# Patient Record
Sex: MALE | Race: WHITE | NOT HISPANIC OR LATINO | Employment: OTHER | URBAN - METROPOLITAN AREA
[De-identification: names, ages, dates, MRNs, and addresses within clinical notes are randomized per-mention and may not be internally consistent; named-entity substitution may affect disease eponyms.]

---

## 2017-01-13 PROBLEM — A04.71 RECURRENT CLOSTRIDIUM DIFFICILE DIARRHEA: Status: ACTIVE | Noted: 2017-01-13

## 2017-01-24 ENCOUNTER — PATIENT OUTREACH (OUTPATIENT)
Dept: HEALTH INFORMATION MANAGEMENT | Facility: OTHER | Age: 37
End: 2017-01-24

## 2017-01-24 NOTE — PROGRESS NOTES
· Chart reviewed.  Patient was discharged home from [facility] 1/23/17 with [facility] services.  Does not qualify for discharge outreach phone call.

## 2017-02-01 ENCOUNTER — TELEPHONE (OUTPATIENT)
Dept: NEUROLOGY | Facility: MEDICAL CENTER | Age: 37
End: 2017-02-01

## 2017-02-01 NOTE — TELEPHONE ENCOUNTER
Message: patient was admitted into the hospital 12/4/16. Patient was discharged 1/23/17. Patient's last tysabri infusion was 11/14/16. Whats the next step? Do you think he has the progessive MS and should he continue the tysabri?    Caller: angelika  Call Back #: [phone]  OK to leave detailed message: yes

## 2017-02-13 ENCOUNTER — OFFICE VISIT (OUTPATIENT)
Dept: PHYSICAL MEDICINE AND REHAB | Facility: MEDICAL CENTER | Age: 37
End: 2017-02-13
Payer: MEDICARE

## 2017-02-13 VITALS
HEIGHT: 71 IN | DIASTOLIC BLOOD PRESSURE: 74 MMHG | HEART RATE: 79 BPM | WEIGHT: 175 LBS | TEMPERATURE: 98.6 F | OXYGEN SATURATION: 96 % | BODY MASS INDEX: 24.5 KG/M2 | SYSTOLIC BLOOD PRESSURE: 124 MMHG

## 2017-02-13 DIAGNOSIS — G89.29 CHRONIC BILATERAL LOW BACK PAIN, WITH SCIATICA PRESENCE UNSPECIFIED: ICD-10-CM

## 2017-02-13 DIAGNOSIS — M54.5 CHRONIC BILATERAL LOW BACK PAIN, WITH SCIATICA PRESENCE UNSPECIFIED: ICD-10-CM

## 2017-02-13 DIAGNOSIS — M79.2 NERVE PAIN: ICD-10-CM

## 2017-02-13 DIAGNOSIS — G35 MULTIPLE SCLEROSIS (HCC): ICD-10-CM

## 2017-02-13 DIAGNOSIS — M62.838 MUSCLE SPASM: ICD-10-CM

## 2017-02-13 DIAGNOSIS — M79.18 MYOFASCIAL PAIN: ICD-10-CM

## 2017-02-13 DIAGNOSIS — M54.9 SPINAL PAIN: ICD-10-CM

## 2017-02-13 DIAGNOSIS — R25.2 SPASTICITY: ICD-10-CM

## 2017-02-13 PROCEDURE — 1111F DSCHRG MED/CURRENT MED MERGE: CPT | Performed by: PHYSICAL MEDICINE & REHABILITATION

## 2017-02-13 PROCEDURE — G8420 CALC BMI NORM PARAMETERS: HCPCS | Performed by: PHYSICAL MEDICINE & REHABILITATION

## 2017-02-13 PROCEDURE — G8484 FLU IMMUNIZE NO ADMIN: HCPCS | Performed by: PHYSICAL MEDICINE & REHABILITATION

## 2017-02-13 PROCEDURE — 1036F TOBACCO NON-USER: CPT | Performed by: PHYSICAL MEDICINE & REHABILITATION

## 2017-02-13 PROCEDURE — 99214 OFFICE O/P EST MOD 30 MIN: CPT | Performed by: PHYSICAL MEDICINE & REHABILITATION

## 2017-02-13 PROCEDURE — G8432 DEP SCR NOT DOC, RNG: HCPCS | Performed by: PHYSICAL MEDICINE & REHABILITATION

## 2017-02-13 RX ORDER — GABAPENTIN 100 MG/1
100 CAPSULE ORAL 3 TIMES DAILY
Qty: 45 CAP | Refills: 2 | Status: SHIPPED | OUTPATIENT
Start: 2017-02-13 | End: 2017-07-11

## 2017-02-13 RX ORDER — LIDOCAINE 50 MG/G
1 PATCH TOPICAL EVERY 24 HOURS
Qty: 30 PATCH | Refills: 2 | Status: SHIPPED | OUTPATIENT
Start: 2017-02-13 | End: 2017-04-11

## 2017-02-13 ASSESSMENT — ENCOUNTER SYMPTOMS
MYALGIAS: 1
BACK PAIN: 1
SPUTUM PRODUCTION: 0
NECK PAIN: 1
SENSORY CHANGE: 1
DIARRHEA: 0
ORTHOPNEA: 0
EYE DISCHARGE: 0
FEVER: 0
ABDOMINAL PAIN: 0
EYE REDNESS: 0
CLAUDICATION: 0
HEMOPTYSIS: 0
TINGLING: 1
CHILLS: 0

## 2017-02-13 NOTE — MR AVS SNAPSHOT
"        Jerzy Juan III   2017 1:20 PM   Office Visit   MRN: [id number]    Department:  Physiat[facility]   Dept Phone:  [phone]    Description:  Male : 1980   Provider:  John Peralta M.D.           Reason for Visit     New Patient           Allergies as of 2017     Allergen Noted Reactions    Other Drug 2016       CANNOT TAKE ANY STEROIDS WITH RECENT TYSABRI INFUSION    Asa [Aspirin] 2016   Unspecified    Bleeding in stomach as a baby      You were diagnosed with     Multiple sclerosis (CMS-HCC)   [340.ICD-9-CM]       Chronic bilateral low back pain, with sciatica presence unspecified   [6798735]       Nerve pain   [126304]       Spinal pain   [457293]         Vital Signs     Blood Pressure Pulse Temperature Height Weight Body Mass Index    124/74 mmHg 79 37 °C (98.6 °F) 1.803 m (5' 10.98\") 79.379 kg (175 lb) 24.42 kg/m2    Oxygen Saturation Smoking Status                96% Former Smoker          Basic Information     Date Of Birth Sex Race Ethnicity Preferred Language    1980 Male White Non- English      Your appointments     2017  3:00 PM   Follow Up Visit with Melissa P Bloch, M.D.   [facility] (--)    [address], [address]  [facility] [address] [phone]           You will be receiving a confirmation call a few days before your appointment from our automated call confirmation system.            Mar 22, 2017  1:30 PM   Follow Up Visit with John Peralta M.D.   [facility] (--)    [address]  [facility] [address] [phone]           You will be receiving a confirmation call a few days before your appointment from our automated call confirmation system.              Problem List              ICD-10-CM Priority Class Noted - Resolved    MS (multiple sclerosis) (HCC) G35 Low  2013 - Present    Depression F32.9 Low  2013 - Present    Multiple sclerosis exacerbation " (CMS-HCC) G35 High  4/7/2016 - Present    Urinary tract infection N39.0   10/7/2016 - Present    Hypokalemia E87.6   10/8/2016 - Present    Hyperglycemia R73.9   10/8/2016 - Present    C. 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      Below and/or attached are the medications your provider expects you to take. Review all of your home medications and newly ordered medications with your provider and/or pharmacist. Follow medication instructions as directed by your provider and/or pharmacist. Please keep your medication list with you and share with your provider. Update the information when medications are discontinued, doses are changed, or new medications (including over-the-counter products) are added; and carry medication information at all times in the event of emergency situations     Allergies:  OTHER DRUG - (reactions not documented)     ASA - Unspecified               Medications  Valid as of: February 13, 2017 -  2:24 PM    Generic Name Brand Name Tablet Size Instructions for use    BuPROPion HCl (TABLET SR 12 HR) WELLBUTRIN- MG Take 1 Tab by mouth 2 Times a Day.        Cholecalciferol   Take 1 Tab by mouth every day.        Escitalopram Oxalate (Tab)  LEXAPRO 20 MG Take 1 Tab by mouth every day.        Gabapentin (Cap) NEURONTIN 100 MG Take 1 Cap by mouth 3 times a day. as needed for nerve pain.        Lidocaine (Patch) LIDODERM 5 % Apply 1 Patch to skin as directed every 24 hours. as needed for nerve pain        Lidocaine HCl (Gel) XYLOCAINE 2 % Apply 1 Application to affected area(s) as needed (pain).        Magnesium Oxide (Cap) Magnesium Oxide 500 MG Take 2 Caps by mouth every day.        Natalizumab (Conc) TYSABRI 300 MG/15ML 300 mg by Intravenous route Q30 DAYS.        NON SPECIFIED   Take 1-2 Caps by mouth 2 Times a Day. Athletic Nutrients. OTC supplement/multivitamin   1 in am, 2 in pm        Ondansetron (TABLET DISPERSIBLE) ZOFRAN ODT 4 MG Take 1 Tab by mouth every 8 hours as needed for Nausea/Vomiting (give PO if no IV route available).        Oxybutynin Chloride (TABLET SR 24 HR) DITROPAN-XL 10 MG Take 1 Tab by mouth every evening.        Probiotic Product   Take 1 Cap by mouth every day.        TiZANidine HCl (Tab) ZANAFLEX 4 MG Take 1 Tab by mouth every 6 hours as needed. Indications: Muscle Spasticity        .                 Medicines prescribed today were sent to:     [facility] #[id number] - [place], [place] - [address] [address]    Phone: [phone] Fax: [phone]    Open 24 Hours?: No    [facility] - [place], [place] - [id number]    [id number] [facility] [address]    Phone: [phone] Fax: [phone]    Open 24 Hours?: No      Medication refill instructions:       If your prescription bottle indicates you have medication refills left, it is not necessary to call your provider’s office. Please contact your pharmacy and they will refill your medication.    If your prescription bottle indicates you do not have any refills left, you may request refills at any time through one of the following ways: The online [employer] CRM system (except Urgent Care), by calling your provider’s office, or by  asking your pharmacy to contact your provider’s office with a refill request. Medication refills are processed only during regular business hours and may not be available until the next business day. Your provider may request additional information or to have a follow-up visit with you prior to refilling your medication.   *Please Note: Medication refills are assigned a new Rx number when refilled electronically. Your pharmacy may indicate that no refills were authorized even though a new prescription for the same medication is available at the pharmacy. Please request the medicine by name with the pharmacy before contacting your provider for a refill.        Your To Do List     Future Labs/Procedures Complete By Expires    MR-LUMBAR SPINE-W/O  As directed 2/13/2018         [employer] Access Code: [email or web address]  Expires: 2/22/2017 12:05 PM    [employer]  A secure, online tool to manage your health information     [employer]’s [employer]® is a secure, online tool that connects you to your personalized health information from the privacy of your home -- day or night - making it very easy for you to manage your healthcare. Once the activation process is completed, you can even access your medical information using the [employer] jesus, which is available for free in the Apple Jesus store or Google Play store.     [employer] provides the following levels of access (as shown below):   My Chart Features   [facility] Doctor [facility]  Urgent  Care Non-[facility]  Primary Care  Doctor   Email your healthcare team securely and privately 24/7 X X X    Manage appointments: schedule your next appointment; view details of past/upcoming appointments X      Request prescription refills. X      View recent personal medical records, including lab and immunizations X X X X   View health record, including health history, allergies, medications X X X X   Read reports about your outpatient visits, procedures, consult and ER  notes X X X X   See your discharge summary, which is a recap of your hospital and/or ER visit that includes your diagnosis, lab results, and care plan. X X       How to register for [employer]:  1. Go to  [email or web address].  2. Click on the Sign Up Now box, which takes you to the New Member Sign Up page. You will need to provide the following information:  a. Enter your [employer] Access Code exactly as it appears at the top of this page. (You will not need to use this code after you’ve completed the sign-up process. If you do not sign up before the expiration date, you must request a new code.)   b. Enter your date of birth.   c. Enter your home email address.   d. Click Submit, and follow the next screen’s instructions.  3. Create a "[employer]t ID. This will be your "[employer]t login ID and cannot be changed, so think of one that is secure and easy to remember.  4. Create a Sionex password. You can change your password at any time.  5. Enter your Password Reset Question and Answer. This can be used at a later time if you forget your password.   6. Enter your e-mail address. This allows you to receive e-mail notifications when new information is available in Sionex.  7. Click Sign Up. You can now view your health information.    For assistance activating your [employer] account, call [phone]

## 2017-02-14 NOTE — PROGRESS NOTES
Subjective:      Jerzy Juan III is a 36 y.o. male who presents with New Patient      Chief complaint: MS pain        HPI   The patient's history is significant for multiple sclerosis, diagnosed in 2013, followed by neurology. The patient has had multiple hospital admissions as well as rehabilitation stay, some records available for review.    Regarding today's visit:    The patient notes ongoing pain in the neck as well as back, as well as extremities with neuropathic component.    The patient has low back pain, also lower limb nerve pain.    The patientis note spasticity lower limbs greater than upper limbs.    The patient has an electric wheelchair, has been nonambulatory for the last couple years. The patient has DME at home.    The patient has tried medications. He has been to physical therapy. He has tried massage therapy. He has tried modalities. The ongoing pain limits his ability to function. He is inquiring about additional treatment options.      MEDICAL RECORDS REVIEW/DATA REVIEW: Reviewed in epic.    Records Reviewed: Reviewed referring provider notes. Reviewed hospital admission records. Did not tolerate trial with Lyrica.    I reviewed medications. Reviewed medication list.    I reviewed  profile 2/13/2017.     I reviewed diagnostic studies:     I reviewed radiographs. Reviewed MRI brain, cervical spine, thoracic spine.    I reviewed lab studies.   Reviewed labs 1/2017, including CMP.    I reviewed medical issues. Followed by primary care provider, consults.    I reviewed family history: No neuromuscular disorders noted.    I reviewed social issues. On disability.      PAST MEDICAL HISTORY:   Past Medical History   Diagnosis Date   • Blood transfusion    • Headache(784.0)    • Heart murmur    • MS (multiple sclerosis) (CMS-HCC) DX 8/20/2013   • Back pain    • Fall      4/6/2016   • Urinary incontinence      condom catheter   • Depression      PTSD/clinical depression       PAST SURGICAL  HISTORY:    Past Surgical History   Procedure Laterality Date   • Open reduction     • Gastroscopy  1/14/2017     Procedure: GASTROSCOPY WITH FECAL MATTER TRANSPLANT;  Surgeon: Obinna Douglas M.D.;  Location: SURGERY [facility];  Service:        ALLERGIES:  Other drug and Asa    MEDICATIONS:    Outpatient Encounter Prescriptions as of 2/13/2017   Medication Sig Dispense Refill   • gabapentin (NEURONTIN) 100 MG Cap Take 1 Cap by mouth 3 times a day. as needed for nerve pain. 45 Cap 2   • lidocaine (LIDODERM) 5 % Patch Apply 1 Patch to skin as directed every 24 hours. as needed for nerve pain 30 Patch 2   • buPROPion SR (WELLBUTRIN-SR) 100 MG TABLET SR 12 HR Take 1 Tab by mouth 2 Times a Day. 60 Tab 0   • escitalopram (LEXAPRO) 20 MG tablet Take 1 Tab by mouth every day. 30 Tab 0   • lidocaine (XYLOCAINE) 2 % Gel Apply 1 Application to affected area(s) as needed (pain). 1 Bottle 0   • oxybutynin SR (DITROPAN-XL) 10 MG CR tablet Take 1 Tab by mouth every evening. 30 Tab 0   • tizanidine (ZANAFLEX) 4 MG Tab Take 1 Tab by mouth every 6 hours as needed. Indications: Muscle Spasticity 30 Tab 0   • Magnesium Oxide 500 MG Cap Take 2 Caps by mouth every day.     • NON SPECIFIED Take 1-2 Caps by mouth 2 Times a Day. Athletic Nutrients. OTC supplement/multivitamin   1 in am, 2 in pm     • Cholecalciferol (VITAMIN D PO) Take 1 Tab by mouth every day.     • ondansetron (ZOFRAN ODT) 4 MG TABLET DISPERSIBLE Take 1 Tab by mouth every 8 hours as needed for Nausea/Vomiting (give PO if no IV route available). 10 Tab 0   • natalizumab (TYSABRI) 300 MG/15ML Conc 300 mg by Intravenous route Q30 DAYS.     • Probiotic Product (PROBIOTIC DAILY PO) Take 1 Cap by mouth every day.       No facility-administered encounter medications on file as of 2/13/2017.       SOCIAL HISTORY:    Social History     Social History   • Marital Status:      Spouse Name: N/A   • Number of Children: N/A   • Years of Education: N/A     Social History  "Main Topics   • Smoking status: Former Smoker     Start date: 08/01/1997     Quit date: 08/01/2013   • Smokeless tobacco: Former User   • Alcohol Use: 0.0 oz/week     0 Standard drinks or equivalent per week      Comment: 1 beer/day   • Drug Use: Yes     Special: Inhaled      Comment: marijuana   • Sexual Activity: Not Asked      Comment: once daily for 23 yrs     Other Topics Concern   • None     Social History Narrative           Review of Systems   Constitutional: Negative for fever and chills.   HENT: Negative for ear discharge and ear pain.    Eyes: Negative for discharge and redness.   Respiratory: Negative for hemoptysis and sputum production.    Cardiovascular: Negative for orthopnea and claudication.   Gastrointestinal: Negative for abdominal pain and diarrhea.   Genitourinary: Negative for urgency.   Musculoskeletal: Positive for myalgias, back pain, joint pain and neck pain.   Skin: Negative.    Neurological: Positive for tingling and sensory change.   Endo/Heme/Allergies: Negative.    All other systems reviewed and are negative.        Objective:     /74 mmHg  Pulse 79  Temp(Src) 37 °C (98.6 °F)  Ht 1.803 m (5' 10.98\")  Wt 79.379 kg (175 lb)  BMI 24.42 kg/m2  SpO2 96%     Physical Exam  Constitutional: oriented to person, place, and time, appears well-developed and well-nourished.   HEENT: Normocephalic atraumatic, neck supple, no JVD noted, no masses noted, no meningeal signs noted  Lymphadenopathy: no cervical, supraclavicular, or inguinal lymphadenopathy noted  Cardiovascular: Intact distal pulses, including at wrists and ankles, no significant limb swelling noted  Pulmonary: No tachypnea noted, no accessory muscle use noted, no dyspnea noted  Abdominal: Soft, nontender, exhibits no distension, no peritoneal signs, no HSM  Musculoskeletal:   Right shoulder: exhibits mild tenderness. Mild pain with range of motion testing  Left shoulder: exhibits mild tenderness. Mild pain with range of " motion testing  Right hip: exhibits mild tenderness. Mild pain with range of motion testing  Left hip: exhibits mild tenderness. Mild pain with range of motion testing  Cervical back: exhibits mild decreased range of motion, mild tenderness and mild pain. Spurling's testing produces mild axial pain, trigger points noted  Lumbar back: exhibits decreased range of motion,  tenderness and  pain. negative straight leg testing, trigger points noted  Wrist/hand: mild pain with range of motion testing, negative tinel's at wrist, negative tinel's at elbows  Neurological: oriented to person, place, and time. Cranial nerves grossly intact, 4/5 strength throughout, nonfocal, Sensation intact distally. Reflexes 1-2+ in upper and lower limbs, spasticity noted in upper and lower limbs, stance/gait not tested   Skin: Skin is intact. no rashes or lesions noted  Psychiatric: normal mood and affect. behavior is normal. Judgment and thought content normal. Cognition and memory are normal.         Assessment/Plan:       ASSESSMENT:    1. Multiple sclerosis, spasticity, nerve pain, neurology consulted    - TENS UNIT    2. Low back pain, myofascial pain, lower limb nerve pain, consider radiculitis/stenosis    - MR-LUMBAR SPINE-W/O; Future      DISCUSSION/PLAN:    - I discussed management options. I reviewed symptomatic care    - I reviewed home exercise program, with medical precautions    - The patient can consider complementary trials with acupuncture and continue with massage therapy     - I reviewed medication monitoring.  I reviewed medication adjustments. I wrote prescription for:    - lidocaine (LIDODERM) 5 % Patch; Apply 1 Patch to skin as directed every 24 hours. as needed for nerve pain  Dispense: 30 Patch; Refill: 2  - gabapentin (NEURONTIN) 100 MG Cap; Take 1 Cap by mouth 3 times a day. as needed for nerve pain.  Dispense: 45 Cap; Refill: 2    - I reviewed risks, side effects, and interactions of medications, including  over-the-counter medications. I reviewed further symptomatic medications.    - I reviewed additional diagnostic options, including further sessions as imaging, electrodiagnostic testing, vascular studies, and further lab screen    - I reviewed additional therapeutic options, including injection/interventional therapy and additional consultative input    - I reviewed psychosocial interventions    - Followup with primary care provider and consultants regarding co-morbid medical isues, as well as for health maintenance/co-management    - Return after the above-noted MRI or an as-needed basis      Please note that this dictation was created using voice recognition software. I have made every reasonable attempt to correct obvious errors but there may be errors of grammar and content that I may have overlooked prior to finalization of this note.

## 2017-02-15 ENCOUNTER — HOSPITAL ENCOUNTER (OUTPATIENT)
Facility: MEDICAL CENTER | Age: 37
End: 2017-02-15
Attending: PHYSICIAN ASSISTANT
Payer: MEDICARE

## 2017-02-15 PROCEDURE — 87086 URINE CULTURE/COLONY COUNT: CPT

## 2017-02-15 PROCEDURE — 87186 SC STD MICRODIL/AGAR DIL: CPT

## 2017-02-15 PROCEDURE — 87077 CULTURE AEROBIC IDENTIFY: CPT

## 2017-02-16 NOTE — TELEPHONE ENCOUNTER
Called and spoke with pt. All information was given and pt and she verbally understood and will comply with all given. ARAM

## 2017-02-17 LAB
BACTERIA UR CULT: ABNORMAL
SIGNIFICANT IND 70042: ABNORMAL
SOURCE SOURCE: ABNORMAL

## 2017-02-21 ENCOUNTER — OFFICE VISIT (OUTPATIENT)
Dept: NEUROLOGY | Facility: MEDICAL CENTER | Age: 37
End: 2017-02-21
Payer: MEDICARE

## 2017-02-21 VITALS
BODY MASS INDEX: 25.05 KG/M2 | WEIGHT: 175 LBS | HEART RATE: 88 BPM | HEIGHT: 70 IN | TEMPERATURE: 99.2 F | SYSTOLIC BLOOD PRESSURE: 120 MMHG | OXYGEN SATURATION: 95 % | DIASTOLIC BLOOD PRESSURE: 82 MMHG

## 2017-02-21 DIAGNOSIS — G35 MS (MULTIPLE SCLEROSIS) (HCC): ICD-10-CM

## 2017-02-21 DIAGNOSIS — F32.9 REACTIVE DEPRESSION: ICD-10-CM

## 2017-02-21 PROCEDURE — G8419 CALC BMI OUT NRM PARAM NOF/U: HCPCS | Performed by: PSYCHIATRY & NEUROLOGY

## 2017-02-21 PROCEDURE — G8484 FLU IMMUNIZE NO ADMIN: HCPCS | Performed by: PSYCHIATRY & NEUROLOGY

## 2017-02-21 PROCEDURE — 99214 OFFICE O/P EST MOD 30 MIN: CPT | Performed by: PSYCHIATRY & NEUROLOGY

## 2017-02-21 PROCEDURE — 1111F DSCHRG MED/CURRENT MED MERGE: CPT | Performed by: PSYCHIATRY & NEUROLOGY

## 2017-02-21 PROCEDURE — G8432 DEP SCR NOT DOC, RNG: HCPCS | Performed by: PSYCHIATRY & NEUROLOGY

## 2017-02-21 PROCEDURE — 1036F TOBACCO NON-USER: CPT | Performed by: PSYCHIATRY & NEUROLOGY

## 2017-02-21 RX ORDER — TIZANIDINE 4 MG/1
4 TABLET ORAL EVERY 6 HOURS PRN
Qty: 60 TAB | Refills: 5 | Status: ON HOLD | OUTPATIENT
Start: 2017-02-21 | End: 2017-07-18

## 2017-02-21 NOTE — MR AVS SNAPSHOT
"        Jerzy Juan III   2017 3:00 PM   Office Visit   MRN: [id number]    Department:  [facility]   Dept Phone:  [phone]    Description:  Male : 1980   Provider:  Melissa P Bloch, M.D.           Reason for Visit     Multiple Sclerosis 4 month follow up      Allergies as of 2017     Allergen Noted Reactions    Other Drug 2016       CANNOT TAKE ANY STEROIDS WITH RECENT TYSABRI INFUSION    Asa [Aspirin] 2016   Unspecified    Bleeding in stomach as a baby      You were diagnosed with     MS (multiple sclerosis) (CMS-HCC)   [427771]         Vital Signs     Blood Pressure Pulse Temperature Height Weight Body Mass Index    120/82 mmHg 88 37.3 °C (99.2 °F) 1.778 m (5' 10\") 79.379 kg (175 lb) 25.11 kg/m2    Oxygen Saturation Smoking Status                95% Former Smoker          Basic Information     Date Of Birth Sex Race Ethnicity Preferred Language    1980 Male White Non- English      Your appointments     2017  4:30 PM   MR SP WO 30 with DOUBLE R MRI 1   IMAGING DOUBLE R. (Double R)    [address]  [facility] [address]   [phone]            Mar 22, 2017  1:30 PM   Follow Up Visit with John Peralta M.D.   [facility] (--)    [address]  [facility] [id number] [phone]           You will be receiving a confirmation call a few days before your appointment from our automated call confirmation system.            May 22, 2017 11:40 AM   Follow Up Visit with Melissa P Bloch, M.D.   [facility] (--)    [address], [address]  [facility] [address] [phone]           You will be receiving a confirmation call a few days before your appointment from our automated call confirmation system.              Problem List              ICD-10-CM Priority Class Noted - Resolved    MS (multiple sclerosis) ([facility]) G35 Low  2013 - Present    Depression F32.9 Low  2013 - Present  "    Multiple sclerosis exacerbation (CMS-HCC) G35 High  4/7/2016 - Present    Urinary tract infection N39.0   10/7/2016 - Present    Hypokalemia E87.6   10/8/2016 - Present    Hyperglycemia R73.9   10/8/2016 - Present    C. 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      Below and/or attached are the medications your provider expects you to take. Review all of your home medications and newly ordered medications with your provider and/or pharmacist. Follow medication instructions as directed by your provider and/or pharmacist. Please keep your medication list with you and share with your provider. Update the information when medications are discontinued, doses are changed, or new medications (including over-the-counter products) are added; and carry medication information at all times in the event of emergency situations     Allergies:  OTHER DRUG - (reactions not documented)     ASA - Unspecified               Medications  Valid as of: February 21, 2017 -  4:04 PM    Generic Name Brand Name Tablet Size Instructions for use    BuPROPion HCl (TABLET SR 12 HR) WELLBUTRIN- MG Take 1 Tab by mouth 2 Times a Day.        Cholecalciferol   Take 1 Tab by mouth every day.         Escitalopram Oxalate (Tab) LEXAPRO 20 MG Take 1 Tab by mouth every day.        Gabapentin (Cap) NEURONTIN 100 MG Take 1 Cap by mouth 3 times a day. as needed for nerve pain.        Lidocaine (Patch) LIDODERM 5 % Apply 1 Patch to skin as directed every 24 hours. as needed for nerve pain        Lidocaine HCl (Gel) XYLOCAINE 2 % Apply 1 Application to affected area(s) as needed (pain).        Magnesium Oxide (Cap) Magnesium Oxide 500 MG Take 2 Caps by mouth every day.        Natalizumab (Conc) TYSABRI 300 MG/15ML 300 mg by Intravenous route Q30 DAYS.        NON SPECIFIED   Take 1-2 Caps by mouth 2 Times a Day. Athletic Nutrients. OTC supplement/multivitamin   1 in am, 2 in pm        Ondansetron (TABLET DISPERSIBLE) ZOFRAN ODT 4 MG Take 1 Tab by mouth every 8 hours as needed for Nausea/Vomiting (give PO if no IV route available).        Oxybutynin Chloride (TABLET SR 24 HR) DITROPAN-XL 10 MG Take 1 Tab by mouth every evening.        Probiotic Product   Take 1 Cap by mouth every day.        TiZANidine HCl (Tab) ZANAFLEX 4 MG Take 1 Tab by mouth every 6 hours as needed. Indications: Muscle Spasticity        .                 Medicines prescribed today were sent to:     \[facility]\"" [facility] #[id number] - [place], [place] - [address]    [address] [address]    Phone: [phone] Fax: [phone]    Open 24 Hours?: No    [facility] - [place], [place] - [id number]    [id number] [facility] [address]    Phone: [phone] Fax: [phone]    Open 24 Hours?: No      Medication refill instructions:       If your prescription bottle indicates you have medication refills left, it is not necessary to call your provider’s office. Please contact your pharmacy and they will refill your medication.    If your prescription bottle indicates you do not have any refills left, you may request refills at any time through one of the following ways: The online [employer] system (except Urgent Care), by calling  your provider’s office, or by asking your pharmacy to contact your provider’s office with a refill request. Medication refills are processed only during regular business hours and may not be available until the next business day. Your provider may request additional information or to have a follow-up visit with you prior to refilling your medication.   *Please Note: Medication refills are assigned a new Rx number when refilled electronically. Your pharmacy may indicate that no refills were authorized even though a new prescription for the same medication is available at the pharmacy. Please request the medicine by name with the pharmacy before contacting your provider for a refill.           TiVo Access Code: [email or web address]  Expires: 2/22/2017 12:05 PM    TiVo  A secure, online tool to manage your health information     "[employer]’s TiVo® is a secure, online tool that connects you to your personalized health information from the privacy of your home -- day or night - making it very easy for you to manage your healthcare. Once the activation process is completed, you can even access your medical information using the TiVo jesus, which is available for free in the [employer] store or Google Play store.     TiVo provides the following levels of access (as shown below):   My Chart Features   [facility] Doctor [facility]  Specialists [facility]  Urgent  Care Non-[facility]  Primary Care  Doctor   Email your healthcare team securely and privately 24/7 X X X    Manage appointments: schedule your next appointment; view details of past/upcoming appointments X      Request prescription refills. X      View recent personal medical records, including lab and immunizations X X X X   View health record, including health history, allergies, medications X X X X   Read reports about your outpatient visits, procedures, consult and ER notes X X X X   See your discharge summary, which is a recap of your hospital and/or  ER visit that includes your diagnosis, lab results, and care plan. X X       How to register for DueDil:  1. Go to  [email or web address].  2. Click on the Sign Up Now box, which takes you to the New Member Sign Up page. You will need to provide the following information:  a. Enter your DueDil Access Code exactly as it appears at the top of this page. (You will not need to use this code after you’ve completed the sign-up process. If you do not sign up before the expiration date, you must request a new code.)   b. Enter your date of birth.   c. Enter your home email address.   d. Click Submit, and follow the next screen’s instructions.  3. Create a Autotethert ID. This will be your Autotethert login ID and cannot be changed, so think of one that is secure and easy to remember.  4. Create a Autotethert password. You can change your password at any time.  5. Enter your Password Reset Question and Answer. This can be used at a later time if you forget your password.   6. Enter your e-mail address. This allows you to receive e-mail notifications when new information is available in DueDil.  7. Click Sign Up. You can now view your health information.    For assistance activating your DueDil account, call [phone]

## 2017-02-24 ENCOUNTER — APPOINTMENT (OUTPATIENT)
Dept: RADIOLOGY | Facility: MEDICAL CENTER | Age: 37
End: 2017-02-24
Attending: PHYSICAL MEDICINE & REHABILITATION
Payer: MEDICARE

## 2017-02-24 DIAGNOSIS — M54.5 CHRONIC BILATERAL LOW BACK PAIN, WITH SCIATICA PRESENCE UNSPECIFIED: ICD-10-CM

## 2017-02-24 DIAGNOSIS — G89.29 CHRONIC BILATERAL LOW BACK PAIN, WITH SCIATICA PRESENCE UNSPECIFIED: ICD-10-CM

## 2017-02-24 DIAGNOSIS — M79.2 NERVE PAIN: ICD-10-CM

## 2017-02-24 PROCEDURE — 72148 MRI LUMBAR SPINE W/O DYE: CPT

## 2017-02-28 ENCOUNTER — OUTPATIENT INFUSION SERVICES (OUTPATIENT)
Dept: ONCOLOGY | Facility: MEDICAL CENTER | Age: 37
End: 2017-02-28
Attending: PSYCHIATRY & NEUROLOGY
Payer: MEDICARE

## 2017-02-28 NOTE — ASSESSMENT & PLAN NOTE
Patient states that he is come to terms with his illness during his hospitalization and feels slightly less depressed.

## 2017-02-28 NOTE — ASSESSMENT & PLAN NOTE
Patient was recently hospitalized with prolonged C. difficile infection after being treated for urosepsis. He is now back in a home facility. Patient states that he is concerned about frequent urinary tract infections and helping to take antibiotics and the antibiotics causing secondary infection. Patient states overall he feels somewhat better.

## 2017-02-28 NOTE — PROGRESS NOTES
CHIEF COMPLAINT  Chief Complaint   Patient presents with   • Multiple Sclerosis     4 month follow up       HPI  Jerzy Juan III is a 35 y.o. male who presents for treatment of his MS, patient is somewhat frustrated and fatigued about dealing with his disease but feels like he is in a better place than he has been in the past.  MS (multiple sclerosis) ([facility])  Patient was recently hospitalized with prolonged C. difficile infection after being treated for urosepsis. He is now back in a home facility. Patient states that he is concerned about frequent urinary tract infections and helping to take antibiotics and the antibiotics causing secondary infection. Patient states overall he feels somewhat better.    Depression  Patient states that he is come to terms with his illness during his hospitalization and feels slightly less depressed.      REVIEW OF SYSTEMS  Pertinent Positives:fatigue, anxiety, paraesthesias   All other systems are negative.     PAST MEDICAL HISTORY  Past Medical History   Diagnosis Date   • Blood transfusion    • Headache(784.0)    • Heart murmur    • MS (multiple sclerosis) (CMS-HCC) DX 8/20/2013   • Back pain    • Fall      4/6/2016   • Urinary incontinence      condom catheter   • Depression      PTSD/clinical depression       SOCIAL HISTORY  Social History     Social History   • Marital Status:      Spouse Name: N/A   • Number of Children: N/A   • Years of Education: N/A     Occupational History   • Not on file.     Social History Main Topics   • Smoking status: Former Smoker     Start date: 08/01/1997     Quit date: 08/01/2013   • Smokeless tobacco: Former User   • Alcohol Use: 0.0 oz/week     0 Standard drinks or equivalent per week      Comment: 1 beer/day   • Drug Use: Yes     Special: Inhaled      Comment: marijuana   • Sexual Activity: Not on file      Comment: once daily for 23 yrs     Other Topics Concern   • Not on file     Social History Narrative       SURGICAL  HISTORY  Past Surgical History   Procedure Laterality Date   • Open reduction     • Gastroscopy  1/14/2017     Procedure: GASTROSCOPY WITH FECAL MATTER TRANSPLANT;  Surgeon: Obinna Douglas M.D.;  Location: SURGERY [facility];  Service:        CURRENT MEDICATIONS  Current Outpatient Prescriptions   Medication Sig Dispense Refill   • tizanidine (ZANAFLEX) 4 MG Tab Take 1 Tab by mouth every 6 hours as needed. Indications: Muscle Spasticity 60 Tab 5   • gabapentin (NEURONTIN) 100 MG Cap Take 1 Cap by mouth 3 times a day. as needed for nerve pain. 45 Cap 2   • lidocaine (LIDODERM) 5 % Patch Apply 1 Patch to skin as directed every 24 hours. as needed for nerve pain 30 Patch 2   • buPROPion SR (WELLBUTRIN-SR) 100 MG TABLET SR 12 HR Take 1 Tab by mouth 2 Times a Day. 60 Tab 0   • escitalopram (LEXAPRO) 20 MG tablet Take 1 Tab by mouth every day. 30 Tab 0   • lidocaine (XYLOCAINE) 2 % Gel Apply 1 Application to affected area(s) as needed (pain). 1 Bottle 0   • oxybutynin SR (DITROPAN-XL) 10 MG CR tablet Take 1 Tab by mouth every evening. 30 Tab 0   • Magnesium Oxide 500 MG Cap Take 2 Caps by mouth every day.     • NON SPECIFIED Take 1-2 Caps by mouth 2 Times a Day. Athletic Nutrients. OTC supplement/multivitamin   1 in am, 2 in pm     • Cholecalciferol (VITAMIN D PO) Take 1 Tab by mouth every day.     • ondansetron (ZOFRAN ODT) 4 MG TABLET DISPERSIBLE Take 1 Tab by mouth every 8 hours as needed for Nausea/Vomiting (give PO if no IV route available). 10 Tab 0   • natalizumab (TYSABRI) 300 MG/15ML Conc 300 mg by Intravenous route Q30 DAYS.     • Probiotic Product (PROBIOTIC DAILY PO) Take 1 Cap by mouth every day.       No current facility-administered medications for this visit.       ALLERGIES  Allergies   Allergen Reactions   • Other Drug      CANNOT TAKE ANY STEROIDS WITH RECENT TYSABRI INFUSION   • Asa [Aspirin] Unspecified     Bleeding in stomach as a baby       PHYSICAL EXAM  VITAL SIGNS: /82 mmHg  Pulse 88  " Temp(Src) 37.3 °C (99.2 °F)  Ht 1.778 m (5' 10\")  Wt 79.379 kg (175 lb)  BMI 25.11 kg/m2  SpO2 95%  Constitutional: Well developed, Well nourished, No acute distress, Non-toxic appearance.   HENT: normocephalic, atraumatic   Eyes: fundi-disc sharp, perrl   Neck: Normal range of motion, No tenderness, Supple, No stridor.   Cardiovascular: Normal heart rate, Normal rhythm, No murmurs, No rubs, No gallops.   Thorax & Lungs: Normal breath sounds, No respiratory distress, No wheezing, No chest tenderness.   Abdomen: Bowel sounds normal, Soft, No tenderness, No masses, No pulsatile masses.   Skin: Warm, Dry, No erythema, No rash.   Back: No tenderness, No CVA tenderness.   Extremities: Intact distal pulses, No edema, No tenderness, No cyanosis, No clubbing.   Neurologic: A&Ox3, CN:2-12:nystagmus, motor: increased tone, sensory; decreased in his legs, wheelchair bound  Psychiatric: Affect normal, Judgment normal, Mood normal.   Lab: Reviewed with patient in detail and as noted in results.        RADIOLOGY/PROCEDURES  MRI of the brain reviewed in the PACS system with the patient.      FINDINGS:  There are multiple ovoid and ushaped areas of increased T2 signal intensity in the immediate periventricular and subcortical white matter. Several lesions are also noted in the zabrina at the level of the brachium pontis. The size and number of lesions has   not changed significantly since previous exam. There are no \"active\" enhancing lesions in the brain parenchyma.    The calvariae are unremarkable.  There are no extra-axial fluid collections.  The ventricular system and basal cisterns are within normal limits.  There are no mass effects or shift of midline structures.  There are no hemorrhagic lesions.  The diffusion   weighted axial images show no evidence of acute cerebral infarction.    The postcontrast images show no areas of abnormal parenchymal or meningeal enhancement.    The brainstem and posterior fossa structures " "are unremarkable.    Vascular flow voids in the vertebrobasilar and carotid arteries, [place], and dural venous sinuses are intact.    The paranasal sinuses and mastoids in the field of view are unremarkable.         Impression          1.  Numerous periventricular, subcortical, and pontine areas of demyelination unchanged from previous exam of 2/29/16 and consistent with the patient's known diagnosis of multiple sclerosis.    2.  No \"active\" enhancing lesions in the brain parenchyma.           ASSESSMENT AND PLAN  1. MS (multiple sclerosis)  Plan to restart Tysabri as patient is HILARIA virus negative re-and his C. difficile is clear. Patient given encouragement.   Patient is taking vitamin D and working on a healthy diet. He will follow up with urologist to monitor urinary tract infections.    2. Depression  Pt will consider therapy and support groups and significant encouragement for home therapy given to patient.     I spent 30 minutes with this patient and his caregiver, over fifty percent was spent counseling patient on their condition, best management practices, reviewing test results and risks and benefits of treatment.          "

## 2017-03-04 ENCOUNTER — OUTPATIENT INFUSION SERVICES (OUTPATIENT)
Dept: ONCOLOGY | Facility: MEDICAL CENTER | Age: 37
End: 2017-03-04
Attending: PSYCHIATRY & NEUROLOGY
Payer: MEDICARE

## 2017-03-04 VITALS
HEIGHT: 71 IN | TEMPERATURE: 98.7 F | DIASTOLIC BLOOD PRESSURE: 89 MMHG | WEIGHT: 177.91 LBS | BODY MASS INDEX: 24.91 KG/M2 | OXYGEN SATURATION: 96 % | RESPIRATION RATE: 18 BRPM | HEART RATE: 92 BPM | SYSTOLIC BLOOD PRESSURE: 120 MMHG

## 2017-03-04 PROCEDURE — 700105 HCHG RX REV CODE 258: Performed by: PSYCHIATRY & NEUROLOGY

## 2017-03-04 PROCEDURE — 700111 HCHG RX REV CODE 636 W/ 250 OVERRIDE (IP): Performed by: PSYCHIATRY & NEUROLOGY

## 2017-03-04 PROCEDURE — 306780 HCHG STAT FOR TRANSFUSION PER CASE

## 2017-03-04 PROCEDURE — 96413 CHEMO IV INFUSION 1 HR: CPT

## 2017-03-04 RX ADMIN — NATALIZUMAB 300 MG: 300 INJECTION INTRAVENOUS at 13:55

## 2017-03-04 ASSESSMENT — PAIN SCALES - GENERAL: PAINLEVEL: 4=SLIGHT-MODERATE PAIN

## 2017-03-05 NOTE — PROGRESS NOTES
Jerzy here for tysabri infusion. Jerzy reports no new or changed symptoms. Patient stated that UTI is resolved. TOUCH pre-infusion patient checklist completed online. Tysabri infused as ordered. Jerzy tolerated well. Jerzy then observed by RN for one hour after infusion finished. No signed or symptoms of adverse reaction observed or expressed. Jerzy DC'd home in good  condition. Jerzy returns monthly.

## 2017-03-14 ENCOUNTER — TELEPHONE (OUTPATIENT)
Dept: PHYSICAL MEDICINE AND REHAB | Facility: MEDICAL CENTER | Age: 37
End: 2017-03-14

## 2017-03-14 NOTE — TELEPHONE ENCOUNTER
Mother wanted to know if Dr. Peralta could help Jerzy with pain in leg. I let her know he could discuss at next office visit 3/22/17

## 2017-03-22 ENCOUNTER — TELEPHONE (OUTPATIENT)
Dept: PHYSICAL MEDICINE AND REHAB | Facility: MEDICAL CENTER | Age: 37
End: 2017-03-22

## 2017-03-22 ENCOUNTER — OFFICE VISIT (OUTPATIENT)
Dept: PHYSICAL MEDICINE AND REHAB | Facility: MEDICAL CENTER | Age: 37
End: 2017-03-22
Payer: MEDICARE

## 2017-03-22 VITALS
HEART RATE: 82 BPM | HEIGHT: 70 IN | BODY MASS INDEX: 25.34 KG/M2 | SYSTOLIC BLOOD PRESSURE: 128 MMHG | OXYGEN SATURATION: 96 % | TEMPERATURE: 98.2 F | WEIGHT: 177 LBS | DIASTOLIC BLOOD PRESSURE: 78 MMHG

## 2017-03-22 DIAGNOSIS — M79.18 MYOFASCIAL PAIN: ICD-10-CM

## 2017-03-22 DIAGNOSIS — M51.36 DDD (DEGENERATIVE DISC DISEASE), LUMBAR: ICD-10-CM

## 2017-03-22 DIAGNOSIS — M79.2 NERVE PAIN: ICD-10-CM

## 2017-03-22 DIAGNOSIS — M54.5 CHRONIC BILATERAL LOW BACK PAIN, WITH SCIATICA PRESENCE UNSPECIFIED: ICD-10-CM

## 2017-03-22 DIAGNOSIS — G35 MULTIPLE SCLEROSIS (HCC): ICD-10-CM

## 2017-03-22 DIAGNOSIS — G89.29 CHRONIC BILATERAL LOW BACK PAIN, WITH SCIATICA PRESENCE UNSPECIFIED: ICD-10-CM

## 2017-03-22 DIAGNOSIS — M54.16 LUMBAR RADICULITIS: ICD-10-CM

## 2017-03-22 DIAGNOSIS — M25.559 ARTHRALGIA OF HIP, UNSPECIFIED LATERALITY: ICD-10-CM

## 2017-03-22 PROCEDURE — G8419 CALC BMI OUT NRM PARAM NOF/U: HCPCS | Performed by: PHYSICAL MEDICINE & REHABILITATION

## 2017-03-22 PROCEDURE — 1036F TOBACCO NON-USER: CPT | Performed by: PHYSICAL MEDICINE & REHABILITATION

## 2017-03-22 PROCEDURE — G8432 DEP SCR NOT DOC, RNG: HCPCS | Performed by: PHYSICAL MEDICINE & REHABILITATION

## 2017-03-22 PROCEDURE — G8484 FLU IMMUNIZE NO ADMIN: HCPCS | Performed by: PHYSICAL MEDICINE & REHABILITATION

## 2017-03-22 PROCEDURE — 99214 OFFICE O/P EST MOD 30 MIN: CPT | Performed by: PHYSICAL MEDICINE & REHABILITATION

## 2017-03-22 NOTE — MR AVS SNAPSHOT
"        Jerzy Juan III   3/22/2017 1:30 PM   Office Visit   MRN: [id number]    Department:  [facility]   Dept Phone:  [phone]    Description:  Male : 1980   Provider:  John Peralta M.D.           Reason for Visit     Follow-Up           Allergies as of 3/22/2017     Allergen Noted Reactions    Other Drug 2016       CANNOT TAKE ANY STEROIDS WITH RECENT TYSABRI INFUSION    Asa [Aspirin] 2016   Unspecified    Bleeding in stomach as a baby      Vital Signs     Blood Pressure Pulse Temperature Height Weight Body Mass Index    128/78 mmHg 82 36.8 °C (98.2 °F) 1.778 m (5' 10\") 80.287 kg (177 lb) 25.40 kg/m2    Oxygen Saturation Smoking Status                96% Former Smoker          Basic Information     Date Of Birth Sex Race Ethnicity Preferred Language    1980 Male White Non- English      Your appointments     2017  1:30 PM   EST MISC Infusion 2 HR with RN 5   Infusion Services ([facility])    [address] [facility] [address]  [facility] [id number] [phone]            May 04, 2017  4:20 PM   Follow Up Visit with John Peralta M.D.   [facility] PHYSIATRY (--)    [address]  [facility] [address] [phone]           You will be receiving a confirmation call a few days before your appointment from our automated call confirmation system.            May 22, 2017 11:40 AM   Follow Up Visit with Melissa P Bloch, M.D.   [facility] Neurology (--)    [address], [address]  [facility] [address] [phone]           You will be receiving a confirmation call a few days before your appointment from our automated call confirmation system.              Problem List              ICD-10-CM Priority Class Noted - Resolved    MS (multiple sclerosis) (HCC) G35 Low  2013 - Present    Depression F32.9 Low  2013 - Present    Multiple sclerosis exacerbation (CMS-HCC) G35 High  2016 - Present    Urinary tract infection N39.0   " 10/7/2016 - Present    Hypokalemia E87.6   10/8/2016 - Present    Hyperglycemia R73.9   10/8/2016 - Present    C. 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      Below and/or attached are the medications your provider expects you to take. Review all of your home medications and newly ordered medications with your provider and/or pharmacist. Follow medication instructions as directed by your provider and/or pharmacist. Please keep your medication list with you and share with your provider. Update the information when medications are discontinued, doses are changed, or new medications (including over-the-counter products) are added; and carry medication information at all times in the event of emergency situations     Allergies:  OTHER DRUG - (reactions not documented)     ASA - Unspecified               Medications  Valid as of: March 22, 2017 -  2:41 PM    Generic Name Brand Name Tablet Size Instructions for use    BuPROPion HCl (TABLET SR 12 HR) WELLBUTRIN- MG Take 1 Tab by mouth 2 Times a Day.        Cholecalciferol   Take 1 Tab by mouth every day.        Cranberry (Tab) Cranberry 125 MG Take  by mouth.        Escitalopram Oxalate (Tab) LEXAPRO 20 MG Take 1 Tab by  mouth every day.        Gabapentin (Cap) NEURONTIN 100 MG Take 1 Cap by mouth 3 times a day. as needed for nerve pain.        Lidocaine (Patch) LIDODERM 5 % Apply 1 Patch to skin as directed every 24 hours. as needed for nerve pain        Lidocaine HCl (Gel) XYLOCAINE 2 % Apply 1 Application to affected area(s) as needed (pain).        Magnesium Oxide (Cap) Magnesium Oxide 500 MG Take 2 Caps by mouth every day.        Natalizumab (Conc) TYSABRI 300 MG/15ML 300 mg by Intravenous route Q30 DAYS.        NON SPECIFIED   Take 1-2 Caps by mouth 2 Times a Day. Athletic Nutrients. OTC supplement/multivitamin   1 in am, 2 in pm        Ondansetron (TABLET DISPERSIBLE) ZOFRAN ODT 4 MG Take 1 Tab by mouth every 8 hours as needed for Nausea/Vomiting (give PO if no IV route available).        Oxybutynin Chloride (TABLET SR 24 HR) DITROPAN-XL 10 MG Take 1 Tab by mouth every evening.        Probiotic Product   Take 1 Cap by mouth every day.        TiZANidine HCl (Tab) ZANAFLEX 4 MG Take 1 Tab by mouth every 6 hours as needed. Indications: Muscle Spasticity        .                 Medicines prescribed today were sent to:     [id number] - [place], [place] - [address] [address]    Phone: [phone] Fax: [phone]    Open 24 Hours?: No    [facility] - [place], [place] - [address]    [address] [facility] [address]    Phone: [phone] Fax: [phone]    Open 24 Hours?: No      Medication refill instructions:       If your prescription bottle indicates you have medication refills left, it is not necessary to call your provider’s office. Please contact your pharmacy and they will refill your medication.    If your prescription bottle indicates you do not have any refills left, you may request refills at any time through one of the following ways: The online m-spatial system (except Urgent Care), by calling your provider’s office, or by asking your pharmacy to  contact your provider’s office with a refill request. Medication refills are processed only during regular business hours and may not be available until the next business day. Your provider may request additional information or to have a follow-up visit with you prior to refilling your medication.   *Please Note: Medication refills are assigned a new Rx number when refilled electronically. Your pharmacy may indicate that no refills were authorized even though a new prescription for the same medication is available at the pharmacy. Please request the medicine by name with the pharmacy before contacting your provider for a refill.           demandmart Access Code: [email or web address]  Expires: 4/21/2017 12:52 PM    demandmart  A secure, online tool to manage your health information     [employer]’s demandmart® is a secure, online tool that connects you to your personalized health information from the privacy of your home -- day or night - making it very easy for you to manage your healthcare. Once the activation process is completed, you can even access your medical information using the demandmart jesus, which is available for free in the [employer] store or Google Play store.     demandmart provides the following levels of access (as shown below):   My Chart Features   [facility] Doctor [facility]  Specialists [facility]  Urgent  Care Non-[facility]  Primary Care  Doctor   Email your healthcare team securely and privately 24/7 X X X    Manage appointments: schedule your next appointment; view details of past/upcoming appointments X      Request prescription refills. X      View recent personal medical records, including lab and immunizations X X X X   View health record, including health history, allergies, medications X X X X   Read reports about your outpatient visits, procedures, consult and ER notes X X X X   See your discharge summary, which is a recap of your hospital and/or ER visit that includes your diagnosis, lab results, and  care plan. X X       How to register for [employer]:  1. Go to  [email or web address].  2. Click on the Sign Up Now box, which takes you to the New Member Sign Up page. You will need to provide the following information:  a. Enter your [employer] Access Code exactly as it appears at the top of this page. (You will not need to use this code after you’ve completed the sign-up process. If you do not sign up before the expiration date, you must request a new code.)   b. Enter your date of birth.   c. Enter your home email address.   d. Click Submit, and follow the next screen’s instructions.  3. Create a [employer] ID. This will be your [employer] login ID and cannot be changed, so think of one that is secure and easy to remember.  4. Create a [employer] password. You can change your password at any time.  5. Enter your Password Reset Question and Answer. This can be used at a later time if you forget your password.   6. Enter your e-mail address. This allows you to receive e-mail notifications when new information is available in [employer].  7. Click Sign Up. You can now view your health information.    For assistance activating your [employer] account, call [phone]

## 2017-03-22 NOTE — TELEPHONE ENCOUNTER
I left message that I checked with pharmacy and there was no auth needed for lidocaine patches, co-pay is about $120.00 and call to verify with pharmacy and to get filled if desired.

## 2017-03-23 NOTE — PROGRESS NOTES
Special Procedures H&P:    Subjective: Mr. Juan notes ongoing low back pain, also left lower limb radiating pain with neuropathic component. He has had prior conservative treatment trial.    The patient has multiple sclerosis, mobility impaired.    MEDICAL RECORDS REVIEW/DATA REVIEW: Reviewed in epic.    I reviewed medications.     I reviewed diagnostic studies:     I reviewed radiographs. Reviewed MRI lumbar spine 2/2017, see below. Reviewed MRI brain, cervical spine, thoracic spine.    I reviewed lab studies.   Reviewed labs 1/2017, including CMP.    I reviewed medical issues. Followed by primary care provider, consultants.    I reviewed family history: No neuromuscular disorders noted.    I reviewed social issues. On disability.        2/24/2017 4:50 PM    HISTORY/REASON FOR EXAM: Chronic LBP extending to bilateral legs. Pt has a hx of MS x 3yrs.    TECHNIQUE/EXAM DESCRIPTION:  MRI of the lumbar spine without contrast.    The study was performed on a [employer] Signa 1.5 Kayce MRI scanner.  T1 sagittal, T2 sagittal, and T2 axial images were obtained of the lumbar spine.    COMPARISON: None.    FINDINGS: Motion artifact degrades the examination and limits evaluation.      There is a chronic compression deformity of the L4 vertebral body with less than 25% loss of height anteriorly. There is no retropulsion. The lumbar vertebral bodies have a normal height and alignment. There is mild disc space height loss at L4-5 and   L5-S1 with associated degenerative disc desiccation. There is a small rounded T1 and T2 hyperintense structure in the right posterior aspect of the L3 vertebral body consistent with a benign spinal hemangioma The conus medullaris has a normal caliber,   course and signal intensity.    Level-specific findings as follows:    L5-S1: There is mild broad posterior disc bulge. There is mild bilateral neural foraminal stenosis.  L4-5: There is mild broad posterior disc bulge. There is mild facet arthropathy  and ligamentum flavum hypertrophy. There is mild bilateral neural foraminal stenosis.  L3-4: There is mild facet arthropathy. There is mild diffuse disc bulge. There is mild right greater than left neural foraminal stenosis.  L2-3: No significant central canal or neural foraminal stenosis.  L1-2: No significant central canal or neural foraminal stenosis.           Impression          1.  Chronic compression deformity of the L4 vertebral body with less than 25% loss of height anteriorly and no retropulsion.  2.  Multilevel degenerative changes of the lumbar spine as described above. Exam is limited secondary to extensive motion artifact.        PAST MEDICAL HISTORY:    Past Medical History     Past Medical History    Diagnosis  Date    •  Blood transfusion      •  Headache(784.0)      •  Heart murmur      •  MS (multiple sclerosis) (CMS-HCC)  DX 8/20/2013    •  Back pain      •  Fall          4/6/2016    •  Urinary incontinence          condom catheter    •  Depression          PTSD/clinical depression           PAST SURGICAL HISTORY:     Past Surgical History     Past Surgical History    Procedure  Laterality  Date    •  Open reduction        •  Gastroscopy    1/14/2017        Procedure: GASTROSCOPY WITH FECAL MATTER TRANSPLANT;  Surgeon: Obinna Douglas M.D.;  Location: SURGERY [facility];  Service:            ALLERGIES:  Other drug and Asa    MEDICATIONS:     Encounter Medications     Outpatient Encounter Prescriptions as of 3/22/2017    Medication  Sig  Dispense  Refill    •  Cranberry 125 MG Tab  Take  by mouth.        •  tizanidine (ZANAFLEX) 4 MG Tab  Take 1 Tab by mouth every 6 hours as needed. Indications: Muscle Spasticity  60 Tab  5    •  buPROPion SR (WELLBUTRIN-SR) 100 MG TABLET SR 12 HR  Take 1 Tab by mouth 2 Times a Day.  60 Tab  0    •  escitalopram (LEXAPRO) 20 MG tablet  Take 1 Tab by mouth every day.  30 Tab  0    •  oxybutynin SR (DITROPAN-XL) 10 MG CR tablet  Take 1 Tab by mouth every  evening.  30 Tab  0    •  Magnesium Oxide 500 MG Cap  Take 2 Caps by mouth every day.        •  NON SPECIFIED  Take 1-2 Caps by mouth 2 Times a Day. Athletic Nutrients. OTC supplement/multivitamin    1 in am, 2 in pm        •  Cholecalciferol (VITAMIN D PO)  Take 1 Tab by mouth every day.        •  natalizumab (TYSABRI) 300 MG/15ML Conc  300 mg by Intravenous route Q30 DAYS.        •  Probiotic Product (PROBIOTIC DAILY PO)  Take 1 Cap by mouth every day.        •  gabapentin (NEURONTIN) 100 MG Cap  Take 1 Cap by mouth 3 times a day. as needed for nerve pain.  45 Cap  2    •  lidocaine (LIDODERM) 5 % Patch  Apply 1 Patch to skin as directed every 24 hours. as needed for nerve pain  30 Patch  2    •  lidocaine (XYLOCAINE) 2 % Gel  Apply 1 Application to affected area(s) as needed (pain).  1 Bottle  0    •  ondansetron (ZOFRAN ODT) 4 MG TABLET DISPERSIBLE  Take 1 Tab by mouth every 8 hours as needed for Nausea/Vomiting (give PO if no IV route available).  10 Tab  0        No facility-administered encounter medications on file as of 3/22/2017.           SOCIAL HISTORY:     Social History     Social History        Social History    •  Marital Status:          Spouse Name:  N/A    •  Number of Children:  N/A    •  Years of Education:  N/A        Social History Main Topics    •  Smoking status:  Former Smoker        Start date:  08/01/1997        Quit date:  08/01/2013    •  Smokeless tobacco:  Former User    •  Alcohol Use:  0.0 oz/week        0 Standard drinks or equivalent per week          Comment: 1 beer/day    •  Drug Use:  Yes        Special:  Inhaled          Comment: marijuana    •  Sexual Activity:  Not Asked          Comment: once daily for 23 yrs        Other Topics  Concern    •  None        Social History Narrative           Review of Systems   Constitutional: Negative for fever and chills.   HENT: Negative for ear discharge and ear pain.    Eyes: Negative for discharge and redness.   Respiratory:  "Negative for hemoptysis and sputum production.    Cardiovascular: Negative for orthopnea and claudication.   Gastrointestinal: Negative for abdominal pain and diarrhea.   Genitourinary: Negative for urgency.   Musculoskeletal: Positive for myalgias, back pain, joint pain and neck pain.   Skin: Negative.    Neurological: Positive for tingling and sensory change.   Endo/Heme/Allergies: Negative.    All other systems reviewed and are negative.          Objective:      /78 mmHg  Pulse 82  Temp(Src) 36.8 °C (98.2 °F)  Ht 1.778 m (5' 10\")  Wt 80.287 kg (177 lb)  BMI 25.40 kg/m2  SpO2 96%     Physical Exam  Constitutional: oriented to person, place, and time, appears well-developed and well-nourished.    HEENT: Normocephalic atraumatic, neck supple, no JVD noted, no masses noted, no meningeal signs noted  Lymphadenopathy: no cervical, supraclavicular, or inguinal lymphadenopathy noted  Cardiovascular: Intact distal pulses, including at wrists and ankles, no significant limb swelling noted  Pulmonary: No tachypnea noted, no accessory muscle use noted, no dyspnea noted  Abdominal: Soft, nontender, exhibits no distension, no peritoneal signs, no HSM  Musculoskeletal:   Right shoulder: exhibits mild tenderness. Mild pain with range of motion testing  Left shoulder: exhibits mild tenderness. Mild pain with range of motion testing  Right hip: exhibits mild tenderness. Mild pain with range of motion testing  Left hip: exhibits mild tenderness. Mild pain with range of motion testing  Cervical back: exhibits mild decreased range of motion, mild tenderness and mild pain. Spurling's testing produces mild axial pain, trigger points noted  Lumbar back: exhibits decreased range of motion,  tenderness and  pain. negative straight leg testing, trigger points noted  Wrist/hand: mild pain with range of motion testing, negative tinel's at wrist, negative tinel's at elbows  Neurological: oriented to person, place, and time. " Cranial nerves grossly intact, 4/5 strength throughout, nonfocal, Sensation intact distally. Reflexes 1-2+ in upper and lower limbs, spasticity noted in upper and lower limbs, stance/gait not tested    Skin: Skin is intact. no rashes or lesions noted  Psychiatric: normal mood and affect. behavior is normal. Judgment and thought content normal. Cognition and memory are normal.           Assessment:    Lumbar radiculitis    Plan:    Left lumbar 4 and 5 transforaminal epidural injections without steroid

## 2017-03-23 NOTE — PROGRESS NOTES
Subjective:      Jerzy Juan III presents with Follow-Up            Subjective: Mr. Juan returns to the office today for follow-up evaluation of spinal/joints/musculoskeletal pain, also nerve pain, note history of multiple sclerosis, followed by neurology.     Regarding today's visit:    The patient notes ongoing low back pain, also left lower limb radiating pain with neuropathic component.    The patient notes ongoing pain in the neck without overt radicular component.    The patient notes nerve pain in extremities    The patientis note spasticity lower limbs greater than upper limbs.    The patient has an electric wheelchair, has been nonambulatory for the last couple years. The patient has DME at home.    The patient has tried medications. He has been to physical therapy, without benefit. He has tried massage therapy. He has tried modalities. The ongoing pain limits his ability to function.       MEDICAL RECORDS REVIEW/DATA REVIEW: Reviewed in epic.    I reviewed medications. Reviewed medication list. Did not fill Lidoderm patch due to cost, did not try gabapentin. Did not tolerate prior trial with Lyrica.    I reviewed  profile 3/23/2017.    I reviewed diagnostic studies:     I reviewed radiographs. Reviewed MRI lumbar spine 2/2017, images and report, see below. Reviewed MRI brain, cervical spine, thoracic spine.    I reviewed lab studies.   Reviewed labs 1/2017, including CMP.    I reviewed medical issues. Followed by primary care provider, consults.    I reviewed family history: No neuromuscular disorders noted.    I reviewed social issues. On disability.      2/24/2017 4:50 PM    HISTORY/REASON FOR EXAM: Chronic LBP extending to bilateral legs. Pt has a hx of MS x 3yrs.    TECHNIQUE/EXAM DESCRIPTION:  MRI of the lumbar spine without contrast.    The study was performed on a [employer] Signa 1.5 Kayce MRI scanner.  T1 sagittal, T2 sagittal, and T2 axial images were obtained of the lumbar  spine.    COMPARISON: None.    FINDINGS: Motion artifact degrades the examination and limits evaluation.      There is a chronic compression deformity of the L4 vertebral body with less than 25% loss of height anteriorly. There is no retropulsion. The lumbar vertebral bodies have a normal height and alignment. There is mild disc space height loss at L4-5 and   L5-S1 with associated degenerative disc desiccation. There is a small rounded T1 and T2 hyperintense structure in the right posterior aspect of the L3 vertebral body consistent with a benign spinal hemangioma The conus medullaris has a normal caliber,   course and signal intensity.    Level-specific findings as follows:    L5-S1: There is mild broad posterior disc bulge. There is mild bilateral neural foraminal stenosis.  L4-5: There is mild broad posterior disc bulge. There is mild facet arthropathy and ligamentum flavum hypertrophy. There is mild bilateral neural foraminal stenosis.  L3-4: There is mild facet arthropathy. There is mild diffuse disc bulge. There is mild right greater than left neural foraminal stenosis.  L2-3: No significant central canal or neural foraminal stenosis.  L1-2: No significant central canal or neural foraminal stenosis.         Impression        1.  Chronic compression deformity of the L4 vertebral body with less than 25% loss of height anteriorly and no retropulsion.  2.  Multilevel degenerative changes of the lumbar spine as described above. Exam is limited secondary to extensive motion artifact.         PAST MEDICAL HISTORY:   Past Medical History   Diagnosis Date   • Blood transfusion    • Headache(784.0)    • Heart murmur    • MS (multiple sclerosis) (CMS-HCC) DX 8/20/2013   • Back pain    • Fall      4/6/2016   • Urinary incontinence      condom catheter   • Depression      PTSD/clinical depression       PAST SURGICAL HISTORY:    Past Surgical History   Procedure Laterality Date   • Open reduction     • Gastroscopy   1/14/2017     Procedure: GASTROSCOPY WITH FECAL MATTER TRANSPLANT;  Surgeon: Obinna Douglas M.D.;  Location: SURGERY [facility];  Service:        ALLERGIES:  Other drug and Asa    MEDICATIONS:    Outpatient Encounter Prescriptions as of 3/22/2017   Medication Sig Dispense Refill   • Cranberry 125 MG Tab Take  by mouth.     • tizanidine (ZANAFLEX) 4 MG Tab Take 1 Tab by mouth every 6 hours as needed. Indications: Muscle Spasticity 60 Tab 5   • buPROPion SR (WELLBUTRIN-SR) 100 MG TABLET SR 12 HR Take 1 Tab by mouth 2 Times a Day. 60 Tab 0   • escitalopram (LEXAPRO) 20 MG tablet Take 1 Tab by mouth every day. 30 Tab 0   • oxybutynin SR (DITROPAN-XL) 10 MG CR tablet Take 1 Tab by mouth every evening. 30 Tab 0   • Magnesium Oxide 500 MG Cap Take 2 Caps by mouth every day.     • NON SPECIFIED Take 1-2 Caps by mouth 2 Times a Day. Athletic Nutrients. OTC supplement/multivitamin   1 in am, 2 in pm     • Cholecalciferol (VITAMIN D PO) Take 1 Tab by mouth every day.     • natalizumab (TYSABRI) 300 MG/15ML Conc 300 mg by Intravenous route Q30 DAYS.     • Probiotic Product (PROBIOTIC DAILY PO) Take 1 Cap by mouth every day.     • gabapentin (NEURONTIN) 100 MG Cap Take 1 Cap by mouth 3 times a day. as needed for nerve pain. 45 Cap 2   • lidocaine (LIDODERM) 5 % Patch Apply 1 Patch to skin as directed every 24 hours. as needed for nerve pain 30 Patch 2   • lidocaine (XYLOCAINE) 2 % Gel Apply 1 Application to affected area(s) as needed (pain). 1 Bottle 0   • ondansetron (ZOFRAN ODT) 4 MG TABLET DISPERSIBLE Take 1 Tab by mouth every 8 hours as needed for Nausea/Vomiting (give PO if no IV route available). 10 Tab 0     No facility-administered encounter medications on file as of 3/22/2017.       SOCIAL HISTORY:    Social History     Social History   • Marital Status:      Spouse Name: N/A   • Number of Children: N/A   • Years of Education: N/A     Social History Main Topics   • Smoking status: Former Smoker     Start  "date: 08/01/1997     Quit date: 08/01/2013   • Smokeless tobacco: Former User   • Alcohol Use: 0.0 oz/week     0 Standard drinks or equivalent per week      Comment: 1 beer/day   • Drug Use: Yes     Special: Inhaled      Comment: marijuana   • Sexual Activity: Not Asked      Comment: once daily for 23 yrs     Other Topics Concern   • None     Social History Narrative         Review of Systems   Constitutional: Negative for fever and chills.   HENT: Negative for ear discharge and ear pain.    Eyes: Negative for discharge and redness.   Respiratory: Negative for hemoptysis and sputum production.    Cardiovascular: Negative for orthopnea and claudication.   Gastrointestinal: Negative for abdominal pain and diarrhea.   Genitourinary: Negative for urgency.   Musculoskeletal: Positive for myalgias, back pain, joint pain and neck pain.   Skin: Negative.    Neurological: Positive for tingling and sensory change.   Endo/Heme/Allergies: Negative.    All other systems reviewed and are negative.        Objective:     /78 mmHg  Pulse 82  Temp(Src) 36.8 °C (98.2 °F)  Ht 1.778 m (5' 10\")  Wt 80.287 kg (177 lb)  BMI 25.40 kg/m2  SpO2 96%     Physical Exam  Constitutional: oriented to person, place, and time, appears well-developed and well-nourished.   HEENT: Normocephalic atraumatic, neck supple, no JVD noted, no masses noted, no meningeal signs noted  Lymphadenopathy: no cervical, supraclavicular, or inguinal lymphadenopathy noted  Cardiovascular: Intact distal pulses, including at wrists and ankles, no significant limb swelling noted  Pulmonary: No tachypnea noted, no accessory muscle use noted, no dyspnea noted  Abdominal: Soft, nontender, exhibits no distension, no peritoneal signs, no HSM  Musculoskeletal:   Right shoulder: exhibits mild tenderness. Mild pain with range of motion testing  Left shoulder: exhibits mild tenderness. Mild pain with range of motion testing  Right hip: exhibits mild tenderness. Mild pain " with range of motion testing  Left hip: exhibits mild tenderness. Mild pain with range of motion testing  Cervical back: exhibits mild decreased range of motion, mild tenderness and mild pain. Spurling's testing produces mild axial pain, trigger points noted  Lumbar back: exhibits decreased range of motion,  tenderness and  pain. negative straight leg testing, trigger points noted  Wrist/hand: mild pain with range of motion testing, negative tinel's at wrist, negative tinel's at elbows  Neurological: oriented to person, place, and time. Cranial nerves grossly intact, 4/5 strength throughout, nonfocal, Sensation intact distally. Reflexes 1-2+ in upper and lower limbs, spasticity noted in upper and lower limbs, stance/gait not tested   Skin: Skin is intact. no rashes or lesions noted  Psychiatric: normal mood and affect. behavior is normal. Judgment and thought content normal. Cognition and memory are normal.         Assessment/Plan:       ASSESSMENT:    1. Low back pain, myofascial pain, lumbar radiculitis, degenerative disc disease, foraminal stenosis, facet arthropathy, chronic L4 compression fracture    - Reviewed injection therapy with left lumbar 4 and 5 transforaminal epidural injections without steroid, as diagnostic/therapeutic intervention    2. Multiple sclerosis, spasticity, nerve pain, neurology consulted      DISCUSSION/PLAN:    - I discussed management options. I reviewed symptomatic care    - I reviewed home exercise program, with medical precautions    - The patient can consider complementary trials with acupuncture, massage therapy, or TENS unit, previously ordered    - I reviewed medication monitoring.  For now, continue current medications. I reviewed medication adjustments, the patient can initiate trials with gabapentin and Lidoderm patch, previously ordered.    - I reviewed risks, side effects, and interactions of medications, including over-the-counter medications. I reviewed further  symptomatic medications.    - I reviewed additional diagnostic options, including further/advanced imaging, electrodiagnostic testing, vascular studies, and further lab screen    - I reviewed additional therapeutic options, including further injection/interventional therapy and additional consultative input    - I reviewed psychosocial interventions    - Followup with primary care provider and consultants regarding co-morbid medical isues, as well as for health maintenance/co-management    - I will plan on seeing the patient back in the procedure center for the above noted intervention or in the office were can further review management options      Please note that this dictation was created using voice recognition software. I have made every reasonable attempt to correct obvious errors but there may be errors of grammar and content that I may have overlooked prior to finalization of this note.

## 2017-04-03 ENCOUNTER — OUTPATIENT INFUSION SERVICES (OUTPATIENT)
Dept: ONCOLOGY | Facility: MEDICAL CENTER | Age: 37
End: 2017-04-03
Attending: PSYCHIATRY & NEUROLOGY
Payer: MEDICARE

## 2017-04-03 VITALS
HEART RATE: 70 BPM | TEMPERATURE: 99.3 F | BODY MASS INDEX: 25 KG/M2 | DIASTOLIC BLOOD PRESSURE: 79 MMHG | RESPIRATION RATE: 18 BRPM | OXYGEN SATURATION: 99 % | SYSTOLIC BLOOD PRESSURE: 115 MMHG | WEIGHT: 178.57 LBS | HEIGHT: 71 IN

## 2017-04-03 PROCEDURE — 700111 HCHG RX REV CODE 636 W/ 250 OVERRIDE (IP): Performed by: PSYCHIATRY & NEUROLOGY

## 2017-04-03 PROCEDURE — 306780 HCHG STAT FOR TRANSFUSION PER CASE

## 2017-04-03 PROCEDURE — 700105 HCHG RX REV CODE 258: Performed by: PSYCHIATRY & NEUROLOGY

## 2017-04-03 PROCEDURE — 96413 CHEMO IV INFUSION 1 HR: CPT

## 2017-04-03 RX ADMIN — NATALIZUMAB 300 MG: 300 INJECTION INTRAVENOUS at 14:14

## 2017-04-03 ASSESSMENT — PAIN SCALES - GENERAL: PAINLEVEL: 2=MINIMAL-SLIGHT

## 2017-04-04 ENCOUNTER — TELEPHONE (OUTPATIENT)
Dept: NEUROLOGY | Facility: MEDICAL CENTER | Age: 37
End: 2017-04-04

## 2017-04-04 NOTE — TELEPHONE ENCOUNTER
Message: patient is seeing Dr Peralta and is having muscle spasms in his left leg. Dr Peralta recommends an epidural for this. Patient would like to know if Dr Bloch agrees.     Caller: angelika  Call Back #: [phone]  OK to leave detailed message: n

## 2017-04-04 NOTE — PROGRESS NOTES
Pt is here for his scheduled Tysabri. Progressive MS - unable to ambulate. Needs help with transfers. Mother here to help with care. He states he is interested in trying a new MS drug recently approved by FDA - he does have an appointment on 5/22 with MD Bloch to discuss his options. He is tearful while talking about his divorce and his financial losses after diagnosed with MS. Emotional support provided. Pt is staying at home with home care assistants. Tysabri online questionnaire completed. Infusion well tolerated followed by one hour observation time. Discharged home with his mother in a wheelchair - [employer] here to provide transportation.

## 2017-04-06 NOTE — TELEPHONE ENCOUNTER
Is he having much back pain because epidural help if the nerve root is irritated but his muscle spasms might be coming from his MS? MB?

## 2017-04-11 ENCOUNTER — HOSPITAL ENCOUNTER (OUTPATIENT)
Dept: RADIOLOGY | Facility: REHABILITATION | Age: 37
End: 2017-04-11
Attending: PHYSICAL MEDICINE & REHABILITATION
Payer: MEDICARE

## 2017-04-11 ENCOUNTER — HOSPITAL ENCOUNTER (OUTPATIENT)
Dept: PAIN MANAGEMENT | Facility: REHABILITATION | Age: 37
End: 2017-04-11
Attending: PHYSICAL MEDICINE & REHABILITATION
Payer: MEDICARE

## 2017-04-11 VITALS
TEMPERATURE: 98.3 F | DIASTOLIC BLOOD PRESSURE: 94 MMHG | RESPIRATION RATE: 18 BRPM | HEART RATE: 69 BPM | BODY MASS INDEX: 24.5 KG/M2 | HEIGHT: 71 IN | SYSTOLIC BLOOD PRESSURE: 128 MMHG | OXYGEN SATURATION: 95 % | WEIGHT: 175 LBS

## 2017-04-11 PROCEDURE — 700117 HCHG RX CONTRAST REV CODE 255

## 2017-04-11 PROCEDURE — 99152 MOD SED SAME PHYS/QHP 5/>YRS: CPT

## 2017-04-11 PROCEDURE — 64483 NJX AA&/STRD TFRM EPI L/S 1: CPT

## 2017-04-11 PROCEDURE — 64484 NJX AA&/STRD TFRM EPI L/S EA: CPT

## 2017-04-11 PROCEDURE — 99153 MOD SED SAME PHYS/QHP EA: CPT

## 2017-04-11 PROCEDURE — 700111 HCHG RX REV CODE 636 W/ 250 OVERRIDE (IP)

## 2017-04-11 RX ORDER — METHYLPREDNISOLONE ACETATE 40 MG/ML
INJECTION, SUSPENSION INTRA-ARTICULAR; INTRALESIONAL; INTRAMUSCULAR; SOFT TISSUE
Status: COMPLETED
Start: 2017-04-11 | End: 2017-04-11

## 2017-04-11 RX ORDER — LIDOCAINE HYDROCHLORIDE 10 MG/ML
INJECTION, SOLUTION EPIDURAL; INFILTRATION; INTRACAUDAL; PERINEURAL
Status: COMPLETED
Start: 2017-04-11 | End: 2017-04-11

## 2017-04-11 RX ORDER — MIDAZOLAM HYDROCHLORIDE 1 MG/ML
INJECTION INTRAMUSCULAR; INTRAVENOUS
Status: COMPLETED
Start: 2017-04-11 | End: 2017-04-11

## 2017-04-11 RX ORDER — METHYLPREDNISOLONE ACETATE 80 MG/ML
INJECTION, SUSPENSION INTRA-ARTICULAR; INTRALESIONAL; INTRAMUSCULAR; SOFT TISSUE
Status: COMPLETED
Start: 2017-04-11 | End: 2017-04-11

## 2017-04-11 RX ADMIN — FENTANYL CITRATE 25 MCG: 50 INJECTION, SOLUTION INTRAMUSCULAR; INTRAVENOUS at 13:46

## 2017-04-11 RX ADMIN — MIDAZOLAM HYDROCHLORIDE 1 MG: 1 INJECTION, SOLUTION INTRAMUSCULAR; INTRAVENOUS at 13:46

## 2017-04-11 RX ADMIN — LIDOCAINE HYDROCHLORIDE 5 ML: 10 INJECTION, SOLUTION EPIDURAL; INFILTRATION; INTRACAUDAL; PERINEURAL at 13:57

## 2017-04-11 RX ADMIN — IOHEXOL 8 ML: 240 INJECTION, SOLUTION INTRATHECAL; INTRAVASCULAR; INTRAVENOUS; ORAL at 13:57

## 2017-04-11 ASSESSMENT — PAIN SCALES - GENERAL
PAINLEVEL_OUTOF10: 0

## 2017-04-11 NOTE — PROGRESS NOTES
Current meds. See medication reconciliation form. Reviewed with pt. Pt denies taking ASA,other blood thinners or anti-inflammatories. Pt has a ride post-procedure (mother is ). Printed and verbal discharge instructions given to pt who verbalized understanding.

## 2017-04-12 ENCOUNTER — TELEPHONE (OUTPATIENT)
Dept: PHYSICAL MEDICINE AND REHAB | Facility: MEDICAL CENTER | Age: 37
End: 2017-04-12

## 2017-04-12 NOTE — TELEPHONE ENCOUNTER
I spoke with mother and she said she will have him call me back. (to see how he is from procedure and if fv appt works for him.)

## 2017-04-12 NOTE — TELEPHONE ENCOUNTER
Zen's mother called back and said she spoke with Zen and he said he did not think procedure worked. I let her know to let him know it can take up to two weeks to get optimum benefits. She said they will keep fv appt.

## 2017-04-12 NOTE — PROCEDURES
DATE OF SERVICE:  04/11/2017    DIAGNOSIS:  Lumbar radiculitis.    PROCEDURE:  Left lumbar 4 and 5 transforaminal epidural injections.    PROCEDURE NOTE:  Informed consent was obtained.  Risks, benefits, and   alternativesdiscussed and all questions answered.  An angiocatheter was placed   in the upper limb.  The patient was placed prone on the fluoroscopy table and the   skin area from T12-S4 was prepped in a sterile manner with povidone-iodine.    Intravenous conscious sedation using 1 mg of midazolam and 25 mcg of fentanyl   was administered to the patient.  The patient was monitored throughout the   procedure.    Fluoroscopy was turned on and positioned to identify the left L4-L5 and L5-S1   foramen.  Those areas both were then locally anesthetized with 2 mL of 1%   Lidocaine. Then 22-gauge spinal needles were inserted and advanced into the   Foramen and 1-2 mL of Iohexol dye was injected into the area allowing visualization   of the selective nerve root and epidural space.  Following negative   aspiration, 3 mL of 1% preservative-free lidocaine was injected at each level.  A total of 2 spinal needles were used for the injections.  There were no   complications.    The patient was transported to recovery.  Postinjection instructions were   reviewed with the patient.  The patient was discharged to home in the care of   a friend and/or family member.       ____________________________________     MD STEPHON BLANDON / BIMAL    DD:  04/11/2017 15:05:28  DT:  04/11/2017 22:56:03    D#:  [id number]  Job#:  [id number]

## 2017-05-01 ENCOUNTER — OUTPATIENT INFUSION SERVICES (OUTPATIENT)
Dept: ONCOLOGY | Facility: MEDICAL CENTER | Age: 37
End: 2017-05-01
Attending: PSYCHIATRY & NEUROLOGY
Payer: MEDICARE

## 2017-05-02 ENCOUNTER — TELEPHONE (OUTPATIENT)
Dept: NEUROLOGY | Facility: MEDICAL CENTER | Age: 37
End: 2017-05-02

## 2017-05-02 NOTE — TELEPHONE ENCOUNTER
Message: patient has been having mood problems and anger outburst. Patient doesn't want to do Tysabri anymore. Patient has an appt with Dr Bloch 5/22/17    Caller: angelika  Call Back #: [phone]  OK to leave detailed message: n

## 2017-05-02 NOTE — PROGRESS NOTES
Pt arrived via electric wheelchair one hour early for appt. Pt did not remain for treatment. Per Keke SIMON, pt feeling frustrated that Tysabri is no longer working for him and he is anxious to start a new medication for MS. Pt will be evaluated by his MD later this month. Encounter closed.

## 2017-05-04 ENCOUNTER — OFFICE VISIT (OUTPATIENT)
Dept: PHYSICAL MEDICINE AND REHAB | Facility: MEDICAL CENTER | Age: 37
End: 2017-05-04
Payer: MEDICARE

## 2017-05-04 VITALS
HEART RATE: 72 BPM | SYSTOLIC BLOOD PRESSURE: 118 MMHG | DIASTOLIC BLOOD PRESSURE: 78 MMHG | TEMPERATURE: 98.6 F | WEIGHT: 175 LBS | OXYGEN SATURATION: 98 % | HEIGHT: 71 IN | BODY MASS INDEX: 24.5 KG/M2

## 2017-05-04 DIAGNOSIS — M79.18 MYOFASCIAL PAIN: ICD-10-CM

## 2017-05-04 DIAGNOSIS — M79.2 NERVE PAIN: ICD-10-CM

## 2017-05-04 DIAGNOSIS — M51.36 DDD (DEGENERATIVE DISC DISEASE), LUMBAR: ICD-10-CM

## 2017-05-04 DIAGNOSIS — G35 MULTIPLE SCLEROSIS (HCC): ICD-10-CM

## 2017-05-04 DIAGNOSIS — R25.2 SPASTICITY: ICD-10-CM

## 2017-05-04 PROCEDURE — G8420 CALC BMI NORM PARAMETERS: HCPCS | Performed by: PHYSICAL MEDICINE & REHABILITATION

## 2017-05-04 PROCEDURE — 1036F TOBACCO NON-USER: CPT | Performed by: PHYSICAL MEDICINE & REHABILITATION

## 2017-05-04 PROCEDURE — G8432 DEP SCR NOT DOC, RNG: HCPCS | Performed by: PHYSICAL MEDICINE & REHABILITATION

## 2017-05-04 PROCEDURE — 99213 OFFICE O/P EST LOW 20 MIN: CPT | Performed by: PHYSICAL MEDICINE & REHABILITATION

## 2017-05-04 NOTE — MR AVS SNAPSHOT
"        Jerzy Juan III   2017 4:20 PM   Office Visit   MRN: [id number]    Department:  [facility]   Dept Phone:  [phone]    Description:  Male : 1980   Provider:  John Peralta M.D.           Reason for Visit     Follow-Up           Allergies as of 2017     Allergen Noted Reactions    Other Drug 2016       CANNOT TAKE ANY STEROIDS WITH RECENT TYSABRI INFUSION    Asa [Aspirin] 2016   Unspecified    Bleeding in stomach as a baby      Vital Signs     Blood Pressure Pulse Temperature Height Weight Body Mass Index    118/78 mmHg 72 37 °C (98.6 °F) 1.803 m (5' 10.98\") 79.379 kg (175 lb) 24.42 kg/m2    Oxygen Saturation Smoking Status                98% Former Smoker          Basic Information     Date Of Birth Sex Race Ethnicity Preferred Language    1980 Male White Non- English      Your appointments     May 22, 2017 11:40 AM   Follow Up Visit with Melissa P Bloch, M.D.   [facility] (--)    [address] [place] [address], [address]  [facility] [address] [phone]           You will be receiving a confirmation call a few days before your appointment from our automated call confirmation system.              Problem List              ICD-10-CM Priority Class Noted - Resolved    MS (multiple sclerosis) (HCC) G35 Low  2013 - Present    Depression F32.9 Low  2013 - Present    Multiple sclerosis exacerbation (CMS-HCC) G35 High  2016 - Present    Urinary tract infection N39.0   10/7/2016 - Present    Hypokalemia E87.6   10/8/2016 - Present    Hyperglycemia R73.9   10/8/2016 - Present    C. 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      Below and/or attached are the medications your provider expects you to take. Review all of your home medications and newly ordered medications with your provider and/or pharmacist. Follow medication instructions as directed by your provider and/or pharmacist. Please keep your medication list with you and share with your provider. Update the information when medications are discontinued, doses are changed, or new medications (including over-the-counter products) are added; and carry medication information at all times in the event of emergency situations     Allergies:  OTHER DRUG - (reactions not documented)     ASA - Unspecified               Medications  Valid as of: May 04, 2017 -  4:21 PM    Generic Name Brand Name Tablet Size Instructions for use    BuPROPion HCl (TABLET SR 12 HR) WELLBUTRIN- MG Take 1 Tab by mouth 2 Times a Day.        Cholecalciferol   Take 1 Tab by mouth every day.        Cranberry (Tab) Cranberry 125 MG Take  by mouth.        Escitalopram Oxalate (Tab) LEXAPRO 20 MG Take 1 Tab by mouth every day.        Gabapentin (Cap) NEURONTIN 100 MG Take 1 Cap by mouth 3 times a day. as needed for nerve pain.        Lidocaine HCl (Gel) XYLOCAINE 2 % Apply 1 Application to affected area(s) as needed (pain).        Magnesium Oxide (Cap) Magnesium Oxide 500 MG Take 2 Caps by mouth every day.        Natalizumab (Conc) TYSABRI 300 MG/15ML 300 mg by Intravenous route Q30 DAYS.        Non Formulary Request Non Formulary Request  Indications: marijuana  for spasms and pain relief        NON  SPECIFIED   Take 1-2 Caps by mouth 2 Times a Day. Athletic Nutrients. OTC supplement/multivitamin   1 in am, 2 in pm        Ondansetron (TABLET DISPERSIBLE) ZOFRAN ODT 4 MG Take 1 Tab by mouth every 8 hours as needed for Nausea/Vomiting (give PO if no IV route available).        Oxybutynin Chloride (TABLET SR 24 HR) DITROPAN-XL 10 MG Take 1 Tab by mouth every evening.        Probiotic Product   Take 1 Cap by mouth every day.        TiZANidine HCl (Tab) ZANAFLEX 4 MG Take 1 Tab by mouth every 6 hours as needed. Indications: Muscle Spasticity        .                 Medicines prescribed today were sent to:     [facility] #[id number] - [place], [place] - [address]    [address] [place] [address]    Phone: [phone] Fax: [phone]    Open 24 Hours?: No    [facility] [id number]    [id number] [facility] [address]    Phone: [phone] Fax: [phone]    Open 24 Hours?: No      Medication refill instructions:       If your prescription bottle indicates you have medication refills left, it is not necessary to call your provider’s office. Please contact your pharmacy and they will refill your medication.    If your prescription bottle indicates you do not have any refills left, you may request refills at any time through one of the following ways: The online [employer] system (except Urgent Care), by calling your provider’s office, or by asking your pharmacy to contact your provider’s office with a refill request. Medication refills are processed only during regular business hours and may not be available until the next business day. Your provider may request additional information or to have a follow-up visit with you prior to refilling your medication.   *Please Note: Medication refills are assigned a new Rx number when refilled electronically. Your pharmacy may indicate that no refills were authorized even though a new prescription for the same medication is  available at the pharmacy. Please request the medicine by name with the pharmacy before contacting your provider for a refill.           MyChart Status: Patient Declined

## 2017-05-05 NOTE — PROGRESS NOTES
Subjective:      Jerzy Juan III presents with Follow-Up            Subjective: Mr. Juan returns to the office today for follow-up evaluation of spinal/joints/musculoskeletal pain, also nerve pain, note history of multiple sclerosis, followed by neurology.     The patient underwent left lumbar 4 and 5 transforaminal epidural steroid injections on 4/11/2017. The patient notes no benefit with the injection, notes post procedure pain flare. No changes with bowel/bladder function noted. The patient notes ongoing low back pain, also lower limb nerve pain.    The patient notes pain in the neck without overt radicular component.    The patient notes nerve pain in extremities    The patientis note spasticity lower limbs greater than upper limbs.    The patient has an electric wheelchair, has been nonambulatory for the last couple years. The patient has DME at home.    The patient has tried medications. He has been to physical therapy, without benefit. He has tried massage therapy. He has tried modalities.       MEDICAL RECORDS REVIEW/DATA REVIEW: Reviewed in epic.    I reviewed medications. Reviewed medication list. Did not fill Lidoderm patch due to cost, did not try gabapentin. Did not tolerate prior trial with Lyrica.    I reviewed  profile 5/4/2017.    I reviewed diagnostic studies:     I reviewed radiographs. Reviewed MRI lumbar spine 2/2017. Reviewed MRI brain, cervical spine, thoracic spine.    I reviewed lab studies.   Reviewed labs 1/2017, including CMP.    I reviewed medical issues. Followed by primary care provider, consultants.    I reviewed family history: No neuromuscular disorders noted.    I reviewed social issues. On disability.      PAST MEDICAL HISTORY:   Past Medical History   Diagnosis Date   • Blood transfusion    • Headache    • Heart murmur    • MS (multiple sclerosis) (CMS-HCC) DX 8/20/2013   • Back pain    • Fall      4/6/2016   • Urinary incontinence      condom catheter   •  Depression      PTSD/clinical depression       PAST SURGICAL HISTORY:    Past Surgical History   Procedure Laterality Date   • Open reduction     • Gastroscopy  1/14/2017     Procedure: GASTROSCOPY WITH FECAL MATTER TRANSPLANT;  Surgeon: Obinna Douglas M.D.;  Location: SURGERY [facility];  Service:        ALLERGIES:  Other drug and Asa    MEDICATIONS:    Outpatient Encounter Prescriptions as of 5/4/2017   Medication Sig Dispense Refill   • Non Formulary Request Indications: marijuana  for spasms and pain relief     • Cranberry 125 MG Tab Take  by mouth.     • tizanidine (ZANAFLEX) 4 MG Tab Take 1 Tab by mouth every 6 hours as needed. Indications: Muscle Spasticity 60 Tab 5   • gabapentin (NEURONTIN) 100 MG Cap Take 1 Cap by mouth 3 times a day. as needed for nerve pain. 45 Cap 2   • buPROPion SR (WELLBUTRIN-SR) 100 MG TABLET SR 12 HR Take 1 Tab by mouth 2 Times a Day. 60 Tab 0   • escitalopram (LEXAPRO) 20 MG tablet Take 1 Tab by mouth every day. 30 Tab 0   • oxybutynin SR (DITROPAN-XL) 10 MG CR tablet Take 1 Tab by mouth every evening. 30 Tab 0   • Magnesium Oxide 500 MG Cap Take 2 Caps by mouth every day.     • NON SPECIFIED Take 1-2 Caps by mouth 2 Times a Day. Athletic Nutrients. OTC supplement/multivitamin   1 in am, 2 in pm     • Cholecalciferol (VITAMIN D PO) Take 1 Tab by mouth every day.     • Probiotic Product (PROBIOTIC DAILY PO) Take 1 Cap by mouth every day.     • lidocaine (XYLOCAINE) 2 % Gel Apply 1 Application to affected area(s) as needed (pain). 1 Bottle 0   • ondansetron (ZOFRAN ODT) 4 MG TABLET DISPERSIBLE Take 1 Tab by mouth every 8 hours as needed for Nausea/Vomiting (give PO if no IV route available). 10 Tab 0   • natalizumab (TYSABRI) 300 MG/15ML Conc 300 mg by Intravenous route Q30 DAYS.       No facility-administered encounter medications on file as of 5/4/2017.       SOCIAL HISTORY:    Social History     Social History   • Marital Status:      Spouse Name: N/A   • Number of  "Children: N/A   • Years of Education: N/A     Social History Main Topics   • Smoking status: Former Smoker     Start date: 08/01/1997     Quit date: 08/01/2013   • Smokeless tobacco: Former User   • Alcohol Use: 0.0 oz/week     0 Standard drinks or equivalent per week      Comment: 1 beer/day   • Drug Use: Yes     Special: Inhaled      Comment: marijuana   • Sexual Activity: Not Asked      Comment: once daily for 23 yrs     Other Topics Concern   • None     Social History Narrative         Review of Systems   Constitutional: Negative for fever and chills.   HENT: Negative for ear discharge and ear pain.    Eyes: Negative for discharge and redness.   Respiratory: Negative for hemoptysis and sputum production.    Cardiovascular: Negative for orthopnea and claudication.   Gastrointestinal: Negative for abdominal pain and diarrhea.   Genitourinary: Negative for urgency.   Musculoskeletal: Positive for myalgias, back pain, joint pain and neck pain.   Skin: Negative.    Neurological: Positive for tingling and sensory change.   Endo/Heme/Allergies: Negative.    All other systems reviewed and are negative.        Objective:     /78 mmHg  Pulse 72  Temp(Src) 37 °C (98.6 °F)  Ht 1.803 m (5' 10.98\")  Wt 79.379 kg (175 lb)  BMI 24.42 kg/m2  SpO2 98%     Physical Exam  Constitutional: oriented to person, place, and time, appears well-developed and well-nourished.   HEENT: Normocephalic atraumatic, neck supple, no JVD noted, no masses noted, no meningeal signs noted  Lymphadenopathy: no cervical, supraclavicular, or inguinal lymphadenopathy noted  Cardiovascular: Intact distal pulses, including at wrists and ankles, no significant limb swelling noted  Pulmonary: No tachypnea noted, no accessory muscle use noted, no dyspnea noted  Abdominal: Soft, nontender, exhibits no distension, no peritoneal signs, no HSM  Musculoskeletal:   Right shoulder: exhibits mild tenderness. Mild pain with range of motion testing  Left " shoulder: exhibits mild tenderness. Mild pain with range of motion testing  Right hip: exhibits mild tenderness. Mild pain with range of motion testing  Left hip: exhibits mild tenderness. Mild pain with range of motion testing  Cervical back: exhibits mild decreased range of motion, mild tenderness and mild pain. Spurling's testing produces mild axial pain, trigger points noted  Lumbar back: exhibits decreased range of motion,  tenderness and  pain. negative straight leg testing, trigger points noted, no signs of infection injection site  Wrist/hand: mild pain with range of motion testing, negative tinel's at wrist, negative tinel's at elbows  Neurological: oriented to person, place, and time. Cranial nerves grossly intact, 4/5 strength throughout, nonfocal, Sensation intact distally. Reflexes 1-2+ in upper and lower limbs, spasticity noted in upper and lower limbs, stance/gait not tested   Skin: Skin is intact. no rashes or lesions noted  Psychiatric: normal mood and affect. behavior is normal. Judgment and thought content normal. Cognition and memory are normal.         Assessment/Plan:       ASSESSMENT:    1. Low back pain, myofascial pain, lumbar radiculitis, degenerative disc disease, foraminal stenosis, facet arthropathy, chronic L4 compression fracture    - I offered the patient updated MRI lumbar spine, patient to consider    2. Multiple sclerosis, spasticity, nerve pain, neurology consulted    - Reviewed neuromodulation, including intrathecal medication and spinal cord stimulation, provided educational information, patient to consider. I also reviewed Botox therapy, patient to consider      DISCUSSION/PLAN:    - I discussed management options. I reviewed symptomatic care    - I reviewed home exercise program, with medical precautions    - The patient can consider complementary trials with acupuncture, massage therapy, or TENS unit, previously ordered    - I reviewed medication monitoring.  For now,  continue current medications. I reviewed medication adjustments, the patient can initiate trials with gabapentin and Lidoderm patch, previously ordered. I reviewed risks, side effects, and interactions of medications, including over-the-counter medications. I reviewed further symptomatic medications.    - I reviewed additional diagnostic options, including further/advanced imaging, electrodiagnostic testing, vascular studies, and further lab screen    - I reviewed additional therapeutic options, including further injection/interventional therapy and additional consultative input    - I reviewed psychosocial interventions    - Followup with primary care provider and consultants regarding co-morbid medical isues, as well as for health maintenance/co-management    - Return in 3 months or an as-needed basis      Please note that this dictation was created using voice recognition software. I have made every reasonable attempt to correct obvious errors but there may be errors of grammar and content that I may have overlooked prior to finalization of this note.

## 2017-05-22 ENCOUNTER — OFFICE VISIT (OUTPATIENT)
Dept: NEUROLOGY | Facility: MEDICAL CENTER | Age: 37
End: 2017-05-22
Payer: MEDICARE

## 2017-05-22 VITALS
TEMPERATURE: 98.1 F | WEIGHT: 175 LBS | OXYGEN SATURATION: 97 % | BODY MASS INDEX: 24.5 KG/M2 | HEIGHT: 71 IN | SYSTOLIC BLOOD PRESSURE: 116 MMHG | HEART RATE: 89 BPM | RESPIRATION RATE: 16 BRPM | DIASTOLIC BLOOD PRESSURE: 86 MMHG

## 2017-05-22 DIAGNOSIS — F32.9 REACTIVE DEPRESSION: ICD-10-CM

## 2017-05-22 DIAGNOSIS — G35 MS (MULTIPLE SCLEROSIS) (HCC): ICD-10-CM

## 2017-05-22 PROCEDURE — G8420 CALC BMI NORM PARAMETERS: HCPCS | Performed by: PSYCHIATRY & NEUROLOGY

## 2017-05-22 PROCEDURE — 1036F TOBACCO NON-USER: CPT | Performed by: PSYCHIATRY & NEUROLOGY

## 2017-05-22 PROCEDURE — 99214 OFFICE O/P EST MOD 30 MIN: CPT | Performed by: PSYCHIATRY & NEUROLOGY

## 2017-05-22 PROCEDURE — G8432 DEP SCR NOT DOC, RNG: HCPCS | Performed by: PSYCHIATRY & NEUROLOGY

## 2017-05-22 NOTE — ASSESSMENT & PLAN NOTE
Pt has primary progressive MS and has not responded to previous treatments and he keeps going downhill. Patient has failed all previous therapies for multiple sclerosis which gone sides with him having primary progressive. He continues to lose strength in his legs which is frustrating for him. Patient also has a history of frequent urinary tract infections in the past but had been stable recently.

## 2017-05-22 NOTE — MR AVS SNAPSHOT
"        Jerzy Juan III   2017 11:40 AM   Office Visit   MRN: [id number]    Department:  Neurology [facility]   Dept Phone:  [phone]    Description:  Male : 1980   Provider:  Melissa P Bloch, M.D.           Reason for Visit     Multiple Sclerosis leg spasms/ discuss meds      Allergies as of 2017     Allergen Noted Reactions    Other Drug 2016       CANNOT TAKE ANY STEROIDS WITH RECENT TYSABRI INFUSION    Asa [Aspirin] 2016   Unspecified    Bleeding in stomach as a baby      You were diagnosed with     MS (multiple sclerosis) (CMS-HCC)   [204938]         Vital Signs     Blood Pressure Pulse Temperature Respirations Height Weight    116/86 mmHg 89 36.7 °C (98.1 °F) 16 1.803 m (5' 10.98\") 79.379 kg (175 lb)    Body Mass Index Oxygen Saturation Smoking Status             24.42 kg/m2 97% Former Smoker         Basic Information     Date Of Birth Sex Race Ethnicity Preferred Language    1980 Male White Non- English      Your appointments     2017  2:00 PM   Follow Up Visit with Melissa P Bloch, M.D.   [facility] (--)    [address], [address]  [facility] [address] [phone]           You will be receiving a confirmation call a few days before your appointment from our automated call confirmation system.              Problem List              ICD-10-CM Priority Class Noted - Resolved    MS (multiple sclerosis) ([facility]) G35 Low  2013 - Present    Depression F32.9 Low  2013 - Present    Multiple sclerosis exacerbation (CMS-HCC) G35 High  2016 - Present    Urinary tract infection N39.0   10/7/2016 - Present    Hypokalemia E87.6   10/8/2016 - Present    Hyperglycemia R73.9   10/8/2016 - Present    C. difficile diarrhea A04.7 High  10/19/2016 - Present    Sepsis (CMS-HCC) A41.9   10/19/2016 - Present    Acute colitis K52.9 High  2016 - Present    Muscle spasm M62.838 Medium  2016 - Present    Chronic progressive " multiple sclerosis (CMS-[facility]) G35 High  12/9/2016 - Present    H/O Clostridium difficile infection Z86.19 Low  12/10/2016 - Present    Failure to thrive in adult R62.7 Low  12/27/2016 - Present    Spasticity R25.2 Medium  12/27/2016 - Present    Urinary incontinence R32 Low  12/27/2016 - Present    Recurrent Clostridium difficile diarrhea A04.7   1/13/2017 - Present      Health Maintenance        Date Due Completion Dates    IMM DTaP/Tdap/Td Vaccine (1 - Tdap) 4/16/1997 ---            Current Immunizations     No immunizations on file.      Below and/or attached are the medications your provider expects you to take. Review all of your home medications and newly ordered medications with your provider and/or pharmacist. Follow medication instructions as directed by your provider and/or pharmacist. Please keep your medication list with you and share with your provider. Update the information when medications are discontinued, doses are changed, or new medications (including over-the-counter products) are added; and carry medication information at all times in the event of emergency situations     Allergies:  OTHER DRUG - (reactions not documented)     ASA - Unspecified               Medications  Valid as of: May 22, 2017 - 12:21 PM    Generic Name Brand Name Tablet Size Instructions for use    BuPROPion HCl (TABLET SR 12 HR) WELLBUTRIN- MG Take 1 Tab by mouth 2 Times a Day.        Cholecalciferol   Take 1 Tab by mouth every day.        Cranberry (Tab) Cranberry 125 MG Take  by mouth.        Escitalopram Oxalate (Tab) LEXAPRO 20 MG Take 1 Tab by mouth every day.        Gabapentin (Cap) NEURONTIN 100 MG Take 1 Cap by mouth 3 times a day. as needed for nerve pain.        Lidocaine HCl (Gel) XYLOCAINE 2 % Apply 1 Application to affected area(s) as needed (pain).        Magnesium Oxide (Cap) Magnesium Oxide 500 MG Take 2 Caps by mouth every day.        Non Formulary Request Non Formulary Request  Indications: marijuana   for spasms and pain relief        NON SPECIFIED   Take 1-2 Caps by mouth 2 Times a Day. Athletic Nutrients. OTC supplement/multivitamin   1 in am, 2 in pm        Ondansetron (TABLET DISPERSIBLE) ZOFRAN ODT 4 MG Take 1 Tab by mouth every 8 hours as needed for Nausea/Vomiting (give PO if no IV route available).        Oxybutynin Chloride (TABLET SR 24 HR) DITROPAN-XL 10 MG Take 1 Tab by mouth every evening.        Probiotic Product   Take 1 Cap by mouth every day.        TiZANidine HCl (Tab) ZANAFLEX 4 MG Take 1 Tab by mouth every 6 hours as needed. Indications: Muscle Spasticity        .                 Medicines prescribed today were sent to:     [facility] #[id number] - [place], [place] - [address]    [address] [place] [address]    Phone: [phone] Fax: [phone]    Open 24 Hours?: No    [facility] [id number]    [id number] [facility] [place] [address]    Phone: [phone] Fax: [phone]    Open 24 Hours?: No      Medication refill instructions:       If your prescription bottle indicates you have medication refills left, it is not necessary to call your provider’s office. Please contact your pharmacy and they will refill your medication.    If your prescription bottle indicates you do not have any refills left, you may request refills at any time through one of the following ways: The online [employer] system (except Urgent Care), by calling your provider’s office, or by asking your pharmacy to contact your provider’s office with a refill request. Medication refills are processed only during regular business hours and may not be available until the next business day. Your provider may request additional information or to have a follow-up visit with you prior to refilling your medication.   *Please Note: Medication refills are assigned a new Rx number when refilled electronically. Your pharmacy may indicate that no refills were authorized even though a new  prescription for the same medication is available at the pharmacy. Please request the medicine by name with the pharmacy before contacting your provider for a refill.           MyChart Status: Patient Declined

## 2017-05-24 NOTE — PROGRESS NOTES
CHIEF COMPLAINT  Chief Complaint   Patient presents with   • Multiple Sclerosis     leg spasms/ discuss meds       HPI  Jerzy Juan III is a 35 y.o. male who presents for treatment of his MS, patient is somewhat frustrated and fatigued about dealing with his disease but feels like he is has hope about trying Ocrevus for primary progressive multiple sclerosis.  MS (multiple sclerosis) ([facility])  Pt has primary progressive MS and has not responded to previous treatments and he keeps going downhill. Patient has failed all previous therapies for multiple sclerosis which gone sides with him having primary progressive. He continues to lose strength in his legs which is frustrating for him. Patient also has a history of frequent urinary tract infections in the past but had been stable recently.    Depression  Patient still quite depressed over his condition. Patient states medical marijuana helps the most for his depression and other symptoms.      REVIEW OF SYSTEMS  Pertinent Positives:fatigue, anxiety, paraesthesias   All other systems are negative.     PAST MEDICAL HISTORY  Past Medical History   Diagnosis Date   • Blood transfusion    • Headache    • Heart murmur    • MS (multiple sclerosis) (CMS-HCC) DX 8/20/2013   • Back pain    • Fall      4/6/2016   • Urinary incontinence      condom catheter   • Depression      PTSD/clinical depression       SOCIAL HISTORY  Social History     Social History   • Marital Status:      Spouse Name: N/A   • Number of Children: N/A   • Years of Education: N/A     Occupational History   • Not on file.     Social History Main Topics   • Smoking status: Former Smoker     Start date: 08/01/1997     Quit date: 08/01/2013   • Smokeless tobacco: Former User   • Alcohol Use: 0.0 oz/week     0 Standard drinks or equivalent per week      Comment: 1 beer/day   • Drug Use: Yes     Special: Inhaled      Comment: marijuana   • Sexual Activity: Not on file      Comment: once daily for 23  yrs     Other Topics Concern   • Not on file     Social History Narrative       SURGICAL HISTORY  Past Surgical History   Procedure Laterality Date   • Open reduction     • Gastroscopy  1/14/2017     Procedure: GASTROSCOPY WITH FECAL MATTER TRANSPLANT;  Surgeon: Obinna Douglas M.D.;  Location: SURGERY [facility];  Service:        CURRENT MEDICATIONS  Current Outpatient Prescriptions   Medication Sig Dispense Refill   • Non Formulary Request Indications: marijuana  for spasms and pain relief     • Cranberry 125 MG Tab Take  by mouth.     • gabapentin (NEURONTIN) 100 MG Cap Take 1 Cap by mouth 3 times a day. as needed for nerve pain. 45 Cap 2   • buPROPion SR (WELLBUTRIN-SR) 100 MG TABLET SR 12 HR Take 1 Tab by mouth 2 Times a Day. 60 Tab 0   • escitalopram (LEXAPRO) 20 MG tablet Take 1 Tab by mouth every day. 30 Tab 0   • oxybutynin SR (DITROPAN-XL) 10 MG CR tablet Take 1 Tab by mouth every evening. 30 Tab 0   • Magnesium Oxide 500 MG Cap Take 2 Caps by mouth every day.     • NON SPECIFIED Take 1-2 Caps by mouth 2 Times a Day. Athletic Nutrients. OTC supplement/multivitamin   1 in am, 2 in pm     • Cholecalciferol (VITAMIN D PO) Take 1 Tab by mouth every day.     • Probiotic Product (PROBIOTIC DAILY PO) Take 1 Cap by mouth every day.     • tizanidine (ZANAFLEX) 4 MG Tab Take 1 Tab by mouth every 6 hours as needed. Indications: Muscle Spasticity 60 Tab 5   • lidocaine (XYLOCAINE) 2 % Gel Apply 1 Application to affected area(s) as needed (pain). 1 Bottle 0   • ondansetron (ZOFRAN ODT) 4 MG TABLET DISPERSIBLE Take 1 Tab by mouth every 8 hours as needed for Nausea/Vomiting (give PO if no IV route available). 10 Tab 0     No current facility-administered medications for this visit.       ALLERGIES  Allergies   Allergen Reactions   • Other Drug      CANNOT TAKE ANY STEROIDS WITH RECENT TYSABRI INFUSION   • Asa [Aspirin] Unspecified     Bleeding in stomach as a baby       PHYSICAL EXAM  VITAL SIGNS: /86 mmHg   "Pulse 89  Temp(Src) 36.7 °C (98.1 °F)  Resp 16  Ht 1.803 m (5' 10.98\")  Wt 79.379 kg (175 lb)  BMI 24.42 kg/m2  SpO2 97%  Constitutional: Well developed, Well nourished, No acute distress, Non-toxic appearance.   HENT: normocephalic, atraumatic   Eyes: fundi-disc sharp, perrl   Neck: Normal range of motion, No tenderness, Supple, No stridor.   Cardiovascular: Normal heart rate, Normal rhythm, No murmurs, No rubs, No gallops.   Thorax & Lungs: Normal breath sounds, No respiratory distress, No wheezing, No chest tenderness.   Abdomen: Bowel sounds normal, Soft, No tenderness, No masses, No pulsatile masses.   Skin: Warm, Dry, No erythema, No rash.   Back: No tenderness, No CVA tenderness.   Extremities: Intact distal pulses, No edema, No tenderness, No cyanosis, No clubbing.   Neurologic: A&Ox3, CN:2-12:nystagmus, motor: increased tone, sensory; decreased in his legs, wheelchair bound  Psychiatric: Affect normal, Judgment normal, Mood normal.   Lab: Reviewed with patient in detail and as noted in results.        RADIOLOGY/PROCEDURES  MRI of the brain reviewed in the PACS system with the patient.      FINDINGS:  There are multiple ovoid and ushaped areas of increased T2 signal intensity in the immediate periventricular and subcortical white matter. Several lesions are also noted in the zabrina at the level of the brachium pontis. The size and number of lesions has   not changed significantly since previous exam. There are no \"active\" enhancing lesions in the brain parenchyma.    The calvariae are unremarkable.  There are no extra-axial fluid collections.  The ventricular system and basal cisterns are within normal limits.  There are no mass effects or shift of midline structures.  There are no hemorrhagic lesions.  The diffusion   weighted axial images show no evidence of acute cerebral infarction.    The postcontrast images show no areas of abnormal parenchymal or meningeal enhancement.    The brainstem and " "posterior fossa structures are unremarkable.    Vascular flow voids in the vertebrobasilar and carotid arteries, [place], and dural venous sinuses are intact.    The paranasal sinuses and mastoids in the field of view are unremarkable.         Impression          1.  Numerous periventricular, subcortical, and pontine areas of demyelination unchanged from previous exam of 2/29/16 and consistent with the patient's known diagnosis of multiple sclerosis.    2.  No \"active\" enhancing lesions in the brain parenchyma.           ASSESSMENT AND PLAN  1. MS (multiple sclerosis)  Plan to start Tysabri as it has not been helping the patient and start Ocrevus for progressive multiple sclerosis. Patient given encouragement and understanding of how medication works.   Patient is taking vitamin D and working on a healthy diet. He will follow up with urologist to monitor urinary tract infections.    2. Depression  Pt will consider therapy and support groups and significant encouragement for home therapy given to patient.     I spent 30 minutes with this patient and his caregiver, over fifty percent was spent counseling patient on their condition, best management practices, reviewing test results and risks and benefits of treatment.          "

## 2017-05-24 NOTE — ASSESSMENT & PLAN NOTE
Patient still quite depressed over his condition. Patient states medical marijuana helps the most for his depression and other symptoms.

## 2017-06-22 ENCOUNTER — TELEPHONE (OUTPATIENT)
Dept: NEUROLOGY | Facility: MEDICAL CENTER | Age: 37
End: 2017-06-22

## 2017-07-03 ENCOUNTER — HOSPITAL ENCOUNTER (OUTPATIENT)
Facility: MEDICAL CENTER | Age: 37
End: 2017-07-03
Attending: PHYSICIAN ASSISTANT
Payer: MEDICARE

## 2017-07-03 PROCEDURE — 87077 CULTURE AEROBIC IDENTIFY: CPT

## 2017-07-03 PROCEDURE — 87186 SC STD MICRODIL/AGAR DIL: CPT

## 2017-07-03 PROCEDURE — 87086 URINE CULTURE/COLONY COUNT: CPT

## 2017-07-06 LAB
BACTERIA UR CULT: ABNORMAL
SIGNIFICANT IND 70042: ABNORMAL
SOURCE SOURCE: ABNORMAL

## 2017-07-11 ENCOUNTER — RESOLUTE PROFESSIONAL BILLING HOSPITAL PROF FEE (OUTPATIENT)
Dept: HOSPITALIST | Facility: MEDICAL CENTER | Age: 37
End: 2017-07-11
Payer: MEDICARE

## 2017-07-11 ENCOUNTER — APPOINTMENT (OUTPATIENT)
Dept: RADIOLOGY | Facility: MEDICAL CENTER | Age: 37
DRG: 060 | End: 2017-07-11
Attending: EMERGENCY MEDICINE
Payer: MEDICARE

## 2017-07-11 ENCOUNTER — HOSPITAL ENCOUNTER (INPATIENT)
Facility: MEDICAL CENTER | Age: 37
LOS: 7 days | DRG: 060 | End: 2017-07-18
Attending: EMERGENCY MEDICINE | Admitting: HOSPITALIST
Payer: MEDICARE

## 2017-07-11 DIAGNOSIS — G35 MULTIPLE SCLEROSIS (HCC): ICD-10-CM

## 2017-07-11 DIAGNOSIS — G35 MULTIPLE SCLEROSIS EXACERBATION (HCC): ICD-10-CM

## 2017-07-11 DIAGNOSIS — M79.2 NERVE PAIN: ICD-10-CM

## 2017-07-11 LAB
ALBUMIN SERPL BCP-MCNC: 4.7 G/DL (ref 3.2–4.9)
ALBUMIN/GLOB SERPL: 1.9 G/DL
ALP SERPL-CCNC: 66 U/L (ref 30–99)
ALT SERPL-CCNC: 14 U/L (ref 2–50)
ANION GAP SERPL CALC-SCNC: 12 MMOL/L (ref 0–11.9)
APPEARANCE UR: CLEAR
AST SERPL-CCNC: 17 U/L (ref 12–45)
BASOPHILS # BLD AUTO: 0.8 % (ref 0–1.8)
BASOPHILS # BLD: 0.05 K/UL (ref 0–0.12)
BILIRUB SERPL-MCNC: 0.6 MG/DL (ref 0.1–1.5)
BILIRUB UR QL STRIP.AUTO: NEGATIVE
BUN SERPL-MCNC: 14 MG/DL (ref 8–22)
CALCIUM SERPL-MCNC: 9.5 MG/DL (ref 8.5–10.5)
CHLORIDE SERPL-SCNC: 103 MMOL/L (ref 96–112)
CO2 SERPL-SCNC: 23 MMOL/L (ref 20–33)
COLOR UR: YELLOW
CREAT SERPL-MCNC: 1.09 MG/DL (ref 0.5–1.4)
CULTURE IF INDICATED INDCX: NO UA CULTURE
EOSINOPHIL # BLD AUTO: 0.15 K/UL (ref 0–0.51)
EOSINOPHIL NFR BLD: 2.4 % (ref 0–6.9)
ERYTHROCYTE [DISTWIDTH] IN BLOOD BY AUTOMATED COUNT: 41.4 FL (ref 35.9–50)
GFR SERPL CREATININE-BSD FRML MDRD: >60 ML/MIN/1.73 M 2
GLOBULIN SER CALC-MCNC: 2.5 G/DL (ref 1.9–3.5)
GLUCOSE SERPL-MCNC: 88 MG/DL (ref 65–99)
GLUCOSE UR STRIP.AUTO-MCNC: NEGATIVE MG/DL
HCT VFR BLD AUTO: 48 % (ref 42–52)
HGB BLD-MCNC: 16.9 G/DL (ref 14–18)
IMM GRANULOCYTES # BLD AUTO: 0.02 K/UL (ref 0–0.11)
IMM GRANULOCYTES NFR BLD AUTO: 0.3 % (ref 0–0.9)
KETONES UR STRIP.AUTO-MCNC: 15 MG/DL
LEUKOCYTE ESTERASE UR QL STRIP.AUTO: NEGATIVE
LIPASE SERPL-CCNC: 44 U/L (ref 11–82)
LYMPHOCYTES # BLD AUTO: 2.61 K/UL (ref 1–4.8)
LYMPHOCYTES NFR BLD: 42.2 % (ref 22–41)
MCH RBC QN AUTO: 30.6 PG (ref 27–33)
MCHC RBC AUTO-ENTMCNC: 35.2 G/DL (ref 33.7–35.3)
MCV RBC AUTO: 86.8 FL (ref 81.4–97.8)
MICRO URNS: ABNORMAL
MONOCYTES # BLD AUTO: 0.52 K/UL (ref 0–0.85)
MONOCYTES NFR BLD AUTO: 8.4 % (ref 0–13.4)
NEUTROPHILS # BLD AUTO: 2.83 K/UL (ref 1.82–7.42)
NEUTROPHILS NFR BLD: 45.9 % (ref 44–72)
NITRITE UR QL STRIP.AUTO: NEGATIVE
NRBC # BLD AUTO: 0 K/UL
NRBC BLD AUTO-RTO: 0 /100 WBC
PH UR STRIP.AUTO: 7 [PH]
PLATELET # BLD AUTO: 303 K/UL (ref 164–446)
PMV BLD AUTO: 8.6 FL (ref 9–12.9)
POTASSIUM SERPL-SCNC: 4 MMOL/L (ref 3.6–5.5)
PROT SERPL-MCNC: 7.2 G/DL (ref 6–8.2)
PROT UR QL STRIP: NEGATIVE MG/DL
RBC # BLD AUTO: 5.53 M/UL (ref 4.7–6.1)
RBC UR QL AUTO: NEGATIVE
SODIUM SERPL-SCNC: 138 MMOL/L (ref 135–145)
SP GR UR STRIP.AUTO: 1.02
UROBILINOGEN UR STRIP.AUTO-MCNC: 0.2 MG/DL
WBC # BLD AUTO: 6.2 K/UL (ref 4.8–10.8)

## 2017-07-11 PROCEDURE — 96365 THER/PROPH/DIAG IV INF INIT: CPT

## 2017-07-11 PROCEDURE — 700105 HCHG RX REV CODE 258: Performed by: EMERGENCY MEDICINE

## 2017-07-11 PROCEDURE — A9270 NON-COVERED ITEM OR SERVICE: HCPCS | Performed by: HOSPITALIST

## 2017-07-11 PROCEDURE — 71010 DX-CHEST-PORTABLE (1 VIEW): CPT

## 2017-07-11 PROCEDURE — 36415 COLL VENOUS BLD VENIPUNCTURE: CPT

## 2017-07-11 PROCEDURE — 80053 COMPREHEN METABOLIC PANEL: CPT

## 2017-07-11 PROCEDURE — 81003 URINALYSIS AUTO W/O SCOPE: CPT

## 2017-07-11 PROCEDURE — 99285 EMERGENCY DEPT VISIT HI MDM: CPT

## 2017-07-11 PROCEDURE — 700105 HCHG RX REV CODE 258: Performed by: HOSPITALIST

## 2017-07-11 PROCEDURE — 303105 HCHG CATHETER EXTRA

## 2017-07-11 PROCEDURE — 770006 HCHG ROOM/CARE - MED/SURG/GYN SEMI*

## 2017-07-11 PROCEDURE — 700102 HCHG RX REV CODE 250 W/ 637 OVERRIDE(OP): Performed by: HOSPITALIST

## 2017-07-11 PROCEDURE — 99223 1ST HOSP IP/OBS HIGH 75: CPT | Performed by: HOSPITALIST

## 2017-07-11 PROCEDURE — 96361 HYDRATE IV INFUSION ADD-ON: CPT

## 2017-07-11 PROCEDURE — 51702 INSERT TEMP BLADDER CATH: CPT

## 2017-07-11 PROCEDURE — 700111 HCHG RX REV CODE 636 W/ 250 OVERRIDE (IP): Performed by: EMERGENCY MEDICINE

## 2017-07-11 PROCEDURE — 83690 ASSAY OF LIPASE: CPT

## 2017-07-11 PROCEDURE — 85025 COMPLETE CBC W/AUTO DIFF WBC: CPT

## 2017-07-11 RX ORDER — AMOXICILLIN 250 MG
2 CAPSULE ORAL 2 TIMES DAILY
Status: DISCONTINUED | OUTPATIENT
Start: 2017-07-11 | End: 2017-07-18 | Stop reason: HOSPADM

## 2017-07-11 RX ORDER — PROMETHAZINE HYDROCHLORIDE 25 MG/1
12.5-25 SUPPOSITORY RECTAL EVERY 4 HOURS PRN
Status: DISCONTINUED | OUTPATIENT
Start: 2017-07-11 | End: 2017-07-18 | Stop reason: HOSPADM

## 2017-07-11 RX ORDER — GABAPENTIN 100 MG/1
100 CAPSULE ORAL 2 TIMES DAILY
Status: DISCONTINUED | OUTPATIENT
Start: 2017-07-11 | End: 2017-07-18 | Stop reason: HOSPADM

## 2017-07-11 RX ORDER — TIZANIDINE 4 MG/1
4 TABLET ORAL EVERY 6 HOURS PRN
Status: DISCONTINUED | OUTPATIENT
Start: 2017-07-11 | End: 2017-07-18 | Stop reason: HOSPADM

## 2017-07-11 RX ORDER — PROMETHAZINE HYDROCHLORIDE 25 MG/1
12.5-25 TABLET ORAL EVERY 4 HOURS PRN
Status: DISCONTINUED | OUTPATIENT
Start: 2017-07-11 | End: 2017-07-18 | Stop reason: HOSPADM

## 2017-07-11 RX ORDER — OXYBUTYNIN CHLORIDE 10 MG/1
10 TABLET, EXTENDED RELEASE ORAL EVERY EVENING
Status: DISCONTINUED | OUTPATIENT
Start: 2017-07-11 | End: 2017-07-18 | Stop reason: HOSPADM

## 2017-07-11 RX ORDER — GABAPENTIN 100 MG/1
100 CAPSULE ORAL 2 TIMES DAILY
Status: ON HOLD | COMMUNITY
End: 2017-07-18

## 2017-07-11 RX ORDER — ESCITALOPRAM OXALATE 10 MG/1
20 TABLET ORAL DAILY
Status: DISCONTINUED | OUTPATIENT
Start: 2017-07-12 | End: 2017-07-18 | Stop reason: HOSPADM

## 2017-07-11 RX ORDER — MORPHINE SULFATE 4 MG/ML
4 INJECTION, SOLUTION INTRAMUSCULAR; INTRAVENOUS ONCE
Status: ACTIVE | OUTPATIENT
Start: 2017-07-11 | End: 2017-07-12

## 2017-07-11 RX ORDER — METHYLPREDNISOLONE SODIUM SUCCINATE 125 MG/2ML
1000 INJECTION, POWDER, LYOPHILIZED, FOR SOLUTION INTRAMUSCULAR; INTRAVENOUS ONCE
Status: DISCONTINUED | OUTPATIENT
Start: 2017-07-11 | End: 2017-07-11

## 2017-07-11 RX ORDER — SODIUM CHLORIDE 9 MG/ML
2000 INJECTION, SOLUTION INTRAVENOUS ONCE
Status: COMPLETED | OUTPATIENT
Start: 2017-07-11 | End: 2017-07-11

## 2017-07-11 RX ORDER — BISACODYL 10 MG
10 SUPPOSITORY, RECTAL RECTAL
Status: DISCONTINUED | OUTPATIENT
Start: 2017-07-11 | End: 2017-07-18 | Stop reason: HOSPADM

## 2017-07-11 RX ORDER — ONDANSETRON 4 MG/1
4 TABLET, ORALLY DISINTEGRATING ORAL EVERY 4 HOURS PRN
Status: DISCONTINUED | OUTPATIENT
Start: 2017-07-11 | End: 2017-07-18 | Stop reason: HOSPADM

## 2017-07-11 RX ORDER — POLYETHYLENE GLYCOL 3350 17 G/17G
1 POWDER, FOR SOLUTION ORAL
Status: DISCONTINUED | OUTPATIENT
Start: 2017-07-11 | End: 2017-07-18 | Stop reason: HOSPADM

## 2017-07-11 RX ORDER — ONDANSETRON 2 MG/ML
4 INJECTION INTRAMUSCULAR; INTRAVENOUS EVERY 4 HOURS PRN
Status: DISCONTINUED | OUTPATIENT
Start: 2017-07-11 | End: 2017-07-18 | Stop reason: HOSPADM

## 2017-07-11 RX ORDER — SODIUM CHLORIDE 9 MG/ML
1000 INJECTION, SOLUTION INTRAVENOUS ONCE
Status: COMPLETED | OUTPATIENT
Start: 2017-07-11 | End: 2017-07-11

## 2017-07-11 RX ORDER — SULFAMETHOXAZOLE AND TRIMETHOPRIM 800; 160 MG/1; MG/1
1 TABLET ORAL 2 TIMES DAILY
Status: ON HOLD | COMMUNITY
Start: 2017-07-06 | End: 2017-07-18

## 2017-07-11 RX ORDER — BUPROPION HYDROCHLORIDE 150 MG/1
150 TABLET, EXTENDED RELEASE ORAL 2 TIMES DAILY
Status: DISCONTINUED | OUTPATIENT
Start: 2017-07-11 | End: 2017-07-18 | Stop reason: HOSPADM

## 2017-07-11 RX ORDER — BUPROPION HYDROCHLORIDE 150 MG/1
150 TABLET, EXTENDED RELEASE ORAL 2 TIMES DAILY
Status: ON HOLD | COMMUNITY
End: 2017-07-18

## 2017-07-11 RX ADMIN — SODIUM CHLORIDE 1000 MG: 9 INJECTION, SOLUTION INTRAVENOUS at 19:53

## 2017-07-11 RX ADMIN — OXYBUTYNIN CHLORIDE 10 MG: 10 TABLET, FILM COATED, EXTENDED RELEASE ORAL at 21:00

## 2017-07-11 RX ADMIN — SODIUM CHLORIDE 1000 ML: 9 INJECTION, SOLUTION INTRAVENOUS at 18:29

## 2017-07-11 RX ADMIN — SODIUM CHLORIDE 2000 ML: 9 INJECTION, SOLUTION INTRAVENOUS at 22:14

## 2017-07-11 ASSESSMENT — LIFESTYLE VARIABLES: DO YOU DRINK ALCOHOL: NO

## 2017-07-11 ASSESSMENT — PAIN SCALES - GENERAL: PAINLEVEL_OUTOF10: 8

## 2017-07-11 NOTE — IP AVS SNAPSHOT
" <p align=\"LEFT\"><IMG SRC=\"//[email or web address]$/[email or web address]\" alt=\"Image\" WIDTH=\"50%\" HEIGHT=\"200\" BORDER=\"\"></p>                   Name:Jerzy Juan III  Medical Record Number:[id number]  CSN: [id number]    YOB: 1980   Age: 37 y.o.  Sex: male  HT:1.803 m (5' 11\") WT: 77.5 kg (170 lb 13.7 oz)          Admit Date: 7/11/2017     Discharge Date:   Today's Date: 7/18/2017  Attending Doctor:  Loki Mayfield M.D.                  Allergies:  Other drug and Asa          Your appointments     Jul 26, 2017  9:30 AM   EST MISC Infusion 2 HR with RN 4   Infusion Services ([facility])    [address] [place], [place] [address] [place] [address]   [phone]            Aug 28, 2017 11:20 AM   Follow Up Visit with Melissa P Bloch, M.D.   [facility] (--)    [address], [facility] [address]  [place] [id number] [phone]           You will be receiving a confirmation call a few days before your appointment from our automated call confirmation system.            Nov 27, 2017  2:00 PM   Follow Up Visit with Melissa P Bloch, M.D.   [facility] (--)    [address], [address] [place] [id number] [phone]           You will be receiving a confirmation call a few days before your appointment from our automated call confirmation system.              Follow-up Information     1. Follow up with NAOMIE Emmanuel. Schedule an appointment as soon as possible for a visit in 2 weeks.    Specialty:  Internal Medicine    Why:  Follow-up appointment    Contact information    [address]  [place] [id number] [phone]          2. Follow up with Keysha Silvestre ([facility]) .    Specialty:  Skilled Nursing Facility    Contact information    [facility] [id number] [phone]         Medication List      Take these Medications        Instructions    bisacodyl 10 MG Supp   Commonly known as:  DULCOLAX    Insert 1 Suppository in rectum 1 time daily as needed (if magnesium hydroxide ineffective " after 24 hours).   Dose:  10 mg       buPROPion  MG Tb12 sustained-release tablet   What changed:    - medication strength  - how much to take   Commonly known as:  WELLBUTRIN-SR    Take 1 Tab by mouth 2 Times a Day.   Dose:  150 mg       calcium carbonate 500 MG Chew   Commonly known as:  TUMS    Take 1 Tab by mouth 3 times a day as needed (heartburn).   Dose:  500 mg       Cranberry 125 MG Tabs    Take 125 mg by mouth every day.   Dose:  125 mg       escitalopram 20 MG tablet   Commonly known as:  LEXAPRO    Take 1 Tab by mouth every day.   Dose:  20 mg       gabapentin 100 MG Caps   What changed:    - when to take this  - additional instructions   Commonly known as:  NEURONTIN    Take 1 Cap by mouth 2 Times a Day.   Dose:  100 mg       magnesium hydroxide 400 MG/5ML Susp   Commonly known as:  MILK OF MAGNESIA    Take 30 mL by mouth 1 time daily as needed (if polyethylene glycol ineffective after 24 hours).   Dose:  30 mL       Magnesium Oxide 500 MG Caps    Take 2 Caps by mouth every day.   Dose:  2 Cap       methenamine hip 1 GM Tabs   Commonly known as:  HIPPREX    Take 1 Tab by mouth 2 Times a Day.   Dose:  1 g       ondansetron 4 MG Tbdp   Commonly known as:  ZOFRAN ODT    Take 1 Tab by mouth every four hours as needed for Nausea/Vomiting (give PO if no IV route available).   Dose:  4 mg       oxybutynin SR 10 MG CR tablet   Commonly known as:  DITROPAN-XL    Take 1 Tab by mouth every evening.   Dose:  10 mg       polyethylene glycol/lytes Pack   Commonly known as:  MIRALAX    Take 1 Packet by mouth 1 time daily as needed (if sennosides and docusate ineffective after 24 hours).   Dose:  17 g       predniSONE 20 MG Tabs   Start taking on:  7/19/2017   Commonly known as:  DELTASONE    20mg daily x 5 days, then 10mg daily x 5 days, then stop       PROBIOTIC DAILY PO    Take 1 Cap by mouth every day.   Dose:  1 Cap       promethazine 12.5 MG tablet   Commonly known as:  PHENERGAN    Take 1-2 Tabs by mouth  every four hours as needed for Nausea/Vomiting (if no relief from ondansetron or if ondansetron is not ordered).   Dose:  12.5-25 mg       psyllium 58.12 % Pack   Commonly known as:  METAMUCIL    Take 1 Packet by mouth every day.   Dose:  1 Packet       senna-docusate 8.6-50 MG Tabs   Commonly known as:  PERICOLACE or SENOKOT S    Take 2 Tabs by mouth 2 Times a Day.   Dose:  2 Tab       tizanidine 4 MG Tabs   Commonly known as:  ZANAFLEX    Take 1 Tab by mouth every 6 hours as needed. Indications: Muscle Spasticity   Dose:  4 mg       Vitamin D3 5000 UNITS Tabs    Take 5,000 Units by mouth every day.   Dose:  5000 Units

## 2017-07-11 NOTE — IP AVS SNAPSHOT
Maktoob Access Code: Activation code not generated  Current Maktoob Status: Patient Declined    [employer]  A secure, online tool to manage your health information     [employer]’s Maktoob® is a secure, online tool that connects you to your personalized health information from the privacy of your home -- day or night - making it very easy for you to manage your healthcare. Once the activation process is completed, you can even access your medical information using the Maktoob jesus, which is available for free in the [employer] store or Google Play store.     Maktoob provides the following levels of access (as shown below):   My Chart Features   [facility] Primary Care Doctor [facility]  Specialists [facility]  Urgent  Care Non-[facility]  Primary Care  Doctor   Email your healthcare team securely and privately 24/7 X X X X   Manage appointments: schedule your next appointment; view details of past/upcoming appointments X      Request prescription refills. X      View recent personal medical records, including lab and immunizations X X X X   View health record, including health history, allergies, medications X X X X   Read reports about your outpatient visits, procedures, consult and ER notes X X X X   See your discharge summary, which is a recap of your hospital and/or ER visit that includes your diagnosis, lab results, and care plan. X X       How to register for Maktoob:  1. Go to  [email or web address].  2. Click on the Sign Up Now box, which takes you to the New Member Sign Up page. You will need to provide the following information:  a. Enter your Maktoob Access Code exactly as it appears at the top of this page. (You will not need to use this code after you’ve completed the sign-up process. If you do not sign up before the expiration date, you must request a new code.)   b. Enter your date of birth.   c. Enter your home email address.   d. Click Submit, and follow the next screen’s instructions.  3. Create a Maktoob ID.  This will be your [employer] login ID and cannot be changed, so think of one that is secure and easy to remember.  4. Create a [employer] password. You can change your password at any time.  5. Enter your Password Reset Question and Answer. This can be used at a later time if you forget your password.   6. Enter your e-mail address. This allows you to receive e-mail notifications when new information is available in [employer].  7. Click Sign Up. You can now view your health information.    For assistance activating your [employer] account, call [phone]

## 2017-07-11 NOTE — IP AVS SNAPSHOT
7/18/2017    Jerzy Abhi Juan III  [address]  [place] [address]    Dear Jerzy:    [facility] wants to ensure your discharge home is safe and you or your loved ones have had all of your questions answered regarding your care after you leave the hospital.    Below is a list of resources and contact information should you have any questions regarding your hospital stay, follow-up instructions, or active medical symptoms.    Questions or Concerns Regarding… Contact   Medical Questions Related to Your Discharge  (7 days a week, 8am-5pm) Contact a Nurse Care Coordinator   [phone]   Medical Questions Not Related to Your Discharge  (24 hours a day / 7 days a week)  Contact the Nurse Health Line   [phone]    Medications or Discharge Instructions Refer to your discharge packet   or contact your [facility] Primary Care Provider   [phone]   Follow-up Appointment(s) Schedule your appointment via [employer]   or contact Scheduling [phone]   Billing Review your statement via [employer]  or contact Billing [phone]   Medical Records Review your records via [employer]   or contact Medical Records [phone]     You may receive a telephone call within two days of discharge. This call is to make certain you understand your discharge instructions and have the opportunity to have any questions answered. You can also easily access your medical information, test results and upcoming appointments via the [employer] free online health management tool. You can learn more and sign up at [employer]. For assistance setting up your [employer] account, please call [phone].    Once again, we want to ensure your discharge home is safe and that you have a clear understanding of any next steps in your care. If you have any questions or concerns, please do not hesitate to contact us, we are here for you. Thank you for choosing [facility] for your healthcare needs.    Sincerely,    Your [facility] Healthcare Team

## 2017-07-11 NOTE — IP AVS SNAPSHOT
" Home Care Instructions                                                                                                                  Name:Jerzy Juan III  Medical Record Number:[id number]  CSN: [id number]    YOB: 1980   Age: 37 y.o.  Sex: male  HT:1.803 m (5' 11\") WT: 77.5 kg (170 lb 13.7 oz)          Admit Date: 7/11/2017     Discharge Date:   Today's Date: 7/18/2017  Attending Doctor:  Loki Mayfield M.D.                  Allergies:  Other drug and Asa            Discharge Instructions       Discharge Instructions    Discharged to other by medical transportation with escort. Discharged via ambulance, hospital escort: Yes.  Special equipment needed: Not Applicable    Be sure to schedule a follow-up appointment with your primary care doctor or any specialists as instructed.     Discharge Plan:   Diet Plan: Discussed  Activity Level: Discussed  Confirmed Follow up Appointment: Appointment Scheduled  Confirmed Symptoms Management: Discussed  Medication Reconciliation Updated: Yes  Influenza Vaccine Indication: Indicated: Not available from distributor/    I understand that a diet low in cholesterol, fat, and sodium is recommended for good health. Unless I have been given specific instructions below for another diet, I accept this instruction as my diet prescription.   Other diet: Regular diet 1:1 assist    Special Instructions: None    · Is patient discharged on Warfarin / Coumadin?   No     · Is patient Post Blood Transfusion?  No    Depression / Suicide Risk    As you are discharged from this [facility] facility, it is important to learn how to keep safe from harming yourself.    Recognize the warning signs:  · Abrupt changes in personality, positive or negative- including increase in energy   · Giving away possessions  · Change in eating patterns- significant weight changes-  positive or negative  · Change in sleeping patterns- unable to sleep or sleeping all the " time   · Unwillingness or inability to communicate  · Depression  · Unusual sadness, discouragement and loneliness  · Talk of wanting to die  · Neglect of personal appearance   · Rebelliousness- reckless behavior  · Withdrawal from people/activities they love  · Confusion- inability to concentrate     If you or a loved one observes any of these behaviors or has concerns about self-harm, here's what you can do:  · Talk about it- your feelings and reasons for harming yourself  · Remove any means that you might use to hurt yourself (examples: pills, rope, extension cords, firearm)  · Get professional help from the community (Mental Health, Substance Abuse, psychological counseling)  · Do not be alone:Call your Safe Contact- someone whom you trust who will be there for you.  · Call your local CRISIS HOTLINE 491-9173 or 222-055-3800  · Call your local Children's Mobile Crisis Response Team [place] (160) 158-6369 or www.[employer]  · Call the toll free National Suicide Prevention Hotlines   · National Suicide Prevention Lifeline 627-088-AZGX (5893)  · National Hope Line Network 800-SUICIDE (928-4258)        Your appointments     Jul 26, 2017  9:30 AM   EST MISC Infusion 2 HR with RN 4   Infusion Services ([facility])    [address] [facility] [address]  [facility] [id number] [phone]            Aug 28, 2017 11:20 AM   Follow Up Visit with Melissa P Bloch, M.D.   [facility] (--)    [address] [place] [address], [address] [id number] [phone]           You will be receiving a confirmation call a few days before your appointment from our automated call confirmation system.            Nov 27, 2017  2:00 PM   Follow Up Visit with Melissa P Bloch, M.D.   [facility] (--)    [address] [facility], [address] [id number] [phone]           You will be receiving a confirmation call a few days before your appointment from our automated call confirmation system.              Follow-up  Information     1. Follow up with NAOMIE Emmanuel. Schedule an appointment as soon as possible for a visit in 2 weeks.    Specialty:  Internal Medicine    Why:  Follow-up appointment    Contact information    [address] [place] [address] [phone]          2. Follow up with Keysha Silvestre ([facility]) .    Specialty:  Skilled Nursing Facility    Contact information    [place] [address]  [phone]         Discharge Medication Instructions:    Below are the medications your physician expects you to take upon discharge:    Review all your home medications and newly ordered medications with your doctor and/or pharmacist. Follow medication instructions as directed by your doctor and/or pharmacist.    Please keep your medication list with you and share with your physician.               Medication List      START taking these medications        Instructions    Morning Afternoon Evening Bedtime    bisacodyl 10 MG Supp   Last time this was given:  10 mg on 7/13/2017  9:51 AM   Commonly known as:  DULCOLAX        Insert 1 Suppository in rectum 1 time daily as needed (if magnesium hydroxide ineffective after 24 hours).   Dose:  10 mg                        calcium carbonate 500 MG Chew   Last time this was given:  500 mg on 7/12/2017  5:57 PM   Commonly known as:  TUMS        Take 1 Tab by mouth 3 times a day as needed (heartburn).   Dose:  500 mg                        magnesium hydroxide 400 MG/5ML Susp   Last time this was given:  30 mL on 7/18/2017 12:51 PM   Commonly known as:  MILK OF MAGNESIA        Take 30 mL by mouth 1 time daily as needed (if polyethylene glycol ineffective after 24 hours).   Dose:  30 mL                        methenamine hip 1 GM Tabs   Last time this was given:  1 g on 7/18/2017  8:47 AM   Commonly known as:  HIPPREX        Take 1 Tab by mouth 2 Times a Day.   Dose:  1 g                        ondansetron 4 MG Tbdp   Last time this was given:  4 mg on 7/15/2017  2:26 PM     Commonly known as:  ZOFRAN ODT        Take 1 Tab by mouth every four hours as needed for Nausea/Vomiting (give PO if no IV route available).   Dose:  4 mg                        polyethylene glycol/lytes Pack   Commonly known as:  MIRALAX        Take 1 Packet by mouth 1 time daily as needed (if sennosides and docusate ineffective after 24 hours).   Dose:  17 g                        predniSONE 20 MG Tabs   Start taking on:  7/19/2017   Last time this was given:  20 mg on 7/18/2017  8:47 AM   Commonly known as:  DELTASONE        20mg daily x 5 days, then 10mg daily x 5 days, then stop                        promethazine 12.5 MG tablet   Last time this was given:  25 mg on 7/15/2017  6:07 PM   Commonly known as:  PHENERGAN        Take 1-2 Tabs by mouth every four hours as needed for Nausea/Vomiting (if no relief from ondansetron or if ondansetron is not ordered).   Dose:  12.5-25 mg                        senna-docusate 8.6-50 MG Tabs   Last time this was given:  2 Tabs on 7/18/2017  8:47 AM   Commonly known as:  PERICOLACE or SENOKOT S        Take 2 Tabs by mouth 2 Times a Day.   Dose:  2 Tab                          CHANGE how you take these medications        Instructions    Morning Afternoon Evening Bedtime    buPROPion  MG Tb12 sustained-release tablet   What changed:    - medication strength  - how much to take   Last time this was given:  150 mg on 7/18/2017  8:48 AM   Commonly known as:  WELLBUTRIN-SR        Take 1 Tab by mouth 2 Times a Day.   Dose:  150 mg                        gabapentin 100 MG Caps   What changed:    - when to take this  - additional instructions   Last time this was given:  100 mg on 7/18/2017  8:48 AM   Commonly known as:  NEURONTIN        Take 1 Cap by mouth 2 Times a Day.   Dose:  100 mg                          CONTINUE taking these medications        Instructions    Morning Afternoon Evening Bedtime    Cranberry 125 MG Tabs        Take 125 mg by mouth every day.   Dose:  125  mg                        escitalopram 20 MG tablet   Last time this was given:  20 mg on 7/18/2017  8:48 AM   Commonly known as:  LEXAPRO        Take 1 Tab by mouth every day.   Dose:  20 mg                        Magnesium Oxide 500 MG Caps        Take 2 Caps by mouth every day.   Dose:  2 Cap                        oxybutynin SR 10 MG CR tablet   Last time this was given:  10 mg on 7/17/2017  9:18 PM   Commonly known as:  DITROPAN-XL        Take 1 Tab by mouth every evening.   Dose:  10 mg                        PROBIOTIC DAILY PO        Take 1 Cap by mouth every day.   Dose:  1 Cap                        psyllium 58.12 % Pack   Commonly known as:  METAMUCIL        Take 1 Packet by mouth every day.   Dose:  1 Packet                        tizanidine 4 MG Tabs   Commonly known as:  ZANAFLEX        Take 1 Tab by mouth every 6 hours as needed. Indications: Muscle Spasticity   Dose:  4 mg                        Vitamin D3 5000 UNITS Tabs        Take 5,000 Units by mouth every day.   Dose:  5000 Units                             Where to Get Your Medications      Information about where to get these medications is not yet available     ! Ask your nurse or doctor about these medications    - bisacodyl 10 MG Supp  - buPROPion  MG Tb12 sustained-release tablet  - calcium carbonate 500 MG Chew  - escitalopram 20 MG tablet  - gabapentin 100 MG Caps  - magnesium hydroxide 400 MG/5ML Susp  - methenamine hip 1 GM Tabs  - ondansetron 4 MG Tbdp  - oxybutynin SR 10 MG CR tablet  - polyethylene glycol/lytes Pack  - predniSONE 20 MG Tabs  - promethazine 12.5 MG tablet  - senna-docusate 8.6-50 MG Tabs  - tizanidine 4 MG Tabs            Orders for after discharge     REFERRAL TO SKILLED NURSING FACILITY    Complete by:  As directed              Instructions           Diet / Nutrition:    Follow any diet instructions given to you by your doctor or the dietician, including how much salt (sodium) you are allowed each day.    If  you are overweight, talk to your doctor about a weight reduction plan.    Activity:    Remain physically active following your doctor's instructions about exercise and activity.    Rest often.     Any time you become even a little tired or short of breath, SIT DOWN and rest.    Worsening Symptoms:    Report any of the following signs and symptoms to the doctor's office immediately:    *Pain of jaw, arm, or neck  *Chest pain not relieved by medication                               *Dizziness or loss of consciousness  *Difficulty breathing even when at rest   *More tired than usual                                       *Bleeding drainage or swelling of surgical site  *Swelling of feet, ankles, legs or stomach                 *Fever (>100ºF)  *Pink or blood tinged sputum  *Weight gain (3lbs/day or 5lbs /week)           *Shock from internal defibrillator (if applicable)  *Palpitations or irregular heartbeats                *Cool and/or numb extremities    Stroke Awareness    Common Risk Factors for Stroke include:    Age  Atrial Fibrillation  Carotid Artery Stenosis  Diabetes Mellitus  Excessive alcohol consumption  High blood pressure  Overweight   Physical inactivity  Smoking    Warning signs and symptoms of a stroke include:    *Sudden numbness or weakness of the face, arm or leg (especially on one side of the body).  *Sudden confusion, trouble speaking or understanding.  *Sudden trouble seeing in one or both eyes.  *Sudden trouble walking, dizziness, loss of balance or coordination.Sudden severe headache with no known cause.    It is very important to get treatment quickly when a stroke occurs. If you experience any of the above warning signs, call 911 immediately.                   Disclaimer         Quit Smoking / Tobacco Use:    I understand the use of any tobacco products increases my chance of suffering from future heart disease or stroke and could cause other illnesses which may shorten my life. Quitting the  use of tobacco products is the single most important thing I can do to improve my health. For further information on smoking / tobacco cessation call a Toll Free Quit Line at 1-350.431.4973 (*National Cancer [place]) or 1-620.806.1650 (American Lung Association) or you can access the web based program at www.lungusa.org.    [place] Tobacco Users Help Line:  (367) 750-9914       Toll Free: 1-886.314.7670  Quit Tobacco Program [facility] (249)764-8856    Crisis Hotline:    [place] Crisis Hotline:  5-934-ITSWCYK or 1-555.518.1518    [place] Crisis Hotline:    1-156.856.6819 or 652-079-9639    Discharge Survey:   Thank you for choosing [facility]. We hope we did everything we could to make your hospital stay a pleasant one. You may be receiving a phone survey and we would appreciate your time and participation in answering the questions. Your input is very valuable to us in our efforts to improve our service to our patients and their families.        My signature on this form indicates that:    1. I have reviewed and understand the above information.  2. My questions regarding this information have been answered to my satisfaction.  3. I have formulated a plan with my discharge nurse to obtain my prescribed medications for home.                  Disclaimer         __________________________________                     __________       ________                       Patient Signature                                                 Date                    Time

## 2017-07-12 PROBLEM — Z87.440 HISTORY OF RECURRENT UTIS: Status: ACTIVE | Noted: 2017-07-12

## 2017-07-12 PROBLEM — R33.9 URINARY RETENTION: Status: ACTIVE | Noted: 2017-07-12

## 2017-07-12 LAB
ANION GAP SERPL CALC-SCNC: 5 MMOL/L (ref 0–11.9)
BUN SERPL-MCNC: 14 MG/DL (ref 8–22)
CALCIUM SERPL-MCNC: 9 MG/DL (ref 8.5–10.5)
CHLORIDE SERPL-SCNC: 110 MMOL/L (ref 96–112)
CO2 SERPL-SCNC: 21 MMOL/L (ref 20–33)
CREAT SERPL-MCNC: 0.93 MG/DL (ref 0.5–1.4)
CRP SERPL HS-MCNC: 0.02 MG/DL (ref 0–0.75)
ERYTHROCYTE [DISTWIDTH] IN BLOOD BY AUTOMATED COUNT: 40.6 FL (ref 35.9–50)
ERYTHROCYTE [SEDIMENTATION RATE] IN BLOOD BY WESTERGREN METHOD: 1 MM/HOUR (ref 0–15)
EST. AVERAGE GLUCOSE BLD GHB EST-MCNC: 97 MG/DL
GFR SERPL CREATININE-BSD FRML MDRD: >60 ML/MIN/1.73 M 2
GLUCOSE SERPL-MCNC: 160 MG/DL (ref 65–99)
HBA1C MFR BLD: 5 % (ref 0–5.6)
HCT VFR BLD AUTO: 43 % (ref 42–52)
HGB BLD-MCNC: 15 G/DL (ref 14–18)
MCH RBC QN AUTO: 30.1 PG (ref 27–33)
MCHC RBC AUTO-ENTMCNC: 34.9 G/DL (ref 33.7–35.3)
MCV RBC AUTO: 86.2 FL (ref 81.4–97.8)
PLATELET # BLD AUTO: 277 K/UL (ref 164–446)
PMV BLD AUTO: 8.5 FL (ref 9–12.9)
POTASSIUM SERPL-SCNC: 4.1 MMOL/L (ref 3.6–5.5)
RBC # BLD AUTO: 4.99 M/UL (ref 4.7–6.1)
RHEUMATOID FACT SER IA-ACNC: <10 IU/ML (ref 0–14)
SODIUM SERPL-SCNC: 136 MMOL/L (ref 135–145)
THYROPEROXIDASE AB SERPL-ACNC: 0.6 IU/ML (ref 0–9)
VIT B12 SERPL-MCNC: 873 PG/ML (ref 211–911)
WBC # BLD AUTO: 4.7 K/UL (ref 4.8–10.8)

## 2017-07-12 PROCEDURE — 700105 HCHG RX REV CODE 258: Performed by: HOSPITALIST

## 2017-07-12 PROCEDURE — A9270 NON-COVERED ITEM OR SERVICE: HCPCS | Performed by: HOSPITALIST

## 2017-07-12 PROCEDURE — 86140 C-REACTIVE PROTEIN: CPT

## 2017-07-12 PROCEDURE — 86225 DNA ANTIBODY NATIVE: CPT

## 2017-07-12 PROCEDURE — 700102 HCHG RX REV CODE 250 W/ 637 OVERRIDE(OP): Performed by: HOSPITALIST

## 2017-07-12 PROCEDURE — 86038 ANTINUCLEAR ANTIBODIES: CPT

## 2017-07-12 PROCEDURE — 83516 IMMUNOASSAY NONANTIBODY: CPT

## 2017-07-12 PROCEDURE — 360976 HOYER LARGE SLING UP TO 300LBS: Performed by: HOSPITALIST

## 2017-07-12 PROCEDURE — 83036 HEMOGLOBIN GLYCOSYLATED A1C: CPT

## 2017-07-12 PROCEDURE — 86431 RHEUMATOID FACTOR QUANT: CPT

## 2017-07-12 PROCEDURE — 99232 SBSQ HOSP IP/OBS MODERATE 35: CPT | Performed by: HOSPITALIST

## 2017-07-12 PROCEDURE — 85730 THROMBOPLASTIN TIME PARTIAL: CPT

## 2017-07-12 PROCEDURE — 86147 CARDIOLIPIN ANTIBODY EA IG: CPT | Mod: 91

## 2017-07-12 PROCEDURE — 85027 COMPLETE CBC AUTOMATED: CPT

## 2017-07-12 PROCEDURE — 770006 HCHG ROOM/CARE - MED/SURG/GYN SEMI*

## 2017-07-12 PROCEDURE — 80048 BASIC METABOLIC PNL TOTAL CA: CPT

## 2017-07-12 PROCEDURE — 86235 NUCLEAR ANTIGEN ANTIBODY: CPT | Mod: 91

## 2017-07-12 PROCEDURE — 85610 PROTHROMBIN TIME: CPT

## 2017-07-12 PROCEDURE — 86255 FLUORESCENT ANTIBODY SCREEN: CPT

## 2017-07-12 PROCEDURE — 82607 VITAMIN B-12: CPT

## 2017-07-12 PROCEDURE — 82595 ASSAY OF CRYOGLOBULIN: CPT

## 2017-07-12 PROCEDURE — 82784 ASSAY IGA/IGD/IGG/IGM EACH: CPT

## 2017-07-12 PROCEDURE — 36415 COLL VENOUS BLD VENIPUNCTURE: CPT

## 2017-07-12 PROCEDURE — 700111 HCHG RX REV CODE 636 W/ 250 OVERRIDE (IP): Performed by: HOSPITALIST

## 2017-07-12 PROCEDURE — 85613 RUSSELL VIPER VENOM DILUTED: CPT

## 2017-07-12 PROCEDURE — 83519 RIA NONANTIBODY: CPT

## 2017-07-12 PROCEDURE — 85652 RBC SED RATE AUTOMATED: CPT

## 2017-07-12 PROCEDURE — 86376 MICROSOMAL ANTIBODY EACH: CPT

## 2017-07-12 RX ORDER — SULFAMETHOXAZOLE AND TRIMETHOPRIM 800; 160 MG/1; MG/1
1 TABLET ORAL EVERY 12 HOURS
Status: DISPENSED | OUTPATIENT
Start: 2017-07-12 | End: 2017-07-15

## 2017-07-12 RX ORDER — METHENAMINE HIPPURATE 1000 MG/1
1 TABLET ORAL 2 TIMES DAILY
Status: DISCONTINUED | OUTPATIENT
Start: 2017-07-15 | End: 2017-07-18 | Stop reason: HOSPADM

## 2017-07-12 RX ORDER — CALCIUM CARBONATE 500 MG/1
500 TABLET, CHEWABLE ORAL 3 TIMES DAILY PRN
Status: DISCONTINUED | OUTPATIENT
Start: 2017-07-12 | End: 2017-07-18 | Stop reason: HOSPADM

## 2017-07-12 RX ADMIN — GABAPENTIN 100 MG: 100 CAPSULE ORAL at 20:49

## 2017-07-12 RX ADMIN — ONDANSETRON 4 MG: 4 TABLET, ORALLY DISINTEGRATING ORAL at 12:32

## 2017-07-12 RX ADMIN — BUPROPION HYDROCHLORIDE 150 MG: 150 TABLET, EXTENDED RELEASE ORAL at 08:52

## 2017-07-12 RX ADMIN — STANDARDIZED SENNA CONCENTRATE AND DOCUSATE SODIUM 2 TABLET: 8.6; 5 TABLET, FILM COATED ORAL at 08:52

## 2017-07-12 RX ADMIN — SULFAMETHOXAZOLE AND TRIMETHOPRIM 1 TABLET: 800; 160 TABLET ORAL at 20:50

## 2017-07-12 RX ADMIN — OXYBUTYNIN CHLORIDE 10 MG: 10 TABLET, FILM COATED, EXTENDED RELEASE ORAL at 20:49

## 2017-07-12 RX ADMIN — STANDARDIZED SENNA CONCENTRATE AND DOCUSATE SODIUM 2 TABLET: 8.6; 5 TABLET, FILM COATED ORAL at 20:49

## 2017-07-12 RX ADMIN — SODIUM CHLORIDE 1000 MG: 9 INJECTION, SOLUTION INTRAVENOUS at 20:45

## 2017-07-12 RX ADMIN — ESCITALOPRAM OXALATE 20 MG: 10 TABLET, FILM COATED ORAL at 08:51

## 2017-07-12 RX ADMIN — ONDANSETRON 4 MG: 4 TABLET, ORALLY DISINTEGRATING ORAL at 01:12

## 2017-07-12 RX ADMIN — ANTACID TABLETS 500 MG: 500 TABLET, CHEWABLE ORAL at 17:57

## 2017-07-12 RX ADMIN — BUPROPION HYDROCHLORIDE 150 MG: 150 TABLET, EXTENDED RELEASE ORAL at 20:51

## 2017-07-12 RX ADMIN — GABAPENTIN 100 MG: 100 CAPSULE ORAL at 08:51

## 2017-07-12 ASSESSMENT — COGNITIVE AND FUNCTIONAL STATUS - GENERAL
PERSONAL GROOMING: TOTAL
SUGGESTED CMS G CODE MODIFIER MOBILITY: CM
CLIMB 3 TO 5 STEPS WITH RAILING: TOTAL
MOVING FROM LYING ON BACK TO SITTING ON SIDE OF FLAT BED: UNABLE
MOVING TO AND FROM BED TO CHAIR: UNABLE
DAILY ACTIVITIY SCORE: 6
MOBILITY SCORE: 7
WALKING IN HOSPITAL ROOM: TOTAL
STANDING UP FROM CHAIR USING ARMS: TOTAL
EATING MEALS: TOTAL
HELP NEEDED FOR BATHING: TOTAL
DRESSING REGULAR LOWER BODY CLOTHING: TOTAL
DRESSING REGULAR UPPER BODY CLOTHING: TOTAL
TOILETING: TOTAL
TURNING FROM BACK TO SIDE WHILE IN FLAT BAD: A LOT
SUGGESTED CMS G CODE MODIFIER DAILY ACTIVITY: CN

## 2017-07-12 ASSESSMENT — PATIENT HEALTH QUESTIONNAIRE - PHQ9
SUM OF ALL RESPONSES TO PHQ9 QUESTIONS 1 AND 2: 0
1. LITTLE INTEREST OR PLEASURE IN DOING THINGS: NOT AT ALL
1. LITTLE INTEREST OR PLEASURE IN DOING THINGS: NOT AT ALL
SUM OF ALL RESPONSES TO PHQ QUESTIONS 1-9: 0
SUM OF ALL RESPONSES TO PHQ QUESTIONS 1-9: 0
2. FEELING DOWN, DEPRESSED, IRRITABLE, OR HOPELESS: NOT AT ALL
SUM OF ALL RESPONSES TO PHQ9 QUESTIONS 1 AND 2: 0
2. FEELING DOWN, DEPRESSED, IRRITABLE, OR HOPELESS: NOT AT ALL

## 2017-07-12 ASSESSMENT — LIFESTYLE VARIABLES
HAVE YOU EVER FELT YOU SHOULD CUT DOWN ON YOUR DRINKING: NO
TOTAL SCORE: 0
DO YOU DRINK ALCOHOL: YES
HAVE PEOPLE ANNOYED YOU BY CRITICIZING YOUR DRINKING: NO
AVERAGE NUMBER OF DAYS PER WEEK YOU HAVE A DRINK CONTAINING ALCOHOL: 7
EVER_SMOKED: YES
ON A TYPICAL DAY WHEN YOU DRINK ALCOHOL HOW MANY DRINKS DO YOU HAVE: 1
TOTAL SCORE: 0
EVER_SMOKED: YES
HAVE PEOPLE ANNOYED YOU BY CRITICIZING YOUR DRINKING: NO
TOTAL SCORE: 0
HOW MANY TIMES IN THE PAST YEAR HAVE YOU HAD 5 OR MORE DRINKS IN A DAY: 0
CONSUMPTION TOTAL: NEGATIVE
EVER FELT BAD OR GUILTY ABOUT YOUR DRINKING: NO
HOW MANY TIMES IN THE PAST YEAR HAVE YOU HAD 5 OR MORE DRINKS IN A DAY: 0
CONSUMPTION TOTAL: NEGATIVE
EVER HAD A DRINK FIRST THING IN THE MORNING TO STEADY YOUR NERVES TO GET RID OF A HANGOVER: NO
TOTAL SCORE: 0
HAVE YOU EVER FELT YOU SHOULD CUT DOWN ON YOUR DRINKING: NO
ON A TYPICAL DAY WHEN YOU DRINK ALCOHOL HOW MANY DRINKS DO YOU HAVE: 1
ALCOHOL_USE: YES
AVERAGE NUMBER OF DAYS PER WEEK YOU HAVE A DRINK CONTAINING ALCOHOL: 7
TOTAL SCORE: 0
EVER HAD A DRINK FIRST THING IN THE MORNING TO STEADY YOUR NERVES TO GET RID OF A HANGOVER: NO
EVER FELT BAD OR GUILTY ABOUT YOUR DRINKING: NO
TOTAL SCORE: 0

## 2017-07-12 ASSESSMENT — ENCOUNTER SYMPTOMS
SORE THROAT: 0
WEAKNESS: 1
VOMITING: 0
HEARTBURN: 1
NAUSEA: 0
MYALGIAS: 1
COUGH: 0
HEADACHES: 0
BACK PAIN: 1
FOCAL WEAKNESS: 1
PHOTOPHOBIA: 0
BLURRED VISION: 0
PALPITATIONS: 0
WHEEZING: 0
SHORTNESS OF BREATH: 0

## 2017-07-12 ASSESSMENT — PAIN SCALES - GENERAL
PAINLEVEL_OUTOF10: 7
PAINLEVEL_OUTOF10: 4
PAINLEVEL_OUTOF10: 8
PAINLEVEL_OUTOF10: 8
PAINLEVEL_OUTOF10: 7

## 2017-07-12 ASSESSMENT — COPD QUESTIONNAIRES
DO YOU EVER COUGH UP ANY MUCUS OR PHLEGM?: NO/ONLY WITH OCCASIONAL COLDS OR INFECTIONS
DURING THE PAST 4 WEEKS HOW MUCH DID YOU FEEL SHORT OF BREATH: SOME OF THE TIME
HAVE YOU SMOKED AT LEAST 100 CIGARETTES IN YOUR ENTIRE LIFE: YES
COPD SCREENING SCORE: 3

## 2017-07-12 NOTE — ED NOTES
Med rec updated and complete.  Allergies reviewed.  Pt/family stated in December pt received  A fecal transplant .  As a result the only antibiotic pt can take is  Septra.  Pt is also suppose to take methenamine 1 GM  Bid. However, pt is not currently taking it as he is on septra.

## 2017-07-12 NOTE — ED NOTES
Pt bib REMSA from home. Pt states he slipped while transferring out of bed this morning and c/o low back pain. Pt has hx of MS and is unable to move his lower extremities. Pt states he needs IV steroids for this flare up of pain. VSS. Chart up for ERP.

## 2017-07-12 NOTE — CONSULTS
"NEUROLOGY NOTE    Referring Physician  Ellie Art M.D.      CHIEF COMPLAINT:  Weakness, fall, low back pain, decreased sensation in lower extremities  Chief Complaint   Patient presents with   • T-5000 FALL     slipped out of bed this morning   • Low Back Pain             IMPRESSION:    1. Multiple Sclerosis -- ( Dr Bloch, Melissa has been trying to recruit him into [facility] for primary progressive multiple sclerosis)    2. Not ambulatory due to 1--     3. Frustration, Depression, Lack of social support and lack of nursing care at home( it is too demanding on his mother alone)       PLAN/RECOMMENDATIONS:    ________________________________________________________________________      Discussed with the patient about his current nursing care needs  We both agree that it is too much for his mother to provide the nursing care  For example, it would take 2-4 persons to give him a shower-- he has not has a shower for long time  He is crying in front of me regarding his misable conditions  I will consult  to find a nursing home for the patient to settle  Lack of nursing care is in fact the major reason he has to come to hospital      We also discussed about the treatment steroid, IVIG, plasmapheresis, blood tests and LP  Patient is not eager to have LP  He is fine with steroid-- months of oral steroid is fine with me if IV steroid helped him ( that is what he stated)      ________________________________________________________________________          MRI Jun 2016  Numerous periventricular, subcortical, and pontine areas of demyelination unchanged from previous exam of 2/29/16 and consistent with the patient's known diagnosis of multiple sclerosis.    2.  No \"active\" enhancing lesions in the brain parenchyma.       1.  Numerous periventricular, subcortical, and pontine areas of demyelination unchanged from previous exam of 2/29/16 and consistent with the patient's known diagnosis of multiple " sclerosis.    1.  Patchy intramedullary signal abnormalities in the spinal cord at the T11 and T12 levels with apparent mild expansion of the cord at this level. Findings could be seen in the setting of MS. There is no evidence of abnormal enhancement to suggest an   underlying neoplasm or an active demyelinating process.        SIGNATURE:  ArjunXimena Jos eLuis    CC:  NAOMIE Emmanuel        PRESENT ILLNESS:   Weakness, fall, low back pain, decreased sensation in lower extremities  Underlined multiple sclerosis    For 3 years, he could not stand and take care of himself. He has not taken shower for a long time    Jerzy Juan is a 37 y.o. male who presents with several complaints including worsening weakness, low back pain after fall, and decreased sensation in his lower extremities. The patient is known to me from a previous hospitalization, he carries a known history of multiple sclerosis. He was last admitted to the hospital for a multiple sclerosis exacerbation. The patient states that he had lower back pain since this morning. He also reports several months of progressive lower extremity weakening  Associated with decreased sensation. His last treatment for a multiple sclerosis flare was completed in December by his report. He states that he is to be enrolled in a medication trial shortly.    Dr Bloch, Melissa has been trying to recruit him into [facility] for primary progressive multiple sclerosis        PAST MEDICAL HISTORY:  Past Medical History   Diagnosis Date   • Blood transfusion    • Headache    • Heart murmur    • MS (multiple sclerosis) (CMS-HCC) DX 8/20/2013   • Back pain    • Fall      4/6/2016   • Urinary incontinence      condom catheter   • Depression      PTSD/clinical depression       PAST SURGICAL HISTORY:  Past Surgical History   Procedure Laterality Date   • Open reduction     • Gastroscopy  1/14/2017     Procedure: GASTROSCOPY WITH FECAL MATTER TRANSPLANT;  Surgeon: Obinna Douglas M.D.;  Location:  SURGERY [facility];  Service:        FAMILY HISTORY:  Family History   Problem Relation Age of Onset   • Thyroid Mother        SOCIAL HISTORY:  Social History     Social History   • Marital Status:      Spouse Name: N/A   • Number of Children: N/A   • Years of Education: N/A     Occupational History   • Not on file.     Social History Main Topics   • Smoking status: Former Smoker     Start date: 08/01/1997     Quit date: 08/01/2013   • Smokeless tobacco: Former User   • Alcohol Use: 0.0 oz/week     0 Standard drinks or equivalent per week      Comment: 1 beer/day   • Drug Use: Yes     Special: Inhaled      Comment: marijuana   • Sexual Activity: Not on file      Comment: once daily for 23 yrs     Other Topics Concern   • Not on file     Social History Narrative     ALLERGIES:  Allergies   Allergen Reactions   • Other Drug      CANNOT TAKE ANY STEROIDS WITH RECENT TYSABRI INFUSION   • Asa [Aspirin] Unspecified     Bleeding in stomach as a baby     TOBHX  History   Smoking status   • Former Smoker   • Start date: 08/01/1997   • Quit date: 08/01/2013   Smokeless tobacco   • Former User     ALCHX  History   Alcohol Use   • 0.0 oz/week   • 0 Standard drinks or equivalent per week     Comment: 1 beer/day     DRUGHX  History   Drug Use   • Yes   • Special: Inhaled     Comment: marijuana           MEDICATIONS:  Current Facility-Administered Medications   Medication Dose   • morphine (pf) 4 mg/ml injection 4 mg  4 mg   • tizanidine (ZANAFLEX) tablet 4 mg  4 mg   • oxybutynin SR (DITROPAN-XL) tablet 10 mg  10 mg   • gabapentin (NEURONTIN) capsule 100 mg  100 mg   • escitalopram (LEXAPRO) tablet 20 mg  20 mg   • buPROPion SR (WELLBUTRIN-SR) tablet 150 mg  150 mg   • methylPREDNISolone sod succ (SOLU-MEDROL) 1,000 mg in  mL IVPB  1 g   • senna-docusate (PERICOLACE or SENOKOT S) 8.6-50 MG per tablet 2 Tab  2 Tab    And   • polyethylene glycol/lytes (MIRALAX) PACKET 1 Packet  1 Packet    And   • magnesium  "hydroxide (MILK OF MAGNESIA) suspension 30 mL  30 mL    And   • bisacodyl (DULCOLAX) suppository 10 mg  10 mg   • Respiratory Care per Protocol     • ondansetron (ZOFRAN) syringe/vial injection 4 mg  4 mg   • ondansetron (ZOFRAN ODT) dispertab 4 mg  4 mg   • promethazine (PHENERGAN) tablet 12.5-25 mg  12.5-25 mg   • promethazine (PHENERGAN) suppository 12.5-25 mg  12.5-25 mg   • prochlorperazine (COMPAZINE) injection 5-10 mg  5-10 mg       REVIEW OF SYSTEM:    Constitutional: Denies fevers, Denies weight changes   Eyes: Denies changes in vision, no eye pain   Ears/Nose/Throat/Mouth: Denies nasal congestion or sore throat   Cardiovascular: Denies chest pain or palpitations   Respiratory: Denies SOB.   Gastrointestinal/Hepatic: Denies abdominal pain, nausea, vomiting, diarrhea, constipation or GI bleeding   Genitourinary: urine incontinence  Musculoskeletal/Rheum: Denies joint pain and swelling   Skin/Breast: Denies rash, denies breast lumps or discharge   Neurological: multiple sclerosis-- bed ridden, back pain  Psychiatric: reactive depression  Endocrine: denies hx of diabetes or thyroid dysfunction   Heme/Oncology/Lymph Nodes: Denies enlarged lymph nodes, denies brusing or known bleeding disorder   Allergic/Immunologic: Denies hx of allergies         PHYSICAL AND NEUROLOGICAL EXMAINATIONS:  VITAL SIGNS: /66 mmHg  Pulse 112  Temp(Src) 37 °C (98.6 °F)  Resp 16  Ht 1.803 m (5' 11\")  Wt 79.379 kg (175 lb)  BMI 24.42 kg/m2  SpO2 96%  CURRENT WEIGHT: BMI: Body mass index is 24.42 kg/(m^2).  PREVIOUS WEIGHTS:  Wt Readings from Last 25 Encounters:   07/11/17 79.379 kg (175 lb)   05/22/17 79.379 kg (175 lb)   05/04/17 79.379 kg (175 lb)   04/11/17 79.379 kg (175 lb)   04/03/17 81 kg (178 lb 9.2 oz)   03/22/17 80.287 kg (177 lb)   03/04/17 80.7 kg (177 lb 14.6 oz)   02/21/17 79.379 kg (175 lb)   02/13/17 79.379 kg (175 lb)   01/21/17 78 kg (171 lb 15.3 oz)   11/18/16 77.111 kg (170 lb)   11/14/16 77.338 kg (170 " lb 8 oz)   10/20/16 76.3 kg (168 lb 3.4 oz)   10/17/16 101.152 kg (223 lb)   10/08/16 75 kg (165 lb 5.5 oz)   09/21/16 73.3 kg (161 lb 9.6 oz)   08/24/16 76.9 kg (169 lb 8.5 oz)   07/27/16 81.1 kg (178 lb 12.7 oz)   07/20/16 77.111 kg (170 lb)   06/29/16 73.6 kg (162 lb 4.1 oz)   06/21/16 77.111 kg (170 lb)   06/18/16 76.5 kg (168 lb 10.4 oz)   05/23/16 76.8 kg (169 lb 5 oz)   05/08/16 74.571 kg (164 lb 6.4 oz)   05/09/16 74.844 kg (165 lb)       General appearance of patient: WDWN(+) NAD(+)    EYES  o Fundus : Papilledem(-) Exudates(-) Hemorrhage(-)  Nervous System  Orientation to time, place and person(+)  Memory normal(-)  Language: aphasia(-)  Knowledge: past(+) Current(+)  Attention(+)  Emotional, crying  Cranial Nerves  • Nerve 2: intact  • Nerve 3,4,6: intact  • Nerve 5 : intact  • Nerve 7: intact  • Nerve 8: intact  • Nerve 9 & 10: intact  • Nerve 11: intact  • Nerve 12: intact  Muscle Power and muscle tone: could not even move the legs, legs are in fixed flexion posture  Gait : bed ridden  Heart and Vascular  Peripheral Vasucular system : Edema (-) Swelling(-)  RHB, Breathing sound clear  abdomen bowel sound normoactive  Extremities freely moveable  Joints no contracture       NEUROIMAGING: I reviewed the MRI/CT of brain       LAB:  Recent Labs      07/11/17   1823  07/12/17   0346   WBC  6.2  4.7*   RBC  5.53  4.99   HEMOGLOBIN  16.9  15.0   HEMATOCRIT  48.0  43.0   MCV  86.8  86.2   MCH  30.6  30.1   MCHC  35.2  34.9   RDW  41.4  40.6   PLATELETCT  303  277   MPV  8.6*  8.5*           IMPRESSION:    1. Multiple Sclerosis -- ( Dr Bloch, Melissa has been trying to recruit him into [facility] for primary progressive multiple sclerosis)    2. Not ambulatory due to 1--     3. Frustration, Depression, Lack of social support and lack of nursing care at home( it is too demanding on his mother alone)       PLAN/RECOMMENDATIONS:    ________________________________________________________________________      Discussed  "with the patient about his current nursing care needs  We both agree that it is too much for his mother to provide the nursing care  For example, it would take 2-4 persons to give him a shower-- he has not has a shower for long time  He is crying in front of me regarding his misable conditions  I will consult  to find a nursing home for the patient to settle  Lack of nursing care is in fact the major reason he has to come to hospital      We also discussed about the treatment steroid, IVIG, plasmapheresis, blood tests and LP  Patient is not eager to have LP  He is fine with steroid-- months of oral steroid is fine with me if IV steroid helped him ( that is what he stated)      ________________________________________________________________________          MRI Jun 2016  Numerous periventricular, subcortical, and pontine areas of demyelination unchanged from previous exam of 2/29/16 and consistent with the patient's known diagnosis of multiple sclerosis.    2.  No \"active\" enhancing lesions in the brain parenchyma.       1.  Numerous periventricular, subcortical, and pontine areas of demyelination unchanged from previous exam of 2/29/16 and consistent with the patient's known diagnosis of multiple sclerosis.    1.  Patchy intramedullary signal abnormalities in the spinal cord at the T11 and T12 levels with apparent mild expansion of the cord at this level. Findings could be seen in the setting of MS. There is no evidence of abnormal enhancement to suggest an   underlying neoplasm or an active demyelinating process.        SIGNATURE:  Christophe Amaya    CC:  Jw Pfeiffer, P.A.      "

## 2017-07-12 NOTE — PROGRESS NOTES
"Pt arrived to floor from ER via gurney. Slid to bed with draw sheet. Pt AAOx4, chronic leg weakness and numbness present. Pt states \"2 years\" since he was last able to walk due to progressive MS. Epperson in place due to incontinence and patient normally straight caths at home. Pt reports pain \"all over\" but declines to take any prn pain meds. States he manages pain at home with marijuana. Oriented to call light system. Bed alarm on.   "

## 2017-07-12 NOTE — ASSESSMENT & PLAN NOTE
Chronic per patient- he refuses to use our straight catheters, wishes instead for matias despite risks

## 2017-07-12 NOTE — ASSESSMENT & PLAN NOTE
- Appreciate neurology reccs  - Treated with solumedrol 1G X 5 days. Now on PO prednisone x 1 month per neurology reccs  - [facility] infusion scheduled per CM for OP infusion  - Worsening debility- pending SNF placement

## 2017-07-12 NOTE — CARE PLAN
Problem: Safety  Goal: Will remain free from injury  Outcome: PROGRESSING AS EXPECTED  No falls this shift, all fall precautions in place, pt calling for help.     Problem: Pain Management  Goal: Pain level will decrease to patient’s comfort goal  Outcome: PROGRESSING AS EXPECTED  Pt. Tolerating daily activities, working on non-pharm interventions

## 2017-07-12 NOTE — H&P
PATIENT ID:  NAME:  Jerzy Juan  MRN:               [id number]  YOB: 1980    PMD: NAOMIE Emmanuel    CC: Weakness, fall, low back pain, decreased sensation in lower extremities    HPI: Jerzy Juan is a 37 y.o. male who presents with several complaints including worsening weakness, low back pain after fall, and decreased sensation in his lower extremities. The patient is known to me from a previous hospitalization, he carries a known history of multiple sclerosis. He was last admitted to the hospital for a multiple sclerosis exacerbation. The patient states that he had a fall of late and has had lower back pain since this morning. He also reports several months of progressive lower extremity weakening  Associated with decreased sensation. His last treatment for a multiple sclerosis flare was completed in December by his report. He states that he is to be enrolled in a medication trial shortly.                REVIEW OF SYSTEMS  A full review of systems was completed and all pertinent positives and negatives are included in the HPI above by AMA/CMS criteria.    PAST MEDICAL HISTORY  Past Medical History   Diagnosis Date   • Blood transfusion    • Headache    • Heart murmur    • MS (multiple sclerosis) (CMS-HCC) DX 8/20/2013   • Back pain    • Fall      4/6/2016   • Urinary incontinence      condom catheter   • Depression      PTSD/clinical depression       PAST SURGICAL HISTORY  Past Surgical History   Procedure Laterality Date   • Open reduction     • Gastroscopy  1/14/2017     Procedure: GASTROSCOPY WITH FECAL MATTER TRANSPLANT;  Surgeon: Obinna Douglas M.D.;  Location: SURGERY [facility];  Service:        FAMILY HISTORY  Family History   Problem Relation Age of Onset   • Thyroid Mother        SOCIAL HISTORY  Social History   Substance Use Topics   • Smoking status: Former Smoker     Start date: 08/01/1997     Quit date: 08/01/2013   • Smokeless tobacco: Former User   • Alcohol Use: 0.0  "oz/week     0 Standard drinks or equivalent per week      Comment: 1 beer/day       ALLERGIES  Allergies as of 07/11/2017 - Saman as Reviewed 07/11/2017   Allergen Reaction Noted   • Other drug  11/18/2016   • Asa [aspirin] Unspecified 11/18/2016       OUTPATIENT MEDICATIONS     Medication List      ASK your doctor about these medications       Instructions    Cranberry 125 MG Tabs    Take  by mouth.       escitalopram 20 MG tablet   Commonly known as:  LEXAPRO    Take 1 Tab by mouth every day.   Dose:  20 mg       lidocaine 2 % Gel   Commonly known as:  XYLOCAINE    Apply 1 Application to affected area(s) as needed (pain).   Dose:  1 Application       Magnesium Oxide 500 MG Caps    Take 2 Caps by mouth every day.   Dose:  2 Cap       NON SPECIFIED    Take 1-2 Caps by mouth 2 Times a Day. Athletic Nutrients. OTC supplement/multivitamin  1 in am, 2 in pm   Dose:  1-2 Cap       ondansetron 4 MG Tbdp   Commonly known as:  ZOFRAN ODT    Take 1 Tab by mouth every 8 hours as needed for Nausea/Vomiting (give PO if no IV route available).   Dose:  4 mg       oxybutynin SR 10 MG CR tablet   Commonly known as:  DITROPAN-XL    Take 1 Tab by mouth every evening.   Dose:  10 mg       PROBIOTIC DAILY PO    Take 1 Cap by mouth every day.   Dose:  1 Cap       tizanidine 4 MG Tabs   Commonly known as:  ZANAFLEX    Take 1 Tab by mouth every 6 hours as needed. Indications: Muscle Spasticity   Dose:  4 mg             PHYSICAL EXAM:  Pulse 86, temperature 37 °C (98.6 °F), resp. rate 18, height 1.803 m (5' 11\"), weight 79.379 kg (175 lb), SpO2 94 %.  Oxygen: Pulse Oximetry: 94 %    Gen: NAD, comfortable  HEENT: NCAT, EOMI, no LAD, no JVD, neck supple, MMM, no supraclavicular lymphadenopathy  Heart: +S1/S2, RRR, no murmur, rubs or gallops appreciated  Lungs: CTAB, no accessory muscle use, no wheezes, rubs or rhonchi heard  GI: NTND, soft, +BS, no rebound/guarding, no hepatosplenomegaly  Extremities: Warm, well-perfused, no " cyanosis/clubbing, no peripheral edema, distal pulses are intact  MSK: 5/5 strength in all 4 extremities, sensation intact to light touch  Neuro: AO x 3, CN II-XII grossly intact    LAB TESTS and IMAGES:   Recent Labs      07/11/17   1823   WBC  6.2   RBC  5.53   HEMOGLOBIN  16.9   HEMATOCRIT  48.0   MCV  86.8   MCH  30.6   RDW  41.4   PLATELETCT  303   MPV  8.6*   NEUTSPOLYS  45.90   LYMPHOCYTES  42.20*   MONOCYTES  8.40   EOSINOPHILS  2.40   BASOPHILS  0.80             Recent Labs      07/11/17   1823   SODIUM  138   POTASSIUM  4.0   CHLORIDE  103   CO2  23   BUN  14   CREATININE  1.09   CALCIUM  9.5   ALBUMIN  4.7     Estimated GFR/CRCL = Estimated Creatinine Clearance: 96.9 mL/min (by C-G formula based on Cr of 1.09).  Recent Labs      07/11/17   1823   GLUCOSE  88     GLYCOHEMOGLOBIN   Date/Time Value Ref Range Status   12/09/2016 07:35 PM 5.6 0.0 - 5.6 % Final     Comment:     Increased risk for diabetes:  5.7 -6.4%  Diabetes:  >6.4%  Glycemic control for adults with diabetes:  <7.0%  The above interpretations are per ADA guidelines.  Diagnosis  of diabetes mellitus on the basis of elevated Hemoglobin A1c  should be confirmed by repeating the Hb A1c test.     10/07/2016 06:22 PM 8.0* 0.0 - 5.6 % Final     Comment:     Increased risk for diabetes:  5.7 -6.4%  Diabetes:  >6.4%  Glycemic control for adults with diabetes:  <7.0%  The above interpretations are per ADA guidelines.  Diagnosis  of diabetes mellitus on the basis of elevated Hemoglobin A1c  should be confirmed by repeating the Hb A1c test.     04/09/2016 05:10 AM 5.6 0.0 - 5.6 % Final     Comment:     Increased risk for diabetes:  5.7 -6.4%  Diabetes:  >6.4%  Glycemic control for adults with diabetes:  <7.0%  The above interpretations are per ADA guidelines.  Diagnosis  of diabetes mellitus on the basis of elevated Hemoglobin A1c  should be confirmed by repeating the Hb A1c test.       FREE T-4   Date/Time Value Ref Range Status   06/08/2016 04:16 AM  0.97 0.53 - 1.43 ng/dL Final     Recent Labs      07/11/17   1823   ASTSGOT  17   ALTSGPT  14   TBILIRUBIN  0.6   ALKPHOSPHAT  66   GLOBULIN  2.5           Invalid input(s): [id number]  VITAMIN B12 -TRUE COBALAMIN   Date/Time Value Ref Range Status   06/08/2016 04:16  211 - 911 pg/mL Final     FOLATE -FOLIC ACID   Date/Time Value Ref Range Status   06/08/2016 04:16 AM 19.0 >4.0 ng/mL Final     Dx-chest-portable (1 View)    7/11/2017 7/11/2017 6:21 PM HISTORY/REASON FOR EXAM:  Cough. TECHNIQUE/EXAM DESCRIPTION AND NUMBER OF VIEWS: Single portable view of the chest. COMPARISON: December 9, 2016 FINDINGS: Heart size is within normal limits. No pulmonary infiltrates or consolidations are noted. No pleural abnormalities are noted.     7/11/2017  No acute cardiac or pulmonary abnormalities are identified.      ASSESSMENT/PLAN: Jerzy Juan is a 37 y.o. male who presents with lower extreme weakness progressively worsening over the last several months, lower back pain after a recent fall and decreased sensation in his legs    1. Multiple sclerosis exacerbation: The patient will be admitted to the neurology floor and started on high-dose steroids. He will be receiving 1 g of prednisone via IV daily. He'll be on every 4 neuro checks. I'll continue the Zanaflex. I have ordered physical therapy and occupational therapy consultation. We will plan to consult neurology in the morning for further recommendations.    2. Chronic urinary incontinence: The patient will have a Epperson catheter placed for his urinary incontinence, he does carry a history of recurrent UTIs. Currently from this standpoint he is stable.    PPX: Sequential compression devices for DVT prophylaxis, no PPI indicated, stool softeners ordered    DISPO: Neurology    CODE STATUS: Full    Anticipate that patient will need greater than 2 midnights for management of the above discussed medical issues.    This dictation was created using voice recognition  software. The accuracy of the dictation is limited to the abilities of the software. I expect there may be some errors of grammar and possibly content.

## 2017-07-12 NOTE — DISCHARGE PLANNING
Care Transition Team Assessment  IHD met with patient at bedside. Patient lives in 1st floor apartment by himself. Patient has a caretaker 7 days a week. Patient uses [facility].Patient uses RTC access bus to go to provider appointment . Patient mother will be taking patient home after  discharged from hospital. Patient uses a wheelchair,does not use O2, uses meals on wheels no other services. Patient expects to be discharged with no new services.     Information Source  Orientation : Oriented x 4  Information Given By: Patient  Informant's Name: Jerzy  Who is responsible for making decisions for patient? : Patient    Readmission Evaluation  Is this a readmission?: No    Elopement Risk  Legal Hold: No  Ambulatory or Self Mobile in Wheelchair: No-Not an Elopement Risk  Elopement Risk: Not at Risk for Elopement    Interdisciplinary Discharge Planning  Does Admitting Nurse Feel This Could be a Complex Discharge?: No  Primary Care Physician: Dr Escalera   Lives with - Patient's Self Care Capacity: Alone and Able to Care For Self (with help of caregiver)  Patient or legal guardian wants to designate a caregiver (see row info): No  Support Systems: Family Member(s), Other (Comments) (Caregiver)  Housing / Facility: 1 Story Apartment / Condo  Do You Take your Prescribed Medications Regularly: Yes  Able to Return to Previous ADL's: Future Time w/Therapy  Mobility Issues: Yes  Prior Services: Other (Comments), Meals on Wheels (Home health services)  Patient Expects to be Discharged to:: Home  Assistance Needed: No  Durable Medical Equipment: Other - Specify (Wheelchair, hospital bed, commode, trapeze)    Discharge Preparedness  What is your plan after discharge?: Home with help  What are your discharge supports?: Parent (mother and caretaker)  Prior Functional Level: Independent with Activities of Daily Living, Independent with Medication Management, Uses Wheelchair  Difficulity with ADLs:  Walking  Difficulty with ADLs Comment: patient uses a wheelchair  Difficulity with IADLs: Cooking, Driving, Laundry, Shopping  Difficulity with IADL Comments: Patient has a caretaker 7 days a week to help him with ,cleaning home, laundry.    Functional Assesment  Prior Functional Level: Independent with Activities of Daily Living, Independent with Medication Management, Uses Wheelchair    Finances  Financial Barriers to Discharge: No  Prescription Coverage: Yes ([facility])    Vision / Hearing Impairment  Vision Impairment : Yes  Right Eye Vision: Impaired  Left Eye Vision: Impaired  Hearing Impairment : No    Values / Beliefs / Concerns  Values / Beliefs Concerns : No         Domestic Abuse  Have you ever been the victim of abuse or violence?: No  Physical Abuse or Sexual Abuse: No  Verbal Abuse or Emotional Abuse: No  Possible Abuse Reported to:: Not Applicable    Psychological Assessment  History of Substance Abuse: None  History of Psychiatric Problems: No    Discharge Risks or Barriers  Discharge risks or barriers?: No    Anticipated Discharge Information  Anticipated discharge disposition: Discharge needs currently unknown  Discharge Address: [address] [place] [address] [place] [address]  Discharge Contact Phone Number: [phone]

## 2017-07-12 NOTE — ED PROVIDER NOTES
ED Provider Note    Scribed for Jenni Montenegro M.D. by Christie Walden. 7/11/2017, 5:56 PM.    Primary care provider: NAOMIE Emmanuel  Means of arrival: ambulance  History obtained from: Patient  History limited by: none    CHIEF COMPLAINT  Chief Complaint   Patient presents with   • T-5000 FALL     slipped out of bed this morning   • Low Back Pain       HPI  Jerzy Juan III is a 37 y.o. male with a history of multiple sclerosis who presents to the Emergency Department for low back pain onset earlier this morning. Patient experienced a fall when he partially slipped out of bed last night. He states that his legs have been progressively weakening for several months and he is unable to feel any sensation in his lower legs. Patient is requesting steroids to treat his MS, with his last regimen of steroids being completed around December. He states that he has also been experiencing intermittent nausea for the past few days, taking Zofran to relieve it.   Patient informs me that he is supposed to be started on a new regimen of Octavis for treatment of his MS and is concerned that the steroids will inhibit this trial. His mother manages his medications, however, he lives at home with a caregiver normally.  No complaints of fever, vomiting.    REVIEW OF SYSTEMS  See HPI for further details. All other systems are negative.     PAST MEDICAL HISTORY   has a past medical history of Blood transfusion; Headache; Heart murmur; MS (multiple sclerosis) (CMS-HCC) (DX 8/20/2013); Back pain; Fall; Urinary incontinence; and Depression.    SURGICAL HISTORY   has past surgical history that includes open reduction and gastroscopy (1/14/2017).    SOCIAL HISTORY  Social History   Substance Use Topics   • Smoking status: Former Smoker     Start date: 08/01/1997     Quit date: 08/01/2013   • Smokeless tobacco: Former User   • Alcohol Use: 0.0 oz/week     0 Standard drinks or equivalent per week      Comment: 1 beer/day     "  History   Drug Use   • Yes   • Special: Inhaled     Comment: marijuana       FAMILY HISTORY  Family History   Problem Relation Age of Onset   • Thyroid Mother        CURRENT MEDICATIONS  Reviewed. See Encounter Summary.     ALLERGIES  Allergies   Allergen Reactions   • Other Drug      CANNOT TAKE ANY STEROIDS WITH RECENT TYSABRI INFUSION   • Asa [Aspirin] Unspecified     Bleeding in stomach as a baby       PHYSICAL EXAM  VITAL SIGNS: Temp(Src) 37 °C (98.6 °F) pulse 81, /87, 96% on RA  Ht 1.803 m (5' 11\")  Wt 79.379 kg (175 lb)  BMI 24.42 kg/m2    Constitutional: Alert in no apparent distress.  HENT: No signs of trauma, Bilateral external ears normal, Nose normal.   Eyes: Pupils are equal and reactive, Conjunctiva normal, Non-icteric.   Neck: Normal range of motion, No tenderness, Supple, No stridor.   Cardiovascular: Regular rate and rhythm, no murmurs.   Thorax & Lungs: Normal breath sounds, No respiratory distress, No wheezing, No chest tenderness.   Abdomen: Bowel sounds normal, Soft, No tenderness, No masses, No pulsatile masses. No peritoneal signs.  Skin: Warm, Dry, No erythema, No rash. Old bruising to left leg and under left arm  Back: No bony tenderness  Extremities: Intact distal pulses, No edema, No tenderness, No cyanosis, Old bruising to left leg and under left arm. lower extremity weakness bilaterally, denying sensation, however, able to strighten his lower extremities  Musculoskeletal: Good range of motion in all major joints. No tenderness to palpation or major deformities noted.   Neurologic: Alert , denied sensation in lower extremities, however, able to extend his legs normally Speech changes that are his normal baseline   Psychiatric: Affect normal, Judgment normal, Mood normal.         DIFFERENTIAL DIAGNOSIS AND WORK UP PLAN    5:56 PM Patient seen and examined at bedside. The patient presents with lower back pain secondary to a partial fall out of bed that occurred last night and " requesting steroids and the differential diagnosis includes but is not limited to MS flair, infection, electrolyte abnormality. Ordered for chest xray, CBC, CMP, lipase, UA to evaluate. Patient will be treated with 1000mg IV Solu-Medrol, 4mg IV Morphine, and IV NS for his symptoms. IV NS given because patient has not been eating and drinking appropriately and has also not been producing much urine. I informed the patient that I would evaluate the pairing of steroids with his future dose of Activis to see if that would negatively impact him.    DIAGNOSTIC STUDIES / PROCEDURES     LABS  cbc, CMP, lipase, UA within normal limits beyond a mild ketonuria - signs of mild dehydration  All labs were reviewed by me.    RADIOLOGY  DX-CHEST-PORTABLE (1 VIEW)   Final Result      No acute cardiac or pulmonary abnormalities are identified.        The radiologist's interpretation of all radiological studies have been reviewed by me.    COURSE & MEDICAL DECISION MAKING  Pertinent Labs & Imaging studies reviewed. (See chart for details)    6:09 PM I reviewed patient's past medical history which shows minimal response to MS medications and treatment options. Patient self catheterizes himself.    6:11 PM I reviewed the medication reactions between Octavis and steroids. At this time, I do not believe that steroids would react negatively with his next dose.    6:43 PM I spoke with the patient's mother who states that he has been on Bactrim for 4 days because he felt that his symptoms were the beginning of a UTI. He has also not been eating or drinking appropriately and his mother and caregiver believes he is smoking to much marijuana.    7:27 PM Spoke with Dr. Art, Hospitalist, about the patient's condition. Agrees to admit    7:30 PM I re-evaluated patient at bedside and informed him that he will be admitted to the hospital for further evaluation and treatment.      This is a 37 y.o. year old male who presents with likely MS flare -  "the patient was given 1g of solumedrol on arrival to the ED - no s/s of infection - will admit the patient for IV steroid pulse and inpatient neurology eval - the patient is stable, understands the plan for admission and he feels comfortable with the plan    119/80 Pulse 86  Temp(Src) 37 °C (98.6 °F)  Resp 18  Ht 1.803 m (5' 11\")  Wt 79.379 kg (175 lb)  BMI 24.42 kg/m2  SpO2 94%      DISPOSITION:  Patient will be admitted to Dr. Art, Hospitalist in guarded condition.      FINAL IMPRESSION  1. Worsening leg weakness  2. MS flare  3. Decreased appetite  4. Nausea     Christie TOM (Scribe), am scribing for, and in the presence of, Jenni Montenegro M.D..    Electronically signed by: Christie Walden (Scribe), 7/11/2017    IJenni M.D. personally performed the services described in this documentation, as scribed by Christie Walden in my presence, and it is both accurate and complete.    The note accurately reflects work and decisions made by me.  Jenni Montenegro  7/11/2017  11:27 PM    This dictation has been created using voice recognition software and/or scribes. The accuracy of the dictation is limited by the abilities of the software and the expertise of the scribes. I expect there may be some errors of grammar and possibly content. I made every attempt to manually correct the errors within my dictation. However, errors related to voice recognition software and/or scribes may still exist and should be interpreted within the appropriate context.      "

## 2017-07-12 NOTE — CARE PLAN
Problem: Communication  Goal: The ability to communicate needs accurately and effectively will improve  Outcome: PROGRESSING AS EXPECTED  Pt oriented to call light system     Problem: Safety  Goal: Will remain free from falls  Outcome: PROGRESSING AS EXPECTED  Bed alarm on, bed in low locked position

## 2017-07-12 NOTE — DISCHARGE PLANNING
Current documentation indicates a potential for acute inpatient rehabilitation. Please consider a rehabilitation consult/referral to assist with discharge planning.

## 2017-07-13 LAB
APTT PPP: 32.4 SEC (ref 24.7–36)
INR PPP: 1.09 (ref 0.87–1.13)
LA PPP-IMP: NORMAL
PROTHROMBIN TIME: 14.5 SEC (ref 12–14.6)
SCREEN DRVVT: 37.9 SEC (ref 28–48)

## 2017-07-13 PROCEDURE — 99232 SBSQ HOSP IP/OBS MODERATE 35: CPT | Performed by: HOSPITALIST

## 2017-07-13 PROCEDURE — G8978 MOBILITY CURRENT STATUS: HCPCS | Mod: CM

## 2017-07-13 PROCEDURE — 700102 HCHG RX REV CODE 250 W/ 637 OVERRIDE(OP): Performed by: HOSPITALIST

## 2017-07-13 PROCEDURE — 700105 HCHG RX REV CODE 258: Performed by: HOSPITALIST

## 2017-07-13 PROCEDURE — 97162 PT EVAL MOD COMPLEX 30 MIN: CPT

## 2017-07-13 PROCEDURE — G8979 MOBILITY GOAL STATUS: HCPCS | Mod: CK

## 2017-07-13 PROCEDURE — A9270 NON-COVERED ITEM OR SERVICE: HCPCS | Performed by: HOSPITALIST

## 2017-07-13 PROCEDURE — 97166 OT EVAL MOD COMPLEX 45 MIN: CPT

## 2017-07-13 PROCEDURE — G8988 SELF CARE GOAL STATUS: HCPCS | Mod: CL

## 2017-07-13 PROCEDURE — 700101 HCHG RX REV CODE 250: Performed by: HOSPITALIST

## 2017-07-13 PROCEDURE — G8987 SELF CARE CURRENT STATUS: HCPCS | Mod: CM

## 2017-07-13 PROCEDURE — 770006 HCHG ROOM/CARE - MED/SURG/GYN SEMI*

## 2017-07-13 PROCEDURE — 700111 HCHG RX REV CODE 636 W/ 250 OVERRIDE (IP): Performed by: HOSPITALIST

## 2017-07-13 RX ORDER — ENEMA 19; 7 G/133ML; G/133ML
1 ENEMA RECTAL ONCE
Status: COMPLETED | OUTPATIENT
Start: 2017-07-13 | End: 2017-07-13

## 2017-07-13 RX ADMIN — BUPROPION HYDROCHLORIDE 150 MG: 150 TABLET, EXTENDED RELEASE ORAL at 09:51

## 2017-07-13 RX ADMIN — SODIUM PHOSPHATE 133 ML: 7; 19 ENEMA RECTAL at 15:28

## 2017-07-13 RX ADMIN — MAGNESIUM CITRATE 296 ML: 1.75 LIQUID ORAL at 18:35

## 2017-07-13 RX ADMIN — SULFAMETHOXAZOLE AND TRIMETHOPRIM 1 TABLET: 800; 160 TABLET ORAL at 09:51

## 2017-07-13 RX ADMIN — SODIUM CHLORIDE 1000 MG: 9 INJECTION, SOLUTION INTRAVENOUS at 20:19

## 2017-07-13 RX ADMIN — GABAPENTIN 100 MG: 100 CAPSULE ORAL at 09:51

## 2017-07-13 RX ADMIN — ESCITALOPRAM OXALATE 20 MG: 10 TABLET, FILM COATED ORAL at 09:51

## 2017-07-13 RX ADMIN — BISACODYL 10 MG: 10 SUPPOSITORY RECTAL at 09:51

## 2017-07-13 RX ADMIN — STANDARDIZED SENNA CONCENTRATE AND DOCUSATE SODIUM 2 TABLET: 8.6; 5 TABLET, FILM COATED ORAL at 09:51

## 2017-07-13 ASSESSMENT — COGNITIVE AND FUNCTIONAL STATUS - GENERAL
DRESSING REGULAR UPPER BODY CLOTHING: A LOT
MOVING FROM LYING ON BACK TO SITTING ON SIDE OF FLAT BED: UNABLE
TOILETING: TOTAL
EATING MEALS: A LOT
SUGGESTED CMS G CODE MODIFIER DAILY ACTIVITY: CL
PERSONAL GROOMING: A LOT
STANDING UP FROM CHAIR USING ARMS: TOTAL
DAILY ACTIVITIY SCORE: 9
WALKING IN HOSPITAL ROOM: TOTAL
DRESSING REGULAR LOWER BODY CLOTHING: TOTAL
SUGGESTED CMS G CODE MODIFIER MOBILITY: CM
HELP NEEDED FOR BATHING: TOTAL
CLIMB 3 TO 5 STEPS WITH RAILING: TOTAL
MOBILITY SCORE: 7
MOVING TO AND FROM BED TO CHAIR: UNABLE
TURNING FROM BACK TO SIDE WHILE IN FLAT BAD: A LOT

## 2017-07-13 ASSESSMENT — ENCOUNTER SYMPTOMS
VOMITING: 0
SORE THROAT: 0
FEVER: 0
ORTHOPNEA: 0
HEADACHES: 0
BACK PAIN: 1
HEMOPTYSIS: 0
NERVOUS/ANXIOUS: 1
WEAKNESS: 1
PALPITATIONS: 0
PHOTOPHOBIA: 0
TREMORS: 1
DOUBLE VISION: 1
BLURRED VISION: 0
NAUSEA: 0
FOCAL WEAKNESS: 1
SHORTNESS OF BREATH: 0
MYALGIAS: 1
NECK PAIN: 1
CHILLS: 1
COUGH: 0
HEADACHES: 1
SEIZURES: 0
HALLUCINATIONS: 0
DEPRESSION: 0
TINGLING: 1
WHEEZING: 0
HEARTBURN: 1
SENSORY CHANGE: 1

## 2017-07-13 ASSESSMENT — PATIENT HEALTH QUESTIONNAIRE - PHQ9
1. LITTLE INTEREST OR PLEASURE IN DOING THINGS: NOT AT ALL
SUM OF ALL RESPONSES TO PHQ9 QUESTIONS 1 AND 2: 0
SUM OF ALL RESPONSES TO PHQ QUESTIONS 1-9: 0
2. FEELING DOWN, DEPRESSED, IRRITABLE, OR HOPELESS: NOT AT ALL

## 2017-07-13 ASSESSMENT — PAIN SCALES - GENERAL
PAINLEVEL_OUTOF10: 4
PAINLEVEL_OUTOF10: 7
PAINLEVEL_OUTOF10: 7

## 2017-07-13 ASSESSMENT — ACTIVITIES OF DAILY LIVING (ADL): TOILETING: REQUIRES ASSIST

## 2017-07-13 ASSESSMENT — GAIT ASSESSMENTS: GAIT LEVEL OF ASSIST: UNABLE TO PARTICIPATE

## 2017-07-13 NOTE — PROGRESS NOTES
[facility] Hospitalist Progress Note    Date of Service: 2017    Chief Complaint  37 y.o. male admitted 2017 lower extremity weakness, chronic pain, and fall that cause worsening of back pain. He has long standing hx of MS and he is bed bound on baseline. He is very difficult to take care by her mom alone at this time. Not able to do his ADLs. He is placed on Steroids. Neurology is following. He was last treated for MS flare in December.     Interval Problem Update  Pt continue to have weakness in lower extremity with minimal improvement. Upper extremity strength is good. He reports very minimal improvement in symptoms since started on Steroids. Denies any CP, SOB, fever or chills. Denies any N/V. Pain is under control.     Consultants/Specialty  Dr. Christophe Amaya.    Disposition  SNF with rehab upon discharge.         Review of Systems   Constitutional: Positive for chills. Negative for fever and malaise/fatigue.        Generalized   HENT: Positive for congestion. Negative for ear pain and sore throat.    Eyes: Positive for double vision. Negative for blurred vision and photophobia.   Respiratory: Negative for cough, shortness of breath and wheezing.    Cardiovascular: Negative for chest pain, palpitations, orthopnea and leg swelling.   Gastrointestinal: Positive for heartburn. Negative for nausea, vomiting and melena.   Genitourinary: Negative for urgency and hematuria.   Musculoskeletal: Positive for myalgias and back pain.   Neurological: Positive for focal weakness and weakness. Negative for seizures and headaches.        Both lower extremity has significant weakness. Upper extremity has good strength.    Psychiatric/Behavioral: Negative for depression and hallucinations.        Frustarted and sad      Physical Exam  Laboratory/Imaging   Hemodynamics  Temp (24hrs), Av.8 °C (98.3 °F), Min:36.3 °C (97.3 °F), Max:37.3 °C (99.1 °F)   Temperature: 36.7 °C (98.1 °F)  Pulse  Av.3  Min: 74  Max: 112     Blood Pressure: 113/64 mmHg      Respiratory      Respiration: 14, Pulse Oximetry: 96 %             Fluids    Intake/Output Summary (Last 24 hours) at 07/13/17 1111  Last data filed at 07/13/17 0400   Gross per 24 hour   Intake   1130 ml   Output    650 ml   Net    480 ml       Nutrition  Orders Placed This Encounter   Procedures   • Diet Order     Standing Status: Standing      Number of Occurrences: 1      Standing Expiration Date:      Order Specific Question:  Diet:     Answer:  Regular [1]     Physical Exam   Constitutional: He is oriented to person, place, and time. He appears distressed.   HENT:   Head: Normocephalic.   Mouth/Throat: No oropharyngeal exudate.   Eyes: Pupils are equal, round, and reactive to light.   Neck: Neck supple. JVD present.   Cardiovascular: Normal rate, regular rhythm and normal heart sounds.    No murmur heard.  Pulmonary/Chest: No respiratory distress. He has no wheezes. He exhibits no tenderness.   Abdominal: Soft. Bowel sounds are normal. He exhibits no distension. There is no tenderness. There is no guarding.   Musculoskeletal: He exhibits no edema.   Neurological: He is alert and oriented to person, place, and time. No cranial nerve deficit.   Bed bound. Bilateral lower extremity extreme weakness. Good strength in bilateral upper extremity.    Skin: Skin is warm. He is diaphoretic.       Recent Labs      07/11/17 1823 07/12/17   0346   WBC  6.2  4.7*   RBC  5.53  4.99   HEMOGLOBIN  16.9  15.0   HEMATOCRIT  48.0  43.0   MCV  86.8  86.2   MCH  30.6  30.1   MCHC  35.2  34.9   RDW  41.4  40.6   PLATELETCT  303  277   MPV  8.6*  8.5*     Recent Labs      07/11/17   1823  07/12/17   0346   SODIUM  138  136   POTASSIUM  4.0  4.1   CHLORIDE  103  110   CO2  23  21   GLUCOSE  88  160*   BUN  14  14   CREATININE  1.09  0.93   CALCIUM  9.5  9.0     Recent Labs      07/12/17   1838   APTT  32.4   INR  1.09                  Assessment/Plan     Hyperglycemia (present on  admission)  Assessment & Plan  Hgb A1C normal. Suspect 2/2 steroid use, continue to monitor.    Multiple sclerosis exacerbation (CMS-HCC) (present on admission)  Assessment & Plan  - Neurology is consulted. Main issue is unable to take care of himself at home and will need SNF placement.   - Cont solumedrol 1G daily for now.       Urinary retention (present on admission)  Assessment & Plan  Epperson catheter  Voiding trial once patient has improved.     History of recurrent UTIs (present on admission)  Assessment & Plan  - Continue Bactrim which he takes home for 3 more days, then start Methenamine hippurate 1 gram bid per Urology recommendations according to the pt.         Depression (present on admission)  Assessment & Plan  Continue home medications.     Failure to thrive in adult (present on admission)  Assessment & Plan  - Nutrition consult.  -   consulted to arrange SNF placement. Pt mother cannot take care of patient at home.       Labs reviewed, Medications reviewed and Radiology images reviewed  Epperson catheter: Urinary Tract Retention or Urinary Tract Obstruction  Central line in place: Dialysis    DVT: SCDs.  DVT prophylaxis - mechanical: SCDs

## 2017-07-13 NOTE — PROGRESS NOTES
0900: Pt. Resting quietly in bed. AOx4. Pt states he is constantly in pain, but does not want pain medication. Helped to reposition pt and assisted with breakfast. Decreased environmental stimuli. Fall precautions in place.

## 2017-07-13 NOTE — PROGRESS NOTES
"Pt AAOx4, chronic leg weakness and numbness present. Unable to ambulate. Epperson in place due to incontinence and patient normally straight caths at home. Pt reports pain \"all over\" but declines to take any prn pain meds. 1g Solumedrol given IVPB. Oriented to call light system. Bed alarm on.                      "

## 2017-07-13 NOTE — THERAPY
"Occupational Therapy Evaluation completed.   Functional Status:  Pt s/p MS exacerbation, demonstrating somewhat decline from baseline; however both pt and mother would like to participate in therapy and increase pt's level of I with basic self-care tasks and functional t/f's. Performed bed mobility with max a, toileting total assist. Curently mother is unable to provide for pt's recent increased need of care. Will follow while in house and would benefit from SNF level of therapy prior to d/c home.   Plan of Care: Will benefit from Occupational Therapy 3 times per week  Discharge Recommendations:  Equipment: Will Continue to Assess for Equipment Needs. Post-acute therapy Discharge to a transitional care facility for continued skilled therapy services.    See \"Rehab Therapy-Acute\" Patient Summary Report for complete documentation.    "

## 2017-07-13 NOTE — CARE PLAN
Problem: Pain Management  Goal: Pain level will decrease to patient’s comfort goal  Outcome: PROGRESSING SLOWER THAN EXPECTED  Pt refusing pharmacologic comfort measures, distraction and reassurance provided     Problem: Urinary Elimination:  Goal: Ability to reestablish a normal urinary elimination pattern will improve  Outcome: PROGRESSING AS EXPECTED  Epperson in place

## 2017-07-13 NOTE — PROGRESS NOTES
[facility] Hospitalist Progress Note    Date of Service: 2017    Chief Complaint  37 y.o. male admitted 2017 lower extremity weakness, chronic pain, and fall that cause worsening of back pain. He has long standing hx of MS and he is bed bound on baseline. He is very difficult to take care by her mom alone at this time. Not able to do his ADLs. He is placed on Steroids. Neurology is following. He was last treated for MS flare in December.     Interval Problem Update  Pt continue to have weakness especially in lower extremity. He reports very minimal improvement in symptoms since started on Steroids. Denies any CP, SOB, fever or chills.     Consultants/Specialty  Dr. Christophe Amaya.    Disposition  SNF with rehab upon discharge.         Review of Systems   Constitutional:        Generalized   HENT: Positive for congestion. Negative for sore throat.    Eyes: Negative for blurred vision and photophobia.   Respiratory: Negative for cough, shortness of breath and wheezing.    Cardiovascular: Negative for chest pain, palpitations and leg swelling.   Gastrointestinal: Positive for heartburn. Negative for nausea, vomiting and melena.   Genitourinary: Negative for urgency and hematuria.   Musculoskeletal: Positive for myalgias and back pain.   Neurological: Positive for focal weakness and weakness. Negative for headaches.   Psychiatric/Behavioral:        Frustarted and sad      Physical Exam  Laboratory/Imaging   Hemodynamics  Temp (24hrs), Av.8 °C (98.2 °F), Min:36.4 °C (97.6 °F), Max:37 °C (98.6 °F)   Temperature: 37 °C (98.6 °F)  Pulse  Av  Min: 81  Max: 112    Blood Pressure: 110/66 mmHg, NIBP: (!) 99/58 mmHg      Respiratory      Respiration: 16, Pulse Oximetry: 96 %, O2 Daily Delivery Respiratory : Room Air with O2 Available             Fluids    Intake/Output Summary (Last 24 hours) at 17 4753  Last data filed at 17 1200   Gross per 24 hour   Intake   1130 ml   Output   1850 ml   Net   -720 ml        Nutrition  Orders Placed This Encounter   Procedures   • Diet Order     Standing Status: Standing      Number of Occurrences: 1      Standing Expiration Date:      Order Specific Question:  Diet:     Answer:  Regular [1]     Physical Exam   Constitutional: He is oriented to person, place, and time. He appears distressed.   HENT:   Head: Normocephalic.   Mouth/Throat: No oropharyngeal exudate.   Eyes: Pupils are equal, round, and reactive to light.   Neck: Neck supple. JVD present.   Cardiovascular: Normal rate, regular rhythm and normal heart sounds.    No murmur heard.  Pulmonary/Chest: No respiratory distress. He has no wheezes. He exhibits no tenderness.   Abdominal: Soft. Bowel sounds are normal. There is no guarding.   Neurological: He is alert and oriented to person, place, and time. No cranial nerve deficit.   Bed bound. Unable to move his legs. Upper extremity is moving but some weakness   Skin: He is diaphoretic.       Recent Labs      07/11/17   1823  07/12/17   0346   WBC  6.2  4.7*   RBC  5.53  4.99   HEMOGLOBIN  16.9  15.0   HEMATOCRIT  48.0  43.0   MCV  86.8  86.2   MCH  30.6  30.1   MCHC  35.2  34.9   RDW  41.4  40.6   PLATELETCT  303  277   MPV  8.6*  8.5*     Recent Labs      07/11/17   1823  07/12/17   0346   SODIUM  138  136   POTASSIUM  4.0  4.1   CHLORIDE  103  110   CO2  23  21   GLUCOSE  88  160*   BUN  14  14   CREATININE  1.09  0.93   CALCIUM  9.5  9.0                      Assessment/Plan     Hyperglycemia (present on admission)  Assessment & Plan  Hgb A1C normal. Suspect 2/2 steroid use, monitor    Multiple sclerosis exacerbation (CMS-HCC) (present on admission)  Assessment & Plan  - Neurology is consulted. Main issue is unable to take care of himself at home and will need SNF with rehab placement.   - Cont solumedrol 1G daily for now.       Urinary retention (present on admission)  Assessment & Plan  Epperson catheter  Voiding trial once patient has improved.     History of recurrent  UTIs (present on admission)  Assessment & Plan  - Continue Bactrim which he takes home for 3 more days, then start Methenamine hippurate 1 gram bid per Urology recommendations according to the pt.         Depression (present on admission)  Assessment & Plan  Continue home medications.     Failure to thrive in adult (present on admission)  Assessment & Plan  - Nutrition consult.  -   consult to arrange SNF with rehab. Pt mom cannot take care of patient at home.       Labs reviewed, Medications reviewed and Radiology images reviewed  Epperson catheter: Urinary Tract Retention or Urinary Tract Obstruction      DVT: SCDs.

## 2017-07-13 NOTE — THERAPY
"Physical Therapy Evaluation completed.   Bed Mobility:  Supine to Sit: Maximal Assist  Transfers: Sit to Stand: Unable to Participate  Gait: Level Of Assist: Unable to Participate with No Equipment Needed       Plan of Care: Will benefit from Physical Therapy 3 times per week  Discharge Recommendations: Equipment: Will Continue to Assess for Equipment Needs. Post-acute therapy Discharge to a transitional care facility for continued skilled therapy services.    See \"Rehab Therapy-Acute\" Patient Summary Report for complete documentation.     "

## 2017-07-13 NOTE — PROGRESS NOTES
IMPRESSION:    1. Multiple Sclerosis -- ( Dr Bloch, Erin has been trying to recruit him into [facility] for primary progressive multiple sclerosis)    2. Not ambulatory due to 1-- for 3 years    3. Frustration, Depression, Lack of social support and lack of nursing care at home( it is too demanding on his mother alone)       PLAN/RECOMMENDATIONS:    ________________________________________________________________________      Discussed with the patient about his current nursing care needs    We both agree that it is too much for his mother to provide the nursing care    Lack of nursing care is in fact the major reason he has to come to hospital      We also discussed about the treatment steroid, IVIG, plasmapheresis, blood tests and LP    Patient is not eager to have LP    He is fine with steroid-- months of oral steroid is fine with me if IV steroid helped him ( that is what he stated)      ________________________________________________________________________    ________________________________________________________________________    If circumstances change do not hesitate to re-consult neurology.     At this point, patient remains stable.    And the patient is going to receive new treatment soon for his MS    Further questions -- please contact Dr Fagan's nurse ( regarding MS treatment)    Thank you for the consult.     Christophe Amaya M.D.  [facility]  3:00 PM07/13/2017    ________________________________________________________________________    Filed Vitals:    07/12/17 1810 07/12/17 2000 07/13/17 0400 07/13/17 0700   BP: 128/95 125/65 126/78 113/64   Pulse: 98 94 77 74   Temp: 37.3 °C (99.1 °F) 36.8 °C (98.2 °F) 36.3 °C (97.3 °F) 36.7 °C (98.1 °F)   Resp: 16 18 16 14   Height:       Weight:       SpO2: 95% 99% 97% 96%   Physical Exam   Constitutional: He is oriented to person, place, and time and well-developed, well-nourished, and in no distress.   HENT:   Head: Normocephalic.    Eyes: Pupils are equal, round, and reactive to light.   Neck: Normal range of motion.   Abdominal: Soft.   Neurological: He is alert and oriented to person, place, and time.   Quadriparesis, ataxia   Skin: Skin is warm.     Review of Systems   Respiratory: Negative for hemoptysis.    Cardiovascular: Negative for leg swelling.   Musculoskeletal: Positive for back pain and neck pain.   Neurological: Positive for tingling, tremors, sensory change, focal weakness, weakness and headaches.   Psychiatric/Behavioral: The patient is nervous/anxious.

## 2017-07-14 PROBLEM — K59.00 CONSTIPATION: Status: ACTIVE | Noted: 2017-07-14

## 2017-07-14 LAB
ANCA IGG TITR SER IF: NORMAL {TITER}
CARDIOLIPIN IGA SER IA-ACNC: 0 APL (ref 0–11)
CARDIOLIPIN IGA SER IA-ACNC: 0 APL (ref 0–11)
CARDIOLIPIN IGG SER IA-ACNC: 1 GPL (ref 0–14)
CARDIOLIPIN IGG SER IA-ACNC: 2 GPL (ref 0–14)
CARDIOLIPIN IGM SER IA-ACNC: 0 MPL (ref 0–12)
CARDIOLIPIN IGM SER IA-ACNC: 5 MPL (ref 0–12)
IGA SERPL-MCNC: 94 MG/DL (ref 68–408)
NUCLEAR IGG SER QL IA: NORMAL
TTG IGA SER IA-ACNC: 0 U/ML (ref 0–3)

## 2017-07-14 PROCEDURE — 99232 SBSQ HOSP IP/OBS MODERATE 35: CPT | Performed by: HOSPITALIST

## 2017-07-14 PROCEDURE — 770006 HCHG ROOM/CARE - MED/SURG/GYN SEMI*

## 2017-07-14 PROCEDURE — A9270 NON-COVERED ITEM OR SERVICE: HCPCS | Performed by: HOSPITALIST

## 2017-07-14 PROCEDURE — 700111 HCHG RX REV CODE 636 W/ 250 OVERRIDE (IP): Performed by: HOSPITALIST

## 2017-07-14 PROCEDURE — 700105 HCHG RX REV CODE 258: Performed by: HOSPITALIST

## 2017-07-14 PROCEDURE — 700102 HCHG RX REV CODE 250 W/ 637 OVERRIDE(OP): Performed by: HOSPITALIST

## 2017-07-14 RX ORDER — LACTULOSE 20 G/30ML
30 SOLUTION ORAL
Status: DISCONTINUED | OUTPATIENT
Start: 2017-07-14 | End: 2017-07-18 | Stop reason: HOSPADM

## 2017-07-14 RX ADMIN — BUPROPION HYDROCHLORIDE 150 MG: 150 TABLET, EXTENDED RELEASE ORAL at 10:30

## 2017-07-14 RX ADMIN — ESCITALOPRAM OXALATE 20 MG: 10 TABLET, FILM COATED ORAL at 10:29

## 2017-07-14 RX ADMIN — ONDANSETRON 4 MG: 2 INJECTION INTRAMUSCULAR; INTRAVENOUS at 20:32

## 2017-07-14 RX ADMIN — SODIUM CHLORIDE 1000 MG: 9 INJECTION, SOLUTION INTRAVENOUS at 20:35

## 2017-07-14 RX ADMIN — GABAPENTIN 100 MG: 100 CAPSULE ORAL at 10:29

## 2017-07-14 RX ADMIN — STANDARDIZED SENNA CONCENTRATE AND DOCUSATE SODIUM 2 TABLET: 8.6; 5 TABLET, FILM COATED ORAL at 10:30

## 2017-07-14 RX ADMIN — SULFAMETHOXAZOLE AND TRIMETHOPRIM 1 TABLET: 800; 160 TABLET ORAL at 10:30

## 2017-07-14 ASSESSMENT — PAIN SCALES - GENERAL
PAINLEVEL_OUTOF10: 8
PAINLEVEL_OUTOF10: 0

## 2017-07-14 ASSESSMENT — ENCOUNTER SYMPTOMS
NECK PAIN: 0
BLURRED VISION: 0
SORE THROAT: 0
HEADACHES: 0
PHOTOPHOBIA: 0
HALLUCINATIONS: 0
DEPRESSION: 0
ORTHOPNEA: 0
HEARTBURN: 1
PALPITATIONS: 0
BACK PAIN: 1
VOMITING: 0
FEVER: 0
SHORTNESS OF BREATH: 0
DOUBLE VISION: 1
WEIGHT LOSS: 1
NAUSEA: 0
SEIZURES: 0
MYALGIAS: 1
CHILLS: 1
COUGH: 0
WHEEZING: 0
FOCAL WEAKNESS: 1
WEAKNESS: 1
DIZZINESS: 0

## 2017-07-14 NOTE — PROGRESS NOTES
Pt AOx4, on RA, exhibits weakness, loss of sensation, inability to move, contractures on BLLE, able to move BLUE with good strength, complaining of constipation and refusing all PO meds until BM, BM protocol in progress, active bowel sounds present, no signs of abdominal distension, call light within reach, bed alarm active, hourly rounding in practice, four bed rails up per patient request.

## 2017-07-14 NOTE — CARE PLAN
Problem: Safety  Goal: Will remain free from injury  Outcome: PROGRESSING AS EXPECTED  No injury or falls this shift     Problem: Bowel/Gastric:  Goal: Normal bowel function is maintained or improved  Outcome: NOT MET  Gave suppository, enema, mag citrate to help pt with BM. Only small BM produced.

## 2017-07-14 NOTE — PROGRESS NOTES
Per AM RN, suppository, enema, manual disimpection was attempted with little success in promoting BM.  Patient is refusing all PO intake including meds.  Pt stating he has taken lactulose in the past for similar issues which has been effective.  MAUREEN Dunlap paged and obtained one time dose of 30mL lactulose.  Will administer when patient is awake.

## 2017-07-14 NOTE — DIETARY
"Nutrition Services - Poor PO     38 YO M admitted r/t worsening weakness, lower back pain after fall and decreased sensation in BL LE. MS exacerbation(primary progressive).     Past Medical History   Diagnosis Date   • Blood transfusion    • Headache    • Heart murmur    • MS (multiple sclerosis) (CMS-HCC) DX 8/20/2013   • Back pain    • Fall      4/6/2016   • Urinary incontinence      condom catheter   • Depression      PTSD/clinical depression     Pertinent Labs: glucose 160  Pertinent Meds: wellbutrin, lexapro, gabapentin, methenamine, methylprednisone, ditropan, senna-docusate, bactrim  GI: Last BM 7/14. Pt reported no BM x 7 days before 7/13 (senna in place)   WT: 79.379  Body mass index is 24.42 kg/(m^2).   No c/o weight loss   SKIN: no pressure areas or edema documented at this time     Diet: regular   PO intake: refused to <25%     Pt appears well-nourished/hydrated and c/o feeling \"pain everywhere, but that is normal for me.\"     PLAN/RECOMMEND     Encourage PO intake of food and fluids.     Will provide chocolate boost plus between meals to provide additional Kcal/protein - active order   Will provide prune juice w/ breakfast to aid in BMs.     Nutrition Rep to see patient daily for meal preferences. Please document PO intake as percentage of meals consumed.     RD following     "

## 2017-07-14 NOTE — CARE PLAN
Problem: Communication  Goal: The ability to communicate needs accurately and effectively will improve  Outcome: MET Date Met:  07/14/17    Problem: Safety  Goal: Will remain free from injury  Outcome: MET Date Met:  07/14/17  Goal: Will remain free from falls  Outcome: MET Date Met:  07/14/17

## 2017-07-14 NOTE — DISCHARGE PLANNING
TCN attempted to meet with patient to discuss SNF choice, patient reported it was not a good time to talk and requested TCN return a different day. RNCM notified. Patient on IV steroids x2 more days. SNF after completion. TCN to follow as needed.

## 2017-07-15 LAB — TEST NAME 95000: NORMAL

## 2017-07-15 PROCEDURE — 700102 HCHG RX REV CODE 250 W/ 637 OVERRIDE(OP): Performed by: HOSPITALIST

## 2017-07-15 PROCEDURE — 99232 SBSQ HOSP IP/OBS MODERATE 35: CPT | Performed by: HOSPITALIST

## 2017-07-15 PROCEDURE — 770006 HCHG ROOM/CARE - MED/SURG/GYN SEMI*

## 2017-07-15 PROCEDURE — A9270 NON-COVERED ITEM OR SERVICE: HCPCS | Performed by: HOSPITALIST

## 2017-07-15 PROCEDURE — 700111 HCHG RX REV CODE 636 W/ 250 OVERRIDE (IP): Performed by: HOSPITALIST

## 2017-07-15 PROCEDURE — 700105 HCHG RX REV CODE 258: Performed by: HOSPITALIST

## 2017-07-15 RX ORDER — LACTULOSE 20 G/30ML
30 SOLUTION ORAL 2 TIMES DAILY
Status: DISCONTINUED | OUTPATIENT
Start: 2017-07-15 | End: 2017-07-18 | Stop reason: HOSPADM

## 2017-07-15 RX ADMIN — METHENAMINE HIPPURATE 1 G: 1 TABLET ORAL at 20:54

## 2017-07-15 RX ADMIN — ESCITALOPRAM OXALATE 20 MG: 10 TABLET, FILM COATED ORAL at 08:49

## 2017-07-15 RX ADMIN — SODIUM CHLORIDE 1000 MG: 9 INJECTION, SOLUTION INTRAVENOUS at 20:00

## 2017-07-15 RX ADMIN — BUPROPION HYDROCHLORIDE 150 MG: 150 TABLET, EXTENDED RELEASE ORAL at 20:55

## 2017-07-15 RX ADMIN — GABAPENTIN 100 MG: 100 CAPSULE ORAL at 21:00

## 2017-07-15 RX ADMIN — ONDANSETRON 4 MG: 4 TABLET, ORALLY DISINTEGRATING ORAL at 14:26

## 2017-07-15 RX ADMIN — STANDARDIZED SENNA CONCENTRATE AND DOCUSATE SODIUM 2 TABLET: 8.6; 5 TABLET, FILM COATED ORAL at 08:48

## 2017-07-15 RX ADMIN — STANDARDIZED SENNA CONCENTRATE AND DOCUSATE SODIUM 1 TABLET: 8.6; 5 TABLET, FILM COATED ORAL at 20:54

## 2017-07-15 RX ADMIN — OXYBUTYNIN CHLORIDE 10 MG: 10 TABLET, FILM COATED, EXTENDED RELEASE ORAL at 20:54

## 2017-07-15 RX ADMIN — METHENAMINE HIPPURATE 1 G: 1 TABLET ORAL at 08:48

## 2017-07-15 RX ADMIN — BUPROPION HYDROCHLORIDE 150 MG: 150 TABLET, EXTENDED RELEASE ORAL at 08:48

## 2017-07-15 RX ADMIN — GABAPENTIN 100 MG: 100 CAPSULE ORAL at 08:48

## 2017-07-15 RX ADMIN — PROMETHAZINE HYDROCHLORIDE 25 MG: 25 TABLET ORAL at 18:07

## 2017-07-15 ASSESSMENT — ENCOUNTER SYMPTOMS
COUGH: 0
PHOTOPHOBIA: 0
BLURRED VISION: 0
WEAKNESS: 1
SHORTNESS OF BREATH: 0
WHEEZING: 0
MYALGIAS: 1
BACK PAIN: 1
DOUBLE VISION: 1
WEIGHT LOSS: 1
HALLUCINATIONS: 0
NECK PAIN: 0
FEVER: 0
ORTHOPNEA: 0
HEARTBURN: 1
SEIZURES: 0
DEPRESSION: 0
PALPITATIONS: 0
HEADACHES: 0
NAUSEA: 0
VOMITING: 0
DIZZINESS: 0
SORE THROAT: 0
CHILLS: 1
CONSTIPATION: 1
FOCAL WEAKNESS: 1

## 2017-07-15 ASSESSMENT — PAIN SCALES - GENERAL
PAINLEVEL_OUTOF10: 0
PAINLEVEL_OUTOF10: 8

## 2017-07-15 NOTE — PROGRESS NOTES
Patient states having nausea and feels he's unable to keep any PO meds down.  Administered IV Zofran.  Holding all PO meds at this time as patient is refusing.  Patient agrees to let RN know if he decides to take them later.

## 2017-07-15 NOTE — CARE PLAN
Problem: Infection  Goal: Will remain free from infection  Outcome: MET Date Met:  07/15/17    Problem: Bowel/Gastric:  Goal: Normal bowel function is maintained or improved  Outcome: MET Date Met:  07/15/17  Goal: Will not experience complications related to bowel motility  Outcome: MET Date Met:  07/15/17

## 2017-07-15 NOTE — DISCHARGE PLANNING
Received LTACH orders for the pt. Met with the pt at bedside. The pt stated that he was not going to go to a rehabilitation hospital due to a previous bad experience. Attempted to explain to the pt that the order was for an LTACH and not a rehab hospital. The pt seemed to have difficulty understanding the difference. The pt stated that his mother was POA and she would be bedside in a few minutes. This SW told the pt he would return later. Checked pt's chart. No POA listed. Came back to pt's room about a half hour later. The pt was sleeping and the pt's mother was not there. Will attempt contact at a later time.

## 2017-07-15 NOTE — PROGRESS NOTES
[facility] Hospitalist Progress Note    Date of Service: 7/14/2017    Chief Complaint  37 y.o. male admitted 7/11/2017 lower extremity weakness, chronic pain, and fall that cause worsening of back pain. He has long standing hx of MS and he is bed bound on baseline. He is very difficult to take care by her mom alone at this time. Not able to do his ADLs. He is placed on Steroids. Neurology is following. He was last treated for MS flare in December.     Interval Problem Update  Pt has some constipation yesterday but after mag citrate it is improved. Denies any N/V. Decrease po intake. Continue to have weakness in lower extremity with minimal improvement. Upper extremity strength is good. He reports very minimal improvement in symptoms since started on Steroids. Denies any CP, SOB, fever or chills. Denies any N/V. Pain is under control.     Consultants/Specialty  Dr. Christophe Amaya.    Disposition  SNF with rehab upon discharge.         Review of Systems   Constitutional: Positive for chills and weight loss. Negative for fever and malaise/fatigue.        Generalized   HENT: Positive for congestion. Negative for ear pain and sore throat.    Eyes: Positive for double vision. Negative for blurred vision and photophobia.   Respiratory: Negative for cough, shortness of breath and wheezing.    Cardiovascular: Negative for chest pain, palpitations, orthopnea and leg swelling.   Gastrointestinal: Positive for heartburn. Negative for nausea, vomiting and melena.   Genitourinary: Negative for urgency and hematuria.   Musculoskeletal: Positive for myalgias and back pain. Negative for neck pain.   Neurological: Positive for focal weakness and weakness. Negative for dizziness, seizures and headaches.        Both lower extremity has significant weakness. Upper extremity has good strength.    Psychiatric/Behavioral: Negative for depression and hallucinations.        Frustarted and sad      Physical Exam  Laboratory/Imaging    Hemodynamics  Temp (24hrs), Av.5 °C (97.7 °F), Min:35.8 °C (96.5 °F), Max:36.9 °C (98.4 °F)   Temperature: 35.8 °C (96.5 °F)  Pulse  Av.7  Min: 74  Max: 112    Blood Pressure: 117/70 mmHg      Respiratory      Respiration: 16, Pulse Oximetry: 91 %             Fluids    Intake/Output Summary (Last 24 hours) at 17 0912  Last data filed at 17 0427   Gross per 24 hour   Intake      0 ml   Output    550 ml   Net   -550 ml       Nutrition  Orders Placed This Encounter   Procedures   • Diet Order     Standing Status: Standing      Number of Occurrences: 1      Standing Expiration Date:      Order Specific Question:  Diet:     Answer:  Regular [1]     Physical Exam   Constitutional: He is oriented to person, place, and time. He appears distressed.   HENT:   Head: Normocephalic.   Mouth/Throat: No oropharyngeal exudate.   Eyes: Pupils are equal, round, and reactive to light.   Neck: Neck supple. JVD present.   Cardiovascular: Normal rate, regular rhythm and normal heart sounds.    No murmur heard.  Pulmonary/Chest: No respiratory distress. He has no wheezes. He exhibits no tenderness.   Abdominal: Soft. Bowel sounds are normal. He exhibits no distension. There is no tenderness. There is no guarding.   Musculoskeletal: He exhibits no edema.   Neurological: He is alert and oriented to person, place, and time. He displays abnormal reflex. No cranial nerve deficit. He exhibits abnormal muscle tone.   Bed bound. Bilateral lower extremity extreme weakness. Good strength in bilateral upper extremity.    Skin: Skin is warm. He is diaphoretic.   Psychiatric:   Frustrated and depressed from the situation       Recent Labs      17   0346   WBC  4.7*   RBC  4.99   HEMOGLOBIN  15.0   HEMATOCRIT  43.0   MCV  86.2   MCH  30.1   MCHC  34.9   RDW  40.6   PLATELETCT  277   MPV  8.5*     Recent Labs      17   0346   SODIUM  136   POTASSIUM  4.1   CHLORIDE  110   CO2  21   GLUCOSE  160*   BUN  14   CREATININE   0.93   CALCIUM  9.0     Recent Labs      07/12/17   1838   APTT  32.4   INR  1.09                  Assessment/Plan     Hyperglycemia (present on admission)  Assessment & Plan  Hgb A1C normal. Suspect 2/2 steroid use, continue to monitor.    Multiple sclerosis exacerbation (CMS-HCC) (present on admission)  Assessment & Plan  - Neurology is consulted. Main issue is unable to take care of himself at home and will need SNF placement.   - Cont solumedrol 1G daily for now.   - Steroids upon discharge.         Urinary retention (present on admission)  Assessment & Plan  Epperson catheter  Voiding trial once patient has improved.     History of recurrent UTIs (present on admission)  Assessment & Plan  - Continue Bactrim which he takes home for 3 more days, then start Methenamine hippurate 1 gram bid per Urology recommendations according to the pt.         Constipation (present on admission)  Assessment & Plan  - Likely chronic.   - Improving with Mag citrate. Add Lactulose, Senna, and Miralax prn.     Depression (present on admission)  Assessment & Plan  Continue home medications.     Failure to thrive in adult (present on admission)  Assessment & Plan  - Nutrition consult.  -   consulted to arrange SNF placement. Pt mother cannot take care of patient at home.       Labs reviewed, Medications reviewed and Radiology images reviewed  Epperson catheter: Urinary Tract Retention or Urinary Tract Obstruction  Central line in place: Dialysis    DVT: SCDs.  DVT prophylaxis - mechanical: SCDs

## 2017-07-15 NOTE — PROGRESS NOTES
Pt VSS stable and AOx4.  Contracted BLE.  Pain is constant 8/10 but, wants nothing for pain because marijuana is his treatment of choice, which he uses at home to treat pain.  Multiple bowel movements today.

## 2017-07-15 NOTE — PROGRESS NOTES
Patient states having multiple BM today and OK with taking medications.  No other acute change noted on patient.  Continuing with current plan of care.

## 2017-07-16 PROCEDURE — 700102 HCHG RX REV CODE 250 W/ 637 OVERRIDE(OP): Performed by: HOSPITALIST

## 2017-07-16 PROCEDURE — A9270 NON-COVERED ITEM OR SERVICE: HCPCS | Performed by: HOSPITALIST

## 2017-07-16 PROCEDURE — 770006 HCHG ROOM/CARE - MED/SURG/GYN SEMI*

## 2017-07-16 PROCEDURE — 99232 SBSQ HOSP IP/OBS MODERATE 35: CPT | Performed by: HOSPITALIST

## 2017-07-16 RX ADMIN — GABAPENTIN 100 MG: 100 CAPSULE ORAL at 21:06

## 2017-07-16 RX ADMIN — OXYBUTYNIN CHLORIDE 10 MG: 10 TABLET, FILM COATED, EXTENDED RELEASE ORAL at 21:06

## 2017-07-16 RX ADMIN — STANDARDIZED SENNA CONCENTRATE AND DOCUSATE SODIUM 2 TABLET: 8.6; 5 TABLET, FILM COATED ORAL at 08:28

## 2017-07-16 RX ADMIN — ESCITALOPRAM OXALATE 20 MG: 10 TABLET, FILM COATED ORAL at 08:28

## 2017-07-16 RX ADMIN — METHENAMINE HIPPURATE 1 G: 1 TABLET ORAL at 10:31

## 2017-07-16 RX ADMIN — BUPROPION HYDROCHLORIDE 150 MG: 150 TABLET, EXTENDED RELEASE ORAL at 21:06

## 2017-07-16 RX ADMIN — BUPROPION HYDROCHLORIDE 150 MG: 150 TABLET, EXTENDED RELEASE ORAL at 08:28

## 2017-07-16 RX ADMIN — STANDARDIZED SENNA CONCENTRATE AND DOCUSATE SODIUM 1 TABLET: 8.6; 5 TABLET, FILM COATED ORAL at 21:06

## 2017-07-16 RX ADMIN — GABAPENTIN 100 MG: 100 CAPSULE ORAL at 08:28

## 2017-07-16 ASSESSMENT — ENCOUNTER SYMPTOMS
CONSTIPATION: 0
SEIZURES: 0
PALPITATIONS: 0
VOMITING: 0
ORTHOPNEA: 0
BLURRED VISION: 0
PHOTOPHOBIA: 0
SORE THROAT: 0
WEIGHT LOSS: 1
SHORTNESS OF BREATH: 0
WHEEZING: 0
ABDOMINAL PAIN: 0
NECK PAIN: 0
NAUSEA: 0
CHILLS: 1
HEADACHES: 0
FEVER: 0
HALLUCINATIONS: 0
DEPRESSION: 0
BACK PAIN: 1
MYALGIAS: 1
WEAKNESS: 1
DIZZINESS: 0
FOCAL WEAKNESS: 1
HEARTBURN: 1
DIARRHEA: 0
COUGH: 0
DOUBLE VISION: 1

## 2017-07-16 ASSESSMENT — PAIN SCALES - GENERAL
PAINLEVEL_OUTOF10: 8
PAINLEVEL_OUTOF10: 0

## 2017-07-16 NOTE — PROGRESS NOTES
No acute change noted on patient.    Patient states being in a bad mood because he didn't have assistance with dinner and he had not eaten.  Requested CNA to assist with feeding but when CNA was at bedside patient refused to eat dinner and asked for boost only.  Patient took all his PO medication this evening.  Will continue with current plan of care and provided patient education as needed.

## 2017-07-16 NOTE — PROGRESS NOTES
Pt AOx4 with nausea this afternoon.  Ate breakfast and some lunch.  Mother at bedside.  VSS, complaints of pain but refuses pain medicine.

## 2017-07-16 NOTE — PROGRESS NOTES
Outpatient infusion center called and wanted to know if Jerzy would be able to make it to his appointment at 11:30 tomorrow.  Notes say Jerzy need SNF placement.  Will contact MD about this.  Infusion center [phone].

## 2017-07-16 NOTE — CARE PLAN
Problem: Knowledge Deficit  Goal: Knowledge of disease process/condition, treatment plan, diagnostic tests, and medications will improve  Reviewing plan of care, activities, and medication with patient.  Encouraging patient to ask questions and participate in plan of care.  Providing answers to all questions.  Continuing with current plan of care.  Hourly rounding in practice.    Problem: Skin Integrity  Goal: Risk for impaired skin integrity will decrease  Mariano scale being used to assess skin break down risks.  Providing assistance with repositioning.  Collaborating and communicating with health care team to prevent pressure ulcer.  Hourly rounding in practice.

## 2017-07-16 NOTE — PROGRESS NOTES
[facility] Hospitalist Progress Note    Date of Service: 7/15/2017    Chief Complaint  37 y.o. male admitted 7/11/2017 lower extremity weakness, chronic pain, and fall that cause worsening of back pain. He has long standing hx of MS and he is bed bound on baseline. He is very difficult to take care by her mom alone at this time. Not able to do his ADLs. He is placed on Steroids. Neurology is following. He was last treated for MS flare in December.     Interval Problem Update  Pt has some constipation yesterday but after mag citrate it is improved. Denies any N/V. Decrease po intake. Continue to have weakness in lower extremity with minimal improvement. Upper extremity strength is good. He reports very minimal improvement in symptoms since started on Steroids. Denies any CP, SOB, fever or chills. Denies any N/V. Pain is under control.     Consultants/Specialty  Dr. Christophe Amaya.    Disposition  SNF with rehab upon discharge.         Review of Systems   Constitutional: Positive for chills and weight loss. Negative for fever and malaise/fatigue.        Generalized   HENT: Positive for congestion. Negative for ear pain and sore throat.    Eyes: Positive for double vision. Negative for blurred vision and photophobia.   Respiratory: Negative for cough, shortness of breath and wheezing.    Cardiovascular: Negative for chest pain, palpitations, orthopnea and leg swelling.   Gastrointestinal: Positive for heartburn and constipation. Negative for nausea, vomiting and melena.   Genitourinary: Negative for urgency and hematuria.   Musculoskeletal: Positive for myalgias and back pain. Negative for neck pain.   Neurological: Positive for focal weakness and weakness. Negative for dizziness, seizures and headaches.        Both lower extremity has significant weakness. Upper extremity has good strength.    Psychiatric/Behavioral: Negative for depression and hallucinations.        Frustarted and sad      Physical Exam  Laboratory/Imaging    Hemodynamics  Temp (24hrs), Av.6 °C (97.9 °F), Min:36.3 °C (97.3 °F), Max:37.1 °C (98.8 °F)   Temperature: 36.5 °C (97.7 °F)  Pulse  Av.2  Min: 74  Max: 112    Blood Pressure: 115/73 mmHg      Respiratory      Respiration: 16, Pulse Oximetry: 93 %             Fluids    Intake/Output Summary (Last 24 hours) at 07/15/17 1722  Last data filed at 07/15/17 0400   Gross per 24 hour   Intake      0 ml   Output   1200 ml   Net  -1200 ml       Nutrition  Orders Placed This Encounter   Procedures   • Diet Order     Standing Status: Standing      Number of Occurrences: 1      Standing Expiration Date:      Order Specific Question:  Diet:     Answer:  Regular [1]     Physical Exam   Constitutional: He is oriented to person, place, and time. He appears distressed.   HENT:   Head: Normocephalic.   Mouth/Throat: No oropharyngeal exudate.   Eyes: Pupils are equal, round, and reactive to light.   Neck: Neck supple. JVD present.   Cardiovascular: Normal rate, regular rhythm and normal heart sounds.    No murmur heard.  Pulmonary/Chest: No respiratory distress. He has no wheezes. He exhibits no tenderness.   Abdominal: Soft. Bowel sounds are normal. He exhibits no distension. There is no tenderness. There is no guarding.   Musculoskeletal: He exhibits no edema.   Neurological: He is alert and oriented to person, place, and time. He displays abnormal reflex. No cranial nerve deficit. He exhibits abnormal muscle tone.   Bed bound. Bilateral lower extremity extreme weakness. Good strength in bilateral upper extremity.    Skin: Skin is warm. He is diaphoretic.   Psychiatric:   Frustrated and depressed from the situation               Recent Labs      17   1838   APTT  32.4   INR  1.09                  Assessment/Plan     Hyperglycemia (present on admission)  Assessment & Plan  Hgb A1C normal. Suspect 2/2 steroid use, continue to monitor.    Multiple sclerosis exacerbation (CMS-HCC) (present on admission)  Assessment &  Plan  - Neurology is consulted. Main issue is unable to take care of himself at home and will need SNF placement.   - Cont solumedrol 1G daily for now.   - Steroids upon discharge.         Urinary retention (present on admission)  Assessment & Plan  Epperson catheter  Voiding trial once patient has improved.     History of recurrent UTIs (present on admission)  Assessment & Plan  - Continue Bactrim which he takes home for 3 more days, then start Methenamine hippurate 1 gram bid per Urology recommendations according to the pt.         Constipation (present on admission)  Assessment & Plan  - Likely chronic.   - Improving with Mag citrate. Add Lactulose, Senna, and Miralax prn.     Depression (present on admission)  Assessment & Plan  Continue home medications.     Failure to thrive in adult (present on admission)  Assessment & Plan  - Nutrition consult.  -   consulted to arrange SNF placement. Pt mother cannot take care of patient at home.       Labs reviewed, Medications reviewed and Radiology images reviewed  Epperson catheter: Urinary Tract Retention or Urinary Tract Obstruction  Central line in place: Dialysis    DVT: SCDs.  DVT prophylaxis - mechanical: SCDs

## 2017-07-16 NOTE — PROGRESS NOTES
[facility] Hospitalist Progress Note    Date of Service: 7/16/2017    Chief Complaint  37 y.o. male admitted 7/11/2017 lower extremity weakness, chronic pain, and fall that cause worsening of back pain. He has long standing hx of MS and he is bed bound on baseline. He is very difficult to take care by her mom alone at this time. Not able to do his ADLs. He is placed on Steroids. Neurology is following. He was last treated for MS flare in December.     Interval Problem Update  Pt constipation is improved now. Denies any N/V. Continue to have weakness in lower extremity with minimal improvement. Upper extremity strength is good. He reports very minimal improvement in symptoms since started on Steroids. Denies any CP, SOB, fever or chills. Denies any N/V. Pain is under control.     Consultants/Specialty  Dr. Christophe Amaya.    Disposition  SNF with rehab upon discharge.         Review of Systems   Constitutional: Positive for chills and weight loss. Negative for fever and malaise/fatigue.        Generalized   HENT: Positive for congestion. Negative for ear pain and sore throat.    Eyes: Positive for double vision. Negative for blurred vision and photophobia.   Respiratory: Negative for cough, shortness of breath and wheezing.    Cardiovascular: Negative for chest pain, palpitations, orthopnea and leg swelling.   Gastrointestinal: Positive for heartburn. Negative for nausea, vomiting, abdominal pain, diarrhea, constipation and melena.   Genitourinary: Negative for urgency and hematuria.   Musculoskeletal: Positive for myalgias and back pain. Negative for neck pain.   Neurological: Positive for focal weakness and weakness. Negative for dizziness, seizures and headaches.        Both lower extremity has significant weakness. Upper extremity has good strength.    Psychiatric/Behavioral: Negative for depression and hallucinations.        Frustarted and sad      Physical Exam  Laboratory/Imaging   Hemodynamics  Temp (24hrs),  Av.9 °C (98.5 °F), Min:36.7 °C (98.1 °F), Max:37.4 °C (99.3 °F)   Temperature: 36.7 °C (98.1 °F)  Pulse  Av.3  Min: 74  Max: 112    Blood Pressure: 130/74 mmHg      Respiratory      Respiration: 20, Pulse Oximetry: 95 %             Fluids    Intake/Output Summary (Last 24 hours) at 17 1444  Last data filed at 17 0600   Gross per 24 hour   Intake      0 ml   Output   1000 ml   Net  -1000 ml       Nutrition  Orders Placed This Encounter   Procedures   • Diet Order     Standing Status: Standing      Number of Occurrences: 1      Standing Expiration Date:      Order Specific Question:  Diet:     Answer:  Regular [1]     Physical Exam   Constitutional: He is oriented to person, place, and time. He appears distressed.   HENT:   Head: Normocephalic and atraumatic.   Mouth/Throat: No oropharyngeal exudate.   Eyes: Pupils are equal, round, and reactive to light.   Neck: Neck supple. JVD present.   Cardiovascular: Normal rate, regular rhythm and normal heart sounds.    No murmur heard.  Pulmonary/Chest: No respiratory distress. He has no wheezes. He has no rales. He exhibits no tenderness.   Abdominal: Soft. Bowel sounds are normal. He exhibits no distension. There is no tenderness. There is no guarding.   Musculoskeletal: He exhibits no edema.   Neurological: He is alert and oriented to person, place, and time. He displays abnormal reflex. No cranial nerve deficit. He exhibits abnormal muscle tone.   Bed bound. Bilateral lower extremity extreme weakness. Good strength in bilateral upper extremity.    Skin: Skin is warm. He is diaphoretic.   Psychiatric:   Frustrated and depressed from the situation                                Assessment/Plan     Hyperglycemia (present on admission)  Assessment & Plan  Hgb A1C normal. Suspect 2/2 steroid use, continue to monitor.    Multiple sclerosis exacerbation (CMS-HCC) (present on admission)  Assessment & Plan  - Neurology is consulted. Main issue is unable to  take care of himself at home and will need SNF placement.   - Cont solumedrol 1G daily for now.   - Steroids upon discharge.   - Will need placement as mother unable to take care of him at home.   - Dr. Melissa, [facility] has been trying to recruit him into [facility] for primary progressive multiple sclerosis.      Urinary retention (present on admission)  Assessment & Plan  Epperson catheter  Voiding trial once patient has improved.     History of recurrent UTIs (present on admission)  Assessment & Plan  - Continue Bactrim which he takes home for 3 more days, then start Methenamine hippurate 1 gram bid per Urology recommendations according to the pt.         Constipation (present on admission)  Assessment & Plan  - Likely chronic.   - Improving with Mag citrate. Add Lactulose, Senna, and Miralax prn.     Depression (present on admission)  Assessment & Plan  Continue home medications.     Failure to thrive in adult (present on admission)  Assessment & Plan  - Nutrition consult.  -   consulted to arrange SNF placement. Pt mother cannot take care of patient at home.       Labs reviewed, Medications reviewed and Radiology images reviewed  Epperson catheter: Urinary Tract Retention or Urinary Tract Obstruction  Central line in place: Dialysis    DVT: SCDs.  DVT prophylaxis - mechanical: SCDs

## 2017-07-16 NOTE — CARE PLAN
Problem: Mobility  Goal: Risk for activity intolerance will decrease  Outcome: MET Date Met:  07/16/17    Problem: Skin Integrity  Goal: Risk for impaired skin integrity will decrease  Outcome: MET Date Met:  07/16/17

## 2017-07-17 ENCOUNTER — APPOINTMENT (OUTPATIENT)
Dept: ONCOLOGY | Facility: MEDICAL CENTER | Age: 37
End: 2017-07-17
Attending: PSYCHIATRY & NEUROLOGY
Payer: MEDICARE

## 2017-07-17 LAB
ANION GAP SERPL CALC-SCNC: 6 MMOL/L (ref 0–11.9)
BASOPHILS # BLD AUTO: 0.2 % (ref 0–1.8)
BASOPHILS # BLD: 0.02 K/UL (ref 0–0.12)
BUN SERPL-MCNC: 22 MG/DL (ref 8–22)
CALCIUM SERPL-MCNC: 9.5 MG/DL (ref 8.5–10.5)
CHLORIDE SERPL-SCNC: 105 MMOL/L (ref 96–112)
CO2 SERPL-SCNC: 28 MMOL/L (ref 20–33)
CREAT SERPL-MCNC: 0.91 MG/DL (ref 0.5–1.4)
CRYOGLOB SER QL 3D COLD INC: NORMAL
EOSINOPHIL # BLD AUTO: 0.02 K/UL (ref 0–0.51)
EOSINOPHIL NFR BLD: 0.2 % (ref 0–6.9)
ERYTHROCYTE [DISTWIDTH] IN BLOOD BY AUTOMATED COUNT: 41.7 FL (ref 35.9–50)
GFR SERPL CREATININE-BSD FRML MDRD: >60 ML/MIN/1.73 M 2
GLUCOSE SERPL-MCNC: 92 MG/DL (ref 65–99)
HCT VFR BLD AUTO: 47.9 % (ref 42–52)
HGB BLD-MCNC: 16.6 G/DL (ref 14–18)
IMM GRANULOCYTES # BLD AUTO: 0.07 K/UL (ref 0–0.11)
IMM GRANULOCYTES NFR BLD AUTO: 0.6 % (ref 0–0.9)
LYMPHOCYTES # BLD AUTO: 3.68 K/UL (ref 1–4.8)
LYMPHOCYTES NFR BLD: 32.2 % (ref 22–41)
MAGNESIUM SERPL-MCNC: 2.3 MG/DL (ref 1.5–2.5)
MCH RBC QN AUTO: 30.5 PG (ref 27–33)
MCHC RBC AUTO-ENTMCNC: 34.7 G/DL (ref 33.7–35.3)
MCV RBC AUTO: 87.9 FL (ref 81.4–97.8)
MONOCYTES # BLD AUTO: 0.91 K/UL (ref 0–0.85)
MONOCYTES NFR BLD AUTO: 8 % (ref 0–13.4)
NEUTROPHILS # BLD AUTO: 6.72 K/UL (ref 1.82–7.42)
NEUTROPHILS NFR BLD: 58.8 % (ref 44–72)
NRBC # BLD AUTO: 0 K/UL
NRBC BLD AUTO-RTO: 0 /100 WBC
PLATELET # BLD AUTO: 300 K/UL (ref 164–446)
PMV BLD AUTO: 8.5 FL (ref 9–12.9)
POTASSIUM SERPL-SCNC: 3.5 MMOL/L (ref 3.6–5.5)
RBC # BLD AUTO: 5.45 M/UL (ref 4.7–6.1)
SODIUM SERPL-SCNC: 139 MMOL/L (ref 135–145)
WBC # BLD AUTO: 11.4 K/UL (ref 4.8–10.8)

## 2017-07-17 PROCEDURE — 80048 BASIC METABOLIC PNL TOTAL CA: CPT

## 2017-07-17 PROCEDURE — 99232 SBSQ HOSP IP/OBS MODERATE 35: CPT | Performed by: HOSPITALIST

## 2017-07-17 PROCEDURE — 700102 HCHG RX REV CODE 250 W/ 637 OVERRIDE(OP): Performed by: HOSPITALIST

## 2017-07-17 PROCEDURE — 36415 COLL VENOUS BLD VENIPUNCTURE: CPT

## 2017-07-17 PROCEDURE — A9270 NON-COVERED ITEM OR SERVICE: HCPCS | Performed by: HOSPITALIST

## 2017-07-17 PROCEDURE — 83735 ASSAY OF MAGNESIUM: CPT

## 2017-07-17 PROCEDURE — 85025 COMPLETE CBC W/AUTO DIFF WBC: CPT

## 2017-07-17 PROCEDURE — 770006 HCHG ROOM/CARE - MED/SURG/GYN SEMI*

## 2017-07-17 PROCEDURE — 700111 HCHG RX REV CODE 636 W/ 250 OVERRIDE (IP): Performed by: HOSPITALIST

## 2017-07-17 RX ORDER — POTASSIUM CHLORIDE 20 MEQ/1
20 TABLET, EXTENDED RELEASE ORAL ONCE
Status: DISPENSED | OUTPATIENT
Start: 2017-07-17 | End: 2017-07-18

## 2017-07-17 RX ORDER — PREDNISONE 20 MG/1
60 TABLET ORAL DAILY
Status: DISCONTINUED | OUTPATIENT
Start: 2017-07-17 | End: 2017-07-18

## 2017-07-17 RX ADMIN — PREDNISONE 60 MG: 20 TABLET ORAL at 21:17

## 2017-07-17 RX ADMIN — OXYBUTYNIN CHLORIDE 10 MG: 10 TABLET, FILM COATED, EXTENDED RELEASE ORAL at 21:18

## 2017-07-17 RX ADMIN — METHENAMINE HIPPURATE 1 G: 1 TABLET ORAL at 21:18

## 2017-07-17 RX ADMIN — BUPROPION HYDROCHLORIDE 150 MG: 150 TABLET, EXTENDED RELEASE ORAL at 21:18

## 2017-07-17 RX ADMIN — STANDARDIZED SENNA CONCENTRATE AND DOCUSATE SODIUM 2 TABLET: 8.6; 5 TABLET, FILM COATED ORAL at 21:20

## 2017-07-17 RX ADMIN — LACTULOSE 30 ML: 20 SOLUTION ORAL at 21:25

## 2017-07-17 RX ADMIN — GABAPENTIN 100 MG: 100 CAPSULE ORAL at 21:18

## 2017-07-17 ASSESSMENT — PAIN SCALES - GENERAL
PAINLEVEL_OUTOF10: 0
PAINLEVEL_OUTOF10: 8

## 2017-07-17 ASSESSMENT — ENCOUNTER SYMPTOMS
NECK PAIN: 0
BACK PAIN: 1
BLOOD IN STOOL: 0
FOCAL WEAKNESS: 1
DIZZINESS: 0
PALPITATIONS: 0
VOMITING: 0
ABDOMINAL PAIN: 0
CHILLS: 1
DEPRESSION: 0
MYALGIAS: 1
NAUSEA: 0
SHORTNESS OF BREATH: 0
FEVER: 0
SEIZURES: 0
DOUBLE VISION: 1
BLURRED VISION: 0
WEIGHT LOSS: 1
HALLUCINATIONS: 0
DIARRHEA: 0
ORTHOPNEA: 0
WHEEZING: 0
WEAKNESS: 1
COUGH: 0
PHOTOPHOBIA: 0
SORE THROAT: 0
CONSTIPATION: 0
HEADACHES: 0
HEARTBURN: 1

## 2017-07-17 ASSESSMENT — PATIENT HEALTH QUESTIONNAIRE - PHQ9
SUM OF ALL RESPONSES TO PHQ QUESTIONS 1-9: 0
1. LITTLE INTEREST OR PLEASURE IN DOING THINGS: NOT AT ALL
SUM OF ALL RESPONSES TO PHQ9 QUESTIONS 1 AND 2: 0
2. FEELING DOWN, DEPRESSED, IRRITABLE, OR HOPELESS: NOT AT ALL

## 2017-07-17 NOTE — PROGRESS NOTES
"Patient again, stating no one assisted with feeding dinner (per AM CNA is able to feed himself), RN assisted feeding but patient only took 3 bites of dinner, pt agreeing to take his medication this evening but keeps referring to his medications as \"chemicals\".  Provided therapeutic communications.  Continuing with current plan of care.  "

## 2017-07-17 NOTE — PROGRESS NOTES
While passing meds, RN asked if patient would like assistance with eating his cake or fruits left from dinner but patient declined.  Removed items from tray per patient request.  Offered assistance with boost but patient declined as well.

## 2017-07-17 NOTE — DISCHARGE PLANNING
Pt and mother spoke with TCN and provided choices for skilled facilities. Pts MS infusion rescheduled for July 26th at 09:30, pt and family aware.

## 2017-07-17 NOTE — DISCHARGE PLANNING
Transitional Care Navigator:    TCN met with patient and mother Kandi to discuss transitional care services for discharge planning.  SNF level of care has been recommended.  TCN explained SNF level of care in detail.  Pt in agreement.  Choice is [facility], [facility], and [facility].  Choice form completed and faxed to [facility]. RN CM aware.  TCN will follow-up as needed.

## 2017-07-17 NOTE — CARE PLAN
Problem: Safety  Goal: Will remain free from injury  Outcome: PROGRESSING AS EXPECTED  Call light with reach, refuses strip alarm    Problem: Urinary Elimination:  Goal: Ability to reestablish a normal urinary elimination pattern will improve  Outcome: PROGRESSING AS EXPECTED  Pt  Self caths at home, fc in place while in hospital.

## 2017-07-17 NOTE — PROGRESS NOTES
Pt has refused all am medications stating he does not want any chemicals in his body, that it will make him throw up

## 2017-07-17 NOTE — DISCHARGE PLANNING
Contacted [facility]. Appointment scheduled for 7/26 at 0930. Received choice form from [place]). Referral sent to [facility], [facility] and [facility].

## 2017-07-17 NOTE — PROGRESS NOTES
Kylah Perez Fall Risk Assessment:     Last Known Fall: Within the last year  Mobility: Use of assistive device/requires assist of two people  Medications: Cardiovascular or central nervous system meds  Mental Status/LOC/Awareness: Awake, alert, and oriented to date, place, and person  Toileting Needs: Use of catheters or diversion devices  Volume/Electrolyte Status: No problems  Communication/Sensory: No deficits  Behavior: Appropriate behavior  Kylah Perez Fall Risk Total: 7  Fall Risk Level: LOW RISK    Universal Fall Precautions:  call light/belongings in reach, bed in low position and locked, wheelchairs and assistive devices out of sight, siderails up x 2, use non-slip footwear, adequate lighting, clutter free and spill free environment, educate to call for assistance, educate on level of risk    Fall Risk Level Interventions:   TRIAL (TELE 8, NEURO, MED CAL 5) Low Fall Risk Interventions  Place yellow fall risk ID band on patient: completed  Provide patient/family education based on risk assessment: completed  Educate patient/family to call staff for assistance when getting out of bed: completed  Place fall precaution signage outside patient door: completed      Patient Specific Interventions:     Medication: assess for medications that can be discontinued or dosage decreased  Mental Status/LOC/Awareness: reinforce falls education, check on patient hourly and reinforce the use of call light  Toileting: not applicable  Volume/Electrolyte Status: ensure patient remains hydrated and monitor abnormal lab values  Communication/Sensory: update plan of care on whiteboard  Behavioral: engage patient in daily activities and administer medication as ordered  Mobility: schedule physical activity throughout the day and provide comfort measures during transport

## 2017-07-17 NOTE — PROGRESS NOTES
AAox4. Epperson cath for neurogenic bladder. Pt has refused meds today. Case management discussing with pt rehab and discharge options. Needs assistance with meals.

## 2017-07-18 VITALS
BODY MASS INDEX: 23.92 KG/M2 | DIASTOLIC BLOOD PRESSURE: 80 MMHG | WEIGHT: 170.86 LBS | RESPIRATION RATE: 16 BRPM | SYSTOLIC BLOOD PRESSURE: 118 MMHG | HEIGHT: 71 IN | HEART RATE: 78 BPM | OXYGEN SATURATION: 97 % | TEMPERATURE: 98.2 F

## 2017-07-18 PROBLEM — K59.00 CONSTIPATION: Status: RESOLVED | Noted: 2017-07-14 | Resolved: 2017-07-18

## 2017-07-18 PROBLEM — G35 MULTIPLE SCLEROSIS EXACERBATION (HCC): Status: RESOLVED | Noted: 2017-07-11 | Resolved: 2017-07-18

## 2017-07-18 PROCEDURE — 99239 HOSP IP/OBS DSCHRG MGMT >30: CPT | Performed by: INTERNAL MEDICINE

## 2017-07-18 PROCEDURE — 700102 HCHG RX REV CODE 250 W/ 637 OVERRIDE(OP): Performed by: HOSPITALIST

## 2017-07-18 PROCEDURE — 700111 HCHG RX REV CODE 636 W/ 250 OVERRIDE (IP): Performed by: HOSPITALIST

## 2017-07-18 PROCEDURE — 302146: Performed by: INTERNAL MEDICINE

## 2017-07-18 PROCEDURE — 97530 THERAPEUTIC ACTIVITIES: CPT

## 2017-07-18 PROCEDURE — A9270 NON-COVERED ITEM OR SERVICE: HCPCS | Performed by: HOSPITALIST

## 2017-07-18 PROCEDURE — 97535 SELF CARE MNGMENT TRAINING: CPT

## 2017-07-18 PROCEDURE — 97112 NEUROMUSCULAR REEDUCATION: CPT

## 2017-07-18 RX ORDER — PROMETHAZINE HYDROCHLORIDE 12.5 MG/1
12.5-25 TABLET ORAL EVERY 4 HOURS PRN
Qty: 30 TAB | Refills: 0
Start: 2017-07-18 | End: 2017-07-26

## 2017-07-18 RX ORDER — PREDNISONE 20 MG/1
20 TABLET ORAL DAILY
Status: DISCONTINUED | OUTPATIENT
Start: 2017-07-19 | End: 2017-07-18 | Stop reason: HOSPADM

## 2017-07-18 RX ORDER — POLYETHYLENE GLYCOL 3350 17 G/17G
17 POWDER, FOR SOLUTION ORAL
Refills: 3
Start: 2017-07-18 | End: 2018-03-12

## 2017-07-18 RX ORDER — PREDNISONE 20 MG/1
TABLET ORAL
Qty: 30 TAB | Refills: 0
Start: 2017-07-19 | End: 2017-07-18

## 2017-07-18 RX ORDER — CALCIUM CARBONATE 500 MG/1
500 TABLET, CHEWABLE ORAL 3 TIMES DAILY PRN
Qty: 30 TAB
Start: 2017-07-18 | End: 2017-09-04

## 2017-07-18 RX ORDER — AMOXICILLIN 250 MG
2 CAPSULE ORAL 2 TIMES DAILY
Qty: 30 TAB | Refills: 0
Start: 2017-07-18 | End: 2017-07-26

## 2017-07-18 RX ORDER — PREDNISONE 20 MG/1
TABLET ORAL
Qty: 30 TAB | Refills: 0
Start: 2017-07-19 | End: 2017-07-26

## 2017-07-18 RX ORDER — BUPROPION HYDROCHLORIDE 150 MG/1
150 TABLET, EXTENDED RELEASE ORAL 2 TIMES DAILY
Qty: 60 TAB | Status: ON HOLD
Start: 2017-07-18 | End: 2019-07-18

## 2017-07-18 RX ORDER — GABAPENTIN 100 MG/1
100 CAPSULE ORAL 2 TIMES DAILY
Qty: 90 CAP
Start: 2017-07-18 | End: 2017-08-09

## 2017-07-18 RX ORDER — ONDANSETRON 4 MG/1
4 TABLET, ORALLY DISINTEGRATING ORAL EVERY 4 HOURS PRN
Qty: 10 TAB | Refills: 0
Start: 2017-07-18 | End: 2017-09-04

## 2017-07-18 RX ORDER — ESCITALOPRAM OXALATE 20 MG/1
20 TABLET ORAL DAILY
Qty: 30 TAB | Status: ON HOLD
Start: 2017-07-18 | End: 2019-07-18

## 2017-07-18 RX ORDER — OXYBUTYNIN CHLORIDE 10 MG/1
10 TABLET, EXTENDED RELEASE ORAL EVERY EVENING
Qty: 30 TAB
Start: 2017-07-18 | End: 2019-01-29

## 2017-07-18 RX ORDER — TIZANIDINE 4 MG/1
4 TABLET ORAL EVERY 6 HOURS PRN
Qty: 30 TAB | Refills: 3
Start: 2017-07-18 | End: 2018-01-29

## 2017-07-18 RX ORDER — BISACODYL 10 MG
10 SUPPOSITORY, RECTAL RECTAL
Refills: 0
Start: 2017-07-18 | End: 2017-09-04

## 2017-07-18 RX ORDER — METHENAMINE HIPPURATE 1000 MG/1
1 TABLET ORAL 2 TIMES DAILY
Qty: 60 TAB
Start: 2017-07-18 | End: 2018-04-26

## 2017-07-18 RX ADMIN — PREDNISONE 20 MG: 20 TABLET ORAL at 08:47

## 2017-07-18 RX ADMIN — ESCITALOPRAM OXALATE 20 MG: 10 TABLET, FILM COATED ORAL at 08:48

## 2017-07-18 RX ADMIN — GABAPENTIN 100 MG: 100 CAPSULE ORAL at 08:48

## 2017-07-18 RX ADMIN — MAGNESIUM HYDROXIDE 30 ML: 400 SUSPENSION ORAL at 12:51

## 2017-07-18 RX ADMIN — METHENAMINE HIPPURATE 1 G: 1 TABLET ORAL at 08:47

## 2017-07-18 RX ADMIN — BUPROPION HYDROCHLORIDE 150 MG: 150 TABLET, EXTENDED RELEASE ORAL at 08:48

## 2017-07-18 RX ADMIN — STANDARDIZED SENNA CONCENTRATE AND DOCUSATE SODIUM 2 TABLET: 8.6; 5 TABLET, FILM COATED ORAL at 08:47

## 2017-07-18 ASSESSMENT — ENCOUNTER SYMPTOMS
CHILLS: 0
SHORTNESS OF BREATH: 0
PALPITATIONS: 0
BACK PAIN: 0
HEADACHES: 0
DIZZINESS: 0
COUGH: 0
VOMITING: 0
NAUSEA: 0
ABDOMINAL PAIN: 0
FEVER: 0
SPUTUM PRODUCTION: 0
SENSORY CHANGE: 0
CONSTIPATION: 1
WEAKNESS: 1
FOCAL WEAKNESS: 1

## 2017-07-18 ASSESSMENT — COGNITIVE AND FUNCTIONAL STATUS - GENERAL
TOILETING: TOTAL
HELP NEEDED FOR BATHING: TOTAL
PERSONAL GROOMING: A LOT
EATING MEALS: TOTAL
DAILY ACTIVITIY SCORE: 8
DRESSING REGULAR LOWER BODY CLOTHING: TOTAL
SUGGESTED CMS G CODE MODIFIER DAILY ACTIVITY: CM
DRESSING REGULAR UPPER BODY CLOTHING: A LOT
SUGGESTED CMS G CODE MODIFIER MOBILITY: CM
STANDING UP FROM CHAIR USING ARMS: TOTAL
MOBILITY SCORE: 7
CLIMB 3 TO 5 STEPS WITH RAILING: TOTAL
TURNING FROM BACK TO SIDE WHILE IN FLAT BAD: A LOT
MOVING FROM LYING ON BACK TO SITTING ON SIDE OF FLAT BED: UNABLE
MOVING TO AND FROM BED TO CHAIR: UNABLE
WALKING IN HOSPITAL ROOM: TOTAL

## 2017-07-18 ASSESSMENT — PATIENT HEALTH QUESTIONNAIRE - PHQ9
2. FEELING DOWN, DEPRESSED, IRRITABLE, OR HOPELESS: NOT AT ALL
SUM OF ALL RESPONSES TO PHQ9 QUESTIONS 1 AND 2: 0
1. LITTLE INTEREST OR PLEASURE IN DOING THINGS: NOT AT ALL
SUM OF ALL RESPONSES TO PHQ QUESTIONS 1-9: 0

## 2017-07-18 ASSESSMENT — GAIT ASSESSMENTS: GAIT LEVEL OF ASSIST: UNABLE TO PARTICIPATE

## 2017-07-18 NOTE — DISCHARGE SUMMARY
CHIEF COMPLAINT ON ADMISSION  Chief Complaint   Patient presents with   • T-5000 FALL     slipped out of bed this morning   • Low Back Pain       CODE STATUS  Full Code    HPI & HOSPITAL COURSE  This is a 37 y.o. year old male here with PMH multiple sclerosis. He presented to the hospital for progressive lower extremity weakness, worsening over the past few weeks. He was admitted, and neurology was called for consultation. He was given 5 days of IV steroids, with some improvement of his symptoms. He has been transitioned to oral steroids, for which he will be given a tapering dose per neurology recommendations. He has been set up for an infusion of Ocrevus for his MS, which he should be transported to have this completed. He will follow up with his neurologist, and PCP.     He was seen by PT and OT therapies who recommended continued inpatient therapies and nursing support. He will be transferred for skilled nursing. He should be given supplies to straight cath himself as needed, as he has chronic urinary retention. He should be treated for constipation as needed. Today, the patient is functioning at baseline. He is wheel chair bound at baseline. He denies new neurologic complaints. His vital signs have remained stable, and he is tolerating a diet and hydrating himself.     Therefore, he is discharged in good and stable condition for further post-acute management.     DISCHARGE PROBLEM LIST  Active Problems:    Depression POA: Yes    Failure to thrive in adult POA: Yes    Multiple sclerosis exacerbation (CMS-HCC) POA: Yes    Urinary retention POA: Yes    History of recurrent UTIs POA: Yes    Constipation POA: Yes  Resolved Problems:    Hyperglycemia POA: Yes      FOLLOW UP  Future Appointments  Date Time Provider Department [place]   7/26/2017 9:30 AM RN 4 [facility]   8/28/2017 11:20 AM Melissa P Bloch, M.D. [facility] None   11/27/2017 2:00 PM Melissa P Bloch, M.D. [facility] None     [facility]. [facility]  [facility]  2. Neurology as scheduled 8/28 for followup  3. Infusion center 7/26 for Ocrevus infusion    MEDICATIONS ON DISCHARGE   Drake Jerzy Tejedaony VICTORIANO   Home Medication Instructions KAVITA:[id number]    Printed on:07/18/17 9252   Medication Information                      bisacodyl (DULCOLAX) 10 MG Suppos  Insert 1 Suppository in rectum 1 time daily as needed (if magnesium hydroxide ineffective after 24 hours).             buPROPion SR (WELLBUTRIN-SR) 150 MG TABLET SR 12 HR sustained-release tablet  Take 1 Tab by mouth 2 Times a Day.             calcium carbonate (TUMS) 500 MG Chew Tab  Take 1 Tab by mouth 3 times a day as needed (heartburn).             Cholecalciferol (VITAMIN D3) 5000 UNITS Tab  Take 5,000 Units by mouth every day.             Cranberry 125 MG Tab  Take 125 mg by mouth every day.             escitalopram (LEXAPRO) 20 MG tablet  Take 1 Tab by mouth every day.             gabapentin (NEURONTIN) 100 MG Cap  Take 1 Cap by mouth 2 Times a Day.             magnesium hydroxide (MILK OF MAGNESIA) 400 MG/5ML Suspension  Take 30 mL by mouth 1 time daily as needed (if polyethylene glycol ineffective after 24 hours).             Magnesium Oxide 500 MG Cap  Take 2 Caps by mouth every day.             methenamine hip (HIPPREX) 1 GM Tab  Take 1 Tab by mouth 2 Times a Day.             ondansetron (ZOFRAN ODT) 4 MG TABLET DISPERSIBLE  Take 1 Tab by mouth every four hours as needed for Nausea/Vomiting (give PO if no IV route available).             oxybutynin SR (DITROPAN-XL) 10 MG CR tablet  Take 1 Tab by mouth every evening.             polyethylene glycol/lytes (MIRALAX) Pack  Take 1 Packet by mouth 1 time daily as needed (if sennosides and docusate ineffective after 24 hours).             predniSONE (DELTASONE) 20 MG Tab  20mg daily x 5 days, then 10mg daily x 5 days, then stop             Probiotic Product (PROBIOTIC DAILY PO)  Take 1 Cap by mouth every day.             promethazine (PHENERGAN) 12.5 MG  tablet  Take 1-2 Tabs by mouth every four hours as needed for Nausea/Vomiting (if no relief from ondansetron or if ondansetron is not ordered).             psyllium (METAMUCIL) 58.12 % Pack  Take 1 Packet by mouth every day.             senna-docusate (PERICOLACE OR SENOKOT S) 8.6-50 MG Tab  Take 2 Tabs by mouth 2 Times a Day.             tizanidine (ZANAFLEX) 4 MG Tab  Take 1 Tab by mouth every 6 hours as needed. Indications: Muscle Spasticity                     INSTRUCTIONS FOR NURSING  -Patient may straight cath self as needed-please give supplies for self cath  -Given instructions to return to the ER if any new or worsening symptoms, operative site bleeding or signs of wound infection, signs of systemic infection  -Facility to arrange transportation to appointments for follow-up  -Ambulate patient TID  -Vital signs every shift, call if BP above 170/100 or below 90, HR below 55 or above 120, temp above 100.4, signs of infection    DIET  Orders Placed This Encounter   Procedures   • Diet Order     Standing Status: Standing      Number of Occurrences: 1      Standing Expiration Date:      Order Specific Question:  Diet:     Answer:  Regular [1]       ACTIVITY  As tolerated.    LINES, DRAINS, AND WOUNDS  This is an automated list. Peripheral IVs will be removed prior to discharge.  PIV Group Right Antecubital 18g (Active)   Line Secured Transparent 7/18/2017  9:00 AM   Site Condition / Description Assessed;Clean;Intact;Dry 7/18/2017  9:00 AM   Dressing Type / Description Transparent;Clean;Dry;Intact 7/18/2017  9:00 AM   Dressing Status Observed 7/18/2017  9:00 AM   Saline Locked Yes 7/18/2017  9:00 AM   Infiltration Grading Used by [facility] 0 7/18/2017  9:00 AM   Phlebitis Scale (Used by [facility]) 0 7/18/2017  9:00 AM   Date Primary Tubing Changed 07/16/17 7/16/2017  8:00 AM   NEXT Primary Tubing Change  07/15/17 7/12/2017 12:20 AM       MENTAL STATUS ON TRANSFER  Level of Consciousness: Alert  Orientation :  Oriented x 4  Speech: Speech Clear    CONSULTATIONS  Dr Amaya- neurology    PROCEDURES  None    LABORATORY  Recent Labs      07/17/17 0323   WBC  11.4*   RBC  5.45   HEMOGLOBIN  16.6   HEMATOCRIT  47.9   MCV  87.9   MCH  30.5   MCHC  34.7   RDW  41.7   PLATELETCT  300   MPV  8.5*       Recent Labs      07/17/17 0323   SODIUM  139   POTASSIUM  3.5*   CHLORIDE  105   CO2  28   GLUCOSE  92   BUN  22   CREATININE  0.91   CALCIUM  9.5       Recent Labs      07/17/17 0323   GLUCOSE  92        Results     Procedure Component Value Units Date/Time    URINALYSIS CULTURE, IF INDICATED [[id number]]  (Abnormal) Collected:  07/11/17 2026    Order Status:  Completed Specimen Information:  Urine from Urine, Clean Catch Updated:  07/11/17 2044     Color Yellow      Character Clear      Specific Gravity 1.021      Ph 7.0      Glucose Negative mg/dL      Ketones 15 (A) mg/dL      Protein Negative mg/dL      Bilirubin Negative      Urobilinogen, Urine 0.2      Nitrite Negative      Leukocyte Esterase Negative      Occult Blood Negative      Micro Urine Req see below      Comment: Microscopic examination not performed when specimen is clear  and chemically negative for protein, blood, leukocyte esterase  and nitrite.          Culture Indicated No UA Culture           BLOOD CULTURE   Date Value Ref Range Status   10/19/2016 No growth after 5 days of incubation.  Final        Imaging/ Testing:      DX-CHEST-PORTABLE (1 VIEW)   Final Result      No acute cardiac or pulmonary abnormalities are identified.          Total time of the discharge process exceeds 36 minutes.

## 2017-07-18 NOTE — PROGRESS NOTES
[facility] Hospitalist Progress Note    Date of Service: 2017    Chief Complaint  37 y.o. male PMH MS admitted 2017 lower extremity weakness, chronic pain, and fall that cause worsening of back pain. He is placed on Steroids. Neurology is following. He was last treated for MS flare in December. Mother who cares for him at home is unable to do so with his debility.    Interval Problem Update  Today, the patient is very upset about still being hospitalized. Is agreeable to SNF placement if it is done soon. He expresses desire to leave, however does not have means for transport. We discussed he MAY sign himself out AMA if he has his family/friend bring his wheelchair for transport.     Consultants/Specialty  Neurology- .    Disposition  SNF consult pending        Review of Systems   Constitutional: Positive for malaise/fatigue. Negative for fever and chills.   Respiratory: Negative for cough, sputum production and shortness of breath.    Cardiovascular: Negative for chest pain and palpitations.   Gastrointestinal: Positive for constipation. Negative for nausea, vomiting and abdominal pain.   Genitourinary: Negative for dysuria.   Musculoskeletal: Negative for back pain.   Neurological: Positive for focal weakness (BLE weak) and weakness. Negative for dizziness, sensory change and headaches.   All other systems reviewed and are negative.     Physical Exam  Laboratory/Imaging   Hemodynamics  Temp (24hrs), Av °C (98.6 °F), Min:36.8 °C (98.2 °F), Max:37.2 °C (98.9 °F)   Temperature: 36.8 °C (98.2 °F)  Pulse  Av.7  Min: 73  Max: 112    Blood Pressure: 118/80 mmHg      Respiratory      Respiration: 16, Pulse Oximetry: 97 %             Fluids    Intake/Output Summary (Last 24 hours) at 17 1351  Last data filed at 17 0400   Gross per 24 hour   Intake      0 ml   Output   1000 ml   Net  -1000 ml       Nutrition  Orders Placed This Encounter   Procedures   • Diet Order     Standing Status:  Standing      Number of Occurrences: 1      Standing Expiration Date:      Order Specific Question:  Diet:     Answer:  Regular [1]     Physical Exam   Constitutional: He is oriented to person, place, and time. He appears well-developed and well-nourished. No distress.   Agitated, upset   HENT:   Head: Normocephalic and atraumatic.   Eyes: Conjunctivae are normal. No scleral icterus.   Neck: Normal range of motion. Neck supple.   Cardiovascular: Normal rate and regular rhythm.    No murmur heard.  Pulmonary/Chest: Effort normal and breath sounds normal. No respiratory distress. He has no rales.   Abdominal: Soft. Bowel sounds are normal. He exhibits no distension. There is no tenderness. There is no guarding.   Musculoskeletal: He exhibits no edema.   BUE strength 3/5, spastic movements  BLE strength 1/5  Sensation absent per patient   Neurological: He is alert and oriented to person, place, and time.   Skin: Skin is warm and dry. No rash noted.   Vitals reviewed.      Recent Labs      07/17/17   0323   WBC  11.4*   RBC  5.45   HEMOGLOBIN  16.6   HEMATOCRIT  47.9   MCV  87.9   MCH  30.5   MCHC  34.7   RDW  41.7   PLATELETCT  300   MPV  8.5*     Recent Labs      07/17/17   0323   SODIUM  139   POTASSIUM  3.5*   CHLORIDE  105   CO2  28   GLUCOSE  92   BUN  22   CREATININE  0.91   CALCIUM  9.5                      Assessment/Plan     Multiple sclerosis exacerbation (CMS-HCC) (present on admission)  Assessment & Plan  - Appreciate neurology reccs  - Treated with solumedrol 1G X 5 days. Now on PO prednisone x 1 month per neurology reccs  - Ocrevus infusion scheduled per  for OP infusion  - Worsening debility- pending SNF placement      Urinary retention (present on admission)  Assessment & Plan  Chronic per patient- he refuses to use our straight catheters, wishes instead for matias despite risks    History of recurrent UTIs (present on admission)  Assessment & Plan  Resume Methenamine hippurate 1 gram BID OP regimen;  OP urology f/u        Constipation (present on admission)  Assessment & Plan  Cont bowel protocol    Depression (present on admission)  Assessment & Plan  Continue wellbutrin, lexapro    Failure to thrive in adult (present on admission)  Assessment & Plan  Nutrition consult, encourage PO intake  SNF consult pending      Labs reviewed and Medications reviewed  Epperson catheter: Urinary Tract Retention or Urinary Tract Obstruction  Central line in place: Dialysis    DVT: SCDs.  DVT prophylaxis - mechanical: SCDs      Assessed for rehab: Patient returned to prior level of function, rehabilitation not indicated at this time

## 2017-07-18 NOTE — PROGRESS NOTES
"Pt AAOX4, BLE paralysis and numbness present. Pt only able to move feet. Pt c/o of pain but declines pain medication because he \"doesn't like to take chemicals.\" Reports he medicates pain with marijuana. Pt verbalizes he is upset because he was not fed dinner today. He has been refusing meds citing he \"doesn't want chemicals in his body.\" Agreeing to take all medications this evening. Epperson in place for neurogenic bladder as patient straight caths at home. Call light in reach, bed alarm on.   "

## 2017-07-18 NOTE — DISCHARGE INSTRUCTIONS
Discharge Instructions    Discharged to other by medical transportation with escort. Discharged via ambulance, hospital escort: Yes.  Special equipment needed: Not Applicable    Be sure to schedule a follow-up appointment with your primary care doctor or any specialists as instructed.     Discharge Plan:   Diet Plan: Discussed  Activity Level: Discussed  Confirmed Follow up Appointment: Appointment Scheduled  Confirmed Symptoms Management: Discussed  Medication Reconciliation Updated: Yes  Influenza Vaccine Indication: Indicated: Not available from distributor/    I understand that a diet low in cholesterol, fat, and sodium is recommended for good health. Unless I have been given specific instructions below for another diet, I accept this instruction as my diet prescription.   Other diet: Regular diet 1:1 assist    Special Instructions: None    · Is patient discharged on Warfarin / Coumadin?   No     · Is patient Post Blood Transfusion?  No    Depression / Suicide Risk    As you are discharged from this [facility] facility, it is important to learn how to keep safe from harming yourself.    Recognize the warning signs:  · Abrupt changes in personality, positive or negative- including increase in energy   · Giving away possessions  · Change in eating patterns- significant weight changes-  positive or negative  · Change in sleeping patterns- unable to sleep or sleeping all the time   · Unwillingness or inability to communicate  · Depression  · Unusual sadness, discouragement and loneliness  · Talk of wanting to die  · Neglect of personal appearance   · Rebelliousness- reckless behavior  · Withdrawal from people/activities they love  · Confusion- inability to concentrate     If you or a loved one observes any of these behaviors or has concerns about self-harm, here's what you can do:  · Talk about it- your feelings and reasons for harming yourself  · Remove any means that you might use to hurt yourself  (examples: pills, rope, extension cords, firearm)  · Get professional help from the community (Mental Health, Substance Abuse, psychological counseling)  · Do not be alone:Call your Safe Contact- someone whom you trust who will be there for you.  · Call your local CRISIS HOTLINE 938-3143 or 242-734-5504  · Call your local Children's Mobile Crisis Response Team [place] (763) 443-1365 or www.[employer]  · Call the toll free National Suicide Prevention Hotlines   · National Suicide Prevention Lifeline 875-800-KESH (7720)  · National Hope Line Network 800-SUICIDE (340-1221)

## 2017-07-18 NOTE — DISCHARGE PLANNING
[facility] declined patient as they feel pt. could potentially need long term care. Radha(DANYEL) notified.

## 2017-07-18 NOTE — DISCHARGE PLANNING
[facility] able to accept patient today. Received PCS form from Radha(DANYEL). Patient has [facility]. Attempted to contact Britni and Nakita at [facility] for transport authorization, however, no answer. Voicemail left. Per Pawel([facility]), he will contact [facility] regarding auth as well. Contacted [facility] as patient has Medicaid. MTM to contact [facility]. Arranged patient's transport to [facility] at 1800 via [facility]. Radha(DANYEL) and Jimmy([facility]) notified of transport time.

## 2017-07-18 NOTE — CARE PLAN
Problem: Safety  Goal: Will remain free from injury  Outcome: PROGRESSING AS EXPECTED  Call light within reach, uses appropriately    Problem: Psychosocial Needs:  Goal: Level of anxiety will decrease  Outcome: PROGRESSING AS EXPECTED  Attempt to give cares on pt schedule to help with anxiety

## 2017-07-18 NOTE — THERAPY
"Physical Therapy Treatment completed.   Bed Mobility:  Supine to Sit: Maximal Assist  Transfers: Sit to Stand: Unable to Participate  Gait: Level Of Assist: Unable to Participate  Plan of Care: Will benefit from Physical Therapy 3 times per week  Discharge Recommendations: Equipment: Will Continue to Assess for Equipment Needs. Post-acute therapy Discharge to a transitional care facility for continued skilled therapy services.     See \"Rehab Therapy-Acute\" Patient Summary Report for complete documentation.     Pt with signifincant LE spasticity and inability to transfer due to MS debility/ poor trunk control and severe LE spasticity. Pt initailly angry and hostile in attitude, then tearful and frustraed with physical impairment/ progressive decline 2* MS. Pt might benefit from medical management of LE spasticity, but currently pt unwilling to trial medications. Cont with PT as able.   "

## 2017-07-18 NOTE — PROGRESS NOTES
Klyah Perez Fall Risk Assessment:     Last Known Fall: Within the last month  Mobility: Hemiplegic, paraplegia, or quadriplegia  Medications: Cardiovascular or central nervous system meds  Mental Status/LOC/Awareness: Awake, alert, and oriented to date, place, and person  Toileting Needs: Use of catheters or diversion devices  Volume/Electrolyte Status: No problems  Communication/Sensory: Visual (Glasses)/hearing deficit  Behavior: Depression/anxiety  Kylah Perez Fall Risk Total: 12  Fall Risk Level: MODERATE RISK    Universal Fall Precautions:  call light/belongings in reach, bed in low position and locked, wheelchairs and assistive devices out of sight, siderails up x 2, use non-slip footwear, adequate lighting, clutter free and spill free environment, educate on level of risk, educate to call for assistance    Fall Risk Level Interventions:   TRIAL (TELE 8, NEURO, MED CAL 5) Low Fall Risk Interventions  Place yellow fall risk ID band on patient: completed  Provide patient/family education based on risk assessment: completed  Educate patient/family to call staff for assistance when getting out of bed: completed  Place fall precaution signage outside patient door: completedTRIAL ([employer] 8, NEURO, MED CAL 5) Moderate Fall Risk Interventions  Place yellow fall risk ID band on patient: completed  Provide patient/family education based on risk assessment : completed  Educate patient/family to call staff for assistance when getting out of bed: completed  Place fall precaution signage outside patient door: completed  Utilize bed/chair fall alarm: completed     Patient Specific Interventions:     Medication: review medications with patient and family  Mental Status/LOC/Awareness: reinforce falls education, check on patient hourly and reinforce the use of call light  Toileting: monitor intake and output/use of appropriate interventions  Volume/Electrolyte Status: ensure patient remains hydrated and monitor abnormal  lab values  Communication/Sensory: update plan of care on whiteboard and ensure proper positioning when transferrng/ambulating  Behavioral: engage patient in daily activities  Mobility: provide comfort measures during transport and provide appropriate assistive device

## 2017-07-18 NOTE — DISCHARGE PLANNING
Pt accepted to [facility]. Pt will require [facility] for transport to facility. Attempting to arrange for this evening.

## 2017-07-18 NOTE — CARE PLAN
Problem: Nutritional:  Goal: Achieve adequate nutritional intake  Patient will consume 50% of meals   Outcome: PROGRESSING AS EXPECTED  Spoke w/ pt at bedside about PO intake. Pt states that he needs help w/ his meals in order to eat them. Per ADL documentation however pt often refuses help w/ meals. PO intake varies, ranging from 25 - 100%, but has improved since admit. Pt stated that he was not receiving his Boost supplements - changed these to come w/ meal trays as opposed to snack, to ensure they are delivered to pt.     RD will con't following for PO intake.

## 2017-07-18 NOTE — PROGRESS NOTES
[facility] Hospitalist Progress Note    Date of Service: 7/17/2017    Chief Complaint  37 y.o. male admitted 7/11/2017 lower extremity weakness, chronic pain, and fall that cause worsening of back pain. He has long standing hx of MS and he is bed bound on baseline. He is very difficult to take care by her mom alone at this time. Not able to do his ADLs. He is placed on Steroids. Neurology is following. He was last treated for MS flare in December.     Interval Problem Update  Pt constipation is improved now. Denies any N/V. Continue to have weakness in lower extremity with minimal improvement. Upper extremity strength is good. He reports very minimal improvement in symptoms since started on Steroids. Denies any CP, SOB, fever or chills. Denies any N/V. Pain is under control.     Consultants/Specialty  Dr. Christophe Amaya.    Disposition  SNF with rehab upon discharge.         Review of Systems   Constitutional: Positive for chills and weight loss. Negative for fever and malaise/fatigue.        Generalized   HENT: Positive for congestion. Negative for ear pain and sore throat.    Eyes: Positive for double vision. Negative for blurred vision and photophobia.   Respiratory: Negative for cough, shortness of breath and wheezing.    Cardiovascular: Negative for chest pain, palpitations, orthopnea and leg swelling.   Gastrointestinal: Positive for heartburn. Negative for nausea, vomiting, abdominal pain, diarrhea, constipation, blood in stool and melena.   Genitourinary: Negative for urgency and hematuria.   Musculoskeletal: Positive for myalgias and back pain. Negative for neck pain.   Neurological: Positive for focal weakness and weakness. Negative for dizziness, seizures and headaches.        Both lower extremity has significant weakness. Upper extremity has good strength.    Psychiatric/Behavioral: Negative for depression and hallucinations.        Frustarted and sad      Physical Exam  Laboratory/Imaging   Hemodynamics  Temp  (24hrs), Av.7 °C (98 °F), Min:36.6 °C (97.9 °F), Max:36.7 °C (98.1 °F)   Temperature:  (pt refused )  Pulse  Av.3  Min: 73  Max: 112    Blood Pressure: 122/78 mmHg      Respiratory      Respiration: 19, Pulse Oximetry: 95 %             Fluids    Intake/Output Summary (Last 24 hours) at 17 1904  Last data filed at 17 1800   Gross per 24 hour   Intake      0 ml   Output    900 ml   Net   -900 ml       Nutrition  Orders Placed This Encounter   Procedures   • Diet Order     Standing Status: Standing      Number of Occurrences: 1      Standing Expiration Date:      Order Specific Question:  Diet:     Answer:  Regular [1]     Physical Exam   Constitutional: He is oriented to person, place, and time. He appears distressed.   HENT:   Head: Normocephalic and atraumatic.   Mouth/Throat: No oropharyngeal exudate.   Eyes: Pupils are equal, round, and reactive to light.   Neck: Neck supple. JVD present.   Cardiovascular: Normal rate, regular rhythm and normal heart sounds.    No murmur heard.  Pulmonary/Chest: No respiratory distress. He has no wheezes. He has no rales. He exhibits no tenderness.   Abdominal: Soft. Bowel sounds are normal. He exhibits no distension and no mass. There is no tenderness. There is no guarding.   Musculoskeletal: He exhibits no edema.   Neurological: He is alert and oriented to person, place, and time. He displays abnormal reflex. No cranial nerve deficit. He exhibits abnormal muscle tone.   Bed bound. Bilateral lower extremity extreme weakness. Good strength in bilateral upper extremity.    Skin: Skin is warm. He is diaphoretic.   Psychiatric:   Frustrated and depressed from the situation       Recent Labs      17   0323   WBC  11.4*   RBC  5.45   HEMOGLOBIN  16.6   HEMATOCRIT  47.9   MCV  87.9   MCH  30.5   MCHC  34.7   RDW  41.7   PLATELETCT  300   MPV  8.5*     Recent Labs      17   SODIUM  139   POTASSIUM  3.5*   CHLORIDE  105   CO2  28   GLUCOSE  92   BUN   22   CREATININE  0.91   CALCIUM  9.5                      Assessment/Plan     Hyperglycemia (present on admission)  Assessment & Plan  Hgb A1C normal. Suspect 2/2 steroid use, continue to monitor.    Multiple sclerosis exacerbation (CMS-HCC) (present on admission)  Assessment & Plan  - Neurology is consulted. Main issue is unable to take care of himself at home and will need SNF placement.   - Treated with solumedrol 1G daily X 5 days. Now switch to PO Prednisone. Neurology recommended that pt should be on Prednisone for one month.   - Will need placement as mother unable to take care of him at home.   - Dr. Melissa, Bloch has been trying to recruit him into [facility] for primary progressive multiple sclerosis. His infusion is reschedule as pt was inpt.  - Case manger is aware. Looking for SNF placement.       Urinary retention (present on admission)  Assessment & Plan  Epperson catheter  Voiding trial once patient has improved.     History of recurrent UTIs (present on admission)  Assessment & Plan  - Continue Bactrim which he takes home for 3 more days, then start Methenamine hippurate 1 gram bid per Urology recommendations according to the pt.         Constipation (present on admission)  Assessment & Plan  - Likely chronic.   - Improving with Mag citrate. Add Lactulose, Senna, and Miralax prn.     Depression (present on admission)  Assessment & Plan  Continue home medications.     Failure to thrive in adult (present on admission)  Assessment & Plan  - Nutrition consult.  -   consulted to arrange SNF placement. Pt mother cannot take care of patient at home.       Labs reviewed, Medications reviewed and Radiology images reviewed  Epperson catheter: Urinary Tract Retention or Urinary Tract Obstruction  Central line in place: Dialysis    DVT: SCDs.  DVT prophylaxis - mechanical: SCDs

## 2017-07-18 NOTE — CARE PLAN
Problem: Venous Thromboembolism (VTW)/Deep Vein Thrombosis (DVT) Prevention:  Goal: Patient will participate in Venous Thrombosis (VTE)/Deep Vein Thrombosis (DVT)Prevention Measures  Outcome: PROGRESSING SLOWER THAN EXPECTED  Pt refusing use of SCDs     Problem: Urinary Elimination:  Goal: Ability to reestablish a normal urinary elimination pattern will improve  Outcome: PROGRESSING AS EXPECTED  Epperson in place for neurogenic bladder

## 2017-07-18 NOTE — THERAPY
"Occupational Therapy Treatment completed with focus on ADLs, ADL transfers and upper extremity function.  Functional Status:  Pt seen for OT tx. Mod A supine to sit, max A for LEs and mod A for trunk, able to initiate off the bed but required assistance to bring to midline. W/ assistance for balance and L UE support pt able to complete oral care w/ setup using R UE. Pt unable to open toothpaste and apply to toothbrush. Mod A to don t-shirt seated EOB. Mod A to return back to supine for LE management.   Plan of Care: Will benefit from Occupational Therapy 3 times per week  Discharge Recommendations:  Equipment Will Continue to Assess for Equipment Needs. Post-acute therapy Discharge to a transitional care facility for continued skilled therapy services.    See \"Rehab Therapy-Acute\" Patient Summary Report for complete documentation.   "

## 2017-07-18 NOTE — THERAPY
"Physical Therapy Treatment completed.   Bed Mobility:  Supine to Sit: Maximal Assist  Transfers: Sit to Stand: Unable to Participate  Gait: Level Of Assist: Unable to Participate with No Equipment Needed       Plan of Care: Will benefit from Physical Therapy 3 times per week  Discharge Recommendations: Equipment: Will Continue to Assess for Equipment Needs. Post-acute therapy Discharge to a transitional care facility for continued skilled therapy services.     See \"Rehab Therapy-Acute\" Patient Summary Report for complete documentation.       "

## 2017-07-19 NOTE — PROGRESS NOTES
Pt to dc to [place] [facility] by beryl at 6 pm. Dc instructions provided to pt. Report called/. All lines and monitors dc'd. Pt verbalized understanding

## 2017-07-22 ENCOUNTER — PATIENT OUTREACH (OUTPATIENT)
Dept: HEALTH INFORMATION MANAGEMENT | Facility: OTHER | Age: 37
End: 2017-07-22

## 2017-07-22 ENCOUNTER — HOSPITAL ENCOUNTER (EMERGENCY)
Dept: HOSPITAL 8 - ED | Age: 37
LOS: 1 days | Discharge: HOME | End: 2017-07-23
Payer: COMMERCIAL

## 2017-07-22 VITALS — BODY MASS INDEX: 23.64 KG/M2 | WEIGHT: 168.87 LBS | HEIGHT: 71 IN

## 2017-07-22 DIAGNOSIS — F32.9: Primary | ICD-10-CM

## 2017-07-22 DIAGNOSIS — F12.10: ICD-10-CM

## 2017-07-22 DIAGNOSIS — Z72.9: ICD-10-CM

## 2017-07-22 DIAGNOSIS — G35: ICD-10-CM

## 2017-07-22 DIAGNOSIS — Z60.2: ICD-10-CM

## 2017-07-22 LAB — DAU SCREEN: (no result)

## 2017-07-22 PROCEDURE — 80307 DRUG TEST PRSMV CHEM ANLYZR: CPT

## 2017-07-22 PROCEDURE — 99284 EMERGENCY DEPT VISIT MOD MDM: CPT

## 2017-07-22 SDOH — SOCIAL STABILITY - SOCIAL INSECURITY: PROBLEMS RELATED TO LIVING ALONE: Z60.2

## 2017-07-23 ENCOUNTER — PATIENT OUTREACH (OUTPATIENT)
Dept: HEALTH INFORMATION MANAGEMENT | Facility: OTHER | Age: 37
End: 2017-07-23

## 2017-07-23 VITALS — SYSTOLIC BLOOD PRESSURE: 128 MMHG | DIASTOLIC BLOOD PRESSURE: 60 MMHG

## 2017-07-23 NOTE — PROGRESS NOTES
Late Entry of 07/21/17:  Notification from [place] [employer] of unsuccessful transition of this patient from [facility] to [facility] SNF.  SNF failure to meet the needs of the patient, patient processed AMA to home without services.    Outreach call to Kandi, patients POA primary support.  Patient resides in a Stud apartment with primary services from [facility] FFS--[facility].     Provided services recovery and my cell number to patient's POA--below is a summary of the events as reported by Kandi:    • SCP & Medicaid FFS  • 37 year old male with Progressive MS with spastic spasms ADM [facility] 07/11/17--Progressive Lower Extremity Weakness  o IV steroids X 5 days--DC with oral steroids  o Scheduled for the [facility] 07/26/17 @ 0930 for 1st infusion Ocreorus for his MS--+ transportation RTC  o Scheduled follow up appt. MIKI Yung 07/24/17 @ 1400--+ transportation RTC   o DC to [facility] 07/18/17 via [facility] stretcher @ 1800 for PT/OT.  • Discharge needs:  Bariatric Bed with bedrails/trapeze secondary to spastic spasms-risk of moving out of bed without rails; patient is Independent with bed mobility  • 07/18/17--1830 arrived at [facility]--REMSA assisted patient into a standard hospital bed without bedrails and no trapeze  o Patient and mother (Kandi) reported they requested a larger bed and bed rails  o Kandi reports [facility] informed her and pt that they were ‘not allowed to use bed rails for anyone.’  o Kandi reports [facility] unable to meet pt.’s needs and only remedy offered was to discharge him to his home AMA  o 2200--AMA paperwork offered and completed.  REMSA transported pt. to his [facility] apt.; pt. has a Bariatric bed with side rails and trapeze in his home.  Kandi reports that she spent the night on an air mattress on the floor of the pt.’s apartment, as he did not have his Medicaid FFS Caregivers to assist him.  • 07/20/17--Kandi reports that she outreached to IHD  for assistance (IHD card left at pt[facility]s bedside at [facility])  Kandi spoke with Michael ([phone]) who provided her with a [facility] as a SCP provider for Home Health services.  She reports no additional follow up was scheduled by [facility].  • 07/21/17--Kandi reports that she spoke with Shaw Vásquez,  through Medicaid FFS to re-establish [facility] Caregiver services for the patient (AM & PM).  Kandi reports that pt. has Caregiver services throughout the weekend; he has a Lifeline for emergency notification; medications are current and accessible in pill boxes; adequate food in the home--Caregivers assist with meal prep.  Denies any additional needs at this time.    Care Coordination Manager (Priyanka Mann) spoke with Kandi to ensure patient is safe in his home environment at the present time and plans follow up with patient and Kandi on Monday post PCP visit.  Kandi has [facility]’s contact number and is able to reach out to her throughout the weekend if needed.  RN Care Coordinator to enroll patient into services; MSW Care Coordinator to work with the Medicaid FFS  on services in the home and PHMO Pharmacist to complete medication reconciliation with patient/caregivers.    Notification of  PHMO; [facility] Accountable Care; Director Accountable Care & Mgr John CC of this encounter.

## 2017-07-24 ENCOUNTER — TELEPHONE (OUTPATIENT)
Dept: HEALTH INFORMATION MANAGEMENT | Facility: OTHER | Age: 37
End: 2017-07-24

## 2017-07-25 ENCOUNTER — HOME HEALTH ADMISSION (OUTPATIENT)
Dept: HOME HEALTH SERVICES | Facility: HOME HEALTHCARE | Age: 37
End: 2017-07-25
Payer: MEDICARE

## 2017-07-25 ENCOUNTER — HOME CARE VISIT (OUTPATIENT)
Dept: HOME HEALTH SERVICES | Facility: HOME HEALTHCARE | Age: 37
End: 2017-07-25
Payer: MEDICARE

## 2017-07-25 VITALS
SYSTOLIC BLOOD PRESSURE: 138 MMHG | HEIGHT: 71 IN | WEIGHT: 170 LBS | RESPIRATION RATE: 16 BRPM | TEMPERATURE: 98.7 F | BODY MASS INDEX: 23.8 KG/M2 | HEART RATE: 88 BPM | DIASTOLIC BLOOD PRESSURE: 88 MMHG

## 2017-07-25 PROCEDURE — G0162 HHC RN E&M PLAN SVS, 15 MIN: HCPCS

## 2017-07-25 PROCEDURE — 665001 SOC-HOME HEALTH

## 2017-07-25 SDOH — ECONOMIC STABILITY: HOUSING INSECURITY: UNSAFE APPLIANCES: 0

## 2017-07-25 SDOH — ECONOMIC STABILITY: HOUSING INSECURITY: UNSAFE COOKING RANGE AREA: 0

## 2017-07-25 ASSESSMENT — ACTIVITIES OF DAILY LIVING (ADL)
ORAL_CARE_ASSISTANCE: 4
LAUNDRY_ASSISTANCE: 6
HOME_HEALTH_OASIS: 01
BATHING_ASSISTANCE: 6
GROOMING_ASSISTANCE: 4
MEAL_PREP_ASSISTANCE: 6
HOUSEKEEPING_ASSISTANCE: 6
DRESSING_LB_ASSISTANCE: 6
EATING_ASSISTANCE: 4
TRANSPORTATION COMMENTS: WHEEL CHAIR BOUND
DRESSING_UB_ASSISTANCE: 5
HOME_HEALTH_OASIS: 02
DRESSING_UB_ASSISTANCE: 6
SHOPPING_ASSISTANCE: 6
TRANSPORTATION_ASSISTANCE: 6
TELEPHONE_ASSISTANCE: 4
TOILETING_ASSISTANCE: 6

## 2017-07-25 ASSESSMENT — PATIENT HEALTH QUESTIONNAIRE - PHQ9
2. FEELING DOWN, DEPRESSED, IRRITABLE, OR HOPELESS: 03
1. LITTLE INTEREST OR PLEASURE IN DOING THINGS: 03

## 2017-07-25 ASSESSMENT — ENCOUNTER SYMPTOMS
VOMITING: DENIES
DEBILITATING PAIN: 1
SHORTNESS OF BREATH: T

## 2017-07-26 ENCOUNTER — PATIENT OUTREACH (OUTPATIENT)
Dept: HEALTH INFORMATION MANAGEMENT | Facility: OTHER | Age: 37
End: 2017-07-26

## 2017-07-26 ENCOUNTER — OUTPATIENT INFUSION SERVICES (OUTPATIENT)
Dept: ONCOLOGY | Facility: MEDICAL CENTER | Age: 37
End: 2017-07-26
Attending: PSYCHIATRY & NEUROLOGY
Payer: MEDICARE

## 2017-07-26 VITALS
TEMPERATURE: 98.7 F | DIASTOLIC BLOOD PRESSURE: 82 MMHG | WEIGHT: 170 LBS | SYSTOLIC BLOOD PRESSURE: 116 MMHG | HEART RATE: 88 BPM | RESPIRATION RATE: 18 BRPM | HEIGHT: 71 IN | OXYGEN SATURATION: 97 % | BODY MASS INDEX: 23.8 KG/M2

## 2017-07-26 PROCEDURE — 96413 CHEMO IV INFUSION 1 HR: CPT

## 2017-07-26 PROCEDURE — 700105 HCHG RX REV CODE 258: Performed by: PSYCHIATRY & NEUROLOGY

## 2017-07-26 PROCEDURE — 700111 HCHG RX REV CODE 636 W/ 250 OVERRIDE (IP): Performed by: PSYCHIATRY & NEUROLOGY

## 2017-07-26 PROCEDURE — 96375 TX/PRO/DX INJ NEW DRUG ADDON: CPT

## 2017-07-26 PROCEDURE — 96415 CHEMO IV INFUSION ADDL HR: CPT

## 2017-07-26 PROCEDURE — 306780 HCHG STAT FOR TRANSFUSION PER CASE

## 2017-07-26 RX ORDER — METHYLPREDNISOLONE SODIUM SUCCINATE 125 MG/2ML
125 INJECTION, POWDER, LYOPHILIZED, FOR SOLUTION INTRAMUSCULAR; INTRAVENOUS ONCE
Status: COMPLETED | OUTPATIENT
Start: 2017-07-26 | End: 2017-07-26

## 2017-07-26 RX ORDER — DIPHENHYDRAMINE HYDROCHLORIDE 50 MG/ML
50 INJECTION INTRAMUSCULAR; INTRAVENOUS ONCE
Status: DISCONTINUED | OUTPATIENT
Start: 2017-07-26 | End: 2017-07-26

## 2017-07-26 RX ADMIN — OCRELIZUMAB 300 MG: 300 INJECTION INTRAVENOUS at 11:30

## 2017-07-26 RX ADMIN — METHYLPREDNISOLONE SODIUM SUCCINATE 125 MG: 125 INJECTION, POWDER, FOR SOLUTION INTRAMUSCULAR; INTRAVENOUS at 10:20

## 2017-07-26 RX ADMIN — DIPHENHYDRAMINE HYDROCHLORIDE 50 MG: 50 INJECTION INTRAMUSCULAR; INTRAVENOUS at 10:30

## 2017-07-26 ASSESSMENT — PAIN SCALES - GENERAL: PAINLEVEL: NO PAIN

## 2017-07-26 NOTE — Clinical Note
July 26, 2017        Jerzy Juan III  [address]   [place] [address]        Dear Zen:    I just wanted to touch base with you and let you know that your mother, Shaw your  and I are working together to be sure that we are able to get the services in place to support your care.      Currently, I hear you are weaning off the gabapentin with your primary care provider's help.  Occasionally patients get a reaction to a medication and it does take time to get back to baseline.      Shaw has gotten approval to increase your caregiver assistance hours to 27 1/2 hours per week.  She is working with Kandi to make sure that if [facility] cannot fulfill all the hours, there would be a second service come in to assist.      [facility] will be assisting with strength training and transfer focus.  Shaw notes that the plan is still in place to move you toward the pool or aquatic program.      I hope that your infusion went well today and we will talk some time next week.      If you have any questions or concerns, please don't hesitate to call.        Sincerely,        LARISSA Lopez, RN, CCM, CHPN  Manager of Care Coordination Services  [phone]    Enc.    Electronically Signed

## 2017-07-26 NOTE — PROGRESS NOTES
Notes from Saturday night: 7/22/17    I received notice late Friday afternoon that patient had been discharged from [facility] on the 18th to [facility] and left AMA and was at home with needs for IV abts and Home Health.    I contacted his mother Kandi [phone] and she informed me he had left [facility] as they did not have a bariatric bed and did not allow side rales that he needs due to his spasticity.  She states he was weaned off of the IV steroids at [facility] and was on PO.  She confirmed he had all his meds and would need PT/OT for strength build up to assist family and caregivers with his transfers.  She stated his caregivers were coming back in from [facility] while she looked at new services.    Kandi states that Zen has an appointment with his PA Berta Dooley at about 2pm on Monday and they will review current status and set up home health or PT/OT.  [facility] has been set up to transport him.    I checked with Esther at [facility] and his next infusion set up for 7/26/17 and has authorization for it.    Pt has SCP and Medicaid FFS insurance.  I talked with his Medicaid FFS CM Shaw Selma [phone] on Friday before 5pm.  He has 27 hours per week of personal caregiver services through [facility] and they have not had staffing to meet his full hours so Shaw is awaiting Kandi to pick another CG service in [facility] and had given her names on Friday.  Medicaid has approved additional hours and Zen will get 27 ½ hours per week.  Shaw and I will talk on Monday on an overall plan to assist Zen and find any additional resources needed in the community.   Kandi notes that Zen had been to both [facility] and [facility] without them being aware of his need for bariatric bed with side rales.  Both places Kandi states she has had to check him out AMA and take him home to get resources needed.    I called Kandi back just before 5pm Friday to advise her of plan and discussions with Shaw.  I asked  if there was anything we could put in place on Friday to assist Kandi and Zen till the appointment on Monday.  Kandi advised no and thanked me for the plan.  I left her my number to call with any needs.  I received a call from Kandi on Saturday evening stating that Zen had been verbally abusive today with his long-term caregiver from [facility] and then with Kandi when she came over to tell him about the plan we were working on to help.  [facility] was called and Zen insisted he go to [facility].  Kandi stated she had not heard from [facility], was not aware if they knew she was his Medical and Financial POA and she was not going over.  I asked her for permission to check on Zen and give [facility] her name and number.  She advised me to proceed.  We talked about Zen's SCP being [facility] as his hospital and once stabilized he would be moved to [facility] if his inpatient qualified.  She acknowledged and stated she had tried to tell Zen and ZHANG of this, but ZHANG told him he could go wherever he liked.     Kandi stated she was exhausted and tired of Zen's behavior.  I listened and acknowledged her concerns.  I asked her if she was okay or if she needed someone to come over.  She reported she was fine, planned to get some rest and wait to hear from [facility].  She states that Zen told her he was going to work on removing her as POA.  She reports she is ready.  I advised her to please call me anytime with questions or needs and I would contact her on Monday if I did not hear from her over the weekend.   I called [facility] and talked with Shala Valdez RN working with Zen.  I advised her patient had SCP insurance and his mother, Kandi was POA with phone [phone].  That she was aware of transport to [facility] and wanted them to know he had SCP, her POA status and number, and that Zen had been to [facility] before and left because of lack of understanding that he needed bariatric bed with  side rales due to spasticity with his MS.  Shala stated she would let staff know, document chart and be sure he got into bariatric bed with side rales.  They will contact Kandi and Shala thanked me for my call.   Mgr Favian on call for weekend at [facility] notified of above.

## 2017-07-26 NOTE — PROGRESS NOTES
Mr Juan arrives for Ocrevus first dose. Jerzy is in good spirits, no complaints.  PIV started with good blood return. Premedications and Ocrevus infused as ordered. Ocrelizumab rate increased slowly, 30 cc every 30 min to a max rate of 180 cc.  Jerzy  tolerated all treatment well, observed for 1 hr post infusion.  No signs or symptoms of adverse reaction observed or expressed.  PIV removed, catheter intact, pressure dressing applied to site. Jerzy DC'd home with family, returns in 15 days. Appointment confirmed.

## 2017-07-26 NOTE — PROGRESS NOTES
Today: 7/26/17  * Zen was d/c'd from [facility] to home without admission on Sunday.    * Zen and Kandi went to his PA appointment on Monday as scheduled and with transport provided.  Report from Kandi is that Zen had reaction that caused increased behaviors about 1 1/2 years ago with Lyrica and when he was recently started on Gabapentin, they were unaware it was a similar drug to Lyrica. PA tapering Zen off gabapentin.  He is reported to be returning toward baseline.  Referral was put in from PA to [facility] for PT/OT  * [facility] evaluated Zen yesterday and plan to start PT/OT in the home at end of this week.  Goals is strength training to get him back to his baseline and being able to assist CGs and family with his transfers to w/c, bed and toilet.  *Zen is currently at [facility] getting his first infusion of the new MS drug he has been authorized for.  *[facility] CG left their employment and there is a new CG from [facility] that will be coming Wed-Sunday to assist Zen through his Medicaid coverage.  He has been moved up 5 hours a week to 27 1/2 hours a week and Shaw his CM with Medicaid/Disability is working with Kandi to get a second service in to  the remaining hours and have 7 day a week coverage.    * Shaw will talk with Kandi tomorrow about 2nd caregiver service and long-term needs.  I have f/u call for midday Friday to be sure all set for weekend.  *All medications and DME needed are in apartment.  Mother has my direct cell to call 24/7 with questions or needs.  *Shaw and I plan to work with Kandi next week on plans for s/p PT/OT.  Zen will be entering a pool/aquatic program covered by Medicaid to continue no-weight bearing muscle strengthening.  Some of the 27 1/2 CG hours will be used to cover assistance with Zen's participation in the program    Zen and Kandi happy with current plans, back in [facility] system and calling with needs.     IHD and HH updated.          Notes  from Saturday 7/22, late evening:  I received notice late Friday afternoon that patient had been discharged from [facility] on the 18th to [facility] and left AMA and was at home with needs for IV abts and Home Health.    I contacted his mother Kandi [phone] and she informed me he had left [facility] as they did not have a bariatric bed and did not allow side rales that he needs due to his spasticity.  She states he was weaned off of the IV steroids at [facility] and was on PO.  She confirmed he had all his meds and would need PT/OT for strength build up to assist family and caregivers with his transfers.  She stated his caregivers were coming back in from [facility] while she looked at new services.    Kandi states that Zen has an appointment with his PA Berta Dooley at about 2pm on Monday and they will review current status and set up home health or PT/OT.  [facility] has been set up to transport him.    I checked with Esther at [facility] and his next infusion set up for 7/26/17 and has authorization for it.    Pt has SCP and Medicaid FFS insurance.  I talked with his Medicaid FFS CM Shaw Selma [phone] on Friday before 5pm.  He has 27 hours per week of personal caregiver services through [facility] and they have not had staffing to meet his full hours so Shaw is awaiting Kandi to pick another CG service in [facility] and had given her names on Friday.  Medicaid has approved additional hours and Zen will get 27 ½ hours per week.  Shaw and I will talk on Monday on an overall plan to assist Zen and find any additional resources needed in the community.   Kandi notes that Zen had been to both [facility] and [facility] without them being aware of his need for bariatric bed with side rales.  Both places Kandi states she has had to check him out AMA and take him home to get resources needed.    I called Kandi back just before 5pm Friday to advise her of plan and discussions with Shaw.  I asked if  there was anything we could put in place on Friday to assist Kandi and Zen till the appointment on Monday.  Kandi advised no and thanked me for the plan.  I left her my number to call with any needs.  I received a call from Kandi on Saturday evening stating that Zen had been verbally abusive today with his long-term caregiver from [facility] and then with Kandi when she came over to tell him about the plan we were working on to help.  ZHANG was called and Zen insisted he go to [facility].  Kandi stated she had not heard from [facility], was not aware if they knew she was his Medical and Financial POA and she was not going over.  I asked her for permission to check on Zen and give [facility] her name and number.  She advised me to proceed.  We talked about Zen's SCP being [facility] as his hospital and once stabilized he would be moved to [facility] if his inpatient qualified.  She acknowledged and stated she had tried to tell Zen and ZHANG of this, but ZHANG told him he could go wherever he liked.     Kandi stated she was exhausted and tired of Zen's behavior.  I listened and acknowledged her concerns.  I asked her if she was okay or if she needed someone to come over.  She reported she was fine, planned to get some rest and wait to hear from [facility].  She states that Zen told her he was going to work on removing her as POA.  She reports she is ready.  I advised her to please call me anytime with questions or needs and I would contact her on Monday if I did not hear from her over the weekend.   I called [facility] and talked with Shala Valdez RN working with Zen.  I advised her patient had SCP insurance and his mother, Kandi was POA with phone [phone].  That she was aware of transport to [facility] and wanted them to know he had SCP, her POA status and number, and that Zen had been to [facility] before and left because of lack of understanding that he needed bariatric bed with side  rales due to spasticity with his MS.  Shala stated she would let staff know, document chart and be sure he got into bariatric bed with side rales.  They will contact Kandi and Shala thanked me for my call.   Mgr Favian on call for weekend at [facility] notified of above.

## 2017-07-27 ENCOUNTER — HOME CARE VISIT (OUTPATIENT)
Dept: HOME HEALTH SERVICES | Facility: HOME HEALTHCARE | Age: 37
End: 2017-07-27
Payer: MEDICARE

## 2017-07-27 VITALS — HEART RATE: 89 BPM | RESPIRATION RATE: 16 BRPM | TEMPERATURE: 97.9 F

## 2017-07-27 PROCEDURE — G0156 HHCP-SVS OF AIDE,EA 15 MIN: HCPCS

## 2017-07-28 ENCOUNTER — HOME CARE VISIT (OUTPATIENT)
Dept: HOME HEALTH SERVICES | Facility: HOME HEALTHCARE | Age: 37
End: 2017-07-28
Payer: MEDICARE

## 2017-07-28 PROCEDURE — G0299 HHS/HOSPICE OF RN EA 15 MIN: HCPCS

## 2017-07-28 PROCEDURE — G0151 HHCP-SERV OF PT,EA 15 MIN: HCPCS

## 2017-07-29 ENCOUNTER — HOME CARE VISIT (OUTPATIENT)
Dept: HOME HEALTH SERVICES | Facility: HOME HEALTHCARE | Age: 37
End: 2017-07-29
Payer: MEDICARE

## 2017-07-29 VITALS
HEART RATE: 86 BPM | DIASTOLIC BLOOD PRESSURE: 70 MMHG | SYSTOLIC BLOOD PRESSURE: 112 MMHG | RESPIRATION RATE: 18 BRPM | TEMPERATURE: 99.6 F

## 2017-07-29 SDOH — ECONOMIC STABILITY: HOUSING INSECURITY: UNSAFE COOKING RANGE AREA: 0

## 2017-07-29 SDOH — ECONOMIC STABILITY: HOUSING INSECURITY: UNSAFE APPLIANCES: 0

## 2017-07-29 SDOH — ECONOMIC STABILITY: HOUSING INSECURITY
HOME SAFETY: PATIENT HAS LIFE LINE BRACELET.  HE IS NOT ABLE TO TRANSFER HIMSELF OUT OF BED TO WHEELCHAIR TO EXIT IN CASE OF EMERGENCY.

## 2017-07-29 ASSESSMENT — ACTIVITIES OF DAILY LIVING (ADL): IADLS_COMMENTS: <!--EPICS-->PT REQUIRES ASSIST WITH IADLS<!--EPICE-->

## 2017-07-29 ASSESSMENT — ENCOUNTER SYMPTOMS
DEPRESSED MOOD: 1
DEBILITATING PAIN: 1

## 2017-07-31 ENCOUNTER — HOME CARE VISIT (OUTPATIENT)
Dept: HOME HEALTH SERVICES | Facility: HOME HEALTHCARE | Age: 37
End: 2017-07-31
Payer: MEDICARE

## 2017-07-31 VITALS
SYSTOLIC BLOOD PRESSURE: 115 MMHG | RESPIRATION RATE: 18 BRPM | TEMPERATURE: 98.2 F | DIASTOLIC BLOOD PRESSURE: 80 MMHG | HEART RATE: 99 BPM

## 2017-07-31 VITALS
DIASTOLIC BLOOD PRESSURE: 74 MMHG | HEART RATE: 80 BPM | TEMPERATURE: 98.7 F | RESPIRATION RATE: 18 BRPM | SYSTOLIC BLOOD PRESSURE: 118 MMHG

## 2017-07-31 VITALS — TEMPERATURE: 98.2 F | RESPIRATION RATE: 18 BRPM | HEART RATE: 99 BPM

## 2017-07-31 VITALS — TEMPERATURE: 99 F | HEART RATE: 86 BPM | RESPIRATION RATE: 18 BRPM

## 2017-07-31 PROCEDURE — G0156 HHCP-SVS OF AIDE,EA 15 MIN: HCPCS

## 2017-07-31 PROCEDURE — G0152 HHCP-SERV OF OT,EA 15 MIN: HCPCS

## 2017-07-31 PROCEDURE — G0151 HHCP-SERV OF PT,EA 15 MIN: HCPCS

## 2017-07-31 SDOH — ECONOMIC STABILITY: HOUSING INSECURITY: UNSAFE COOKING RANGE AREA: 0

## 2017-07-31 SDOH — ECONOMIC STABILITY: HOUSING INSECURITY: HOME SAFETY: PT LIVES ALONE, UNABLE TO TRANSFER SELF OUT OF BED IN CASE OF EMERGENCY.   SPONGE BATHS AT THIS TIME

## 2017-07-31 SDOH — ECONOMIC STABILITY: HOUSING INSECURITY: UNSAFE APPLIANCES: 0

## 2017-07-31 ASSESSMENT — ACTIVITIES OF DAILY LIVING (ADL)
BATHING_ASSISTANCE: 6
DRESSING_UB_ASSISTANCE: 6
DRESSING_LB_ASSISTANCE: 6
ORAL_CARE_ASSISTANCE: 6
GROOMING_ASSISTANCE: 6
TOILETING_ASSISTANCE: 6

## 2017-07-31 ASSESSMENT — ENCOUNTER SYMPTOMS
NAUSEA: DENIES
DEPRESSED MOOD: 1
DEBILITATING PAIN: 1
RESPIRATORY SYMPTOMS COMMENTS: DENIES SOB, RESP EVEN AND UNLABORED, BILAT CHEST RISE.

## 2017-08-01 ENCOUNTER — HOME CARE VISIT (OUTPATIENT)
Dept: HOME HEALTH SERVICES | Facility: HOME HEALTHCARE | Age: 37
End: 2017-08-01
Payer: MEDICARE

## 2017-08-01 PROCEDURE — G0155 HHCP-SVS OF CSW,EA 15 MIN: HCPCS

## 2017-08-02 ENCOUNTER — HOME CARE VISIT (OUTPATIENT)
Dept: HOME HEALTH SERVICES | Facility: HOME HEALTHCARE | Age: 37
End: 2017-08-02
Payer: MEDICARE

## 2017-08-02 VITALS — TEMPERATURE: 98.5 F | HEART RATE: 98 BPM | RESPIRATION RATE: 14 BRPM

## 2017-08-02 PROCEDURE — G0495 RN CARE TRAIN/EDU IN HH: HCPCS

## 2017-08-02 PROCEDURE — G0153 HHCP-SVS OF S/L PATH,EA 15MN: HCPCS

## 2017-08-02 ASSESSMENT — ENCOUNTER SYMPTOMS
SHORTNESS OF BREATH: T
DEPRESSED MOOD: 1

## 2017-08-02 ASSESSMENT — ACTIVITIES OF DAILY LIVING (ADL): OASIS_M1830: 05

## 2017-08-03 ENCOUNTER — HOME CARE VISIT (OUTPATIENT)
Dept: HOME HEALTH SERVICES | Facility: HOME HEALTHCARE | Age: 37
End: 2017-08-03
Payer: MEDICARE

## 2017-08-03 VITALS
DIASTOLIC BLOOD PRESSURE: 82 MMHG | TEMPERATURE: 98.4 F | HEART RATE: 97 BPM | RESPIRATION RATE: 18 BRPM | SYSTOLIC BLOOD PRESSURE: 120 MMHG

## 2017-08-03 VITALS — TEMPERATURE: 99.3 F | RESPIRATION RATE: 18 BRPM | HEART RATE: 90 BPM

## 2017-08-03 PROCEDURE — G0151 HHCP-SERV OF PT,EA 15 MIN: HCPCS

## 2017-08-03 PROCEDURE — G0156 HHCP-SVS OF AIDE,EA 15 MIN: HCPCS

## 2017-08-03 ASSESSMENT — ENCOUNTER SYMPTOMS
DIFFICULTY THINKING: 1
POOR JUDGMENT: 1
DEPRESSED MOOD: 1

## 2017-08-04 ENCOUNTER — HOME CARE VISIT (OUTPATIENT)
Dept: HOME HEALTH SERVICES | Facility: HOME HEALTHCARE | Age: 37
End: 2017-08-04
Payer: MEDICARE

## 2017-08-04 VITALS
TEMPERATURE: 99.3 F | HEART RATE: 90 BPM | SYSTOLIC BLOOD PRESSURE: 132 MMHG | RESPIRATION RATE: 18 BRPM | DIASTOLIC BLOOD PRESSURE: 82 MMHG

## 2017-08-06 ASSESSMENT — ENCOUNTER SYMPTOMS: DEPRESSED MOOD: 1

## 2017-08-07 ENCOUNTER — HOME CARE VISIT (OUTPATIENT)
Dept: HOME HEALTH SERVICES | Facility: HOME HEALTHCARE | Age: 37
End: 2017-08-07
Payer: MEDICARE

## 2017-08-07 VITALS
DIASTOLIC BLOOD PRESSURE: 89 MMHG | SYSTOLIC BLOOD PRESSURE: 124 MMHG | TEMPERATURE: 98.1 F | HEART RATE: 92 BPM | RESPIRATION RATE: 18 BRPM

## 2017-08-07 VITALS
SYSTOLIC BLOOD PRESSURE: 116 MMHG | HEART RATE: 88 BPM | TEMPERATURE: 99.1 F | DIASTOLIC BLOOD PRESSURE: 80 MMHG | RESPIRATION RATE: 18 BRPM

## 2017-08-07 PROCEDURE — G0152 HHCP-SERV OF OT,EA 15 MIN: HCPCS

## 2017-08-07 PROCEDURE — G0156 HHCP-SVS OF AIDE,EA 15 MIN: HCPCS

## 2017-08-08 ENCOUNTER — HOME CARE VISIT (OUTPATIENT)
Dept: HOME HEALTH SERVICES | Facility: HOME HEALTHCARE | Age: 37
End: 2017-08-08
Payer: MEDICARE

## 2017-08-08 VITALS — RESPIRATION RATE: 20 BRPM | TEMPERATURE: 98.6 F | HEART RATE: 80 BPM

## 2017-08-08 PROCEDURE — G0151 HHCP-SERV OF PT,EA 15 MIN: HCPCS

## 2017-08-09 ENCOUNTER — OUTPATIENT INFUSION SERVICES (OUTPATIENT)
Dept: ONCOLOGY | Facility: MEDICAL CENTER | Age: 37
End: 2017-08-09
Attending: PSYCHIATRY & NEUROLOGY
Payer: MEDICARE

## 2017-08-09 VITALS
HEIGHT: 71 IN | WEIGHT: 170 LBS | SYSTOLIC BLOOD PRESSURE: 134 MMHG | TEMPERATURE: 97.9 F | BODY MASS INDEX: 23.8 KG/M2 | DIASTOLIC BLOOD PRESSURE: 80 MMHG | RESPIRATION RATE: 18 BRPM | HEART RATE: 90 BPM | OXYGEN SATURATION: 97 %

## 2017-08-09 PROCEDURE — 96415 CHEMO IV INFUSION ADDL HR: CPT

## 2017-08-09 PROCEDURE — 96413 CHEMO IV INFUSION 1 HR: CPT

## 2017-08-09 PROCEDURE — 306780 HCHG STAT FOR TRANSFUSION PER CASE

## 2017-08-09 PROCEDURE — 700111 HCHG RX REV CODE 636 W/ 250 OVERRIDE (IP): Performed by: PSYCHIATRY & NEUROLOGY

## 2017-08-09 PROCEDURE — 96375 TX/PRO/DX INJ NEW DRUG ADDON: CPT

## 2017-08-09 PROCEDURE — 700105 HCHG RX REV CODE 258: Performed by: PSYCHIATRY & NEUROLOGY

## 2017-08-09 RX ORDER — METHYLPREDNISOLONE SODIUM SUCCINATE 125 MG/2ML
125 INJECTION, POWDER, LYOPHILIZED, FOR SOLUTION INTRAMUSCULAR; INTRAVENOUS ONCE
Status: COMPLETED | OUTPATIENT
Start: 2017-08-09 | End: 2017-08-09

## 2017-08-09 RX ADMIN — OCRELIZUMAB 300 MG: 300 INJECTION INTRAVENOUS at 10:10

## 2017-08-09 RX ADMIN — DIPHENHYDRAMINE HYDROCHLORIDE 50 MG: 50 INJECTION INTRAMUSCULAR; INTRAVENOUS at 09:05

## 2017-08-09 RX ADMIN — METHYLPREDNISOLONE SODIUM SUCCINATE 125 MG: 125 INJECTION, POWDER, FOR SOLUTION INTRAMUSCULAR; INTRAVENOUS at 08:55

## 2017-08-09 ASSESSMENT — PAIN SCALES - GENERAL: PAINLEVEL: NO PAIN

## 2017-08-09 NOTE — PROGRESS NOTES
Mr Juan arrives for Ocrevus Day 15. Jerzy is in good spirits, no complaints, reports no improvement noticed after first treatment.  PIV started with good blood return. Premedications and Ocrevus infused as ordered. Ocrelizumab rate increased slowly, 30 cc every 30 min to a max rate of 180 cc.  Jerzy  tolerated all treatment well, observed for 1 hr post infusion.  No signs or symptoms of adverse reaction observed or expressed.  PIV removed, catheter intact, pressure dressing applied to site. Jerzy DC'd home with family. Appointment given for 6 months.

## 2017-08-10 ENCOUNTER — HOME CARE VISIT (OUTPATIENT)
Dept: HOME HEALTH SERVICES | Facility: HOME HEALTHCARE | Age: 37
End: 2017-08-10
Payer: MEDICARE

## 2017-08-10 VITALS
RESPIRATION RATE: 16 BRPM | HEART RATE: 89 BPM | SYSTOLIC BLOOD PRESSURE: 120 MMHG | DIASTOLIC BLOOD PRESSURE: 84 MMHG | TEMPERATURE: 98.9 F

## 2017-08-10 VITALS
RESPIRATION RATE: 16 BRPM | TEMPERATURE: 98.9 F | SYSTOLIC BLOOD PRESSURE: 121 MMHG | DIASTOLIC BLOOD PRESSURE: 84 MMHG | HEART RATE: 89 BPM

## 2017-08-10 PROCEDURE — G0151 HHCP-SERV OF PT,EA 15 MIN: HCPCS

## 2017-08-10 PROCEDURE — G0156 HHCP-SVS OF AIDE,EA 15 MIN: HCPCS

## 2017-08-10 SDOH — ECONOMIC STABILITY: HOUSING INSECURITY: UNSAFE COOKING RANGE AREA: 0

## 2017-08-10 SDOH — ECONOMIC STABILITY: HOUSING INSECURITY: UNSAFE APPLIANCES: 0

## 2017-08-10 SDOH — ECONOMIC STABILITY: HOUSING INSECURITY
HOME SAFETY: PATIENT HAS A LIFELINE.  HE IS UNABLE TO GET OUT OF BED ON HIS OWN, REQUIRES MAX TO DEPENDENT ASSIST TO TRANSFER FROM BED TO WHEELCHAIR

## 2017-08-10 ASSESSMENT — ACTIVITIES OF DAILY LIVING (ADL): IADLS_COMMENTS: <!--EPICS-->REQUIRES ASSIST WITH ALL IADLS<!--EPICE-->

## 2017-08-10 ASSESSMENT — ENCOUNTER SYMPTOMS: DEBILITATING PAIN: 1

## 2017-08-11 ENCOUNTER — HOME CARE VISIT (OUTPATIENT)
Dept: HOME HEALTH SERVICES | Facility: HOME HEALTHCARE | Age: 37
End: 2017-08-11

## 2017-08-11 ENCOUNTER — HOME CARE VISIT (OUTPATIENT)
Dept: HOME HEALTH SERVICES | Facility: HOME HEALTHCARE | Age: 37
End: 2017-08-11
Payer: MEDICARE

## 2017-08-11 VITALS
DIASTOLIC BLOOD PRESSURE: 76 MMHG | SYSTOLIC BLOOD PRESSURE: 118 MMHG | HEART RATE: 84 BPM | TEMPERATURE: 98.3 F | RESPIRATION RATE: 16 BRPM

## 2017-08-11 VITALS
DIASTOLIC BLOOD PRESSURE: 76 MMHG | SYSTOLIC BLOOD PRESSURE: 118 MMHG | HEART RATE: 84 BPM | RESPIRATION RATE: 16 BRPM | TEMPERATURE: 98.3 F

## 2017-08-11 PROCEDURE — G0153 HHCP-SVS OF S/L PATH,EA 15MN: HCPCS

## 2017-08-11 PROCEDURE — G0152 HHCP-SERV OF OT,EA 15 MIN: HCPCS

## 2017-08-11 ASSESSMENT — ENCOUNTER SYMPTOMS: DIFFICULTY THINKING: 1

## 2017-08-14 ENCOUNTER — HOME CARE VISIT (OUTPATIENT)
Dept: HOME HEALTH SERVICES | Facility: HOME HEALTHCARE | Age: 37
End: 2017-08-14
Payer: MEDICARE

## 2017-08-14 VITALS — HEART RATE: 114 BPM | TEMPERATURE: 97.9 F | RESPIRATION RATE: 20 BRPM

## 2017-08-14 PROCEDURE — G0156 HHCP-SVS OF AIDE,EA 15 MIN: HCPCS

## 2017-08-14 PROCEDURE — G0151 HHCP-SERV OF PT,EA 15 MIN: HCPCS

## 2017-08-14 ASSESSMENT — ENCOUNTER SYMPTOMS: DEBILITATING PAIN: 1

## 2017-08-15 ENCOUNTER — HOME CARE VISIT (OUTPATIENT)
Dept: HOME HEALTH SERVICES | Facility: HOME HEALTHCARE | Age: 37
End: 2017-08-15
Payer: MEDICARE

## 2017-08-15 VITALS
RESPIRATION RATE: 18 BRPM | HEART RATE: 95 BPM | SYSTOLIC BLOOD PRESSURE: 122 MMHG | DIASTOLIC BLOOD PRESSURE: 78 MMHG | TEMPERATURE: 98.6 F

## 2017-08-15 VITALS — HEART RATE: 104 BPM | RESPIRATION RATE: 18 BRPM | TEMPERATURE: 99.4 F

## 2017-08-15 PROCEDURE — G0152 HHCP-SERV OF OT,EA 15 MIN: HCPCS

## 2017-08-15 ASSESSMENT — ENCOUNTER SYMPTOMS: DEBILITATING PAIN: 1

## 2017-08-16 ENCOUNTER — HOME CARE VISIT (OUTPATIENT)
Dept: HOME HEALTH SERVICES | Facility: HOME HEALTHCARE | Age: 37
End: 2017-08-16
Payer: MEDICARE

## 2017-08-16 PROCEDURE — G0159 HHC PT MAINT EA 15 MIN: HCPCS

## 2017-08-17 ENCOUNTER — HOME CARE VISIT (OUTPATIENT)
Dept: HOME HEALTH SERVICES | Facility: HOME HEALTHCARE | Age: 37
End: 2017-08-17
Payer: MEDICARE

## 2017-08-17 VITALS — HEART RATE: 91 BPM | TEMPERATURE: 98.5 F | RESPIRATION RATE: 18 BRPM

## 2017-08-17 VITALS
HEART RATE: 91 BPM | TEMPERATURE: 98.5 F | RESPIRATION RATE: 18 BRPM | DIASTOLIC BLOOD PRESSURE: 84 MMHG | SYSTOLIC BLOOD PRESSURE: 122 MMHG

## 2017-08-17 PROCEDURE — G0152 HHCP-SERV OF OT,EA 15 MIN: HCPCS

## 2017-08-17 PROCEDURE — G0156 HHCP-SVS OF AIDE,EA 15 MIN: HCPCS

## 2017-08-17 ASSESSMENT — ENCOUNTER SYMPTOMS: DEBILITATING PAIN: 1

## 2017-08-18 ENCOUNTER — HOME CARE VISIT (OUTPATIENT)
Dept: HOME HEALTH SERVICES | Facility: HOME HEALTHCARE | Age: 37
End: 2017-08-18
Payer: MEDICARE

## 2017-08-18 VITALS
DIASTOLIC BLOOD PRESSURE: 82 MMHG | HEART RATE: 104 BPM | SYSTOLIC BLOOD PRESSURE: 140 MMHG | TEMPERATURE: 100.2 F | RESPIRATION RATE: 18 BRPM

## 2017-08-18 PROCEDURE — G0299 HHS/HOSPICE OF RN EA 15 MIN: HCPCS

## 2017-08-18 SDOH — ECONOMIC STABILITY: HOUSING INSECURITY: UNSAFE APPLIANCES: 0

## 2017-08-18 SDOH — ECONOMIC STABILITY: HOUSING INSECURITY: UNSAFE COOKING RANGE AREA: 0

## 2017-08-18 ASSESSMENT — ACTIVITIES OF DAILY LIVING (ADL): IADLS_COMMENTS: <!--EPICS-->REQUIRES ASSIST WITH IADLS<!--EPICE-->

## 2017-08-21 VITALS — TEMPERATURE: 98.7 F | RESPIRATION RATE: 18 BRPM | HEART RATE: 88 BPM

## 2017-08-21 ASSESSMENT — ENCOUNTER SYMPTOMS
DEPRESSED MOOD: 1
NAUSEA: DENIES
DEBILITATING PAIN: 1

## 2017-08-22 ENCOUNTER — HOME CARE VISIT (OUTPATIENT)
Dept: HOME HEALTH SERVICES | Facility: HOME HEALTHCARE | Age: 37
End: 2017-08-22
Payer: MEDICARE

## 2017-08-23 ENCOUNTER — HOME CARE VISIT (OUTPATIENT)
Dept: HOME HEALTH SERVICES | Facility: HOME HEALTHCARE | Age: 37
End: 2017-08-23
Payer: MEDICARE

## 2017-08-23 ENCOUNTER — HOME CARE VISIT (OUTPATIENT)
Dept: HOME HEALTH SERVICES | Facility: HOME HEALTHCARE | Age: 37
End: 2017-08-23

## 2017-08-23 VITALS — TEMPERATURE: 98.6 F | HEART RATE: 89 BPM | RESPIRATION RATE: 18 BRPM

## 2017-08-23 PROCEDURE — G0152 HHCP-SERV OF OT,EA 15 MIN: HCPCS

## 2017-08-23 ASSESSMENT — ENCOUNTER SYMPTOMS: DEBILITATING PAIN: 1

## 2017-08-24 ENCOUNTER — HOME CARE VISIT (OUTPATIENT)
Dept: HOME HEALTH SERVICES | Facility: HOME HEALTHCARE | Age: 37
End: 2017-08-24
Payer: MEDICARE

## 2017-08-24 PROCEDURE — G0156 HHCP-SVS OF AIDE,EA 15 MIN: HCPCS

## 2017-08-25 ENCOUNTER — HOME CARE VISIT (OUTPATIENT)
Dept: HOME HEALTH SERVICES | Facility: HOME HEALTHCARE | Age: 37
End: 2017-08-25
Payer: MEDICARE

## 2017-08-25 VITALS
HEART RATE: 96 BPM | RESPIRATION RATE: 18 BRPM | DIASTOLIC BLOOD PRESSURE: 69 MMHG | SYSTOLIC BLOOD PRESSURE: 114 MMHG | TEMPERATURE: 97.1 F

## 2017-08-26 ENCOUNTER — HOME CARE VISIT (OUTPATIENT)
Dept: HOME HEALTH SERVICES | Facility: HOME HEALTHCARE | Age: 37
End: 2017-08-26
Payer: MEDICARE

## 2017-08-26 PROCEDURE — G0151 HHCP-SERV OF PT,EA 15 MIN: HCPCS

## 2017-08-27 VITALS
RESPIRATION RATE: 18 BRPM | DIASTOLIC BLOOD PRESSURE: 84 MMHG | OXYGEN SATURATION: 99 % | TEMPERATURE: 99.6 F | SYSTOLIC BLOOD PRESSURE: 128 MMHG | HEART RATE: 86 BPM

## 2017-08-28 ENCOUNTER — OFFICE VISIT (OUTPATIENT)
Dept: NEUROLOGY | Facility: MEDICAL CENTER | Age: 37
End: 2017-08-28
Payer: MEDICARE

## 2017-08-28 ENCOUNTER — HOME CARE VISIT (OUTPATIENT)
Dept: HOME HEALTH SERVICES | Facility: HOME HEALTHCARE | Age: 37
End: 2017-08-28
Payer: MEDICARE

## 2017-08-28 VITALS
SYSTOLIC BLOOD PRESSURE: 138 MMHG | OXYGEN SATURATION: 96 % | TEMPERATURE: 99 F | HEART RATE: 93 BPM | RESPIRATION RATE: 18 BRPM | DIASTOLIC BLOOD PRESSURE: 86 MMHG

## 2017-08-28 VITALS
OXYGEN SATURATION: 95 % | HEART RATE: 85 BPM | BODY MASS INDEX: 24.34 KG/M2 | TEMPERATURE: 97.8 F | RESPIRATION RATE: 16 BRPM | WEIGHT: 170 LBS | SYSTOLIC BLOOD PRESSURE: 110 MMHG | HEIGHT: 70 IN | DIASTOLIC BLOOD PRESSURE: 80 MMHG

## 2017-08-28 DIAGNOSIS — G35 MS (MULTIPLE SCLEROSIS) (HCC): ICD-10-CM

## 2017-08-28 DIAGNOSIS — F32.A DEPRESSION, UNSPECIFIED DEPRESSION TYPE: ICD-10-CM

## 2017-08-28 PROCEDURE — 99214 OFFICE O/P EST MOD 30 MIN: CPT | Performed by: PSYCHIATRY & NEUROLOGY

## 2017-08-28 PROCEDURE — G0151 HHCP-SERV OF PT,EA 15 MIN: HCPCS

## 2017-08-28 ASSESSMENT — PATIENT HEALTH QUESTIONNAIRE - PHQ9
CLINICAL INTERPRETATION OF PHQ2 SCORE: 6
SUM OF ALL RESPONSES TO PHQ QUESTIONS 1-9: 21
5. POOR APPETITE OR OVEREATING: 0 - NOT AT ALL

## 2017-08-28 NOTE — ASSESSMENT & PLAN NOTE
Pt states that he has a lot of muscle spasms in his legs. Pt has OT and PT at home since he was in the hospital for an MS exacerbation. Patient is going to restart aquatic therapy in September. Patient does not want to try any muscle relaxers as he does not like the way they make him feel. Patient prefers medical marijuana treatments.

## 2017-08-28 NOTE — PROGRESS NOTES
CHIEF COMPLAINT  Chief Complaint   Patient presents with   • Follow-Up     MS       HPI  Jerzy Juan III is a 35 y.o. male who presents for treatment of his MS, patient is somewhat frustrated and fatigued about dealing with his disease but feels like he is has hope about trying Ocrevus for primary progressive multiple sclerosis.  MS (multiple sclerosis) ([facility])  Pt states that he has a lot of muscle spasms in his legs. Pt has OT and PT at home since he was in the hospital for an MS exacerbation. Patient is going to restart aquatic therapy in September. Patient does not want to try any muscle relaxers as he does not like the way they make him feel. Patient prefers medical marijuana treatments.    Depression  Patient still is depressed over his condition and at times irrational. His mother states it's frustrating for both of them.    REVIEW OF SYSTEMS  Pertinent Positives:fatigue, anxiety, paraesthesias   All other systems are negative.     PAST MEDICAL HISTORY  Past Medical History:   Diagnosis Date   • Back pain    • Blood transfusion    • Depression     PTSD/clinical depression   • Fall     4/6/2016   • Headache    • Heart murmur    • MS (multiple sclerosis) ([facility]) DX 8/20/2013   • Urinary incontinence     condom catheter       SOCIAL HISTORY  Social History     Social History   • Marital status:      Spouse name: N/A   • Number of children: N/A   • Years of education: N/A     Occupational History   • Not on file.     Social History Main Topics   • Smoking status: Former Smoker     Start date: 8/1/1997     Quit date: 8/1/2013   • Smokeless tobacco: Former User   • Alcohol use 0.0 oz/week      Comment: 1 beer/day   • Drug use:      Types: Inhaled      Comment: marijuana   • Sexual activity: Not on file      Comment: once daily for 23 yrs     Other Topics Concern   • Not on file     Social History Narrative   • No narrative on file       SURGICAL HISTORY  Past Surgical History:   Procedure  Laterality Date   • GASTROSCOPY  1/14/2017    Procedure: GASTROSCOPY WITH FECAL MATTER TRANSPLANT;  Surgeon: Obinna Douglas M.D.;  Location: [facility];  Service:    • OPEN REDUCTION         CURRENT MEDICATIONS  Current Outpatient Prescriptions   Medication Sig Dispense Refill   • docusate sodium (COLACE) 250 MG capsule Take 250 mg by mouth every day at 6 PM. constipation     • lidocaine (XYLOCAINE) 2 % Gel Apply 1 Application to affected area(s) as needed (pain). when self cathing     • Multiple Vitamins-Minerals (MULTIVITAMIN & MINERAL PO) Take 0.5 Doses by mouth every day at 6 PM. supplement     • Magnesium 300 MG Cap Take 300 mg by mouth every day. supplement     • acetaminophen (TYLENOL) 500 MG Tab Take 500-1,000 mg by mouth every 6 hours as needed for Moderate Pain.     • calcium carbonate (TUMS) 500 MG Chew Tab Take 1 Tab by mouth 3 times a day as needed (heartburn). 30 Tab    • buPROPion SR (WELLBUTRIN-SR) 150 MG TABLET SR 12 HR sustained-release tablet Take 1 Tab by mouth 2 Times a Day. 60 Tab    • escitalopram (LEXAPRO) 20 MG tablet Take 1 Tab by mouth every day. 30 Tab    • polyethylene glycol/lytes (MIRALAX) Pack Take 1 Packet by mouth 1 time daily as needed (if sennosides and docusate ineffective after 24 hours).  3   • magnesium hydroxide (MILK OF MAGNESIA) 400 MG/5ML Suspension Take 30 mL by mouth 1 time daily as needed (if polyethylene glycol ineffective after 24 hours). 1 Bottle    • tizanidine (ZANAFLEX) 4 MG Tab Take 1 Tab by mouth every 6 hours as needed. Indications: Muscle Spasticity 30 Tab 3   • methenamine hip (HIPPREX) 1 GM Tab Take 1 Tab by mouth 2 Times a Day. 60 Tab    • oxybutynin SR (DITROPAN-XL) 10 MG CR tablet Take 1 Tab by mouth every evening. 30 Tab    • ondansetron (ZOFRAN ODT) 4 MG TABLET DISPERSIBLE Take 1 Tab by mouth every four hours as needed for Nausea/Vomiting (give PO if no IV route available). (Patient taking differently: Take 8 mg by mouth every 8 hours as  "needed for Nausea/Vomiting.) 10 Tab 0   • Cholecalciferol (VITAMIN D3) 5000 UNITS Tab Take 5,000 Units by mouth every day.     • psyllium (METAMUCIL) 58.12 % Pack Take 1 Packet by mouth every day.     • Cranberry 125 MG Tab Take 125 mg by mouth every day. supplement     • Probiotic Product (PROBIOTIC DAILY PO) Take 1 Cap by mouth every day.     • bisacodyl (DULCOLAX) 10 MG Suppos Insert 1 Suppository in rectum 1 time daily as needed (if magnesium hydroxide ineffective after 24 hours).  0     No current facility-administered medications for this visit.        ALLERGIES  Allergies   Allergen Reactions   • Other Drug      CANNOT TAKE ANY STEROIDS WITH RECENT TYSABRI INFUSION   • Asa [Aspirin] Unspecified     Bleeding in stomach as a baby   • Gabapentin Anxiety     anger and severe mood swing   • Lyrica Anxiety     anger and severe mood swing       PHYSICAL EXAM  VITAL SIGNS: /80   Pulse 85   Temp 36.6 °C (97.8 °F)   Resp 16   Ht 1.778 m (5' 10\")   Wt 77.1 kg (170 lb)   SpO2 95%   BMI 24.39 kg/m²   Constitutional: Well developed, Well nourished, No acute distress, Non-toxic appearance.   HENT: normocephalic, atraumatic   Eyes: fundi-disc sharp, perrl   Neck: Normal range of motion, No tenderness, Supple, No stridor.   Cardiovascular: Normal heart rate, Normal rhythm, No murmurs, No rubs, No gallops.   Thorax & Lungs: Normal breath sounds, No respiratory distress, No wheezing, No chest tenderness.   Abdomen: Bowel sounds normal, Soft, No tenderness, No masses, No pulsatile masses.   Skin: Warm, Dry, No erythema, No rash.   Back: No tenderness, No CVA tenderness.   Extremities: Intact distal pulses, No edema, No tenderness, No cyanosis, No clubbing.   Neurologic: A&Ox3, CN:2-12:nystagmus, motor: increased tone, sensory; decreased in his legs, wheelchair bound  Psychiatric: Affect normal, Judgment normal, Mood normal.   Lab: Reviewed with patient in detail and as noted in " "results.        RADIOLOGY/PROCEDURES  MRI of the brain reviewed in the PACS system with the patient.      FINDINGS:  There are multiple ovoid and ushaped areas of increased T2 signal intensity in the immediate periventricular and subcortical white matter. Several lesions are also noted in the zabrina at the level of the brachium pontis. The size and number of lesions has   not changed significantly since previous exam. There are no \"active\" enhancing lesions in the brain parenchyma.    The calvariae are unremarkable.  There are no extra-axial fluid collections.  The ventricular system and basal cisterns are within normal limits.  There are no mass effects or shift of midline structures.  There are no hemorrhagic lesions.  The diffusion   weighted axial images show no evidence of acute cerebral infarction.    The postcontrast images show no areas of abnormal parenchymal or meningeal enhancement.    The brainstem and posterior fossa structures are unremarkable.    Vascular flow voids in the vertebrobasilar and carotid arteries, Frederic of [place], and dural venous sinuses are intact.    The paranasal sinuses and mastoids in the field of view are unremarkable.         Impression          1.  Numerous periventricular, subcortical, and pontine areas of demyelination unchanged from previous exam of 2/29/16 and consistent with the patient's known diagnosis of multiple sclerosis.    2.  No \"active\" enhancing lesions in the brain parenchyma.           ASSESSMENT AND PLAN  1. MS (multiple sclerosis)  Plan to start Tysabri as it has not been helping the patient and continue Ocrevus for progressive multiple sclerosis. Patient given encouragement and understanding of how medication works.   Patient is taking vitamin D and working on a healthy diet. He will follow up with urologist to monitor urinary tract infections. We are not using any muscle relaxants at this point but we discussed baclofen pump and/or adding other muscle " relaxers. Patient will continue with home therapy and then transition to a aquatic therapy. We discussed importance of vitamin D supplementation and a healthy diet.    2. Depression  Pt will consider therapy and support groups and significant encouragement for home therapy given to patient.     I spent 30 minutes with this patient and his caregiver, over fifty percent was spent counseling patient on their condition, best management practices, reviewing test results and risks and benefits of treatment.

## 2017-08-28 NOTE — ASSESSMENT & PLAN NOTE
Patient still is depressed over his condition and at times irrational. His mother states it's frustrating for both of them.

## 2017-08-29 ENCOUNTER — PATIENT OUTREACH (OUTPATIENT)
Dept: HEALTH INFORMATION MANAGEMENT | Facility: OTHER | Age: 37
End: 2017-08-29

## 2017-08-29 NOTE — PROGRESS NOTES
"CC received referral from Manager of Care Care Coordination.  CC reviewed recent notes from Manager, Johnathan.  CC attempted to contact patients , Shaw Hahn at [phone]. CC left  requesting return call to CC at [phone].    CC outreach call to patient.  Patient answered telephone.  Patient gives CC permission to discuss any health related information with his Mother, Kandi.  Patient requesting CC follow-up with his Mother regarding his health care needs.     CC spoke to patients Mother, Kandi.  Mother reports she is the primary caregiver for her son.  Mother reports she has been working with Shaw Hahn,  with [facility].  Mother states the  has been working on getting patient set up with resources for additional caregivers.  Mother states she is currently applying for caregivers with [employer].  Mom states she currently has a caregiver Wednesday through Sunday 9:30 am -12:00 pm with [facility]. Mother states she is providing care for the rest of the time.  Mother states patient still received OT/PT with [facility].  Mother reports she has a talia lift to transfer patient.    Mother reports patient had recent visit with neurologist.  States they discussed starting baclofen for patients muscle spacticity.  Mother states patient has declined because \"it's a chemical\".  Mother states patient doesn't want to take any chemicals and has discussed with neurologist.    Mother states PCP was suppose to refer patient to a psychiatrist.  States they haven't heard anything on referral.  CC left message with MA at PCP office to check on status of referral.     Mother states patient is receiving Ocrevus for his multiple sclerosis.Mother reports they are hoping this medication will stop the progression of his MS.    CC asked Mother is she is needing any resources for caregivers.  Mother states \"no, I don't need any resources\".  CC informed Mother she could " contact CC if needing any resources for caregivers. Mother voiced understanding.    Plan:  CC enrolled patient in complex care management. Provided Mother with contact information. CC will continue to follow and complete intake assessment and care plan when Mother available to complete with CC.

## 2017-08-30 ENCOUNTER — HOME CARE VISIT (OUTPATIENT)
Dept: HOME HEALTH SERVICES | Facility: HOME HEALTHCARE | Age: 37
End: 2017-08-30
Payer: MEDICARE

## 2017-08-30 VITALS
HEART RATE: 88 BPM | RESPIRATION RATE: 18 BRPM | OXYGEN SATURATION: 98 % | DIASTOLIC BLOOD PRESSURE: 82 MMHG | TEMPERATURE: 99 F | SYSTOLIC BLOOD PRESSURE: 118 MMHG

## 2017-08-30 PROCEDURE — G0151 HHCP-SERV OF PT,EA 15 MIN: HCPCS

## 2017-08-30 PROCEDURE — G0156 HHCP-SVS OF AIDE,EA 15 MIN: HCPCS

## 2017-08-31 ENCOUNTER — HOME CARE VISIT (OUTPATIENT)
Dept: HOME HEALTH SERVICES | Facility: HOME HEALTHCARE | Age: 37
End: 2017-08-31
Payer: MEDICARE

## 2017-08-31 VITALS
HEART RATE: 102 BPM | DIASTOLIC BLOOD PRESSURE: 71 MMHG | TEMPERATURE: 98.3 F | RESPIRATION RATE: 18 BRPM | OXYGEN SATURATION: 97 % | SYSTOLIC BLOOD PRESSURE: 117 MMHG

## 2017-08-31 VITALS
TEMPERATURE: 98.4 F | DIASTOLIC BLOOD PRESSURE: 78 MMHG | RESPIRATION RATE: 18 BRPM | SYSTOLIC BLOOD PRESSURE: 120 MMHG | OXYGEN SATURATION: 98 % | HEART RATE: 76 BPM

## 2017-08-31 PROCEDURE — G0152 HHCP-SERV OF OT,EA 15 MIN: HCPCS

## 2017-08-31 ASSESSMENT — ENCOUNTER SYMPTOMS: DEBILITATING PAIN: 1

## 2017-09-01 ENCOUNTER — HOME CARE VISIT (OUTPATIENT)
Dept: HOME HEALTH SERVICES | Facility: HOME HEALTHCARE | Age: 37
End: 2017-09-01
Payer: MEDICARE

## 2017-09-04 ENCOUNTER — HOME CARE VISIT (OUTPATIENT)
Dept: HOME HEALTH SERVICES | Facility: HOME HEALTHCARE | Age: 37
End: 2017-09-04
Payer: MEDICARE

## 2017-09-04 ENCOUNTER — HOSPITAL ENCOUNTER (EMERGENCY)
Facility: MEDICAL CENTER | Age: 37
End: 2017-09-05
Attending: EMERGENCY MEDICINE
Payer: MEDICARE

## 2017-09-04 VITALS
SYSTOLIC BLOOD PRESSURE: 132 MMHG | OXYGEN SATURATION: 94 % | HEART RATE: 84 BPM | TEMPERATURE: 99 F | RESPIRATION RATE: 18 BRPM | DIASTOLIC BLOOD PRESSURE: 88 MMHG

## 2017-09-04 DIAGNOSIS — R45.851 SUICIDAL IDEATION: ICD-10-CM

## 2017-09-04 LAB
AMPHET UR QL SCN: NEGATIVE
APPEARANCE UR: CLEAR
BACTERIA #/AREA URNS HPF: NEGATIVE /HPF
BARBITURATES UR QL SCN: NEGATIVE
BENZODIAZ UR QL SCN: NEGATIVE
BILIRUB UR QL STRIP.AUTO: NEGATIVE
BZE UR QL SCN: NEGATIVE
CANNABINOIDS UR QL SCN: POSITIVE
COLOR UR: YELLOW
CULTURE IF INDICATED INDCX: YES UA CULTURE
EPI CELLS #/AREA URNS HPF: NEGATIVE /HPF
GLUCOSE UR STRIP.AUTO-MCNC: NEGATIVE MG/DL
HYALINE CASTS #/AREA URNS LPF: ABNORMAL /LPF
KETONES UR STRIP.AUTO-MCNC: NEGATIVE MG/DL
LEUKOCYTE ESTERASE UR QL STRIP.AUTO: ABNORMAL
METHADONE UR QL SCN: NEGATIVE
MICRO URNS: ABNORMAL
NITRITE UR QL STRIP.AUTO: NEGATIVE
OPIATES UR QL SCN: NEGATIVE
OXYCODONE UR QL SCN: NEGATIVE
PCP UR QL SCN: NEGATIVE
PH UR STRIP.AUTO: >=9 [PH]
POC BREATHALIZER: 0 PERCENT (ref 0–0.01)
PROPOXYPH UR QL SCN: NEGATIVE
PROT UR QL STRIP: NEGATIVE MG/DL
RBC # URNS HPF: ABNORMAL /HPF
RBC UR QL AUTO: NEGATIVE
SP GR UR STRIP.AUTO: 1.01
UROBILINOGEN UR STRIP.AUTO-MCNC: 0.2 MG/DL
WBC #/AREA URNS HPF: ABNORMAL /HPF

## 2017-09-04 PROCEDURE — 81001 URINALYSIS AUTO W/SCOPE: CPT | Mod: 59

## 2017-09-04 PROCEDURE — 302970 POC BREATHALIZER: Performed by: EMERGENCY MEDICINE

## 2017-09-04 PROCEDURE — A9270 NON-COVERED ITEM OR SERVICE: HCPCS | Performed by: EMERGENCY MEDICINE

## 2017-09-04 PROCEDURE — 99285 EMERGENCY DEPT VISIT HI MDM: CPT

## 2017-09-04 PROCEDURE — G0152 HHCP-SERV OF OT,EA 15 MIN: HCPCS

## 2017-09-04 PROCEDURE — 87186 SC STD MICRODIL/AGAR DIL: CPT

## 2017-09-04 PROCEDURE — 80307 DRUG TEST PRSMV CHEM ANLYZR: CPT

## 2017-09-04 PROCEDURE — 87086 URINE CULTURE/COLONY COUNT: CPT

## 2017-09-04 PROCEDURE — 302970 POC BREATHALIZER

## 2017-09-04 PROCEDURE — 700102 HCHG RX REV CODE 250 W/ 637 OVERRIDE(OP): Performed by: EMERGENCY MEDICINE

## 2017-09-04 PROCEDURE — 87077 CULTURE AEROBIC IDENTIFY: CPT

## 2017-09-04 PROCEDURE — 90791 PSYCH DIAGNOSTIC EVALUATION: CPT

## 2017-09-04 RX ORDER — OXYBUTYNIN CHLORIDE 10 MG/1
10 TABLET, EXTENDED RELEASE ORAL EVERY EVENING
Status: DISCONTINUED | OUTPATIENT
Start: 2017-09-04 | End: 2017-09-05 | Stop reason: HOSPADM

## 2017-09-04 RX ORDER — BUPROPION HYDROCHLORIDE 150 MG/1
150 TABLET, EXTENDED RELEASE ORAL 2 TIMES DAILY
Status: DISCONTINUED | OUTPATIENT
Start: 2017-09-04 | End: 2017-09-05 | Stop reason: HOSPADM

## 2017-09-04 RX ORDER — METHENAMINE HIPPURATE 1000 MG/1
1 TABLET ORAL 2 TIMES DAILY
Status: DISCONTINUED | OUTPATIENT
Start: 2017-09-04 | End: 2017-09-05 | Stop reason: HOSPADM

## 2017-09-04 RX ORDER — TIZANIDINE 4 MG/1
4 TABLET ORAL EVERY 6 HOURS PRN
Status: DISCONTINUED | OUTPATIENT
Start: 2017-09-04 | End: 2017-09-05 | Stop reason: HOSPADM

## 2017-09-04 RX ORDER — ESCITALOPRAM OXALATE 10 MG/1
20 TABLET ORAL DAILY
Status: DISCONTINUED | OUTPATIENT
Start: 2017-09-05 | End: 2017-09-05 | Stop reason: HOSPADM

## 2017-09-04 RX ADMIN — METHENAMINE HIPPURATE 1 G: 1 TABLET ORAL at 22:38

## 2017-09-04 RX ADMIN — OXYBUTYNIN CHLORIDE 10 MG: 10 TABLET, FILM COATED, EXTENDED RELEASE ORAL at 22:36

## 2017-09-04 RX ADMIN — BUPROPION HYDROCHLORIDE 150 MG: 150 TABLET, EXTENDED RELEASE ORAL at 22:38

## 2017-09-04 ASSESSMENT — LIFESTYLE VARIABLES
HAVE PEOPLE ANNOYED YOU BY CRITICIZING YOUR DRINKING: NO
CONSUMPTION TOTAL: INCOMPLETE
TOTAL SCORE: 0
HAVE YOU EVER FELT YOU SHOULD CUT DOWN ON YOUR DRINKING: NO
DO YOU DRINK ALCOHOL: YES
EVER FELT BAD OR GUILTY ABOUT YOUR DRINKING: NO
EVER HAD A DRINK FIRST THING IN THE MORNING TO STEADY YOUR NERVES TO GET RID OF A HANGOVER: NO
TOTAL SCORE: 0
TOTAL SCORE: 0

## 2017-09-04 NOTE — ED PROVIDER NOTES
ED Provider Note    Scribed for Lazarus Drake M.D. by Aman Evans. 9/4/2017  3:30 PM    Primary care provider: NAOMIE Emmanuel  Means of arrival: Robert  History obtained from: Patient  History limited by: None    CHIEF COMPLAINT  Chief Complaint   Patient presents with   • Suicidal Ideation     HPI  Jerzy Juan III is a 37 y.o. male with a history of MS and depression who presents to the Emergency Department with chronic suicidal ideation, onset earlier today. He states that he has no plan to hurt himself. The patient reports that he has had these episodes for the past 4 years. He has been seen at [facility] tin the past with his most recent being 1 year ago. Patient has been on antidepressants in the past. Per patient he missed one dose of medication last night. He denies associated fever, chills, nausea, or vomiting. Patient admits to using marijuana recently but denies any other drug use or overuse of medications.     REVIEW OF SYSTEMS  Pertinent positives include suicidal ideation, depression, and medication non compliance.  Pertinent negatives include no fever, chills, nausea, vomiting, suicide attempt, or homicidal ideations.    All other systems reviewed and negative.  C.     PAST MEDICAL HISTORY   has a past medical history of Back pain; Blood transfusion; Depression; Fall; Headache; Heart murmur; MS (multiple sclerosis) (CMS-HCC) (DX 8/20/2013); and Urinary incontinence.    SURGICAL HISTORY   has a past surgical history that includes open reduction and gastroscopy (1/14/2017).    SOCIAL HISTORY  Social History   Substance Use Topics   • Smoking status: Former Smoker     Start date: 8/1/1997     Quit date: 8/1/2013   • Smokeless tobacco: Former User   • Alcohol use 0.0 oz/week      Comment: 1 beer/day      History   Drug Use   • Types: Inhaled     Comment: marijuana     FAMILY HISTORY  Family History   Problem Relation Age of Onset   • Thyroid Mother      CURRENT MEDICATIONS  No current  "facility-administered medications on file prior to encounter.      Current Outpatient Prescriptions on File Prior to Encounter   Medication Sig Dispense Refill   • docusate sodium (COLACE) 250 MG capsule Take 250 mg by mouth every day at 6 PM. constipation     • Multiple Vitamins-Minerals (MULTIVITAMIN & MINERAL PO) Take 1 Tab by mouth every day at 6 PM. supplement     • Magnesium 300 MG Cap Take 300 mg by mouth every day. supplement     • buPROPion SR (WELLBUTRIN-SR) 150 MG TABLET SR 12 HR sustained-release tablet Take 1 Tab by mouth 2 Times a Day. 60 Tab    • escitalopram (LEXAPRO) 20 MG tablet Take 1 Tab by mouth every day. 30 Tab    • polyethylene glycol/lytes (MIRALAX) Pack Take 1 Packet by mouth 1 time daily as needed (if sennosides and docusate ineffective after 24 hours).  3   • tizanidine (ZANAFLEX) 4 MG Tab Take 1 Tab by mouth every 6 hours as needed. Indications: Muscle Spasticity 30 Tab 3   • methenamine hip (HIPPREX) 1 GM Tab Take 1 Tab by mouth 2 Times a Day. 60 Tab    • oxybutynin SR (DITROPAN-XL) 10 MG CR tablet Take 1 Tab by mouth every evening. 30 Tab    • Cholecalciferol (VITAMIN D3) 5000 UNITS Tab Take 5,000 Units by mouth every day.     • psyllium (METAMUCIL) 58.12 % Pack Take 1 Packet by mouth every day.     • Cranberry 125 MG Tab Take 125 mg by mouth every day. supplement     • Probiotic Product (PROBIOTIC DAILY PO) Take 1 Cap by mouth every day.       ALLERGIES  Allergies   Allergen Reactions   • Other Drug      CANNOT TAKE ANY STEROIDS WITH RECENT TYSABRI INFUSION   • Asa [Aspirin] Unspecified     Bleeding in stomach as a baby   • Gabapentin Anxiety     anger and severe mood swing   • Lyrica Anxiety     anger and severe mood swing     PHYSICAL EXAM  VITAL SIGNS: /93   Pulse 92   Temp 37 °C (98.6 °F)   Resp 18   Ht 1.803 m (5' 11\")   Wt 74.8 kg (165 lb)   SpO2 96%   BMI 23.01 kg/m²     Constitutional: Well developed, Well nourished,Mild distress, Non-toxic appearance.   HENT: " Normocephalic, Atraumatic, Bilateral external ears normal, slightly dry mucous membranes, No oral exudates.   Eyes: PERRLA, EOMI, Conjunctiva normal, No discharge.   Neck: No tenderness, Supple, No stridor.   Lymphatic: No lymphadenopathy noted.   Cardiovascular: Normal heart rate, Normal rhythm.   Thorax & Lungs: Clear to auscultation bilaterally, No respiratory distress, No wheezing, No crackles.   Abdomen: Soft, No tenderness, No masses, No pulsatile masses.   Skin: Warm, Dry, No erythema, No rash.   Extremities:, No edema No cyanosis.   Musculoskeletal: No tenderness to palpation or major deformities noted.  Intact distal pulses  Neurologic: Awake, alert. Slightly slurred speech secondary to MS, 3/5 muscle strength in BUE, 1/5 strength in BLE.   Psychiatric: Flat affect, reported SI, no plans, no HI, Judgment normal, Mood normal.     LABS  Labs Reviewed   URINE DRUG SCREEN - Abnormal; Notable for the following:        Result Value    Cannabinoid Metab Positive (*)     All other components within normal limits   URINALYSIS,CULTURE IF INDICATED - Abnormal; Notable for the following:     Ph >=9.0 (*)     Leukocyte Esterase Trace (*)     All other components within normal limits   URINE MICROSCOPIC (W/UA) - Abnormal; Notable for the following:     WBC 0-2 (*)     RBC 0-2 (*)     All other components within normal limits   POC BREATHALIZER - Normal   URINE CULTURE(NEW)     All labs reviewed by me.  COURSE & MEDICAL DECISION MAKING  Pertinent Labs & Imaging studies reviewed. (See chart for details)    I reviewed the patient's medical records which showed the patient has a history of MS and depression. Last admitted July 11 for MS exacerbation.     3:30 PM - Patient seen and examined at bedside. Ordered urinalysis, urine microscopic, breathalyzer, drug screen, and urine culture to evaluate his symptoms.. I discussed the treatment plan with the patient which includes being evaluated by [facility]. He understand the plan  and verbalizes agreement.     3:45 PM - I discussed the case with [facility]. They are aware of the patient and agree to evaluate the patient. Patient will be evaluated in the ED until he can be seen by the appropriate facility.     Decision Making:  Patient is suicidal ideation, [facility] saw the patient, will place patient on legal hold, the patient will be transferred to psychiatric facility.    DISPOSITION:  Patient will be transferred to a psychiatric facility    FINAL IMPRESSION  1. Suicidal ideation       IAman (Scribe), am scribing for, and in the presence of, Lazarus Drake M.D..    Electronically signed by: Aman Evans (Scribe), 9/4/2017    I, Lazarus Drake M.D. personally performed the services described in this documentation, as scribed by Aman Evans in my presence, and it is both accurate and complete.    The note accurately reflects work and decisions made by me.  Lazarus Drake  9/4/2017  10:04 PM

## 2017-09-04 NOTE — ED NOTES
Pt provided with urethral self cath kit. + clear/yellow urine output, approximately 300 ml.  Urine sample sent to lab.

## 2017-09-04 NOTE — ED NOTES
".  Chief Complaint   Patient presents with   • Suicidal Ideation   Pt BIB EMS from home.  + suicidal ideation.  Pt states \" I'm sick of life, I'm fucking over it - had 4 years of this horse shit.\"   Pt diagnosed with multiple sclerosis 4 years ago.  Pt had argument with his mother rocky today.  Pt reports no plan.  Pt alert and oriented X 4.    "

## 2017-09-04 NOTE — CONSULTS
"[facility]   TRIAGE ASSESSMENT    Name: Jerzy Juan III  MRN: [id number]  : 1980  Age: 37 y.o.  Date of assessment: 2017  PCP: NAOMIE Emmanuel  Persons in attendance: Patient    CHIEF COMPLAINT/PRESENTING ISSUE (as stated by Patient): Patient was brought in by [facility] after he pushed his [facility] alert after telling the home health care nurse that he wanted to kill himself.  Once he arrv'd here states that he is \"Sick of Life and has lost everything in the past 4 years after being DX. With MS.\"  Has lost his wife, house, business and had to file for bankrupty.  Speech is hard to understand at times related to progressive MS.  Patient does state \"Once I leave [facility], just fuck it.\"  When asked if that meant that he was going to kill self, he said \"yeah, but I can't say that cause I know what will happen.\" Patient is extremely depressed and angry about his current living conditions, states that he gets home health care 2 times daily but some times they don't call and they don't come.\"  Upon reading the notes many time the mother or the patient request them not to come related to appointments or other scheduled events. Patient is very frustrated and angry at times.   Chief Complaint   Patient presents with   • Suicidal Ideation        CURRENT LIVING SITUATION/SOCIAL SUPPORT: Mother lives near him but states that they got in a fight this am.    BEHAVIORAL HEALTH TREATMENT HISTORY  Does patient/parent report a history of prior behavioral health treatment for patient?   Yes:    Dates Level of Care Facilty/Provider Diagnosis/Problem Medications   2016 Outpt [facility]   depression               SAFETY ASSESSMENT - SELF  Does patient acknowledge current or past symptoms of dangerousness to self? Yes  Does parent/significant other report patient has current or past symptoms of dangerousness to self? N\A  Does presenting problem suggest symptoms of dangerousness to self? Yes:     " "Past Current    Suicidal Thoughts: []  [x]    Suicidal Plans: []  []    Suicidal Intent: []  []    Suicide Attempts: []  []    Self-Injury []  []      For any boxes checked above, provide detail: patient states that he is \"sick of life\" and if he leaves [facility] \"fuck it\" which is his way of saying that he is suicidal. Confirmed verbally with him and he said, \"I just can't say it cause I know what will happen.\"    History of suicide by family member: no  History of suicide by friend/significant other: no  Recent change in frequency/specificity/intensity of suicidal thoughts or self-harm behavior? yes - with increased loss of function  Current access to firearms, medications, or other identified means of suicide/self-harm? no  If yes, willing to restrict access to means of suicide/self-harm? Denies weapons  Protective factors present:  denies any      SAFETY ASSESSMENT - OTHERS  Does patient acknowledge current or past symptoms of aggressive behavior or risk to others? no  Does parent/significant other report patient has current or past symptoms of aggressive behavior or risk to others?  N\A  Does presenting problem suggest symptoms of dangerousness to others? No    Crisis Safety Plan completed and copy given to patient? N\A    ABUSE/NEGLECT SCREENING  Does patient report feeling “unsafe” in his/her home, or afraid of anyone?  no  Does patient report any history of physical, sexual, or emotional abuse?  deferred   Does parent or significant other report any of the above? N\A  Is there evidence of neglect by self?  no  Is there evidence of neglect by a caregiver? no  Does the patient/parent report any history of CPS/APS/police involvement related to suspected abuse/neglect or domestic violence? no  Based on the information provided during the current assessment, is a mandated report of suspected abuse/neglect being made?  No    SUBSTANCE USE SCREENING  States that he smokes MJ all day long.  States doesn't drink " etoh      MENTAL STATUS   Participation: Active verbal participation  Grooming: Inappropriate to situation, has no pants on and just covered with blanket  Orientation: Fully Oriented  Behavior: Tense , Labile  Eye contact: wearing sun glasses as he states the light hurts his eyes  Mood: Depressed, Anxious, Angry and Irritable  Affect: Labile, Incongruent with content, Anxious and Angry  Thought process: Tangential  Thought content: Preoccupation  Speech: Loud, Pressured and Rapid  Perception: Within normal limits  Memory:  Recent:  Adequate and Remote:  Adequate  Insight: Limited  Judgment:  Limited  Other:    Collateral information:   Source:  [] Significant other present in person:   [] Significant other by telephone  [] [facility]   [x] [facility] Nursing Staff  [x] [facility] Medical Record  [x] Other: [facility] Notes        CLINICAL IMPRESSIONS:  Primary:  Major Depressive Do with SI  Secondary:  Anxiety DO       IDENTIFIED NEEDS/PLAN:  [Trigger DISPOSITION list for any items marked]    [x]  Imminent safety risk - self [] Imminent safety risk - others   []  Acute substance withdrawal []  Psychosis/Impaired reality testing   []  Mood/anxiety []  Substance use/Addictive behavior   []  Maladaptive behaviro []  Parent/child conflict   []  Family/Couples conflict []  Biomedical   []  Housing []  Financial   []   Legal  Occupational/Educational   []  Domestic violence []  Other:     Disposition:MD to eval. Place on legal hold and will watch for psychiatric bed    Does patient express agreement with the above plan? No, angry         Alert team only:   I have discussed findings and recommendations with   who is in agreement with these recommendations.     Referral information sent to the following community providers :    Randi Grover R.N.  9/4/2017

## 2017-09-05 ENCOUNTER — PATIENT OUTREACH (OUTPATIENT)
Dept: HEALTH INFORMATION MANAGEMENT | Facility: OTHER | Age: 37
End: 2017-09-05

## 2017-09-05 ENCOUNTER — HOME CARE VISIT (OUTPATIENT)
Dept: HOME HEALTH SERVICES | Facility: HOME HEALTHCARE | Age: 37
End: 2017-09-05
Payer: MEDICARE

## 2017-09-05 VITALS
RESPIRATION RATE: 18 BRPM | SYSTOLIC BLOOD PRESSURE: 119 MMHG | TEMPERATURE: 98.6 F | HEART RATE: 96 BPM | WEIGHT: 165 LBS | BODY MASS INDEX: 23.1 KG/M2 | HEIGHT: 71 IN | OXYGEN SATURATION: 86 % | DIASTOLIC BLOOD PRESSURE: 81 MMHG

## 2017-09-05 PROCEDURE — 700102 HCHG RX REV CODE 250 W/ 637 OVERRIDE(OP): Performed by: EMERGENCY MEDICINE

## 2017-09-05 PROCEDURE — A9270 NON-COVERED ITEM OR SERVICE: HCPCS | Performed by: EMERGENCY MEDICINE

## 2017-09-05 RX ADMIN — BUPROPION HYDROCHLORIDE 150 MG: 150 TABLET, EXTENDED RELEASE ORAL at 09:05

## 2017-09-05 RX ADMIN — METHENAMINE HIPPURATE 1 G: 1 TABLET ORAL at 09:05

## 2017-09-05 RX ADMIN — ESCITALOPRAM OXALATE 20 MG: 10 TABLET ORAL at 09:04

## 2017-09-05 NOTE — DISCHARGE PLANNING
Alert team note:  Patient discharged by Dr Sorenson.   Patient assessed and accepted by [facility].  [facility] unable to transport in a wheelchair at this time.  Contacted [facility] Donnelly who will access transportation via ambulance. Patient will call [facility] when he gets home to assess him there for therapy. Resources provided for community resources.

## 2017-09-05 NOTE — ED NOTES
Patient given self cath; unable to urinate and reports he will try again later. VSS. Patient denies further needs at this time. Sitter in direct view of patient.

## 2017-09-05 NOTE — DISCHARGE PLANNING
SW received update that pt's legal hold has been discontinued and pt is clear to d/c home. Pt updated and aware.     Pt will continue receiving home care through [facility], [facility] aware of pt's scheduled d/c today.     [facility] arranged [facility] transport home 1300. [facility] Lisa obtaining auth for transport from [facility].

## 2017-09-05 NOTE — ED PROVIDER NOTES
ED Provider Note    9/5. This is a patient who has been awaiting psychiatric evaluation. Psychiatrist is seen this patient and feels the patient is reasonably discharged. Currently not a danger to themselves or to others. Able to care for self. Vital signs have remained normal

## 2017-09-05 NOTE — ED NOTES
Patient's home medications have been reviewed by the pharmacy team.     Past Medical History:   Diagnosis Date   • Back pain    • Blood transfusion    • Depression     PTSD/clinical depression   • Fall     4/6/2016   • Headache    • Heart murmur    • MS (multiple sclerosis) (CMS-HCC) DX 8/20/2013   • Urinary incontinence     condom catheter       Patient's Medications   New Prescriptions    No medications on file   Previous Medications    BUPROPION SR (WELLBUTRIN-SR) 150 MG TABLET SR 12 HR SUSTAINED-RELEASE TABLET    Take 1 Tab by mouth 2 Times a Day.    CHOLECALCIFEROL (VITAMIN D3) 5000 UNITS TAB    Take 5,000 Units by mouth every day.    CRANBERRY 125 MG TAB    Take 125 mg by mouth every day. supplement    DOCUSATE SODIUM (COLACE) 250 MG CAPSULE    Take 250 mg by mouth every day at 6 PM. constipation    ESCITALOPRAM (LEXAPRO) 20 MG TABLET    Take 1 Tab by mouth every day.    MAGNESIUM 300 MG CAP    Take 300 mg by mouth every day. supplement    METHENAMINE HIP (HIPPREX) 1 GM TAB    Take 1 Tab by mouth 2 Times a Day.    MULTIPLE VITAMINS-MINERALS (MULTIVITAMIN & MINERAL PO)    Take 1 Tab by mouth every day at 6 PM. supplement    OXYBUTYNIN SR (DITROPAN-XL) 10 MG CR TABLET    Take 1 Tab by mouth every evening.    POLYETHYLENE GLYCOL/LYTES (MIRALAX) PACK    Take 1 Packet by mouth 1 time daily as needed (if sennosides and docusate ineffective after 24 hours).    PROBIOTIC PRODUCT (PROBIOTIC DAILY PO)    Take 1 Cap by mouth every day.    PSYLLIUM (METAMUCIL) 58.12 % PACK    Take 1 Packet by mouth every day.    TIZANIDINE (ZANAFLEX) 4 MG TAB    Take 1 Tab by mouth every 6 hours as needed. Indications: Muscle Spasticity   Modified Medications    No medications on file   Discontinued Medications    ACETAMINOPHEN (TYLENOL) 500 MG TAB    Take 500-1,000 mg by mouth every 6 hours as needed for Moderate Pain.    BISACODYL (DULCOLAX) 10 MG SUPPOS    Insert 1 Suppository in rectum 1 time daily as needed (if magnesium hydroxide  ineffective after 24 hours).    CALCIUM CARBONATE (TUMS) 500 MG CHEW TAB    Take 1 Tab by mouth 3 times a day as needed (heartburn).    LIDOCAINE (XYLOCAINE) 2 % GEL    Apply 1 Application to affected area(s) as needed (pain). when self cathing    MAGNESIUM HYDROXIDE (MILK OF MAGNESIA) 400 MG/5ML SUSPENSION    Take 30 mL by mouth 1 time daily as needed (if polyethylene glycol ineffective after 24 hours).    ONDANSETRON (ZOFRAN ODT) 4 MG TABLET DISPERSIBLE    Take 1 Tab by mouth every four hours as needed for Nausea/Vomiting (give PO if no IV route available).          A/P:    Medication reconciliation completed by technician.  Patient has been compliant with home medications and here with suicidal ideation.  Home medications have been reviewed and resumed as appropriate.  Discussed with Dr. Drake.       Deloris Whitaker, PharmD, BCPS

## 2017-09-05 NOTE — PROGRESS NOTES
CC received incoming call from patients Mother, Kandi.  Mother reports patient had an incident yesterday where he called police from his residence to have Mother removed from his property.  Mother states she left and later heard from a neighbor that patient was taken away by ambulance.  Mother states she is trying to find out if patient was admitted to hospital.  CC provided Mother with telephone number for [facility] [phone].  CC also recommended Mother try and contact patients , Shaw Hahn.

## 2017-09-05 NOTE — DISCHARGE PLANNING
Received call from Geno(Supervisor PEYTON). Per Geno, transport was arranged with [facility], however, authorization from [facility] and [facility] needed. Contacted [facility]. [facility] to contact [facility] for auth.  left for Sophie at [facility]. Geno(PEYTON) and Jung(ZHANG) updated via phone.

## 2017-09-05 NOTE — ED NOTES
Med rec updated and complete.  Allergies reviewed.  Pt denies antibiotic use in last 30 days.  Pt took no medications today.

## 2017-09-05 NOTE — ED NOTES
Patient states he has no problems swallowing w/ his MS and requests food. Pt given food and water. No problems swallowing observed. Patient resting comfortably.

## 2017-09-05 NOTE — ED NOTES
Pt medicated per MAR; pt calm and cooperative; sitter in direct view of patient; patient in direct view of nurse's station.

## 2017-09-05 NOTE — PSYCHIATRY
"PSYCHIATRIC CONSULTATION:  Reason for admission: history of MS and depression who presents to the Emergency Department with chronic suicidal ideation, onset earlier today. He states that he has no plan to hurt himself.      Reason for consult: suicidal  Requesting Physician:Lazarus Drake M.D.       Legal status: +    Chief Complaint: pt says he is not going to act on the SI, that he has never acted.     HPI: 36 yo male who had an argument with his mom: she has to supplement his care at home 7 days a week and \"she is always mad\" and \"stomped her feet\" and he became angry and cursed and told her to leave. Pt says he has been depressed since having MS because he has lost everything in [place]: his wife left him, she kept their son, his home, and his mom brought him to [place]. He wished he had stayed there. This was approximately 4 years ago. He sleeps \"too much\". He has been on welbutrin 150 mg bid and lexapro 20 mg for about once year and does not feel it does much. However he smokes THC and that helps with the muscle spasms and he feels happier on it. His SI has been there for years but he has not acted and won't act because \"It would hurt too much\". It also helps his anxiety.     He feels hopeless and anhedonic frequently because he watches TV all the time and can't participate in the things he liked to do. 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 He sleeps to \"pass the time\". Pt denies having a current flare up of MS and says he is at baseline in this respect.      Denies psychosis or marcelino. Sees a PCP and Melissa Bloch neurologist and prefers that she prescribe meds so that there are less \"cooks in the pot\"    Psychiatric Review of Systems:current symptoms as reported by pt.  Depression: as notes     Marcelino:denies  Anxiety/Panic Attacks ongoing  PTSD symptom: denies  Psychosis:denies  Other:feels the MS impacts his memory as well.     Medical Review of Systems: as reported by pt. All " "systems reviewed. Only those found to be + are noted below. All others are negative.   Neurological:    TBIs:denies   SZs:denies   Strokes:denies   Other:MS causes light sensitivity and he wears sunglasses, memory impairment and speech impediment in addition to motor problems.   Other medical symptoms:denies     Psychiatric Examination: observed phenomenon:  Vitals:Blood pressure 119/81, pulse 96, temperature 37 °C (98.6 °F), temperature source Temporal, resp. rate 18, height 1.803 m (5' 11\"), weight 74.8 kg (165 lb), SpO2 (!) 86 %.  Musculoskeletal(abnormal movements, gait, etc): lying in bed, difficulty with motor movements of hand  Appearance:sun glasses, tattoos, good eye contact.  Thoughts:linear, no psychosis  Speech:speech impediment from MS  Mood: depressed  Affect: mildly labile  SI/HI: chronic SI, no plan to act. No HI  Attention/Alertness: intact  Memory:  Grossly intact, not formally tested    Orientation:  intact    Fund of Knowledge:adequate      Insight/Judgement into symptoms: fair to good     Past Psychiatric Hx: once, years ago in [place]. No SAs. meds as noted. Hx of effexor and abilify, the latter which made him more suicidal. No new psych symptoms reported since 12/2016.  12/2016: Endorses depressed mood, anhedonia, feelings of worthlessness, decreased energy, trouble concentrating.  Denies trouble sleeping, decreased appetite, SI.   Denies being easily distracted, impulsive, grandiose, flight of ideas, increased activity, decreased need for sleep, over-talkativeness   Reports history of anxiety, but says he hasn't been feeling very anxious lately. Reports nightmares ever since 2013, but denies flashbacks, hypervigilance, re-experiencing. Denies auditory/visual hallucinations    Family Psychiatric Hx: dad may have been on the autism spectrum    Social Hx:  x 2. A single son who is going into the [employer] that he communicates with via facetime. Has caretakers and mom who also helps. "     Drug/Alcohol/Tobacco Hx:   Drugs:THC daily   Alcohol:1-2 beers daily    Medical Hx: labs, MARS, medications, etc were reviewed. Only those findings of potential interest to psychiatry are noted below:  Medical Conditions:   MS: wheelchair bound since relapse in 2015.  Problems with disequilibrium and vertigo, spasticity of legs, difficultly controlling hands, speech impediment, light sensitivity, urinary incontinence.   Allergies: Other drug; Asa [aspirin]; Gabapentin; and Lyrica  Medications (currently prescribed at [facility]):lexapro 20 and welbutrin 150 mg bid, zanaflex, ditropan.   Labs: Results for JAYNA MOODY III (MRN [id number]) as of 9/5/2017 13:05   Ref. Range 9/4/2017 15:40   Cannabinoid Metab Latest Ref Range: Negative  Positive (A)   Results for JAYNA MOODY III (MRN [id number]) as of 9/5/2017 13:05   Ref. Range 9/4/2017 15:40   Leukocyte Esterase Latest Ref Range: Negative  Trace (A)     ECG: none     ASSESSMENT: (new dx, acuity level)  Depressive Disorder NOS: though this started out as an adjustment disorder it has continued and worsened beyond the initial stressors.     R/O neurocognitive disorder secondary to MS  Multiple Sclerosis 2013 dx.     PLAN:if his antidepressants no longer work, then other meds could be considered as long as they do not interfere with him otherwise: modafinil or adderall. However, will leave this up to his neurologist as he requested. [facility] will be taking him as a patient and provide in home counseling for him.  Legal status: dc'd. No scripts.   Signing off   Thank you for the consult.

## 2017-09-05 NOTE — ED NOTES
"Pt states \" I need to be cathed.\"  Pt provided with self-catheterization(red rubber catheter) kit. Pt able to self cath - clear/yellow urine output.   "

## 2017-09-05 NOTE — ED NOTES
Patient given self cath after stating he needs to urinate. Pt requests indwelling matias; no further needs at this time.

## 2017-09-05 NOTE — DISCHARGE PLANNING
[facility] auth #[id number].    Auth # provided to Jung at [facility].

## 2017-09-05 NOTE — ED NOTES
Discharge instructions given. All questions answered. Pt to follow-up with PCP. Pt verbalized understanding. All belongings with pt. Pt discharged to [facility].

## 2017-09-05 NOTE — ED NOTES
Patient sleeping w/ visible rise and fall of chest; even unlabored respirations; sitter in direct view of patient.

## 2017-09-06 ENCOUNTER — HOME CARE VISIT (OUTPATIENT)
Dept: HOME HEALTH SERVICES | Facility: HOME HEALTHCARE | Age: 37
End: 2017-09-06
Payer: MEDICARE

## 2017-09-06 VITALS
OXYGEN SATURATION: 98 % | RESPIRATION RATE: 18 BRPM | HEART RATE: 75 BPM | DIASTOLIC BLOOD PRESSURE: 64 MMHG | SYSTOLIC BLOOD PRESSURE: 123 MMHG | TEMPERATURE: 98.2 F

## 2017-09-06 LAB
BACTERIA UR CULT: ABNORMAL
BACTERIA UR CULT: ABNORMAL
SIGNIFICANT IND 70042: ABNORMAL
SITE SITE: ABNORMAL
SOURCE SOURCE: ABNORMAL

## 2017-09-06 PROCEDURE — G0156 HHCP-SVS OF AIDE,EA 15 MIN: HCPCS

## 2017-09-07 ENCOUNTER — HOME CARE VISIT (OUTPATIENT)
Dept: HOME HEALTH SERVICES | Facility: HOME HEALTHCARE | Age: 37
End: 2017-09-07
Payer: MEDICARE

## 2017-09-07 PROCEDURE — G0151 HHCP-SERV OF PT,EA 15 MIN: HCPCS

## 2017-09-07 PROCEDURE — G0152 HHCP-SERV OF OT,EA 15 MIN: HCPCS

## 2017-09-07 PROCEDURE — G0299 HHS/HOSPICE OF RN EA 15 MIN: HCPCS

## 2017-09-07 PROCEDURE — G0155 HHCP-SVS OF CSW,EA 15 MIN: HCPCS

## 2017-09-08 ENCOUNTER — HOME CARE VISIT (OUTPATIENT)
Dept: HOME HEALTH SERVICES | Facility: HOME HEALTHCARE | Age: 37
End: 2017-09-08
Payer: MEDICARE

## 2017-09-08 VITALS
DIASTOLIC BLOOD PRESSURE: 84 MMHG | OXYGEN SATURATION: 99 % | SYSTOLIC BLOOD PRESSURE: 120 MMHG | TEMPERATURE: 98.6 F | RESPIRATION RATE: 18 BRPM | HEART RATE: 99 BPM

## 2017-09-08 NOTE — ED NOTES
ED Positive Culture Follow-up/Notification Note:    Date: 9/8/17     Patient seen in the ED on 9/4/2017 for SI. History of recurrent UTIs on methenamine.  1. Suicidal ideation       Discharge Medication List as of 9/5/2017 11:17 AM          Allergies: Other drug; Asa [aspirin]; Gabapentin; and Lyrica     Final cultures:   Results     Procedure Component Value Units Date/Time    URINE CULTURE(NEW) [[id number]]  (Abnormal)  (Susceptibility) Collected:  09/04/17 1540    Order Status:  Completed Specimen:  Urine Updated:  09/06/17 0911     Significant Indicator POS (POS)     Source UR     Site --     Urine Culture -- (A)     Urine Culture -- (A)     Klebsiella pneumoniae  10-50,000 cfu/mL      Culture & Susceptibility     KLEBSIELLA PNEUMONIAE     Antibiotic Sensitivity Microscan Unit Status    Ampicillin Resistant >16 mcg/mL Final    Cefepime Sensitive <=8 mcg/mL Final    Cefotaxime Sensitive <=2 mcg/mL Final    Cefotetan Sensitive <=16 mcg/mL Final    Ceftazidime Sensitive <=1 mcg/mL Final    Ceftriaxone Sensitive <=8 mcg/mL Final    Cefuroxime Sensitive 8 mcg/mL Final    Cephalothin Sensitive <=8 mcg/mL Final    Ciprofloxacin Sensitive <=1 mcg/mL Final    Gentamicin Sensitive <=4 mcg/mL Final    Levofloxacin Sensitive <=2 mcg/mL Final    Nitrofurantoin Intermediate 64 mcg/mL Final    Pip/Tazobactam Sensitive <=16 mcg/mL Final    Piperacillin Sensitive <=16 mcg/mL Final    Tigecycline Sensitive <=2 mcg/mL Final    Tobramycin Sensitive <=4 mcg/mL Final    Trimeth/Sulfa Sensitive <=2/38 mcg/mL Final                       URINALYSIS,CULTURE IF INDICATED [[id number]]  (Abnormal) Collected:  09/04/17 1540    Order Status:  Completed Specimen:  Urine from Urine, Straight Cath Updated:  09/04/17 1617     Color Yellow     Character Clear     Specific Gravity 1.014     Ph >=9.0 (A)     Glucose Negative mg/dL      Ketones Negative mg/dL      Protein Negative mg/dL      Bilirubin Negative     Urobilinogen, Urine 0.2     Nitrite  Negative     Leukocyte Esterase Trace (A)     Occult Blood Negative     Micro Urine Req Microscopic     Culture Indicated Yes UA Culture           Plan:   Patient does not present with any urinary complaints. UA not indicative of infection.  Culture likely represents asymptomatic bacteriuria. No treatment indicated at this time.    Carin Willingham

## 2017-09-09 VITALS
SYSTOLIC BLOOD PRESSURE: 132 MMHG | RESPIRATION RATE: 18 BRPM | TEMPERATURE: 98.7 F | DIASTOLIC BLOOD PRESSURE: 90 MMHG | HEART RATE: 75 BPM | OXYGEN SATURATION: 98 %

## 2017-09-09 ASSESSMENT — ENCOUNTER SYMPTOMS: DEBILITATING PAIN: 1

## 2017-09-12 ENCOUNTER — HOME CARE VISIT (OUTPATIENT)
Dept: HOME HEALTH SERVICES | Facility: HOME HEALTHCARE | Age: 37
End: 2017-09-12
Payer: MEDICARE

## 2017-09-12 VITALS
OXYGEN SATURATION: 94 % | SYSTOLIC BLOOD PRESSURE: 124 MMHG | TEMPERATURE: 98.4 F | RESPIRATION RATE: 16 BRPM | DIASTOLIC BLOOD PRESSURE: 68 MMHG | HEART RATE: 74 BPM

## 2017-09-12 ASSESSMENT — ENCOUNTER SYMPTOMS
NAUSEA: DENIES
POOR JUDGMENT: 1
DEPRESSED MOOD: 1
DEBILITATING PAIN: 1

## 2017-09-13 ENCOUNTER — HOME CARE VISIT (OUTPATIENT)
Dept: HOME HEALTH SERVICES | Facility: HOME HEALTHCARE | Age: 37
End: 2017-09-13
Payer: MEDICARE

## 2017-09-13 VITALS
RESPIRATION RATE: 18 BRPM | OXYGEN SATURATION: 96 % | DIASTOLIC BLOOD PRESSURE: 79 MMHG | HEART RATE: 97 BPM | TEMPERATURE: 98.8 F | SYSTOLIC BLOOD PRESSURE: 118 MMHG

## 2017-09-13 PROCEDURE — G0156 HHCP-SVS OF AIDE,EA 15 MIN: HCPCS

## 2017-09-14 ENCOUNTER — TELEPHONE (OUTPATIENT)
Dept: NEUROLOGY | Facility: MEDICAL CENTER | Age: 37
End: 2017-09-14

## 2017-09-14 ENCOUNTER — HOME CARE VISIT (OUTPATIENT)
Dept: HOME HEALTH SERVICES | Facility: HOME HEALTHCARE | Age: 37
End: 2017-09-14
Payer: MEDICARE

## 2017-09-14 PROCEDURE — G0151 HHCP-SERV OF PT,EA 15 MIN: HCPCS

## 2017-09-14 RX ORDER — BACLOFEN 10 MG/1
TABLET ORAL
Qty: 120 TAB | Refills: 11 | Status: SHIPPED | OUTPATIENT
Start: 2017-09-14 | End: 2017-11-27 | Stop reason: SDUPTHER

## 2017-09-14 ASSESSMENT — ENCOUNTER SYMPTOMS: DEBILITATING PAIN: 1

## 2017-09-14 NOTE — TELEPHONE ENCOUNTER
Message: patient has decided to give baclofen a try. Can you please rx and send to [facility].     Caller: angelika  Call Back #: [phone]  OK to leave detailed message: n

## 2017-09-15 ENCOUNTER — HOME CARE VISIT (OUTPATIENT)
Dept: HOME HEALTH SERVICES | Facility: HOME HEALTHCARE | Age: 37
End: 2017-09-15
Payer: MEDICARE

## 2017-09-15 VITALS
HEART RATE: 78 BPM | TEMPERATURE: 98.9 F | DIASTOLIC BLOOD PRESSURE: 82 MMHG | OXYGEN SATURATION: 99 % | SYSTOLIC BLOOD PRESSURE: 128 MMHG | RESPIRATION RATE: 18 BRPM

## 2017-09-15 PROCEDURE — G0299 HHS/HOSPICE OF RN EA 15 MIN: HCPCS

## 2017-09-15 SDOH — ECONOMIC STABILITY: HOUSING INSECURITY: UNSAFE APPLIANCES: 0

## 2017-09-15 SDOH — ECONOMIC STABILITY: HOUSING INSECURITY: UNSAFE COOKING RANGE AREA: 0

## 2017-09-19 ENCOUNTER — HOME CARE VISIT (OUTPATIENT)
Dept: HOME HEALTH SERVICES | Facility: HOME HEALTHCARE | Age: 37
End: 2017-09-19
Payer: MEDICARE

## 2017-09-19 VITALS
DIASTOLIC BLOOD PRESSURE: 60 MMHG | RESPIRATION RATE: 18 BRPM | OXYGEN SATURATION: 96 % | HEART RATE: 76 BPM | TEMPERATURE: 98.2 F | SYSTOLIC BLOOD PRESSURE: 110 MMHG

## 2017-09-19 ASSESSMENT — ENCOUNTER SYMPTOMS
NAUSEA: DENIES
DEPRESSED MOOD: 1
RESPIRATORY SYMPTOMS COMMENTS: DENIES SOB RESP EVEN AND UNLABORED

## 2017-09-20 ENCOUNTER — HOME CARE VISIT (OUTPATIENT)
Dept: HOME HEALTH SERVICES | Facility: HOME HEALTHCARE | Age: 37
End: 2017-09-20
Payer: MEDICARE

## 2017-09-20 VITALS
RESPIRATION RATE: 18 BRPM | OXYGEN SATURATION: 97 % | DIASTOLIC BLOOD PRESSURE: 76 MMHG | TEMPERATURE: 98.9 F | SYSTOLIC BLOOD PRESSURE: 121 MMHG | HEART RATE: 96 BPM

## 2017-09-20 PROCEDURE — G0156 HHCP-SVS OF AIDE,EA 15 MIN: HCPCS

## 2017-09-22 ENCOUNTER — HOME CARE VISIT (OUTPATIENT)
Dept: HOME HEALTH SERVICES | Facility: HOME HEALTHCARE | Age: 37
End: 2017-09-22
Payer: MEDICARE

## 2017-09-22 PROCEDURE — G0162 HHC RN E&M PLAN SVS, 15 MIN: HCPCS

## 2017-09-25 VITALS
HEART RATE: 81 BPM | SYSTOLIC BLOOD PRESSURE: 110 MMHG | DIASTOLIC BLOOD PRESSURE: 79 MMHG | TEMPERATURE: 99.8 F | OXYGEN SATURATION: 98 % | RESPIRATION RATE: 18 BRPM

## 2017-09-25 SDOH — ECONOMIC STABILITY: HOUSING INSECURITY: UNSAFE APPLIANCES: 0

## 2017-09-25 SDOH — ECONOMIC STABILITY: HOUSING INSECURITY: UNSAFE COOKING RANGE AREA: 0

## 2017-09-25 ASSESSMENT — ENCOUNTER SYMPTOMS
DEPRESSED MOOD: 1
DEBILITATING PAIN: 1

## 2017-09-25 ASSESSMENT — ACTIVITIES OF DAILY LIVING (ADL)
OASIS_M1830: 05
HOME_HEALTH_OASIS: 01

## 2017-10-05 ENCOUNTER — PATIENT OUTREACH (OUTPATIENT)
Dept: HEALTH INFORMATION MANAGEMENT | Facility: OTHER | Age: 37
End: 2017-10-05

## 2017-10-05 NOTE — PROGRESS NOTES
CC outreach call to patient for follow-up. VM left for patient.  Outreach call to patients , Shaw Hahn.   left requesting return call for follow-up.  Outreach call to patients Mother, Kandi for follow-up.  CC spoke to patients Mother, Kandi.  Kandi reports Jerzy has been doing okay.  Reports she is taking him to swimming therapy.  Reports patient currently isn't needing any additional resources at this time. CC informed patients Mother that this CC would be transferring care coordinator to another CC in the Population Health Department. Mother voiced appreciation for the call.

## 2017-10-23 ENCOUNTER — TELEPHONE (OUTPATIENT)
Dept: NEUROLOGY | Facility: MEDICAL CENTER | Age: 37
End: 2017-10-23

## 2017-10-23 NOTE — TELEPHONE ENCOUNTER
Message: patient started the Baclofen little over a month ago and angelika did a slow titration with the medication. About 2 weeks ago, Angelika noticed a behavior change in him; irritable, yelling, kicking her out of the house and just be very nasty to everyone. Can this be due to the Baclofen? If so, will it subsided? Patient is currently taking baclofen 10mg TID. This is a good week and the past 1 1/2 week was not    Caller: angelika  Call Back #: [phone]  OK to leave detailed message: n

## 2017-10-23 NOTE — TELEPHONE ENCOUNTER
Ask if the baclofen is helping with the spasms at all and we can see if the behavior gets better this week or not and touch base next Monday. MB

## 2017-10-25 NOTE — TELEPHONE ENCOUNTER
Baclofen is helping a little bit for the muscle spasms. The patient is going to take the baclofen how it was prescribed:   baclofen (LIORESAL) 10 MG Tab 120 Tab 11/11 9/14/2017     Sig: Take on am, one afternoon and two at bedtime.      Kandi will call with an update on Monday.

## 2017-11-03 NOTE — TELEPHONE ENCOUNTER
Baclofen is not helping with the muscle spasms. Patient's behavior is still the same. He lost another caregiver today d/t his behavior.

## 2017-11-10 ENCOUNTER — PATIENT OUTREACH (OUTPATIENT)
Dept: HEALTH INFORMATION MANAGEMENT | Facility: OTHER | Age: 37
End: 2017-11-10

## 2017-11-10 DIAGNOSIS — Z63.9 PROBLEM RELATED TO PRIMARY SUPPORT GROUP, UNSPECIFIED: ICD-10-CM

## 2017-11-10 SDOH — SOCIAL STABILITY - SOCIAL INSECURITY: PROBLEM RELATED TO PRIMARY SUPPORT GROUP, UNSPECIFIED: Z63.9

## 2017-11-10 NOTE — PROGRESS NOTES
Outreach call to the patient for follow up.  The patient stated that he stays at home most of the time and would like to get out more but it is a taxing effort.  Jerzy does go to swimming lessons once a week and likes doing that.  Permission received to speak to his mom Kandi.  Outreach call to Kandi.  Kandi said that her son's condition is hard on her but she receives counseling and attends MS support groups.  Kandi agreed that her son could benefit from companionship.  Plan: Referral to Social work placed.

## 2017-11-15 NOTE — TELEPHONE ENCOUNTER
Spoke with Leana Bertrand doesn't want to D/C Baclofen because the caregiver does see a difference on the baclofen

## 2017-11-17 ENCOUNTER — PATIENT OUTREACH (OUTPATIENT)
Dept: HEALTH INFORMATION MANAGEMENT | Facility: OTHER | Age: 37
End: 2017-11-17

## 2017-11-17 DIAGNOSIS — Z78.9 IMPAIRED MOBILITY AND ADLS: ICD-10-CM

## 2017-11-17 DIAGNOSIS — Z74.09 IMPAIRED MOBILITY AND ADLS: ICD-10-CM

## 2017-11-20 NOTE — PROGRESS NOTES
"Pt referred to  by RN care coordinator for in-home companion/online support groups. Outreach call to pt to introduce self, discuss role of care coordination and pt's identified needs. Pt spoke to LSW in an angry/upset tone throughout the conversation. Pt made statements related to lack of care/services in this state as well as statements related to inability to not obtain services due to the color of his skin ie when asked if pt was receiving SNAP benefits he stated, \"I'm not brown enough to get food stamps.\" Overall pt indicates his main need is to obtain placement for 24/7 retirement care.     Pt currently is on services with [facility] and has in-home care services with [facility]. However he reports this is not enough and inadequate care explaining many times the care givers don't show up for scheduled shifts. Pt indicated many times his mother who is in her 60s is having to provide care for him. Pt adamantly refused any resources related to support groups. Pt asked LSW to follow up with his mother vs discussing resources options with him.     Outreach call to pt's mother, Kandi who reports similar concerns and needs as expressed by pt. She indicated that her son needs long-term care placement. She discussed the difficulties in getting him placed and the lack of resources in the state. LSW provided empathic listening and discussed with mother the challenges of getting the services requested in this state. She reported attempting to get pt into SNF many times and being unsuccessful as facilities would not accept pt due to his behaviors and because he needs bed rails which can be considered a restraint. SNF are not allowed to use restraints. Explained to pt's mother at although this is true there are ways for a SNF to utilize if deemed for the pt's safety. Indicated that this could be addressed if an accepting SNF can be found.     Also talked about possibly moving pt out of state to potential a state " with more services. Mother reported she moved pt from [place] to [place] to be closer to her but indicated he wants to go back to [place]. Discussed looking into resources in [place] to determine if pt's needs could be better met there. Pt's mother in agreement.     Outreach call to pt's [facility] worker to follow up and collaborate care. Left VM requesting call back.     Plan:  · Pt placed on SW case load and care plan initiated.

## 2017-11-27 ENCOUNTER — OFFICE VISIT (OUTPATIENT)
Dept: NEUROLOGY | Facility: MEDICAL CENTER | Age: 37
End: 2017-11-27
Payer: MEDICARE

## 2017-11-27 VITALS
DIASTOLIC BLOOD PRESSURE: 80 MMHG | HEART RATE: 92 BPM | RESPIRATION RATE: 16 BRPM | OXYGEN SATURATION: 98 % | SYSTOLIC BLOOD PRESSURE: 130 MMHG | TEMPERATURE: 99.3 F

## 2017-11-27 DIAGNOSIS — F32.A DEPRESSION, UNSPECIFIED DEPRESSION TYPE: ICD-10-CM

## 2017-11-27 DIAGNOSIS — G35 MS (MULTIPLE SCLEROSIS) (HCC): ICD-10-CM

## 2017-11-27 PROCEDURE — 99214 OFFICE O/P EST MOD 30 MIN: CPT | Performed by: PSYCHIATRY & NEUROLOGY

## 2017-11-27 RX ORDER — OMEPRAZOLE 20 MG/1
20 CAPSULE, DELAYED RELEASE ORAL
COMMUNITY
End: 2018-03-18

## 2017-11-27 RX ORDER — BACLOFEN 20 MG/1
TABLET ORAL
Qty: 120 TAB | Refills: 5 | Status: SHIPPED | OUTPATIENT
Start: 2017-11-27 | End: 2018-03-18

## 2017-11-30 NOTE — ASSESSMENT & PLAN NOTE
Patient is very depressed due to his condition. He is looking at moving back to [place] where he has some family connections and perhaps there would be better . Patient's mother is taking care of him but it is taking a toll on her.

## 2017-11-30 NOTE — PROGRESS NOTES
Dictation #1  MRN:[id number]  CSN:[id number]  CHIEF COMPLAINT  Chief Complaint   Patient presents with   • Follow-Up     MS       HPI  Jerzy Juan III is a 35 y.o. male who presents for treatment of his MS, patient is somewhat frustrated and fatigued about dealing with his disease.Patient feels increased spasticity despite the baclofen. Patient is not sure if the Ocrevus is helping him  MS (multiple sclerosis) ([facility])  Pt states that he has had issues with behavior on the baclofen.    Depression  Patient is very depressed due to his condition. He is looking at moving back to [place] where he has some family connections and perhaps there would be better . Patient's mother is taking care of him but it is taking a toll on her.    REVIEW OF SYSTEMS  Pertinent Positives:fatigue, anxiety, paraesthesias   All other systems are negative.     PAST MEDICAL HISTORY  Past Medical History:   asdfasdfads Date   • Back pain    • Blood transfusion    • Depression     PTSD/clinical depression   • Fall     4/6/2016   • Headache(784.0)    • Heart murmur    • MS (multiple sclerosis) ([facility]) DX 8/20/2013   • Urinary incontinence     condom catheter       SOCIAL HISTORY  Social History     Social History   • Marital status:      Spouse name: N/A   • Number of children: N/A   • Years of education: N/A     Occupational History   • Not on file.     Social History Main Topics   • Smoking status: Former Smoker     Start date: 8/1/1997     Quit date: 8/1/2013   • Smokeless tobacco: Former User   • Alcohol use 0.0 oz/week      Comment: 1 beer/day   • Drug use:      Types: Inhaled      Comment: marijuana   • Sexual activity: Not on file      Comment: once daily for 23 yrs     Other Topics Concern   • Not on file     Social History Narrative   • No narrative on file       SURGICAL HISTORY  Past Surgical History:   Procedure Laterality Date   • GASTROSCOPY  1/14/2017    Procedure: GASTROSCOPY WITH FECAL MATTER  TRANSPLANT;  Surgeon: Obinna Douglas M.D.;  Location: SURGERY [facility];  Service:    • OPEN REDUCTION         CURRENT MEDICATIONS  Current Outpatient Prescriptions   Medication Sig Dispense Refill   • omeprazole (PRILOSEC) 20 MG delayed-release capsule Take 20 mg by mouth every day.     • baclofen (LIORESAL) 20 MG tablet Take on am, one afternoon and two at bedtime. 120 Tab 5   • docusate sodium (COLACE) 250 MG capsule Take 250 mg by mouth every day at 6 PM. constipation     • Multiple Vitamins-Minerals (MULTIVITAMIN & MINERAL PO) Take 1 Tab by mouth every day at 6 PM. supplement     • Magnesium 300 MG Cap Take 300 mg by mouth every day. supplement     • buPROPion SR (WELLBUTRIN-SR) 150 MG TABLET SR 12 HR sustained-release tablet Take 1 Tab by mouth 2 Times a Day. 60 Tab    • escitalopram (LEXAPRO) 20 MG tablet Take 1 Tab by mouth every day. 30 Tab    • polyethylene glycol/lytes (MIRALAX) Pack Take 1 Packet by mouth 1 time daily as needed (if sennosides and docusate ineffective after 24 hours).  3   • tizanidine (ZANAFLEX) 4 MG Tab Take 1 Tab by mouth every 6 hours as needed. Indications: Muscle Spasticity 30 Tab 3   • methenamine hip (HIPPREX) 1 GM Tab Take 1 Tab by mouth 2 Times a Day. 60 Tab    • oxybutynin SR (DITROPAN-XL) 10 MG CR tablet Take 1 Tab by mouth every evening. 30 Tab    • Cholecalciferol (VITAMIN D3) 5000 UNITS Tab Take 5,000 Units by mouth every day.     • psyllium (METAMUCIL) 58.12 % Pack Take 1 Packet by mouth every day.     • Cranberry 125 MG Tab Take 125 mg by mouth every day. supplement     • Probiotic Product (PROBIOTIC DAILY PO) Take 1 Cap by mouth every day.       No current facility-administered medications for this visit.        ALLERGIES  Allergies   Allergen Reactions   • Other Drug      CANNOT TAKE ANY STEROIDS WITH RECENT TYSABRI INFUSION   • Asa [Aspirin] Unspecified     Bleeding in stomach as a baby   • Gabapentin Anxiety     anger and severe mood swing   • Lyrica Anxiety     " anger and severe mood swing       PHYSICAL EXAM  VITAL SIGNS: /80   Pulse 92   Temp 37.4 °C (99.3 °F)   Resp 16   SpO2 98%   Constitutional: Well developed, Well nourished, No acute distress, Non-toxic appearance.   HENT: normocephalic, atraumatic   Eyes: fundi-disc sharp, perrl   Neck: Normal range of motion, No tenderness, Supple, No stridor.   Cardiovascular: Normal heart rate, Normal rhythm, No murmurs, No rubs, No gallops.   Thorax & Lungs: Normal breath sounds, No respiratory distress, No wheezing, No chest tenderness.   Abdomen: Bowel sounds normal, Soft, No tenderness, No masses, No pulsatile masses.   Skin: Warm, Dry, No erythema, No rash.   Back: No tenderness, No CVA tenderness.   Extremities: Intact distal pulses, No edema, No tenderness, No cyanosis, No clubbing.   Neurologic: A&Ox3, CN:2-12:nystagmus, motor: increased tone, sensory; decreased in his legs, wheelchair bound  Psychiatric: Affect normal, Judgment normal, Mood normal.   Lab: Reviewed with patient in detail and as noted in results.        RADIOLOGY/PROCEDURES  MRI of the brain reviewed in the PACS system with the patient.      FINDINGS:  There are multiple ovoid and ushaped areas of increased T2 signal intensity in the immediate periventricular and subcortical white matter. Several lesions are also noted in the zabrina at the level of the brachium pontis. The size and number of lesions has   not changed significantly since previous exam. There are no \"active\" enhancing lesions in the brain parenchyma.    The calvariae are unremarkable.  There are no extra-axial fluid collections.  The ventricular system and basal cisterns are within normal limits.  There are no mass effects or shift of midline structures.  There are no hemorrhagic lesions.  The diffusion   weighted axial images show no evidence of acute cerebral infarction.    The postcontrast images show no areas of abnormal parenchymal or meningeal enhancement.    The brainstem and " "posterior fossa structures are unremarkable.    Vascular flow voids in the vertebrobasilar and carotid arteries, [place], and dural venous sinuses are intact.    The paranasal sinuses and mastoids in the field of view are unremarkable.         Impression          1.  Numerous periventricular, subcortical, and pontine areas of demyelination unchanged from previous exam of 2/29/16 and consistent with the patient's known diagnosis of multiple sclerosis.    2.  No \"active\" enhancing lesions in the brain parenchyma.           ASSESSMENT AND PLAN  1. MS (multiple sclerosis)  Plan to continue Ocrevus for progressive multiple sclerosis. Patient given encouragement and understanding of how medication works. We do not expect him to feel better but we want to slow the progression of his disease. We discussed home care options for placement options together with his mother.  Patient is taking vitamin D and working on a healthy diet. He will follow up with urologist to monitor urinary tract infections. I referred the patient to Dr. Steve Behrman as we discussed baclofen pump and I added an increase in baclofen to 20 mg 4 times a day prior to adding other muscle relaxers. Patient will continue with home therapy and then transition to a aquatic therapy. We discussed importance of vitamin D supplementation and a healthy diet.    2. Depression  Pt will consider therapy and support groups and significant encouragement for home therapy given to patient.     I spent 30 minutes with this patient and his caregiver, over fifty percent was spent counseling patient on their condition, best management practices, reviewing test results and risks and benefits of treatment.          "

## 2018-01-02 ENCOUNTER — PATIENT OUTREACH (OUTPATIENT)
Dept: HEALTH INFORMATION MANAGEMENT | Facility: OTHER | Age: 38
End: 2018-01-02

## 2018-01-03 ENCOUNTER — PATIENT OUTREACH (OUTPATIENT)
Dept: HEALTH INFORMATION MANAGEMENT | Facility: OTHER | Age: 38
End: 2018-01-03

## 2018-01-03 NOTE — PROGRESS NOTES
"Outreach call to pt's mom, Kandi to follow up. She indicated that they are \"hanging in there\" reporting thre has been no dramatic changes for the good or the bad. She reported they got some additional care givers through [facility] which have been good and has made a difference. She reported they are hopeful pt will be getting a pump to assist with his spasms and the MS medication he takes 2x year will help to prevent/reduce the progression of the disease. Discussed that LSW has followed up with agencies in [place] to determine if the state has programs available to assist in long-term care placement. Explained thus far this LSW has been redirected and does not have clear insight regarding [place] programs related to long-term care services. Also informed her that of the local SNF in [facility] have denied currently having any long-term care beds. Explained that LSW will continue to research topics and update her as more information is determined. Pt's mother voiced understanding and denied any other needs/concerns at this time.     Plan:  · LSW will continue to assist pt and family with long-term care options.   "

## 2018-01-09 ENCOUNTER — HOME HEALTH ADMISSION (OUTPATIENT)
Dept: HOME HEALTH SERVICES | Facility: HOME HEALTHCARE | Age: 38
End: 2018-01-09
Payer: MEDICARE

## 2018-01-12 ENCOUNTER — HOME CARE VISIT (OUTPATIENT)
Dept: HOME HEALTH SERVICES | Facility: HOME HEALTHCARE | Age: 38
End: 2018-01-12

## 2018-01-23 ENCOUNTER — HOME CARE VISIT (OUTPATIENT)
Dept: HOME HEALTH SERVICES | Facility: HOME HEALTHCARE | Age: 38
End: 2018-01-23
Payer: MEDICARE

## 2018-01-23 VITALS
SYSTOLIC BLOOD PRESSURE: 140 MMHG | TEMPERATURE: 98.4 F | DIASTOLIC BLOOD PRESSURE: 90 MMHG | OXYGEN SATURATION: 98 % | HEART RATE: 84 BPM | RESPIRATION RATE: 16 BRPM

## 2018-01-23 PROCEDURE — G0493 RN CARE EA 15 MIN HH/HOSPICE: HCPCS

## 2018-01-23 PROCEDURE — 665001 SOC-HOME HEALTH

## 2018-01-23 SDOH — ECONOMIC STABILITY: HOUSING INSECURITY: UNSAFE APPLIANCES: 0

## 2018-01-23 SDOH — ECONOMIC STABILITY: HOUSING INSECURITY: UNSAFE COOKING RANGE AREA: 0

## 2018-01-23 SDOH — ECONOMIC STABILITY: HOUSING INSECURITY: HOME SAFETY: PT NOT USING SUPP O2

## 2018-01-23 ASSESSMENT — ACTIVITIES OF DAILY LIVING (ADL)
HOME_HEALTH_OASIS: 01
OASIS_M1830: 06

## 2018-01-23 ASSESSMENT — ENCOUNTER SYMPTOMS
DEPRESSED MOOD: 1
NAUSEA: DENIES
THOUGHT CONTENT - SUSPICIOUS: 1

## 2018-01-23 ASSESSMENT — PATIENT HEALTH QUESTIONNAIRE - PHQ9
1. LITTLE INTEREST OR PLEASURE IN DOING THINGS: 03
2. FEELING DOWN, DEPRESSED, IRRITABLE, OR HOPELESS: 03

## 2018-01-24 ENCOUNTER — TELEPHONE (OUTPATIENT)
Dept: HEALTH INFORMATION MANAGEMENT | Facility: OTHER | Age: 38
End: 2018-01-24

## 2018-01-24 ENCOUNTER — OUTPATIENT INFUSION SERVICES (OUTPATIENT)
Dept: ONCOLOGY | Facility: MEDICAL CENTER | Age: 38
End: 2018-01-24
Attending: PSYCHIATRY & NEUROLOGY
Payer: MEDICARE

## 2018-01-24 ENCOUNTER — PATIENT OUTREACH (OUTPATIENT)
Dept: HEALTH INFORMATION MANAGEMENT | Facility: OTHER | Age: 38
End: 2018-01-24

## 2018-01-24 VITALS
OXYGEN SATURATION: 96 % | SYSTOLIC BLOOD PRESSURE: 137 MMHG | DIASTOLIC BLOOD PRESSURE: 83 MMHG | BODY MASS INDEX: 23.1 KG/M2 | HEIGHT: 71 IN | TEMPERATURE: 99 F | HEART RATE: 83 BPM | WEIGHT: 165 LBS | RESPIRATION RATE: 18 BRPM

## 2018-01-24 DIAGNOSIS — G35 MS (MULTIPLE SCLEROSIS) (HCC): ICD-10-CM

## 2018-01-24 PROCEDURE — 96366 THER/PROPH/DIAG IV INF ADDON: CPT

## 2018-01-24 PROCEDURE — 96415 CHEMO IV INFUSION ADDL HR: CPT

## 2018-01-24 PROCEDURE — 700111 HCHG RX REV CODE 636 W/ 250 OVERRIDE (IP): Performed by: PSYCHIATRY & NEUROLOGY

## 2018-01-24 PROCEDURE — 700105 HCHG RX REV CODE 258: Performed by: PSYCHIATRY & NEUROLOGY

## 2018-01-24 PROCEDURE — 96413 CHEMO IV INFUSION 1 HR: CPT

## 2018-01-24 PROCEDURE — 306780 HCHG STAT FOR TRANSFUSION PER CASE

## 2018-01-24 PROCEDURE — 306397 CUSHION, WAFFLE ADULT 19X19: Performed by: PSYCHIATRY & NEUROLOGY

## 2018-01-24 PROCEDURE — 96375 TX/PRO/DX INJ NEW DRUG ADDON: CPT

## 2018-01-24 PROCEDURE — 96365 THER/PROPH/DIAG IV INF INIT: CPT

## 2018-01-24 RX ORDER — DIPHENHYDRAMINE HYDROCHLORIDE 50 MG/ML
50 INJECTION INTRAMUSCULAR; INTRAVENOUS ONCE
Status: COMPLETED | OUTPATIENT
Start: 2018-01-24 | End: 2018-01-24

## 2018-01-24 RX ORDER — METHYLPREDNISOLONE SODIUM SUCCINATE 125 MG/2ML
125 INJECTION, POWDER, LYOPHILIZED, FOR SOLUTION INTRAMUSCULAR; INTRAVENOUS ONCE
Status: COMPLETED | OUTPATIENT
Start: 2018-01-24 | End: 2018-01-24

## 2018-01-24 RX ADMIN — DIPHENHYDRAMINE HYDROCHLORIDE 50 MG: 50 INJECTION INTRAMUSCULAR; INTRAVENOUS at 10:08

## 2018-01-24 RX ADMIN — OCRELIZUMAB 600 MG: 300 INJECTION INTRAVENOUS at 10:40

## 2018-01-24 RX ADMIN — METHYLPREDNISOLONE SODIUM SUCCINATE 125 MG: 125 INJECTION, POWDER, FOR SOLUTION INTRAMUSCULAR; INTRAVENOUS at 10:08

## 2018-01-24 ASSESSMENT — PAIN SCALES - GENERAL: PAINLEVEL: 5=MODERATE PAIN

## 2018-01-24 NOTE — TELEPHONE ENCOUNTER
Medications reviewed. Interaction noted between ondansetron and escitalopram for increased risk of qt-prolongation. Per chart review, patient has had this combination since 2016. Outbound call to patient. Spoke with patient's mother Kandi who manages medications. She states patient does not use ondansetron very often and has had no adverse effects with combination. Duplication identified with tizanidine and baclofen. Per Kandi, patient no longer takes tizanidine. No other clinically significant medication issues noted.     Amy Mayo, PharmD

## 2018-01-24 NOTE — PROGRESS NOTES
Received referral from [facility] for med rec. Medications reviewed. Interaction noted between ondansetron and escitalopram for increased risk of qt-prolongation. Per chart review, patient has had this combination since 2016. Outbound call to patient. Spoke with patient's mother Kandi who manages medications. She states patient does not use ondansetron very often and has had no adverse effects with combination. Duplication identified with tizanidine and baclofen. Per Kandi, patient no longer takes tizanidine. No other clinically significant medication issues noted.     Amy Mayo, PharmD

## 2018-01-25 NOTE — PROGRESS NOTES
Patient here for Ocrevus for multiple sclerosis. PIV established. Pre-medications given per MAR. Ocrevus started at 40 mL/hr, increase by 40 mL/hr every 30 minutes as tolerated to  mL/hr. PIV removed; gauze/coban applied over site. Assisted patient back into motorized wheelchair. Next appointment scheduled. Discharged to care of Mother; no apparent distress noted.

## 2018-01-29 ENCOUNTER — HOSPITAL ENCOUNTER (EMERGENCY)
Facility: MEDICAL CENTER | Age: 38
End: 2018-01-29
Attending: EMERGENCY MEDICINE
Payer: MEDICARE

## 2018-01-29 ENCOUNTER — HOME CARE VISIT (OUTPATIENT)
Dept: HOME HEALTH SERVICES | Facility: HOME HEALTHCARE | Age: 38
End: 2018-01-29
Payer: MEDICARE

## 2018-01-29 ENCOUNTER — PATIENT OUTREACH (OUTPATIENT)
Dept: HEALTH INFORMATION MANAGEMENT | Facility: OTHER | Age: 38
End: 2018-01-29

## 2018-01-29 VITALS
BODY MASS INDEX: 22.32 KG/M2 | RESPIRATION RATE: 20 BRPM | TEMPERATURE: 98.7 F | WEIGHT: 160 LBS | HEART RATE: 88 BPM | OXYGEN SATURATION: 93 % | SYSTOLIC BLOOD PRESSURE: 113 MMHG | DIASTOLIC BLOOD PRESSURE: 84 MMHG

## 2018-01-29 DIAGNOSIS — R11.2 NON-INTRACTABLE VOMITING WITH NAUSEA, UNSPECIFIED VOMITING TYPE: ICD-10-CM

## 2018-01-29 DIAGNOSIS — N39.0 URINARY TRACT INFECTION WITHOUT HEMATURIA, SITE UNSPECIFIED: ICD-10-CM

## 2018-01-29 LAB
ALBUMIN SERPL BCP-MCNC: 4.2 G/DL (ref 3.2–4.9)
ALBUMIN/GLOB SERPL: 1.5 G/DL
ALP SERPL-CCNC: 78 U/L (ref 30–99)
ALT SERPL-CCNC: 10 U/L (ref 2–50)
ANION GAP SERPL CALC-SCNC: 10 MMOL/L (ref 0–11.9)
APPEARANCE UR: ABNORMAL
AST SERPL-CCNC: 13 U/L (ref 12–45)
BACTERIA #/AREA URNS HPF: ABNORMAL /HPF
BASOPHILS # BLD AUTO: 0.7 % (ref 0–1.8)
BASOPHILS # BLD: 0.07 K/UL (ref 0–0.12)
BILIRUB SERPL-MCNC: 0.9 MG/DL (ref 0.1–1.5)
BILIRUB UR QL STRIP.AUTO: NEGATIVE
BUN SERPL-MCNC: 12 MG/DL (ref 8–22)
CALCIUM SERPL-MCNC: 10.2 MG/DL (ref 8.5–10.5)
CHLORIDE SERPL-SCNC: 103 MMOL/L (ref 96–112)
CO2 SERPL-SCNC: 25 MMOL/L (ref 20–33)
COLOR UR: YELLOW
CREAT SERPL-MCNC: 0.78 MG/DL (ref 0.5–1.4)
CULTURE IF INDICATED INDCX: YES UA CULTURE
EOSINOPHIL # BLD AUTO: 0.04 K/UL (ref 0–0.51)
EOSINOPHIL NFR BLD: 0.4 % (ref 0–6.9)
EPI CELLS #/AREA URNS HPF: ABNORMAL /HPF
ERYTHROCYTE [DISTWIDTH] IN BLOOD BY AUTOMATED COUNT: 38.9 FL (ref 35.9–50)
GLOBULIN SER CALC-MCNC: 2.8 G/DL (ref 1.9–3.5)
GLUCOSE SERPL-MCNC: 99 MG/DL (ref 65–99)
GLUCOSE UR STRIP.AUTO-MCNC: NEGATIVE MG/DL
HCT VFR BLD AUTO: 47.2 % (ref 42–52)
HGB BLD-MCNC: 16 G/DL (ref 14–18)
IMM GRANULOCYTES # BLD AUTO: 0.03 K/UL (ref 0–0.11)
IMM GRANULOCYTES NFR BLD AUTO: 0.3 % (ref 0–0.9)
KETONES UR STRIP.AUTO-MCNC: 80 MG/DL
LEUKOCYTE ESTERASE UR QL STRIP.AUTO: ABNORMAL
LYMPHOCYTES # BLD AUTO: 1.92 K/UL (ref 1–4.8)
LYMPHOCYTES NFR BLD: 18.2 % (ref 22–41)
MCH RBC QN AUTO: 30.3 PG (ref 27–33)
MCHC RBC AUTO-ENTMCNC: 33.9 G/DL (ref 33.7–35.3)
MCV RBC AUTO: 89.4 FL (ref 81.4–97.8)
MICRO URNS: ABNORMAL
MONOCYTES # BLD AUTO: 0.89 K/UL (ref 0–0.85)
MONOCYTES NFR BLD AUTO: 8.4 % (ref 0–13.4)
NEUTROPHILS # BLD AUTO: 7.61 K/UL (ref 1.82–7.42)
NEUTROPHILS NFR BLD: 72 % (ref 44–72)
NITRITE UR QL STRIP.AUTO: NEGATIVE
NRBC # BLD AUTO: 0 K/UL
NRBC BLD-RTO: 0 /100 WBC
PH UR STRIP.AUTO: >=9 [PH]
PLATELET # BLD AUTO: 326 K/UL (ref 164–446)
PMV BLD AUTO: 8.7 FL (ref 9–12.9)
POTASSIUM SERPL-SCNC: 3.6 MMOL/L (ref 3.6–5.5)
PROT SERPL-MCNC: 7 G/DL (ref 6–8.2)
PROT UR QL STRIP: NEGATIVE MG/DL
RBC # BLD AUTO: 5.28 M/UL (ref 4.7–6.1)
RBC # URNS HPF: ABNORMAL /HPF
RBC UR QL AUTO: NEGATIVE
SODIUM SERPL-SCNC: 138 MMOL/L (ref 135–145)
SP GR UR STRIP.AUTO: 1.02
UROBILINOGEN UR STRIP.AUTO-MCNC: 1 MG/DL
WBC # BLD AUTO: 10.6 K/UL (ref 4.8–10.8)
WBC #/AREA URNS HPF: ABNORMAL /HPF

## 2018-01-29 PROCEDURE — 700111 HCHG RX REV CODE 636 W/ 250 OVERRIDE (IP): Performed by: EMERGENCY MEDICINE

## 2018-01-29 PROCEDURE — 85025 COMPLETE CBC W/AUTO DIFF WBC: CPT

## 2018-01-29 PROCEDURE — 80053 COMPREHEN METABOLIC PANEL: CPT

## 2018-01-29 PROCEDURE — 87086 URINE CULTURE/COLONY COUNT: CPT

## 2018-01-29 PROCEDURE — 99285 EMERGENCY DEPT VISIT HI MDM: CPT

## 2018-01-29 PROCEDURE — 96374 THER/PROPH/DIAG INJ IV PUSH: CPT

## 2018-01-29 PROCEDURE — 81001 URINALYSIS AUTO W/SCOPE: CPT

## 2018-01-29 PROCEDURE — 700105 HCHG RX REV CODE 258: Performed by: EMERGENCY MEDICINE

## 2018-01-29 PROCEDURE — 96361 HYDRATE IV INFUSION ADD-ON: CPT

## 2018-01-29 RX ORDER — SODIUM CHLORIDE 9 MG/ML
1000 INJECTION, SOLUTION INTRAVENOUS ONCE
Status: COMPLETED | OUTPATIENT
Start: 2018-01-29 | End: 2018-01-29

## 2018-01-29 RX ORDER — SULFAMETHOXAZOLE AND TRIMETHOPRIM 800; 160 MG/1; MG/1
1 TABLET ORAL 2 TIMES DAILY
Qty: 14 TAB | Refills: 0 | Status: SHIPPED | OUTPATIENT
Start: 2018-01-29 | End: 2018-02-05

## 2018-01-29 RX ORDER — ONDANSETRON 4 MG/1
4 TABLET, ORALLY DISINTEGRATING ORAL EVERY 8 HOURS PRN
Qty: 10 TAB | Refills: 1 | Status: SHIPPED | OUTPATIENT
Start: 2018-01-29 | End: 2018-03-12

## 2018-01-29 RX ORDER — ONDANSETRON 2 MG/ML
4 INJECTION INTRAMUSCULAR; INTRAVENOUS ONCE
Status: COMPLETED | OUTPATIENT
Start: 2018-01-29 | End: 2018-01-29

## 2018-01-29 RX ORDER — METOCLOPRAMIDE 10 MG/1
10 TABLET ORAL EVERY 6 HOURS PRN
Qty: 10 TAB | Refills: 1 | Status: SHIPPED | OUTPATIENT
Start: 2018-01-29 | End: 2018-03-12

## 2018-01-29 RX ADMIN — SODIUM CHLORIDE 1000 ML: 9 INJECTION, SOLUTION INTRAVENOUS at 12:15

## 2018-01-29 RX ADMIN — ONDANSETRON 4 MG: 2 INJECTION INTRAMUSCULAR; INTRAVENOUS at 12:15

## 2018-01-29 ASSESSMENT — PAIN SCALES - GENERAL: PAINLEVEL_OUTOF10: 0

## 2018-01-29 NOTE — ED TRIAGE NOTES
36 y/o male BIB [facility] from home   Chief Complaint   Patient presents with   • N/V     x 2 started last night    • Other     decrease urine output     Pt has hx of MS, paraplegic,  Had an infusion Wednesday, increase in fluids, states he has to cath more.  Thursday started to have trouble about 100cc. On Friday pt had 2400 output.  Dark urine, started Saturday

## 2018-01-29 NOTE — DISCHARGE INSTRUCTIONS
Nausea, Adult  Nausea means you feel sick to your stomach or need to throw up (vomit). It may be a sign of a more serious problem. If nausea gets worse, you may throw up. If you throw up a lot, you may lose too much body fluid (dehydration).  HOME CARE   · Get plenty of rest.  · Ask your doctor how to replace body fluid losses (rehydrate).  · Eat small amounts of food. Sip liquids more often.  · Take all medicines as told by your doctor.  GET HELP RIGHT AWAY IF:  · You have a fever.  · You pass out (faint).  · You keep throwing up or have blood in your throw up.  · You are very weak, have dry lips or a dry mouth, or you are very thirsty (dehydrated).  · You have dark or bloody poop (stool).  · You have very bad chest or belly (abdominal) pain.  · You do not get better after 2 days, or you get worse.  · You have a headache.  MAKE SURE YOU:  · Understand these instructions.  · Will watch your condition.  · Will get help right away if you are not doing well or get worse.     This information is not intended to replace advice given to you by your health care provider. Make sure you discuss any questions you have with your health care provider.     Document Released: 12/06/2012 Document Revised: 03/11/2013 Document Reviewed: 12/06/2012  [employer] Interactive Patient Education ©2016 [employer]      Urinary Tract Infection  A urinary tract infection (UTI) can occur any place along the urinary tract. The tract includes the kidneys, ureters, bladder, and urethra. A type of germ called bacteria often causes a UTI. UTIs are often helped with antibiotic medicine.   HOME CARE   · If given, take antibiotics as told by your doctor. Finish them even if you start to feel better.  · Drink enough fluids to keep your pee (urine) clear or pale yellow.  · Avoid tea, drinks with caffeine, and bubbly (carbonated) drinks.  · Pee often. Avoid holding your pee in for a long time.  · Pee before and after having sex (intercourse).  · Wipe  from front to back after you poop (bowel movement) if you are a woman. Use each tissue only once.  GET HELP RIGHT AWAY IF:   · You have back pain.  · You have lower belly (abdominal) pain.  · You have chills.  · You feel sick to your stomach (nauseous).  · You throw up (vomit).  · Your burning or discomfort with peeing does not go away.  · You have a fever.  · Your symptoms are not better in 3 days.  MAKE SURE YOU:   · Understand these instructions.  · Will watch your condition.  · Will get help right away if you are not doing well or get worse.     This information is not intended to replace advice given to you by your health care provider. Make sure you discuss any questions you have with your health care provider.     Document Released: 06/05/2009 Document Revised: 01/08/2016 Document Reviewed: 07/18/2013  [employer] Interactive Patient Education ©2016 [employer]

## 2018-01-29 NOTE — PROGRESS NOTES
The patient is currently receiving [facility] services.  Plan: will monitor for discharge from .

## 2018-01-30 LAB
BACTERIA UR CULT: NORMAL
SIGNIFICANT IND 70042: NORMAL
SITE SITE: NORMAL
SOURCE SOURCE: NORMAL

## 2018-01-30 NOTE — DISCHARGE PLANNING
[employer] Authorization #[id number] for [facility] transport home.  [facility] notified.

## 2018-01-30 NOTE — ED NOTES
Patient dc'd to home w/ mother. Patient dc'd via [facility]. Patient alert and oriented. Patient and mother verbalize understanding of discharge instructions, follow up care, and Rx x 3. Patient denies questions upon discharge.

## 2018-01-31 ENCOUNTER — HOME CARE VISIT (OUTPATIENT)
Dept: HOME HEALTH SERVICES | Facility: HOME HEALTHCARE | Age: 38
End: 2018-01-31
Payer: MEDICARE

## 2018-01-31 VITALS — RESPIRATION RATE: 16 BRPM | TEMPERATURE: 99.3 F | HEART RATE: 94 BPM

## 2018-01-31 VITALS
TEMPERATURE: 99.3 F | SYSTOLIC BLOOD PRESSURE: 128 MMHG | DIASTOLIC BLOOD PRESSURE: 70 MMHG | HEART RATE: 94 BPM | RESPIRATION RATE: 16 BRPM | OXYGEN SATURATION: 98 %

## 2018-01-31 PROCEDURE — G0151 HHCP-SERV OF PT,EA 15 MIN: HCPCS

## 2018-01-31 PROCEDURE — G0153 HHCP-SVS OF S/L PATH,EA 15MN: HCPCS

## 2018-01-31 SDOH — ECONOMIC STABILITY: HOUSING INSECURITY: UNSAFE APPLIANCES: 0

## 2018-01-31 SDOH — ECONOMIC STABILITY: HOUSING INSECURITY: HOME SAFETY: PATIENT HAS LIFELINE

## 2018-01-31 SDOH — ECONOMIC STABILITY: HOUSING INSECURITY: UNSAFE COOKING RANGE AREA: 0

## 2018-01-31 ASSESSMENT — ACTIVITIES OF DAILY LIVING (ADL)
HOME_HEALTH_OASIS: 02
IADLS_COMMENTS: <!--EPICS-->REQUIRES ASSIST WITH ALL IADLS<!--EPICE-->

## 2018-01-31 ASSESSMENT — ENCOUNTER SYMPTOMS
DEBILITATING PAIN: 1
DEBILITATING PAIN: 1

## 2018-02-01 ENCOUNTER — HOME CARE VISIT (OUTPATIENT)
Dept: HOME HEALTH SERVICES | Facility: HOME HEALTHCARE | Age: 38
End: 2018-02-01
Payer: MEDICARE

## 2018-02-01 VITALS
RESPIRATION RATE: 17 BRPM | OXYGEN SATURATION: 98 % | DIASTOLIC BLOOD PRESSURE: 83 MMHG | HEART RATE: 95 BPM | SYSTOLIC BLOOD PRESSURE: 128 MMHG | TEMPERATURE: 99.3 F

## 2018-02-01 PROCEDURE — G0152 HHCP-SERV OF OT,EA 15 MIN: HCPCS

## 2018-02-01 SDOH — ECONOMIC STABILITY: HOUSING INSECURITY: UNSAFE APPLIANCES: 0

## 2018-02-01 SDOH — ECONOMIC STABILITY: HOUSING INSECURITY: UNSAFE COOKING RANGE AREA: 0

## 2018-02-01 ASSESSMENT — ACTIVITIES OF DAILY LIVING (ADL)
TOILETING_ASSISTANCE: 4
TELEPHONE_ASSISTANCE: 1
GROOMING_ASSISTANCE: 5
ORAL_CARE_ASSISTANCE: 5

## 2018-02-02 ENCOUNTER — HOME CARE VISIT (OUTPATIENT)
Dept: HOME HEALTH SERVICES | Facility: HOME HEALTHCARE | Age: 38
End: 2018-02-02
Payer: MEDICARE

## 2018-02-02 PROCEDURE — G0156 HHCP-SVS OF AIDE,EA 15 MIN: HCPCS

## 2018-02-02 PROCEDURE — G0299 HHS/HOSPICE OF RN EA 15 MIN: HCPCS

## 2018-02-03 VITALS
RESPIRATION RATE: 16 BRPM | OXYGEN SATURATION: 98 % | SYSTOLIC BLOOD PRESSURE: 108 MMHG | HEART RATE: 88 BPM | DIASTOLIC BLOOD PRESSURE: 68 MMHG | TEMPERATURE: 99.3 F

## 2018-02-05 ENCOUNTER — HOME CARE VISIT (OUTPATIENT)
Dept: HOME HEALTH SERVICES | Facility: HOME HEALTHCARE | Age: 38
End: 2018-02-05
Payer: MEDICARE

## 2018-02-05 ENCOUNTER — PATIENT OUTREACH (OUTPATIENT)
Dept: HEALTH INFORMATION MANAGEMENT | Facility: OTHER | Age: 38
End: 2018-02-05

## 2018-02-05 VITALS
DIASTOLIC BLOOD PRESSURE: 76 MMHG | SYSTOLIC BLOOD PRESSURE: 126 MMHG | TEMPERATURE: 99.5 F | OXYGEN SATURATION: 95 % | RESPIRATION RATE: 16 BRPM | HEART RATE: 88 BPM

## 2018-02-05 VITALS
HEART RATE: 88 BPM | RESPIRATION RATE: 16 BRPM | DIASTOLIC BLOOD PRESSURE: 76 MMHG | OXYGEN SATURATION: 95 % | SYSTOLIC BLOOD PRESSURE: 126 MMHG | TEMPERATURE: 99.5 F

## 2018-02-05 VITALS
DIASTOLIC BLOOD PRESSURE: 76 MMHG | HEART RATE: 88 BPM | SYSTOLIC BLOOD PRESSURE: 126 MMHG | TEMPERATURE: 99.5 F | OXYGEN SATURATION: 95 % | RESPIRATION RATE: 16 BRPM

## 2018-02-05 VITALS
HEART RATE: 88 BPM | TEMPERATURE: 99.3 F | SYSTOLIC BLOOD PRESSURE: 108 MMHG | RESPIRATION RATE: 16 BRPM | OXYGEN SATURATION: 98 % | DIASTOLIC BLOOD PRESSURE: 68 MMHG

## 2018-02-05 PROCEDURE — G0152 HHCP-SERV OF OT,EA 15 MIN: HCPCS

## 2018-02-05 PROCEDURE — G0151 HHCP-SERV OF PT,EA 15 MIN: HCPCS

## 2018-02-05 PROCEDURE — G0156 HHCP-SVS OF AIDE,EA 15 MIN: HCPCS

## 2018-02-05 PROCEDURE — G0299 HHS/HOSPICE OF RN EA 15 MIN: HCPCS

## 2018-02-05 ASSESSMENT — ENCOUNTER SYMPTOMS
NAUSEA: DENIES
POOR JUDGMENT: 1
THOUGHT CONTENT - SUSPICIOUS: 1
DEPRESSED MOOD: 1
DIFFICULTY THINKING: 1

## 2018-02-06 VITALS
OXYGEN SATURATION: 95 % | RESPIRATION RATE: 16 BRPM | DIASTOLIC BLOOD PRESSURE: 76 MMHG | SYSTOLIC BLOOD PRESSURE: 126 MMHG | HEART RATE: 88 BPM | TEMPERATURE: 99.5 F

## 2018-02-06 ASSESSMENT — ENCOUNTER SYMPTOMS
POOR JUDGMENT: 1
DEPRESSED MOOD: 1
NAUSEA: DENIES

## 2018-02-06 NOTE — PROGRESS NOTES
Chart reviewed. The patient is currently receiving [facility] services.  Plan: will monitor for discharge from .

## 2018-02-07 ENCOUNTER — HOME CARE VISIT (OUTPATIENT)
Dept: HOME HEALTH SERVICES | Facility: HOME HEALTHCARE | Age: 38
End: 2018-02-07
Payer: MEDICARE

## 2018-02-07 VITALS
SYSTOLIC BLOOD PRESSURE: 123 MMHG | RESPIRATION RATE: 17 BRPM | DIASTOLIC BLOOD PRESSURE: 88 MMHG | HEART RATE: 74 BPM | TEMPERATURE: 98.4 F | OXYGEN SATURATION: 99 %

## 2018-02-07 PROCEDURE — G0152 HHCP-SERV OF OT,EA 15 MIN: HCPCS

## 2018-02-08 ENCOUNTER — HOME CARE VISIT (OUTPATIENT)
Dept: HOME HEALTH SERVICES | Facility: HOME HEALTHCARE | Age: 38
End: 2018-02-08
Payer: MEDICARE

## 2018-02-08 PROCEDURE — G0151 HHCP-SERV OF PT,EA 15 MIN: HCPCS

## 2018-02-08 PROCEDURE — G0156 HHCP-SVS OF AIDE,EA 15 MIN: HCPCS

## 2018-02-09 ENCOUNTER — HOME CARE VISIT (OUTPATIENT)
Dept: HOME HEALTH SERVICES | Facility: HOME HEALTHCARE | Age: 38
End: 2018-02-09
Payer: MEDICARE

## 2018-02-09 VITALS
HEART RATE: 90 BPM | DIASTOLIC BLOOD PRESSURE: 82 MMHG | TEMPERATURE: 99.7 F | RESPIRATION RATE: 16 BRPM | OXYGEN SATURATION: 99 % | SYSTOLIC BLOOD PRESSURE: 128 MMHG

## 2018-02-09 VITALS
SYSTOLIC BLOOD PRESSURE: 126 MMHG | TEMPERATURE: 98.6 F | DIASTOLIC BLOOD PRESSURE: 84 MMHG | RESPIRATION RATE: 18 BRPM | HEART RATE: 87 BPM | OXYGEN SATURATION: 99 %

## 2018-02-09 PROCEDURE — G0299 HHS/HOSPICE OF RN EA 15 MIN: HCPCS

## 2018-02-12 ENCOUNTER — HOME CARE VISIT (OUTPATIENT)
Dept: HOME HEALTH SERVICES | Facility: HOME HEALTHCARE | Age: 38
End: 2018-02-12
Payer: MEDICARE

## 2018-02-12 VITALS
OXYGEN SATURATION: 99 % | RESPIRATION RATE: 16 BRPM | SYSTOLIC BLOOD PRESSURE: 126 MMHG | DIASTOLIC BLOOD PRESSURE: 74 MMHG | TEMPERATURE: 99 F | HEART RATE: 88 BPM

## 2018-02-12 VITALS
DIASTOLIC BLOOD PRESSURE: 60 MMHG | RESPIRATION RATE: 16 BRPM | HEART RATE: 81 BPM | TEMPERATURE: 99 F | SYSTOLIC BLOOD PRESSURE: 114 MMHG | OXYGEN SATURATION: 98 %

## 2018-02-12 PROCEDURE — G0299 HHS/HOSPICE OF RN EA 15 MIN: HCPCS

## 2018-02-12 PROCEDURE — G0156 HHCP-SVS OF AIDE,EA 15 MIN: HCPCS

## 2018-02-12 PROCEDURE — G0152 HHCP-SERV OF OT,EA 15 MIN: HCPCS

## 2018-02-12 ASSESSMENT — ENCOUNTER SYMPTOMS
THOUGHT CONTENT - SUSPICIOUS: 1
POOR JUDGMENT: 1
NAUSEA: DENIES
DEPRESSED MOOD: 1

## 2018-02-13 ENCOUNTER — PATIENT OUTREACH (OUTPATIENT)
Dept: HEALTH INFORMATION MANAGEMENT | Facility: OTHER | Age: 38
End: 2018-02-13

## 2018-02-13 VITALS
RESPIRATION RATE: 16 BRPM | HEART RATE: 81 BPM | TEMPERATURE: 99.4 F | DIASTOLIC BLOOD PRESSURE: 60 MMHG | OXYGEN SATURATION: 98 % | SYSTOLIC BLOOD PRESSURE: 114 MMHG

## 2018-02-13 VITALS
RESPIRATION RATE: 16 BRPM | TEMPERATURE: 99 F | DIASTOLIC BLOOD PRESSURE: 60 MMHG | HEART RATE: 81 BPM | SYSTOLIC BLOOD PRESSURE: 119 MMHG | OXYGEN SATURATION: 98 %

## 2018-02-13 ASSESSMENT — ENCOUNTER SYMPTOMS
NAUSEA: DENIES
POOR JUDGMENT: 1
DEPRESSED MOOD: 1

## 2018-02-13 NOTE — PROGRESS NOTES
Received return call from pt's mother. LSW provided information obtained via research into LTC options in [place]. She reported she will discuss with pt and her  to decide if they would like to move forward with referrals to facilities in [place] or to continue current services here in [place]. Discussed next steps if family/pt would like to move forward with LTC options in [place]. Mother voiced understanding and reported she would notify LSW of how family would like to move forward.     Plan:  · LSW will continue to follow up and assist with care plan and interventions as needed.

## 2018-02-13 NOTE — PROGRESS NOTES
RESHMAW was able to research services in [place] and per research the state Medicaid does cover long-term care in a SNF and Assisted Living Facilities pending level of care needs. [facility] [place] [facility] Medicaid also covers in home care services similar to what pt is receiving current in the state [facility]. Discussed process of applying for coverage with a few Skilled Nursing Facilities in [place], which reported after pt is accepted to facility they are able to submit the application day one once pt is physically in [place] (per report there is no wait time for residency). Indicated pt does have to be admitted to facility for 30 days to qualify and there needs to be a functional assessment however the state will retro back payment. Discussed with a few SNF in [place] the culture around facilities accepting Medicaid as a payor source. Individuals reported many of their population in LTC are Medicaid funded and did not appear to be an issue with facilities not wanting to accept Medicaid as a payor source. One representative provided [employer] with their Medicaid  who would determine eligibility of application Yolanda at [phone].  LSW was able to contact individual to determine what application would need to be completed by pt if he would like to move forward with this option and some instructions on how to complete.     Outreach call to pt's mother to provide update and discuss how family would like to move forward and follow up on pt's current status. Pt is currently on Home Health Care services with [facility] after admission to ED on 01/29/2018. There was no answer. LSW left  requesting call back.     Plan:  · LSW will attempt to call at later time/date, if LSW does not hear back from mother.

## 2018-02-14 ENCOUNTER — HOME CARE VISIT (OUTPATIENT)
Dept: HOME HEALTH SERVICES | Facility: HOME HEALTHCARE | Age: 38
End: 2018-02-14
Payer: MEDICARE

## 2018-02-14 ENCOUNTER — PATIENT OUTREACH (OUTPATIENT)
Dept: HEALTH INFORMATION MANAGEMENT | Facility: OTHER | Age: 38
End: 2018-02-14

## 2018-02-14 VITALS
OXYGEN SATURATION: 98 % | DIASTOLIC BLOOD PRESSURE: 75 MMHG | SYSTOLIC BLOOD PRESSURE: 115 MMHG | TEMPERATURE: 98.2 F | RESPIRATION RATE: 18 BRPM | HEART RATE: 79 BPM

## 2018-02-14 PROCEDURE — G0156 HHCP-SVS OF AIDE,EA 15 MIN: HCPCS

## 2018-02-14 PROCEDURE — G0152 HHCP-SERV OF OT,EA 15 MIN: HCPCS

## 2018-02-14 NOTE — PROGRESS NOTES
Chart reviewed. The patient is currently receiving [facility] services.  Plan: will monitor for discharge from .

## 2018-02-15 VITALS
OXYGEN SATURATION: 98 % | HEART RATE: 79 BPM | DIASTOLIC BLOOD PRESSURE: 75 MMHG | TEMPERATURE: 98.2 F | RESPIRATION RATE: 18 BRPM | SYSTOLIC BLOOD PRESSURE: 115 MMHG

## 2018-02-16 ENCOUNTER — HOME CARE VISIT (OUTPATIENT)
Dept: HOME HEALTH SERVICES | Facility: HOME HEALTHCARE | Age: 38
End: 2018-02-16
Payer: MEDICARE

## 2018-02-16 VITALS
SYSTOLIC BLOOD PRESSURE: 110 MMHG | RESPIRATION RATE: 18 BRPM | DIASTOLIC BLOOD PRESSURE: 70 MMHG | TEMPERATURE: 99.5 F | OXYGEN SATURATION: 98 % | HEART RATE: 88 BPM

## 2018-02-16 PROCEDURE — G0151 HHCP-SERV OF PT,EA 15 MIN: HCPCS

## 2018-02-16 PROCEDURE — G0299 HHS/HOSPICE OF RN EA 15 MIN: HCPCS

## 2018-02-19 ENCOUNTER — HOME CARE VISIT (OUTPATIENT)
Dept: HOME HEALTH SERVICES | Facility: HOME HEALTHCARE | Age: 38
End: 2018-02-19
Payer: MEDICARE

## 2018-02-19 PROCEDURE — G0299 HHS/HOSPICE OF RN EA 15 MIN: HCPCS

## 2018-02-20 ENCOUNTER — PATIENT OUTREACH (OUTPATIENT)
Dept: HEALTH INFORMATION MANAGEMENT | Facility: OTHER | Age: 38
End: 2018-02-20

## 2018-02-20 VITALS
HEART RATE: 88 BPM | OXYGEN SATURATION: 99 % | HEART RATE: 80 BPM | OXYGEN SATURATION: 98 % | RESPIRATION RATE: 18 BRPM | SYSTOLIC BLOOD PRESSURE: 110 MMHG | DIASTOLIC BLOOD PRESSURE: 70 MMHG | TEMPERATURE: 99.5 F | RESPIRATION RATE: 18 BRPM | SYSTOLIC BLOOD PRESSURE: 110 MMHG | TEMPERATURE: 99 F | DIASTOLIC BLOOD PRESSURE: 70 MMHG

## 2018-02-20 ASSESSMENT — ENCOUNTER SYMPTOMS
DEPRESSED MOOD: 1
NAUSEA: DENIES
RESPIRATORY SYMPTOMS COMMENTS: DENIES SOB RESP EVEN AND UNLABORED
DEPRESSED MOOD: 1
NAUSEA: DENIES

## 2018-02-21 ENCOUNTER — HOME CARE VISIT (OUTPATIENT)
Dept: HOME HEALTH SERVICES | Facility: HOME HEALTHCARE | Age: 38
End: 2018-02-21
Payer: MEDICARE

## 2018-02-21 VITALS
SYSTOLIC BLOOD PRESSURE: 132 MMHG | TEMPERATURE: 98.6 F | RESPIRATION RATE: 18 BRPM | HEART RATE: 83 BPM | OXYGEN SATURATION: 98 % | DIASTOLIC BLOOD PRESSURE: 87 MMHG

## 2018-02-21 PROCEDURE — G0156 HHCP-SVS OF AIDE,EA 15 MIN: HCPCS

## 2018-02-21 NOTE — PROGRESS NOTES
Chart reviewed. The patient is currently receiving [facility] services.  Plan: will monitor for discharge from .

## 2018-02-22 ENCOUNTER — HOME CARE VISIT (OUTPATIENT)
Dept: HOME HEALTH SERVICES | Facility: HOME HEALTHCARE | Age: 38
End: 2018-02-22
Payer: MEDICARE

## 2018-02-22 PROCEDURE — G0151 HHCP-SERV OF PT,EA 15 MIN: HCPCS

## 2018-02-22 PROCEDURE — G0299 HHS/HOSPICE OF RN EA 15 MIN: HCPCS

## 2018-02-23 ENCOUNTER — HOME CARE VISIT (OUTPATIENT)
Dept: HOME HEALTH SERVICES | Facility: HOME HEALTHCARE | Age: 38
End: 2018-02-23
Payer: MEDICARE

## 2018-02-23 VITALS
SYSTOLIC BLOOD PRESSURE: 128 MMHG | DIASTOLIC BLOOD PRESSURE: 82 MMHG | TEMPERATURE: 99.3 F | HEART RATE: 83 BPM | RESPIRATION RATE: 18 BRPM

## 2018-02-26 ENCOUNTER — HOME CARE VISIT (OUTPATIENT)
Dept: HOME HEALTH SERVICES | Facility: HOME HEALTHCARE | Age: 38
End: 2018-02-26
Payer: MEDICARE

## 2018-02-26 PROCEDURE — G0299 HHS/HOSPICE OF RN EA 15 MIN: HCPCS

## 2018-02-27 ENCOUNTER — HOME CARE VISIT (OUTPATIENT)
Dept: HOME HEALTH SERVICES | Facility: HOME HEALTHCARE | Age: 38
End: 2018-02-27
Payer: MEDICARE

## 2018-02-27 VITALS
OXYGEN SATURATION: 98 % | DIASTOLIC BLOOD PRESSURE: 82 MMHG | RESPIRATION RATE: 18 BRPM | SYSTOLIC BLOOD PRESSURE: 128 MMHG | TEMPERATURE: 99.3 F | HEART RATE: 83 BPM

## 2018-02-27 PROCEDURE — G0156 HHCP-SVS OF AIDE,EA 15 MIN: HCPCS

## 2018-02-27 ASSESSMENT — ENCOUNTER SYMPTOMS
RESPIRATORY SYMPTOMS COMMENTS: DENIES SOB, RESP EVEN AND UNLABORED
NAUSEA: DENIES

## 2018-02-28 ENCOUNTER — HOSPITAL ENCOUNTER (OUTPATIENT)
Facility: MEDICAL CENTER | Age: 38
End: 2018-02-28
Attending: PHYSICIAN ASSISTANT
Payer: MEDICARE

## 2018-02-28 VITALS
RESPIRATION RATE: 20 BRPM | SYSTOLIC BLOOD PRESSURE: 119 MMHG | OXYGEN SATURATION: 99 % | TEMPERATURE: 99.7 F | DIASTOLIC BLOOD PRESSURE: 86 MMHG | HEART RATE: 88 BPM

## 2018-02-28 PROCEDURE — 87077 CULTURE AEROBIC IDENTIFY: CPT

## 2018-02-28 PROCEDURE — 87086 URINE CULTURE/COLONY COUNT: CPT

## 2018-03-01 ENCOUNTER — HOME CARE VISIT (OUTPATIENT)
Dept: HOME HEALTH SERVICES | Facility: HOME HEALTHCARE | Age: 38
End: 2018-03-01
Payer: MEDICARE

## 2018-03-01 PROCEDURE — G0299 HHS/HOSPICE OF RN EA 15 MIN: HCPCS

## 2018-03-02 LAB
BACTERIA UR CULT: NORMAL
SIGNIFICANT IND 70042: NORMAL
SITE SITE: NORMAL
SOURCE SOURCE: NORMAL

## 2018-03-05 ENCOUNTER — PATIENT OUTREACH (OUTPATIENT)
Dept: HEALTH INFORMATION MANAGEMENT | Facility: OTHER | Age: 38
End: 2018-03-05

## 2018-03-05 ENCOUNTER — HOME CARE VISIT (OUTPATIENT)
Dept: HOME HEALTH SERVICES | Facility: HOME HEALTHCARE | Age: 38
End: 2018-03-05
Payer: MEDICARE

## 2018-03-05 VITALS
DIASTOLIC BLOOD PRESSURE: 86 MMHG | TEMPERATURE: 99.2 F | HEART RATE: 82 BPM | RESPIRATION RATE: 18 BRPM | OXYGEN SATURATION: 96 % | SYSTOLIC BLOOD PRESSURE: 130 MMHG

## 2018-03-05 PROCEDURE — G0299 HHS/HOSPICE OF RN EA 15 MIN: HCPCS

## 2018-03-05 ASSESSMENT — ENCOUNTER SYMPTOMS
NAUSEA: DENIES
DEPRESSED MOOD: 1

## 2018-03-05 NOTE — PROGRESS NOTES
Chart reviewed. The patient is currently receiving [facility] services.  Plan: will monitor for discharge from .

## 2018-03-06 ENCOUNTER — HOME CARE VISIT (OUTPATIENT)
Dept: HOME HEALTH SERVICES | Facility: HOME HEALTHCARE | Age: 38
End: 2018-03-06
Payer: MEDICARE

## 2018-03-06 VITALS
TEMPERATURE: 99.5 F | HEART RATE: 78 BPM | DIASTOLIC BLOOD PRESSURE: 64 MMHG | RESPIRATION RATE: 16 BRPM | SYSTOLIC BLOOD PRESSURE: 122 MMHG | OXYGEN SATURATION: 98 % | HEART RATE: 85 BPM | OXYGEN SATURATION: 100 % | SYSTOLIC BLOOD PRESSURE: 122 MMHG | DIASTOLIC BLOOD PRESSURE: 66 MMHG | RESPIRATION RATE: 16 BRPM | TEMPERATURE: 97.7 F

## 2018-03-06 VITALS
HEART RATE: 98 BPM | SYSTOLIC BLOOD PRESSURE: 124 MMHG | OXYGEN SATURATION: 97 % | RESPIRATION RATE: 17 BRPM | DIASTOLIC BLOOD PRESSURE: 80 MMHG | TEMPERATURE: 100.2 F

## 2018-03-06 VITALS
SYSTOLIC BLOOD PRESSURE: 124 MMHG | RESPIRATION RATE: 18 BRPM | HEART RATE: 98 BPM | DIASTOLIC BLOOD PRESSURE: 80 MMHG | OXYGEN SATURATION: 97 % | TEMPERATURE: 100.2 F

## 2018-03-06 PROCEDURE — G0151 HHCP-SERV OF PT,EA 15 MIN: HCPCS

## 2018-03-06 PROCEDURE — G0156 HHCP-SVS OF AIDE,EA 15 MIN: HCPCS

## 2018-03-06 ASSESSMENT — ENCOUNTER SYMPTOMS
DEPRESSED MOOD: 1
NAUSEA: DENIES
DEPRESSED MOOD: 1
RESPIRATORY SYMPTOMS COMMENTS: DENIES SOB, RESP EVEN AND UNLABORED
RESPIRATORY SYMPTOMS COMMENTS: DENIES SOB, RESP EVEN AND UNLABORED
NAUSEA: DENIES

## 2018-03-07 ENCOUNTER — HOME CARE VISIT (OUTPATIENT)
Dept: HOME HEALTH SERVICES | Facility: HOME HEALTHCARE | Age: 38
End: 2018-03-07
Payer: MEDICARE

## 2018-03-08 ENCOUNTER — HOME CARE VISIT (OUTPATIENT)
Dept: HOME HEALTH SERVICES | Facility: HOME HEALTHCARE | Age: 38
End: 2018-03-08
Payer: MEDICARE

## 2018-03-08 VITALS
DIASTOLIC BLOOD PRESSURE: 80 MMHG | SYSTOLIC BLOOD PRESSURE: 128 MMHG | OXYGEN SATURATION: 98 % | TEMPERATURE: 96.9 F | RESPIRATION RATE: 18 BRPM | HEART RATE: 88 BPM

## 2018-03-08 PROCEDURE — G0299 HHS/HOSPICE OF RN EA 15 MIN: HCPCS

## 2018-03-08 PROCEDURE — G0156 HHCP-SVS OF AIDE,EA 15 MIN: HCPCS

## 2018-03-09 VITALS
TEMPERATURE: 99.1 F | RESPIRATION RATE: 18 BRPM | DIASTOLIC BLOOD PRESSURE: 80 MMHG | SYSTOLIC BLOOD PRESSURE: 128 MMHG | OXYGEN SATURATION: 96 % | HEART RATE: 88 BPM

## 2018-03-09 ASSESSMENT — ENCOUNTER SYMPTOMS
DEPRESSED MOOD: 1
NAUSEA: DENIES
POOR JUDGMENT: 1
RESPIRATORY SYMPTOMS COMMENTS: DENIES SOB, RESP EVEN AND UNLABORED

## 2018-03-11 ENCOUNTER — HOSPITAL ENCOUNTER (EMERGENCY)
Facility: MEDICAL CENTER | Age: 38
DRG: 698 | End: 2018-03-11
Attending: EMERGENCY MEDICINE
Payer: MEDICARE

## 2018-03-11 VITALS
WEIGHT: 165 LBS | TEMPERATURE: 97.6 F | DIASTOLIC BLOOD PRESSURE: 87 MMHG | BODY MASS INDEX: 23.1 KG/M2 | RESPIRATION RATE: 14 BRPM | HEART RATE: 78 BPM | HEIGHT: 71 IN | SYSTOLIC BLOOD PRESSURE: 130 MMHG

## 2018-03-11 DIAGNOSIS — K62.5 RECTAL BLEEDING: ICD-10-CM

## 2018-03-11 LAB
ABO GROUP BLD: NORMAL
ALBUMIN SERPL BCP-MCNC: 4.4 G/DL (ref 3.2–4.9)
ALBUMIN/GLOB SERPL: 2.1 G/DL
ALP SERPL-CCNC: 54 U/L (ref 30–99)
ALT SERPL-CCNC: 12 U/L (ref 2–50)
ANION GAP SERPL CALC-SCNC: 7 MMOL/L (ref 0–11.9)
APTT PPP: 30.7 SEC (ref 24.7–36)
AST SERPL-CCNC: 13 U/L (ref 12–45)
BASOPHILS # BLD AUTO: 0.8 % (ref 0–1.8)
BASOPHILS # BLD: 0.05 K/UL (ref 0–0.12)
BILIRUB SERPL-MCNC: 0.8 MG/DL (ref 0.1–1.5)
BLD GP AB SCN SERPL QL: NORMAL
BUN SERPL-MCNC: 14 MG/DL (ref 8–22)
CALCIUM SERPL-MCNC: 10 MG/DL (ref 8.5–10.5)
CHLORIDE SERPL-SCNC: 108 MMOL/L (ref 96–112)
CO2 SERPL-SCNC: 26 MMOL/L (ref 20–33)
CREAT SERPL-MCNC: 0.92 MG/DL (ref 0.5–1.4)
EOSINOPHIL # BLD AUTO: 0.12 K/UL (ref 0–0.51)
EOSINOPHIL NFR BLD: 1.9 % (ref 0–6.9)
ERYTHROCYTE [DISTWIDTH] IN BLOOD BY AUTOMATED COUNT: 40.1 FL (ref 35.9–50)
GLOBULIN SER CALC-MCNC: 2.1 G/DL (ref 1.9–3.5)
GLUCOSE SERPL-MCNC: 100 MG/DL (ref 65–99)
HCT VFR BLD AUTO: 45.7 % (ref 42–52)
HGB BLD-MCNC: 15.9 G/DL (ref 14–18)
IMM GRANULOCYTES # BLD AUTO: 0.01 K/UL (ref 0–0.11)
IMM GRANULOCYTES NFR BLD AUTO: 0.2 % (ref 0–0.9)
INR PPP: 1.02 (ref 0.87–1.13)
LYMPHOCYTES # BLD AUTO: 1.4 K/UL (ref 1–4.8)
LYMPHOCYTES NFR BLD: 21.7 % (ref 22–41)
MCH RBC QN AUTO: 30.9 PG (ref 27–33)
MCHC RBC AUTO-ENTMCNC: 34.8 G/DL (ref 33.7–35.3)
MCV RBC AUTO: 88.9 FL (ref 81.4–97.8)
MONOCYTES # BLD AUTO: 0.59 K/UL (ref 0–0.85)
MONOCYTES NFR BLD AUTO: 9.2 % (ref 0–13.4)
NEUTROPHILS # BLD AUTO: 4.27 K/UL (ref 1.82–7.42)
NEUTROPHILS NFR BLD: 66.2 % (ref 44–72)
NRBC # BLD AUTO: 0 K/UL
NRBC BLD-RTO: 0 /100 WBC
PLATELET # BLD AUTO: 290 K/UL (ref 164–446)
PMV BLD AUTO: 8.6 FL (ref 9–12.9)
POTASSIUM SERPL-SCNC: 4.1 MMOL/L (ref 3.6–5.5)
PROT SERPL-MCNC: 6.5 G/DL (ref 6–8.2)
PROTHROMBIN TIME: 13.1 SEC (ref 12–14.6)
RBC # BLD AUTO: 5.14 M/UL (ref 4.7–6.1)
RH BLD: NORMAL
SODIUM SERPL-SCNC: 141 MMOL/L (ref 135–145)
WBC # BLD AUTO: 6.4 K/UL (ref 4.8–10.8)

## 2018-03-11 PROCEDURE — 36415 COLL VENOUS BLD VENIPUNCTURE: CPT

## 2018-03-11 PROCEDURE — 86850 RBC ANTIBODY SCREEN: CPT

## 2018-03-11 PROCEDURE — 85025 COMPLETE CBC W/AUTO DIFF WBC: CPT

## 2018-03-11 PROCEDURE — 86900 BLOOD TYPING SEROLOGIC ABO: CPT

## 2018-03-11 PROCEDURE — 85730 THROMBOPLASTIN TIME PARTIAL: CPT

## 2018-03-11 PROCEDURE — 99284 EMERGENCY DEPT VISIT MOD MDM: CPT

## 2018-03-11 PROCEDURE — 86901 BLOOD TYPING SEROLOGIC RH(D): CPT

## 2018-03-11 PROCEDURE — 82272 OCCULT BLD FECES 1-3 TESTS: CPT

## 2018-03-11 PROCEDURE — 85610 PROTHROMBIN TIME: CPT

## 2018-03-11 PROCEDURE — 80053 COMPREHEN METABOLIC PANEL: CPT

## 2018-03-11 ASSESSMENT — PAIN SCALES - GENERAL: PAINLEVEL_OUTOF10: 2

## 2018-03-11 ASSESSMENT — PAIN SCALES - WONG BAKER: WONGBAKER_NUMERICALRESPONSE: DOESN'T HURT AT ALL

## 2018-03-11 NOTE — ED TRIAGE NOTES
Pt BIB EMS from home. Home health nurse noted bright red blood in stool this morning.  Pt also had a small amount of blood in stool 2 days ago. Pt denies abdominal area, dizziness, and weakness.

## 2018-03-11 NOTE — DISCHARGE PLANNING
Per ER PEYTON Pritchett, transport has been arranged for patient to transfer to his home today at 1415 via [facility].  Spoke with Edison at [facility] he is aware of transport request and will call [facility] with insurance authorization. ER PEYTON Pritchett advised of transport time.

## 2018-03-11 NOTE — ED PROVIDER NOTES
ED Provider Note    CHIEF COMPLAINT  Chief Complaint   Patient presents with   • Rectal Bleeding       HPI  Jerzy Juan III is a 37 y.o. male with a history of multiple sclerosis, blood transfusions, PTSD, headache, urinary incontinence, with permanent condom catheter, who presents with complaints of blood in his stool for the past 2 days. Apparently the home healthcare nurse noted there was blood in his stool 2 days ago, and again today. The patient is unable to tell me the amount of blood, as he says he did not see the stool. He denies having any abdominal pain, nausea, vomiting, or dizziness. He has had no fever, chills, sore throat, has had a slight cough, but denies any new difficulty breathing.    REVIEW OF SYSTEMS  See HPI for further details. All other systems are negative.     PAST MEDICAL HISTORY  Past Medical History:   Diagnosis Date   • Back pain    • Blood transfusion    • Depression     PTSD/clinical depression   • Fall     4/6/2016   • Headache(784.0)    • Heart murmur    • MS (multiple sclerosis) (CMS-[facility]) DX 8/20/2013   • Urinary incontinence     condom catheter       FAMILY HISTORY  Family History   Problem Relation Age of Onset   • Thyroid Mother        SOCIAL HISTORY  Social History     Social History   • Marital status:      Spouse name: N/A   • Number of children: N/A   • Years of education: N/A     Social History Main Topics   • Smoking status: Former Smoker     Start date: 8/1/1997     Quit date: 8/1/2013   • Smokeless tobacco: Former User   • Alcohol use 0.0 oz/week      Comment: 1 beer/day   • Drug use: Yes     Types: Inhaled      Comment: marijuana   • Sexual activity: Not on file      Comment: once daily for 23 yrs     Other Topics Concern   • Not on file     Social History Narrative   • No narrative on file       SURGICAL HISTORY  Past Surgical History:   Procedure Laterality Date   • GASTROSCOPY  1/14/2017    Procedure: GASTROSCOPY WITH FECAL MATTER TRANSPLANT;   "Surgeon: Obinna Douglas M.D.;  Location: SURGERY [facility];  Service:    • OPEN REDUCTION         CURRENT MEDICATIONS  Home Medications     Reviewed by Grace Nieves R.N. (Registered Nurse) on 03/11/18 at 1053  Med List Status: Unable to Obtain   Medication Last Dose Status   baclofen (LIORESAL) 20 MG tablet 1/28/2018 Active   buPROPion SR (WELLBUTRIN-SR) 150 MG TABLET SR 12 HR sustained-release tablet 1/28/2018 Active   Cholecalciferol (VITAMIN D3) 5000 UNITS Tab 1/28/2018 Active   Cranberry 125 MG Tab 1/28/2018 Active   docusate sodium (COLACE) 250 MG capsule 1/28/2018 Active   escitalopram (LEXAPRO) 20 MG tablet 1/28/2018 Active   losartan (COZAAR) 50 MG Tab 1/28/2018 Active   Magnesium 300 MG Cap 1/28/2018 Active   methenamine hip (HIPPREX) 1 GM Tab 1/28/2018 Active   metoclopramide (REGLAN) 10 MG Tab  Active   Multiple Vitamins-Minerals (MULTIVITAMIN & MINERAL PO) 1/28/2018 Active   NS SOLN 250 mL with ocrelizumab 300 MG/10ML SOLN 300 mg 1/24/2018 Active   omeprazole (PRILOSEC) 20 MG delayed-release capsule 1/28/2018 Active   ondansetron (ZOFRAN ODT) 4 MG TABLET DISPERSIBLE  Active   ondansetron (ZOFRAN) 8 MG Tab 1/28/2018 Active   oxybutynin SR (DITROPAN-XL) 10 MG CR tablet 1/28/2018 Active   polyethylene glycol/lytes (MIRALAX) Pack Unknown Active   Probiotic Product (PROBIOTIC DAILY PO) 1/28/2018 Active   psyllium (METAMUCIL) 58.12 % Pack 1/28/2018 Active                ALLERGIES  Allergies   Allergen Reactions   • Other Drug      CANNOT TAKE ANY STEROIDS WITH RECENT TYSABRI INFUSION   • Asa [Aspirin] Unspecified     Bleeding in stomach as a baby   • Gabapentin Anxiety     anger and severe mood swing   • Lyrica Anxiety     anger and severe mood swing       PHYSICAL EXAM  VITAL SIGNS: /82   Pulse 74   Temp 36.4 °C (97.6 °F)   Resp 14   Ht 1.803 m (5' 11\")   Wt 74.8 kg (165 lb)   BMI 23.01 kg/m²   Constitutional: Awake, alert, in no acute distress, Non-toxic appearance. Wearing " sunglasses.  HENT: Atraumatic. Bilateral external ears normal, mucous membranes moist, throat nonerythematous without exudates, nose is normal.  Eyes: PERRL, EOMI, conjunctiva moist, noninjected.  Neck: Nontender, Normal range of motion, No nuchal rigidity, No stridor.   Lymphatic: No lymphadenopathy noted.   Cardiovascular: Regular rate and rhythm, no murmurs, rubs, gallops.  Thorax & Lungs:  Good breath sounds bilaterally, no wheezes, rales, or retractions.  No chest tenderness.  Abdomen: Bowel sounds normal, Soft, nontender, nondistended, no rebound, guarding, masses.  Rectal: Decreased tone, there is some slight incontinence of stool which is light brown in color with some reddish particulate matter present. Guaiac negative.  Back: No CVA or spinal tenderness.  Extremities: Intact distal pulses, No edema, No tenderness.   Skin: Warm, Dry, No rashes.   Musculoskeletal: No joint swelling or tenderness.  Neurologic: Alert & oriented x 3, speech is slightly slurred, no significant facial asymmetry, sensory shows significant decreased sensation below the waist, and motor is some contractures and weakness to the upper extremities, no movement of the lower extremities.  Psychiatric: Affect normal, Judgment normal, Mood normal.         COURSE & MEDICAL DECISION MAKING  Pertinent Labs & Imaging studies reviewed. (See chart for details)  The patient presents with possible blood in his stool for the past 2 days.  Rectal exam shows light brown stool, with reddish particular matter, but is guaiac negative. Patient says he ate pizza yesterday. CBC shows a white count 6400, normal hemoglobin 15.9, platelets 290, normal differential, chemistry shows a glucose of 100, otherwise normal, INR 1.02. The patient's hemoglobin appears to be stable from a month and half ago. He does not have any melena or gross blood on rectal exam, and is guaiac negative. This time he shows no active bleeding.  I discussed findings with the patient and  his mother. She does note that he has had some blood in his stools on and off for the past month. I recommended she follow with Dr. Douglas gastric urology for follow-up and possible repeat colonoscopy. He is to return to ER for any worsening symptoms, passing large amounts of blood, weakness, dizziness, or any other problems.    FINAL IMPRESSION  1. Rectal bleeding  2.   3.         Electronically signed by: Manuel Lance, 3/11/2018 11:25 AM

## 2018-03-11 NOTE — ED NOTES
Discharging patient home via [facility]. Verbalized understanding of discharge instructions, follow up appointments, and home care. All questions answered.  Belongings with patient at time of discharge.  Vitals signs WNL. Pt given discharge information. Discharge assessment completed.

## 2018-03-12 ENCOUNTER — APPOINTMENT (OUTPATIENT)
Dept: RADIOLOGY | Facility: MEDICAL CENTER | Age: 38
DRG: 698 | End: 2018-03-12
Attending: EMERGENCY MEDICINE
Payer: MEDICARE

## 2018-03-12 ENCOUNTER — RESOLUTE PROFESSIONAL BILLING HOSPITAL PROF FEE (OUTPATIENT)
Dept: HOSPITALIST | Facility: MEDICAL CENTER | Age: 38
End: 2018-03-12
Payer: MEDICARE

## 2018-03-12 ENCOUNTER — HOSPITAL ENCOUNTER (INPATIENT)
Facility: MEDICAL CENTER | Age: 38
LOS: 2 days | DRG: 698 | End: 2018-03-14
Attending: EMERGENCY MEDICINE | Admitting: HOSPITALIST
Payer: MEDICARE

## 2018-03-12 ENCOUNTER — PATIENT OUTREACH (OUTPATIENT)
Dept: HEALTH INFORMATION MANAGEMENT | Facility: OTHER | Age: 38
End: 2018-03-12

## 2018-03-12 ENCOUNTER — HOME CARE VISIT (OUTPATIENT)
Dept: HOME HEALTH SERVICES | Facility: HOME HEALTHCARE | Age: 38
End: 2018-03-12
Payer: MEDICARE

## 2018-03-12 ENCOUNTER — OFFICE VISIT (OUTPATIENT)
Dept: NEUROLOGY | Facility: MEDICAL CENTER | Age: 38
End: 2018-03-12
Payer: MEDICARE

## 2018-03-12 VITALS
DIASTOLIC BLOOD PRESSURE: 96 MMHG | BODY MASS INDEX: 23.1 KG/M2 | HEART RATE: 120 BPM | TEMPERATURE: 97.7 F | WEIGHT: 165 LBS | SYSTOLIC BLOOD PRESSURE: 154 MMHG | HEIGHT: 71 IN | OXYGEN SATURATION: 95 %

## 2018-03-12 DIAGNOSIS — G35 MS (MULTIPLE SCLEROSIS) (HCC): ICD-10-CM

## 2018-03-12 DIAGNOSIS — R33.9 URINARY RETENTION: ICD-10-CM

## 2018-03-12 PROBLEM — I10 ESSENTIAL HYPERTENSION: Status: ACTIVE | Noted: 2018-03-12

## 2018-03-12 LAB
ALBUMIN SERPL BCP-MCNC: 4.9 G/DL (ref 3.2–4.9)
ALBUMIN/GLOB SERPL: 1.8 G/DL
ALP SERPL-CCNC: 66 U/L (ref 30–99)
ALT SERPL-CCNC: 12 U/L (ref 2–50)
AMORPH CRY #/AREA URNS HPF: PRESENT /HPF
ANION GAP SERPL CALC-SCNC: 15 MMOL/L (ref 0–11.9)
APPEARANCE UR: ABNORMAL
AST SERPL-CCNC: 17 U/L (ref 12–45)
BACTERIA #/AREA URNS HPF: ABNORMAL /HPF
BASOPHILS # BLD AUTO: 0.3 % (ref 0–1.8)
BASOPHILS # BLD: 0.07 K/UL (ref 0–0.12)
BILIRUB SERPL-MCNC: 1 MG/DL (ref 0.1–1.5)
BILIRUB UR QL STRIP.AUTO: NEGATIVE
BUN SERPL-MCNC: 21 MG/DL (ref 8–22)
CALCIUM SERPL-MCNC: 9.9 MG/DL (ref 8.5–10.5)
CHLORIDE SERPL-SCNC: 105 MMOL/L (ref 96–112)
CO2 SERPL-SCNC: 17 MMOL/L (ref 20–33)
COLOR UR: YELLOW
CREAT SERPL-MCNC: 1.09 MG/DL (ref 0.5–1.4)
EOSINOPHIL # BLD AUTO: 0 K/UL (ref 0–0.51)
EOSINOPHIL NFR BLD: 0 % (ref 0–6.9)
ERYTHROCYTE [DISTWIDTH] IN BLOOD BY AUTOMATED COUNT: 40.9 FL (ref 35.9–50)
GLOBULIN SER CALC-MCNC: 2.7 G/DL (ref 1.9–3.5)
GLUCOSE SERPL-MCNC: 147 MG/DL (ref 65–99)
GLUCOSE UR STRIP.AUTO-MCNC: NEGATIVE MG/DL
HCT VFR BLD AUTO: 51.4 % (ref 42–52)
HGB BLD-MCNC: 18.2 G/DL (ref 14–18)
IMM GRANULOCYTES # BLD AUTO: 0.09 K/UL (ref 0–0.11)
IMM GRANULOCYTES NFR BLD AUTO: 0.4 % (ref 0–0.9)
KETONES UR STRIP.AUTO-MCNC: 40 MG/DL
LACTATE BLD-SCNC: 2 MMOL/L (ref 0.5–2)
LEUKOCYTE ESTERASE UR QL STRIP.AUTO: ABNORMAL
LYMPHOCYTES # BLD AUTO: 0.71 K/UL (ref 1–4.8)
LYMPHOCYTES NFR BLD: 3.5 % (ref 22–41)
MCH RBC QN AUTO: 31.3 PG (ref 27–33)
MCHC RBC AUTO-ENTMCNC: 35.4 G/DL (ref 33.7–35.3)
MCV RBC AUTO: 88.5 FL (ref 81.4–97.8)
MICRO URNS: ABNORMAL
MONOCYTES # BLD AUTO: 0.9 K/UL (ref 0–0.85)
MONOCYTES NFR BLD AUTO: 4.4 % (ref 0–13.4)
NEUTROPHILS # BLD AUTO: 18.7 K/UL (ref 1.82–7.42)
NEUTROPHILS NFR BLD: 91.4 % (ref 44–72)
NITRITE UR QL STRIP.AUTO: NEGATIVE
NRBC # BLD AUTO: 0 K/UL
NRBC BLD-RTO: 0 /100 WBC
PH UR STRIP.AUTO: 8 [PH]
PLATELET # BLD AUTO: 382 K/UL (ref 164–446)
PMV BLD AUTO: 8.9 FL (ref 9–12.9)
POTASSIUM SERPL-SCNC: 4.6 MMOL/L (ref 3.6–5.5)
PROT SERPL-MCNC: 7.6 G/DL (ref 6–8.2)
PROT UR QL STRIP: >=500 MG/DL
RBC # BLD AUTO: 5.81 M/UL (ref 4.7–6.1)
RBC # URNS HPF: ABNORMAL /HPF
RBC UR QL AUTO: ABNORMAL
SODIUM SERPL-SCNC: 137 MMOL/L (ref 135–145)
SP GR UR STRIP.AUTO: 1
UROBILINOGEN UR STRIP.AUTO-MCNC: NORMAL MG/DL
WBC # BLD AUTO: 20.5 K/UL (ref 4.8–10.8)
WBC #/AREA URNS HPF: ABNORMAL /HPF

## 2018-03-12 PROCEDURE — 85025 COMPLETE CBC W/AUTO DIFF WBC: CPT

## 2018-03-12 PROCEDURE — 51702 INSERT TEMP BLADDER CATH: CPT

## 2018-03-12 PROCEDURE — 96361 HYDRATE IV INFUSION ADD-ON: CPT

## 2018-03-12 PROCEDURE — 770006 HCHG ROOM/CARE - MED/SURG/GYN SEMI*

## 2018-03-12 PROCEDURE — 99285 EMERGENCY DEPT VISIT HI MDM: CPT

## 2018-03-12 PROCEDURE — 71045 X-RAY EXAM CHEST 1 VIEW: CPT

## 2018-03-12 PROCEDURE — 99223 1ST HOSP IP/OBS HIGH 75: CPT | Performed by: HOSPITALIST

## 2018-03-12 PROCEDURE — 96374 THER/PROPH/DIAG INJ IV PUSH: CPT

## 2018-03-12 PROCEDURE — 87040 BLOOD CULTURE FOR BACTERIA: CPT

## 2018-03-12 PROCEDURE — 83605 ASSAY OF LACTIC ACID: CPT

## 2018-03-12 PROCEDURE — 81001 URINALYSIS AUTO W/SCOPE: CPT

## 2018-03-12 PROCEDURE — 700111 HCHG RX REV CODE 636 W/ 250 OVERRIDE (IP): Performed by: HOSPITALIST

## 2018-03-12 PROCEDURE — 99214 OFFICE O/P EST MOD 30 MIN: CPT | Performed by: PSYCHIATRY & NEUROLOGY

## 2018-03-12 PROCEDURE — 36415 COLL VENOUS BLD VENIPUNCTURE: CPT

## 2018-03-12 PROCEDURE — 700105 HCHG RX REV CODE 258: Performed by: EMERGENCY MEDICINE

## 2018-03-12 PROCEDURE — 96376 TX/PRO/DX INJ SAME DRUG ADON: CPT

## 2018-03-12 PROCEDURE — 303105 HCHG CATHETER EXTRA

## 2018-03-12 PROCEDURE — 83036 HEMOGLOBIN GLYCOSYLATED A1C: CPT

## 2018-03-12 PROCEDURE — 700102 HCHG RX REV CODE 250 W/ 637 OVERRIDE(OP): Mod: JG | Performed by: HOSPITALIST

## 2018-03-12 PROCEDURE — 700111 HCHG RX REV CODE 636 W/ 250 OVERRIDE (IP): Performed by: EMERGENCY MEDICINE

## 2018-03-12 PROCEDURE — 74177 CT ABD & PELVIS W/CONTRAST: CPT

## 2018-03-12 PROCEDURE — A9270 NON-COVERED ITEM OR SERVICE: HCPCS | Mod: JG | Performed by: HOSPITALIST

## 2018-03-12 PROCEDURE — 700117 HCHG RX CONTRAST REV CODE 255: Performed by: EMERGENCY MEDICINE

## 2018-03-12 PROCEDURE — 700105 HCHG RX REV CODE 258: Performed by: HOSPITALIST

## 2018-03-12 PROCEDURE — 96375 TX/PRO/DX INJ NEW DRUG ADDON: CPT

## 2018-03-12 PROCEDURE — 80053 COMPREHEN METABOLIC PANEL: CPT

## 2018-03-12 RX ORDER — ONDANSETRON 4 MG/1
4 TABLET, ORALLY DISINTEGRATING ORAL EVERY 4 HOURS PRN
Status: DISCONTINUED | OUTPATIENT
Start: 2018-03-12 | End: 2018-03-14 | Stop reason: HOSPADM

## 2018-03-12 RX ORDER — ONDANSETRON 2 MG/ML
4 INJECTION INTRAMUSCULAR; INTRAVENOUS EVERY 4 HOURS PRN
Status: DISCONTINUED | OUTPATIENT
Start: 2018-03-12 | End: 2018-03-14 | Stop reason: HOSPADM

## 2018-03-12 RX ORDER — SODIUM CHLORIDE 9 MG/ML
500 INJECTION, SOLUTION INTRAVENOUS
Status: DISCONTINUED | OUTPATIENT
Start: 2018-03-12 | End: 2018-03-14 | Stop reason: HOSPADM

## 2018-03-12 RX ORDER — ESCITALOPRAM OXALATE 10 MG/1
20 TABLET ORAL DAILY
Status: DISCONTINUED | OUTPATIENT
Start: 2018-03-13 | End: 2018-03-14 | Stop reason: HOSPADM

## 2018-03-12 RX ORDER — SODIUM CHLORIDE 9 MG/ML
1000 INJECTION, SOLUTION INTRAVENOUS ONCE
Status: COMPLETED | OUTPATIENT
Start: 2018-03-12 | End: 2018-03-12

## 2018-03-12 RX ORDER — SODIUM CHLORIDE 9 MG/ML
INJECTION, SOLUTION INTRAVENOUS CONTINUOUS
Status: DISCONTINUED | OUTPATIENT
Start: 2018-03-12 | End: 2018-03-14

## 2018-03-12 RX ORDER — BUPROPION HYDROCHLORIDE 150 MG/1
150 TABLET, EXTENDED RELEASE ORAL 2 TIMES DAILY
Status: DISCONTINUED | OUTPATIENT
Start: 2018-03-12 | End: 2018-03-14 | Stop reason: HOSPADM

## 2018-03-12 RX ORDER — METHENAMINE HIPPURATE 1000 MG/1
1 TABLET ORAL 2 TIMES DAILY
Status: DISCONTINUED | OUTPATIENT
Start: 2018-03-12 | End: 2018-03-13

## 2018-03-12 RX ORDER — M-VIT,TX,IRON,MINS/CALC/FOLIC 27MG-0.4MG
1 TABLET ORAL DAILY
Status: ON HOLD | COMMUNITY
End: 2019-07-18

## 2018-03-12 RX ORDER — LOSARTAN POTASSIUM 50 MG/1
50 TABLET ORAL DAILY
Status: DISCONTINUED | OUTPATIENT
Start: 2018-03-13 | End: 2018-03-14 | Stop reason: HOSPADM

## 2018-03-12 RX ORDER — OMEPRAZOLE 20 MG/1
20 CAPSULE, DELAYED RELEASE ORAL DAILY
Status: DISCONTINUED | OUTPATIENT
Start: 2018-03-13 | End: 2018-03-14 | Stop reason: HOSPADM

## 2018-03-12 RX ORDER — BACLOFEN 10 MG/1
20 TABLET ORAL 3 TIMES DAILY
Status: DISCONTINUED | OUTPATIENT
Start: 2018-03-12 | End: 2018-03-14 | Stop reason: HOSPADM

## 2018-03-12 RX ORDER — OXYBUTYNIN CHLORIDE 10 MG/1
10 TABLET, EXTENDED RELEASE ORAL EVERY EVENING
Status: DISCONTINUED | OUTPATIENT
Start: 2018-03-13 | End: 2018-03-14 | Stop reason: HOSPADM

## 2018-03-12 RX ORDER — BISACODYL 10 MG
10 SUPPOSITORY, RECTAL RECTAL
Status: DISCONTINUED | OUTPATIENT
Start: 2018-03-12 | End: 2018-03-14 | Stop reason: HOSPADM

## 2018-03-12 RX ORDER — ONDANSETRON 2 MG/ML
4 INJECTION INTRAMUSCULAR; INTRAVENOUS ONCE
Status: COMPLETED | OUTPATIENT
Start: 2018-03-12 | End: 2018-03-12

## 2018-03-12 RX ORDER — SODIUM CHLORIDE 9 MG/ML
30 INJECTION, SOLUTION INTRAVENOUS
Status: DISCONTINUED | OUTPATIENT
Start: 2018-03-12 | End: 2018-03-14 | Stop reason: HOSPADM

## 2018-03-12 RX ORDER — POLYETHYLENE GLYCOL 3350 17 G/17G
1 POWDER, FOR SOLUTION ORAL
Status: DISCONTINUED | OUTPATIENT
Start: 2018-03-12 | End: 2018-03-14 | Stop reason: HOSPADM

## 2018-03-12 RX ORDER — ZOLPIDEM TARTRATE 5 MG/1
5 TABLET ORAL
Status: DISCONTINUED | OUTPATIENT
Start: 2018-03-12 | End: 2018-03-14 | Stop reason: HOSPADM

## 2018-03-12 RX ORDER — AMOXICILLIN 250 MG
2 CAPSULE ORAL 2 TIMES DAILY
Status: DISCONTINUED | OUTPATIENT
Start: 2018-03-12 | End: 2018-03-14 | Stop reason: HOSPADM

## 2018-03-12 RX ORDER — CEFTRIAXONE 2 G/1
2 INJECTION, POWDER, FOR SOLUTION INTRAMUSCULAR; INTRAVENOUS ONCE
Status: COMPLETED | OUTPATIENT
Start: 2018-03-12 | End: 2018-03-12

## 2018-03-12 RX ORDER — CLONIDINE HYDROCHLORIDE 0.1 MG/1
0.1 TABLET ORAL EVERY 6 HOURS PRN
Status: DISCONTINUED | OUTPATIENT
Start: 2018-03-12 | End: 2018-03-14 | Stop reason: HOSPADM

## 2018-03-12 RX ORDER — ONDANSETRON 4 MG/1
4 TABLET, ORALLY DISINTEGRATING ORAL PRN
Status: ON HOLD | COMMUNITY
End: 2019-07-18

## 2018-03-12 RX ORDER — PROMETHAZINE HYDROCHLORIDE 25 MG/1
12.5-25 SUPPOSITORY RECTAL EVERY 4 HOURS PRN
Status: DISCONTINUED | OUTPATIENT
Start: 2018-03-12 | End: 2018-03-14 | Stop reason: HOSPADM

## 2018-03-12 RX ORDER — PROMETHAZINE HYDROCHLORIDE 25 MG/1
12.5-25 TABLET ORAL EVERY 4 HOURS PRN
Status: DISCONTINUED | OUTPATIENT
Start: 2018-03-12 | End: 2018-03-14 | Stop reason: HOSPADM

## 2018-03-12 RX ORDER — ACETAMINOPHEN 325 MG/1
650 TABLET ORAL EVERY 6 HOURS PRN
Status: DISCONTINUED | OUTPATIENT
Start: 2018-03-12 | End: 2018-03-14 | Stop reason: HOSPADM

## 2018-03-12 RX ADMIN — SODIUM CHLORIDE 1000 ML: 9 INJECTION, SOLUTION INTRAVENOUS at 16:23

## 2018-03-12 RX ADMIN — BUPROPION HYDROCHLORIDE 150 MG: 150 TABLET, EXTENDED RELEASE ORAL at 23:13

## 2018-03-12 RX ADMIN — BACLOFEN 20 MG: 10 TABLET ORAL at 23:13

## 2018-03-12 RX ADMIN — SODIUM CHLORIDE 1000 ML: 9 INJECTION, SOLUTION INTRAVENOUS at 17:34

## 2018-03-12 RX ADMIN — ONDANSETRON HYDROCHLORIDE 4 MG: 2 INJECTION, SOLUTION INTRAMUSCULAR; INTRAVENOUS at 16:23

## 2018-03-12 RX ADMIN — ENOXAPARIN SODIUM 40 MG: 100 INJECTION SUBCUTANEOUS at 23:13

## 2018-03-12 RX ADMIN — ONDANSETRON HYDROCHLORIDE 4 MG: 2 INJECTION, SOLUTION INTRAMUSCULAR; INTRAVENOUS at 21:06

## 2018-03-12 RX ADMIN — IOHEXOL 100 ML: 350 INJECTION, SOLUTION INTRAVENOUS at 18:19

## 2018-03-12 RX ADMIN — CEFTRIAXONE 2 G: 2 INJECTION, POWDER, FOR SOLUTION INTRAMUSCULAR; INTRAVENOUS at 19:00

## 2018-03-12 RX ADMIN — SODIUM CHLORIDE: 9 INJECTION, SOLUTION INTRAVENOUS at 20:00

## 2018-03-12 ASSESSMENT — LIFESTYLE VARIABLES
ON A TYPICAL DAY WHEN YOU DRINK ALCOHOL HOW MANY DRINKS DO YOU HAVE: 2
DO YOU DRINK ALCOHOL: NO
EVER FELT BAD OR GUILTY ABOUT YOUR DRINKING: YES
EVER_SMOKED: YES
HAVE PEOPLE ANNOYED YOU BY CRITICIZING YOUR DRINKING: NO
TOTAL SCORE: 2
TOTAL SCORE: 2
DOES PATIENT WANT TO TALK TO SOMEONE ABOUT QUITTING: NO
TOTAL SCORE: 2
EVER_SMOKED: YES
HAVE YOU EVER FELT YOU SHOULD CUT DOWN ON YOUR DRINKING: YES
HOW MANY TIMES IN THE PAST YEAR HAVE YOU HAD 5 OR MORE DRINKS IN A DAY: 0
ALCOHOL_USE: YES
CONSUMPTION TOTAL: POSITIVE
EVER HAD A DRINK FIRST THING IN THE MORNING TO STEADY YOUR NERVES TO GET RID OF A HANGOVER: NO
DOES PATIENT WANT TO STOP DRINKING: YES
AVERAGE NUMBER OF DAYS PER WEEK YOU HAVE A DRINK CONTAINING ALCOHOL: 7

## 2018-03-12 ASSESSMENT — ENCOUNTER SYMPTOMS
BLOOD IN STOOL: 1
VOMITING: 1
DEPRESSION: 1
RESPIRATORY NEGATIVE: 1
MUSCULOSKELETAL NEGATIVE: 1
TREMORS: 1
NAUSEA: 1
CARDIOVASCULAR NEGATIVE: 1
EYES NEGATIVE: 1
NERVOUS/ANXIOUS: 1
DIARRHEA: 1

## 2018-03-12 ASSESSMENT — PATIENT HEALTH QUESTIONNAIRE - PHQ9
SUM OF ALL RESPONSES TO PHQ9 QUESTIONS 1 AND 2: 0
SUM OF ALL RESPONSES TO PHQ QUESTIONS 1-9: 0
2. FEELING DOWN, DEPRESSED, IRRITABLE, OR HOPELESS: NOT AT ALL
1. LITTLE INTEREST OR PLEASURE IN DOING THINGS: NOT AT ALL

## 2018-03-12 ASSESSMENT — COPD QUESTIONNAIRES
COPD SCREENING SCORE: 2
DURING THE PAST 4 WEEKS HOW MUCH DID YOU FEEL SHORT OF BREATH: NONE/LITTLE OF THE TIME
DO YOU EVER COUGH UP ANY MUCUS OR PHLEGM?: NO/ONLY WITH OCCASIONAL COLDS OR INFECTIONS
HAVE YOU SMOKED AT LEAST 100 CIGARETTES IN YOUR ENTIRE LIFE: YES

## 2018-03-12 ASSESSMENT — PAIN SCALES - GENERAL
PAINLEVEL_OUTOF10: 5
PAINLEVEL_OUTOF10: 4
PAINLEVEL_OUTOF10: 3
PAINLEVEL_OUTOF10: 3

## 2018-03-12 NOTE — PROGRESS NOTES
Chart reviewed. The patient is currently receiving [facility] services.  Plan: will monitor for discharge from .

## 2018-03-12 NOTE — ED PROVIDER NOTES
ED Provider Note    CHIEF COMPLAINT  Chief Complaint   Patient presents with   • UTI   • N/V       HPI  Jerzy Juan III is a 37 y.o. male who presents for evaluation of shaking chills not feeling well. The patient has severe multiple sclerosis and is followed by Dr. Melissa Bloch. He is on immune modulated toward therapy and is immunocompromised. He been having some crampy abdominal pain with bloody mucousy stools. Prior history of Clostridium difficile and is status post fecal transplant about 2 years ago. He lives alone with caregivers. He has to self cath. He was seen here 2 days ago with these symptoms and blood work was performed which is reassuring. He had an appointment with Dr. murcia this afternoon and she is very concerned about urosepsis and emergently had him transported down to the emergency department. Patient has had waves of nausea vomiting low-grade fevers. He has not made any urine over the past 24 hours     REVIEW OF SYSTEMS  See HPI for further details. Positive for nausea vomiting blood-tinged stools diarrhea All other systems are negative.     PAST MEDICAL HISTORY  Past Medical History:   Diagnosis Date   • Back pain    • Blood transfusion    • Depression     PTSD/clinical depression   • Fall     4/6/2016   • Headache(784.0)    • Heart murmur    • MS (multiple sclerosis) (CMS-HCC) DX 8/20/2013   • Urinary incontinence     condom catheter       FAMILY HISTORY  No history of bleeding disorder    SOCIAL HISTORY  Social History     Social History   • Marital status:      Spouse name: N/A   • Number of children: N/A   • Years of education: N/A     Social History Main Topics   • Smoking status: Former Smoker     Start date: 8/1/1997     Quit date: 8/1/2013   • Smokeless tobacco: Former User   • Alcohol use 0.0 oz/week      Comment: 1 beer/day   • Drug use: Yes     Types: Inhaled      Comment: marijuana   • Sexual activity: Not on file      Comment: once daily for 23 yrs     Other  Topics Concern   • Not on file     Social History Narrative   • No narrative on file   Lives alone    SURGICAL HISTORY  Past Surgical History:   Procedure Laterality Date   • GASTROSCOPY  1/14/2017    Procedure: GASTROSCOPY WITH FECAL MATTER TRANSPLANT;  Surgeon: Obinna Douglas M.D.;  Location: SURGERY [facility];  Service:    • OPEN REDUCTION         CURRENT MEDICATIONS  Home Medications     Reviewed by Julia Correa R.N. (Registered Nurse) on 03/12/18 at 1521  Med List Status: Partial   Medication Last Dose Status   baclofen (LIORESAL) 20 MG tablet 3/12/2018 Active   buPROPion SR (WELLBUTRIN-SR) 150 MG TABLET SR 12 HR sustained-release tablet 3/12/2018 Active   Cholecalciferol (VITAMIN D3) 5000 UNITS Tab 3/12/2018 Active   Cranberry 125 MG Tab 3/12/2018 Active   docusate sodium (COLACE) 250 MG capsule 3/12/2018 Active   escitalopram (LEXAPRO) 20 MG tablet 3/12/2018 Active   losartan (COZAAR) 50 MG Tab 3/12/2018 Active   Magnesium 300 MG Cap 3/12/2018 Active   methenamine hip (HIPPREX) 1 GM Tab 3/12/2018 Active   metoclopramide (REGLAN) 10 MG Tab  Active   Multiple Vitamins-Minerals (MULTIVITAMIN & MINERAL PO) 3/12/2018 Active   NS SOLN 250 mL with ocrelizumab 300 MG/10ML SOLN 300 mg 3/12/2018 Active   omeprazole (PRILOSEC) 20 MG delayed-release capsule 3/12/2018 Active   ondansetron (ZOFRAN ODT) 4 MG TABLET DISPERSIBLE  Active   ondansetron (ZOFRAN) 8 MG Tab 3/12/2018 Active   oxybutynin SR (DITROPAN-XL) 10 MG CR tablet 3/12/2018 Active   polyethylene glycol/lytes (MIRALAX) Pack Unknown Active   Probiotic Product (PROBIOTIC DAILY PO) 3/12/2018 Active   psyllium (METAMUCIL) 58.12 % Pack 3/12/2018 Active                ALLERGIES  Allergies   Allergen Reactions   • Other Drug      CANNOT TAKE ANY STEROIDS WITH RECENT TYSABRI INFUSION   • Asa [Aspirin] Unspecified     Bleeding in stomach as a baby   • Gabapentin Anxiety     anger and severe mood swing   • Lyrica Anxiety     anger and severe mood swing        PHYSICAL EXAM  VITAL SIGNS: /90   Pulse (!) 123   Temp 37.3 °C (99.1 °F)   Resp 20   SpO2 95%  Room air O2: 99    Constitutional: Patient patient appears ill  Dry mucous membranes  Eyes: PERRLA, EOMI, Conjunctiva normal, No discharge.   Neck: Normal range of motion, No tenderness, Supple, No stridor.   Cardiovascular: Tachycardic Normal rhythm, No murmurs, No rubs, No gallops.   Thorax & Lungs: Normal breath sounds, No respiratory distress, No wheezing, No chest tenderness.   Abdomen: Bowel sounds normal, Soft, No tenderness, No masses, No pulsatile masses. Only catheter is in the urethra does not appear to be draining any urine  Skin: Warm, Dry, No erythema, No rash.   Back: No tenderness, No CVA tenderness.   Extremities: Intact distal pulses, No edema, No tenderness, No cyanosis, No clubbing.   Musculoskeletal: Good range of motion in all major joints. No tenderness to palpation or major deformities noted.   Neurologic: Alert & oriented x 3, Normal motor function, Normal sensory function, No focal deficits noted.   Psychiatric: Anxious    Results for orders placed or performed during the hospital encounter of 03/12/18   URINALYSIS   Result Value Ref Range    Micro Urine Req Microscopic     Color Yellow     Character Cloudy (A)     Specific Gravity 1.005 <1.035    Ph 8.0 5.0 - 8.0    Glucose Negative Negative mg/dL    Ketones 40 (A) Negative mg/dL    Protein >=500 (A) Negative mg/dL    Bilirubin Negative Negative    Urobilinogen, Urine Normal Negative    Nitrite Negative Negative    Leukocyte Esterase Large (A) Negative    Occult Blood Trace (A) Negative   CBC WITH DIFFERENTIAL   Result Value Ref Range    WBC 20.5 (H) 4.8 - 10.8 K/uL    RBC 5.81 4.70 - 6.10 M/uL    Hemoglobin 18.2 (H) 14.0 - 18.0 g/dL    Hematocrit 51.4 42.0 - 52.0 %    MCV 88.5 81.4 - 97.8 fL    MCH 31.3 27.0 - 33.0 pg    MCHC 35.4 (H) 33.7 - 35.3 g/dL    RDW 40.9 35.9 - 50.0 fL    Platelet Count 382 164 - 446 K/uL    MPV 8.9 (L)  9.0 - 12.9 fL    Neutrophils-Polys 91.40 (H) 44.00 - 72.00 %    Lymphocytes 3.50 (L) 22.00 - 41.00 %    Monocytes 4.40 0.00 - 13.40 %    Eosinophils 0.00 0.00 - 6.90 %    Basophils 0.30 0.00 - 1.80 %    Immature Granulocytes 0.40 0.00 - 0.90 %    Nucleated RBC 0.00 /100 WBC    Neutrophils (Absolute) 18.70 (H) 1.82 - 7.42 K/uL    Lymphs (Absolute) 0.71 (L) 1.00 - 4.80 K/uL    Monos (Absolute) 0.90 (H) 0.00 - 0.85 K/uL    Eos (Absolute) 0.00 0.00 - 0.51 K/uL    Baso (Absolute) 0.07 0.00 - 0.12 K/uL    Immature Granulocytes (abs) 0.09 0.00 - 0.11 K/uL    NRBC (Absolute) 0.00 K/uL   COMP METABOLIC PANEL   Result Value Ref Range    Sodium 137 135 - 145 mmol/L    Potassium 4.6 3.6 - 5.5 mmol/L    Chloride 105 96 - 112 mmol/L    Co2 17 (L) 20 - 33 mmol/L    Anion Gap 15.0 (H) 0.0 - 11.9    Glucose 147 (H) 65 - 99 mg/dL    Bun 21 8 - 22 mg/dL    Creatinine 1.09 0.50 - 1.40 mg/dL    Calcium 9.9 8.5 - 10.5 mg/dL    AST(SGOT) 17 12 - 45 U/L    ALT(SGPT) 12 2 - 50 U/L    Alkaline Phosphatase 66 30 - 99 U/L    Total Bilirubin 1.0 0.1 - 1.5 mg/dL    Albumin 4.9 3.2 - 4.9 g/dL    Total Protein 7.6 6.0 - 8.2 g/dL    Globulin 2.7 1.9 - 3.5 g/dL    A-G Ratio 1.8 g/dL   ESTIMATED GFR   Result Value Ref Range    GFR If African American >60 >60 mL/min/1.73 m 2    GFR If Non African American >60 >60 mL/min/1.73 m 2   URINE MICROSCOPIC (W/UA)   Result Value Ref Range    WBC Packed (A) /hpf    RBC 5-10 (A) /hpf    Bacteria Many (A) None /hpf    Amorphous Crystal Present /hpf      CT-ABDOMEN-PELVIS WITH   Final Result         1. Very distended urinary bladder despite having Epperson catheter in place.      2.  Mild to moderate bilateral hydronephrosis, likely due to urinary bladder distention and backflow.      3. Multiple layering urinary bladder stones. Some debris is seen layering in the dependent portion of urinary bladder as well.      4. Thickening of the distal rectum and anus could relate to inflammation. Correlate with direct  visualization.      DX-CHEST-PORTABLE (1 VIEW)    (Results Pending)           COURSE & MEDICAL DECISION MAKING  Pertinent Labs & Imaging studies reviewed. (See chart for details)  Patient was quite ill on arrival. Septic protocol was initiated. Blood cultures have been performed. He was given 2 L of IV fluids. CT scan was performed to rule out significant colitis and it did demonstrate some mild irritation around the rectum but he had profound bladder swelling with significant bladder stones. It appears as a catheter was likely blocked by a bladder stone has developed severe urine infection. Epperson catheter was subsequently placed and about 1000 mL of purulent urine was drained. He is given broad-spectrum IV antibiotics and will be admitted to internal medicine    FINAL IMPRESSION  1. Complicated urinary tract infection         Electronically signed by: John Horton, 3/12/2018 3:42 PM

## 2018-03-12 NOTE — ED TRIAGE NOTES
BELINDA HOLCOMB from neurologist office. Chronic matias in place d/t bedbound and MS . C/o UTI s/s and NV . Bloody stools within a week - dx w hemorrhoids and dc'd. No blood in vomit.

## 2018-03-12 NOTE — ED NOTES
Labs collected from PIV and BC x 1 collected using sterile technique - all sent to lab. Pt hooked to Zoll (unmonitored room). Wife to bedside. Ongoing monitoring.

## 2018-03-12 NOTE — PROGRESS NOTES
Dictation #1  MRN:[id number]  CSN:[id number]  CHIEF COMPLAINT  Chief Complaint   Patient presents with   • Follow-Up     MS       HPI  Jerzy Juan III is a 35 y.o. male who presents for treatment of his MS today with shaking rigors, tachycardia, hypertension and intractable nausea and vomiting. Patient has not had any urine output today.  Urinary retention  Pt has not had any urine output today. Pt with witnessed rigors in the office.    MS (multiple sclerosis) ([facility])  Pt with increased spasticity in his legs and rigors in the office.    REVIEW OF SYSTEMS  Pertinent Positives:fatigue, anxiety, paraesthesias   All other systems are negative.     PAST MEDICAL HISTORY  Past Medical History:   Diagnosis Date   • Back pain    • Blood transfusion    • Depression     PTSD/clinical depression   • Fall     4/6/2016   • Headache(784.0)    • Heart murmur    • MS (multiple sclerosis) (CMS-HCC) DX 8/20/2013   • Urinary incontinence     condom catheter       SOCIAL HISTORY  Social History     Social History   • Marital status:      Spouse name: N/A   • Number of children: N/A   • Years of education: N/A     Occupational History   • Not on file.     Social History Main Topics   • Smoking status: Former Smoker     Start date: 8/1/1997     Quit date: 8/1/2013   • Smokeless tobacco: Former User   • Alcohol use 0.0 oz/week      Comment: 1 beer/day   • Drug use: Yes     Types: Inhaled      Comment: marijuana   • Sexual activity: Not on file      Comment: once daily for 23 yrs     Other Topics Concern   • Not on file     Social History Narrative   • No narrative on file       SURGICAL HISTORY  Past Surgical History:   Procedure Laterality Date   • GASTROSCOPY  1/14/2017    Procedure: GASTROSCOPY WITH FECAL MATTER TRANSPLANT;  Surgeon: Obinna Douglas M.D.;  Location: SURGERY [facility];  Service:    • OPEN REDUCTION         CURRENT MEDICATIONS  Current Outpatient Prescriptions   Medication Sig Dispense Refill   • NS  SOLN 250 mL with ocrelizumab 300 MG/10ML SOLN 300 mg 300 mg by Intravenous route every 6 months.     • losartan (COZAAR) 50 MG Tab Take 50 mg by mouth every day.     • ondansetron (ZOFRAN) 8 MG Tab Take 8 mg by mouth every 8 hours as needed for Nausea/Vomiting.     • omeprazole (PRILOSEC) 20 MG delayed-release capsule Take 20 mg by mouth every day.     • baclofen (LIORESAL) 20 MG tablet Take on am, one afternoon and two at bedtime. 120 Tab 5   • docusate sodium (COLACE) 250 MG capsule Take 250 mg by mouth every day at 6 PM. constipation     • Multiple Vitamins-Minerals (MULTIVITAMIN & MINERAL PO) Take 1 Tab by mouth every day at 6 PM. supplement     • Magnesium 300 MG Cap Take 300 mg by mouth every day. supplement     • buPROPion SR (WELLBUTRIN-SR) 150 MG TABLET SR 12 HR sustained-release tablet Take 1 Tab by mouth 2 Times a Day. 60 Tab    • escitalopram (LEXAPRO) 20 MG tablet Take 1 Tab by mouth every day. 30 Tab    • methenamine hip (HIPPREX) 1 GM Tab Take 1 Tab by mouth 2 Times a Day. 60 Tab    • oxybutynin SR (DITROPAN-XL) 10 MG CR tablet Take 1 Tab by mouth every evening. 30 Tab    • Cholecalciferol (VITAMIN D3) 5000 UNITS Tab Take 5,000 Units by mouth every day.     • psyllium (METAMUCIL) 58.12 % Pack Take 1 Packet by mouth every day.     • Cranberry 125 MG Tab Take 125 mg by mouth every day. supplement     • Probiotic Product (PROBIOTIC DAILY PO) Take 1 Cap by mouth every day.     • ondansetron (ZOFRAN ODT) 4 MG TABLET DISPERSIBLE Take 1 Tab by mouth every 8 hours as needed. 10 Tab 1   • metoclopramide (REGLAN) 10 MG Tab Take 1 Tab by mouth every 6 hours as needed (prn nausea). 10 Tab 1   • polyethylene glycol/lytes (MIRALAX) Pack Take 1 Packet by mouth 1 time daily as needed (if sennosides and docusate ineffective after 24 hours).  3     No current facility-administered medications for this visit.        ALLERGIES  Allergies   Allergen Reactions   • Other Drug      CANNOT TAKE ANY STEROIDS WITH RECENT  "TYSABRI INFUSION   • Asa [Aspirin] Unspecified     Bleeding in stomach as a baby   • Gabapentin Anxiety     anger and severe mood swing   • Lyrica Anxiety     anger and severe mood swing       PHYSICAL EXAM  VITAL SIGNS: /96   Pulse (!) 120   Temp 36.5 °C (97.7 °F)   Ht 1.803 m (5' 11\")   Wt 74.8 kg (165 lb)   SpO2 95%   BMI 23.01 kg/m²   Constitutional: Well developed, Well nourished, patient vomited 4 times during visit, witnessed rigors and diaphoresis  HENT: normocephalic, atraumatic, dry mucous membranes   Eyes: fundi-disc sharp, perrl   Neck: Normal range of motion, No tenderness, Supple, No stridor.   Cardiovascular: Normal heart rate, Normal rhythm, No murmurs, No rubs, No gallops.   Thorax & Lungs: Normal breath sounds, No respiratory distress, No wheezing, No chest tenderness.   Abdomen: Bowel sounds normal, tender, No tenderness, No masses, No pulsatile masses.   Skin: Warm, Dry, No erythema, No rash, positive tenting  Back: No tenderness, No CVA tenderness.   Extremities: Intact distal pulses, No edema, No tenderness, No cyanosis, No clubbing.   Neurologic: A&Ox3, CN:2-12:nystagmus, motor: increased tone, sensory; decreased in his legs, wheelchair bound  Psychiatric: Affect normal, Judgment normal, Mood normal.   Lab: Reviewed with patient in detail and as noted in results.        RADIOLOGY/PROCEDURES  MRI of the brain reviewed in the PACS system with the patient.      FINDINGS:  There are multiple ovoid and ushaped areas of increased T2 signal intensity in the immediate periventricular and subcortical white matter. Several lesions are also noted in the zabrina at the level of the brachium pontis. The size and number of lesions has   not changed significantly since previous exam. There are no \"active\" enhancing lesions in the brain parenchyma.    The calvariae are unremarkable.  There are no extra-axial fluid collections.  The ventricular system and basal cisterns are within normal limits. " " There are no mass effects or shift of midline structures.  There are no hemorrhagic lesions.  The diffusion   weighted axial images show no evidence of acute cerebral infarction.    The postcontrast images show no areas of abnormal parenchymal or meningeal enhancement.    The brainstem and posterior fossa structures are unremarkable.    Vascular flow voids in the vertebrobasilar and carotid arteries, [place], and dural venous sinuses are intact.    The paranasal sinuses and mastoids in the field of view are unremarkable.         Impression          1.  Numerous periventricular, subcortical, and pontine areas of demyelination unchanged from previous exam of 2/29/16 and consistent with the patient's known diagnosis of multiple sclerosis.    2.  No \"active\" enhancing lesions in the brain parenchyma.           ASSESSMENT AND PLAN  1. MS (multiple sclerosis)  Plan to continue Ocrevus for progressive multiple sclerosis unless patient continues to suffer from frequent infections. Currently patient appears to be having an MS exacerbation or fluctuation which may be brought on by urosepsis. Consider IV Solu-Medrol treatment after infection workup is completed. Transfer to the emergency room today for urosepsis workup at admission for MS exacerbation.      2. Urinary retention with concern for urosepsis  Pt with tachycardia and hypertension in the office today. Patient has not had any urine output today and had witnessed rigors. Plan to call [facility] to transfer to the emergency room for workup for urosepsis. Patient is immunosuppressed with ocrevus which destroys his B lymphocytes which is increases his risk for infection.     I spent 30 minutes with this patient and his caregiver, over fifty percent was spent counseling patient on their condition, best management practices, reviewing test results and risks and benefits of treatment.          "

## 2018-03-12 NOTE — ED NOTES
2nd BC collected via straight stick using sterile technique. No urine in matias tube or bag to be sent at this time. Ongoing monitoring.

## 2018-03-13 LAB
ANION GAP SERPL CALC-SCNC: 9 MMOL/L (ref 0–11.9)
BASOPHILS # BLD AUTO: 0.2 % (ref 0–1.8)
BASOPHILS # BLD: 0.04 K/UL (ref 0–0.12)
BUN SERPL-MCNC: 17 MG/DL (ref 8–22)
CALCIUM SERPL-MCNC: 9.4 MG/DL (ref 8.5–10.5)
CHLORIDE SERPL-SCNC: 108 MMOL/L (ref 96–112)
CO2 SERPL-SCNC: 23 MMOL/L (ref 20–33)
CREAT SERPL-MCNC: 0.98 MG/DL (ref 0.5–1.4)
EOSINOPHIL # BLD AUTO: 0.01 K/UL (ref 0–0.51)
EOSINOPHIL NFR BLD: 0.1 % (ref 0–6.9)
ERYTHROCYTE [DISTWIDTH] IN BLOOD BY AUTOMATED COUNT: 41.1 FL (ref 35.9–50)
EST. AVERAGE GLUCOSE BLD GHB EST-MCNC: 105 MG/DL
GLUCOSE SERPL-MCNC: 122 MG/DL (ref 65–99)
HBA1C MFR BLD: 5.3 % (ref 0–5.6)
HCT VFR BLD AUTO: 42.4 % (ref 42–52)
HGB BLD-MCNC: 14.7 G/DL (ref 14–18)
IMM GRANULOCYTES # BLD AUTO: 0.05 K/UL (ref 0–0.11)
IMM GRANULOCYTES NFR BLD AUTO: 0.3 % (ref 0–0.9)
LACTATE BLD-SCNC: 1.3 MMOL/L (ref 0.5–2)
LYMPHOCYTES # BLD AUTO: 1.67 K/UL (ref 1–4.8)
LYMPHOCYTES NFR BLD: 9.7 % (ref 22–41)
MCH RBC QN AUTO: 30.1 PG (ref 27–33)
MCHC RBC AUTO-ENTMCNC: 33.9 G/DL (ref 33.7–35.3)
MCV RBC AUTO: 88.8 FL (ref 81.4–97.8)
MONOCYTES # BLD AUTO: 1.67 K/UL (ref 0–0.85)
MONOCYTES NFR BLD AUTO: 9.7 % (ref 0–13.4)
NEUTROPHILS # BLD AUTO: 13.82 K/UL (ref 1.82–7.42)
NEUTROPHILS NFR BLD: 80 % (ref 44–72)
NRBC # BLD AUTO: 0 K/UL
NRBC BLD-RTO: 0 /100 WBC
PLATELET # BLD AUTO: 319 K/UL (ref 164–446)
PMV BLD AUTO: 8.9 FL (ref 9–12.9)
POTASSIUM SERPL-SCNC: 3.8 MMOL/L (ref 3.6–5.5)
RBC # BLD AUTO: 4.82 M/UL (ref 4.7–6.1)
SODIUM SERPL-SCNC: 140 MMOL/L (ref 135–145)
WBC # BLD AUTO: 17.3 K/UL (ref 4.8–10.8)

## 2018-03-13 PROCEDURE — 85025 COMPLETE CBC W/AUTO DIFF WBC: CPT

## 2018-03-13 PROCEDURE — 700102 HCHG RX REV CODE 250 W/ 637 OVERRIDE(OP): Mod: JG | Performed by: HOSPITALIST

## 2018-03-13 PROCEDURE — A9270 NON-COVERED ITEM OR SERVICE: HCPCS | Performed by: HOSPITALIST

## 2018-03-13 PROCEDURE — 700105 HCHG RX REV CODE 258: Performed by: HOSPITALIST

## 2018-03-13 PROCEDURE — 700111 HCHG RX REV CODE 636 W/ 250 OVERRIDE (IP): Performed by: HOSPITALIST

## 2018-03-13 PROCEDURE — 770021 HCHG ROOM/CARE - ISO PRIVATE

## 2018-03-13 PROCEDURE — 99232 SBSQ HOSP IP/OBS MODERATE 35: CPT | Performed by: HOSPITALIST

## 2018-03-13 PROCEDURE — 97535 SELF CARE MNGMENT TRAINING: CPT

## 2018-03-13 PROCEDURE — 97166 OT EVAL MOD COMPLEX 45 MIN: CPT

## 2018-03-13 PROCEDURE — A9270 NON-COVERED ITEM OR SERVICE: HCPCS | Mod: JG | Performed by: HOSPITALIST

## 2018-03-13 PROCEDURE — 80048 BASIC METABOLIC PNL TOTAL CA: CPT

## 2018-03-13 PROCEDURE — G8987 SELF CARE CURRENT STATUS: HCPCS | Mod: CL

## 2018-03-13 PROCEDURE — 36415 COLL VENOUS BLD VENIPUNCTURE: CPT

## 2018-03-13 PROCEDURE — G8988 SELF CARE GOAL STATUS: HCPCS | Mod: CK

## 2018-03-13 PROCEDURE — 700102 HCHG RX REV CODE 250 W/ 637 OVERRIDE(OP): Performed by: HOSPITALIST

## 2018-03-13 PROCEDURE — 83605 ASSAY OF LACTIC ACID: CPT

## 2018-03-13 RX ORDER — SULFAMETHOXAZOLE AND TRIMETHOPRIM 800; 160 MG/1; MG/1
1 TABLET ORAL EVERY 12 HOURS
Status: DISCONTINUED | OUTPATIENT
Start: 2018-03-13 | End: 2018-03-14 | Stop reason: HOSPADM

## 2018-03-13 RX ADMIN — BACLOFEN 20 MG: 10 TABLET ORAL at 15:44

## 2018-03-13 RX ADMIN — ESCITALOPRAM OXALATE 20 MG: 10 TABLET ORAL at 08:23

## 2018-03-13 RX ADMIN — BACLOFEN 20 MG: 10 TABLET ORAL at 08:23

## 2018-03-13 RX ADMIN — ENOXAPARIN SODIUM 40 MG: 100 INJECTION SUBCUTANEOUS at 08:23

## 2018-03-13 RX ADMIN — BUPROPION HYDROCHLORIDE 150 MG: 150 TABLET, EXTENDED RELEASE ORAL at 20:19

## 2018-03-13 RX ADMIN — LOSARTAN POTASSIUM 50 MG: 50 TABLET, FILM COATED ORAL at 08:23

## 2018-03-13 RX ADMIN — BUPROPION HYDROCHLORIDE 150 MG: 150 TABLET, EXTENDED RELEASE ORAL at 08:23

## 2018-03-13 RX ADMIN — SULFAMETHOXAZOLE AND TRIMETHOPRIM 1 TABLET: 800; 160 TABLET ORAL at 20:19

## 2018-03-13 RX ADMIN — POLYETHYLENE GLYCOL 3350 1 PACKET: 17 POWDER, FOR SOLUTION ORAL at 20:19

## 2018-03-13 RX ADMIN — SODIUM CHLORIDE: 9 INJECTION, SOLUTION INTRAVENOUS at 08:18

## 2018-03-13 RX ADMIN — SODIUM CHLORIDE: 9 INJECTION, SOLUTION INTRAVENOUS at 17:58

## 2018-03-13 RX ADMIN — SULFAMETHOXAZOLE AND TRIMETHOPRIM 1 TABLET: 800; 160 TABLET ORAL at 15:45

## 2018-03-13 RX ADMIN — OXYBUTYNIN CHLORIDE 10 MG: 10 TABLET, FILM COATED, EXTENDED RELEASE ORAL at 20:19

## 2018-03-13 RX ADMIN — BACLOFEN 20 MG: 10 TABLET ORAL at 20:19

## 2018-03-13 ASSESSMENT — COGNITIVE AND FUNCTIONAL STATUS - GENERAL
SUGGESTED CMS G CODE MODIFIER DAILY ACTIVITY: CL
HELP NEEDED FOR BATHING: TOTAL
TOILETING: TOTAL
DRESSING REGULAR LOWER BODY CLOTHING: TOTAL
DRESSING REGULAR UPPER BODY CLOTHING: A LOT
EATING MEALS: A LOT
DAILY ACTIVITIY SCORE: 9
PERSONAL GROOMING: A LOT

## 2018-03-13 ASSESSMENT — PAIN SCALES - GENERAL
PAINLEVEL_OUTOF10: 0
PAINLEVEL_OUTOF10: 0
PAINLEVEL_OUTOF10: ASSUMED PAIN PRESENT

## 2018-03-13 ASSESSMENT — ENCOUNTER SYMPTOMS
SENSORY CHANGE: 1
FOCAL WEAKNESS: 0
HALLUCINATIONS: 0
DIAPHORESIS: 0
PALPITATIONS: 0
SPEECH CHANGE: 0
EYE DISCHARGE: 0
EYE REDNESS: 0
FLANK PAIN: 0
ABDOMINAL PAIN: 0
INSOMNIA: 1
WEAKNESS: 1
TINGLING: 1
COUGH: 0
DEPRESSION: 1
DIARRHEA: 1
NECK PAIN: 0
BLOOD IN STOOL: 1
STRIDOR: 0
VOMITING: 0
ROS GI COMMENTS: IMPROVED.
SHORTNESS OF BREATH: 0
WHEEZING: 0
TREMORS: 0
BACK PAIN: 0
MYALGIAS: 1

## 2018-03-13 ASSESSMENT — ACTIVITIES OF DAILY LIVING (ADL): TOILETING: REQUIRES ASSIST

## 2018-03-13 ASSESSMENT — LIFESTYLE VARIABLES: SUBSTANCE_ABUSE: 0

## 2018-03-13 NOTE — ED NOTES
Med rec completed per Pt's Mother at bedside  Allergies reviewed    Pts mother unsure whether pt took omeprazole 3/11 or 3/10

## 2018-03-13 NOTE — ASSESSMENT & PLAN NOTE
With associated obstruction, neurogenic bladder- post  replacement of matias catheter  Continue Bactrim, less C diff promoting.

## 2018-03-13 NOTE — ASSESSMENT & PLAN NOTE
Due to MS exah from infection - w persistent  extremity weakness-- treating  infection but remains very weak.  Consult neurology

## 2018-03-13 NOTE — ED NOTES
Existing matias cath removed using clean technique. Urine began squirting from urethral site immediately upon draining balloon. Urethral open cleansed per protocol and new 16F indwelling cath placed with no complication. Large amount of urine draining into bag -- +foul smelling, + pus and cloudy urine visualized. Sample collected using sterile technique and sent to lab per orders. Dr. Cameron aware of all.

## 2018-03-13 NOTE — ED NOTES
Mother at bedside, pt resting comfortably. Notified that pt is awaiting bed assignment. No complaints at this time, pt states he is feeling much better. Denies nausea, states some general pain but denies abd pain. Epperson draining jose alberto colored urine.

## 2018-03-13 NOTE — PROGRESS NOTES
[facility] Hospitalist Progress Note    Date of Service: 3/13/2018    Chief Complaint  37 y.o. Male hx of  MS, neurogenic bladder recently had matias placed by urology  admitted 3/12/2018 with N/V, diarrhea,  weakness,  dx UTI , sepsis.     Interval Problem Update     Afeb on IV atbs. Cxs pending.  Still tachycardic - although improved. Reports his strength is little better. No new diarrhea.     Consultants/Specialty  None     Disposition  TBD        Review of Systems   Constitutional: Positive for malaise/fatigue. Negative for diaphoresis.   HENT: Negative for congestion.    Eyes: Negative for discharge and redness.   Respiratory: Negative for cough, shortness of breath, wheezing and stridor.    Cardiovascular: Negative for chest pain, palpitations and leg swelling.   Gastrointestinal: Positive for blood in stool and diarrhea. Negative for abdominal pain and vomiting.        Improved.    Genitourinary: Negative for flank pain and hematuria.   Musculoskeletal: Positive for myalgias. Negative for back pain, joint pain and neck pain.   Neurological: Positive for tingling, sensory change and weakness. Negative for tremors, speech change and focal weakness.        Generalized weakness of all extremities with decreased sensation hands.    Psychiatric/Behavioral: Positive for depression. Negative for hallucinations and substance abuse. The patient has insomnia.       Physical Exam  Laboratory/Imaging   Hemodynamics  Temp (24hrs), Av.3 °C (99.1 °F), Min:36.9 °C (98.4 °F), Max:37.9 °C (100.2 °F)   Temperature: 36.9 °C (98.4 °F)  Pulse  Av.5  Min: 104  Max: 123 Heart Rate (Monitored): (!) 112  Blood Pressure: 121/65, NIBP: 130/78      Respiratory      Respiration: 19, Pulse Oximetry: 95 %             Fluids    Intake/Output Summary (Last 24 hours) at 18 0703  Last data filed at 18 9731   Gross per 24 hour   Intake                0 ml   Output             1550 ml   Net            -1550 ml        Nutrition  Orders Placed This Encounter   Procedures   • Diet Order     Standing Status:   Standing     Number of Occurrences:   1     Order Specific Question:   Diet:     Answer:   Regular [1]     Physical Exam   Constitutional: He is oriented to person, place, and time.   Anxious appearing.    HENT:   Head: Normocephalic and atraumatic.   Nose: Nose normal.   Eyes: Conjunctivae and EOM are normal. No scleral icterus.   Neck: Normal range of motion. No JVD present.   Cardiovascular:   No murmur heard.  Tachycardic regular.    Pulmonary/Chest: Effort normal. No stridor. No respiratory distress. He has no wheezes. He has no rales.   Abdominal: Soft. Bowel sounds are normal. He exhibits no distension. There is no tenderness.   Musculoskeletal: He exhibits no edema or tenderness.   Upper exts approx 3-4/5 strength. Lower exts paresis approx 2/5. Weakly moves feet.    Neurological: He is alert and oriented to person, place, and time. No cranial nerve deficit.   Skin: Skin is warm and dry. He is not diaphoretic. No pallor.   Vitals reviewed.      Recent Labs      03/11/18   1136  03/12/18   1530  03/13/18   0237   WBC  6.4  20.5*  17.3*   RBC  5.14  5.81  4.82   HEMOGLOBIN  15.9  18.2*  14.7   HEMATOCRIT  45.7  51.4  42.4   MCV  88.9  88.5  88.8   MCH  30.9  31.3  30.1   MCHC  34.8  35.4*  33.9   RDW  40.1  40.9  41.1   PLATELETCT  290  382  319   MPV  8.6*  8.9*  8.9*     Recent Labs      03/11/18   1136  03/12/18   1530  03/13/18   0237   SODIUM  141  137  140   POTASSIUM  4.1  4.6  3.8   CHLORIDE  108  105  108   CO2  26  17*  23   GLUCOSE  100*  147*  122*   BUN  14  21  17   CREATININE  0.92  1.09  0.98   CALCIUM  10.0  9.9  9.4     Recent Labs      03/11/18   1136   APTT  30.7   INR  1.02                  Assessment/Plan     * Sepsis (CMS-[facility])- (present on admission)   Assessment & Plan    This is sepsis (without associated acute organ dysfunction).  Due to urinary tract infection.  Diarrhea improved.    Rule  out C diff   IV rocephin   Fu urine cultures.   Monitor cbc, HR response.         Recurrent Clostridium difficile diarrhea- (present on admission)   Assessment & Plan    Post fecal transplant.  Monitor for diarrhea.   Check C diff PCR if recurrent.         Urinary tract infection- (present on admission)   Assessment & Plan    With associated obstruction, neurogenic bladder- now with replacement of matias catheter by urology.   Continue antibiotic therapy, monitor culture results.          Essential hypertension   Assessment & Plan    Controlled with current medication regimen.  Monitor.         Spasticity- (present on admission)   Assessment & Plan    Due MS exah,  Continue current medication regimen.         MS (multiple sclerosis) (HCC)- (present on admission)   Assessment & Plan    Apparently at baseline, unable to ambulate or transfer on own.  Needs assist with feeding.  Resumed on outpatient medications.             Quality-Core Measures   Reviewed items::  Labs reviewed, Medications reviewed and Radiology images reviewed  Matias catheter::  No Matias  DVT prophylaxis pharmacological::  Enoxaparin (Lovenox)  Antibiotics:  Treating active infection/contamination beyond 24 hours perioperative coverage

## 2018-03-13 NOTE — DOCUMENTATION QUERY
DOCUMENTATION QUERY    PROVIDERS: Please select “Cosign w/ note”to reply to query.    To better represent the severity of illness of your patient, please review the following information and exercise your independent professional judgment in responding to this query.     Patient admitted with sepsis due to urinary tract infection and has a matias catheter in place.     3/12/18 ED RN note documents chronic matias in place due to bed bound and MS.     3/12/18 H&P documents in the emergency department he was found to have an obstructed bladder, and with placement of a new matias catheter, had passage of large amounts of pus.  UTI with associated obstruction, now with replacement matias catheter.        Based upon the clinical findings, risk factors, and treatment, can the relationship between these two conditions be further specified?    • There is no relationship between UTI and matias catheter.  • The UTI  is secondary to the matias catheter.  • Other explanation of clinical findings  • Findings of no clinical significance  • Unable to determine        The medical record reflects the following:   Clinical Findings Documented UTI with sepsis and matias catheter  UTI with associated obstruction   MS with neurogenic bladder   Treatment Replacement of matias catheter  Antibiotics   IVF    Risk Factors MS   UTI with associated obstruction   C Difficile   Hypertension   Sepsis    Location within medical record History and Physical and ED note      Thank you,   Swathi Clay, RN  Clinical   [phone]

## 2018-03-13 NOTE — ED NOTES
Pt attempted to eat but became too nauseated, began dry heaving. No vomiting. Zofran administered. Pt resting now.

## 2018-03-13 NOTE — PROGRESS NOTES
Patient arrived to room T301 from ED via hospital bed. Patient is accompanied by mother, Kandi. Patient is on 2L O2 via nasal cannula. Patient has a PIV to right FA #20 running NS at 100ml/hr. Patient is lying on right side. Spasticity to legs. Epperson in place draining cloudy urine. Vitals are stable. Plan of care reviewed. Skin check performed will continue to monitor.

## 2018-03-13 NOTE — THERAPY
PT eval attempted. In discussion with OT, he is very fearful of movement at this time. Patient and mother requested PT/OT attempt evals tomorrow morning as they want more time for antibiotics in hopes it decreases his spasticity. Will defer based on patient request and attempt tomorrow.

## 2018-03-13 NOTE — ED NOTES
Per Dr. Roy, concern for matias blockage due to lack of urine output. To switch out catheter when pt returns from Ct.

## 2018-03-13 NOTE — ASSESSMENT & PLAN NOTE
This is sepsis (without associated acute organ dysfunction).  Due to urinary tract infection. No new bms or diarrhea reported. Resolved- tachycardia , elev wbc corrected.   IV rocephin - changed to oral bactrim for UTI coverage.   Blood cultures negative.  Apparently no urine cx done.

## 2018-03-13 NOTE — CARE PLAN
Problem: Fluid Volume:  Goal: Will maintain balanced intake and output  Outcome: PROGRESSING AS EXPECTED  IVFs in use. PRN meds available for nasuea/vomiting.     Problem: Skin Integrity  Goal: Risk for impaired skin integrity will decrease  Outcome: PROGRESSING AS EXPECTED  Patient encouraged to perform turns every two hours.

## 2018-03-13 NOTE — ED NOTES
Labs drawn and sent to lab. Pt asking to eat, diet order is regular. Mother went to get food for her and pt. Notified they are awaiting bed assignment. Pt c/o slight nausea from hunger, no other complaints at this time.

## 2018-03-13 NOTE — ASSESSMENT & PLAN NOTE
Mother reports gradual decline over 3 weeks-  baseline, unable to ambulate or transfer on own.  Needs assist with feeding.  Resumed on outpatient medications.   Possible exah as symptoms not signif improved despite treatment for infxn-- consult Neurology to eval for IV pulse steroids. Discussed with neurology Dr. Archer.

## 2018-03-13 NOTE — H&P
Hospital Medicine History and Physical    Date of Service  3/12/2018    Chief Complaint  Chief Complaint   Patient presents with   • UTI   • N/V       History of Presenting Illness  37 y.o. male with history of multiple sclerosis, and associated neurogenic bladder was in his usual state of health until 1 day prior to admission, when he began to feel worsening of his underlying multiple sclerosis with increased spasms, and with associated nausea and vomiting, as well as 1 day of diarrhea, fecal incontinence and passage of blood per rectum.  He denies any specific current pain.   In the emergency department he was found to have an obstructed bladder, and with placement of a new matias catheter, had passage of large amounts of pus.      Primary Care Physician  NAOMIE Emmanuel    Consultants  none    Code Status  Full code     Review of Systems  Review of Systems   Constitutional: Positive for malaise/fatigue.   HENT: Negative.    Eyes: Negative.    Respiratory: Negative.    Cardiovascular: Negative.    Gastrointestinal: Positive for blood in stool, diarrhea, nausea and vomiting.   Genitourinary: Negative.    Musculoskeletal: Negative.    Skin: Negative.    Neurological: Positive for tremors.   Endo/Heme/Allergies: Negative.    Psychiatric/Behavioral: Positive for depression. The patient is nervous/anxious.         Past Medical History  Past Medical History:   Diagnosis Date   • Back pain    • Blood transfusion    • Depression     PTSD/clinical depression   • Fall     4/6/2016   • Headache(784.0)    • Heart murmur    • MS (multiple sclerosis) (CMS-HCC) DX 8/20/2013   • Urinary incontinence     condom catheter       Surgical History  Past Surgical History:   Procedure Laterality Date   • GASTROSCOPY  1/14/2017    Procedure: GASTROSCOPY WITH FECAL MATTER TRANSPLANT;  Surgeon: Obinna Douglas M.D.;  Location: SURGERY [facility];  Service:    • OPEN REDUCTION         Medications  No current facility-administered  medications on file prior to encounter.      Current Outpatient Prescriptions on File Prior to Encounter   Medication Sig Dispense Refill   • ondansetron (ZOFRAN ODT) 4 MG TABLET DISPERSIBLE Take 1 Tab by mouth every 8 hours as needed. 10 Tab 1   • metoclopramide (REGLAN) 10 MG Tab Take 1 Tab by mouth every 6 hours as needed (prn nausea). 10 Tab 1   • NS SOLN 250 mL with ocrelizumab 300 MG/10ML SOLN 300 mg 300 mg by Intravenous route every 6 months.     • losartan (COZAAR) 50 MG Tab Take 50 mg by mouth every day.     • ondansetron (ZOFRAN) 8 MG Tab Take 8 mg by mouth every 8 hours as needed for Nausea/Vomiting.     • omeprazole (PRILOSEC) 20 MG delayed-release capsule Take 20 mg by mouth every day.     • baclofen (LIORESAL) 20 MG tablet Take on am, one afternoon and two at bedtime. 120 Tab 5   • docusate sodium (COLACE) 250 MG capsule Take 250 mg by mouth every day at 6 PM. constipation     • Multiple Vitamins-Minerals (MULTIVITAMIN & MINERAL PO) Take 1 Tab by mouth every day at 6 PM. supplement     • Magnesium 300 MG Cap Take 300 mg by mouth every day. supplement     • buPROPion SR (WELLBUTRIN-SR) 150 MG TABLET SR 12 HR sustained-release tablet Take 1 Tab by mouth 2 Times a Day. 60 Tab    • escitalopram (LEXAPRO) 20 MG tablet Take 1 Tab by mouth every day. 30 Tab    • polyethylene glycol/lytes (MIRALAX) Pack Take 1 Packet by mouth 1 time daily as needed (if sennosides and docusate ineffective after 24 hours).  3   • methenamine hip (HIPPREX) 1 GM Tab Take 1 Tab by mouth 2 Times a Day. 60 Tab    • oxybutynin SR (DITROPAN-XL) 10 MG CR tablet Take 1 Tab by mouth every evening. 30 Tab    • Cholecalciferol (VITAMIN D3) 5000 UNITS Tab Take 5,000 Units by mouth every day.     • psyllium (METAMUCIL) 58.12 % Pack Take 1 Packet by mouth every day.     • Cranberry 125 MG Tab Take 125 mg by mouth every day. supplement     • Probiotic Product (PROBIOTIC DAILY PO) Take 1 Cap by mouth every day.         Family History  Family  History   Problem Relation Age of Onset   • Thyroid Mother    • Hypertension Mother    • Hypertension Father    • Psychiatry Father        Social History  Social History   Substance Use Topics   • Smoking status: Former Smoker     Start date: 1997     Quit date: 2013   • Smokeless tobacco: Former User   • Alcohol use 0.0 oz/week      Comment: 1 beer/day       Allergies  Allergies   Allergen Reactions   • Other Drug      CANNOT TAKE ANY STEROIDS WITH RECENT TYSABRI INFUSION   • Asa [Aspirin] Unspecified     Bleeding in stomach as a baby   • Gabapentin Anxiety     anger and severe mood swing   • Lyrica Anxiety     anger and severe mood swing        Physical Exam  Laboratory   Hemodynamics  Temp (24hrs), Av.3 °C (99.1 °F), Min:37.3 °C (99.1 °F), Max:37.3 °C (99.1 °F)   Temperature: 37.3 °C (99.1 °F)  Pulse  Av.8  Min: 104  Max: 123 Heart Rate (Monitored): (!) 124  Blood Pressure: 130/90, NIBP: 143/91      Respiratory      Respiration: 20, Pulse Oximetry: 95 %             Physical Exam   Constitutional: He is oriented to person, place, and time. He appears well-developed and well-nourished. He appears distressed.   HENT:   Head: Normocephalic.   Eyes: Pupils are equal, round, and reactive to light.   Neck: Normal range of motion. Neck supple. No tracheal deviation present. No thyromegaly present.   Cardiovascular: Normal rate, regular rhythm and normal heart sounds.  Exam reveals no gallop and no friction rub.    No murmur heard.  Pulmonary/Chest: Effort normal and breath sounds normal. No respiratory distress. He has no wheezes.   Abdominal: Soft. Bowel sounds are normal. He exhibits no distension and no mass. There is no tenderness. There is no rebound and no guarding.   Musculoskeletal: Normal range of motion. He exhibits no edema.   Lymphadenopathy:     He has no cervical adenopathy.   Neurological: He is alert and oriented to person, place, and time. No cranial nerve deficit.   Skin: Skin is warm.  He is diaphoretic.   Psychiatric: He has a normal mood and affect.   Nursing note and vitals reviewed.      Recent Labs      03/11/18   1136  03/12/18   1530   WBC  6.4  20.5*   RBC  5.14  5.81   HEMOGLOBIN  15.9  18.2*   HEMATOCRIT  45.7  51.4   MCV  88.9  88.5   MCH  30.9  31.3   MCHC  34.8  35.4*   RDW  40.1  40.9   PLATELETCT  290  382   MPV  8.6*  8.9*     Recent Labs      03/11/18   1136  03/12/18   1530   SODIUM  141  137   POTASSIUM  4.1  4.6   CHLORIDE  108  105   CO2  26  17*   GLUCOSE  100*  147*   BUN  14  21   CREATININE  0.92  1.09   CALCIUM  10.0  9.9     Recent Labs      03/11/18   1136  03/12/18   1530   ALTSGPT  12  12   ASTSGOT  13  17   ALKPHOSPHAT  54  66   TBILIRUBIN  0.8  1.0   GLUCOSE  100*  147*     Recent Labs      03/11/18   1136   APTT  30.7   INR  1.02             Lab Results   Component Value Date    TROPONINI <0.01 12/09/2016     Urinalysis:    Lab Results  Component Value Date/Time   SPECGRAVITY 1.005 03/12/2018 1840   GLUCOSEUR Negative 03/12/2018 1840   KETONES 40 (A) 03/12/2018 1840   NITRITE Negative 03/12/2018 1840   WBCURINE Packed (A) 03/12/2018 1840   RBCURINE 5-10 (A) 03/12/2018 1840   BACTERIA Many (A) 03/12/2018 1840   EPITHELCELL Rare 01/29/2018 1222        Imaging  CT abdomen and pelvis with enlarged bladder per my read.  Radiologist notes presence of bladder stones as well.        Assessment/Plan     I anticipate this patient will require at least two midnights for appropriate medical management, necessitating inpatient admission.    * Urinary tract infection- (present on admission)   Assessment & Plan    With associated obstruction, now with replacement of matias catheter.  Continue antibiotic therapy, monitor culture results.          Sepsis (CMS-[facility])- (present on admission)   Assessment & Plan    This is sepsis (without associated acute organ dysfunction).  Due to urinary tract infection.  ?possible C. Difficile recurrence despite large diarrhea         Spasticity-  (present on admission)   Assessment & Plan    Due to MS, and worsening with current infectious process.  Continue current medication regimen.         Essential hypertension   Assessment & Plan    Controlled with current medication regimen.  Monitor.         Recurrent Clostridium difficile diarrhea- (present on admission)   Assessment & Plan    History of the same status post fecal transplant.  Monitor for diarrhea.  Per mother and caregivers, patient has had some fecal incontinence (not his normal) and some passage of blood.         MS (multiple sclerosis) (HCC)- (present on admission)   Assessment & Plan    Apparently at baseline, unable to ambulate or transfer on own.  Is able to turn from side to side in bed, and thus has no current pressure ulcers noted.  Continue follow-up and any needed medications with neurologist.  Patient follows with Dr. Bloch.             VTE prophylaxis: SCD, Lovenox.

## 2018-03-13 NOTE — PROGRESS NOTES
Patient has been verbally abusive to staff this AM. He has been refusing labs. He upset his mother who was staying overnight. She left due to patient poor behavior and verbal aggression. He wishes to be left alone. RN to continue to monitor. Other staff made aware.

## 2018-03-13 NOTE — PROGRESS NOTES
2 RN Skin Check:    Skin check was performed. Patient skin is overall intact. A small, red blanching spot to medial right knee. Bottom is red but blanching. No open wounds or signs of breakdown noted anywhere on the body. Patient able to turn self from side to side. Staff to provide reminders and offer assistance when needed.

## 2018-03-14 ENCOUNTER — HOME CARE VISIT (OUTPATIENT)
Dept: HOME HEALTH SERVICES | Facility: HOME HEALTHCARE | Age: 38
End: 2018-03-14
Payer: MEDICARE

## 2018-03-14 VITALS
OXYGEN SATURATION: 92 % | TEMPERATURE: 99.7 F | RESPIRATION RATE: 16 BRPM | WEIGHT: 165 LBS | BODY MASS INDEX: 23.01 KG/M2 | DIASTOLIC BLOOD PRESSURE: 77 MMHG | HEART RATE: 100 BPM | SYSTOLIC BLOOD PRESSURE: 135 MMHG

## 2018-03-14 LAB
ANION GAP SERPL CALC-SCNC: 7 MMOL/L (ref 0–11.9)
BASOPHILS # BLD AUTO: 0.7 % (ref 0–1.8)
BASOPHILS # BLD: 0.05 K/UL (ref 0–0.12)
BUN SERPL-MCNC: 13 MG/DL (ref 8–22)
CALCIUM SERPL-MCNC: 8.6 MG/DL (ref 8.5–10.5)
CHLORIDE SERPL-SCNC: 110 MMOL/L (ref 96–112)
CO2 SERPL-SCNC: 25 MMOL/L (ref 20–33)
CREAT SERPL-MCNC: 0.81 MG/DL (ref 0.5–1.4)
EOSINOPHIL # BLD AUTO: 0.3 K/UL (ref 0–0.51)
EOSINOPHIL NFR BLD: 4.3 % (ref 0–6.9)
ERYTHROCYTE [DISTWIDTH] IN BLOOD BY AUTOMATED COUNT: 41.4 FL (ref 35.9–50)
GLUCOSE SERPL-MCNC: 91 MG/DL (ref 65–99)
HCT VFR BLD AUTO: 38.9 % (ref 42–52)
HGB BLD-MCNC: 13.2 G/DL (ref 14–18)
IMM GRANULOCYTES # BLD AUTO: 0.02 K/UL (ref 0–0.11)
IMM GRANULOCYTES NFR BLD AUTO: 0.3 % (ref 0–0.9)
LYMPHOCYTES # BLD AUTO: 1.73 K/UL (ref 1–4.8)
LYMPHOCYTES NFR BLD: 24.8 % (ref 22–41)
MCH RBC QN AUTO: 31 PG (ref 27–33)
MCHC RBC AUTO-ENTMCNC: 33.9 G/DL (ref 33.7–35.3)
MCV RBC AUTO: 91.3 FL (ref 81.4–97.8)
MONOCYTES # BLD AUTO: 0.74 K/UL (ref 0–0.85)
MONOCYTES NFR BLD AUTO: 10.6 % (ref 0–13.4)
NEUTROPHILS # BLD AUTO: 4.13 K/UL (ref 1.82–7.42)
NEUTROPHILS NFR BLD: 59.3 % (ref 44–72)
NRBC # BLD AUTO: 0 K/UL
NRBC BLD-RTO: 0 /100 WBC
PLATELET # BLD AUTO: 246 K/UL (ref 164–446)
PMV BLD AUTO: 9 FL (ref 9–12.9)
POTASSIUM SERPL-SCNC: 3.5 MMOL/L (ref 3.6–5.5)
RBC # BLD AUTO: 4.26 M/UL (ref 4.7–6.1)
SODIUM SERPL-SCNC: 142 MMOL/L (ref 135–145)
WBC # BLD AUTO: 7 K/UL (ref 4.8–10.8)

## 2018-03-14 PROCEDURE — 85025 COMPLETE CBC W/AUTO DIFF WBC: CPT

## 2018-03-14 PROCEDURE — 51798 US URINE CAPACITY MEASURE: CPT

## 2018-03-14 PROCEDURE — 36415 COLL VENOUS BLD VENIPUNCTURE: CPT

## 2018-03-14 PROCEDURE — 700102 HCHG RX REV CODE 250 W/ 637 OVERRIDE(OP): Performed by: HOSPITALIST

## 2018-03-14 PROCEDURE — 700102 HCHG RX REV CODE 250 W/ 637 OVERRIDE(OP): Mod: JG | Performed by: HOSPITALIST

## 2018-03-14 PROCEDURE — 97535 SELF CARE MNGMENT TRAINING: CPT

## 2018-03-14 PROCEDURE — 80048 BASIC METABOLIC PNL TOTAL CA: CPT

## 2018-03-14 PROCEDURE — A9270 NON-COVERED ITEM OR SERVICE: HCPCS | Mod: JG | Performed by: HOSPITALIST

## 2018-03-14 PROCEDURE — G8978 MOBILITY CURRENT STATUS: HCPCS | Mod: CM

## 2018-03-14 PROCEDURE — A9270 NON-COVERED ITEM OR SERVICE: HCPCS | Performed by: HOSPITALIST

## 2018-03-14 PROCEDURE — 99239 HOSP IP/OBS DSCHRG MGMT >30: CPT | Performed by: HOSPITALIST

## 2018-03-14 PROCEDURE — 700105 HCHG RX REV CODE 258: Performed by: HOSPITALIST

## 2018-03-14 PROCEDURE — 97112 NEUROMUSCULAR REEDUCATION: CPT

## 2018-03-14 PROCEDURE — G8979 MOBILITY GOAL STATUS: HCPCS | Mod: CK

## 2018-03-14 PROCEDURE — 97163 PT EVAL HIGH COMPLEX 45 MIN: CPT

## 2018-03-14 RX ORDER — SULFAMETHOXAZOLE AND TRIMETHOPRIM 800; 160 MG/1; MG/1
1 TABLET ORAL EVERY 12 HOURS
Qty: 10 TAB | Refills: 0 | Status: SHIPPED | OUTPATIENT
Start: 2018-03-14 | End: 2018-03-19

## 2018-03-14 RX ORDER — POTASSIUM CHLORIDE 20 MEQ/1
20 TABLET, EXTENDED RELEASE ORAL DAILY
Qty: 3 TAB | Refills: 0 | Status: SHIPPED | OUTPATIENT
Start: 2018-03-15 | End: 2018-03-14

## 2018-03-14 RX ORDER — POTASSIUM CHLORIDE 20 MEQ/1
20 TABLET, EXTENDED RELEASE ORAL DAILY
Status: DISCONTINUED | OUTPATIENT
Start: 2018-03-14 | End: 2018-03-14 | Stop reason: HOSPADM

## 2018-03-14 RX ORDER — POLYETHYLENE GLYCOL 3350 17 G/17G
17 POWDER, FOR SOLUTION ORAL
Qty: 30 EACH | Refills: 1 | Status: SHIPPED | OUTPATIENT
Start: 2018-03-14 | End: 2018-04-26

## 2018-03-14 RX ORDER — LACTULOSE 10 G/15ML
20 SOLUTION ORAL 3 TIMES DAILY PRN
Qty: 1 BOTTLE | Refills: 1 | Status: ON HOLD | OUTPATIENT
Start: 2018-03-14 | End: 2018-05-07

## 2018-03-14 RX ORDER — POTASSIUM CHLORIDE 20 MEQ/1
20 TABLET, EXTENDED RELEASE ORAL DAILY
Qty: 3 TAB | Refills: 0 | Status: ON HOLD | OUTPATIENT
Start: 2018-03-15 | End: 2018-03-19

## 2018-03-14 RX ORDER — POTASSIUM CHLORIDE 1.5 G/1.58G
20 POWDER, FOR SOLUTION ORAL DAILY
Qty: 3 PACKET | Refills: 0 | Status: SHIPPED | OUTPATIENT
Start: 2018-03-14 | End: 2018-03-14

## 2018-03-14 RX ADMIN — BACLOFEN 20 MG: 10 TABLET ORAL at 16:53

## 2018-03-14 RX ADMIN — POTASSIUM CHLORIDE 20 MEQ: 1500 TABLET, EXTENDED RELEASE ORAL at 09:15

## 2018-03-14 RX ADMIN — SODIUM CHLORIDE: 9 INJECTION, SOLUTION INTRAVENOUS at 03:31

## 2018-03-14 RX ADMIN — BACLOFEN 20 MG: 10 TABLET ORAL at 11:22

## 2018-03-14 RX ADMIN — BUPROPION HYDROCHLORIDE 150 MG: 150 TABLET, EXTENDED RELEASE ORAL at 11:22

## 2018-03-14 RX ADMIN — SULFAMETHOXAZOLE AND TRIMETHOPRIM 1 TABLET: 800; 160 TABLET ORAL at 11:23

## 2018-03-14 RX ADMIN — LOSARTAN POTASSIUM 50 MG: 50 TABLET, FILM COATED ORAL at 11:22

## 2018-03-14 RX ADMIN — ESCITALOPRAM OXALATE 20 MG: 10 TABLET ORAL at 11:23

## 2018-03-14 ASSESSMENT — COGNITIVE AND FUNCTIONAL STATUS - GENERAL
DAILY ACTIVITIY SCORE: 9
CLIMB 3 TO 5 STEPS WITH RAILING: TOTAL
MOBILITY SCORE: 6
TURNING FROM BACK TO SIDE WHILE IN FLAT BAD: UNABLE
PERSONAL GROOMING: A LOT
TOILETING: TOTAL
WALKING IN HOSPITAL ROOM: TOTAL
SUGGESTED CMS G CODE MODIFIER DAILY ACTIVITY: CL
DRESSING REGULAR LOWER BODY CLOTHING: TOTAL
HELP NEEDED FOR BATHING: TOTAL
STANDING UP FROM CHAIR USING ARMS: TOTAL
MOVING FROM LYING ON BACK TO SITTING ON SIDE OF FLAT BED: UNABLE
EATING MEALS: A LOT
MOVING TO AND FROM BED TO CHAIR: UNABLE
DRESSING REGULAR UPPER BODY CLOTHING: A LOT
SUGGESTED CMS G CODE MODIFIER MOBILITY: CN

## 2018-03-14 ASSESSMENT — ENCOUNTER SYMPTOMS
NERVOUS/ANXIOUS: 1
ROS GI COMMENTS: RESOLVED
STRIDOR: 0
WHEEZING: 0
WEAKNESS: 1
VOMITING: 0
SPEECH CHANGE: 0
EYE REDNESS: 0
DIAPHORESIS: 0
HALLUCINATIONS: 0
FLANK PAIN: 0
MYALGIAS: 1
TREMORS: 0
BACK PAIN: 0
DIARRHEA: 0
BLOOD IN STOOL: 1
SENSORY CHANGE: 1
NECK PAIN: 0
PALPITATIONS: 0
FOCAL WEAKNESS: 0
ABDOMINAL PAIN: 0
TINGLING: 1
COUGH: 0
DEPRESSION: 1
SHORTNESS OF BREATH: 0
EYE DISCHARGE: 0

## 2018-03-14 ASSESSMENT — LIFESTYLE VARIABLES: SUBSTANCE_ABUSE: 0

## 2018-03-14 ASSESSMENT — PAIN SCALES - GENERAL
PAINLEVEL_OUTOF10: 0
PAINLEVEL_OUTOF10: 0

## 2018-03-14 ASSESSMENT — GAIT ASSESSMENTS: GAIT LEVEL OF ASSIST: UNABLE TO PARTICIPATE

## 2018-03-14 NOTE — THERAPY
"Physical Therapy Evaluation completed.   Bed Mobility:  Supine to Sit: Maximal Assist (x2)  Transfers: Sit to Stand: Maximal Assist (x2)  Gait: Level Of Assist: Unable to Participate with No Equipment Needed       Plan of Care: Will benefit from Physical Therapy 2 times per week  Discharge Recommendations: Equipment: No Equipment Needed.     Mr. Juan is a 36 y/o male who presents to acute secondary to UTI and sepsis. PMH of MS with need for extensive assist at baseline. Mom present for eval. Pt able to sit at EOB without LOB if undisturbed. Pt has high anxiety with moving which triggers extensor tone. When pt is calm and relaxed no spasms. STS max assist x2 due to severe tone. Mother actually reports he is at his baseline and she would like him to discharge home at this current functional level. Concern for injury to her as pt is very stubborn with transfers and poor command following. They do have talia but need a new sling. Mom continues to assert pt is back at his baseline level of function. He would benefit from continued home health physical therapy to try to control tone and focus on pivot transfers with patient increasing his participation. PT to continue to follow while acute to assist with DC needs. Mom and patient reluctant to do much training as they say they have their own methods for tranfsers and pt does not trust staff.     See \"Rehab Therapy-Acute\" Patient Summary Report for complete documentation.     "

## 2018-03-14 NOTE — PROGRESS NOTES
Notified by patient and mother that patient requires [facility] transport from hospital. SW updated. Patient not satisfied with current home health agency, states they are rarely available for him. SW updated.

## 2018-03-14 NOTE — PROGRESS NOTES
[facility] Hospitalist Progress Note    Date of Service: 3/14/2018    Chief Complaint  37 y.o. Male hx of  MS, neurogenic bladder recently had matias placed by urology  admitted 3/12/2018 with N/V, stool incontinence,  weakness,  dx UTI , sepsis.     Interval Problem Update     Still very weak w spasticity extremities- needs full assist to feed and sit up. Tachycardia, elev wbc resolved.  No Diarrhea or stool incontinence. Mother reports has had generalized decline over 3 weeks.     Consultants/Specialty  Plan to consult neurology.    Disposition  TBD        Review of Systems   Constitutional: Positive for malaise/fatigue. Negative for diaphoresis.   HENT: Negative for congestion.    Eyes: Negative for discharge and redness.   Respiratory: Negative for cough, shortness of breath, wheezing and stridor.    Cardiovascular: Negative for chest pain, palpitations and leg swelling.   Gastrointestinal: Positive for blood in stool. Negative for abdominal pain, diarrhea and vomiting.        Resolved    Genitourinary: Negative for flank pain and hematuria.   Musculoskeletal: Positive for myalgias. Negative for back pain, joint pain and neck pain.        With spasticity    Neurological: Positive for tingling, sensory change and weakness. Negative for tremors, speech change and focal weakness.        Generalized weakness of all extremities with decreased sensation hands.    Psychiatric/Behavioral: Positive for depression. Negative for hallucinations and substance abuse. The patient is nervous/anxious.       Physical Exam  Laboratory/Imaging   Hemodynamics  Temp (24hrs), Av.2 °C (99 °F), Min:36.7 °C (98 °F), Max:37.6 °C (99.7 °F)   Temperature: 37.6 °C (99.7 °F)  Pulse  Av.6  Min: 86  Max: 123   Blood Pressure: 120/81      Respiratory      Respiration: 14, Pulse Oximetry: 92 %             Fluids    Intake/Output Summary (Last 24 hours) at 18 1542  Last data filed at 18 0400   Gross per 24 hour   Intake              2690 ml   Output             2550 ml   Net              140 ml       Nutrition  Orders Placed This Encounter   Procedures   • Diet Order     Standing Status:   Standing     Number of Occurrences:   1     Order Specific Question:   Diet:     Answer:   Regular [1]     Physical Exam   Constitutional: He is oriented to person, place, and time.   Anxious appearing.    HENT:   Head: Normocephalic and atraumatic.   Right Ear: External ear normal.   Left Ear: External ear normal.   Nose: Nose normal.   Eyes: Conjunctivae and EOM are normal. No scleral icterus.   Neck: Normal range of motion. No JVD present.   Cardiovascular: Normal rate and regular rhythm.    No murmur heard.  Pulmonary/Chest: Effort normal. No stridor. No respiratory distress. He has no wheezes. He has no rales.   Abdominal: Soft. Bowel sounds are normal. He exhibits no distension. There is no tenderness.   Musculoskeletal: He exhibits no edema or tenderness.   Upper exts approx 3-4/5 strength w spasticity. Lower exts paresis approx 2/5. Weakly moves feet.    Neurological: He is alert and oriented to person, place, and time. No cranial nerve deficit. He exhibits abnormal muscle tone. Coordination abnormal.   Skin: Skin is warm and dry. He is not diaphoretic. No pallor.   Psychiatric: Cognition and memory are not impaired. He does not express impulsivity.   Cooperative.    Vitals reviewed.      Recent Labs      03/12/18   1530  03/13/18   0237  03/14/18   0256   WBC  20.5*  17.3*  7.0   RBC  5.81  4.82  4.26*   HEMOGLOBIN  18.2*  14.7  13.2*   HEMATOCRIT  51.4  42.4  38.9*   MCV  88.5  88.8  91.3   MCH  31.3  30.1  31.0   MCHC  35.4*  33.9  33.9   RDW  40.9  41.1  41.4   PLATELETCT  382  319  246   MPV  8.9*  8.9*  9.0     Recent Labs      03/12/18   1530  03/13/18   0237  03/14/18   0256   SODIUM  137  140  142   POTASSIUM  4.6  3.8  3.5*   CHLORIDE  105  108  110   CO2  17*  23  25   GLUCOSE  147*  122*  91   BUN  21  17  13   CREATININE  1.09  0.98   0.81   CALCIUM  9.9  9.4  8.6     Recent Labs      03/11/18   1136   APTT  30.7   INR  1.02                  Assessment/Plan     * Urinary tract infection- (present on admission)   Assessment & Plan    With associated obstruction, neurogenic bladder- post  replacement of matias catheter  Continue Bactrim, less C diff promoting.        Sepsis (CMS-HCC)- (present on admission)   Assessment & Plan    This is sepsis (without associated acute organ dysfunction).  Due to urinary tract infection. No new bms or diarrhea reported. Resolved- tachycardia , elev wbc corrected.   IV rocephin - changed to oral bactrim for UTI coverage.   Blood cultures negative.  Apparently no urine cx done.            MS (multiple sclerosis) ([facility])- (present on admission)   Assessment & Plan    Mother reports gradual decline over 3 weeks-  baseline, unable to ambulate or transfer on own.  Needs assist with feeding.  Resumed on outpatient medications.   Possible exah as symptoms not signif improved despite treatment for infxn-- consult Neurology to eval for IV pulse steroids. Discussed with neurology Dr. Archer.           Essential hypertension   Assessment & Plan    Controlled with current medication regimen.  Monitor.         Recurrent Clostridium difficile diarrhea- (present on admission)   Assessment & Plan     Hx of Post fecal transplant. No new diarrhea or BMs  Dc cdiff precautions.         Spasticity- (present on admission)   Assessment & Plan    Due to MS exah from infection - w persistent  extremity weakness-- treating  infection but remains very weak.  Consult neurology         Hypokalemia- (present on admission)   Assessment & Plan    Replete with oral kcl  Fu serum K  Check mag        Discussed with mother , patient, RN at bedside plan of care.   Quality-Core Measures   Reviewed items::  Labs reviewed, Medications reviewed and Radiology images reviewed  Matias catheter::  No Matias  DVT prophylaxis pharmacological::  Enoxaparin  (Lovenox)  Antibiotics:  Treating active infection/contamination beyond 24 hours perioperative coverage

## 2018-03-14 NOTE — CARE PLAN
Problem: Communication  Goal: The ability to communicate needs accurately and effectively will improve  Outcome: PROGRESSING AS EXPECTED  A&Ox4. Family at bedside early in shift, plan of care discussed with mother and patient. IVF infusing. Refusing to let staff help him turn, turns himself side to side. Epperson patent.    Problem: Safety  Goal: Will remain free from injury  Outcome: PROGRESSING AS EXPECTED  Bed alarm on, pt does call appropriately. Fall precautions.    Problem: Infection  Goal: Will remain free from infection  Outcome: PROGRESSING AS EXPECTED  Special contact isolation precautions in place, CDIFF sample still needed.

## 2018-03-14 NOTE — DISCHARGE PLANNING
ATTN: Case Management  RE: Referral for Home Health                We would like to take this opportunity to thank you for submitting a referral for your patient to continue care with [facility]. Our skilled team is dedicated to helping all patients recover and gain independence in the home setting.            As of 3/14/2018, we have accepted the above patient into our service. A [facility] clinician will be out to see the patient within 48 hours to conduct our initial visit. If you have any questions or concerns regarding the patient’s transition to Home Health, please do not hesitate to contact us. We are open for referrals 7 days a week from 8AM to 5PM at [phone].      We look forward to collaborating with you,  [facility] Team

## 2018-03-15 ENCOUNTER — PATIENT OUTREACH (OUTPATIENT)
Dept: HEALTH INFORMATION MANAGEMENT | Facility: OTHER | Age: 38
End: 2018-03-15

## 2018-03-15 NOTE — DISCHARGE PLANNING
Medical Social Work     PEYTON received a page requesting assistance with [facility] transportation home. PEYTON faxed a PCS form to [facility] and spoke with Pawel and set up transportation for 1830.     Pt will be transporting to [address] [place] [address] [address] [place] [address].     PEYTON called and updated the RN, Chris about transport time of 1830.     Plan: PEYTON will remain available for pt and family support.

## 2018-03-15 NOTE — DISCHARGE SUMMARY
Hospital Medicine Discharge Note     Admit Date:  3/12/2018       Discharge Date:   3/15/2018    Attending Physician:  Terrance Apple M.D.      Diagnoses (includes active and resolved):     Principal Problem:    Urinary tract infection POA: Yes, clinically improved.   Active Problems:    MS (multiple sclerosis) (Formerly [facility]) POA: Yes    Sepsis (CMS-Formerly [facility]) POA: Yes, resolved.    Hypokalemia POA: Yes    Spasticity POA: Yes    History of Recurrent Clostridium difficile diarrhea post Fecal transplant POA: Yes, asymptomatic.     Essential hypertension POA: Unknown    Neurogenic bladder with indwelling matias    Bladder stones, asymptomatic      Hospital Summary (Brief Narrative):         37 y.o. male with history of multiple sclerosis, and associated neurogenic bladder with recent matias placed by urology was in his usual state of health until 1 day prior to admission, when he began to feel worsening of his underlying multiple sclerosis with increased spasms, and with associated nausea and vomiting.  Patient had finding of sepsis with elevated Wbc and tachycardia with Urinary tract infection.  He also reported stool incontinence with blood stools, no diarrhea.  Patient was treated with IVFs, IV rocephin and changed to Bactrim to lessen risk for C diff re occurrence.  Following admit he had no bloody stools or diarrhea.  Mother reports at times he required manual digital disimpaction at home.  CT also revealed rectal area swelling, no colitis. May suggest area of irritation.  With treatment his tachycardia resolved.  WBC normalized from 20 K to 7 K and remained afebrile.  N/V resolved, tolerated diet, spasms improved.   Neurology evaluated patient and felt unlikely MS flare up.  Patient was anxious to go home and was discharged with home health, outpatient follow up with Urology regarding his asymptomatic bladder stones.  Follow up bladder scan revealed no residual volume with matias in place.  Blood cultures were negative. Plan to  complete additional 5 days of  Bactrim outpatient.      Consultants:        Dr. Archer, neurology.    Imaging/ Testing:      DX-CHEST-PORTABLE (1 VIEW)   Final Result      No acute cardiac or pulmonary abnormality is noted.      CT-ABDOMEN-PELVIS WITH   Final Result         1. Very distended urinary bladder despite having Epperson catheter in place.      2.  Mild to moderate bilateral hydronephrosis, likely due to urinary bladder distention and backflow.      3. Multiple layering urinary bladder stones. Some debris is seen layering in the dependent portion of urinary bladder as well.      4. Thickening of the distal rectum and anus could relate to inflammation. Correlate with direct visualization.                Discharge Medications:             Medication List      START taking these medications      Instructions   lactulose 10 GM/15ML Soln   Take 30 mL by mouth 3 times a day as needed (constipation). Constipation  Dose:  20 g     polyethylene glycol/lytes Pack  Commonly known as:  MIRALAX   Take 1 Packet by mouth 1 time daily as needed.  Dose:  17 g     potassium chloride SA 20 MEQ Tbcr  Commonly known as:  Kdur   Take 1 Tab by mouth every day for 3 days.  Dose:  20 mEq     sulfamethoxazole-trimethoprim 800-160 MG tablet  Commonly known as:  BACTRIM DS   Take 1 Tab by mouth every 12 hours for 5 days.  Dose:  1 Tab        CONTINUE taking these medications      Instructions   baclofen 20 MG tablet  Commonly known as:  LIORESAL   Take on am, one afternoon and two at bedtime.     buPROPion  MG Tb12 sustained-release tablet  Commonly known as:  WELLBUTRIN-SR   Take 1 Tab by mouth 2 Times a Day.  Dose:  150 mg     Cranberry 125 MG Tabs   Take 125 mg by mouth every day. supplement  Dose:  125 mg     docusate sodium 250 MG capsule  Commonly known as:  COLACE   Take 250 mg by mouth every day at 6 PM. constipation  Dose:  250 mg     escitalopram 20 MG tablet  Commonly known as:  LEXAPRO   Take 1 Tab by mouth every  day.  Dose:  20 mg     losartan 50 MG Tabs  Commonly known as:  COZAAR   Take 50 mg by mouth every day.  Dose:  50 mg     Magnesium 300 MG Caps   Take 300 mg by mouth every day. supplement  Dose:  300 mg     methenamine hip 1 GM Tabs  Commonly known as:  HIPPREX   Take 1 Tab by mouth 2 Times a Day.  Dose:  1 g     NS SOLN 250 mL with ocrelizumab 300 MG/10ML SOLN 300 mg   300 mg by Intravenous route every 6 months.  Dose:  300 mg     omeprazole 20 MG delayed-release capsule  Commonly known as:  PRILOSEC   Take 20 mg by mouth every 48 hours.  Dose:  20 mg     ondansetron 4 MG Tbdp  Commonly known as:  ZOFRAN ODT   Take 4 mg by mouth every 4 hours.  Dose:  4 mg     oxybutynin SR 10 MG CR tablet  Commonly known as:  DITROPAN-XL   Take 1 Tab by mouth every evening.  Dose:  10 mg     PROBIOTIC DAILY PO   Take 1 Cap by mouth 2 Times a Day.  Dose:  1 Cap     psyllium 58.12 % Pack  Commonly known as:  METAMUCIL   Take 1 Packet by mouth every day.  Dose:  1 Packet     therapeutic multivitamin-minerals Tabs   Take 1 Tab by mouth every day.  Dose:  1 Tab     Vitamin D3 5000 units Tabs   Take 5,000 Units by mouth every day.  Dose:  5000 Units               Diet:       DIET ORDERS (Through next 24h)    Cardiac as tolerated.             Activity:   As tolerated.      Code status:   Full code    Primary Care Provider:    NAOMIE Emmanuel    Follow up appointment details :      .  Melissa P Bloch, M.D.  [address] [facility] [address]  [place] [address]  [phone]    In 2 weeks      NAOMIE Emmanuel  [address] [place] [address]  [phone]    In 2 weeks      UROLOGY [address]  [place] [id number] [phone]  In 1 week      Future Appointments  Date Time Provider Department Center   3/15/2018 4:00 PM Saman Coleman R.N. [facility] None   3/16/2018 4:00 PM MARKOS NaqviNMIKE [facility] None   3/19/2018 To Be Determined Saman Coleman R.N. [facility] None   3/20/2018 To Be Determined Sera DEL ROSARIO  JOSIAH [facility]th[facility] None   3/20/2018 1:30 PM PREADMIT SM 2 [facility] None   3/22/2018 To Be Determined Sera Hawthorne C.N.A. [facility] None   3/22/2018 To Be Determined Saman Coleman R.N. [facility] None   6/12/2018 1:20 PM Melissa P Bloch, M.D. [facility] None   7/11/2018 9:30 AM RN 4 [facility]           Time spent on discharge day patient visit: 40 minutes    #################################################

## 2018-03-15 NOTE — PROGRESS NOTES
Patient alert an oriented. Denies need for pain medication. Patient ready for discharge. Epperson to remain in place. PIV removed. Denies further needs at this time.

## 2018-03-15 NOTE — DISCHARGE INSTRUCTIONS
Discharge Instructions    Discharged to home by medical transportation with relative. Discharged via ambulance, hospital escort: Refused.  Special equipment needed: Not Applicable    Be sure to schedule a follow-up appointment with your primary care doctor or any specialists as instructed.     Discharge Plan:   Diet Plan: Discussed  Activity Level: Discussed  Confirmed Follow up Appointment: Patient to Call and Schedule Appointment  Confirmed Symptoms Management: Discussed  Influenza Vaccine Indication: Patient Refuses    I understand that a diet low in cholesterol, fat, and sodium is recommended for good health. Unless I have been given specific instructions below for another diet, I accept this instruction as my diet prescription.   Other diet: Regular    Special Instructions: None    · Is patient discharged on Warfarin / Coumadin?   No     Depression / Suicide Risk    As you are discharged from this [facility], it is important to learn how to keep safe from harming yourself.    Recognize the warning signs:  · Abrupt changes in personality, positive or negative- including increase in energy   · Giving away possessions  · Change in eating patterns- significant weight changes-  positive or negative  · Change in sleeping patterns- unable to sleep or sleeping all the time   · Unwillingness or inability to communicate  · Depression  · Unusual sadness, discouragement and loneliness  · Talk of wanting to die  · Neglect of personal appearance   · Rebelliousness- reckless behavior  · Withdrawal from people/activities they love  · Confusion- inability to concentrate     If you or a loved one observes any of these behaviors or has concerns about self-harm, here's what you can do:  · Talk about it- your feelings and reasons for harming yourself  · Remove any means that you might use to hurt yourself (examples: pills, rope, extension cords, firearm)  · Get professional help from the community (Mental Health,  Substance Abuse, psychological counseling)  · Do not be alone:Call your Safe Contact- someone whom you trust who will be there for you.  · Call your local CRISIS HOTLINE 543-8109 or 995-860-4590  · Call your local Children's Mobile Crisis Response Team [place] (788) 907-2060 or www.Musicraiser  · Call the toll free National Suicide Prevention Hotlines   · National Suicide Prevention Lifeline 295-225-OTXV (4508)  · National Hope Line Network 800-SUICIDE (715-1397)

## 2018-03-15 NOTE — CONSULTS
DATE OF SERVICE:  03/14/2018    REQUESTING PHYSICIAN:  Dr. Terrance Apple.    REASON FOR CONSULTATION:  Possible multiple sclerosis exacerbation.    HISTORY OF PRESENT ILLNESS:  The patient is a 37-year-old right-handed male   with past medical history significant for progressive multiple sclerosis,   diagnosed in 2013, currently followed by Dr. Melissa Bloch at neurology   clinic, treated with Ocrevus infusion every 6 months.  The patient was seen 2   days ago at neurology clinic for evaluation of increased spasm associated with   nausea, vomiting, fever, and rigors.  Patient was found to have urosepsis and   was sent to emergency room for admission to the hospital.  Apparently as far   as his multiple sclerosis, he is at baseline with significant disability and   lower extremity weakness and spasm.  Dr. Bloch felt he might have some   worsening of his symptoms due to urosepsis with fluctuation of his symptoms.    I also had a chance to discuss his case with his mother at bedside who thinks   the patient's symptoms are the same and this is mainly his new infection that   is causing the problem.  According to his medical record, he was found to have   obstructed bladder and with a new placement of Epperson catheter, there was   passage of large amount of pus.  He currently has a urinary catheter.    PAST MEDICAL HISTORY:  Significant for multiple sclerosis, primary   progressive, diagnosed in 2013, history of neurogenic bladder, history of   urinary incontinence, bilateral lower extremity weakness and spasm, history of   depression and PTSD.    MEDICATIONS:  Reviewed as per MAR.    ALLERGIES:  ASPIRIN, GABAPENTIN, and LYRICA.    SOCIAL HISTORY:  He is a former smoker; he quit in 2013.  He drinks socially   and has no history of drug abuse.    FAMILY HISTORY:  Reviewed and noncontributory.    REVIEW OF SYSTEMS:  As above.  All other systems are normal.  Please also see   Dr. Oliver Ortiz's review of system in H and P dated  03/12/2018.    PHYSICAL EXAMINATION:  VITAL SIGNS:  Today, heart rate 87, blood pressure 120/81, respirations 14, O2   sat 92% on room air, temperature 37.6.  NECK:  Supple.  No carotid bruits.  CARDIOVASCULAR:  Regular rate and rhythm.  LUNGS:  Clear.  ABDOMEN:  Soft.  EXTREMITIES:  No cyanosis or clubbing.  NEUROLOGIC:  He is awake, alert, fully oriented.  Speech and memory within   normal limits.  Extraocular movements are full, although he has some   disconjugate gaze.  Facial motor and sensations are intact.  Hearing is intact   to finger rub bilaterally.  Tongue in midline and protrudes symmetrically.    ASSESSMENT AND PLAN:  A 37-year-old male with history of progressive multiple   Sclerosis, who presented with worsening of his symptoms and was found to   have urosepsis due to obstructive uropathy.  According to the mother he is   back to baseline with placement of urinary catheter and treatment with   antibiotics.  She would like to take him home and continue his treatment at   home, which I have no objection.  I discussed with Dr. Apple that there is no   urgency to treat him with IV Solu-Medrol as he receives Ocrevus and he   appeared to have active infection which may be aggravated by IV steroid and   plus he is at baseline per his mother and hopefully with treatment of his   infection, his symptom even further improves.  He will have followup with Dr. Bloch in neurology clinic.       ____________________________________     MD ISADORA Adams / BIMAL    DD:  03/14/2018 18:05:19  DT:  03/14/2018 19:00:38    D#:  [id number]  Job#:  [id number]

## 2018-03-15 NOTE — THERAPY
"Occupational Therapy Treatment completed with focus on ADLs, patient education and caregiver training.  Functional Status:  Mom present during tx.  Pt required Max A x 2 for supine to sit EOB.  Once EOB pt required min A for balance.  Pt fatigues quickly & has poor tolerance in sitting.  Pt stood briefly with Max A x 2.  Pt declined transfer to chair.  Plan of Care: Will benefit from Occupational Therapy 3 times per week  Discharge Recommendations:  Equipment talia sling. Post-acute therapy Discharge to home with outpatient or home health for additional skilled therapy services.    Mom present & felt confident with tx session today & feels good about pt being able to D/C home once medically cleared.    See \"Rehab Therapy-Acute\" Patient Summary Report for complete documentation.   "

## 2018-03-16 ENCOUNTER — HOME CARE VISIT (OUTPATIENT)
Dept: HOME HEALTH SERVICES | Facility: HOME HEALTHCARE | Age: 38
End: 2018-03-16
Payer: MEDICARE

## 2018-03-16 ENCOUNTER — PATIENT OUTREACH (OUTPATIENT)
Dept: HEALTH INFORMATION MANAGEMENT | Facility: OTHER | Age: 38
End: 2018-03-16

## 2018-03-16 ENCOUNTER — TELEPHONE (OUTPATIENT)
Dept: HEALTH INFORMATION MANAGEMENT | Facility: OTHER | Age: 38
End: 2018-03-16

## 2018-03-16 PROCEDURE — G0493 RN CARE EA 15 MIN HH/HOSPICE: HCPCS

## 2018-03-16 NOTE — PROGRESS NOTES
Received referral from [facility] for med rec. Medications reviewed against discharge summary. Interaction noted between escitalopram and ondansetron for increased risk of QT prolongation. Per chart review, patient has had this combination since 2016. Spoke with patient's mother Kandi on 01/24/2018 who manages patient's medications. She had stated that patient does not use ondansetron very often and has had no adverse effects with combination. Continue to monitor.     Amy Mayo, PharmD

## 2018-03-16 NOTE — TELEPHONE ENCOUNTER
Medications reviewed against discharge summary. Interaction noted between escitalopram and ondansetron for increased risk of QT prolongation. Per chart review, patient has had this combination since 2016. Spoke with patient's mother Kandi on 01/24/2018 who manages patient's medications. She had stated that patient does not use ondansetron very often and has had no adverse effects with combination. Continue to monitor.     Amy Mayo, PharmD

## 2018-03-17 LAB
BACTERIA BLD CULT: NORMAL
BACTERIA BLD CULT: NORMAL
SIGNIFICANT IND 70042: NORMAL
SIGNIFICANT IND 70042: NORMAL
SITE SITE: NORMAL
SITE SITE: NORMAL
SOURCE SOURCE: NORMAL
SOURCE SOURCE: NORMAL

## 2018-03-17 SDOH — ECONOMIC STABILITY: HOUSING INSECURITY: UNSAFE COOKING RANGE AREA: 0

## 2018-03-17 SDOH — ECONOMIC STABILITY: HOUSING INSECURITY: UNSAFE APPLIANCES: 0

## 2018-03-18 ENCOUNTER — HOSPITAL ENCOUNTER (OUTPATIENT)
Facility: MEDICAL CENTER | Age: 38
End: 2018-03-19
Attending: EMERGENCY MEDICINE | Admitting: HOSPITALIST
Payer: MEDICARE

## 2018-03-18 ENCOUNTER — APPOINTMENT (OUTPATIENT)
Dept: RADIOLOGY | Facility: MEDICAL CENTER | Age: 38
End: 2018-03-18
Attending: EMERGENCY MEDICINE
Payer: MEDICARE

## 2018-03-18 ENCOUNTER — HOME CARE VISIT (OUTPATIENT)
Dept: HOME HEALTH SERVICES | Facility: HOME HEALTHCARE | Age: 38
End: 2018-03-18
Payer: MEDICARE

## 2018-03-18 ENCOUNTER — RESOLUTE PROFESSIONAL BILLING HOSPITAL PROF FEE (OUTPATIENT)
Dept: HOSPITALIST | Facility: MEDICAL CENTER | Age: 38
End: 2018-03-18
Payer: MEDICARE

## 2018-03-18 DIAGNOSIS — K56.7 ILEUS (HCC): ICD-10-CM

## 2018-03-18 DIAGNOSIS — G35 MULTIPLE SCLEROSIS (HCC): ICD-10-CM

## 2018-03-18 LAB
ALBUMIN SERPL BCP-MCNC: 4.4 G/DL (ref 3.2–4.9)
ALBUMIN/GLOB SERPL: 1.8 G/DL
ALP SERPL-CCNC: 58 U/L (ref 30–99)
ALT SERPL-CCNC: 14 U/L (ref 2–50)
ANION GAP SERPL CALC-SCNC: 11 MMOL/L (ref 0–11.9)
AST SERPL-CCNC: 14 U/L (ref 12–45)
BASOPHILS # BLD AUTO: 0.9 % (ref 0–1.8)
BASOPHILS # BLD: 0.05 K/UL (ref 0–0.12)
BILIRUB SERPL-MCNC: 0.5 MG/DL (ref 0.1–1.5)
BUN SERPL-MCNC: 13 MG/DL (ref 8–22)
CALCIUM SERPL-MCNC: 9.6 MG/DL (ref 8.5–10.5)
CHLORIDE SERPL-SCNC: 102 MMOL/L (ref 96–112)
CO2 SERPL-SCNC: 25 MMOL/L (ref 20–33)
CREAT SERPL-MCNC: 0.98 MG/DL (ref 0.5–1.4)
EOSINOPHIL # BLD AUTO: 0.14 K/UL (ref 0–0.51)
EOSINOPHIL NFR BLD: 2.6 % (ref 0–6.9)
ERYTHROCYTE [DISTWIDTH] IN BLOOD BY AUTOMATED COUNT: 38.9 FL (ref 35.9–50)
GLOBULIN SER CALC-MCNC: 2.5 G/DL (ref 1.9–3.5)
GLUCOSE SERPL-MCNC: 97 MG/DL (ref 65–99)
HCT VFR BLD AUTO: 47.6 % (ref 42–52)
HGB BLD-MCNC: 16.9 G/DL (ref 14–18)
IMM GRANULOCYTES # BLD AUTO: 0.02 K/UL (ref 0–0.11)
IMM GRANULOCYTES NFR BLD AUTO: 0.4 % (ref 0–0.9)
LYMPHOCYTES # BLD AUTO: 1.68 K/UL (ref 1–4.8)
LYMPHOCYTES NFR BLD: 31.5 % (ref 22–41)
MCH RBC QN AUTO: 31.1 PG (ref 27–33)
MCHC RBC AUTO-ENTMCNC: 35.5 G/DL (ref 33.7–35.3)
MCV RBC AUTO: 87.5 FL (ref 81.4–97.8)
MONOCYTES # BLD AUTO: 0.62 K/UL (ref 0–0.85)
MONOCYTES NFR BLD AUTO: 11.6 % (ref 0–13.4)
NEUTROPHILS # BLD AUTO: 2.82 K/UL (ref 1.82–7.42)
NEUTROPHILS NFR BLD: 53 % (ref 44–72)
NRBC # BLD AUTO: 0 K/UL
NRBC BLD-RTO: 0 /100 WBC
PLATELET # BLD AUTO: 383 K/UL (ref 164–446)
PMV BLD AUTO: 8.4 FL (ref 9–12.9)
POTASSIUM SERPL-SCNC: 3.8 MMOL/L (ref 3.6–5.5)
PROT SERPL-MCNC: 6.9 G/DL (ref 6–8.2)
RBC # BLD AUTO: 5.44 M/UL (ref 4.7–6.1)
SODIUM SERPL-SCNC: 138 MMOL/L (ref 135–145)
WBC # BLD AUTO: 5.3 K/UL (ref 4.8–10.8)

## 2018-03-18 PROCEDURE — 74022 RADEX COMPL AQT ABD SERIES: CPT

## 2018-03-18 PROCEDURE — 700102 HCHG RX REV CODE 250 W/ 637 OVERRIDE(OP): Performed by: HOSPITALIST

## 2018-03-18 PROCEDURE — 85025 COMPLETE CBC W/AUTO DIFF WBC: CPT

## 2018-03-18 PROCEDURE — A9270 NON-COVERED ITEM OR SERVICE: HCPCS | Performed by: HOSPITALIST

## 2018-03-18 PROCEDURE — G0378 HOSPITAL OBSERVATION PER HR: HCPCS

## 2018-03-18 PROCEDURE — 96372 THER/PROPH/DIAG INJ SC/IM: CPT

## 2018-03-18 PROCEDURE — 700102 HCHG RX REV CODE 250 W/ 637 OVERRIDE(OP): Performed by: EMERGENCY MEDICINE

## 2018-03-18 PROCEDURE — 99220 PR INITIAL OBSERVATION CARE,LEVL III: CPT | Performed by: HOSPITALIST

## 2018-03-18 PROCEDURE — 80053 COMPREHEN METABOLIC PANEL: CPT

## 2018-03-18 PROCEDURE — 700111 HCHG RX REV CODE 636 W/ 250 OVERRIDE (IP): Performed by: HOSPITALIST

## 2018-03-18 PROCEDURE — A9270 NON-COVERED ITEM OR SERVICE: HCPCS | Performed by: EMERGENCY MEDICINE

## 2018-03-18 PROCEDURE — 99285 EMERGENCY DEPT VISIT HI MDM: CPT

## 2018-03-18 RX ORDER — BACLOFEN 20 MG/1
20 TABLET ORAL 3 TIMES DAILY
COMMUNITY
End: 2018-07-18

## 2018-03-18 RX ORDER — HEPARIN SODIUM 5000 [USP'U]/ML
5000 INJECTION, SOLUTION INTRAVENOUS; SUBCUTANEOUS EVERY 8 HOURS
Status: DISCONTINUED | OUTPATIENT
Start: 2018-03-18 | End: 2018-03-19 | Stop reason: HOSPADM

## 2018-03-18 RX ORDER — ESCITALOPRAM OXALATE 10 MG/1
20 TABLET ORAL DAILY
Status: DISCONTINUED | OUTPATIENT
Start: 2018-03-19 | End: 2018-03-19 | Stop reason: HOSPADM

## 2018-03-18 RX ORDER — BISACODYL 10 MG
10 SUPPOSITORY, RECTAL RECTAL
Status: DISCONTINUED | OUTPATIENT
Start: 2018-03-18 | End: 2018-03-19 | Stop reason: HOSPADM

## 2018-03-18 RX ORDER — PROMETHAZINE HYDROCHLORIDE 25 MG/1
12.5-25 TABLET ORAL EVERY 4 HOURS PRN
Status: DISCONTINUED | OUTPATIENT
Start: 2018-03-18 | End: 2018-03-19 | Stop reason: HOSPADM

## 2018-03-18 RX ORDER — BISACODYL 10 MG
10 SUPPOSITORY, RECTAL RECTAL DAILY
Status: DISCONTINUED | OUTPATIENT
Start: 2018-03-18 | End: 2018-03-19 | Stop reason: HOSPADM

## 2018-03-18 RX ORDER — AMOXICILLIN 250 MG
2 CAPSULE ORAL 2 TIMES DAILY
Status: DISCONTINUED | OUTPATIENT
Start: 2018-03-18 | End: 2018-03-19 | Stop reason: HOSPADM

## 2018-03-18 RX ORDER — M-VIT,TX,IRON,MINS/CALC/FOLIC 27MG-0.4MG
1 TABLET ORAL DAILY
Status: DISCONTINUED | OUTPATIENT
Start: 2018-03-19 | End: 2018-03-19 | Stop reason: HOSPADM

## 2018-03-18 RX ORDER — ACETAMINOPHEN 325 MG/1
650 TABLET ORAL EVERY 6 HOURS PRN
Status: DISCONTINUED | OUTPATIENT
Start: 2018-03-18 | End: 2018-03-19 | Stop reason: HOSPADM

## 2018-03-18 RX ORDER — LOSARTAN POTASSIUM 50 MG/1
50 TABLET ORAL DAILY
Status: DISCONTINUED | OUTPATIENT
Start: 2018-03-18 | End: 2018-03-19 | Stop reason: HOSPADM

## 2018-03-18 RX ORDER — PROMETHAZINE HYDROCHLORIDE 25 MG/1
12.5-25 SUPPOSITORY RECTAL EVERY 4 HOURS PRN
Status: DISCONTINUED | OUTPATIENT
Start: 2018-03-18 | End: 2018-03-19 | Stop reason: HOSPADM

## 2018-03-18 RX ORDER — ONDANSETRON 4 MG/1
4 TABLET, ORALLY DISINTEGRATING ORAL EVERY 4 HOURS PRN
Status: DISCONTINUED | OUTPATIENT
Start: 2018-03-18 | End: 2018-03-19 | Stop reason: HOSPADM

## 2018-03-18 RX ORDER — POLYETHYLENE GLYCOL 3350 17 G/17G
1 POWDER, FOR SOLUTION ORAL
Status: DISCONTINUED | OUTPATIENT
Start: 2018-03-18 | End: 2018-03-19 | Stop reason: HOSPADM

## 2018-03-18 RX ORDER — LORAZEPAM 1 MG/1
0.5 TABLET ORAL EVERY 6 HOURS PRN
Status: DISCONTINUED | OUTPATIENT
Start: 2018-03-18 | End: 2018-03-19 | Stop reason: HOSPADM

## 2018-03-18 RX ORDER — BACLOFEN 10 MG/1
20 TABLET ORAL 3 TIMES DAILY
Status: DISCONTINUED | OUTPATIENT
Start: 2018-03-18 | End: 2018-03-19 | Stop reason: HOSPADM

## 2018-03-18 RX ORDER — LORAZEPAM 2 MG/ML
0.5 INJECTION INTRAMUSCULAR EVERY 6 HOURS PRN
Status: DISCONTINUED | OUTPATIENT
Start: 2018-03-18 | End: 2018-03-19 | Stop reason: HOSPADM

## 2018-03-18 RX ORDER — ONDANSETRON 2 MG/ML
4 INJECTION INTRAMUSCULAR; INTRAVENOUS EVERY 4 HOURS PRN
Status: DISCONTINUED | OUTPATIENT
Start: 2018-03-18 | End: 2018-03-19 | Stop reason: HOSPADM

## 2018-03-18 RX ORDER — OXYBUTYNIN CHLORIDE 10 MG/1
10 TABLET, EXTENDED RELEASE ORAL EVERY EVENING
Status: DISCONTINUED | OUTPATIENT
Start: 2018-03-18 | End: 2018-03-19 | Stop reason: HOSPADM

## 2018-03-18 RX ORDER — SODIUM CHLORIDE 9 MG/ML
1000 INJECTION, SOLUTION INTRAVENOUS ONCE
Status: DISCONTINUED | OUTPATIENT
Start: 2018-03-18 | End: 2018-03-19 | Stop reason: HOSPADM

## 2018-03-18 RX ORDER — BUPROPION HYDROCHLORIDE 150 MG/1
150 TABLET, EXTENDED RELEASE ORAL 2 TIMES DAILY
Status: DISCONTINUED | OUTPATIENT
Start: 2018-03-18 | End: 2018-03-19 | Stop reason: HOSPADM

## 2018-03-18 RX ORDER — DOCUSATE SODIUM 100 MG/1
200 CAPSULE, LIQUID FILLED ORAL EVERY MORNING
Status: DISCONTINUED | OUTPATIENT
Start: 2018-03-19 | End: 2018-03-19 | Stop reason: HOSPADM

## 2018-03-18 RX ORDER — SULFAMETHOXAZOLE AND TRIMETHOPRIM 800; 160 MG/1; MG/1
1 TABLET ORAL EVERY 12 HOURS
Status: DISCONTINUED | OUTPATIENT
Start: 2018-03-18 | End: 2018-03-19 | Stop reason: HOSPADM

## 2018-03-18 RX ADMIN — BACLOFEN 20 MG: 10 TABLET ORAL at 22:21

## 2018-03-18 RX ADMIN — MAGNESIUM CITRATE 296 ML: 1.75 LIQUID ORAL at 18:45

## 2018-03-18 RX ADMIN — HEPARIN SODIUM 5000 UNITS: 5000 INJECTION, SOLUTION INTRAVENOUS; SUBCUTANEOUS at 22:22

## 2018-03-18 RX ADMIN — ONDANSETRON 4 MG: 4 TABLET, ORALLY DISINTEGRATING ORAL at 23:03

## 2018-03-18 RX ADMIN — OXYBUTYNIN CHLORIDE 10 MG: 10 TABLET, FILM COATED, EXTENDED RELEASE ORAL at 22:22

## 2018-03-18 ASSESSMENT — LIFESTYLE VARIABLES
EVER FELT BAD OR GUILTY ABOUT YOUR DRINKING: NO
TOTAL SCORE: 0
AVERAGE NUMBER OF DAYS PER WEEK YOU HAVE A DRINK CONTAINING ALCOHOL: 7
TOTAL SCORE: 0
EVER HAD A DRINK FIRST THING IN THE MORNING TO STEADY YOUR NERVES TO GET RID OF A HANGOVER: NO
TOTAL SCORE: 0
CONSUMPTION TOTAL: NEGATIVE
ON A TYPICAL DAY WHEN YOU DRINK ALCOHOL HOW MANY DRINKS DO YOU HAVE: 2
EVER_SMOKED: NEVER
ALCOHOL_USE: YES
HAVE PEOPLE ANNOYED YOU BY CRITICIZING YOUR DRINKING: NO
HOW MANY TIMES IN THE PAST YEAR HAVE YOU HAD 5 OR MORE DRINKS IN A DAY: 0
HAVE YOU EVER FELT YOU SHOULD CUT DOWN ON YOUR DRINKING: NO

## 2018-03-18 ASSESSMENT — PAIN SCALES - GENERAL: PAINLEVEL_OUTOF10: 6

## 2018-03-18 NOTE — ED PROVIDER NOTES
ED Provider Note    Scribed for Vane Laguna M.D. by Vandana Regan. 3/18/2018, 4:28 PM.    Primary care provider: NAOMIE Emmanuel  Means of arrival: ambulance  History obtained from: patient and mother   History limited by: none       CHIEF COMPLAINT  Chief Complaint   Patient presents with   • Constipation       HPI  Jerzy Juan III is a 37 y.o. male with a history of multiple sclerosis who presents to the Emergency Department for evaluation of chronic constipation with worsening severity in the past week. Per mother, the patient has his bowel movements managed with suppositories, enemas, stool softeners and lactulose in addition to a high fiber diet with no relief of his symptoms. Mother states the patient has not had a bowel movement in the past week.  He had one episode of associated vomiting last night. The patient was also seen one week ago for bloody formed stools. Mother denies noting any bloody stool since he was seen one week ago. The patient was also seen 6 days ago for evaluation of chills and malaise. At that time he was noted to have an elevated WBC and his CT abdomen indicated bladder stones that were clogging his matias catheter.       REVIEW OF SYSTEMS  Pertinent positives include constipation, vomiting.   Pertinent negatives include no recent hematochezia.   All other systems reviewed and negative. C.       PAST MEDICAL HISTORY   has a past medical history of Back pain; Blood transfusion; Depression; Fall; Headache(784.0); Heart murmur; MS (multiple sclerosis) (CMS-HCC) (DX 8/20/2013); and Urinary incontinence.      SURGICAL HISTORY   has a past surgical history that includes open reduction and gastroscopy (1/14/2017).      SOCIAL HISTORY  Social History   Substance Use Topics   • Smoking status: Former Smoker     Start date: 8/1/1997     Quit date: 8/1/2013   • Smokeless tobacco: Former User   • Alcohol use 0.0 oz/week      Comment: 1 beer/day      History   Drug Use   •  Types: Inhaled     Comment: marijuana       FAMILY HISTORY  Family History   Problem Relation Age of Onset   • Thyroid Mother    • Hypertension Mother    • Hypertension Father    • Psychiatry Father        CURRENT MEDICATIONS  No current facility-administered medications on file prior to encounter.      Current Outpatient Prescriptions on File Prior to Encounter   Medication Sig Dispense Refill   • sulfamethoxazole-trimethoprim (BACTRIM DS) 800-160 MG tablet Take 1 Tab by mouth every 12 hours for 5 days. 10 Tab 0   • lactulose 10 GM/15ML Solution Take 30 mL by mouth 3 times a day as needed (constipation). Constipation 1 Bottle 1   • polyethylene glycol/lytes (MIRALAX) Pack Take 1 Packet by mouth 1 time daily as needed. 30 Each 1   • potassium chloride SA (KDUR) 20 MEQ Tab CR Take 1 Tab by mouth every day for 3 days. 3 Tab 0   • therapeutic multivitamin-minerals (THERAGRAN-M) Tab Take 1 Tab by mouth every day.     • ondansetron (ZOFRAN ODT) 4 MG TABLET DISPERSIBLE Take 4 mg by mouth every 4 hours.     • NS SOLN 250 mL with ocrelizumab 300 MG/10ML SOLN 300 mg 300 mg by Intravenous route every 6 months.     • losartan (COZAAR) 50 MG Tab Take 50 mg by mouth every day.     • docusate sodium (COLACE) 250 MG capsule Take 250 mg by mouth every morning. constipation     • Magnesium 300 MG Cap Take 300 mg by mouth every day. supplement     • buPROPion SR (WELLBUTRIN-SR) 150 MG TABLET SR 12 HR sustained-release tablet Take 1 Tab by mouth 2 Times a Day. 60 Tab    • escitalopram (LEXAPRO) 20 MG tablet Take 1 Tab by mouth every day. 30 Tab    • methenamine hip (HIPPREX) 1 GM Tab Take 1 Tab by mouth 2 Times a Day. 60 Tab    • oxybutynin SR (DITROPAN-XL) 10 MG CR tablet Take 1 Tab by mouth every evening. 30 Tab    • Cholecalciferol (VITAMIN D3) 5000 UNITS Tab Take 5,000 Units by mouth every day.     • psyllium (METAMUCIL) 58.12 % Pack Take 1 Packet by mouth every day.     • Cranberry 125 MG Tab Take 125 mg by mouth every day.  supplement     • Probiotic Product (PROBIOTIC DAILY PO) Take 1 Cap by mouth 2 Times a Day.           ALLERGIES  Allergies   Allergen Reactions   • Other Drug      CANNOT TAKE ANY STEROIDS WITH RECENT TYSABRI INFUSION   • Asa [Aspirin] Unspecified     Bleeding in stomach as a baby   • Gabapentin Anxiety     anger and severe mood swing   • Pregabalin Anxiety     Anger and severe mood swing         PHYSICAL EXAM  VITAL SIGNS: /72   Pulse (!) 111   Resp 20   Wt 74.8 kg (165 lb)   SpO2 93%   BMI 23.01 kg/m²   Constitutional:  Laying comfortably on gurney, able to answer questions  HENT: Nose is normal in appearance without rhinorrhea, external ears are normal,  moist mucous membranes  Eyes: Anicteric,  pupils are equal round and reactive, there is no conjunctival drainage or pallor   Neck: The trachea is midline, there is no obvious mass or meningeal signs  Cardiovascular: Equal radial pulsation, regular rate and rhythm without murmurs gallops or rubs  Thorax & Lungs: Respiratory rate and effort are normal. There is normal chest excursion with respiration.  No wheezes rhonchi or rales noted.  Abdomen: Abdomen is normal in appearance, normal bowel sounds, no pain with cough, nonpulsatile  :  No CVA tenderness to palpation  Musculoskeletal: No deformities noted in all 4 extremities. Actively moves all 4 extremities  Skin: Visualized skin is warm, no erythema, no rash.  Neurologic:  Cranial nerves II through XII are intact there is no focal abnormality noted.  Psychiatric: Normal mood and mentation        DIAGNOSTIC STUDIES / PROCEDURES  RADIOLOGY  DX-ABDOMEN COMPLETE WITH AP OR PA CXR   Final Result      Moderate to large amount of colonic stool.      No free intraperitoneal air identified.      No acute cardiopulmonary process is seen.      Calcifications in the pelvis likely represent urinary calculi as seen on prior CT and phleboliths.      The radiologist's interpretation of all radiological studies have  been reviewed by me.        COURSE & MEDICAL DECISION MAKING  Nursing notes and vital signs were reviewed. (See chart for details)  The patient's  records were reviewed, history was obtained from the patient and his mother.      Patient presents to the emergency department with concern for continue constipation despite aggressive bowel regimen at home. Differential is constipation obstruction or ileus. There is no signs of sepsis or toxicity on exam    4:28 PM Initial orders in the Emergency Department included DX abdomen.     5:57 PM Patient reevaluated at bedside. Discussed diagnostic imaging results with the patient and his family in addition to plan of care. They understand his imaging indicated constipation.  Patient agreed to soap suds enema at this time.     7:10 PM Patient reevaluated at bedside after the patient had one episode of vomiting following administration of his magnesium citrate.  In his emesis there was noted to be bile and one object with the appearance of an undigested pill or undigested cheeto. He confirms eating cheetos recently but denies taking any pills recently.  Discussed plan of care with patient and family. They agreed to admission.     7:27 PM Consult with Dr. Murphy, hospitalist, who agreed to admit the patient.       DISPOSITION:  Patient will be admitted to Dr. Garcia in guarded condition.      FINAL IMPRESSION  1. Ileus (CMS-HCC)    2. Multiple sclerosis (CMS-HCC)           Vandana TOM (Elizabeth), am scribing for, and in the presence of, Vane Laguna M.D..  Electronically signed by: Vandana Regan (Elizabeth), 3/18/2018  Vane TOM M.D. personally performed the services described in this documentation, as scribed by Vandana Regan in my presence, and it is both accurate and complete.      The note accurately reflects work and decisions made by me.  Vane Laguna  3/18/2018  10:30 PM

## 2018-03-18 NOTE — ED TRIAGE NOTES
Pt presents to ED from home via EMS with c/o constipation x 1wk; pt reports mult attempts, including 2 enemas, and stool softeners with no success; pt hx MS

## 2018-03-19 ENCOUNTER — PATIENT OUTREACH (OUTPATIENT)
Dept: HEALTH INFORMATION MANAGEMENT | Facility: OTHER | Age: 38
End: 2018-03-19

## 2018-03-19 VITALS
SYSTOLIC BLOOD PRESSURE: 114 MMHG | TEMPERATURE: 97.9 F | RESPIRATION RATE: 18 BRPM | BODY MASS INDEX: 22.75 KG/M2 | HEART RATE: 91 BPM | OXYGEN SATURATION: 95 % | WEIGHT: 163.14 LBS | DIASTOLIC BLOOD PRESSURE: 76 MMHG

## 2018-03-19 VITALS
TEMPERATURE: 98.6 F | OXYGEN SATURATION: 98 % | HEART RATE: 84 BPM | SYSTOLIC BLOOD PRESSURE: 108 MMHG | DIASTOLIC BLOOD PRESSURE: 64 MMHG | RESPIRATION RATE: 16 BRPM

## 2018-03-19 PROBLEM — K59.00 CONSTIPATION: Status: ACTIVE | Noted: 2018-03-19

## 2018-03-19 PROBLEM — K59.00 CONSTIPATION: Status: RESOLVED | Noted: 2018-03-19 | Resolved: 2018-03-19

## 2018-03-19 PROCEDURE — A9270 NON-COVERED ITEM OR SERVICE: HCPCS | Performed by: HOSPITALIST

## 2018-03-19 PROCEDURE — 700102 HCHG RX REV CODE 250 W/ 637 OVERRIDE(OP): Performed by: HOSPITALIST

## 2018-03-19 PROCEDURE — 99217 PR OBSERVATION CARE DISCHARGE: CPT | Performed by: INTERNAL MEDICINE

## 2018-03-19 PROCEDURE — G0378 HOSPITAL OBSERVATION PER HR: HCPCS

## 2018-03-19 RX ADMIN — MULTIPLE VITAMINS W/ MINERALS TAB 1 TABLET: TAB at 08:02

## 2018-03-19 RX ADMIN — BUPROPION HYDROCHLORIDE 150 MG: 150 TABLET, EXTENDED RELEASE ORAL at 08:02

## 2018-03-19 RX ADMIN — LOSARTAN POTASSIUM 50 MG: 50 TABLET, FILM COATED ORAL at 00:09

## 2018-03-19 RX ADMIN — SULFAMETHOXAZOLE AND TRIMETHOPRIM 1 TABLET: 800; 160 TABLET ORAL at 00:09

## 2018-03-19 RX ADMIN — ESCITALOPRAM OXALATE 20 MG: 10 TABLET ORAL at 08:02

## 2018-03-19 RX ADMIN — SULFAMETHOXAZOLE AND TRIMETHOPRIM 1 TABLET: 800; 160 TABLET ORAL at 08:02

## 2018-03-19 RX ADMIN — BACLOFEN 20 MG: 10 TABLET ORAL at 08:01

## 2018-03-19 RX ADMIN — BACLOFEN 20 MG: 10 TABLET ORAL at 14:52

## 2018-03-19 SDOH — ECONOMIC STABILITY: HOUSING INSECURITY: HOME SAFETY: PT DOES NOT USE SUPP O2

## 2018-03-19 SDOH — ECONOMIC STABILITY: HOUSING INSECURITY: UNSAFE COOKING RANGE AREA: 0

## 2018-03-19 SDOH — ECONOMIC STABILITY: HOUSING INSECURITY: UNSAFE APPLIANCES: 0

## 2018-03-19 ASSESSMENT — ENCOUNTER SYMPTOMS
POOR JUDGMENT: 1
DEPRESSED MOOD: 1
NAUSEA: DENIES

## 2018-03-19 ASSESSMENT — PAIN SCALES - GENERAL: PAINLEVEL_OUTOF10: 0

## 2018-03-19 ASSESSMENT — ACTIVITIES OF DAILY LIVING (ADL)
OASIS_M1830: 06
HOME_HEALTH_OASIS: 01

## 2018-03-19 ASSESSMENT — LIFESTYLE VARIABLES: EVER_SMOKED: NEVER

## 2018-03-19 NOTE — PROGRESS NOTES
A/o,assessment completed,poc discussed,verbalized understanding,tolerating po,mother at bedside,4 side rails up per pt request,bed alarm on,call button within reach,will continue to monitor.

## 2018-03-19 NOTE — ED NOTES
Med rec updated and complete.  Allergies reviewed and updated  Pt is current on Bactrim and was told to stop taking his methenamine  While on it.

## 2018-03-19 NOTE — DISCHARGE PLANNING
Request from physician to set up [facility] transport for patient to go home this afternoon.  [facility] called and updated - [facility]  Hopefully transport will be at 1600 as per request.  Auth number from [facility] is [id number].

## 2018-03-19 NOTE — DISCHARGE PLANNING
This CM spoke to the pt and offered an Advance Directives packet.  Pt refused and stated he already has advance directives in place.  No safety or other issues identified at this time.  This does not appear to be a difficult discharge.    Care Transition Team Assessment    Information Source  Orientation : Oriented x 4  Information Given By: Patient    Readmission Evaluation  Is this a readmission?: No    Elopement Risk  Legal Hold: No  Ambulatory or Self Mobile in Wheelchair: Yes  Elopement Risk: Not at Risk for Elopement    Interdisciplinary Discharge Planning  Does Admitting Nurse Feel This Could be a Complex Discharge?: No  Lives with - Patient's Self Care Capacity: Alone and Able to Care For Self  Support Systems: Parent  Housing / Facility: 1 Story Apartment / Condo  Do You Take your Prescribed Medications Regularly: Yes  Able to Return to Previous ADL's: Yes  Patient Expects to be Discharged to:: Home    Discharge Preparedness  What is your plan after discharge?: Home with help  What are your discharge supports?: Parent  Prior Functional Level: Ambulatory, Independent with Activities of Daily Living  Difficulity with ADLs: None    Functional Assesment  Prior Functional Level: Ambulatory, Independent with Activities of Daily Living    Finances  Financial Barriers to Discharge: No         Values / Beliefs / Concerns  Values / Beliefs Concerns : No    Advance Directive  Advance Directive?: Living Will                   Anticipated Discharge Information  Anticipated discharge disposition: Home

## 2018-03-19 NOTE — PROGRESS NOTES
Chart reviewed.  The patient is currently at the hospital.  Plan: will monitor for discharge from the hospital.

## 2018-03-19 NOTE — H&P
DATE OF ADMISSION:  03/18/2018    PRIMARY CARE PHYSICIAN:  Jw Pfeiffer    CHIEF COMPLAINT:  Constipation.    HISTORY OF PRESENT ILLNESS:  Patient is a 37-year-old male who has a history   of progressive multiple sclerosis and associated neurogenic bladder for which   he has an indwelling Epperson catheter.  He presents to the hospital complaining   of constipation.  Patient reports that he usually manages bowel movements with   suppositories, stool softeners, laxatives, and enemas and he has tried these,   but he continues to have constipation.  He reports that it has been over 1   week since he has had a bowel movement.  He has noted some increasing   abdominal distension and discomfort.  He is now having decreased appetite and   vomited a few times.  He denies any fevers, chills and has no other   complaints.    REVIEW OF SYSTEMS:  As per HPI.  All other systems have been reviewed and are   negative.    ALLERGIES:  ASPIRIN, GABAPENTIN, LYRICA, AND STEROIDS.    PAST MEDICAL HISTORY:  1.  Progressive multiple sclerosis.  2.  PTSD.  3.  Depression.  4.  Chronic back pain.  5.  Urinary incontinence with a Epperson catheter in place.    PAST SURGICAL HISTORY:  He has had a fecal transplant and orthopedic surgery.    SOCIAL HISTORY:  He drinks beer daily.  He quit smoking in 2013.  Denies any   drug use.    FAMILY HISTORY:  Significant for hypertension in both of his parents.    HOME MEDICATIONS:  1.  Bactrim b.i.d.  2.  Lactulose.  3.  MiraLax.  4.  Docusate.  5.  Magnesium.  6.  Wellbutrin- mg b.i.d.  7.  Lexapro 20 mg daily.  8.  Ditropan XL 10 mg daily.  9.  Baclofen 20 mg 3 times a day.  10.  Multivitamin.  11.  Zofran as needed.  12.  Cozaar 50 mg daily.  13.  Vitamin D3.  14.  Metamucil.  15.  Probiotic.    PHYSICAL EXAMINATION:  VITAL SIGNS:  Blood pressure 129/87, pulse of 92, respirations 19, temperature   36.7, oxygen saturations 91% on room air, weight 74 kilograms.  GENERAL:  Patient appears chronically  ill, currently in mild distress   secondary to abdominal discomfort.  HEENT:  Dry mucous membranes.  No oropharyngeal exudates.  Eyes, EOMI, PERRLA.  NECK:  No lymphadenopathy.  There is no JVD.  CARDIOVASCULAR:  Regular rate and rhythm.  There are no murmurs.  LUNGS:  Clear to auscultation bilaterally.  No rales or rhonchi.  ABDOMEN:  Positive bowel sounds, soft, nondistended.  Bowel sounds are   decreased.  There is generalized tenderness to palpation.  EXTREMITIES:  No clubbing, cyanosis.  SKIN:  He has extensive tattoos.  NEUROLOGIC:  He is awake, alert, and oriented to person, place, time, and   situation.    LABORATORY DATA:  WBC 5.3, hemoglobin 16.9, hematocrit 47.6, platelets 383.    Sodium 138, potassium 3.8, BUN 13, creatinine 0.98.  Rest of CMP is   unremarkable.    IMAGING:  Abdominal x-ray shows moderate-to-large amount of colonic stool.    ASSESSMENT AND PLAN:  Patient is a 37-year-old male with history of multiple   sclerosis, presents to the hospital complaining of abdominal pain and   constipation.  1.  Constipation.  At this point, patient will be put under observation status   on the medical unit.  We will treat him with bowel protocol.  He will be   treated with oral laxatives, stool softeners, and also Dulcolax suppositories.    If this does not work, then he will need an enema.  If he still does not   have bowel movement after all of these medications and at that point, we will   consider consulting GI.  There is no evidence of ileus on imaging.  2.  Multiple sclerosis, progressive continue with his home medications.  3.  Depression.  Continue Lexapro 20 mg daily and Wellbutrin- mg b.i.d.  4.  Hypertension.  Continue Cozaar 50 mg daily.  5.  Deep venous thrombosis prophylaxis, heparin 5000 units t.i.d.  6.  Recent urinary tract infection.  We will continue full course of Bactrim   b.i.d.  7.  He is a full code.       ____________________________________     Gayla Murphy MD    DS /  BIMAL    DD:  03/19/2018 01:04:53  DT:  03/19/2018 03:19:45    D#:  [id number]  Job#:  [id number]

## 2018-03-19 NOTE — ASSESSMENT & PLAN NOTE
Bowel protocol  Clear diet  Suppositories and enemas as needed  If no relief after medications and consider consulting GI

## 2018-03-19 NOTE — DISCHARGE INSTRUCTIONS
Discharge Instructions    Discharged to home by medical transportation with self. Discharged via ambulance, hospital escort: Yes.  Special equipment needed: Not Applicable    Be sure to schedule a follow-up appointment with your primary care doctor or any specialists as instructed.     Discharge Plan:   Diet Plan: Discussed  Activity Level: Discussed  Confirmed Follow up Appointment: Patient to Call and Schedule Appointment  Confirmed Symptoms Management: Discussed  Medication Reconciliation Updated: Yes  Influenza Vaccine Indication: Patient Refuses    I understand that a diet low in cholesterol, fat, and sodium is recommended for good health. Unless I have been given specific instructions below for another diet, I accept this instruction as my diet prescription.   Other diet: Regular    Special Instructions:   -Please stay well hydrated and continue home bowel regimen   -Return to the ER if any new or worsening symptoms, worsening condition, or failure to improve   -Call PCP for followup     · Is patient discharged on Warfarin / Coumadin?   No     Depression / Suicide Risk    As you are discharged from this [facility] facility, it is important to learn how to keep safe from harming yourself.    Recognize the warning signs:  · Abrupt changes in personality, positive or negative- including increase in energy   · Giving away possessions  · Change in eating patterns- significant weight changes-  positive or negative  · Change in sleeping patterns- unable to sleep or sleeping all the time   · Unwillingness or inability to communicate  · Depression  · Unusual sadness, discouragement and loneliness  · Talk of wanting to die  · Neglect of personal appearance   · Rebelliousness- reckless behavior  · Withdrawal from people/activities they love  · Confusion- inability to concentrate     If you or a loved one observes any of these behaviors or has concerns about self-harm, here's what you can do:  · Talk about it- your  feelings and reasons for harming yourself  · Remove any means that you might use to hurt yourself (examples: pills, rope, extension cords, firearm)  · Get professional help from the community (Mental Health, Substance Abuse, psychological counseling)  · Do not be alone:Call your Safe Contact- someone whom you trust who will be there for you.  · Call your local CRISIS HOTLINE 183-2510 or 128-670-5335  · Call your local Children's Mobile Crisis Response Team [place] (886) 728-3217 or www.Sellobuy  · Call the toll free National Suicide Prevention Hotlines   · National Suicide Prevention Lifeline 622-858-ENUJ (8866)  · National Hope Line Network 800-SUICIDE (471-6508)

## 2018-03-19 NOTE — DISCHARGE PLANNING
Patient's mother at desk inquiring about transfer of patient home later today.  Patient's address is different than listed on face sheet.  New address is [address] [place] [address] [address] here in [place].  This address change called to [facility].

## 2018-03-19 NOTE — ED NOTES
Pt received soap suds enema, had small-moderate amount of stool immediately afterward. ERP aware.

## 2018-03-19 NOTE — DISCHARGE SUMMARY
Hospital Medicine Discharge Note     Patient ID:  Jerzy Juan III  [id number]  37 y.o.male  1980    Admit Date:  3/18/2018       Discharge Date:   3/19/2018    Primary Care Provider: NAOMIE Emmanuel    Admitting Physician: Gayla Murphy M.D.  Discharging Physician: Denver Echavarria M.D.    Chief Complaint: Constipation     Discharge Diagnoses:   Principal Problem:    Constipation- resolved  Active Problems:    MS (multiple sclerosis) ([facility])- continue outpatient management follow-up with PCP and neurology    Urinary tract infection- already treated at visit a week ago    Essential hypertension- continue outpatient management follow-up with PCP    Urinary retention- chronic Epperson in place      Chronic Medical Problems:  Past Medical History:   Diagnosis Date   • Back pain    • Blood transfusion    • Depression     PTSD/clinical depression   • Fall     4/6/2016   • Headache(784.0)    • Heart murmur    • MS (multiple sclerosis) ([facility]) DX 8/20/2013   • Urinary incontinence     condom catheter       Code Status: Full Code    Hospital Summary:       Please refer to H&P by Gayla Murphy M.D. on 3/18/2018 for full details.      In brief, Jerzy Juan III is a 37 y.o. male who was admitted 3/18/2018 for constipation. Patient has no medical history of back pain, depression, headache, heart murmur, multiple sclerosis, and urinary incontinence. Patient presented to the hospital complaining of approximately one week constipation, with associated abdominal distention and discomfort.  Patient was admitted to CDU for further monitoring. Abdominal x-ray obtained which is moderate to large amount of colonic stool no other acute findings noted.CBC and BMP were obtained which were essentially benign and noncontributory. Patient was provided with stool softeners as well as bowel protocol. Patient was placed on gentle IV fluid hydration. Patient was continued on his home medications.  On exam this morning patient  states he feels he is ready to be discharged patient has had 3 large bowel movements. Patient had significant results with bowel protocol and is encouraged to continue this at home. Patient states no pain in his abdomen, no nausea, vomiting. Patient is able to adequately intake by mouth. Patient is adequately able to hydrate himself. Patient states no tenderness on palpation of abdomen no rebound, no guarding. Lungs are clear bilaterally on auscultation. Patient has close follow-up as well as caregivers at home he will see his PCP for follow-up. [facility] ambulance transport has been arranged to patient home as he is bedridden.     Therefore, he is discharged in good and stable condition with close outpatient follow-up.    Consultants:      None     Studies:   Imaging/ Testing:      DX-ABDOMEN COMPLETE WITH AP OR PA CXR   Final Result      Moderate to large amount of colonic stool.      No free intraperitoneal air identified.      No acute cardiopulmonary process is seen.      Calcifications in the pelvis likely represent urinary calculi as seen on prior CT and phleboliths.            Laboratory:   Recent Labs      03/18/18 2037   WBC  5.3   RBC  5.44   HEMOGLOBIN  16.9   HEMATOCRIT  47.6   MCV  87.5   MCH  31.1   MCHC  35.5*   RDW  38.9   PLATELETCT  383   MPV  8.4*       Recent Labs      03/18/18 2037   SODIUM  138   POTASSIUM  3.8   CHLORIDE  102   CO2  25   GLUCOSE  97   BUN  13   CREATININE  0.98   CALCIUM  9.6       Recent Labs      03/18/18 2037   ALTSGPT  14   ASTSGOT  14   ALKPHOSPHAT  58   TBILIRUBIN  0.5   GLUCOSE  97        Procedures/Surgeries:        None     Disposition:    Discharge home    Condition:  Stable    Instructions:   Activity: As tolerated.  Diet: As tolerated   Followup: With PCP   Instructions:  -Please stay well hydrated and continue home bowel regimen   -Given instructions to return to the ER if any new or worsening symptoms, worsening condition, or failure to improve  -Call PCP for  followup  -No smoking, no alcohol, no caffeine       Follow-Up  Future Appointments  Date Time Provider Department Center   3/20/2018 To Be Determined Sera Hawthorne C.N.A. [facility] None   3/20/2018 1:30 PM PREADMIT SM 2 [facility] None   3/21/2018 To Be Determined Saman Coleman R.N. [facility] None   3/22/2018 To Be Determined Sera Hawthorne C.N.A. [facility] None   3/22/2018 To Be Determined Saman Coleman R.N. [facility] None   6/12/2018 1:20 PM Melissa P Bloch, M.D. [facility] None   7/11/2018 9:30 AM [facility] [address]       Discharge Medications:           Medication List      CONTINUE taking these medications      Instructions   baclofen 20 MG tablet  Commonly known as:  LIORESAL   Take 20 mg by mouth 3 times a day.  Dose:  20 mg     buPROPion  MG Tb12 sustained-release tablet  Commonly known as:  WELLBUTRIN-SR   Take 1 Tab by mouth 2 Times a Day.  Dose:  150 mg     Cranberry 125 MG Tabs   Take 125 mg by mouth every day. supplement  Dose:  125 mg     docusate sodium 250 MG capsule  Commonly known as:  COLACE   Take 250 mg by mouth every morning. constipation  Dose:  250 mg     escitalopram 20 MG tablet  Commonly known as:  LEXAPRO   Take 1 Tab by mouth every day.  Dose:  20 mg     lactulose 10 GM/15ML Soln   Take 30 mL by mouth 3 times a day as needed (constipation). Constipation  Dose:  20 g     losartan 50 MG Tabs  Commonly known as:  COZAAR   Take 50 mg by mouth every day.  Dose:  50 mg     Magnesium 300 MG Caps   Take 300 mg by mouth every day. supplement  Dose:  300 mg     methenamine hip 1 GM Tabs  Commonly known as:  HIPPREX   Take 1 Tab by mouth 2 Times a Day.  Dose:  1 g     NS SOLN 250 mL with ocrelizumab 300 MG/10ML SOLN 300 mg   300 mg by Intravenous route every 6 months.  Dose:  300 mg     ondansetron 4 MG Tbdp  Commonly known as:  ZOFRAN ODT   Take 4 mg by mouth every 4 hours.  Dose:  4 mg     oxybutynin SR 10 MG CR tablet  Commonly known as:  DITROPAN-XL   Take 1 Tab by mouth every evening.  Dose:   10 mg     polyethylene glycol/lytes Pack  Commonly known as:  MIRALAX   Take 1 Packet by mouth 1 time daily as needed.  Dose:  17 g     PROBIOTIC DAILY PO   Take 1 Cap by mouth 2 Times a Day.  Dose:  1 Cap     psyllium 58.12 % Pack  Commonly known as:  METAMUCIL   Take 1 Packet by mouth every day.  Dose:  1 Packet     sulfamethoxazole-trimethoprim 800-160 MG tablet  Commonly known as:  BACTRIM DS   Take 1 Tab by mouth every 12 hours for 5 days.  Dose:  1 Tab     therapeutic multivitamin-minerals Tabs   Take 1 Tab by mouth every day.  Dose:  1 Tab     Vitamin D3 5000 units Tabs   Take 5,000 Units by mouth every day.  Dose:  5000 Units        STOP taking these medications    potassium chloride SA 20 MEQ Tbcr  Commonly known as:  Kdur              Please CC the above physicians    MANDO Peoples  3/19/2018  9:52 AM        '

## 2018-03-19 NOTE — PROGRESS NOTES
Discharge instructions, medications and follow-up reviewed with pt and pt mother. Pt verbalized understanding and denies questions. Discharge paperwork given to pt. Armband removed. Pt provided mariama care with soap and water, matias catheter cleaned. Urine drained from matias catheter. Pt awaiting arrival of [facility].

## 2018-03-20 ENCOUNTER — APPOINTMENT (OUTPATIENT)
Dept: ADMISSIONS | Facility: MEDICAL CENTER | Age: 38
End: 2018-03-20
Attending: PAIN MEDICINE
Payer: MEDICARE

## 2018-03-20 RX ORDER — SULFAMETHOXAZOLE AND TRIMETHOPRIM 800; 160 MG/1; MG/1
1 TABLET ORAL EVERY 12 HOURS
COMMUNITY
End: 2018-04-05

## 2018-03-20 NOTE — OR NURSING
"Preadmit appointment: \" Preparing for your Procedure information\" sheet given to patient with verbal and written instructions. Patient instructed to continue prescribed medications through the day before surgery, instructed to take the following medications the day of surgery per anesthesia protocol: Mother and patient advised to check with Dr. Gomez regarding meds to take morning of procedure and also to call and inform him of recent admits to the hospital for UTI and most recent stool impaction.  Consent signed by mother (DPA) per patient request. Pt has a preop visit with Dr. Gomez on 3/23/2018  "

## 2018-03-21 ENCOUNTER — HOME CARE VISIT (OUTPATIENT)
Dept: HOME HEALTH SERVICES | Facility: HOME HEALTHCARE | Age: 38
End: 2018-03-21
Payer: MEDICARE

## 2018-03-21 ENCOUNTER — PATIENT OUTREACH (OUTPATIENT)
Dept: HEALTH INFORMATION MANAGEMENT | Facility: OTHER | Age: 38
End: 2018-03-21

## 2018-03-21 PROCEDURE — G0299 HHS/HOSPICE OF RN EA 15 MIN: HCPCS

## 2018-03-21 NOTE — PROGRESS NOTES
Verified with Marjan dos santos at [facility] [place] that the patient is still receiving their services.  Plan: will monitor for discharge from [facility].

## 2018-03-22 ENCOUNTER — HOME CARE VISIT (OUTPATIENT)
Dept: HOME HEALTH SERVICES | Facility: HOME HEALTHCARE | Age: 38
End: 2018-03-22
Payer: MEDICARE

## 2018-03-22 VITALS
DIASTOLIC BLOOD PRESSURE: 62 MMHG | HEART RATE: 82 BPM | TEMPERATURE: 99.6 F | RESPIRATION RATE: 18 BRPM | OXYGEN SATURATION: 98 % | SYSTOLIC BLOOD PRESSURE: 132 MMHG

## 2018-03-22 VITALS
SYSTOLIC BLOOD PRESSURE: 124 MMHG | OXYGEN SATURATION: 99 % | DIASTOLIC BLOOD PRESSURE: 62 MMHG | HEART RATE: 88 BPM | RESPIRATION RATE: 18 BRPM | TEMPERATURE: 98.9 F

## 2018-03-22 VITALS
HEART RATE: 88 BPM | OXYGEN SATURATION: 99 % | BODY MASS INDEX: 22.59 KG/M2 | SYSTOLIC BLOOD PRESSURE: 124 MMHG | TEMPERATURE: 98.9 F | RESPIRATION RATE: 16 BRPM | WEIGHT: 162 LBS | DIASTOLIC BLOOD PRESSURE: 62 MMHG

## 2018-03-22 PROCEDURE — G0152 HHCP-SERV OF OT,EA 15 MIN: HCPCS

## 2018-03-22 PROCEDURE — G0493 RN CARE EA 15 MIN HH/HOSPICE: HCPCS

## 2018-03-22 PROCEDURE — G0156 HHCP-SVS OF AIDE,EA 15 MIN: HCPCS

## 2018-03-22 SDOH — ECONOMIC STABILITY: HOUSING INSECURITY: HOME SAFETY: PT DOES NOT WEAR SUPP O2

## 2018-03-22 SDOH — ECONOMIC STABILITY: HOUSING INSECURITY: UNSAFE APPLIANCES: 0

## 2018-03-22 SDOH — ECONOMIC STABILITY: HOUSING INSECURITY: UNSAFE COOKING RANGE AREA: 0

## 2018-03-22 ASSESSMENT — ENCOUNTER SYMPTOMS
DEPRESSED MOOD: 1
NAUSEA: DENIES
DEPRESSED MOOD: 1
POOR JUDGMENT: 1
THOUGHT CONTENT - SUSPICIOUS: 1

## 2018-03-22 ASSESSMENT — ACTIVITIES OF DAILY LIVING (ADL): OASIS_M1830: 06

## 2018-03-23 VITALS
OXYGEN SATURATION: 99 % | RESPIRATION RATE: 16 BRPM | SYSTOLIC BLOOD PRESSURE: 124 MMHG | HEART RATE: 88 BPM | DIASTOLIC BLOOD PRESSURE: 62 MMHG | TEMPERATURE: 98.7 F

## 2018-03-23 SDOH — ECONOMIC STABILITY: HOUSING INSECURITY: HOME SAFETY: SMALL APT

## 2018-03-23 ASSESSMENT — ACTIVITIES OF DAILY LIVING (ADL)
BATHING_ASSISTANCE: 6
BATHING_ASSISTIVE_EQUIPMENT_NEEDED: TBD

## 2018-03-23 ASSESSMENT — ENCOUNTER SYMPTOMS
DEBILITATING PAIN: 1
DEPRESSED MOOD: 1

## 2018-03-26 ENCOUNTER — HOME CARE VISIT (OUTPATIENT)
Dept: HOME HEALTH SERVICES | Facility: HOME HEALTHCARE | Age: 38
End: 2018-03-26
Payer: MEDICARE

## 2018-03-26 VITALS
TEMPERATURE: 98.9 F | RESPIRATION RATE: 16 BRPM | OXYGEN SATURATION: 98 % | HEART RATE: 88 BPM | DIASTOLIC BLOOD PRESSURE: 72 MMHG | SYSTOLIC BLOOD PRESSURE: 114 MMHG

## 2018-03-26 PROCEDURE — G0299 HHS/HOSPICE OF RN EA 15 MIN: HCPCS

## 2018-03-26 PROCEDURE — 665003 FOLLOW UP-HOME HEALTH

## 2018-03-26 ASSESSMENT — ENCOUNTER SYMPTOMS
NAUSEA: DENIES
RESPIRATORY SYMPTOMS COMMENTS: DENIES SOB, RESP EVEN AND UNLABORED
DEPRESSED MOOD: 1
POOR JUDGMENT: 1

## 2018-03-27 ENCOUNTER — APPOINTMENT (OUTPATIENT)
Dept: RADIOLOGY | Facility: MEDICAL CENTER | Age: 38
End: 2018-03-27
Attending: PAIN MEDICINE
Payer: MEDICARE

## 2018-03-27 ENCOUNTER — HOSPITAL ENCOUNTER (OUTPATIENT)
Facility: MEDICAL CENTER | Age: 38
End: 2018-03-27
Attending: PAIN MEDICINE | Admitting: PAIN MEDICINE
Payer: MEDICARE

## 2018-03-27 VITALS
SYSTOLIC BLOOD PRESSURE: 110 MMHG | OXYGEN SATURATION: 98 % | DIASTOLIC BLOOD PRESSURE: 70 MMHG | HEIGHT: 71 IN | HEART RATE: 88 BPM | RESPIRATION RATE: 12 BRPM | TEMPERATURE: 99 F | WEIGHT: 162 LBS | BODY MASS INDEX: 22.68 KG/M2

## 2018-03-27 PROCEDURE — 700111 HCHG RX REV CODE 636 W/ 250 OVERRIDE (IP)

## 2018-03-27 PROCEDURE — 160025 RECOVERY II MINUTES (STATS): Performed by: PAIN MEDICINE

## 2018-03-27 PROCEDURE — 97530 THERAPEUTIC ACTIVITIES: CPT

## 2018-03-27 PROCEDURE — 160046 HCHG PACU - 1ST 60 MINS PHASE II: Performed by: PAIN MEDICINE

## 2018-03-27 PROCEDURE — 97161 PT EVAL LOW COMPLEX 20 MIN: CPT

## 2018-03-27 PROCEDURE — 62323 NJX INTERLAMINAR LMBR/SAC: CPT | Mod: XU

## 2018-03-27 PROCEDURE — 700105 HCHG RX REV CODE 258: Performed by: PAIN MEDICINE

## 2018-03-27 PROCEDURE — 160048 HCHG OR STATISTICAL LEVEL 1-5: Performed by: PAIN MEDICINE

## 2018-03-27 PROCEDURE — 160047 HCHG PACU  - EA ADDL 30 MINS PHASE II: Performed by: PAIN MEDICINE

## 2018-03-27 PROCEDURE — 700101 HCHG RX REV CODE 250

## 2018-03-27 PROCEDURE — 700117 HCHG RX CONTRAST REV CODE 255

## 2018-03-27 RX ORDER — LIDOCAINE HYDROCHLORIDE 10 MG/ML
INJECTION, SOLUTION INFILTRATION; PERINEURAL
Status: COMPLETED
Start: 2018-03-27 | End: 2018-03-27

## 2018-03-27 RX ORDER — MIDAZOLAM HYDROCHLORIDE 1 MG/ML
INJECTION INTRAMUSCULAR; INTRAVENOUS
Status: DISCONTINUED
Start: 2018-03-27 | End: 2018-03-27 | Stop reason: HOSPADM

## 2018-03-27 RX ORDER — MIDAZOLAM HYDROCHLORIDE 1 MG/ML
INJECTION INTRAMUSCULAR; INTRAVENOUS
Status: COMPLETED
Start: 2018-03-27 | End: 2018-03-27

## 2018-03-27 RX ORDER — SODIUM CHLORIDE, SODIUM LACTATE, POTASSIUM CHLORIDE, CALCIUM CHLORIDE 600; 310; 30; 20 MG/100ML; MG/100ML; MG/100ML; MG/100ML
INJECTION, SOLUTION INTRAVENOUS
Status: DISCONTINUED | OUTPATIENT
Start: 2018-03-27 | End: 2018-03-27 | Stop reason: HOSPADM

## 2018-03-27 RX ADMIN — SODIUM CHLORIDE, POTASSIUM CHLORIDE, SODIUM LACTATE AND CALCIUM CHLORIDE: 600; 310; 30; 20 INJECTION, SOLUTION INTRAVENOUS at 07:45

## 2018-03-27 RX ADMIN — LIDOCAINE HYDROCHLORIDE 0.1 ML: 10 INJECTION, SOLUTION INFILTRATION; PERINEURAL at 07:45

## 2018-03-27 RX ADMIN — MIDAZOLAM HYDROCHLORIDE 2 MG: 1 INJECTION, SOLUTION INTRAMUSCULAR; INTRAVENOUS at 07:45

## 2018-03-27 ASSESSMENT — PAIN SCALES - GENERAL
PAINLEVEL_OUTOF10: 5

## 2018-03-27 ASSESSMENT — GAIT ASSESSMENTS: GAIT LEVEL OF ASSIST: UNABLE TO PARTICIPATE

## 2018-03-27 NOTE — PROCEDURES
DATE OF SERVICE:  03/27/2018    NAME OF PROCEDURE:  Spinal baclofen trial.    PREPROCEDURAL DIAGNOSIS:  Patient with spastic quadriparesis following head   injury for spinal narcotic trial due to inadequate spasticity management with   oral baclofen.    POSTPROCEDURAL  DIAGNOSIS:  Patient with spastic quadriparesis following head   injury for spinal narcotic trial due to inadequate spasticity management with   oral baclofen.    PROCEDURE PERFORMED BY:  Andrew Gomez MD    ANESTHESIA:  Local.    PROCEDURE NOTE:  Patient was placed in prone position.  The back was sterilely   prepped using a surgical prep set.  Lidocaine skin wheal was raised over the   L2-L3 interspace.  Using a needle through needle technique, 18-gauge and   25-gauge Jennifer needle was advanced into the intrathecal space.  The stylet   was removed.  Clear CSF was noted to be flowing at the tip of the needle.  A   total of 4 mL of radiographic contrast material was injected confirming an   intrathecal myelogram.  This was followed by 7 mL containing 75 mcg of   baclofen mixed with preservative-free normal saline, a total of 7 mL.  Prior   to removal of the needle, another 2 mL of radiographic contrast material   confirmed no migration of the needle with normal-appearing intrathecal   myelogram.  Needles were removed.  Patient tolerated procedure quite well.    There were no apparent complications.       ____________________________________     MD NEELIMA CARRILLO / NTS    DD:  03/27/2018 10:50:17  DT:  03/27/2018 11:29:12    D#:  [id number]  Job#:  [id number]

## 2018-03-27 NOTE — DISCHARGE INSTRUCTIONS
ACTIVITY: Rest and take it easy for the first 24 hours.  A responsible adult is recommended to remain with you during that time.  It is normal to feel sleepy.  We encourage you to not do anything that requires balance, judgment or coordination.    MILD FLU-LIKE SYMPTOMS ARE NORMAL. YOU MAY EXPERIENCE GENERALIZED MUSCLE ACHES, THROAT IRRITATION, HEADACHE AND/OR SOME NAUSEA.    FOR 24 HOURS DO NOT:  Drive, operate machinery or run household appliances.  Drink beer or alcoholic beverages.   Make important decisions or sign legal documents.    SPECIAL INSTRUCTIONS: NA     DIET: To avoid nausea, slowly advance diet as tolerated, avoiding spicy or greasy foods for the first day.  Add more substantial food to your diet according to your physician's instructions.  Babies can be fed formula or breast milk as soon as they are hungry.  INCREASE FLUIDS AND FIBER TO AVOID CONSTIPATION.        FOLLOW-UP APPOINTMENT:  A follow-up appointment should be arranged with your doctor ; call to schedule.    You should CALL YOUR PHYSICIAN if you develop:  Fever greater than 101 degrees F.  Pain not relieved by medication, or persistent nausea or vomiting.  Excessive bleeding (blood soaking through dressing) or unexpected drainage from the wound.  Extreme redness or swelling around the incision site, drainage of pus or foul smelling drainage.  Inability to urinate or empty your bladder within 8 hours.  Problems with breathing or chest pain.    You should call 911 if you develop problems with breathing or chest pain.  If you are unable to contact your doctor or surgical center, you should go to the nearest emergency room or urgent care center.  Physician's telephone #: Dr. Gomez [phone]    If any questions arise, call your doctor.  If your doctor is not available, please feel free to call the Surgical Center at [phone].  The Center is open Monday through Friday from 7AM to 7PM.  You can also call the HEALTH HOTLINE open 28  hours/day, 7 days/week and speak to a nurse at [phone], or toll free at [phone].    A registered nurse may call you a few days after your surgery to see how you are doing after your procedure.    MEDICATIONS: Resume taking daily medication.  Take prescribed pain medication with food.  If no medication is prescribed, you may take non-aspirin pain medication if needed.  PAIN MEDICATION CAN BE VERY CONSTIPATING.  Take a stool softener or laxative such as senokot, pericolace, or milk of magnesia if needed.    Prescription given for NA .  Last pain medication given at NA .    If your physician has prescribed pain medication that includes Acetaminophen (Tylenol), do not take additional Acetaminophen (Tylenol) while taking the prescribed medication.    Depression / Suicide Risk    As you are discharged from this [facility] facility, it is important to learn how to keep safe from harming yourself.    Recognize the warning signs:  · Abrupt changes in personality, positive or negative- including increase in energy   · Giving away possessions  · Change in eating patterns- significant weight changes-  positive or negative  · Change in sleeping patterns- unable to sleep or sleeping all the time   · Unwillingness or inability to communicate  · Depression  · Unusual sadness, discouragement and loneliness  · Talk of wanting to die  · Neglect of personal appearance   · Rebelliousness- reckless behavior  · Withdrawal from people/activities they love  · Confusion- inability to concentrate     If you or a loved one observes any of these behaviors or has concerns about self-harm, here's what you can do:  · Talk about it- your feelings and reasons for harming yourself  · Remove any means that you might use to hurt yourself (examples: pills, rope, extension cords, firearm)  · Get professional help from the community (Mental Health, Substance Abuse, psychological counseling)  · Do not be alone:Call your Safe Contact-  someone whom you trust who will be there for you.  · Call your local CRISIS HOTLINE 566-2328 or 249-258-7788  · Call your local Children's Mobile Crisis Response Team [place] (559) 652-1699 or www.Veristorm  · Call the toll free National Suicide Prevention Hotlines   · National Suicide Prevention Lifeline 337-008-OOIG (4454)  · Box[employer] Line Network 800-SUICIDE (318-9706)

## 2018-03-27 NOTE — OR NURSING
1044 PT RECEIVED IN STAGE 2, REPORT RECEIVED.  VSS.  MOTHER AT BEDSIDE. 1140 VSS. LUNCH AT BEDSIDE. 1215 REPORT GIVEN TO BROOKS SIMON TO ASSUME CARE OF THIS PT.

## 2018-03-27 NOTE — THERAPY
37 year old male with a history of M/S Pt lives at home on his own with help from a caregiver. He is Independent with bed mob but needs max A with transfers and is unable to ambulate.  Pt has no active movement in legsand restricted ROM in hips and knees  Pt has been complaining of spasms in bi lat legs that is causing disturbed sleep and difficulty in transferring.  Before Baclofen Pt had marked increase tone with spasms in both lower extremities and considerable difficulty straightening legs out.  Following Baclofen patient had normal tone in both legs with no spasms for the first 4 hours,He had full passive ROM in all joints.Patients Mother found it a lot easier to transfer him from bed to wheelchair.  Patient appears to be a great candidate for Baclofen    Mateo Ramirez PT

## 2018-03-27 NOTE — OR NURSING
1215 Report received from AURORA Greenberg. Pt tolerating lunch with help of pts mother who is at bedside.   1220 Dr. Gomez at bedside. VSS   1250 PT at bedside to evaluate pt   1340 Pt sleeping. VSS   1440 PT at bedside. VSS.   1540 No change. Pt sleeping. VSS   1600 PT and this RN at bedside. Pts mom to transfer pt to chair.    1610 Pt transferred to wheelchair with mothers assistance.   1620 Explained discharge instructions to pt and pts mother. Both express understanding. VSS   1625 Pt meets criteria for discharge after uneventful stay

## 2018-03-27 NOTE — OR NURSING
0623 pt to pre op to assume care. Pt reports eating yogurt and coffee with milk at 0500 today.  0715 DR. PEDRAZA AWARE. PT WILL NOT RECEIVE ANESTHESIA FOR PROCEDURE. 0910 PT AT BEDSIDE WITH PRE PROCEDURE EVALUATION. 0925 Patient allergies, home medication reconciliation completed and belongings secured. Patient verbalizes understanding of pain scale, expected course of stay and plan of care. Surgical site verified with patient. IV access established.

## 2018-03-27 NOTE — OR SURGEON
Immediate Post OP Note    PreOp Diagnosis: spastic quadraparesis    PostOp Diagnosis: as above    Procedure(s):  BLOCK EPIDURAL STEROID INJECTION - BACLOFEN PUMP TRIAL - Wound Class: Clean    Surgeon(s):  Andrew Gomez M.D.    Anesthesiologist/Type of Anesthesia:  No anesthesia staff entered./None    Surgical Staff:  Circulator: Courtney Vides RMICHAELLE; Saman Begum R.N.  Scrub Person: Sid Ballard    Specimens removed if any:  * No specimens in log *    Estimated Blood Loss: none    Findings: none    Complications: none        3/27/2018 12:24 PM Andrew Gomez M.D.

## 2018-03-29 ENCOUNTER — HOME CARE VISIT (OUTPATIENT)
Dept: HOME HEALTH SERVICES | Facility: HOME HEALTHCARE | Age: 38
End: 2018-03-29
Payer: MEDICARE

## 2018-03-29 VITALS
SYSTOLIC BLOOD PRESSURE: 108 MMHG | HEART RATE: 105 BPM | RESPIRATION RATE: 18 BRPM | DIASTOLIC BLOOD PRESSURE: 68 MMHG | TEMPERATURE: 99.9 F | OXYGEN SATURATION: 99 %

## 2018-03-29 PROCEDURE — G0299 HHS/HOSPICE OF RN EA 15 MIN: HCPCS

## 2018-03-29 PROCEDURE — G0156 HHCP-SVS OF AIDE,EA 15 MIN: HCPCS

## 2018-03-29 ASSESSMENT — ENCOUNTER SYMPTOMS
NAUSEA: DENIES
POOR JUDGMENT: 1
DEPRESSED MOOD: 1
MENTAL STATUS CHANGE: 0

## 2018-03-30 ENCOUNTER — HOME CARE VISIT (OUTPATIENT)
Dept: HOME HEALTH SERVICES | Facility: HOME HEALTHCARE | Age: 38
End: 2018-03-30
Payer: MEDICARE

## 2018-03-30 VITALS
DIASTOLIC BLOOD PRESSURE: 58 MMHG | OXYGEN SATURATION: 99 % | SYSTOLIC BLOOD PRESSURE: 108 MMHG | RESPIRATION RATE: 18 BRPM | TEMPERATURE: 99.9 F

## 2018-04-02 ENCOUNTER — PATIENT OUTREACH (OUTPATIENT)
Dept: HEALTH INFORMATION MANAGEMENT | Facility: OTHER | Age: 38
End: 2018-04-02

## 2018-04-02 ENCOUNTER — HOME CARE VISIT (OUTPATIENT)
Dept: HOME HEALTH SERVICES | Facility: HOME HEALTHCARE | Age: 38
End: 2018-04-02
Payer: MEDICARE

## 2018-04-02 PROCEDURE — G0299 HHS/HOSPICE OF RN EA 15 MIN: HCPCS

## 2018-04-02 NOTE — PROGRESS NOTES
Outreach call to pt's mother to follow up. She reported they've briefly discussed options in pt moving back to [place] and placement in a SNF but are holding off for now. She explained pt had a test to determine if he would be a good candidate for a pump to help him with is muscles, which she was told he was and the test went well. She also reported his pain management specialist will be starting stem-cell procedures to the office and they are looking forward to determining if this would also be an option for pt. Pt's mother was more optimistic about the possibilities in treatment and reported for the next 6 months they weren't going to make any decisions on placement until they see how pt progresses with these potential options. She also reported pt's uncle, who is around his age, came down from [place] to provide care for pt while she took some respite care for herself. She indicated they are talking about his uncle moving to [place] and for pt to live with them here to receive additional help and care. Lastly she indicated they will be changing pt's personal care agency and are in the process of interviewing alternative agencies. Family and pt will work with [facility] worker for this process. At this time family/pt have met care plan goal and interventions are are familiar with alternative options related to placement for the future. Discussed graduating pt from  services at this time, pt's mother voiced agreement and has LSW contact information to call if additional assistance is needed int he future.     Plan:  · At this time LSW will graduate from  CC services.  · Pt remains on services with RN CC.

## 2018-04-03 ENCOUNTER — PATIENT OUTREACH (OUTPATIENT)
Dept: HEALTH INFORMATION MANAGEMENT | Facility: OTHER | Age: 38
End: 2018-04-03

## 2018-04-03 ENCOUNTER — HOME CARE VISIT (OUTPATIENT)
Dept: HOME HEALTH SERVICES | Facility: HOME HEALTHCARE | Age: 38
End: 2018-04-03
Payer: MEDICARE

## 2018-04-03 VITALS
HEART RATE: 71 BPM | TEMPERATURE: 99.5 F | DIASTOLIC BLOOD PRESSURE: 80 MMHG | RESPIRATION RATE: 17 BRPM | OXYGEN SATURATION: 98 % | SYSTOLIC BLOOD PRESSURE: 120 MMHG

## 2018-04-03 PROCEDURE — G0156 HHCP-SVS OF AIDE,EA 15 MIN: HCPCS

## 2018-04-03 NOTE — PROGRESS NOTES
Chart reviewed. The patient is currently receiving [facility] services.  Plan: will monitor for discharge from .

## 2018-04-05 ENCOUNTER — HOSPITAL ENCOUNTER (EMERGENCY)
Facility: MEDICAL CENTER | Age: 38
End: 2018-04-05
Attending: EMERGENCY MEDICINE
Payer: MEDICARE

## 2018-04-05 VITALS
BODY MASS INDEX: 22.68 KG/M2 | OXYGEN SATURATION: 95 % | TEMPERATURE: 98.8 F | WEIGHT: 162 LBS | HEIGHT: 71 IN | RESPIRATION RATE: 17 BRPM | SYSTOLIC BLOOD PRESSURE: 134 MMHG | HEART RATE: 103 BPM | DIASTOLIC BLOOD PRESSURE: 93 MMHG

## 2018-04-05 DIAGNOSIS — N30.01 ACUTE CYSTITIS WITH HEMATURIA: ICD-10-CM

## 2018-04-05 DIAGNOSIS — R33.8 ACUTE URINARY RETENTION: ICD-10-CM

## 2018-04-05 LAB
APPEARANCE UR: ABNORMAL
BACTERIA #/AREA URNS HPF: ABNORMAL /HPF
BILIRUB UR QL STRIP.AUTO: NEGATIVE
COLOR UR: YELLOW
CULTURE IF INDICATED INDCX: YES UA CULTURE
EPI CELLS #/AREA URNS HPF: NEGATIVE /HPF
GLUCOSE UR STRIP.AUTO-MCNC: NEGATIVE MG/DL
HYALINE CASTS #/AREA URNS LPF: ABNORMAL /LPF
KETONES UR STRIP.AUTO-MCNC: NEGATIVE MG/DL
LEUKOCYTE ESTERASE UR QL STRIP.AUTO: ABNORMAL
MICRO URNS: ABNORMAL
NITRITE UR QL STRIP.AUTO: NEGATIVE
PH UR STRIP.AUTO: >=9 [PH]
PROT UR QL STRIP: 100 MG/DL
RBC # URNS HPF: ABNORMAL /HPF
RBC UR QL AUTO: ABNORMAL
SP GR UR STRIP.AUTO: 1.02
UROBILINOGEN UR STRIP.AUTO-MCNC: 0.2 MG/DL
WBC #/AREA URNS HPF: ABNORMAL /HPF

## 2018-04-05 PROCEDURE — 87086 URINE CULTURE/COLONY COUNT: CPT

## 2018-04-05 PROCEDURE — 87077 CULTURE AEROBIC IDENTIFY: CPT

## 2018-04-05 PROCEDURE — 700102 HCHG RX REV CODE 250 W/ 637 OVERRIDE(OP): Performed by: EMERGENCY MEDICINE

## 2018-04-05 PROCEDURE — 99284 EMERGENCY DEPT VISIT MOD MDM: CPT

## 2018-04-05 PROCEDURE — 51798 US URINE CAPACITY MEASURE: CPT

## 2018-04-05 PROCEDURE — 51702 INSERT TEMP BLADDER CATH: CPT

## 2018-04-05 PROCEDURE — A9270 NON-COVERED ITEM OR SERVICE: HCPCS | Performed by: EMERGENCY MEDICINE

## 2018-04-05 PROCEDURE — 303105 HCHG CATHETER EXTRA

## 2018-04-05 PROCEDURE — 81001 URINALYSIS AUTO W/SCOPE: CPT

## 2018-04-05 PROCEDURE — 87186 SC STD MICRODIL/AGAR DIL: CPT

## 2018-04-05 RX ORDER — SULFAMETHOXAZOLE AND TRIMETHOPRIM 800; 160 MG/1; MG/1
1 TABLET ORAL 2 TIMES DAILY
Qty: 14 TAB | Refills: 0 | Status: SHIPPED | OUTPATIENT
Start: 2018-04-05 | End: 2018-04-12

## 2018-04-05 RX ORDER — SULFAMETHOXAZOLE AND TRIMETHOPRIM 800; 160 MG/1; MG/1
1 TABLET ORAL ONCE
Status: COMPLETED | OUTPATIENT
Start: 2018-04-05 | End: 2018-04-05

## 2018-04-05 RX ADMIN — SULFAMETHOXAZOLE AND TRIMETHOPRIM 1 TABLET: 800; 160 TABLET ORAL at 19:12

## 2018-04-05 ASSESSMENT — PAIN SCALES - GENERAL: PAINLEVEL_OUTOF10: 4

## 2018-04-06 ENCOUNTER — HOME CARE VISIT (OUTPATIENT)
Dept: HOME HEALTH SERVICES | Facility: HOME HEALTHCARE | Age: 38
End: 2018-04-06
Payer: MEDICARE

## 2018-04-06 VITALS
SYSTOLIC BLOOD PRESSURE: 108 MMHG | RESPIRATION RATE: 16 BRPM | HEART RATE: 94 BPM | DIASTOLIC BLOOD PRESSURE: 72 MMHG | OXYGEN SATURATION: 98 %

## 2018-04-06 ASSESSMENT — ENCOUNTER SYMPTOMS
POOR JUDGMENT: 1
MENTAL STATUS CHANGE: 0
NAUSEA: DENIES
DEPRESSED MOOD: 1

## 2018-04-06 NOTE — ED NOTES
Family at bedside, pt verbalized understanding of poc and discharge instructions, no questions at this time, SS called for patient to go home via [facility],  Pt is bedbound.

## 2018-04-06 NOTE — ED PROVIDER NOTES
ED Provider Note    Scribed for Mateo Johnson M.D. by Christie Walden. 4/5/2018  5:11 PM    Primary care provider: NAOMIE Emmanuel  Means of arrival: ambulance  History obtained from: patient  History limited by: none    CHIEF COMPLAINT  Chief Complaint   Patient presents with   • Urinary Catheter Problem     catheter has not been draining since this morning       HPI  Jerzy Juan III is a 37 y.o. male with a history of hypertension, MS, and urinary incontinence who presents to the Emergency Department with a urinary catheter dysfunction. Patient reports that he has not had output from his catheter since earlier this morning. He was here 2 weeks ago for Epperson dysfunction as well, diagnosed with stones in the bladder and a UTI. Patient was discharged and placed on antibiotics.   The Epperson has been placed for 1 month by his Urologist, secondary to increasing difficulty of self- catheterization. Patient has MS, and cannot feel any discomfort he may have in his abdomen.    REVIEW OF SYSTEMS  Pertinent positives include urinary catheter dysfunction. Pertinent negatives include no known abdominal pain.  All other systems reviewed and negative.    PAST MEDICAL HISTORY   has a past medical history of Anxiety; Back pain; Blood transfusion; Breath shortness; Depression; Fall; Headache(784.0); Heart burn; Heart murmur; Hypertension; MS (multiple sclerosis) (CMS-[facility]) (DX 8/20/2013); Pain; and Urinary incontinence.    SURGICAL HISTORY   has a past surgical history that includes open reduction (Left, 1988); gastroscopy (1/14/2017); and block epidural steroid injection (N/A, 3/27/2018).    SOCIAL HISTORY  Social History   Substance Use Topics   • Smoking status: Former Smoker     Packs/day: 1.00     Years: 20.00     Start date: 8/1/1997     Quit date: 8/1/2013   • Smokeless tobacco: Never Used   • Alcohol use 0.0 oz/week      Comment: 2 per day      History   Drug Use   • Types: Inhaled     Comment: medical marijuana  "      FAMILY HISTORY  Family History   Problem Relation Age of Onset   • Thyroid Mother    • Hypertension Mother    • Hypertension Father    • Psychiatry Father        CURRENT MEDICATIONS  Home Medications     Reviewed by Kofi Penn R.N. (Registered Nurse) on 04/05/18 at 1653  Med List Status: <None>   Medication Last Dose Status   baclofen (LIORESAL) 20 MG tablet 3/26/2018 Active   buPROPion SR (WELLBUTRIN-SR) 150 MG TABLET SR 12 HR sustained-release tablet 3/26/2018 Active   Cholecalciferol (VITAMIN D3) 5000 UNITS Tab 3/26/2018 Active   Cranberry 125 MG Tab 3/26/2018 Active   docusate sodium (COLACE) 250 MG capsule 3/25/2018 Active   escitalopram (LEXAPRO) 20 MG tablet 3/26/2018 Active   lactulose 10 GM/15ML Solution  Active   losartan (COZAAR) 50 MG Tab 3/26/2018 Active   Magnesium 300 MG Cap 3/26/2018 Active   methenamine hip (HIPPREX) 1 GM Tab 3/26/2018 Active   NS SOLN 250 mL with ocrelizumab 300 MG/10ML SOLN 300 mg 1/31/2018 Active   ondansetron (ZOFRAN ODT) 4 MG TABLET DISPERSIBLE 3/26/2018 Active   oxybutynin SR (DITROPAN-XL) 10 MG CR tablet 3/26/2018 Active   polyethylene glycol/lytes (MIRALAX) Pack  Active   Probiotic Product (PROBIOTIC DAILY PO)  Active   psyllium (METAMUCIL) 58.12 % Pack  Active   sulfamethoxazole-trimethoprim (BACTRIM DS) 800-160 MG tablet 3/20/2018 Active   therapeutic multivitamin-minerals (THERAGRAN-M) Tab 3/26/2018 Active                ALLERGIES  Allergies   Allergen Reactions   • Other Drug      CANNOT TAKE ANY STEROIDS WITH RECENT TYSABRI INFUSION   • Asa [Aspirin] Unspecified     Bleeding in stomach as a baby   • Gabapentin Anxiety     anger and severe mood swing   • Pregabalin Anxiety     Anger and severe mood swing       PHYSICAL EXAM  VITAL SIGNS: /93   Pulse 93   Temp 37.1 °C (98.8 °F)   Resp 17   Ht 1.803 m (5' 11\")   Wt 73.5 kg (162 lb)   BMI 22.59 kg/m²     Vital signs reviewed.  Constitutional:  Ill appearing young man, appearing older than stated " age.  Head: normocephalic  Neck: normal range of motion  Cardiovascular: regular rate and rhythm  Pulmonary/Chest: clear to auscultation  Abdominal: soft   Musculoskeletal: no obvious deformities of tenderness  Neurological: cachectic with temporal wasting, flexion in the arms and legs  Skin: warm dry no rashes    LABS  Results for orders placed or performed during the hospital encounter of 04/05/18   URINALYSIS,CULTURE IF INDICATED   Result Value Ref Range    Color Yellow     Character Cloudy (A)     Specific Gravity 1.019 <1.035    Ph >=9.0 (A) 5.0 - 8.0    Glucose Negative Negative mg/dL    Ketones Negative Negative mg/dL    Protein 100 (A) Negative mg/dL    Bilirubin Negative Negative    Urobilinogen, Urine 0.2 Negative    Nitrite Negative Negative    Leukocyte Esterase Large (A) Negative    Occult Blood Moderate (A) Negative    Micro Urine Req Microscopic     Culture Indicated Yes UA Culture   URINE MICROSCOPIC (W/UA)   Result Value Ref Range    -150 (A) /hpf    -150 (A) /hpf    Bacteria Many (A) None /hpf    Epithelial Cells Negative /hpf    Hyaline Cast 3-5 (A) /lpf       All labs reviewed by me.    COURSE & MEDICAL DECISION MAKING  Pertinent Labs & Imaging studies reviewed. (See chart for details) The patient's [facility] and past medical  records were reviewed    5:11 PM - Patient seen and examined at bedside. Ordered UA, bladder scan to evaluate his symptoms. I informed the patient that we would US his bladder and possibly place a new functioning Epperson. He understands and agrees.    5:48 PM Bladder scan reveled 750 ml of retained urine. New Epperson will be placed. Patient was started on antibiotics tonight. He is due to see his urologist tomorrow    7:00 PM I informed the patient that his UA does show signs of infection. I explained that he would be placed on antibiotics, advising him to follow up with his Urologist within the next few days. Patient understands and agrees with  discharge.     The patient will return for new or worsening symptoms and is stable at the time of discharge.      DISPOSITION:  Patient will be discharged home in stable condition.    FOLLOW UP:  Kwasi Sapp M.D.  [address] [place] [address]  [phone]    In 1 day  for procedure      OUTPATIENT MEDICATIONS:  New Prescriptions    SULFAMETHOXAZOLE-TRIMETHOPRIM (BACTRIM DS) 800-160 MG TABLET    Take 1 Tab by mouth 2 times a day for 7 days.         FINAL IMPRESSION  1. Acute cystitis with hematuria    2. Acute urinary retention          Christie TOM (Scribe), am scribing for, and in the presence of, Mateo Johnson M.D..    Electronically signed by: Christie Walden ([place]), 4/5/2018    Mateo TOM M.D. personally performed the services described in this documentation, as scribed by Christie Walden in my presence, and it is both accurate and complete.    The note accurately reflects work and decisions made by me.  Mateo Johnson  4/5/2018  11:36 PM

## 2018-04-06 NOTE — DISCHARGE PLANNING
Medical Social Work  SW asked to assist Pt in getting home via [facility], Pt has MS and is bed bound.    SW contacted [facility] and spoke to Madai she will pass on to stretcher team.    PCS completed and faxed to [facility], [place] spoke to Michael and transportation was arranged for 2015, pending [facility] approval.    RN notified of transport time.    SW available for any needs.

## 2018-04-06 NOTE — DISCHARGE INSTRUCTIONS
Acute Urinary Retention, Male  Acute urinary retention is when you are unable to pee (urinate). Acute urinary retention is common in older men. Prostates can get bigger, which blocks the flow of pee.  Follow these instructions at home:  · Drink enough fluids to keep your pee clear or pale yellow.  · If you are sent home with a tube that drains the bladder (catheter), there will be a drainage bag attached to it. There are two types of bags. One is big that you can wear at night without having to empty it. One is smaller and needs to be emptied more often.  ¨ Keep the drainage bag empty.  ¨ Keep the drainage bag lower than your catheter.  · Only take medicine as told by your doctor.  Contact a doctor if:  · You have a low-grade fever.  · You have spasms or you are leaking pee when you have spasms.  Get help right away if:  · You have chills or a fever.  · Your catheter stops draining pee.  · Your catheter falls out.  · You have increased bleeding that does not stop after you have rested and increased the amount of fluids you had been drinking.  This information is not intended to replace advice given to you by your health care provider. Make sure you discuss any questions you have with your health care provider.  Document Released: 06/05/2009 Document Revised: 05/25/2017 Document Reviewed: 05/29/2014  [employer] Interactive Patient Education © 2017 [employer]

## 2018-04-09 ENCOUNTER — HOME CARE VISIT (OUTPATIENT)
Dept: HOME HEALTH SERVICES | Facility: HOME HEALTHCARE | Age: 38
End: 2018-04-09
Payer: MEDICARE

## 2018-04-09 ENCOUNTER — PATIENT OUTREACH (OUTPATIENT)
Dept: HEALTH INFORMATION MANAGEMENT | Facility: OTHER | Age: 38
End: 2018-04-09

## 2018-04-09 VITALS
TEMPERATURE: 98.5 F | RESPIRATION RATE: 16 BRPM | SYSTOLIC BLOOD PRESSURE: 128 MMHG | OXYGEN SATURATION: 99 % | HEART RATE: 75 BPM | DIASTOLIC BLOOD PRESSURE: 72 MMHG

## 2018-04-09 PROCEDURE — G0299 HHS/HOSPICE OF RN EA 15 MIN: HCPCS

## 2018-04-09 ASSESSMENT — ENCOUNTER SYMPTOMS
RESPIRATORY SYMPTOMS COMMENTS: DENIES SOB, RESP EVEN AND UNLABORED
DEPRESSED MOOD: 1
POOR JUDGMENT: 1
NAUSEA: DENIES

## 2018-04-09 NOTE — PROGRESS NOTES
Chart reviewed. The patient is currently receiving [facility] services.  Plan: will monitor for discharge from .

## 2018-04-10 ENCOUNTER — HOME CARE VISIT (OUTPATIENT)
Dept: HOME HEALTH SERVICES | Facility: HOME HEALTHCARE | Age: 38
End: 2018-04-10
Payer: MEDICARE

## 2018-04-10 ENCOUNTER — TELEPHONE (OUTPATIENT)
Dept: NEUROLOGY | Facility: MEDICAL CENTER | Age: 38
End: 2018-04-10

## 2018-04-10 PROCEDURE — G0156 HHCP-SVS OF AIDE,EA 15 MIN: HCPCS

## 2018-04-10 NOTE — TELEPHONE ENCOUNTER
Mother of pt called states pt has been taking Baclofen 1 tab TID. Pt was seen at the ED and the dr that saw him increased Baclofen 20mg to 2 tabs 3 times a day. Wants to know if dr Bloch can up the dose and send to [facility].

## 2018-04-11 VITALS
RESPIRATION RATE: 18 BRPM | SYSTOLIC BLOOD PRESSURE: 130 MMHG | HEART RATE: 74 BPM | DIASTOLIC BLOOD PRESSURE: 82 MMHG | OXYGEN SATURATION: 97 % | TEMPERATURE: 99.7 F

## 2018-04-11 NOTE — ED NOTES
"ED Positive Culture Follow-up/Notification Note:    Date: 4/10/18     Patient seen in the ED on 4/5/2018 for dysfunctional matias catheter.   1. Acute cystitis with hematuria    2. Acute urinary retention       Discharge Medication List as of 4/5/2018  7:09 PM      START taking these medications    Details   sulfamethoxazole-trimethoprim (BACTRIM DS) 800-160 MG tablet Take 1 Tab by mouth 2 times a day for 7 days., Disp-14 Tab, R-0, Print Rx Paper             Allergies: Other drug; Asa [aspirin]; Gabapentin; and Pregabalin     Vitals:    04/05/18 1649 04/05/18 1651 04/05/18 1900   BP:  134/93    Pulse:  93 (!) 103   Resp:  17    Temp:  37.1 °C (98.8 °F)    SpO2:   95%   Weight: 73.5 kg (162 lb)     Height: 1.803 m (5' 11\")         Final cultures:   Results     Procedure Component Value Units Date/Time    URINE CULTURE(NEW) [[id number]]  (Abnormal)  (Susceptibility) Collected:  04/05/18 1800    Order Status:  Completed Specimen:  Urine Updated:  04/08/18 1339     Significant Indicator POS (POS)     Source UR     Site --     Urine Culture Mixed skin martha 10-50,000 cfu/mL (A)      Proteus mirabilis  >100,000 cfu/mL   (A)    Culture & Susceptibility     PROTEUS MIRABILIS     Antibiotic Sensitivity Microscan Unit Status    Ampicillin Resistant >16 mcg/mL Final    Cefepime Sensitive <=8 mcg/mL Final    Cefotaxime Sensitive <=2 mcg/mL Final    Cefotetan Sensitive <=16 mcg/mL Final    Ceftazidime Sensitive <=1 mcg/mL Final    Ceftriaxone Sensitive <=8 mcg/mL Final    Cefuroxime Sensitive <=4 mcg/mL Final    Cephalothin Sensitive <=8 mcg/mL Final    Ciprofloxacin Resistant >2 mcg/mL Final    Gentamicin Resistant >8 mcg/mL Final    Levofloxacin Resistant >4 mcg/mL Final    Nitrofurantoin Intermediate 64 mcg/mL Final    Pip/Tazobactam Sensitive <=16 mcg/mL Final    Piperacillin Resistant >64 mcg/mL Final    Tobramycin Resistant >8 mcg/mL Final    Trimeth/Sulfa Resistant >2/38 mcg/mL Final                       URINALYSIS,CULTURE " "IF INDICATED [[id number]]  (Abnormal) Collected:  04/05/18 1800    Order Status:  Completed Specimen:  Urine Updated:  04/05/18 1823     Color Yellow     Character Cloudy (A)     Specific Gravity 1.019     Ph >=9.0 (A)     Glucose Negative mg/dL      Ketones Negative mg/dL      Protein 100 (A) mg/dL      Bilirubin Negative     Urobilinogen, Urine 0.2     Nitrite Negative     Leukocyte Esterase Large (A)     Occult Blood Moderate (A)     Micro Urine Req Microscopic     Culture Indicated Yes UA Culture           Plan:  Urine culture is positive for P. mirabilis and mixed martha - per pt's mother, pt exhibits UTI symptoms via tremors in his legs (present on presentation)  -Contacted pt on his preferred line - mother who has POA. Stated pt is doing better.   --Organism is resistant to TMP/SMX, yet pt improved. Pt's mother insists on tx.   --Urology prescribed an \"antiseptic\" (name unknown per mother)   -Surgery scheduled to eradicate bladder stones on 5/7 - pt can straight cath himself afterwards  -Will call in cephalexin 500mg po qid x7 days; 0 RF's; #28; per MD Ramires protocol   --Mother instructed to have pt complete cephalexin therapy and DC TMP/SMX. Further abx recommendations/therapy will be deferred to pt's urologist.     Lana Taylor, PharmD, BCPS      "

## 2018-04-12 ENCOUNTER — HOME CARE VISIT (OUTPATIENT)
Dept: HOME HEALTH SERVICES | Facility: HOME HEALTHCARE | Age: 38
End: 2018-04-12
Payer: MEDICARE

## 2018-04-12 ENCOUNTER — HOSPITAL ENCOUNTER (EMERGENCY)
Facility: MEDICAL CENTER | Age: 38
End: 2018-04-12
Attending: EMERGENCY MEDICINE
Payer: MEDICARE

## 2018-04-12 VITALS
OXYGEN SATURATION: 96 % | SYSTOLIC BLOOD PRESSURE: 120 MMHG | DIASTOLIC BLOOD PRESSURE: 88 MMHG | HEART RATE: 92 BPM | BODY MASS INDEX: 23.1 KG/M2 | WEIGHT: 165 LBS | RESPIRATION RATE: 16 BRPM | TEMPERATURE: 98.6 F | HEIGHT: 71 IN

## 2018-04-12 DIAGNOSIS — T83.9XXA PROBLEM WITH FOLEY CATHETER, INITIAL ENCOUNTER (HCC): ICD-10-CM

## 2018-04-12 LAB
ANION GAP SERPL CALC-SCNC: 7 MMOL/L (ref 0–11.9)
BASOPHILS # BLD AUTO: 1.2 % (ref 0–1.8)
BASOPHILS # BLD: 0.05 K/UL (ref 0–0.12)
BUN SERPL-MCNC: 13 MG/DL (ref 8–22)
CALCIUM SERPL-MCNC: 9.5 MG/DL (ref 8.5–10.5)
CHLORIDE SERPL-SCNC: 105 MMOL/L (ref 96–112)
CO2 SERPL-SCNC: 28 MMOL/L (ref 20–33)
CREAT SERPL-MCNC: 0.99 MG/DL (ref 0.5–1.4)
EOSINOPHIL # BLD AUTO: 0.12 K/UL (ref 0–0.51)
EOSINOPHIL NFR BLD: 3 % (ref 0–6.9)
ERYTHROCYTE [DISTWIDTH] IN BLOOD BY AUTOMATED COUNT: 41.7 FL (ref 35.9–50)
GLUCOSE SERPL-MCNC: 98 MG/DL (ref 65–99)
HCT VFR BLD AUTO: 45.7 % (ref 42–52)
HGB BLD-MCNC: 15.8 G/DL (ref 14–18)
IMM GRANULOCYTES # BLD AUTO: 0.01 K/UL (ref 0–0.11)
IMM GRANULOCYTES NFR BLD AUTO: 0.2 % (ref 0–0.9)
LYMPHOCYTES # BLD AUTO: 1.61 K/UL (ref 1–4.8)
LYMPHOCYTES NFR BLD: 40 % (ref 22–41)
MCH RBC QN AUTO: 31 PG (ref 27–33)
MCHC RBC AUTO-ENTMCNC: 34.6 G/DL (ref 33.7–35.3)
MCV RBC AUTO: 89.8 FL (ref 81.4–97.8)
MONOCYTES # BLD AUTO: 0.43 K/UL (ref 0–0.85)
MONOCYTES NFR BLD AUTO: 10.7 % (ref 0–13.4)
NEUTROPHILS # BLD AUTO: 1.8 K/UL (ref 1.82–7.42)
NEUTROPHILS NFR BLD: 44.9 % (ref 44–72)
NRBC # BLD AUTO: 0 K/UL
NRBC BLD-RTO: 0 /100 WBC
PLATELET # BLD AUTO: 284 K/UL (ref 164–446)
PMV BLD AUTO: 8.6 FL (ref 9–12.9)
POTASSIUM SERPL-SCNC: 4 MMOL/L (ref 3.6–5.5)
RBC # BLD AUTO: 5.09 M/UL (ref 4.7–6.1)
SODIUM SERPL-SCNC: 140 MMOL/L (ref 135–145)
WBC # BLD AUTO: 4 K/UL (ref 4.8–10.8)

## 2018-04-12 PROCEDURE — 80048 BASIC METABOLIC PNL TOTAL CA: CPT

## 2018-04-12 PROCEDURE — 85025 COMPLETE CBC W/AUTO DIFF WBC: CPT

## 2018-04-12 PROCEDURE — 99284 EMERGENCY DEPT VISIT MOD MDM: CPT

## 2018-04-12 PROCEDURE — G0299 HHS/HOSPICE OF RN EA 15 MIN: HCPCS

## 2018-04-12 PROCEDURE — 93005 ELECTROCARDIOGRAM TRACING: CPT | Performed by: EMERGENCY MEDICINE

## 2018-04-12 PROCEDURE — 51798 US URINE CAPACITY MEASURE: CPT

## 2018-04-12 ASSESSMENT — LIFESTYLE VARIABLES
HAVE YOU EVER FELT YOU SHOULD CUT DOWN ON YOUR DRINKING: NO
EVER FELT BAD OR GUILTY ABOUT YOUR DRINKING: NO
AVERAGE NUMBER OF DAYS PER WEEK YOU HAVE A DRINK CONTAINING ALCOHOL: 7
EVER HAD A DRINK FIRST THING IN THE MORNING TO STEADY YOUR NERVES TO GET RID OF A HANGOVER: NO
TOTAL SCORE: 0
HOW MANY TIMES IN THE PAST YEAR HAVE YOU HAD 5 OR MORE DRINKS IN A DAY: 0
HAVE PEOPLE ANNOYED YOU BY CRITICIZING YOUR DRINKING: NO
CONSUMPTION TOTAL: NEGATIVE
ON A TYPICAL DAY WHEN YOU DRINK ALCOHOL HOW MANY DRINKS DO YOU HAVE: 2
DO YOU DRINK ALCOHOL: YES
TOTAL SCORE: 0
TOTAL SCORE: 0

## 2018-04-12 NOTE — ED TRIAGE NOTES
EMS sts, Pt is here for a clogged Epperson. He was here on Saturday for the same thing. Nurse at home tried to flush the catheter but couldn't.

## 2018-04-12 NOTE — ED PROVIDER NOTES
"ED Provider Note    CHIEF COMPLAINT  Chief Complaint   Patient presents with   • Urinary Catheter Problem       HPI  Jerzy Juan III is a 37 y.o. male who presents to the emergency with possible Epperson catheter dysfunction. He feels that it doesn't work. States he hasn't had as much urine output. He's been having some generalized cramping discomfort in his extremities, but denies abdominal pain. He has not had fevers, nausea or vomiting. Was here a few days ago with Epperson catheter dysfunction. Had a new Epperson catheter placed. He had what appeared to be a UTI and was given a prescription for antibiotics which she has been taking.    REVIEW OF SYSTEMS  As per HPI, otherwise a 10 point review of systems is negative    PAST MEDICAL HISTORY  Past Medical History:   Diagnosis Date   • Anxiety    • Back pain    • Blood transfusion    • Breath shortness     \"r/t to medication Ocrevus and Baclofen\" \"side effect   • Depression     PTSD/clinical depression   • Fall     4/6/2016   • Headache(784.0)    • Heart burn    • Heart murmur    • Hypertension    • MS (multiple sclerosis) (CMS-[facility]) DX 8/20/2013   • Pain     \"everywhere\"   • Urinary incontinence     condom catheter       SOCIAL HISTORY  Social History   Substance Use Topics   • Smoking status: Former Smoker     Packs/day: 1.00     Years: 20.00     Start date: 8/1/1997     Quit date: 8/1/2013   • Smokeless tobacco: Never Used   • Alcohol use 0.0 oz/week      Comment: 2 per day       SURGICAL HISTORY  Past Surgical History:   Procedure Laterality Date   • BLOCK EPIDURAL STEROID INJECTION N/A 3/27/2018    Procedure: BLOCK EPIDURAL STEROID INJECTION - BACLOFEN PUMP TRIAL;  Surgeon: Andrew Gomez M.D.;  Location: SURGERY [facility];  Service: Pain Management   • GASTROSCOPY  1/14/2017    Procedure: GASTROSCOPY WITH FECAL MATTER TRANSPLANT;  Surgeon: Obinna Douglas M.D.;  Location: SURGERY [facility];  Service:    • OPEN REDUCTION Left 1988    left thumb " "      CURRENT MEDICATIONS  Home Medications    **Home medications have not yet been reviewed for this encounter**         ALLERGIES  Allergies   Allergen Reactions   • Other Drug      CANNOT TAKE ANY STEROIDS WITH RECENT TYSABRI INFUSION   • Asa [Aspirin] Unspecified     Bleeding in stomach as a baby   • Gabapentin Anxiety     anger and severe mood swing   • Pregabalin Anxiety     Anger and severe mood swing       PHYSICAL EXAM  VITAL SIGNS: /88   Pulse 85   Temp 37 °C (98.6 °F)   Resp 18   Ht 1.803 m (5' 11\")   Wt 74.8 kg (165 lb)   SpO2 95%   BMI 23.01 kg/m²    Constitutional: Awake and alert  HENT:  Atraumatic, Normocephalic.Oropharynx dry mucus membranes, Nose normal inspection.   Eyes: Normal inspection  Neck: Supple  Cardiovascular: Normal heart rate, Normal rhythm.  Symmetric peripheral pulses.   Thorax & Lungs: No respiratory distress, No wheezing, No rales, No rhonchi, No chest tenderness.   Abdomen: Bowel sounds normal, soft, non-distended, nontender, no mass  Skin: Warm, Dry, No rash.   Back: No tenderness, No CVA tenderness.   Extremities: No asymmetric swelling  Neurologic: Motor plegia  Psychiatric: Anxious appearing    RADIOLOGY/PROCEDURES  Bladder scan 30 mL    Labs:  Results for orders placed or performed during the hospital encounter of 04/12/18   CBC WITH DIFFERENTIAL   Result Value Ref Range    WBC 4.0 (L) 4.8 - 10.8 K/uL    RBC 5.09 4.70 - 6.10 M/uL    Hemoglobin 15.8 14.0 - 18.0 g/dL    Hematocrit 45.7 42.0 - 52.0 %    MCV 89.8 81.4 - 97.8 fL    MCH 31.0 27.0 - 33.0 pg    MCHC 34.6 33.7 - 35.3 g/dL    RDW 41.7 35.9 - 50.0 fL    Platelet Count 284 164 - 446 K/uL    MPV 8.6 (L) 9.0 - 12.9 fL    Neutrophils-Polys 44.90 44.00 - 72.00 %    Lymphocytes 40.00 22.00 - 41.00 %    Monocytes 10.70 0.00 - 13.40 %    Eosinophils 3.00 0.00 - 6.90 %    Basophils 1.20 0.00 - 1.80 %    Immature Granulocytes 0.20 0.00 - 0.90 %    Nucleated RBC 0.00 /100 WBC    Neutrophils (Absolute) 1.80 (L) 1.82 - " 7.42 K/uL    Lymphs (Absolute) 1.61 1.00 - 4.80 K/uL    Monos (Absolute) 0.43 0.00 - 0.85 K/uL    Eos (Absolute) 0.12 0.00 - 0.51 K/uL    Baso (Absolute) 0.05 0.00 - 0.12 K/uL    Immature Granulocytes (abs) 0.01 0.00 - 0.11 K/uL    NRBC (Absolute) 0.00 K/uL   BASIC METABOLIC PANEL   Result Value Ref Range    Sodium 140 135 - 145 mmol/L    Potassium 4.0 3.6 - 5.5 mmol/L    Chloride 105 96 - 112 mmol/L    Co2 28 20 - 33 mmol/L    Glucose 98 65 - 99 mg/dL    Bun 13 8 - 22 mg/dL    Creatinine 0.99 0.50 - 1.40 mg/dL    Calcium 9.5 8.5 - 10.5 mg/dL    Anion Gap 7.0 0.0 - 11.9   ESTIMATED GFR   Result Value Ref Range    GFR If African American >60 >60 mL/min/1.73 m 2    GFR If Non African American >60 >60 mL/min/1.73 m 2       COURSE & MEDICAL DECISION MAKING  Patient presents with concern for Epperson catheter dysfunction. His exam is benign. I flushed his catheter without difficulty. He did not have any significant urine in the bladder. Given his reports of decreased urine output laboratory data was obtained to rule out renal failure. Data returned reassuring. At this point she is appropriate for discharge with continuation of current management. He will return to the ER for any concerning symptoms.    Patient referred to primary provider for blood pressure management    FINAL IMPRESSION  1. Epperson catheter dysfunction, resolved      This dictation was created using voice recognition software. The accuracy of the dictation is limited to the abilities of the software.  The nursing notes were reviewed and certain aspects of this information were incorporated into this note.      Electronically signed by: Mario Fagan, 4/12/2018 2:13 PM

## 2018-04-12 NOTE — DISCHARGE PLANNING
MSW received call from bedside RN requesting transport home by [facility]. Pt has MS and is bedbound. MSW faxed PCS and facesheet to [facility]. MSW called Lydia [phone] at [facility] for auth. Lydia provided auth# [id number].    MSW called Jung at [facility] to set transport time for 1530. MSW updated bedside RN. Bedside RN to call pt;s caregiver and alert her of when pt is leaving.

## 2018-04-13 ENCOUNTER — HOME CARE VISIT (OUTPATIENT)
Dept: HOME HEALTH SERVICES | Facility: HOME HEALTHCARE | Age: 38
End: 2018-04-13
Payer: MEDICARE

## 2018-04-13 VITALS — RESPIRATION RATE: 18 BRPM

## 2018-04-13 PROCEDURE — G0156 HHCP-SVS OF AIDE,EA 15 MIN: HCPCS

## 2018-04-16 ENCOUNTER — HOME CARE VISIT (OUTPATIENT)
Dept: HOME HEALTH SERVICES | Facility: HOME HEALTHCARE | Age: 38
End: 2018-04-16
Payer: MEDICARE

## 2018-04-16 ENCOUNTER — PATIENT OUTREACH (OUTPATIENT)
Dept: HEALTH INFORMATION MANAGEMENT | Facility: OTHER | Age: 38
End: 2018-04-16

## 2018-04-16 PROCEDURE — G0299 HHS/HOSPICE OF RN EA 15 MIN: HCPCS

## 2018-04-16 NOTE — PROGRESS NOTES
Chart reviewed. The patient is currently receiving [facility] services.  Plan: will monitor for discharge from .

## 2018-04-17 ENCOUNTER — HOME CARE VISIT (OUTPATIENT)
Dept: HOME HEALTH SERVICES | Facility: HOME HEALTHCARE | Age: 38
End: 2018-04-17
Payer: MEDICARE

## 2018-04-17 VITALS
HEART RATE: 88 BPM | OXYGEN SATURATION: 99 % | TEMPERATURE: 98.6 F | OXYGEN SATURATION: 98 % | HEART RATE: 80 BPM | TEMPERATURE: 98.1 F | DIASTOLIC BLOOD PRESSURE: 64 MMHG | DIASTOLIC BLOOD PRESSURE: 68 MMHG | RESPIRATION RATE: 18 BRPM | RESPIRATION RATE: 16 BRPM | SYSTOLIC BLOOD PRESSURE: 120 MMHG | SYSTOLIC BLOOD PRESSURE: 108 MMHG

## 2018-04-17 VITALS
OXYGEN SATURATION: 98 % | SYSTOLIC BLOOD PRESSURE: 122 MMHG | RESPIRATION RATE: 18 BRPM | HEART RATE: 81 BPM | TEMPERATURE: 98.8 F | DIASTOLIC BLOOD PRESSURE: 80 MMHG

## 2018-04-17 PROCEDURE — G0156 HHCP-SVS OF AIDE,EA 15 MIN: HCPCS

## 2018-04-17 ASSESSMENT — ENCOUNTER SYMPTOMS
POOR JUDGMENT: 1
RESPIRATORY SYMPTOMS COMMENTS: DENIES SOB RESP EVEN AND UNLABORED
DEPRESSED MOOD: 1
NAUSEA: DENIES

## 2018-04-17 NOTE — TELEPHONE ENCOUNTER
Baclofen 20mg 2 tabs 3 times a day. #180 refills 5    Called spoke with Jose to pharmacy [facility] [phone]  Called let pt know.

## 2018-04-19 ENCOUNTER — HOME CARE VISIT (OUTPATIENT)
Dept: HOME HEALTH SERVICES | Facility: HOME HEALTHCARE | Age: 38
End: 2018-04-19
Payer: MEDICARE

## 2018-04-19 PROCEDURE — G0299 HHS/HOSPICE OF RN EA 15 MIN: HCPCS

## 2018-04-20 ENCOUNTER — HOSPITAL ENCOUNTER (OUTPATIENT)
Dept: LAB | Facility: MEDICAL CENTER | Age: 38
End: 2018-04-20
Attending: PHYSICIAN ASSISTANT
Payer: MEDICARE

## 2018-04-20 ENCOUNTER — HOME CARE VISIT (OUTPATIENT)
Dept: HOME HEALTH SERVICES | Facility: HOME HEALTHCARE | Age: 38
End: 2018-04-20
Payer: MEDICARE

## 2018-04-20 PROCEDURE — 87086 URINE CULTURE/COLONY COUNT: CPT

## 2018-04-20 PROCEDURE — G0156 HHCP-SVS OF AIDE,EA 15 MIN: HCPCS

## 2018-04-21 LAB
AMBIGUOUS DTTM AMBI4: NORMAL
SIGNIFICANT IND 70042: NORMAL
SITE SITE: NORMAL
SOURCE SOURCE: NORMAL

## 2018-04-22 VITALS
TEMPERATURE: 97.5 F | OXYGEN SATURATION: 98 % | HEART RATE: 101 BPM | DIASTOLIC BLOOD PRESSURE: 64 MMHG | SYSTOLIC BLOOD PRESSURE: 124 MMHG | RESPIRATION RATE: 18 BRPM

## 2018-04-23 ENCOUNTER — PATIENT OUTREACH (OUTPATIENT)
Dept: HEALTH INFORMATION MANAGEMENT | Facility: OTHER | Age: 38
End: 2018-04-23

## 2018-04-23 ENCOUNTER — HOME CARE VISIT (OUTPATIENT)
Dept: HOME HEALTH SERVICES | Facility: HOME HEALTHCARE | Age: 38
End: 2018-04-23
Payer: MEDICARE

## 2018-04-23 VITALS
RESPIRATION RATE: 18 BRPM | DIASTOLIC BLOOD PRESSURE: 74 MMHG | TEMPERATURE: 99.6 F | SYSTOLIC BLOOD PRESSURE: 124 MMHG | OXYGEN SATURATION: 98 % | HEART RATE: 112 BPM

## 2018-04-23 VITALS
DIASTOLIC BLOOD PRESSURE: 76 MMHG | SYSTOLIC BLOOD PRESSURE: 122 MMHG | OXYGEN SATURATION: 98 % | TEMPERATURE: 98.5 F | HEART RATE: 80 BPM

## 2018-04-23 LAB
BACTERIA UR CULT: NORMAL
SIGNIFICANT IND 70042: NORMAL
SITE SITE: NORMAL
SOURCE SOURCE: NORMAL

## 2018-04-23 PROCEDURE — G0156 HHCP-SVS OF AIDE,EA 15 MIN: HCPCS

## 2018-04-23 PROCEDURE — G0299 HHS/HOSPICE OF RN EA 15 MIN: HCPCS

## 2018-04-23 ASSESSMENT — ENCOUNTER SYMPTOMS
DEPRESSED MOOD: 1
NAUSEA: DENIES
RESPIRATORY SYMPTOMS COMMENTS: DENIES SOB, RESP EVEN AND UNLABORED
DEPRESSED MOOD: 1
MENTAL STATUS CHANGE: 0
NAUSEA: DENIES
POOR JUDGMENT: 1
RESPIRATORY SYMPTOMS COMMENTS: DENIES SOB, RESP EVEN AND UNLABORED

## 2018-04-23 NOTE — PROGRESS NOTES
Patient  Jerzy Juan was discharged on 03/14/2018. [facility] Patient Advocate assisted with multiple discharge orders including confirming  1- Primary Care Physician follow up.  The patient did not kept Primary Care Physician appointment. [facility] also assisted with discharge orders for Home Health , which patient started utilizing services on 03/06/2018. The patient has 2 future   appointment scheduled 1- Neurology  visit and 1- infusion therapy  . [facility] patient advocate was in contact with the patient's caregiver for patient updates. PPS 40%.

## 2018-04-25 ENCOUNTER — PATIENT OUTREACH (OUTPATIENT)
Dept: HEALTH INFORMATION MANAGEMENT | Facility: OTHER | Age: 38
End: 2018-04-25

## 2018-04-26 ENCOUNTER — HOME CARE VISIT (OUTPATIENT)
Dept: HOME HEALTH SERVICES | Facility: HOME HEALTHCARE | Age: 38
End: 2018-04-26
Payer: MEDICARE

## 2018-04-26 ENCOUNTER — APPOINTMENT (OUTPATIENT)
Dept: ADMISSIONS | Facility: MEDICAL CENTER | Age: 38
End: 2018-04-26
Attending: UROLOGY
Payer: MEDICARE

## 2018-04-26 VITALS
OXYGEN SATURATION: 98 % | SYSTOLIC BLOOD PRESSURE: 122 MMHG | DIASTOLIC BLOOD PRESSURE: 76 MMHG | RESPIRATION RATE: 18 BRPM | TEMPERATURE: 98.5 F | HEART RATE: 80 BPM

## 2018-04-26 PROCEDURE — G0299 HHS/HOSPICE OF RN EA 15 MIN: HCPCS

## 2018-04-26 PROCEDURE — G0156 HHCP-SVS OF AIDE,EA 15 MIN: HCPCS

## 2018-04-26 RX ORDER — NITROFURANTOIN MACROCRYSTALS 100 MG/1
100 CAPSULE ORAL 2 TIMES DAILY
Status: ON HOLD | COMMUNITY
End: 2018-05-07

## 2018-04-26 ASSESSMENT — ENCOUNTER SYMPTOMS
DEPRESSED MOOD: 1
NAUSEA: DENIES
POOR JUDGMENT: 1
RESPIRATORY SYMPTOMS COMMENTS: DENIES SOB, RESP EVEN AND UNLABORED
DIFFICULTY THINKING: 1

## 2018-04-26 NOTE — OR NURSING
Spoke to Balbina/Dr Sapp's office regarding preop orders for procedure scheduled 5.7.18; unable to obtain UA/culture due to pt has catheter;last urine culture done 4.20.18; per Balbina; will have Dr Sapp advise.

## 2018-04-29 VITALS
RESPIRATION RATE: 18 BRPM | SYSTOLIC BLOOD PRESSURE: 122 MMHG | OXYGEN SATURATION: 16 % | HEART RATE: 84 BPM | TEMPERATURE: 98.9 F | DIASTOLIC BLOOD PRESSURE: 82 MMHG

## 2018-04-30 ENCOUNTER — HOME CARE VISIT (OUTPATIENT)
Dept: HOME HEALTH SERVICES | Facility: HOME HEALTHCARE | Age: 38
End: 2018-04-30
Payer: MEDICARE

## 2018-04-30 VITALS
SYSTOLIC BLOOD PRESSURE: 122 MMHG | TEMPERATURE: 98.9 F | OXYGEN SATURATION: 98 % | HEART RATE: 84 BPM | RESPIRATION RATE: 16 BRPM | DIASTOLIC BLOOD PRESSURE: 82 MMHG

## 2018-04-30 PROCEDURE — G0299 HHS/HOSPICE OF RN EA 15 MIN: HCPCS

## 2018-04-30 ASSESSMENT — ENCOUNTER SYMPTOMS
DEPRESSED MOOD: 1
NAUSEA: DENIES
RESPIRATORY SYMPTOMS COMMENTS: DENIES SOB, RESP EVEN AND UNLABORED
DIFFICULTY THINKING: 1
POOR JUDGMENT: 1

## 2018-05-01 ENCOUNTER — HOME CARE VISIT (OUTPATIENT)
Dept: HOME HEALTH SERVICES | Facility: HOME HEALTHCARE | Age: 38
End: 2018-05-01
Payer: MEDICARE

## 2018-05-01 VITALS
DIASTOLIC BLOOD PRESSURE: 80 MMHG | SYSTOLIC BLOOD PRESSURE: 120 MMHG | OXYGEN SATURATION: 97 % | RESPIRATION RATE: 17 BRPM | HEART RATE: 74 BPM

## 2018-05-01 PROCEDURE — G0156 HHCP-SVS OF AIDE,EA 15 MIN: HCPCS

## 2018-05-02 ENCOUNTER — PATIENT OUTREACH (OUTPATIENT)
Dept: HEALTH INFORMATION MANAGEMENT | Facility: OTHER | Age: 38
End: 2018-05-02

## 2018-05-02 NOTE — PROGRESS NOTES
Chart reviewed. The patient is currently receiving [facility] services.  Plan: will monitor for discharge from .

## 2018-05-03 ENCOUNTER — HOME CARE VISIT (OUTPATIENT)
Dept: HOME HEALTH SERVICES | Facility: HOME HEALTHCARE | Age: 38
End: 2018-05-03
Payer: MEDICARE

## 2018-05-03 VITALS
DIASTOLIC BLOOD PRESSURE: 74 MMHG | SYSTOLIC BLOOD PRESSURE: 114 MMHG | TEMPERATURE: 99.5 F | RESPIRATION RATE: 16 BRPM | OXYGEN SATURATION: 98 % | HEART RATE: 80 BPM

## 2018-05-03 VITALS
DIASTOLIC BLOOD PRESSURE: 64 MMHG | OXYGEN SATURATION: 98 % | TEMPERATURE: 99.3 F | SYSTOLIC BLOOD PRESSURE: 115 MMHG | HEART RATE: 77 BPM | RESPIRATION RATE: 16 BRPM

## 2018-05-03 PROCEDURE — G0156 HHCP-SVS OF AIDE,EA 15 MIN: HCPCS

## 2018-05-03 PROCEDURE — G0299 HHS/HOSPICE OF RN EA 15 MIN: HCPCS

## 2018-05-03 ASSESSMENT — ENCOUNTER SYMPTOMS
NAUSEA: DENIES
RESPIRATORY SYMPTOMS COMMENTS: REG
DEPRESSED MOOD: 1

## 2018-05-07 ENCOUNTER — HOME CARE VISIT (OUTPATIENT)
Dept: HOME HEALTH SERVICES | Facility: HOME HEALTHCARE | Age: 38
End: 2018-05-07
Payer: MEDICARE

## 2018-05-07 ENCOUNTER — HOSPITAL ENCOUNTER (OUTPATIENT)
Facility: MEDICAL CENTER | Age: 38
End: 2018-05-07
Attending: UROLOGY | Admitting: UROLOGY
Payer: MEDICARE

## 2018-05-07 ENCOUNTER — APPOINTMENT (OUTPATIENT)
Dept: RADIOLOGY | Facility: MEDICAL CENTER | Age: 38
End: 2018-05-07
Attending: UROLOGY
Payer: MEDICARE

## 2018-05-07 VITALS
SYSTOLIC BLOOD PRESSURE: 115 MMHG | OXYGEN SATURATION: 98 % | RESPIRATION RATE: 16 BRPM | DIASTOLIC BLOOD PRESSURE: 64 MMHG | TEMPERATURE: 99.3 F | HEART RATE: 77 BPM

## 2018-05-07 VITALS
DIASTOLIC BLOOD PRESSURE: 71 MMHG | WEIGHT: 167.99 LBS | HEIGHT: 71 IN | TEMPERATURE: 98 F | OXYGEN SATURATION: 94 % | HEART RATE: 86 BPM | RESPIRATION RATE: 16 BRPM | BODY MASS INDEX: 23.52 KG/M2 | SYSTOLIC BLOOD PRESSURE: 101 MMHG

## 2018-05-07 PROCEDURE — 500880 HCHG PACK, CYSTO W/SEP LEGGINGS: Performed by: UROLOGY

## 2018-05-07 PROCEDURE — 160046 HCHG PACU - 1ST 60 MINS PHASE II: Performed by: UROLOGY

## 2018-05-07 PROCEDURE — 501329 HCHG SET, CYSTO IRRIG Y TUR: Performed by: UROLOGY

## 2018-05-07 PROCEDURE — 700111 HCHG RX REV CODE 636 W/ 250 OVERRIDE (IP)

## 2018-05-07 PROCEDURE — 82365 CALCULUS SPECTROSCOPY: CPT

## 2018-05-07 PROCEDURE — A9270 NON-COVERED ITEM OR SERVICE: HCPCS

## 2018-05-07 PROCEDURE — 160025 RECOVERY II MINUTES (STATS): Performed by: UROLOGY

## 2018-05-07 PROCEDURE — 160035 HCHG PACU - 1ST 60 MINS PHASE I: Performed by: UROLOGY

## 2018-05-07 PROCEDURE — 88300 SURGICAL PATH GROSS: CPT

## 2018-05-07 PROCEDURE — 160009 HCHG ANES TIME/MIN: Performed by: UROLOGY

## 2018-05-07 PROCEDURE — 700102 HCHG RX REV CODE 250 W/ 637 OVERRIDE(OP)

## 2018-05-07 PROCEDURE — 160048 HCHG OR STATISTICAL LEVEL 1-5: Performed by: UROLOGY

## 2018-05-07 PROCEDURE — 502240 HCHG MISC OR SUPPLY RC 0272: Performed by: UROLOGY

## 2018-05-07 PROCEDURE — 700101 HCHG RX REV CODE 250

## 2018-05-07 PROCEDURE — 160002 HCHG RECOVERY MINUTES (STAT): Performed by: UROLOGY

## 2018-05-07 PROCEDURE — 160029 HCHG SURGERY MINUTES - 1ST 30 MINS LEVEL 4: Performed by: UROLOGY

## 2018-05-07 PROCEDURE — 160041 HCHG SURGERY MINUTES - EA ADDL 1 MIN LEVEL 4: Performed by: UROLOGY

## 2018-05-07 RX ORDER — OXYCODONE HCL 20 MG/1
TABLET, FILM COATED, EXTENDED RELEASE ORAL
Status: COMPLETED
Start: 2018-05-07 | End: 2018-05-07

## 2018-05-07 RX ORDER — DIAZEPAM 5 MG/1
TABLET ORAL
Status: COMPLETED
Start: 2018-05-07 | End: 2018-05-07

## 2018-05-07 RX ORDER — SODIUM CHLORIDE, SODIUM LACTATE, POTASSIUM CHLORIDE, CALCIUM CHLORIDE 600; 310; 30; 20 MG/100ML; MG/100ML; MG/100ML; MG/100ML
INJECTION, SOLUTION INTRAVENOUS CONTINUOUS
Status: DISCONTINUED | OUTPATIENT
Start: 2018-05-07 | End: 2018-05-07 | Stop reason: HOSPADM

## 2018-05-07 RX ORDER — HYDROCODONE BITARTRATE AND ACETAMINOPHEN 5; 325 MG/1; MG/1
1 TABLET ORAL EVERY 4 HOURS PRN
Status: DISCONTINUED | OUTPATIENT
Start: 2018-05-07 | End: 2018-05-07 | Stop reason: HOSPADM

## 2018-05-07 RX ORDER — CELECOXIB 200 MG/1
CAPSULE ORAL
Status: COMPLETED
Start: 2018-05-07 | End: 2018-05-07

## 2018-05-07 RX ORDER — MAGNESIUM HYDROXIDE 1200 MG/15ML
LIQUID ORAL
Status: DISCONTINUED | OUTPATIENT
Start: 2018-05-07 | End: 2018-05-07 | Stop reason: HOSPADM

## 2018-05-07 RX ORDER — PHENAZOPYRIDINE HYDROCHLORIDE 100 MG/1
200 TABLET, FILM COATED ORAL
Status: DISCONTINUED | OUTPATIENT
Start: 2018-05-07 | End: 2018-05-07 | Stop reason: HOSPADM

## 2018-05-07 RX ORDER — OXYCODONE HYDROCHLORIDE AND ACETAMINOPHEN 5; 325 MG/1; MG/1
TABLET ORAL
Status: COMPLETED
Start: 2018-05-07 | End: 2018-05-07

## 2018-05-07 RX ORDER — LIDOCAINE HYDROCHLORIDE 10 MG/ML
0.5 INJECTION, SOLUTION INFILTRATION; PERINEURAL
Status: DISCONTINUED | OUTPATIENT
Start: 2018-05-07 | End: 2018-05-07 | Stop reason: HOSPADM

## 2018-05-07 RX ORDER — ACETAMINOPHEN 500 MG
TABLET ORAL
Status: COMPLETED
Start: 2018-05-07 | End: 2018-05-07

## 2018-05-07 RX ORDER — NITROFURANTOIN MACROCRYSTALS 100 MG/1
100 CAPSULE ORAL 2 TIMES DAILY
Qty: 10 CAP | Refills: 0 | Status: SHIPPED | OUTPATIENT
Start: 2018-05-07 | End: 2018-05-12

## 2018-05-07 RX ORDER — DIAZEPAM 5 MG/1
10 TABLET ORAL ONCE
Status: COMPLETED | OUTPATIENT
Start: 2018-05-07 | End: 2018-05-07

## 2018-05-07 RX ORDER — ATROPA BELLADONNA AND OPIUM 16.2; 6 MG/1; MG/1
60 SUPPOSITORY RECTAL
Status: DISCONTINUED | OUTPATIENT
Start: 2018-05-07 | End: 2018-05-07 | Stop reason: HOSPADM

## 2018-05-07 RX ADMIN — SODIUM CHLORIDE, SODIUM LACTATE, POTASSIUM CHLORIDE, CALCIUM CHLORIDE: 600; 310; 30; 20 INJECTION, SOLUTION INTRAVENOUS at 12:50

## 2018-05-07 RX ADMIN — FENTANYL CITRATE 50 MCG: 50 INJECTION, SOLUTION INTRAMUSCULAR; INTRAVENOUS at 15:15

## 2018-05-07 RX ADMIN — DIAZEPAM 10 MG: 5 TABLET ORAL at 14:05

## 2018-05-07 RX ADMIN — OXYCODONE AND ACETAMINOPHEN 2 TABLET: 5; 325 TABLET ORAL at 15:15

## 2018-05-07 ASSESSMENT — PAIN SCALES - GENERAL
PAINLEVEL_OUTOF10: 5
PAINLEVEL_OUTOF10: 7
PAINLEVEL_OUTOF10: 2

## 2018-05-07 NOTE — OP REPORT
DATE OF SERVICE:  05/07/2018    PREOPERATIVE DIAGNOSES:  Bladder stones and history of urinary tract infection   and neurogenic bladder.    POSTOPERATIVE DIAGNOSES:  Bladder stones and history of urinary tract   infection and neurogenic bladder.    PROCEDURE PERFORMED:  Cystolitholapaxy for a large stone burden greater than 5   cm.    INDICATIONS FOR PROCEDURE:  The patient is a 38-year-old male with severe MS   and quadriplegia and neurogenic bladder, who does self catheterization, but   has had difficulty with self-catheterization recently and hematuria and   urinary tract infection and found on recent imaging to have many stones in his   bladder.  After discussing treatment options, he has elected for   cystolitholapaxy.    DESCRIPTION OF PROCEDURE:  After obtaining informed consent, the patient was   brought to the operating room.  After the induction of general anesthesia, the   patient was placed in dorsal lithotomy position and his genitalia were   prepped and draped in sterile fashion.  He received ceftriaxone as antibiotic   prophylaxis prior to starting the procedure, after which, a 25-Iranian   cystoscope was passed per urethra into the bladder under direct vision.  The   bladder was surveyed in its entirety, was moderately erythematous.  No other   abnormalities other than approximately 8-10 stones in the base of the bladder,   each about 1 cm in size, 1000 micron holmium laser fiber was used to fragment   the stone into small enough fragments to irrigate out of his bladder.  They   were all sent off for specimen, after which the bladder was surveyed again in   its entirety and was normal in appearance with normal ureteral orifices   effluxing clear urine.  The scope was then used to empty the bladder and then   the scope was removed.  The patient was then awoken from anesthesia and was   taken to recovery room in stable condition.    COMPLICATIONS:  None.    ESTIMATED BLOOD LOSS:  5 mL.    SPECIMENS:   Bladder stones.    DRAINS:  None.       ____________________________________     MD RANDY Smith / BIMAL    DD:  05/07/2018 15:13:43  DT:  05/07/2018 15:20:34    D#:  [id number]  Job#:  [id number]

## 2018-05-07 NOTE — DISCHARGE INSTRUCTIONS
ACTIVITY: Rest and take it easy for the first 24 hours.  A responsible adult is recommended to remain with you during that time.  It is normal to feel sleepy.  We encourage you to not do anything that requires balance, judgment or coordination.    MILD FLU-LIKE SYMPTOMS ARE NORMAL. YOU MAY EXPERIENCE GENERALIZED MUSCLE ACHES, THROAT IRRITATION, HEADACHE AND/OR SOME NAUSEA.    FOR 24 HOURS DO NOT:  Drive, operate machinery or run household appliances.  Drink beer or alcoholic beverages.   Make important decisions or sign legal documents.    SPECIAL INSTRUCTIONS:     Cystoscopy, Care After  Refer to this sheet in the next few weeks. These instructions provide you with information on caring for yourself after your procedure. Your caregiver may also give you more specific instructions. Your treatment has been planned according to current medical practices, but problems sometimes occur. Call your caregiver if you have any problems or questions after your procedure.  HOME CARE INSTRUCTIONS   Things you can do to ease any discomfort after your procedure include:  · Drinking enough water and fluids to keep your urine clear or pale yellow.  · Taking a warm bath to relieve any burning feelings.  SEEK IMMEDIATE MEDICAL CARE IF:   · You have an increase in blood in your urine.  · You notice blood clots in your urine.  · You have difficulty passing urine.  · You have the chills.  · You have abdominal pain.  · You have a fever or persistent symptoms for more than 2-3 days.  · You have a fever and your symptoms suddenly get worse.  MAKE SURE YOU:   · Understand these instructions.  · Will watch your condition.  · Will get help right away if you are not doing well or get worse.       DIET: To avoid nausea, slowly advance diet as tolerated, avoiding spicy or greasy foods for the first day.  Add more substantial food to your diet according to your physician's instructions.  Babies can be fed formula or breast milk as soon as they  are hungry.  INCREASE FLUIDS AND FIBER TO AVOID CONSTIPATION.    SURGICAL DRESSING/BATHING: N/A    FOLLOW-UP APPOINTMENT:  A follow-up appointment should be arranged with your doctor in 1-2 weeks; call to schedule.    You should CALL YOUR PHYSICIAN if you develop:  Fever greater than 101 degrees F.  Pain not relieved by medication, or persistent nausea or vomiting.  Excessive bleeding (blood soaking through dressing) or unexpected drainage from the wound.  Extreme redness or swelling around the incision site, drainage of pus or foul smelling drainage.  Inability to urinate or empty your bladder within 8 hours.  Problems with breathing or chest pain.    You should call 911 if you develop problems with breathing or chest pain.  If you are unable to contact your doctor or surgical center, you should go to the nearest emergency room or urgent care center.  Physician's telephone #: [phone]    If any questions arise, call your doctor.  If your doctor is not available, please feel free to call the Surgical Center at [phone].  The Center is open Monday through Friday from 7AM to 7PM.  You can also call the [employer] HOTLINE open 24 hours/day, 7 days/week and speak to a nurse at [phone], or toll free at [phone].    A registered nurse may call you a few days after your surgery to see how you are doing after your procedure.    MEDICATIONS: Resume taking daily medication.  Take prescribed pain medication with food.  If no medication is prescribed, you may take non-aspirin pain medication if needed.  PAIN MEDICATION CAN BE VERY CONSTIPATING.  Take a stool softener or laxative such as senokot, pericolace, or milk of magnesia if needed.    Prescription given for Macrodantin.  Last pain medication given at 3:15pm.    If your physician has prescribed pain medication that includes Acetaminophen (Tylenol), do not take additional Acetaminophen (Tylenol) while taking the prescribed medication.    Depression /  Suicide Risk    As you are discharged from this [facility] facility, it is important to learn how to keep safe from harming yourself.    Recognize the warning signs:  · Abrupt changes in personality, positive or negative- including increase in energy   · Giving away possessions  · Change in eating patterns- significant weight changes-  positive or negative  · Change in sleeping patterns- unable to sleep or sleeping all the time   · Unwillingness or inability to communicate  · Depression  · Unusual sadness, discouragement and loneliness  · Talk of wanting to die  · Neglect of personal appearance   · Rebelliousness- reckless behavior  · Withdrawal from people/activities they love  · Confusion- inability to concentrate     If you or a loved one observes any of these behaviors or has concerns about self-harm, here's what you can do:  · Talk about it- your feelings and reasons for harming yourself  · Remove any means that you might use to hurt yourself (examples: pills, rope, extension cords, firearm)  · Get professional help from the community (Mental Health, Substance Abuse, psychological counseling)  · Do not be alone:Call your Safe Contact- someone whom you trust who will be there for you.  · Call your local CRISIS HOTLINE 204-0641 or 226-091-5022  · Call your local Children's Mobile Crisis Response Team [place] (341) 590-5000 or www.[employer]  · Call the toll free National Suicide Prevention Hotlines   · National Suicide Prevention Lifeline 656-360-POLL (2456)  · National Hope Line Network 800-SUICIDE (392-2410)

## 2018-05-07 NOTE — OR SURGEON
Immediate Post OP Note    PreOp Diagnosis: bladder stones, h/o UTI    PostOp Diagnosis: same    Procedure(s):  CYSTOSCOPY - Wound Class: Clean Contaminated  LASERTRIPSY - FOR: LITHOLAPAXY - Wound Class: Clean Contaminated    Surgeon(s):  Kwasi Sapp M.D.    Anesthesiologist/Type of Anesthesia:  Anesthesiologist: Kush Monroe M.D./General    Surgical Staff:  Circulator: Joya Olivia R.N.  Scrub Person: Chery Mabus    Specimens removed if any:  Bladder stones    Estimated Blood Loss: 5ml    Findings: 8-10 bladder stones each ~1cm    Complications: none    Drains:  none    5/7/2018 3:07 PM Kwasi Sapp M.D.

## 2018-05-07 NOTE — OR NURSING
Dr am communicated that the pt straight caths himself and that he doesn't need to void prior to d/c

## 2018-05-08 ENCOUNTER — HOME CARE VISIT (OUTPATIENT)
Dept: HOME HEALTH SERVICES | Facility: HOME HEALTHCARE | Age: 38
End: 2018-05-08
Payer: MEDICARE

## 2018-05-08 VITALS
HEART RATE: 97 BPM | TEMPERATURE: 98.3 F | DIASTOLIC BLOOD PRESSURE: 70 MMHG | SYSTOLIC BLOOD PRESSURE: 115 MMHG | OXYGEN SATURATION: 91 % | RESPIRATION RATE: 18 BRPM

## 2018-05-08 PROCEDURE — G0156 HHCP-SVS OF AIDE,EA 15 MIN: HCPCS

## 2018-05-08 ASSESSMENT — ENCOUNTER SYMPTOMS
DIFFICULTY THINKING: 1
RESPIRATORY SYMPTOMS COMMENTS: DENIES SOB, RESP EVEN AND UNLABORED
DEPRESSED MOOD: 1
NAUSEA: DENIES
POOR JUDGMENT: 1

## 2018-05-09 ENCOUNTER — HOME CARE VISIT (OUTPATIENT)
Dept: HOME HEALTH SERVICES | Facility: HOME HEALTHCARE | Age: 38
End: 2018-05-09
Payer: MEDICARE

## 2018-05-09 ENCOUNTER — PATIENT OUTREACH (OUTPATIENT)
Dept: HEALTH INFORMATION MANAGEMENT | Facility: OTHER | Age: 38
End: 2018-05-09

## 2018-05-09 PROCEDURE — G0299 HHS/HOSPICE OF RN EA 15 MIN: HCPCS

## 2018-05-09 NOTE — PROGRESS NOTES
Chart reviewed. The patient is currently receiving [facility] services.  Plan: will monitor for discharge from .

## 2018-05-11 ENCOUNTER — HOME CARE VISIT (OUTPATIENT)
Dept: HOME HEALTH SERVICES | Facility: HOME HEALTHCARE | Age: 38
End: 2018-05-11
Payer: MEDICARE

## 2018-05-11 VITALS
RESPIRATION RATE: 18 BRPM | HEART RATE: 84 BPM | SYSTOLIC BLOOD PRESSURE: 122 MMHG | DIASTOLIC BLOOD PRESSURE: 80 MMHG | OXYGEN SATURATION: 98 % | TEMPERATURE: 99 F

## 2018-05-11 LAB
APPEARANCE STONE: NORMAL
COMPN STONE: NORMAL
NUMBER STONE: 4
SIZE STONE: NORMAL MM
SPECIMEN WT: 289 MG

## 2018-05-11 PROCEDURE — G0156 HHCP-SVS OF AIDE,EA 15 MIN: HCPCS

## 2018-05-14 ENCOUNTER — HOME CARE VISIT (OUTPATIENT)
Dept: HOME HEALTH SERVICES | Facility: HOME HEALTHCARE | Age: 38
End: 2018-05-14
Payer: MEDICARE

## 2018-05-14 VITALS
HEART RATE: 88 BPM | SYSTOLIC BLOOD PRESSURE: 114 MMHG | TEMPERATURE: 99.9 F | OXYGEN SATURATION: 100 % | RESPIRATION RATE: 16 BRPM | DIASTOLIC BLOOD PRESSURE: 78 MMHG

## 2018-05-14 VITALS
DIASTOLIC BLOOD PRESSURE: 64 MMHG | OXYGEN SATURATION: 98 % | SYSTOLIC BLOOD PRESSURE: 130 MMHG | TEMPERATURE: 99.3 F | RESPIRATION RATE: 17 BRPM | HEART RATE: 83 BPM

## 2018-05-14 PROCEDURE — G0156 HHCP-SVS OF AIDE,EA 15 MIN: HCPCS

## 2018-05-14 PROCEDURE — G0299 HHS/HOSPICE OF RN EA 15 MIN: HCPCS

## 2018-05-14 ASSESSMENT — ENCOUNTER SYMPTOMS
RESPIRATORY SYMPTOMS COMMENTS: DENIES SOB RESP EVEN AND UNLABORED
DIFFICULTY THINKING: 1
NAUSEA: DENIES
DEPRESSED MOOD: 1
POOR JUDGMENT: 1

## 2018-05-16 ENCOUNTER — PATIENT OUTREACH (OUTPATIENT)
Dept: HEALTH INFORMATION MANAGEMENT | Facility: OTHER | Age: 38
End: 2018-05-16

## 2018-05-17 ENCOUNTER — HOME CARE VISIT (OUTPATIENT)
Dept: HOME HEALTH SERVICES | Facility: HOME HEALTHCARE | Age: 38
End: 2018-05-17
Payer: MEDICARE

## 2018-05-17 ENCOUNTER — APPOINTMENT (OUTPATIENT)
Dept: RADIOLOGY | Facility: MEDICAL CENTER | Age: 38
End: 2018-05-17
Attending: EMERGENCY MEDICINE
Payer: MEDICARE

## 2018-05-17 ENCOUNTER — HOSPITAL ENCOUNTER (EMERGENCY)
Facility: MEDICAL CENTER | Age: 38
End: 2018-05-18
Attending: EMERGENCY MEDICINE
Payer: MEDICARE

## 2018-05-17 VITALS
RESPIRATION RATE: 18 BRPM | DIASTOLIC BLOOD PRESSURE: 64 MMHG | OXYGEN SATURATION: 98 % | SYSTOLIC BLOOD PRESSURE: 102 MMHG | TEMPERATURE: 99.5 F | HEART RATE: 80 BPM

## 2018-05-17 DIAGNOSIS — K59.00 CONSTIPATION, UNSPECIFIED CONSTIPATION TYPE: ICD-10-CM

## 2018-05-17 DIAGNOSIS — R10.84 GENERALIZED ABDOMINAL PAIN: ICD-10-CM

## 2018-05-17 LAB
ALBUMIN SERPL BCP-MCNC: 5 G/DL (ref 3.2–4.9)
ALBUMIN/GLOB SERPL: 2.2 G/DL
ALP SERPL-CCNC: 63 U/L (ref 30–99)
ALT SERPL-CCNC: 20 U/L (ref 2–50)
ANION GAP SERPL CALC-SCNC: 9 MMOL/L (ref 0–11.9)
APPEARANCE UR: CLEAR
AST SERPL-CCNC: 32 U/L (ref 12–45)
BACTERIA #/AREA URNS HPF: NEGATIVE /HPF
BASOPHILS # BLD AUTO: 0.9 % (ref 0–1.8)
BASOPHILS # BLD: 0.08 K/UL (ref 0–0.12)
BILIRUB SERPL-MCNC: 0.6 MG/DL (ref 0.1–1.5)
BILIRUB UR QL STRIP.AUTO: NEGATIVE
BUN SERPL-MCNC: 11 MG/DL (ref 8–22)
CALCIUM SERPL-MCNC: 9.6 MG/DL (ref 8.5–10.5)
CHLORIDE SERPL-SCNC: 105 MMOL/L (ref 96–112)
CO2 SERPL-SCNC: 21 MMOL/L (ref 20–33)
COLOR UR: YELLOW
CREAT SERPL-MCNC: 0.75 MG/DL (ref 0.5–1.4)
EOSINOPHIL # BLD AUTO: 0.04 K/UL (ref 0–0.51)
EOSINOPHIL NFR BLD: 0.4 % (ref 0–6.9)
EPI CELLS #/AREA URNS HPF: ABNORMAL /HPF
ERYTHROCYTE [DISTWIDTH] IN BLOOD BY AUTOMATED COUNT: 38.2 FL (ref 35.9–50)
GLOBULIN SER CALC-MCNC: 2.3 G/DL (ref 1.9–3.5)
GLUCOSE SERPL-MCNC: 105 MG/DL (ref 65–99)
GLUCOSE UR STRIP.AUTO-MCNC: NEGATIVE MG/DL
HCT VFR BLD AUTO: 50.6 % (ref 42–52)
HGB BLD-MCNC: 17.8 G/DL (ref 14–18)
HYALINE CASTS #/AREA URNS LPF: ABNORMAL /LPF
IMM GRANULOCYTES # BLD AUTO: 0.04 K/UL (ref 0–0.11)
IMM GRANULOCYTES NFR BLD AUTO: 0.4 % (ref 0–0.9)
KETONES UR STRIP.AUTO-MCNC: 15 MG/DL
LACTATE BLD-SCNC: 1.4 MMOL/L (ref 0.5–2)
LEUKOCYTE ESTERASE UR QL STRIP.AUTO: ABNORMAL
LIPASE SERPL-CCNC: 35 U/L (ref 11–82)
LYMPHOCYTES # BLD AUTO: 1.68 K/UL (ref 1–4.8)
LYMPHOCYTES NFR BLD: 18.5 % (ref 22–41)
MCH RBC QN AUTO: 30.6 PG (ref 27–33)
MCHC RBC AUTO-ENTMCNC: 35.2 G/DL (ref 33.7–35.3)
MCV RBC AUTO: 86.9 FL (ref 81.4–97.8)
MICRO URNS: ABNORMAL
MONOCYTES # BLD AUTO: 0.59 K/UL (ref 0–0.85)
MONOCYTES NFR BLD AUTO: 6.5 % (ref 0–13.4)
NEUTROPHILS # BLD AUTO: 6.64 K/UL (ref 1.82–7.42)
NEUTROPHILS NFR BLD: 73.3 % (ref 44–72)
NITRITE UR QL STRIP.AUTO: NEGATIVE
NRBC # BLD AUTO: 0 K/UL
NRBC BLD-RTO: 0 /100 WBC
PH UR STRIP.AUTO: 7 [PH]
PLATELET # BLD AUTO: 415 K/UL (ref 164–446)
PMV BLD AUTO: 8.4 FL (ref 9–12.9)
POTASSIUM SERPL-SCNC: 5.2 MMOL/L (ref 3.6–5.5)
PROT SERPL-MCNC: 7.3 G/DL (ref 6–8.2)
PROT UR QL STRIP: NEGATIVE MG/DL
RBC # BLD AUTO: 5.82 M/UL (ref 4.7–6.1)
RBC # URNS HPF: ABNORMAL /HPF
RBC UR QL AUTO: NEGATIVE
SODIUM SERPL-SCNC: 135 MMOL/L (ref 135–145)
SP GR UR STRIP.AUTO: 1.02
UROBILINOGEN UR STRIP.AUTO-MCNC: 0.2 MG/DL
WBC # BLD AUTO: 9.1 K/UL (ref 4.8–10.8)
WBC #/AREA URNS HPF: ABNORMAL /HPF

## 2018-05-17 PROCEDURE — 80053 COMPREHEN METABOLIC PANEL: CPT

## 2018-05-17 PROCEDURE — 99285 EMERGENCY DEPT VISIT HI MDM: CPT

## 2018-05-17 PROCEDURE — G0156 HHCP-SVS OF AIDE,EA 15 MIN: HCPCS

## 2018-05-17 PROCEDURE — 83605 ASSAY OF LACTIC ACID: CPT

## 2018-05-17 PROCEDURE — G0162 HHC RN E&M PLAN SVS, 15 MIN: HCPCS

## 2018-05-17 PROCEDURE — 85025 COMPLETE CBC W/AUTO DIFF WBC: CPT

## 2018-05-17 PROCEDURE — 81001 URINALYSIS AUTO W/SCOPE: CPT

## 2018-05-17 PROCEDURE — 74022 RADEX COMPL AQT ABD SERIES: CPT

## 2018-05-17 PROCEDURE — 83690 ASSAY OF LIPASE: CPT

## 2018-05-17 ASSESSMENT — PAIN SCALES - GENERAL: PAINLEVEL_OUTOF10: 5

## 2018-05-17 ASSESSMENT — LIFESTYLE VARIABLES: DO YOU DRINK ALCOHOL: NO

## 2018-05-17 NOTE — ED TRIAGE NOTES
"Pt state here for \"bowel blockage again\". Pt states taking pills today. Smoking marijuana today. Pt slurring words in triage  "

## 2018-05-18 ENCOUNTER — APPOINTMENT (OUTPATIENT)
Dept: RADIOLOGY | Facility: MEDICAL CENTER | Age: 38
End: 2018-05-18
Attending: EMERGENCY MEDICINE
Payer: MEDICARE

## 2018-05-18 VITALS
TEMPERATURE: 99.8 F | HEART RATE: 88 BPM | SYSTOLIC BLOOD PRESSURE: 140 MMHG | RESPIRATION RATE: 16 BRPM | OXYGEN SATURATION: 98 % | SYSTOLIC BLOOD PRESSURE: 130 MMHG | DIASTOLIC BLOOD PRESSURE: 78 MMHG | HEART RATE: 74 BPM | DIASTOLIC BLOOD PRESSURE: 78 MMHG | TEMPERATURE: 99.3 F | OXYGEN SATURATION: 98 % | RESPIRATION RATE: 14 BRPM

## 2018-05-18 VITALS
RESPIRATION RATE: 16 BRPM | HEIGHT: 71 IN | HEART RATE: 79 BPM | TEMPERATURE: 97.8 F | DIASTOLIC BLOOD PRESSURE: 68 MMHG | SYSTOLIC BLOOD PRESSURE: 122 MMHG | WEIGHT: 165 LBS | BODY MASS INDEX: 23.1 KG/M2 | OXYGEN SATURATION: 98 %

## 2018-05-18 PROCEDURE — 74177 CT ABD & PELVIS W/CONTRAST: CPT

## 2018-05-18 SDOH — ECONOMIC STABILITY: HOUSING INSECURITY: UNSAFE COOKING RANGE AREA: 0

## 2018-05-18 SDOH — ECONOMIC STABILITY: HOUSING INSECURITY: HOME SAFETY: PT NOT ON SUPP O2

## 2018-05-18 SDOH — ECONOMIC STABILITY: HOUSING INSECURITY: UNSAFE APPLIANCES: 0

## 2018-05-18 ASSESSMENT — ENCOUNTER SYMPTOMS
POOR JUDGMENT: 1
NAUSEA: DENIES
RESPIRATORY SYMPTOMS COMMENTS: DENIES SOB, RESP EVEN AND UNLABORED
DIFFICULTY THINKING: 1
POOR JUDGMENT: 1
DIFFICULTY THINKING: 1
DEPRESSED MOOD: 1
DEPRESSED MOOD: 1

## 2018-05-18 ASSESSMENT — ACTIVITIES OF DAILY LIVING (ADL)
OASIS_M1830: 06
HOME_HEALTH_OASIS: 01

## 2018-05-18 NOTE — DISCHARGE INSTRUCTIONS
Abdominal Pain, Adult  Many things can cause belly (abdominal) pain. Most times, belly pain is not dangerous. Many cases of belly pain can be watched and treated at home. Sometimes belly pain is serious, though. Your doctor will try to find the cause of your belly pain.  Follow these instructions at home:  · Take over-the-counter and prescription medicines only as told by your doctor. Do not take medicines that help you poop (laxatives) unless told to by your doctor.  · Drink enough fluid to keep your pee (urine) clear or pale yellow.  · Watch your belly pain for any changes.  · Keep all follow-up visits as told by your doctor. This is important.  Contact a doctor if:  · Your belly pain changes or gets worse.  · You are not hungry, or you lose weight without trying.  · You are having trouble pooping (constipated) or have watery poop (diarrhea) for more than 2-3 days.  · You have pain when you pee or poop.  · Your belly pain wakes you up at night.  · Your pain gets worse with meals, after eating, or with certain foods.  · You are throwing up and cannot keep anything down.  · You have a fever.  Get help right away if:  · Your pain does not go away as soon as your doctor says it should.  · You cannot stop throwing up.  · Your pain is only in areas of your belly, such as the right side or the left lower part of the belly.  · You have bloody or black poop, or poop that looks like tar.  · You have very bad pain, cramping, or bloating in your belly.  · You have signs of not having enough fluid or water in your body (dehydration), such as:  ¨ Dark pee, very little pee, or no pee.  ¨ Cracked lips.  ¨ Dry mouth.  ¨ Sunken eyes.  ¨ Sleepiness.  ¨ Weakness.  This information is not intended to replace advice given to you by your health care provider. Make sure you discuss any questions you have with your health care provider.  Document Released: 06/05/2009 Document Revised: 07/07/2017 Document Reviewed: 05/31/2017  [employer]  Interactive Patient Education © 2017 [employer]      Constipation, Adult  Constipation is when a person has fewer bowel movements in a week than normal, has difficulty having a bowel movement, or has stools that are dry, hard, or larger than normal. Constipation may be caused by an underlying condition. It may become worse with age if a person takes certain medicines and does not take in enough fluids.  Follow these instructions at home:  Eating and drinking  · Eat foods that have a lot of fiber, such as fresh fruits and vegetables, whole grains, and beans.  · Limit foods that are high in fat, low in fiber, or overly processed, such as french fries, hamburgers, cookies, candies, and soda.  · Drink enough fluid to keep your urine clear or pale yellow.  General instructions  · Exercise regularly or as told by your health care provider.  · Go to the restroom when you have the urge to go. Do not hold it in.  · Take over-the-counter and prescription medicines only as told by your health care provider. These include any fiber supplements.  · Practice pelvic floor retraining exercises, such as deep breathing while relaxing the lower abdomen and pelvic floor relaxation during bowel movements.  · Watch your condition for any changes.  · Keep all follow-up visits as told by your health care provider. This is important.  Contact a health care provider if:  · You have pain that gets worse.  · You have a fever.  · You do not have a bowel movement after 4 days.  · You vomit.  · You are not hungry.  · You lose weight.  · You are bleeding from the anus.  · You have thin, pencil-like stools.  Get help right away if:  · You have a fever and your symptoms suddenly get worse.  · You leak stool or have blood in your stool.  · Your abdomen is bloated.  · You have severe pain in your abdomen.  · You feel dizzy or you faint.  This information is not intended to replace advice given to you by your health care provider. Make sure you  discuss any questions you have with your health care provider.  Document Released: 09/15/2005 Document Revised: 07/07/2017 Document Reviewed: 06/07/2017  Elsevier Interactive Patient Education © 2017 Elsevier Inc.

## 2018-05-18 NOTE — ED NOTES
Patient asked me about wait times.  Explained number of people in front of him and number of people in line.  Apologized for the delay and assured him that we were doing our very best.  Also told him to keep hoping

## 2018-05-18 NOTE — DISCHARGE PLANNING
SW asked to arrange for transportation to get Pt back to his home. Pt has MS , is non ambulatory-Needs [facility].    MTM called spoke to Susana  PCS completed and faxed to [facility]. Spoke to Will, transport arranged for [phone].  RN updated on transport time.

## 2018-05-18 NOTE — ED PROVIDER NOTES
CHIEF COMPLAINT  Chief Complaint   Patient presents with   • Bowel Obstruction     per pt       HPI  Jerzy Juan III is a 38 y.o. male who presents for evaluation of generalized abdominal pain started sometime yesterday. Patient notes she is the same pain that he has when he needs had bowel obstructions in the past. He denies any nausea or vomiting and has had no fevers or chills. He has had no diarrhea or blood in the stool. Patient self caths and has not noticed any blood in his urine.    REVIEW OF SYSTEMS  Constitutional: No fevers or chills  Skin: No rashes  HEENT: No sore throat, runny nose, sores, trouble swallowing, trouble speaking.  Neck: No neck pain, stiffness, or masses.  Chest: No pain or rashes  Pulm: No shortness of breath, cough, wheezing, stridor, or pain with inspiration/expiration  Gastrointestinal: No nausea, vomiting, diarrhea,melena, hematochezia or pain.  Genitourinary: No pain, urgency, frequency, dysuria, hematuria, or polyuria.   Musculoskeletal: No recent trauma, pain, swelling, weakness  Neurologic: No new sensory or motor changes.   Heme: No bleeding or bruising problems.   Immuno: No hx of recurrent infections      PAST MEDICAL HISTORY   has a past medical history of Anxiety; Back pain; Blood transfusion; Bowel habit changes; Breath shortness; Dental disorder; Depression; Fall; Headache(784.0); Heart burn; Heart murmur; Hypertension; Indigestion; MS (multiple sclerosis) (HCC) (DX 8/20/2013); Pain; Urinary bladder disorder; and Urinary incontinence.    SOCIAL HISTORY  Social History     Social History Main Topics   • Smoking status: Former Smoker     Packs/day: 1.00     Years: 20.00     Start date: 8/1/1997     Quit date: 8/1/2013   • Smokeless tobacco: Never Used   • Alcohol use 0.0 oz/week      Comment: 2 per day   • Drug use: Yes     Types: Inhaled      Comment: medical marijuana/and oral   • Sexual activity: Not on file      Comment: once daily for 23 yrs       SURGICAL  "HISTORY   has a past surgical history that includes open reduction (Left, 1988); gastroscopy (1/14/2017); block epidural steroid injection (N/A, 3/27/2018); cystoscopy (5/7/2018); and lasertripsy (5/7/2018).    CURRENT MEDICATIONS  Home Medications    **Home medications have not yet been reviewed for this encounter**         ALLERGIES  Allergies   Allergen Reactions   • Other Drug      CANNOT TAKE ANY STEROIDS WITH RECENT TYSABRI INFUSION   • Asa [Aspirin] Unspecified     Bleeding in stomach as a baby   • Gabapentin Anxiety     anger and severe mood swing   • Pregabalin Anxiety     Anger and severe mood swing       PHYSICAL EXAM  VITAL SIGNS: /68   Pulse 79   Temp 36.6 °C (97.8 °F)   Resp 16   Ht 1.803 m (5' 11\")   Wt 74.8 kg (165 lb)   SpO2 98%   BMI 23.01 kg/m²    Gen: Alert in no apparent distress.  HEENT: No signs of trauma, Bilateral external ears normal, Nose normal. Conjunctiva normal, Non-icteric.   Neck:  No tenderness, Supple, No masses  Lymphatic: No cervical lymphadenopathy noted.   Cardiovascular: Regular rate and rhythm, no murmurs.   Thorax & Lungs: Normal breath sounds, No respiratory distress, No wheezing bilateral chest rise  Abdomen: Bowel sounds normal, Soft, diffuse tenderness, No masses, No pulsatile masses. No Guarding or rebound  Skin: Warm, Dry, No erythema, No rash.   Extremities: Intact distal pulses, No edema  Neurologic: Alert   Psychiatric: Affect normal, Judgment normal, Mood normal.         LABS  Results for orders placed or performed during the hospital encounter of 05/17/18   CBC WITH DIFFERENTIAL   Result Value Ref Range    WBC 9.1 4.8 - 10.8 K/uL    RBC 5.82 4.70 - 6.10 M/uL    Hemoglobin 17.8 14.0 - 18.0 g/dL    Hematocrit 50.6 42.0 - 52.0 %    MCV 86.9 81.4 - 97.8 fL    MCH 30.6 27.0 - 33.0 pg    MCHC 35.2 33.7 - 35.3 g/dL    RDW 38.2 35.9 - 50.0 fL    Platelet Count 415 164 - 446 K/uL    MPV 8.4 (L) 9.0 - 12.9 fL    Neutrophils-Polys 73.30 (H) 44.00 - 72.00 %    " Lymphocytes 18.50 (L) 22.00 - 41.00 %    Monocytes 6.50 0.00 - 13.40 %    Eosinophils 0.40 0.00 - 6.90 %    Basophils 0.90 0.00 - 1.80 %    Immature Granulocytes 0.40 0.00 - 0.90 %    Nucleated RBC 0.00 /100 WBC    Neutrophils (Absolute) 6.64 1.82 - 7.42 K/uL    Lymphs (Absolute) 1.68 1.00 - 4.80 K/uL    Monos (Absolute) 0.59 0.00 - 0.85 K/uL    Eos (Absolute) 0.04 0.00 - 0.51 K/uL    Baso (Absolute) 0.08 0.00 - 0.12 K/uL    Immature Granulocytes (abs) 0.04 0.00 - 0.11 K/uL    NRBC (Absolute) 0.00 K/uL   COMP METABOLIC PANEL   Result Value Ref Range    Sodium 135 135 - 145 mmol/L    Potassium 5.2 3.6 - 5.5 mmol/L    Chloride 105 96 - 112 mmol/L    Co2 21 20 - 33 mmol/L    Anion Gap 9.0 0.0 - 11.9    Glucose 105 (H) 65 - 99 mg/dL    Bun 11 8 - 22 mg/dL    Creatinine 0.75 0.50 - 1.40 mg/dL    Calcium 9.6 8.5 - 10.5 mg/dL    AST(SGOT) 32 12 - 45 U/L    ALT(SGPT) 20 2 - 50 U/L    Alkaline Phosphatase 63 30 - 99 U/L    Total Bilirubin 0.6 0.1 - 1.5 mg/dL    Albumin 5.0 (H) 3.2 - 4.9 g/dL    Total Protein 7.3 6.0 - 8.2 g/dL    Globulin 2.3 1.9 - 3.5 g/dL    A-G Ratio 2.2 g/dL   LIPASE   Result Value Ref Range    Lipase 35 11 - 82 U/L   LACTIC ACID   Result Value Ref Range    Lactic Acid 1.4 0.5 - 2.0 mmol/L   URINALYSIS CULTURE, IF INDICATED   Result Value Ref Range    Color Yellow     Character Clear     Specific Gravity 1.019 <1.035    Ph 7.0 5.0 - 8.0    Glucose Negative Negative mg/dL    Ketones 15 (A) Negative mg/dL    Protein Negative Negative mg/dL    Bilirubin Negative Negative    Urobilinogen, Urine 0.2 Negative    Nitrite Negative Negative    Leukocyte Esterase Trace (A) Negative    Occult Blood Negative Negative    Micro Urine Req Microscopic    URINE MICROSCOPIC (W/UA)   Result Value Ref Range    WBC 2-5 (A) /hpf    RBC 2-5 (A) /hpf    Bacteria Negative None /hpf    Epithelial Cells Few /hpf    Hyaline Cast 0-2 /lpf   ESTIMATED GFR   Result Value Ref Range    GFR If African American >60 >60 mL/min/1.73 m 2     GFR If Non African American >60 >60 mL/min/1.73 m 2       RADIOLOGY  CT-ABDOMEN-PELVIS WITH   Final Result      1.  Increased colonic stool suggesting constipation.   2.  No evidence of bowel obstruction.   3.  Appendix is not visualized.      DX-ABDOMEN COMPLETE WITH AP OR PA CXR   Final Result      1.  No pneumonia or pneumothorax.   2.  Findings suggest constipation.   3.  Mild edematous small bowel, possibly indicating ileus.        Reevaluation   Time:2:24 AM  Vital signs:   Assessment: Patient informed of results, states understanding findings and plan for discharge. Patient will continue his bowel regimen at home    COURSE & MEDICAL DECISION MAKING  Pertinent Labs & Imaging studies reviewed. (See chart for details)  Patient has no findings today to suggest an emergent etiology to explain his symptoms are most likely is suffering from chronic constipation and resulting mild abdominal pain. Patient does not appear septic or toxic and had no deterioration in his condition while in the emergency department. He was tolerating by mouth liquids without difficulty and had no episodes of vomiting. Do not feel inpatient treatment is necessary at this point and I feel he can continue his existing bowel regimen at home and that follow-up with the primary care physician within a week is reasonable. Patient stated understanding of return instructions.     FINAL IMPRESSION  1. Constipation  2. Abdominal pain  3.         Electronically signed by: Chip Olguin, 5/17/2018 8:48 PM

## 2018-06-19 ENCOUNTER — PATIENT OUTREACH (OUTPATIENT)
Dept: HEALTH INFORMATION MANAGEMENT | Facility: OTHER | Age: 38
End: 2018-06-19

## 2018-06-19 PROBLEM — Z74.09 IMPAIRED MOBILITY AND ADLS: Status: RESOLVED | Noted: 2017-11-17 | Resolved: 2018-06-19

## 2018-06-19 PROBLEM — Z78.9 IMPAIRED MOBILITY AND ADLS: Status: RESOLVED | Noted: 2017-11-17 | Resolved: 2018-06-19

## 2018-06-19 NOTE — PROGRESS NOTES
Outreach call to Kandi for follow up on the patient.  Kandi reports that the patient is doing well at home with care givers.  CCRN discussed graduating the patient from the program and Kandi is agreeable.  Provided Kandi with CCRN and Jenni's LSW contact info and instructed to call with any questions or concerns.

## 2018-06-20 ENCOUNTER — HOSPITAL ENCOUNTER (OUTPATIENT)
Dept: RADIOLOGY | Facility: MEDICAL CENTER | Age: 38
End: 2018-06-20
Attending: PAIN MEDICINE | Admitting: PAIN MEDICINE
Payer: MEDICARE

## 2018-06-20 DIAGNOSIS — Z01.811 PRE-OPERATIVE RESPIRATORY EXAMINATION: ICD-10-CM

## 2018-06-20 DIAGNOSIS — Z01.810 PRE-OPERATIVE CARDIOVASCULAR EXAMINATION: ICD-10-CM

## 2018-06-20 LAB — EKG IMPRESSION: NORMAL

## 2018-06-20 PROCEDURE — 71046 X-RAY EXAM CHEST 2 VIEWS: CPT

## 2018-06-20 PROCEDURE — 93005 ELECTROCARDIOGRAM TRACING: CPT

## 2018-06-20 PROCEDURE — 93010 ELECTROCARDIOGRAM REPORT: CPT | Performed by: INTERNAL MEDICINE

## 2018-06-20 RX ORDER — DOCUSATE SODIUM 250 MG
250 CAPSULE ORAL 2 TIMES DAILY
Status: ON HOLD | COMMUNITY
End: 2019-07-18

## 2018-06-20 RX ORDER — MULTIVIT WITH MINERALS/LUTEIN
1000 TABLET ORAL 2 TIMES DAILY
Status: ON HOLD | COMMUNITY
End: 2019-07-18

## 2018-06-20 RX ORDER — METHENAMINE HIPPURATE 1000 MG/1
1 TABLET ORAL 2 TIMES DAILY
Status: ON HOLD | COMMUNITY
End: 2019-07-18

## 2018-06-20 RX ORDER — MULTIVIT WITH MINERALS/LUTEIN
1 TABLET ORAL 2 TIMES DAILY
COMMUNITY
End: 2018-06-20

## 2018-06-20 NOTE — OR NURSING
Patient in wheelchair . Needs assist to transfer. He is light sensitive and occasionally  Has slurred speech and double vision.

## 2018-06-21 ENCOUNTER — HOSPITAL ENCOUNTER (OUTPATIENT)
Dept: LAB | Facility: MEDICAL CENTER | Age: 38
End: 2018-06-21
Attending: STUDENT IN AN ORGANIZED HEALTH CARE EDUCATION/TRAINING PROGRAM
Payer: MEDICARE

## 2018-06-21 ENCOUNTER — HOSPITAL ENCOUNTER (OUTPATIENT)
Dept: LAB | Facility: MEDICAL CENTER | Age: 38
End: 2018-06-21
Attending: PHYSICIAN ASSISTANT
Payer: MEDICARE

## 2018-06-21 LAB
25(OH)D3 SERPL-MCNC: 39 NG/ML (ref 30–100)
ALBUMIN SERPL BCP-MCNC: 4.6 G/DL (ref 3.2–4.9)
ALBUMIN/GLOB SERPL: 2.2 G/DL
ALP SERPL-CCNC: 59 U/L (ref 30–99)
ALT SERPL-CCNC: 16 U/L (ref 2–50)
ANION GAP SERPL CALC-SCNC: 9 MMOL/L (ref 0–11.9)
APPEARANCE UR: CLEAR
APTT PPP: 31.8 SEC (ref 24.7–36)
AST SERPL-CCNC: 15 U/L (ref 12–45)
BASOPHILS # BLD AUTO: 1.4 % (ref 0–1.8)
BASOPHILS # BLD: 0.06 K/UL (ref 0–0.12)
BILIRUB SERPL-MCNC: 0.6 MG/DL (ref 0.1–1.5)
BILIRUB UR QL STRIP.AUTO: NEGATIVE
BUN SERPL-MCNC: 16 MG/DL (ref 8–22)
CALCIUM SERPL-MCNC: 9.7 MG/DL (ref 8.5–10.5)
CHLORIDE SERPL-SCNC: 105 MMOL/L (ref 96–112)
CHOLEST SERPL-MCNC: 208 MG/DL (ref 100–199)
CO2 SERPL-SCNC: 26 MMOL/L (ref 20–33)
COLOR UR: YELLOW
CREAT SERPL-MCNC: 0.94 MG/DL (ref 0.5–1.4)
EOSINOPHIL # BLD AUTO: 0.15 K/UL (ref 0–0.51)
EOSINOPHIL NFR BLD: 3.6 % (ref 0–6.9)
ERYTHROCYTE [DISTWIDTH] IN BLOOD BY AUTOMATED COUNT: 40.6 FL (ref 35.9–50)
EST. AVERAGE GLUCOSE BLD GHB EST-MCNC: 108 MG/DL
GLOBULIN SER CALC-MCNC: 2.1 G/DL (ref 1.9–3.5)
GLUCOSE SERPL-MCNC: 103 MG/DL (ref 65–99)
GLUCOSE UR STRIP.AUTO-MCNC: NEGATIVE MG/DL
HBA1C MFR BLD: 5.4 % (ref 0–5.6)
HCT VFR BLD AUTO: 48.5 % (ref 42–52)
HDLC SERPL-MCNC: 56 MG/DL
HGB BLD-MCNC: 16.4 G/DL (ref 14–18)
IMM GRANULOCYTES # BLD AUTO: 0.01 K/UL (ref 0–0.11)
IMM GRANULOCYTES NFR BLD AUTO: 0.2 % (ref 0–0.9)
INR PPP: 0.94 (ref 0.87–1.13)
KETONES UR STRIP.AUTO-MCNC: NEGATIVE MG/DL
LDLC SERPL CALC-MCNC: 98 MG/DL
LEUKOCYTE ESTERASE UR QL STRIP.AUTO: NEGATIVE
LYMPHOCYTES # BLD AUTO: 1.68 K/UL (ref 1–4.8)
LYMPHOCYTES NFR BLD: 40.1 % (ref 22–41)
MCH RBC QN AUTO: 30.4 PG (ref 27–33)
MCHC RBC AUTO-ENTMCNC: 33.8 G/DL (ref 33.7–35.3)
MCV RBC AUTO: 89.8 FL (ref 81.4–97.8)
MICRO URNS: NORMAL
MONOCYTES # BLD AUTO: 0.46 K/UL (ref 0–0.85)
MONOCYTES NFR BLD AUTO: 11 % (ref 0–13.4)
NEUTROPHILS # BLD AUTO: 1.83 K/UL (ref 1.82–7.42)
NEUTROPHILS NFR BLD: 43.7 % (ref 44–72)
NITRITE UR QL STRIP.AUTO: NEGATIVE
NRBC # BLD AUTO: 0 K/UL
NRBC BLD-RTO: 0 /100 WBC
PH UR STRIP.AUTO: 5.5 [PH]
PLATELET # BLD AUTO: 294 K/UL (ref 164–446)
PMV BLD AUTO: 9.2 FL (ref 9–12.9)
POTASSIUM SERPL-SCNC: 3.6 MMOL/L (ref 3.6–5.5)
PROT SERPL-MCNC: 6.7 G/DL (ref 6–8.2)
PROT UR QL STRIP: NEGATIVE MG/DL
PROTHROMBIN TIME: 12.3 SEC (ref 12–14.6)
RBC # BLD AUTO: 5.4 M/UL (ref 4.7–6.1)
RBC UR QL AUTO: NEGATIVE
SODIUM SERPL-SCNC: 140 MMOL/L (ref 135–145)
SP GR UR STRIP.AUTO: 1.02
TRIGL SERPL-MCNC: 272 MG/DL (ref 0–149)
UROBILINOGEN UR STRIP.AUTO-MCNC: 0.2 MG/DL
WBC # BLD AUTO: 4.2 K/UL (ref 4.8–10.8)

## 2018-06-21 PROCEDURE — 80053 COMPREHEN METABOLIC PANEL: CPT

## 2018-06-21 PROCEDURE — 85025 COMPLETE CBC W/AUTO DIFF WBC: CPT

## 2018-06-21 PROCEDURE — 85730 THROMBOPLASTIN TIME PARTIAL: CPT

## 2018-06-21 PROCEDURE — 83036 HEMOGLOBIN GLYCOSYLATED A1C: CPT

## 2018-06-21 PROCEDURE — 85610 PROTHROMBIN TIME: CPT

## 2018-06-21 PROCEDURE — 36415 COLL VENOUS BLD VENIPUNCTURE: CPT

## 2018-06-21 PROCEDURE — 80061 LIPID PANEL: CPT

## 2018-06-21 PROCEDURE — 81003 URINALYSIS AUTO W/O SCOPE: CPT

## 2018-06-21 PROCEDURE — 82306 VITAMIN D 25 HYDROXY: CPT

## 2018-06-26 ENCOUNTER — APPOINTMENT (OUTPATIENT)
Dept: RADIOLOGY | Facility: MEDICAL CENTER | Age: 38
End: 2018-06-26
Attending: PAIN MEDICINE
Payer: MEDICARE

## 2018-06-26 ENCOUNTER — HOSPITAL ENCOUNTER (OUTPATIENT)
Facility: MEDICAL CENTER | Age: 38
End: 2018-06-27
Attending: PAIN MEDICINE | Admitting: PAIN MEDICINE
Payer: MEDICARE

## 2018-06-26 PROCEDURE — 500126 HCHG BOVIE, NEEDLE TIP: Performed by: PAIN MEDICINE

## 2018-06-26 PROCEDURE — 700102 HCHG RX REV CODE 250 W/ 637 OVERRIDE(OP): Performed by: PAIN MEDICINE

## 2018-06-26 PROCEDURE — A9270 NON-COVERED ITEM OR SERVICE: HCPCS | Performed by: PAIN MEDICINE

## 2018-06-26 PROCEDURE — C1772 INFUSION PUMP, PROGRAMMABLE: HCPCS | Performed by: PAIN MEDICINE

## 2018-06-26 PROCEDURE — 96376 TX/PRO/DX INJ SAME DRUG ADON: CPT | Mod: XU

## 2018-06-26 PROCEDURE — 700111 HCHG RX REV CODE 636 W/ 250 OVERRIDE (IP)

## 2018-06-26 PROCEDURE — 160028 HCHG SURGERY MINUTES - 1ST 30 MINS LEVEL 3: Performed by: PAIN MEDICINE

## 2018-06-26 PROCEDURE — C1755 CATHETER, INTRASPINAL: HCPCS | Performed by: PAIN MEDICINE

## 2018-06-26 PROCEDURE — 502240 HCHG MISC OR SUPPLY RC 0272: Performed by: PAIN MEDICINE

## 2018-06-26 PROCEDURE — 501445 HCHG STAPLER, SKIN DISP: Performed by: PAIN MEDICINE

## 2018-06-26 PROCEDURE — 160048 HCHG OR STATISTICAL LEVEL 1-5: Performed by: PAIN MEDICINE

## 2018-06-26 PROCEDURE — 160035 HCHG PACU - 1ST 60 MINS PHASE I: Performed by: PAIN MEDICINE

## 2018-06-26 PROCEDURE — 700112 HCHG RX REV CODE 229: Performed by: PAIN MEDICINE

## 2018-06-26 PROCEDURE — A6222 GAUZE <=16 IN NO W/SAL W/O B: HCPCS | Performed by: PAIN MEDICINE

## 2018-06-26 PROCEDURE — 501838 HCHG SUTURE GENERAL: Performed by: PAIN MEDICINE

## 2018-06-26 PROCEDURE — 500064 HCHG BINDER, 4-PANEL MED/LG: Performed by: PAIN MEDICINE

## 2018-06-26 PROCEDURE — 502000 HCHG MISC OR IMPLANTS RC 0278: Performed by: PAIN MEDICINE

## 2018-06-26 PROCEDURE — 700111 HCHG RX REV CODE 636 W/ 250 OVERRIDE (IP): Performed by: PAIN MEDICINE

## 2018-06-26 PROCEDURE — 160002 HCHG RECOVERY MINUTES (STAT): Performed by: PAIN MEDICINE

## 2018-06-26 PROCEDURE — 700101 HCHG RX REV CODE 250

## 2018-06-26 PROCEDURE — 700105 HCHG RX REV CODE 258: Performed by: PAIN MEDICINE

## 2018-06-26 PROCEDURE — 700101 HCHG RX REV CODE 250: Performed by: PAIN MEDICINE

## 2018-06-26 PROCEDURE — A6402 STERILE GAUZE <= 16 SQ IN: HCPCS | Performed by: PAIN MEDICINE

## 2018-06-26 PROCEDURE — 160039 HCHG SURGERY MINUTES - EA ADDL 1 MIN LEVEL 3: Performed by: PAIN MEDICINE

## 2018-06-26 PROCEDURE — G0378 HOSPITAL OBSERVATION PER HR: HCPCS

## 2018-06-26 PROCEDURE — 160009 HCHG ANES TIME/MIN: Performed by: PAIN MEDICINE

## 2018-06-26 PROCEDURE — 160036 HCHG PACU - EA ADDL 30 MINS PHASE I: Performed by: PAIN MEDICINE

## 2018-06-26 PROCEDURE — 96375 TX/PRO/DX INJ NEW DRUG ADDON: CPT | Mod: XU

## 2018-06-26 DEVICE — IMPLANTABLE DEVICE: Type: IMPLANTABLE DEVICE | Site: BACK | Status: FUNCTIONAL

## 2018-06-26 RX ORDER — LIDOCAINE HYDROCHLORIDE 10 MG/ML
INJECTION, SOLUTION EPIDURAL; INFILTRATION; INTRACAUDAL; PERINEURAL
Status: COMPLETED
Start: 2018-06-26 | End: 2018-06-26

## 2018-06-26 RX ORDER — BACITRACIN 50000 [IU]/1
INJECTION, POWDER, FOR SOLUTION INTRAMUSCULAR
Status: DISCONTINUED | OUTPATIENT
Start: 2018-06-26 | End: 2018-06-26 | Stop reason: HOSPADM

## 2018-06-26 RX ORDER — CEFAZOLIN SODIUM 1 G/3ML
INJECTION, POWDER, FOR SOLUTION INTRAMUSCULAR; INTRAVENOUS
Status: DISCONTINUED | OUTPATIENT
Start: 2018-06-26 | End: 2018-06-26 | Stop reason: HOSPADM

## 2018-06-26 RX ORDER — OXYCODONE HYDROCHLORIDE 10 MG/1
10 TABLET ORAL
Status: DISCONTINUED | OUTPATIENT
Start: 2018-06-26 | End: 2018-06-27 | Stop reason: HOSPADM

## 2018-06-26 RX ORDER — DOCUSATE SODIUM 100 MG/1
100 CAPSULE, LIQUID FILLED ORAL 2 TIMES DAILY
Status: DISCONTINUED | OUTPATIENT
Start: 2018-06-26 | End: 2018-06-27 | Stop reason: HOSPADM

## 2018-06-26 RX ORDER — BACLOFEN 10 MG/1
20 TABLET ORAL 3 TIMES DAILY
Status: DISCONTINUED | OUTPATIENT
Start: 2018-06-26 | End: 2018-06-27 | Stop reason: HOSPADM

## 2018-06-26 RX ORDER — OXYCODONE HYDROCHLORIDE 10 MG/1
20 TABLET ORAL
Status: DISCONTINUED | OUTPATIENT
Start: 2018-06-26 | End: 2018-06-27 | Stop reason: HOSPADM

## 2018-06-26 RX ORDER — SODIUM CHLORIDE, SODIUM LACTATE, POTASSIUM CHLORIDE, CALCIUM CHLORIDE 600; 310; 30; 20 MG/100ML; MG/100ML; MG/100ML; MG/100ML
INJECTION, SOLUTION INTRAVENOUS CONTINUOUS
Status: DISCONTINUED | OUTPATIENT
Start: 2018-06-26 | End: 2018-06-27 | Stop reason: HOSPADM

## 2018-06-26 RX ORDER — HYDROMORPHONE HYDROCHLORIDE 2 MG/ML
1 INJECTION, SOLUTION INTRAMUSCULAR; INTRAVENOUS; SUBCUTANEOUS
Status: DISCONTINUED | OUTPATIENT
Start: 2018-06-26 | End: 2018-06-27 | Stop reason: HOSPADM

## 2018-06-26 RX ORDER — BUPROPION HYDROCHLORIDE 150 MG/1
150 TABLET, EXTENDED RELEASE ORAL 2 TIMES DAILY
Status: DISCONTINUED | OUTPATIENT
Start: 2018-06-26 | End: 2018-06-27 | Stop reason: HOSPADM

## 2018-06-26 RX ORDER — ACETAMINOPHEN 500 MG
1000 TABLET ORAL EVERY 6 HOURS
Status: DISCONTINUED | OUTPATIENT
Start: 2018-06-26 | End: 2018-06-27 | Stop reason: HOSPADM

## 2018-06-26 RX ORDER — BUPIVACAINE HYDROCHLORIDE AND EPINEPHRINE 5; 5 MG/ML; UG/ML
INJECTION, SOLUTION EPIDURAL; INTRACAUDAL; PERINEURAL
Status: DISCONTINUED | OUTPATIENT
Start: 2018-06-26 | End: 2018-06-26 | Stop reason: HOSPADM

## 2018-06-26 RX ORDER — LOSARTAN POTASSIUM 25 MG/1
50 TABLET ORAL
Status: DISCONTINUED | OUTPATIENT
Start: 2018-06-26 | End: 2018-06-27 | Stop reason: HOSPADM

## 2018-06-26 RX ORDER — OXYBUTYNIN CHLORIDE 5 MG/1
10 TABLET, EXTENDED RELEASE ORAL EVERY EVENING
Status: DISCONTINUED | OUTPATIENT
Start: 2018-06-26 | End: 2018-06-27 | Stop reason: HOSPADM

## 2018-06-26 RX ORDER — ESCITALOPRAM OXALATE 10 MG/1
20 TABLET ORAL DAILY
Status: DISCONTINUED | OUTPATIENT
Start: 2018-06-26 | End: 2018-06-27 | Stop reason: HOSPADM

## 2018-06-26 RX ORDER — PHYSOSTIGMINE SALICYLATE 1 MG/ML
1 INJECTION INTRAVENOUS PRN
Status: DISCONTINUED | OUTPATIENT
Start: 2018-06-26 | End: 2018-06-27 | Stop reason: HOSPADM

## 2018-06-26 RX ADMIN — BACLOFEN 20 MG: 10 TABLET ORAL at 17:23

## 2018-06-26 RX ADMIN — CEFAZOLIN 1 G: 1 INJECTION, POWDER, FOR SOLUTION INTRAMUSCULAR; INTRAVENOUS at 14:57

## 2018-06-26 RX ADMIN — SODIUM CHLORIDE, POTASSIUM CHLORIDE, SODIUM LACTATE AND CALCIUM CHLORIDE: 600; 310; 30; 20 INJECTION, SOLUTION INTRAVENOUS at 10:45

## 2018-06-26 RX ADMIN — OXYBUTYNIN CHLORIDE 10 MG: 5 TABLET, EXTENDED RELEASE ORAL at 22:11

## 2018-06-26 RX ADMIN — ACETAMINOPHEN 1000 MG: 500 TABLET, FILM COATED ORAL at 14:57

## 2018-06-26 RX ADMIN — BACLOFEN 20 MG: 10 TABLET ORAL at 22:11

## 2018-06-26 RX ADMIN — LIDOCAINE HYDROCHLORIDE 0.2 ML: 10 INJECTION, SOLUTION EPIDURAL; INFILTRATION; INTRACAUDAL; PERINEURAL at 06:52

## 2018-06-26 RX ADMIN — BUPROPION HYDROCHLORIDE 150 MG: 150 TABLET, FILM COATED, EXTENDED RELEASE ORAL at 22:11

## 2018-06-26 RX ADMIN — BACLOFEN: 0.5 INJECTION INTRATHECAL at 07:45

## 2018-06-26 RX ADMIN — LOSARTAN POTASSIUM 50 MG: 25 TABLET ORAL at 22:11

## 2018-06-26 RX ADMIN — ACETAMINOPHEN 1000 MG: 500 TABLET, FILM COATED ORAL at 22:10

## 2018-06-26 RX ADMIN — ESCITALOPRAM OXALATE 20 MG: 10 TABLET ORAL at 17:23

## 2018-06-26 RX ADMIN — SODIUM CHLORIDE, POTASSIUM CHLORIDE, SODIUM LACTATE AND CALCIUM CHLORIDE: 600; 310; 30; 20 INJECTION, SOLUTION INTRAVENOUS at 15:03

## 2018-06-26 RX ADMIN — Medication 3 ML: at 14:00

## 2018-06-26 RX ADMIN — SODIUM CHLORIDE, SODIUM LACTATE, POTASSIUM CHLORIDE, CALCIUM CHLORIDE 1000 ML: 600; 310; 30; 20 INJECTION, SOLUTION INTRAVENOUS at 06:53

## 2018-06-26 RX ADMIN — DOCUSATE SODIUM 100 MG: 100 CAPSULE, LIQUID FILLED ORAL at 22:10

## 2018-06-26 RX ADMIN — CEFAZOLIN 1 G: 1 INJECTION, POWDER, FOR SOLUTION INTRAMUSCULAR; INTRAVENOUS at 22:08

## 2018-06-26 ASSESSMENT — COPD QUESTIONNAIRES
DO YOU EVER COUGH UP ANY MUCUS OR PHLEGM?: NO/ONLY WITH OCCASIONAL COLDS OR INFECTIONS
COPD SCREENING SCORE: 3
HAVE YOU SMOKED AT LEAST 100 CIGARETTES IN YOUR ENTIRE LIFE: YES
IN THE PAST 12 MONTHS DO YOU DO LESS THAN YOU USED TO BECAUSE OF YOUR BREATHING PROBLEMS: DISAGREE/UNSURE
DURING THE PAST 4 WEEKS HOW MUCH DID YOU FEEL SHORT OF BREATH: SOME OF THE TIME

## 2018-06-26 ASSESSMENT — LIFESTYLE VARIABLES
TOTAL SCORE: 0
EVER_SMOKED: YES
CONSUMPTION TOTAL: POSITIVE
HAVE PEOPLE ANNOYED YOU BY CRITICIZING YOUR DRINKING: NO
AVERAGE NUMBER OF DAYS PER WEEK YOU HAVE A DRINK CONTAINING ALCOHOL: 7
TOTAL SCORE: 0
EVER FELT BAD OR GUILTY ABOUT YOUR DRINKING: NO
ON A TYPICAL DAY WHEN YOU DRINK ALCOHOL HOW MANY DRINKS DO YOU HAVE: 2
TOTAL SCORE: 0
HAVE YOU EVER FELT YOU SHOULD CUT DOWN ON YOUR DRINKING: NO
HOW MANY TIMES IN THE PAST YEAR HAVE YOU HAD 5 OR MORE DRINKS IN A DAY: 10
EVER HAD A DRINK FIRST THING IN THE MORNING TO STEADY YOUR NERVES TO GET RID OF A HANGOVER: NO
ALCOHOL_USE: YES

## 2018-06-26 ASSESSMENT — PATIENT HEALTH QUESTIONNAIRE - PHQ9
1. LITTLE INTEREST OR PLEASURE IN DOING THINGS: NEARLY EVERY DAY
2. FEELING DOWN, DEPRESSED, IRRITABLE, OR HOPELESS: NEARLY EVERY DAY
6. FEELING BAD ABOUT YOURSELF - OR THAT YOU ARE A FAILURE OR HAVE LET YOURSELF OR YOUR FAMILY DOWN: SEVERAL DAYS
5. POOR APPETITE OR OVEREATING: NOT AT ALL
4. FEELING TIRED OR HAVING LITTLE ENERGY: SEVERAL DAYS
3. TROUBLE FALLING OR STAYING ASLEEP OR SLEEPING TOO MUCH: NOT AT ALL
SUM OF ALL RESPONSES TO PHQ QUESTIONS 1-9: 10
SUM OF ALL RESPONSES TO PHQ9 QUESTIONS 1 AND 2: 6
8. MOVING OR SPEAKING SO SLOWLY THAT OTHER PEOPLE COULD HAVE NOTICED. OR THE OPPOSITE, BEING SO FIGETY OR RESTLESS THAT YOU HAVE BEEN MOVING AROUND A LOT MORE THAN USUAL: NOT AT ALL
9. THOUGHTS THAT YOU WOULD BE BETTER OFF DEAD, OR OF HURTING YOURSELF: NOT AT ALL
7. TROUBLE CONCENTRATING ON THINGS, SUCH AS READING THE NEWSPAPER OR WATCHING TELEVISION: MORE THAN HALF THE DAYS

## 2018-06-26 ASSESSMENT — PAIN SCALES - GENERAL
PAINLEVEL_OUTOF10: 0

## 2018-06-26 NOTE — PROGRESS NOTES
Patient transferred from PAC post baclofen pump replacement. VSS, O2 nasal cannula removed with O2 sat>97%. Regular diet ordered per orders from MD. On bariatric bed per patient request. No complaints of pain at this time, will continue to monitor.

## 2018-06-26 NOTE — OP REPORT
DATE OF SERVICE:  06/26/2018    NAME OF PROCEDURE:  Implantation of [employer] baclofen intrathecal infusion   pump and catheter.    PREPROCEDURAL DIAGNOSES:  Patient with multiple sclerosis and spastic   paraparesis with dramatic improvement following intrathecal baclofen trial.    POSTPROCEDURAL DIAGNOSES:  Patient with multiple sclerosis and spastic   paraparesis with dramatic improvement following intrathecal baclofen trial.    PROCEDURE PERFORMED BY:  Andrew Gomez MD    ANESTHESIA:  General.    ANESTHESIOLOGIST:  Bobby Page MD    PROCEDURE NOTE:  Patient was seen in the preop holding area.  The procedure   itself had been thoroughly discussed.  They have no further questions.  He was   then brought to the operating room.  General anesthesia was induced.  He was   placed in a left lateral decubitus position.  A total of 25 mL of 0.5%   bupivacaine, 1:200,000 epinephrine was infiltrated over the paramedian at the   L3-L4 level on the right side and also the right lower quadrant.  A 16-gauge   Tuohy needle was then advanced paramedian approach into the L3-L4 interspace   under fluoroscopic imaging.  Stylet was removed.  CSF was noted to be freely   flowing.  A styletted intrathecal pump catheter was then guided up to the   T5-T6 interspace intrathecally.  The stylet was removed.  CSF was noted coming   from the distal aspect of the catheter.  Needle was pulled back 2 cm.  A 4 cm   incision was made.  Dissection down the fascia was performed.  Electrocautery   was used to staunch the bleeding.  A 0 Ethibond pursestring was then tied   around the catheter and needle.  The needle was then removed.  Silastic anchor   was deployed and tied using the 0 Ethibond suture.  A 10 cm incision was then   made in the right lower quadrant.  Blunt dissection was used to make a pocket   for the pump itself.  Again, electrocautery was used to staunch the bleeding.    Tunneling germán was passed from the abdominal wound site to  the lumbar wound   site.  The catheter was passed back through.  Distal aspect of the catheter   was ligated and the sutureless connector was connected to the catheter.  The   catheter was then connected to the pump.  Pump was anchored in with 4-0   Ethibond sutures to the fascia in the abdominal region.  Irrigation of both   the anterior and posterior wound site was performed using bacitracin solution.    Wounds were closed using 3-0 Vicryl subcuticular, staples in the skin for   the abdominal wound site and a 3-0 nylon mattress stitch was used on the   lumbar wound site.  Patient will be programmed to begin at 50 mcg a day of   intrathecal baclofen with a 5 mcg bolus.  He will be kept 23 hours, scheduled   follow up in our office on Thursday morning.       ____________________________________     MD NEELIMA CARRILLO / NTS    DD:  06/26/2018 09:18:15  DT:  06/26/2018 10:21:51    D#:  [id number]  Job#:  [id number]

## 2018-06-26 NOTE — OR NURSING
0916- Patient arrived from OR. Respirations even and unlabored. Binder in place around surgical dressings. Dressing to LLQ and right back. Clean, dry, and intact. Epperson in place per patient request. VSS, see flowsheets.   1000- Patient awake. Denies any pain or nausea. Reports numbness all over body which is baseline.   1030- VSS, see flowsheets. Patient continues to deny any pain or nausea. Report to AURORA Mccollum.

## 2018-06-26 NOTE — OR SURGEON
Immediate Post OP Note    PreOp Diagnosis: multiple sclerosis with spacity    PostOp Diagnosis: as above    Procedure(s):  PUMP INSERT/REMOVE - BACLOFEN - Wound Class: Clean    Surgeon(s):  Andrew Gomez M.D.    Anesthesiologist/Type of Anesthesia:  Anesthesiologist: Bobby Page M.D./General    Surgical Staff:  Circulator: Saman Begum R.N.  Relief Circulator: John Davis R.N.  Scrub Person: Cordell Ambrosio    Specimens removed if any:  * No specimens in log *    Estimated Blood Loss: <50cc    Findings: none    Complications: none        6/26/2018 9:13 AM Andrew Gomez M.D.

## 2018-06-27 VITALS
HEART RATE: 69 BPM | TEMPERATURE: 97.7 F | WEIGHT: 165 LBS | BODY MASS INDEX: 23.1 KG/M2 | DIASTOLIC BLOOD PRESSURE: 72 MMHG | OXYGEN SATURATION: 99 % | HEIGHT: 71 IN | RESPIRATION RATE: 18 BRPM | SYSTOLIC BLOOD PRESSURE: 145 MMHG

## 2018-06-27 PROCEDURE — G0378 HOSPITAL OBSERVATION PER HR: HCPCS

## 2018-06-27 PROCEDURE — 96376 TX/PRO/DX INJ SAME DRUG ADON: CPT

## 2018-06-27 PROCEDURE — 700105 HCHG RX REV CODE 258: Performed by: PAIN MEDICINE

## 2018-06-27 PROCEDURE — A9270 NON-COVERED ITEM OR SERVICE: HCPCS | Performed by: PAIN MEDICINE

## 2018-06-27 PROCEDURE — 700102 HCHG RX REV CODE 250 W/ 637 OVERRIDE(OP): Performed by: PAIN MEDICINE

## 2018-06-27 PROCEDURE — 700111 HCHG RX REV CODE 636 W/ 250 OVERRIDE (IP): Performed by: PAIN MEDICINE

## 2018-06-27 RX ADMIN — ESCITALOPRAM OXALATE 20 MG: 10 TABLET ORAL at 09:26

## 2018-06-27 RX ADMIN — BUPROPION HYDROCHLORIDE 150 MG: 150 TABLET, FILM COATED, EXTENDED RELEASE ORAL at 09:25

## 2018-06-27 RX ADMIN — BACLOFEN 20 MG: 10 TABLET ORAL at 09:26

## 2018-06-27 RX ADMIN — CEFAZOLIN 1 G: 1 INJECTION, POWDER, FOR SOLUTION INTRAMUSCULAR; INTRAVENOUS at 06:06

## 2018-06-27 RX ADMIN — ACETAMINOPHEN 1000 MG: 500 TABLET, FILM COATED ORAL at 06:07

## 2018-06-27 ASSESSMENT — PAIN SCALES - GENERAL: PAINLEVEL_OUTOF10: 0

## 2018-06-27 NOTE — DISCHARGE INSTRUCTIONS
Discharge Instructions    Discharged to home by medical transportation with relative. Discharged via ambulance, hospital escort: Yes.  Special equipment needed: Wheelchair    Be sure to schedule a follow-up appointment with your primary care doctor or any specialists as instructed.     Discharge Plan:   Diet Plan: Discussed  Activity Level: Discussed  Confirmed Follow up Appointment: Appointment Scheduled  Confirmed Symptoms Management: Discussed  Medication Reconciliation Updated: Yes  Influenza Vaccine Indication: Patient Refuses    I understand that a diet low in cholesterol, fat, and sodium is recommended for good health. Unless I have been given specific instructions below for another diet, I accept this instruction as my diet prescription.   Other diet: Regular    Special Instructions: None    · Is patient discharged on Warfarin / Coumadin?   No     Depression / Suicide Risk    As [facility] are [facility], it is important to learn how to keep safe from harming yourself.    Recognize the warning signs:  · Abrupt changes in personality, positive or negative- including increase in energy   · Giving away possessions  · Change in eating patterns- significant weight changes-  positive or negative  · Change in sleeping patterns- unable to sleep or sleeping all the time   · Unwillingness or inability to communicate  · Depression  · Unusual sadness, discouragement and loneliness  · Talk of wanting to die  · Neglect of personal appearance   · Rebelliousness- reckless behavior  · Withdrawal from people/activities they love  · Confusion- inability to concentrate     If you or a loved one observes any of these behaviors or has concerns about self-harm, here's what you can do:  · Talk about it- your feelings and reasons for harming yourself  · Remove any means that you might use to hurt yourself (examples: pills, rope, extension cords, firearm)  · Get professional help from the community (Mental  Health, Substance Abuse, psychological counseling)  · Do not be alone:Call your Safe Contact- someone whom you trust who will be there for you.  · Call your local CRISIS HOTLINE 884-5177 or 208-668-6932  · Call your local Children's Mobile Crisis Response Team [place] (298) 668-2769 or www.[employer]  · Call the toll free National Suicide Prevention Hotlines   · National Suicide Prevention Lifeline 492-843-CUOC (3008)  · National Hope Line Network 800-SUICIDE (188-1504)

## 2018-06-27 NOTE — PROGRESS NOTES
Bedside report completed.  Assumed pt care. Patient in bed, resting, in no apparent distress.  Safety precautions in place. Call light & personal belongings within reach.  Plan of care discussed.  Patient is on room air, IV running LR @ 30 mL/hour.  Reports no pain at this time.  Requested to be repositioned.  No other needs expressed.  Will continue to monitor.

## 2018-06-27 NOTE — DISCHARGE PLANNING
SW informed that this patient will need transportation set up with [facility] for home transportation. SW met with this patient and his mother bedside to confirmed MTM/REMSA transportation.  SW called and made request from [facility] and will await a call back.

## 2018-06-27 NOTE — CARE PLAN
Problem: Communication  Goal: The ability to communicate needs accurately and effectively will improve  Outcome: PROGRESSING AS EXPECTED    Intervention: [place] patient and significant other/support system to call light to alert staff of needs  Patient oriented to surroundings and unit policies/routines.  Educated and understands the use of the call button.  Patient calls appropriately.      Problem: Safety  Goal: Will remain free from falls  Outcome: PROGRESSING AS EXPECTED    Intervention: Implement fall precautions  Patient educated and understands the fall precautions in place to prevent falls.  Bed alarm is off, patient calls for assistance, IV pole closest to bathroom, treaded slipper socks on, and bedrails closest to bathroom down.  Patient also educated and understands the use of the call button for any assistance with mobility.

## 2018-06-27 NOTE — DISCHARGE PLANNING
Spoke with Jung at [facility], he received all needed paperwork. Vandana at [facility] department Auth # [id number].

## 2018-06-27 NOTE — PROGRESS NOTES
Resting comfortably in bed, denies pain at this time. VSS, A&Ox4. Family is at bedside, will continue to monitor.

## 2018-06-27 NOTE — PROGRESS NOTES
Received bedside report from Zak SIMON.  Patient resting in bed without complaints of pain or discomfort.  VS stable.  Patient is strong enough to turn from one side of the bed to the other.  POC reviewed, gtts verified, and safety protocols in place.

## 2018-06-28 ENCOUNTER — PATIENT OUTREACH (OUTPATIENT)
Dept: HEALTH INFORMATION MANAGEMENT | Facility: OTHER | Age: 38
End: 2018-06-28

## 2018-06-28 ENCOUNTER — HOME HEALTH ADMISSION (OUTPATIENT)
Dept: HOME HEALTH SERVICES | Facility: HOME HEALTHCARE | Age: 38
End: 2018-06-28
Payer: MEDICARE
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
## 2018-06-29 ENCOUNTER — HOME CARE VISIT (OUTPATIENT)
Dept: HOME HEALTH SERVICES | Facility: HOME HEALTHCARE | Age: 38
End: 2018-06-29
Payer: MEDICARE

## 2018-06-29 PROCEDURE — G0493 RN CARE EA 15 MIN HH/HOSPICE: HCPCS

## 2018-06-29 PROCEDURE — 665001 SOC-HOME HEALTH

## 2018-06-30 VITALS
TEMPERATURE: 98.5 F | DIASTOLIC BLOOD PRESSURE: 88 MMHG | SYSTOLIC BLOOD PRESSURE: 132 MMHG | RESPIRATION RATE: 14 BRPM | OXYGEN SATURATION: 97 % | HEART RATE: 88 BPM

## 2018-06-30 SDOH — ECONOMIC STABILITY: HOUSING INSECURITY: UNSAFE APPLIANCES: 0

## 2018-06-30 SDOH — ECONOMIC STABILITY: HOUSING INSECURITY: UNSAFE COOKING RANGE AREA: 0

## 2018-06-30 ASSESSMENT — PATIENT HEALTH QUESTIONNAIRE - PHQ9
2. FEELING DOWN, DEPRESSED, IRRITABLE, OR HOPELESS: 03
1. LITTLE INTEREST OR PLEASURE IN DOING THINGS: 02

## 2018-06-30 ASSESSMENT — ACTIVITIES OF DAILY LIVING (ADL)
HOME_HEALTH_OASIS: 01
OASIS_M1830: 06

## 2018-06-30 ASSESSMENT — ENCOUNTER SYMPTOMS
AGORAPHOBIA: 1
POOR JUDGMENT: 1
DEBILITATING PAIN: 1
DIFFICULTY THINKING: 1
DEPRESSED MOOD: 1
THOUGHT CONTENT - SUSPICIOUS: 1

## 2018-07-02 ENCOUNTER — HOME CARE VISIT (OUTPATIENT)
Dept: HOME HEALTH SERVICES | Facility: HOME HEALTHCARE | Age: 38
End: 2018-07-02
Payer: MEDICARE

## 2018-07-02 ENCOUNTER — TELEPHONE (OUTPATIENT)
Dept: HEALTH INFORMATION MANAGEMENT | Facility: OTHER | Age: 38
End: 2018-07-02

## 2018-07-02 VITALS
OXYGEN SATURATION: 97 % | DIASTOLIC BLOOD PRESSURE: 58 MMHG | TEMPERATURE: 99.6 F | RESPIRATION RATE: 18 BRPM | SYSTOLIC BLOOD PRESSURE: 98 MMHG | HEART RATE: 98 BPM

## 2018-07-02 PROCEDURE — G0152 HHCP-SERV OF OT,EA 15 MIN: HCPCS

## 2018-07-02 ASSESSMENT — ACTIVITIES OF DAILY LIVING (ADL)
DRESSING_UB_ASSISTANCE: 6
BATHING_ASSISTANCE: 6
TOILETING_EQUIPMENT_USED: COMMODE
TRANSPORTATION_ASSISTANCE: 6
EATING_ASSISTANCE: 2
MEAL_PREP_ASSISTANCE: 6
DRESSING_LB_ASSISTANCE: 6
GROOMING_ASSISTANCE: 2
SHOPPING_ASSISTANCE: 6
TOILETING_ASSISTANCE: 6
HOUSEKEEPING_ASSISTANCE: 6
TELEPHONE_ASSISTANCE: 6
LAUNDRY_ASSISTANCE: 6
ORAL_CARE_ASSISTANCE: 2

## 2018-07-02 NOTE — TELEPHONE ENCOUNTER
Received referral from [facility]. Medications reviewed. No clinically significant interactions or medication issues noted.     Amy Mayo, RebeccaD

## 2018-07-03 ENCOUNTER — HOME CARE VISIT (OUTPATIENT)
Dept: HOME HEALTH SERVICES | Facility: HOME HEALTHCARE | Age: 38
End: 2018-07-03
Payer: MEDICARE

## 2018-07-03 PROCEDURE — G0299 HHS/HOSPICE OF RN EA 15 MIN: HCPCS

## 2018-07-04 ENCOUNTER — HOME CARE VISIT (OUTPATIENT)
Dept: HOME HEALTH SERVICES | Facility: HOME HEALTHCARE | Age: 38
End: 2018-07-04
Payer: MEDICARE

## 2018-07-04 VITALS
SYSTOLIC BLOOD PRESSURE: 92 MMHG | TEMPERATURE: 99.5 F | RESPIRATION RATE: 17 BRPM | DIASTOLIC BLOOD PRESSURE: 60 MMHG | HEART RATE: 75 BPM | OXYGEN SATURATION: 97 %

## 2018-07-04 VITALS
DIASTOLIC BLOOD PRESSURE: 70 MMHG | SYSTOLIC BLOOD PRESSURE: 120 MMHG | RESPIRATION RATE: 16 BRPM | TEMPERATURE: 98.5 F | HEART RATE: 80 BPM | OXYGEN SATURATION: 98 %

## 2018-07-04 PROCEDURE — G0156 HHCP-SVS OF AIDE,EA 15 MIN: HCPCS

## 2018-07-04 PROCEDURE — G0151 HHCP-SERV OF PT,EA 15 MIN: HCPCS

## 2018-07-04 ASSESSMENT — ENCOUNTER SYMPTOMS: NAUSEA: DENIES

## 2018-07-05 ENCOUNTER — HOME CARE VISIT (OUTPATIENT)
Dept: HOME HEALTH SERVICES | Facility: HOME HEALTHCARE | Age: 38
End: 2018-07-05
Payer: MEDICARE

## 2018-07-05 VITALS
OXYGEN SATURATION: 98 % | HEART RATE: 80 BPM | DIASTOLIC BLOOD PRESSURE: 60 MMHG | SYSTOLIC BLOOD PRESSURE: 92 MMHG | RESPIRATION RATE: 18 BRPM | TEMPERATURE: 99.5 F

## 2018-07-05 SDOH — ECONOMIC STABILITY: HOUSING INSECURITY: UNSAFE APPLIANCES: 0

## 2018-07-05 SDOH — ECONOMIC STABILITY: HOUSING INSECURITY: UNSAFE COOKING RANGE AREA: 0

## 2018-07-05 ASSESSMENT — ENCOUNTER SYMPTOMS
DIFFICULTY THINKING: 1
DEBILITATING PAIN: 1
DEPRESSED MOOD: 1

## 2018-07-05 ASSESSMENT — ACTIVITIES OF DAILY LIVING (ADL): IADLS_COMMENTS: <!--EPICS-->PT REQUIRES ASSIST WITH ALL IADLS<!--EPICE-->

## 2018-07-06 ENCOUNTER — HOME CARE VISIT (OUTPATIENT)
Dept: HOME HEALTH SERVICES | Facility: HOME HEALTHCARE | Age: 38
End: 2018-07-06
Payer: MEDICARE

## 2018-07-06 PROCEDURE — G0156 HHCP-SVS OF AIDE,EA 15 MIN: HCPCS

## 2018-07-07 ENCOUNTER — HOME CARE VISIT (OUTPATIENT)
Dept: HOME HEALTH SERVICES | Facility: HOME HEALTHCARE | Age: 38
End: 2018-07-07
Payer: MEDICARE

## 2018-07-07 VITALS
DIASTOLIC BLOOD PRESSURE: 78 MMHG | HEART RATE: 73 BPM | TEMPERATURE: 98.4 F | RESPIRATION RATE: 18 BRPM | SYSTOLIC BLOOD PRESSURE: 122 MMHG | OXYGEN SATURATION: 98 %

## 2018-07-07 PROCEDURE — G0493 RN CARE EA 15 MIN HH/HOSPICE: HCPCS

## 2018-07-08 VITALS
SYSTOLIC BLOOD PRESSURE: 102 MMHG | RESPIRATION RATE: 17 BRPM | DIASTOLIC BLOOD PRESSURE: 62 MMHG | TEMPERATURE: 99.7 F | HEART RATE: 81 BPM | OXYGEN SATURATION: 95 %

## 2018-07-09 ENCOUNTER — HOME CARE VISIT (OUTPATIENT)
Dept: HOME HEALTH SERVICES | Facility: HOME HEALTHCARE | Age: 38
End: 2018-07-09
Payer: MEDICARE

## 2018-07-09 VITALS
SYSTOLIC BLOOD PRESSURE: 118 MMHG | TEMPERATURE: 99.8 F | DIASTOLIC BLOOD PRESSURE: 72 MMHG | RESPIRATION RATE: 18 BRPM | OXYGEN SATURATION: 97 % | HEART RATE: 81 BPM

## 2018-07-09 PROCEDURE — G0299 HHS/HOSPICE OF RN EA 15 MIN: HCPCS

## 2018-07-09 PROCEDURE — G0152 HHCP-SERV OF OT,EA 15 MIN: HCPCS

## 2018-07-09 ASSESSMENT — ENCOUNTER SYMPTOMS
DIFFICULTY THINKING: 1
POOR JUDGMENT: 1
DEPRESSED MOOD: 1

## 2018-07-10 ENCOUNTER — HOME CARE VISIT (OUTPATIENT)
Dept: HOME HEALTH SERVICES | Facility: HOME HEALTHCARE | Age: 38
End: 2018-07-10
Payer: MEDICARE

## 2018-07-10 VITALS
TEMPERATURE: 98.6 F | OXYGEN SATURATION: 98 % | RESPIRATION RATE: 17 BRPM | HEART RATE: 87 BPM | SYSTOLIC BLOOD PRESSURE: 100 MMHG | DIASTOLIC BLOOD PRESSURE: 60 MMHG

## 2018-07-10 PROCEDURE — G0156 HHCP-SVS OF AIDE,EA 15 MIN: HCPCS

## 2018-07-10 PROCEDURE — G0151 HHCP-SERV OF PT,EA 15 MIN: HCPCS

## 2018-07-11 VITALS
TEMPERATURE: 98.6 F | OXYGEN SATURATION: 98 % | RESPIRATION RATE: 17 BRPM | SYSTOLIC BLOOD PRESSURE: 100 MMHG | HEART RATE: 87 BPM | DIASTOLIC BLOOD PRESSURE: 60 MMHG

## 2018-07-11 ASSESSMENT — ENCOUNTER SYMPTOMS: DIFFICULTY THINKING: 1

## 2018-07-12 ENCOUNTER — HOME CARE VISIT (OUTPATIENT)
Dept: HOME HEALTH SERVICES | Facility: HOME HEALTHCARE | Age: 38
End: 2018-07-12
Payer: MEDICARE

## 2018-07-12 VITALS
RESPIRATION RATE: 16 BRPM | DIASTOLIC BLOOD PRESSURE: 62 MMHG | SYSTOLIC BLOOD PRESSURE: 104 MMHG | TEMPERATURE: 98.5 F | HEART RATE: 78 BPM | OXYGEN SATURATION: 98 %

## 2018-07-12 PROCEDURE — G0299 HHS/HOSPICE OF RN EA 15 MIN: HCPCS

## 2018-07-12 PROCEDURE — G0156 HHCP-SVS OF AIDE,EA 15 MIN: HCPCS

## 2018-07-13 ENCOUNTER — HOME CARE VISIT (OUTPATIENT)
Dept: HOME HEALTH SERVICES | Facility: HOME HEALTHCARE | Age: 38
End: 2018-07-13
Payer: MEDICARE

## 2018-07-13 VITALS
SYSTOLIC BLOOD PRESSURE: 120 MMHG | TEMPERATURE: 98.8 F | RESPIRATION RATE: 16 BRPM | DIASTOLIC BLOOD PRESSURE: 64 MMHG | HEART RATE: 86 BPM | OXYGEN SATURATION: 98 %

## 2018-07-13 PROCEDURE — G0152 HHCP-SERV OF OT,EA 15 MIN: HCPCS

## 2018-07-13 PROCEDURE — G0151 HHCP-SERV OF PT,EA 15 MIN: HCPCS

## 2018-07-14 VITALS
OXYGEN SATURATION: 97 % | HEART RATE: 73 BPM | DIASTOLIC BLOOD PRESSURE: 72 MMHG | TEMPERATURE: 99.1 F | SYSTOLIC BLOOD PRESSURE: 100 MMHG | RESPIRATION RATE: 16 BRPM

## 2018-07-14 ASSESSMENT — ENCOUNTER SYMPTOMS
DEBILITATING PAIN: 1
DIFFICULTY THINKING: 1

## 2018-07-16 ENCOUNTER — HOME CARE VISIT (OUTPATIENT)
Dept: HOME HEALTH SERVICES | Facility: HOME HEALTHCARE | Age: 38
End: 2018-07-16
Payer: MEDICARE

## 2018-07-16 VITALS
DIASTOLIC BLOOD PRESSURE: 70 MMHG | RESPIRATION RATE: 16 BRPM | RESPIRATION RATE: 18 BRPM | HEART RATE: 73 BPM | OXYGEN SATURATION: 98 % | SYSTOLIC BLOOD PRESSURE: 102 MMHG | DIASTOLIC BLOOD PRESSURE: 72 MMHG | OXYGEN SATURATION: 97 % | TEMPERATURE: 99.1 F | TEMPERATURE: 99.7 F | SYSTOLIC BLOOD PRESSURE: 100 MMHG | HEART RATE: 81 BPM

## 2018-07-16 VITALS
HEART RATE: 81 BPM | SYSTOLIC BLOOD PRESSURE: 102 MMHG | OXYGEN SATURATION: 98 % | RESPIRATION RATE: 18 BRPM | DIASTOLIC BLOOD PRESSURE: 70 MMHG | TEMPERATURE: 99.7 F

## 2018-07-16 VITALS
OXYGEN SATURATION: 98 % | SYSTOLIC BLOOD PRESSURE: 102 MMHG | DIASTOLIC BLOOD PRESSURE: 70 MMHG | RESPIRATION RATE: 18 BRPM | HEART RATE: 81 BPM | TEMPERATURE: 99.7 F

## 2018-07-16 PROCEDURE — G0151 HHCP-SERV OF PT,EA 15 MIN: HCPCS

## 2018-07-16 PROCEDURE — G0300 HHS/HOSPICE OF LPN EA 15 MIN: HCPCS

## 2018-07-16 PROCEDURE — G0152 HHCP-SERV OF OT,EA 15 MIN: HCPCS

## 2018-07-16 ASSESSMENT — ENCOUNTER SYMPTOMS
DIFFICULTY THINKING: 1
DEBILITATING PAIN: 1
RESPIRATORY SYMPTOMS COMMENTS: DENIES SOB, RSP EVEN AND UNLABORED
DEPRESSED MOOD: 1
THOUGHT CONTENT - SUSPICIOUS: 1
POOR JUDGMENT: 1
DIFFICULTY THINKING: 1
NAUSEA: DENIES

## 2018-07-18 ENCOUNTER — OUTPATIENT INFUSION SERVICES (OUTPATIENT)
Dept: ONCOLOGY | Facility: MEDICAL CENTER | Age: 38
End: 2018-07-18
Attending: PSYCHIATRY & NEUROLOGY
Payer: MEDICARE

## 2018-07-18 ENCOUNTER — HOME CARE VISIT (OUTPATIENT)
Dept: HOME HEALTH SERVICES | Facility: HOME HEALTHCARE | Age: 38
End: 2018-07-18
Payer: MEDICARE

## 2018-07-18 VITALS
HEIGHT: 71 IN | BODY MASS INDEX: 23.1 KG/M2 | OXYGEN SATURATION: 96 % | TEMPERATURE: 99.3 F | SYSTOLIC BLOOD PRESSURE: 122 MMHG | WEIGHT: 165 LBS | DIASTOLIC BLOOD PRESSURE: 76 MMHG | HEART RATE: 71 BPM | RESPIRATION RATE: 18 BRPM

## 2018-07-18 PROCEDURE — 96415 CHEMO IV INFUSION ADDL HR: CPT

## 2018-07-18 PROCEDURE — 700105 HCHG RX REV CODE 258: Performed by: PSYCHIATRY & NEUROLOGY

## 2018-07-18 PROCEDURE — 306780 HCHG STAT FOR TRANSFUSION PER CASE

## 2018-07-18 PROCEDURE — 700111 HCHG RX REV CODE 636 W/ 250 OVERRIDE (IP): Performed by: PSYCHIATRY & NEUROLOGY

## 2018-07-18 PROCEDURE — 96375 TX/PRO/DX INJ NEW DRUG ADDON: CPT

## 2018-07-18 PROCEDURE — 96413 CHEMO IV INFUSION 1 HR: CPT

## 2018-07-18 RX ORDER — DIPHENHYDRAMINE HYDROCHLORIDE 50 MG/ML
50 INJECTION INTRAMUSCULAR; INTRAVENOUS ONCE
Status: DISCONTINUED | OUTPATIENT
Start: 2018-07-18 | End: 2018-07-18

## 2018-07-18 RX ORDER — METHYLPREDNISOLONE SODIUM SUCCINATE 125 MG/2ML
125 INJECTION, POWDER, LYOPHILIZED, FOR SOLUTION INTRAMUSCULAR; INTRAVENOUS ONCE
Status: COMPLETED | OUTPATIENT
Start: 2018-07-18 | End: 2018-07-18

## 2018-07-18 RX ADMIN — OCRELIZUMAB 600 MG: 300 INJECTION INTRAVENOUS at 10:58

## 2018-07-18 RX ADMIN — METHYLPREDNISOLONE SODIUM SUCCINATE 125 MG: 125 INJECTION, POWDER, FOR SOLUTION INTRAMUSCULAR; INTRAVENOUS at 10:24

## 2018-07-18 RX ADMIN — DIPHENHYDRAMINE HYDROCHLORIDE 50 MG: 50 INJECTION INTRAMUSCULAR; INTRAVENOUS at 10:26

## 2018-07-18 ASSESSMENT — PAIN SCALES - GENERAL: PAINLEVEL_OUTOF10: 5

## 2018-07-18 NOTE — PROGRESS NOTES
Patient arrived to clinic in motorized wheelchair with mother for Ocrevus infusion. Patient transferred to chair with 2 person assistance.  PIV established with good blood return noted.  Pre medications give and Ocrevus infused, titrating 40ml/hr every 30 minutes to max rate of 200ml/hr.  Pt tolerated well.  Pt monitored for 1 hour post infusion, no s/s reaction noted.  PIV flushed, removed, and gauze/coban placed.  Next appointment scheduled and pt left [facility] in no apparent distress.

## 2018-07-19 ENCOUNTER — HOME CARE VISIT (OUTPATIENT)
Dept: HOME HEALTH SERVICES | Facility: HOME HEALTHCARE | Age: 38
End: 2018-07-19
Payer: MEDICARE

## 2018-07-19 VITALS
DIASTOLIC BLOOD PRESSURE: 78 MMHG | SYSTOLIC BLOOD PRESSURE: 122 MMHG | HEART RATE: 84 BPM | RESPIRATION RATE: 18 BRPM | OXYGEN SATURATION: 96 % | TEMPERATURE: 98.5 F

## 2018-07-19 PROCEDURE — G0495 RN CARE TRAIN/EDU IN HH: HCPCS

## 2018-07-19 PROCEDURE — G0152 HHCP-SERV OF OT,EA 15 MIN: HCPCS

## 2018-07-19 PROCEDURE — G0156 HHCP-SVS OF AIDE,EA 15 MIN: HCPCS

## 2018-07-20 ENCOUNTER — HOME CARE VISIT (OUTPATIENT)
Dept: HOME HEALTH SERVICES | Facility: HOME HEALTHCARE | Age: 38
End: 2018-07-20
Payer: MEDICARE

## 2018-07-20 VITALS
HEART RATE: 84 BPM | DIASTOLIC BLOOD PRESSURE: 78 MMHG | RESPIRATION RATE: 18 BRPM | TEMPERATURE: 98.5 F | SYSTOLIC BLOOD PRESSURE: 122 MMHG | OXYGEN SATURATION: 96 %

## 2018-07-20 PROCEDURE — G0151 HHCP-SERV OF PT,EA 15 MIN: HCPCS

## 2018-07-21 VITALS
TEMPERATURE: 98.5 F | DIASTOLIC BLOOD PRESSURE: 86 MMHG | RESPIRATION RATE: 18 BRPM | OXYGEN SATURATION: 98 % | SYSTOLIC BLOOD PRESSURE: 118 MMHG | HEART RATE: 87 BPM

## 2018-07-21 SDOH — ECONOMIC STABILITY: HOUSING INSECURITY: UNSAFE COOKING RANGE AREA: 0

## 2018-07-21 SDOH — ECONOMIC STABILITY: HOUSING INSECURITY: UNSAFE APPLIANCES: 0

## 2018-07-21 ASSESSMENT — ENCOUNTER SYMPTOMS
DEBILITATING PAIN: 1
RESPIRATORY SYMPTOMS COMMENTS: DENIES BREATHING PROBLEM
DIFFICULTY THINKING: 1

## 2018-07-22 VITALS
SYSTOLIC BLOOD PRESSURE: 122 MMHG | OXYGEN SATURATION: 98 % | TEMPERATURE: 99.6 F | DIASTOLIC BLOOD PRESSURE: 78 MMHG | RESPIRATION RATE: 18 BRPM | HEART RATE: 88 BPM

## 2018-07-23 ENCOUNTER — HOME CARE VISIT (OUTPATIENT)
Dept: HOME HEALTH SERVICES | Facility: HOME HEALTHCARE | Age: 38
End: 2018-07-23
Payer: MEDICARE

## 2018-07-23 VITALS
RESPIRATION RATE: 18 BRPM | TEMPERATURE: 99.1 F | HEART RATE: 110 BPM | OXYGEN SATURATION: 98 % | SYSTOLIC BLOOD PRESSURE: 102 MMHG | DIASTOLIC BLOOD PRESSURE: 68 MMHG

## 2018-07-23 PROCEDURE — G0151 HHCP-SERV OF PT,EA 15 MIN: HCPCS

## 2018-07-23 PROCEDURE — G0152 HHCP-SERV OF OT,EA 15 MIN: HCPCS

## 2018-07-23 ASSESSMENT — ENCOUNTER SYMPTOMS
DEBILITATING PAIN: 1
DIFFICULTY THINKING: 1

## 2018-07-24 ENCOUNTER — HOME CARE VISIT (OUTPATIENT)
Dept: HOME HEALTH SERVICES | Facility: HOME HEALTHCARE | Age: 38
End: 2018-07-24
Payer: MEDICARE

## 2018-07-24 VITALS
SYSTOLIC BLOOD PRESSURE: 115 MMHG | HEART RATE: 83 BPM | TEMPERATURE: 98.9 F | DIASTOLIC BLOOD PRESSURE: 60 MMHG | OXYGEN SATURATION: 98 % | RESPIRATION RATE: 18 BRPM

## 2018-07-24 VITALS — SYSTOLIC BLOOD PRESSURE: 102 MMHG | DIASTOLIC BLOOD PRESSURE: 78 MMHG | RESPIRATION RATE: 18 BRPM

## 2018-07-24 PROCEDURE — G0300 HHS/HOSPICE OF LPN EA 15 MIN: HCPCS

## 2018-07-24 PROCEDURE — G0156 HHCP-SVS OF AIDE,EA 15 MIN: HCPCS

## 2018-07-24 ASSESSMENT — ACTIVITIES OF DAILY LIVING (ADL)
TOILETING_ASSISTANCE: 4
MEAL_PREP_ASSISTANCE: 6
GROOMING_ASSISTANCE: 6
SHOPPING_ASSISTANCE: 6
LAUNDRY_ASSISTANCE: 6
DRESSING_LB_ASSISTANCE: 6
TELEPHONE_ASSISTANCE: 0
EATING_ASSISTANCE: 5
BATHING_ASSISTANCE: 6
ORAL_CARE_ASSISTANCE: 5
DRESSING_UB_ASSISTANCE: 6
TRANSPORTATION_ASSISTANCE: 6
HOUSEKEEPING_ASSISTANCE: 6

## 2018-07-24 ASSESSMENT — ENCOUNTER SYMPTOMS
NAUSEA: DENIES
VOMITING: DENIES

## 2018-07-26 ENCOUNTER — HOME CARE VISIT (OUTPATIENT)
Dept: HOME HEALTH SERVICES | Facility: HOME HEALTHCARE | Age: 38
End: 2018-07-26
Payer: MEDICARE

## 2018-07-26 VITALS
OXYGEN SATURATION: 98 % | DIASTOLIC BLOOD PRESSURE: 60 MMHG | DIASTOLIC BLOOD PRESSURE: 68 MMHG | SYSTOLIC BLOOD PRESSURE: 102 MMHG | RESPIRATION RATE: 18 BRPM | TEMPERATURE: 98.9 F | HEART RATE: 79 BPM | TEMPERATURE: 99.1 F | OXYGEN SATURATION: 98 % | SYSTOLIC BLOOD PRESSURE: 98 MMHG | HEART RATE: 110 BPM | RESPIRATION RATE: 18 BRPM

## 2018-07-26 VITALS
DIASTOLIC BLOOD PRESSURE: 60 MMHG | TEMPERATURE: 98.9 F | RESPIRATION RATE: 18 BRPM | HEART RATE: 79 BPM | OXYGEN SATURATION: 98 % | SYSTOLIC BLOOD PRESSURE: 98 MMHG

## 2018-07-26 PROCEDURE — G0151 HHCP-SERV OF PT,EA 15 MIN: HCPCS

## 2018-07-26 PROCEDURE — G0156 HHCP-SVS OF AIDE,EA 15 MIN: HCPCS

## 2018-07-26 PROCEDURE — G0152 HHCP-SERV OF OT,EA 15 MIN: HCPCS

## 2018-07-26 ASSESSMENT — ACTIVITIES OF DAILY LIVING (ADL): BATHING_ASSISTANCE: 6

## 2018-07-27 ENCOUNTER — HOME CARE VISIT (OUTPATIENT)
Dept: HOME HEALTH SERVICES | Facility: HOME HEALTHCARE | Age: 38
End: 2018-07-27
Payer: MEDICARE

## 2018-07-27 VITALS
RESPIRATION RATE: 18 BRPM | DIASTOLIC BLOOD PRESSURE: 68 MMHG | OXYGEN SATURATION: 96 % | HEART RATE: 85 BPM | SYSTOLIC BLOOD PRESSURE: 118 MMHG | TEMPERATURE: 98.5 F

## 2018-07-27 VITALS
SYSTOLIC BLOOD PRESSURE: 98 MMHG | DIASTOLIC BLOOD PRESSURE: 60 MMHG | HEART RATE: 79 BPM | TEMPERATURE: 98.9 F | OXYGEN SATURATION: 98 % | RESPIRATION RATE: 18 BRPM

## 2018-07-27 PROCEDURE — G0300 HHS/HOSPICE OF LPN EA 15 MIN: HCPCS

## 2018-07-27 SDOH — ECONOMIC STABILITY: HOUSING INSECURITY: UNSAFE COOKING RANGE AREA: 0

## 2018-07-27 SDOH — ECONOMIC STABILITY: HOUSING INSECURITY: UNSAFE APPLIANCES: 0

## 2018-07-27 ASSESSMENT — ACTIVITIES OF DAILY LIVING (ADL)
BATHING_ASSISTANCE: 6
LAUNDRY_ASSISTANCE: 6
ORAL_CARE_ASSISTANCE: 6
TELEPHONE_ASSISTANCE: 4
GROOMING_ASSISTANCE: 6
DRESSING_UB_ASSISTANCE: 6
TOILETING_ASSISTANCE: 4
DRESSING_LB_ASSISTANCE: 6
SHOPPING_ASSISTANCE: 6
HOUSEKEEPING_ASSISTANCE: 6
EATING_ASSISTANCE: 6
TRANSPORTATION_ASSISTANCE: 6
MEAL_PREP_ASSISTANCE: 6

## 2018-07-27 ASSESSMENT — ENCOUNTER SYMPTOMS
DEBILITATING PAIN: 1
NAUSEA: DENIES
DIFFICULTY THINKING: 1
VOMITING: DENIES

## 2018-07-30 ENCOUNTER — HOME CARE VISIT (OUTPATIENT)
Dept: HOME HEALTH SERVICES | Facility: HOME HEALTHCARE | Age: 38
End: 2018-07-30
Payer: MEDICARE

## 2018-07-30 VITALS
RESPIRATION RATE: 16 BRPM | TEMPERATURE: 99.4 F | OXYGEN SATURATION: 96 % | HEART RATE: 88 BPM | SYSTOLIC BLOOD PRESSURE: 124 MMHG | DIASTOLIC BLOOD PRESSURE: 65 MMHG

## 2018-07-30 VITALS
HEART RATE: 94 BPM | DIASTOLIC BLOOD PRESSURE: 85 MMHG | RESPIRATION RATE: 18 BRPM | OXYGEN SATURATION: 96 % | SYSTOLIC BLOOD PRESSURE: 124 MMHG | TEMPERATURE: 99.4 F

## 2018-07-30 PROCEDURE — G0152 HHCP-SERV OF OT,EA 15 MIN: HCPCS

## 2018-07-30 PROCEDURE — G0299 HHS/HOSPICE OF RN EA 15 MIN: HCPCS

## 2018-07-30 PROCEDURE — G0151 HHCP-SERV OF PT,EA 15 MIN: HCPCS

## 2018-07-30 ASSESSMENT — ENCOUNTER SYMPTOMS
MENTAL STATUS CHANGE: 0
POOR JUDGMENT: 1
DEPRESSED MOOD: 1
NAUSEA: DENIES
DEBILITATING PAIN: 1
DIFFICULTY THINKING: 1
DEPRESSED MOOD: 1
DIFFICULTY THINKING: 1

## 2018-07-31 ENCOUNTER — OFFICE VISIT (OUTPATIENT)
Dept: NEUROLOGY | Facility: MEDICAL CENTER | Age: 38
End: 2018-07-31
Payer: MEDICARE

## 2018-07-31 ENCOUNTER — HOME CARE VISIT (OUTPATIENT)
Dept: HOME HEALTH SERVICES | Facility: HOME HEALTHCARE | Age: 38
End: 2018-07-31
Payer: MEDICARE

## 2018-07-31 VITALS
BODY MASS INDEX: 23.1 KG/M2 | TEMPERATURE: 98.2 F | DIASTOLIC BLOOD PRESSURE: 74 MMHG | WEIGHT: 165 LBS | OXYGEN SATURATION: 97 % | HEIGHT: 71 IN | SYSTOLIC BLOOD PRESSURE: 120 MMHG | HEART RATE: 86 BPM

## 2018-07-31 VITALS
SYSTOLIC BLOOD PRESSURE: 104 MMHG | OXYGEN SATURATION: 97 % | TEMPERATURE: 99 F | DIASTOLIC BLOOD PRESSURE: 82 MMHG | RESPIRATION RATE: 17 BRPM | HEART RATE: 87 BPM

## 2018-07-31 DIAGNOSIS — M62.838 MUSCLE SPASM: ICD-10-CM

## 2018-07-31 DIAGNOSIS — G35 MS (MULTIPLE SCLEROSIS) (HCC): ICD-10-CM

## 2018-07-31 PROCEDURE — G0156 HHCP-SVS OF AIDE,EA 15 MIN: HCPCS

## 2018-07-31 PROCEDURE — 99214 OFFICE O/P EST MOD 30 MIN: CPT | Performed by: PSYCHIATRY & NEUROLOGY

## 2018-08-01 ENCOUNTER — HOME CARE VISIT (OUTPATIENT)
Dept: HOME HEALTH SERVICES | Facility: HOME HEALTHCARE | Age: 38
End: 2018-08-01
Payer: MEDICARE

## 2018-08-01 VITALS
RESPIRATION RATE: 16 BRPM | TEMPERATURE: 99 F | DIASTOLIC BLOOD PRESSURE: 76 MMHG | HEART RATE: 82 BPM | OXYGEN SATURATION: 98 % | SYSTOLIC BLOOD PRESSURE: 128 MMHG

## 2018-08-01 VITALS
RESPIRATION RATE: 16 BRPM | SYSTOLIC BLOOD PRESSURE: 128 MMHG | TEMPERATURE: 99 F | OXYGEN SATURATION: 98 % | DIASTOLIC BLOOD PRESSURE: 76 MMHG | HEART RATE: 82 BPM

## 2018-08-01 PROCEDURE — G0152 HHCP-SERV OF OT,EA 15 MIN: HCPCS

## 2018-08-01 PROCEDURE — G0151 HHCP-SERV OF PT,EA 15 MIN: HCPCS

## 2018-08-01 ASSESSMENT — ACTIVITIES OF DAILY LIVING (ADL)
BATHING_ASSISTANCE: 6
DRESSING_UB_ASSISTANCE: 6
DRESSING_LB_ASSISTANCE: 6
TOILETING_ASSISTANCE: 6
TOILETING_EQUIPMENT_USED: BSC

## 2018-08-01 ASSESSMENT — ENCOUNTER SYMPTOMS
DIFFICULTY THINKING: 1
DEBILITATING PAIN: 1
DEPRESSED MOOD: 1

## 2018-08-01 NOTE — PROGRESS NOTES
"Dictation #1  MRN:[id number]  CSN:[id number]  CHIEF COMPLAINT  Chief Complaint   Patient presents with   • Follow-Up     MS        HPI  Jerzy Juan III is a 35 y.o. male who presents for treatment of his MS today with a recent baclofen pump and which is made his arms feel slightly weak.  Muscle spasm  Patient comes in after having a baclofen pump placed which is decreased his muscle spasticity but weakened him to a certain extent. Patient states he feels depressed about his condition and at times feels he would like to give up. Patient states he did get the Ocrevus but is not sure how much it is helping. Patient is having difficulty getting full-time caregiving here and is looking at moving back to [facility] where he lived previously and has more social support.    MS (multiple sclerosis) ([facility])  Patient states that he has had more problems with cognition and emotional upset. He feels more depressed and he cries frequently and feels he has pseudobulbar affect. Patient states he sees a counselor and she agrees that things are getting worse in terms of his behavioral aspect of the disease.    REVIEW OF SYSTEMS  Pertinent Positives:fatigue, anxiety, paraesthesias   All other systems are negative.     PAST MEDICAL HISTORY  Past Medical History:   Diagnosis Date   • Anxiety    • Back pain    • Blood transfusion    • Bowel habit changes     constipation   • Breath shortness     \"r/t to medication Ocrevus and Baclofen\" \"side effect   • Dental disorder     full denture   • Depression     PTSD/clinical depression   • Fall     4/6/2016   • Headache(784.0)    • Heart burn    • Heart murmur    • Hypertension    • Indigestion    • MS (multiple sclerosis) ([facility]) DX 8/20/2013   • Pain 06/20/2018    \"everywhere\"   • Urinary bladder disorder     UTI/catheter/bladder stone   • Urinary incontinence     catheter in place       SOCIAL HISTORY  Social History     Social History   • Marital status:      Spouse " name: N/A   • Number of children: N/A   • Years of education: N/A     Occupational History   • Not on file.     Social History Main Topics   • Smoking status: Former Smoker     Packs/day: 1.00     Years: 20.00     Start date: 8/1/1997     Quit date: 8/1/2013   • Smokeless tobacco: Never Used   • Alcohol use 0.0 oz/week      Comment: 2 per day   • Drug use: Yes     Types: Inhaled      Comment: medical marijuana/and oral   • Sexual activity: Not on file      Comment: once daily for 23 yrs     Other Topics Concern   • Not on file     Social History Narrative   • No narrative on file       SURGICAL HISTORY  Past Surgical History:   Procedure Laterality Date   • PUMP INSERT/REMOVE  6/26/2018    Procedure: PUMP INSERT/REMOVE - BACLOFEN;  Surgeon: Andrew Gomez M.D.;  Location: [facility];  Service: Pain Management   • CYSTOSCOPY  5/7/2018    Procedure: CYSTOSCOPY;  Surgeon: Kwasi Sapp M.D.;  Location: [facility];  Service: Urology   • LASERTRIPSY  5/7/2018    Procedure: LASERTRIPSY - FOR: LITHOLAPAXY;  Surgeon: Kwasi Sapp M.D.;  Location: [facility];  Service: Urology   • BLOCK EPIDURAL STEROID INJECTION N/A 3/27/2018    Procedure: BLOCK EPIDURAL STEROID INJECTION - BACLOFEN PUMP TRIAL;  Surgeon: Andrew Gomez M.D.;  Location: [facility];  Service: Pain Management   • GASTROSCOPY  1/14/2017    Procedure: GASTROSCOPY WITH FECAL MATTER TRANSPLANT;  Surgeon: Obinna Douglas M.D.;  Location: [facility];  Service:    • OPEN REDUCTION Left 1988    left thumb       CURRENT MEDICATIONS  Current Outpatient Prescriptions   Medication Sig Dispense Refill   • docusate sodium (COLACE) 250 MG capsule Take 250 mg by mouth 2 Times a Day.     • methenamine hip (HIPPREX) 1 GM Tab Take 1 g by mouth 2 times a day.     • Ascorbic Acid (VITAMIN C) 1000 MG Tab Take  by mouth 2 Times a Day.     • therapeutic multivitamin-minerals (THERAGRAN-M) Tab Take 1 Tab by  "mouth every day.     • losartan (COZAAR) 50 MG Tab Take 50 mg by mouth every evening.     • Magnesium 300 MG Cap Take 300 mg by mouth every day. supplement     • buPROPion SR (WELLBUTRIN-SR) 150 MG TABLET SR 12 HR sustained-release tablet Take 1 Tab by mouth 2 Times a Day. 60 Tab    • escitalopram (LEXAPRO) 20 MG tablet Take 1 Tab by mouth every day. 30 Tab    • oxybutynin SR (DITROPAN-XL) 10 MG CR tablet Take 1 Tab by mouth every evening. 30 Tab    • Cholecalciferol (VITAMIN D3) 5000 UNITS Tab Take 5,000 Units by mouth every day.     • NON SPECIFIED      • ondansetron (ZOFRAN ODT) 4 MG TABLET DISPERSIBLE Take 4 mg by mouth as needed.       No current facility-administered medications for this visit.        ALLERGIES  Allergies   Allergen Reactions   • Asa [Aspirin] Unspecified     Bleeding in stomach as a baby   • Gabapentin Anxiety     anger and severe mood swing   • Pregabalin Anxiety     Anger and severe mood swing       PHYSICAL EXAM  VITAL SIGNS: /74   Pulse 86   Temp 36.8 °C (98.2 °F)   Ht 1.803 m (5' 11\")   Wt 74.8 kg (165 lb)   SpO2 97%   BMI 23.01 kg/m²   Constitutional: Well developed, Well nourished, patient tearful during the appointment.  HENT: normocephalic, atraumatic, dry mucous membranes   Eyes: fundi-disc sharp, perrl   Neck: Normal range of motion, No tenderness, Supple, No stridor.   Cardiovascular: Normal heart rate, Normal rhythm, No murmurs, No rubs, No gallops.   Thorax & Lungs: Normal breath sounds, No respiratory distress, No wheezing, No chest tenderness.   Abdomen: Bowel sounds normal, tender, No tenderness, No masses, No pulsatile masses.   Skin: Warm, Dry, No erythema, No rash, positive tenting  Back: No tenderness, No CVA tenderness.   Extremities: Intact distal pulses, No edema, No tenderness, No cyanosis, No clubbing.   Neurologic: A&Ox3, CN:2-12:nystagmus, motor: increased tone, sensory; decreased in his legs, wheelchair bound  Psychiatric: Affect abnormal, Judgment " "abnormal, Mood depressed.   Lab: Reviewed with patient in detail and as noted in results.        RADIOLOGY/PROCEDURES  MRI of the brain reviewed in the PACS system with the patient.      FINDINGS:  There are multiple ovoid and ushaped areas of increased T2 signal intensity in the immediate periventricular and subcortical white matter. Several lesions are also noted in the zabrina at the level of the brachium pontis. The size and number of lesions has   not changed significantly since previous exam. There are no \"active\" enhancing lesions in the brain parenchyma.    The calvariae are unremarkable.  There are no extra-axial fluid collections.  The ventricular system and basal cisterns are within normal limits.  There are no mass effects or shift of midline structures.  There are no hemorrhagic lesions.  The diffusion   weighted axial images show no evidence of acute cerebral infarction.    The postcontrast images show no areas of abnormal parenchymal or meningeal enhancement.    The brainstem and posterior fossa structures are unremarkable.    Vascular flow voids in the vertebrobasilar and carotid arteries, [place], and dural venous sinuses are intact.    The paranasal sinuses and mastoids in the field of view are unremarkable.         Impression          1.  Numerous periventricular, subcortical, and pontine areas of demyelination unchanged from previous exam of 2/29/16 and consistent with the patient's known diagnosis of multiple sclerosis.    2.  No \"active\" enhancing lesions in the brain parenchyma.           ASSESSMENT AND PLAN  1. MS (multiple sclerosis)  Plan to continue Ocrevus for progressive multiple sclerosis unless patient continues to suffer from frequent infections. Currently patient appears to have stabilized since I last saw him when he was hospitalized. Patient is doing better after the bladder stones were taking care of and the baclofen pump was placed. We discussed care treatment options for " the patient when he goes to [place]. We discussed the cognitive issues related to his frontal lobe lesions.    2. Muscle spasm    The baclofen bump has helped significantly with his leg spasticity but he feels is causing some arm weakness. He should discuss his concerns with Dr. Gomez. Recommend continue physical therapy and occupational therapy at home. Patient would benefit from full-time 24-hour skilled nursing facility and his family is looking for one potential in [place] where he has family and lived previously.       I spent 35 minutes with this patient and his caregiver face-to-face, over fifty percent was spent counseling patient on their condition, best management practices, reviewing test results and risks and benefits of treatment.

## 2018-08-01 NOTE — ASSESSMENT & PLAN NOTE
Patient comes in after having a baclofen pump placed which is decreased his muscle spasticity but weakened him to a certain extent. Patient states he feels depressed about his condition and at times feels he would like to give up. Patient states he did get the Ocrevus but is not sure how much it is helping. Patient is having difficulty getting full-time caregiving here and is looking at moving back to [facility] where he lived previously and has more social support.

## 2018-08-01 NOTE — ASSESSMENT & PLAN NOTE
Patient states that he has had more problems with cognition and emotional upset. He feels more depressed and he cries frequently and feels he has pseudobulbar affect. Patient states he sees a counselor and she agrees that things are getting worse in terms of his behavioral aspect of the disease.

## 2018-08-02 ENCOUNTER — HOME CARE VISIT (OUTPATIENT)
Dept: HOME HEALTH SERVICES | Facility: HOME HEALTHCARE | Age: 38
End: 2018-08-02
Payer: MEDICARE

## 2018-08-03 ENCOUNTER — HOME CARE VISIT (OUTPATIENT)
Dept: HOME HEALTH SERVICES | Facility: HOME HEALTHCARE | Age: 38
End: 2018-08-03
Payer: MEDICARE

## 2018-08-03 PROCEDURE — G0156 HHCP-SVS OF AIDE,EA 15 MIN: HCPCS

## 2018-08-05 VITALS
RESPIRATION RATE: 17 BRPM | OXYGEN SATURATION: 98 % | HEART RATE: 98 BPM | TEMPERATURE: 98.8 F | DIASTOLIC BLOOD PRESSURE: 60 MMHG | SYSTOLIC BLOOD PRESSURE: 120 MMHG

## 2018-08-06 ENCOUNTER — HOME CARE VISIT (OUTPATIENT)
Dept: HOME HEALTH SERVICES | Facility: HOME HEALTHCARE | Age: 38
End: 2018-08-06
Payer: MEDICARE

## 2018-08-06 VITALS
RESPIRATION RATE: 18 BRPM | SYSTOLIC BLOOD PRESSURE: 128 MMHG | OXYGEN SATURATION: 98 % | HEART RATE: 83 BPM | DIASTOLIC BLOOD PRESSURE: 80 MMHG | TEMPERATURE: 99 F

## 2018-08-06 VITALS
HEART RATE: 83 BPM | TEMPERATURE: 99 F | SYSTOLIC BLOOD PRESSURE: 128 MMHG | RESPIRATION RATE: 18 BRPM | OXYGEN SATURATION: 98 % | DIASTOLIC BLOOD PRESSURE: 80 MMHG

## 2018-08-06 PROCEDURE — G0299 HHS/HOSPICE OF RN EA 15 MIN: HCPCS

## 2018-08-06 PROCEDURE — G0152 HHCP-SERV OF OT,EA 15 MIN: HCPCS

## 2018-08-06 PROCEDURE — G0151 HHCP-SERV OF PT,EA 15 MIN: HCPCS

## 2018-08-06 ASSESSMENT — ENCOUNTER SYMPTOMS: DEBILITATING PAIN: 1

## 2018-08-08 ENCOUNTER — HOME CARE VISIT (OUTPATIENT)
Dept: HOME HEALTH SERVICES | Facility: HOME HEALTHCARE | Age: 38
End: 2018-08-08
Payer: MEDICARE

## 2018-08-08 VITALS
RESPIRATION RATE: 18 BRPM | HEART RATE: 83 BPM | TEMPERATURE: 98 F | SYSTOLIC BLOOD PRESSURE: 128 MMHG | DIASTOLIC BLOOD PRESSURE: 80 MMHG | OXYGEN SATURATION: 99 %

## 2018-08-08 VITALS
OXYGEN SATURATION: 98 % | TEMPERATURE: 98 F | DIASTOLIC BLOOD PRESSURE: 62 MMHG | SYSTOLIC BLOOD PRESSURE: 102 MMHG | HEART RATE: 81 BPM

## 2018-08-08 PROCEDURE — G0151 HHCP-SERV OF PT,EA 15 MIN: HCPCS

## 2018-08-08 PROCEDURE — G0156 HHCP-SVS OF AIDE,EA 15 MIN: HCPCS

## 2018-08-08 ASSESSMENT — ENCOUNTER SYMPTOMS
DIFFICULTY THINKING: 1
POOR JUDGMENT: 1
NAUSEA: DENIES
DEPRESSED MOOD: 1
RESPIRATORY SYMPTOMS COMMENTS: DENIES SOB, RESP EVEN AND UNLABORED

## 2018-08-09 ENCOUNTER — HOME CARE VISIT (OUTPATIENT)
Dept: HOME HEALTH SERVICES | Facility: HOME HEALTHCARE | Age: 38
End: 2018-08-09
Payer: MEDICARE

## 2018-08-09 VITALS
OXYGEN SATURATION: 98 % | HEART RATE: 78 BPM | TEMPERATURE: 98.5 F | SYSTOLIC BLOOD PRESSURE: 100 MMHG | RESPIRATION RATE: 18 BRPM | DIASTOLIC BLOOD PRESSURE: 70 MMHG

## 2018-08-09 PROCEDURE — G0495 RN CARE TRAIN/EDU IN HH: HCPCS

## 2018-08-09 PROCEDURE — G0152 HHCP-SERV OF OT,EA 15 MIN: HCPCS

## 2018-08-09 ASSESSMENT — ENCOUNTER SYMPTOMS
DEBILITATING PAIN: 1
DEPRESSED MOOD: 1

## 2018-08-10 ENCOUNTER — HOME CARE VISIT (OUTPATIENT)
Dept: HOME HEALTH SERVICES | Facility: HOME HEALTHCARE | Age: 38
End: 2018-08-10
Payer: MEDICARE

## 2018-08-10 VITALS
RESPIRATION RATE: 18 BRPM | SYSTOLIC BLOOD PRESSURE: 100 MMHG | HEART RATE: 82 BPM | TEMPERATURE: 99 F | DIASTOLIC BLOOD PRESSURE: 58 MMHG | OXYGEN SATURATION: 97 %

## 2018-08-10 VITALS
OXYGEN SATURATION: 98 % | TEMPERATURE: 98.5 F | HEART RATE: 78 BPM | DIASTOLIC BLOOD PRESSURE: 70 MMHG | SYSTOLIC BLOOD PRESSURE: 100 MMHG | RESPIRATION RATE: 18 BRPM

## 2018-08-10 PROCEDURE — G0156 HHCP-SVS OF AIDE,EA 15 MIN: HCPCS

## 2018-08-11 VITALS
HEART RATE: 81 BPM | TEMPERATURE: 98 F | OXYGEN SATURATION: 98 % | RESPIRATION RATE: 17 BRPM | SYSTOLIC BLOOD PRESSURE: 102 MMHG | DIASTOLIC BLOOD PRESSURE: 62 MMHG

## 2018-08-11 ASSESSMENT — ENCOUNTER SYMPTOMS: DEBILITATING PAIN: 1

## 2018-08-12 SDOH — ECONOMIC STABILITY: HOUSING INSECURITY: UNSAFE COOKING RANGE AREA: 0

## 2018-08-12 SDOH — ECONOMIC STABILITY: HOUSING INSECURITY: UNSAFE APPLIANCES: 0

## 2018-08-12 ASSESSMENT — ENCOUNTER SYMPTOMS: RESPIRATORY SYMPTOMS COMMENTS: REPORTS NO BREATHING PROBLEM

## 2018-08-13 ENCOUNTER — HOME CARE VISIT (OUTPATIENT)
Dept: HOME HEALTH SERVICES | Facility: HOME HEALTHCARE | Age: 38
End: 2018-08-13
Payer: MEDICARE

## 2018-08-13 PROCEDURE — G0156 HHCP-SVS OF AIDE,EA 15 MIN: HCPCS

## 2018-08-13 PROCEDURE — G0152 HHCP-SERV OF OT,EA 15 MIN: HCPCS

## 2018-08-14 ENCOUNTER — HOME CARE VISIT (OUTPATIENT)
Dept: HOME HEALTH SERVICES | Facility: HOME HEALTHCARE | Age: 38
End: 2018-08-14
Payer: MEDICARE

## 2018-08-14 VITALS
RESPIRATION RATE: 18 BRPM | SYSTOLIC BLOOD PRESSURE: 122 MMHG | TEMPERATURE: 98.8 F | DIASTOLIC BLOOD PRESSURE: 80 MMHG | OXYGEN SATURATION: 98 % | HEART RATE: 86 BPM

## 2018-08-14 VITALS
OXYGEN SATURATION: 98 % | SYSTOLIC BLOOD PRESSURE: 102 MMHG | RESPIRATION RATE: 18 BRPM | HEART RATE: 83 BPM | TEMPERATURE: 98.4 F | DIASTOLIC BLOOD PRESSURE: 60 MMHG

## 2018-08-14 VITALS
OXYGEN SATURATION: 98 % | RESPIRATION RATE: 18 BRPM | SYSTOLIC BLOOD PRESSURE: 122 MMHG | DIASTOLIC BLOOD PRESSURE: 80 MMHG | TEMPERATURE: 98.8 F | HEART RATE: 86 BPM

## 2018-08-14 VITALS
SYSTOLIC BLOOD PRESSURE: 102 MMHG | HEART RATE: 85 BPM | RESPIRATION RATE: 18 BRPM | OXYGEN SATURATION: 98 % | DIASTOLIC BLOOD PRESSURE: 60 MMHG | TEMPERATURE: 98.4 F

## 2018-08-14 PROCEDURE — G0152 HHCP-SERV OF OT,EA 15 MIN: HCPCS

## 2018-08-14 PROCEDURE — G0151 HHCP-SERV OF PT,EA 15 MIN: HCPCS

## 2018-08-14 ASSESSMENT — ACTIVITIES OF DAILY LIVING (ADL)
DRESSING_LB_ASSISTANCE: 6
LAUNDRY_ASSISTANCE: 6
TELEPHONE_ASSISTANCE: 0
TRANSPORTATION_ASSISTANCE: 6
ORAL_CARE_ASSISTANCE: 6
MEAL_PREP_ASSISTANCE: 6
TOILETING_ASSISTANCE: 5
HOUSEKEEPING_ASSISTANCE: 6
DRESSING_UB_ASSISTANCE: 6
GROOMING_ASSISTANCE: 6
BATHING_ASSISTANCE: 6
EATING_ASSISTANCE: 5
SHOPPING_ASSISTANCE: 6

## 2018-08-14 ASSESSMENT — ENCOUNTER SYMPTOMS
DEBILITATING PAIN: 1
DEPRESSED MOOD: 1
DEBILITATING PAIN: 1
DEBILITATING PAIN: 1

## 2018-08-16 ENCOUNTER — HOME CARE VISIT (OUTPATIENT)
Dept: HOME HEALTH SERVICES | Facility: HOME HEALTHCARE | Age: 38
End: 2018-08-16
Payer: MEDICARE

## 2018-08-16 VITALS
DIASTOLIC BLOOD PRESSURE: 80 MMHG | TEMPERATURE: 98.8 F | RESPIRATION RATE: 18 BRPM | SYSTOLIC BLOOD PRESSURE: 118 MMHG | HEART RATE: 65 BPM | OXYGEN SATURATION: 98 %

## 2018-08-16 VITALS
OXYGEN SATURATION: 98 % | TEMPERATURE: 98.8 F | HEART RATE: 65 BPM | DIASTOLIC BLOOD PRESSURE: 80 MMHG | RESPIRATION RATE: 18 BRPM | SYSTOLIC BLOOD PRESSURE: 118 MMHG

## 2018-08-16 PROCEDURE — G0156 HHCP-SVS OF AIDE,EA 15 MIN: HCPCS

## 2018-08-16 PROCEDURE — G0151 HHCP-SERV OF PT,EA 15 MIN: HCPCS

## 2018-08-16 ASSESSMENT — ENCOUNTER SYMPTOMS: DEBILITATING PAIN: 1

## 2018-08-17 ENCOUNTER — HOME CARE VISIT (OUTPATIENT)
Dept: HOME HEALTH SERVICES | Facility: HOME HEALTHCARE | Age: 38
End: 2018-08-17
Payer: MEDICARE

## 2018-08-17 PROCEDURE — G0299 HHS/HOSPICE OF RN EA 15 MIN: HCPCS

## 2018-08-20 ENCOUNTER — HOME CARE VISIT (OUTPATIENT)
Dept: HOME HEALTH SERVICES | Facility: HOME HEALTHCARE | Age: 38
End: 2018-08-20
Payer: MEDICARE

## 2018-08-20 PROCEDURE — G0299 HHS/HOSPICE OF RN EA 15 MIN: HCPCS

## 2018-08-21 VITALS
HEART RATE: 86 BPM | OXYGEN SATURATION: 99 % | DIASTOLIC BLOOD PRESSURE: 84 MMHG | TEMPERATURE: 98.7 F | RESPIRATION RATE: 16 BRPM | SYSTOLIC BLOOD PRESSURE: 130 MMHG

## 2018-08-21 ASSESSMENT — ENCOUNTER SYMPTOMS
POOR JUDGMENT: 1
DIFFICULTY THINKING: 1
DEPRESSED MOOD: 1
NAUSEA: DENIES
RESPIRATORY SYMPTOMS COMMENTS: DENIES SOB, RSP EVEN AND UNLABORED

## 2018-08-22 ENCOUNTER — HOME CARE VISIT (OUTPATIENT)
Dept: HOME HEALTH SERVICES | Facility: HOME HEALTHCARE | Age: 38
End: 2018-08-22
Payer: MEDICARE

## 2018-08-22 ENCOUNTER — HOSPITAL ENCOUNTER (OUTPATIENT)
Dept: LAB | Facility: MEDICAL CENTER | Age: 38
End: 2018-08-22
Attending: PHYSICIAN ASSISTANT
Payer: MEDICARE

## 2018-08-22 VITALS
DIASTOLIC BLOOD PRESSURE: 80 MMHG | HEART RATE: 110 BPM | SYSTOLIC BLOOD PRESSURE: 140 MMHG | OXYGEN SATURATION: 96 % | TEMPERATURE: 99.5 F | RESPIRATION RATE: 16 BRPM

## 2018-08-22 LAB
AMBIGUOUS DTTM AMBI4: NORMAL
SIGNIFICANT IND 70042: NORMAL
SITE SITE: NORMAL
SOURCE SOURCE: NORMAL

## 2018-08-22 PROCEDURE — 87086 URINE CULTURE/COLONY COUNT: CPT

## 2018-08-22 PROCEDURE — G0156 HHCP-SVS OF AIDE,EA 15 MIN: HCPCS

## 2018-08-22 PROCEDURE — G0299 HHS/HOSPICE OF RN EA 15 MIN: HCPCS

## 2018-08-22 ASSESSMENT — ENCOUNTER SYMPTOMS
DEPRESSED MOOD: 1
DIFFICULTY THINKING: 1
MENTAL STATUS CHANGE: 0
NAUSEA: DENIES
POOR JUDGMENT: 1

## 2018-08-23 ENCOUNTER — HOME CARE VISIT (OUTPATIENT)
Dept: HOME HEALTH SERVICES | Facility: HOME HEALTHCARE | Age: 38
End: 2018-08-23
Payer: MEDICARE

## 2018-08-23 VITALS
OXYGEN SATURATION: 98 % | RESPIRATION RATE: 18 BRPM | TEMPERATURE: 98.9 F | SYSTOLIC BLOOD PRESSURE: 108 MMHG | HEART RATE: 91 BPM | DIASTOLIC BLOOD PRESSURE: 78 MMHG

## 2018-08-23 PROCEDURE — G0151 HHCP-SERV OF PT,EA 15 MIN: HCPCS

## 2018-08-24 ENCOUNTER — HOME CARE VISIT (OUTPATIENT)
Dept: HOME HEALTH SERVICES | Facility: HOME HEALTHCARE | Age: 38
End: 2018-08-24
Payer: MEDICARE

## 2018-08-24 LAB
BACTERIA UR CULT: NORMAL
SIGNIFICANT IND 70042: NORMAL
SITE SITE: NORMAL
SOURCE SOURCE: NORMAL

## 2018-08-24 PROCEDURE — G0151 HHCP-SERV OF PT,EA 15 MIN: HCPCS

## 2018-08-24 PROCEDURE — G0299 HHS/HOSPICE OF RN EA 15 MIN: HCPCS

## 2018-08-25 ENCOUNTER — HOME CARE VISIT (OUTPATIENT)
Dept: HOME HEALTH SERVICES | Facility: HOME HEALTHCARE | Age: 38
End: 2018-08-25
Payer: MEDICARE

## 2018-08-26 ENCOUNTER — HOME CARE VISIT (OUTPATIENT)
Dept: HOME HEALTH SERVICES | Facility: HOME HEALTHCARE | Age: 38
End: 2018-08-26
Payer: MEDICARE

## 2018-08-26 VITALS
RESPIRATION RATE: 16 BRPM | HEART RATE: 88 BPM | TEMPERATURE: 98.8 F | SYSTOLIC BLOOD PRESSURE: 108 MMHG | OXYGEN SATURATION: 98 % | DIASTOLIC BLOOD PRESSURE: 64 MMHG

## 2018-08-26 SDOH — ECONOMIC STABILITY: HOUSING INSECURITY: UNSAFE COOKING RANGE AREA: 0

## 2018-08-26 SDOH — ECONOMIC STABILITY: HOUSING INSECURITY: UNSAFE APPLIANCES: 0

## 2018-08-27 ENCOUNTER — HOME CARE VISIT (OUTPATIENT)
Dept: HOME HEALTH SERVICES | Facility: HOME HEALTHCARE | Age: 38
End: 2018-08-27
Payer: MEDICARE

## 2018-08-27 VITALS
HEART RATE: 81 BPM | TEMPERATURE: 99.4 F | OXYGEN SATURATION: 97 % | DIASTOLIC BLOOD PRESSURE: 68 MMHG | RESPIRATION RATE: 16 BRPM | SYSTOLIC BLOOD PRESSURE: 104 MMHG

## 2018-08-27 VITALS
HEART RATE: 88 BPM | DIASTOLIC BLOOD PRESSURE: 70 MMHG | TEMPERATURE: 99.1 F | OXYGEN SATURATION: 98 % | SYSTOLIC BLOOD PRESSURE: 120 MMHG | RESPIRATION RATE: 16 BRPM

## 2018-08-27 PROCEDURE — G0493 RN CARE EA 15 MIN HH/HOSPICE: HCPCS

## 2018-08-27 SDOH — ECONOMIC STABILITY: HOUSING INSECURITY: UNSAFE COOKING RANGE AREA: 0

## 2018-08-27 SDOH — ECONOMIC STABILITY: HOUSING INSECURITY: UNSAFE APPLIANCES: 0

## 2018-08-27 ASSESSMENT — ENCOUNTER SYMPTOMS
NAUSEA: DENIES
DIFFICULTY THINKING: 1
RESPIRATORY SYMPTOMS COMMENTS: DENIES SOB, RESP EVEN AND UNLABORED
POOR JUDGMENT: 1
POOR JUDGMENT: 1
NAUSEA: DENIES
DIFFICULTY THINKING: 1
DEPRESSED MOOD: 1
DEPRESSED MOOD: 1

## 2018-08-27 ASSESSMENT — ACTIVITIES OF DAILY LIVING (ADL): OASIS_M1830: 06

## 2018-08-28 VITALS
DIASTOLIC BLOOD PRESSURE: 64 MMHG | SYSTOLIC BLOOD PRESSURE: 108 MMHG | HEART RATE: 88 BPM | OXYGEN SATURATION: 98 % | TEMPERATURE: 98.8 F | RESPIRATION RATE: 16 BRPM

## 2018-08-29 ENCOUNTER — HOME CARE VISIT (OUTPATIENT)
Dept: HOME HEALTH SERVICES | Facility: HOME HEALTHCARE | Age: 38
End: 2018-08-29
Payer: MEDICARE

## 2018-08-30 ENCOUNTER — HOME CARE VISIT (OUTPATIENT)
Dept: HOME HEALTH SERVICES | Facility: HOME HEALTHCARE | Age: 38
End: 2018-08-30
Payer: MEDICARE

## 2018-08-30 PROCEDURE — G0299 HHS/HOSPICE OF RN EA 15 MIN: HCPCS

## 2018-08-30 PROCEDURE — G0156 HHCP-SVS OF AIDE,EA 15 MIN: HCPCS

## 2018-08-30 PROCEDURE — 665003 FOLLOW UP-HOME HEALTH

## 2018-09-03 ENCOUNTER — HOME CARE VISIT (OUTPATIENT)
Dept: HOME HEALTH SERVICES | Facility: HOME HEALTHCARE | Age: 38
End: 2018-09-03
Payer: MEDICARE

## 2018-09-03 VITALS
HEART RATE: 80 BPM | OXYGEN SATURATION: 97 % | DIASTOLIC BLOOD PRESSURE: 60 MMHG | TEMPERATURE: 98.3 F | RESPIRATION RATE: 18 BRPM | SYSTOLIC BLOOD PRESSURE: 115 MMHG

## 2018-09-03 VITALS
DIASTOLIC BLOOD PRESSURE: 65 MMHG | TEMPERATURE: 98.3 F | RESPIRATION RATE: 17 BRPM | HEART RATE: 83 BPM | SYSTOLIC BLOOD PRESSURE: 104 MMHG | OXYGEN SATURATION: 98 %

## 2018-09-03 PROCEDURE — G0299 HHS/HOSPICE OF RN EA 15 MIN: HCPCS

## 2018-09-03 PROCEDURE — G0156 HHCP-SVS OF AIDE,EA 15 MIN: HCPCS

## 2018-09-03 ASSESSMENT — ENCOUNTER SYMPTOMS
NAUSEA: DENIES
DIFFICULTY THINKING: 1
DEPRESSED MOOD: 1
POOR JUDGMENT: 1
RESPIRATORY SYMPTOMS COMMENTS: DENIES SOB, RESP EVEN AND UNLABORED

## 2018-09-04 VITALS
DIASTOLIC BLOOD PRESSURE: 65 MMHG | TEMPERATURE: 98.3 F | OXYGEN SATURATION: 98 % | RESPIRATION RATE: 16 BRPM | HEART RATE: 84 BPM | SYSTOLIC BLOOD PRESSURE: 104 MMHG

## 2018-09-04 ASSESSMENT — ENCOUNTER SYMPTOMS
RESPIRATORY SYMPTOMS COMMENTS: DENIES SOB, RESP EVEN AND UNLABORED
DEPRESSED MOOD: 1
NAUSEA: DENIES
POOR JUDGMENT: 1
DIFFICULTY THINKING: 1

## 2018-09-06 ENCOUNTER — HOME CARE VISIT (OUTPATIENT)
Dept: HOME HEALTH SERVICES | Facility: HOME HEALTHCARE | Age: 38
End: 2018-09-06
Payer: MEDICARE

## 2018-09-06 PROCEDURE — G0299 HHS/HOSPICE OF RN EA 15 MIN: HCPCS

## 2018-09-06 PROCEDURE — G0156 HHCP-SVS OF AIDE,EA 15 MIN: HCPCS

## 2018-09-07 VITALS
OXYGEN SATURATION: 98 % | HEART RATE: 92 BPM | DIASTOLIC BLOOD PRESSURE: 80 MMHG | TEMPERATURE: 98.2 F | RESPIRATION RATE: 17 BRPM | SYSTOLIC BLOOD PRESSURE: 102 MMHG

## 2018-09-10 ENCOUNTER — HOME CARE VISIT (OUTPATIENT)
Dept: HOME HEALTH SERVICES | Facility: HOME HEALTHCARE | Age: 38
End: 2018-09-10
Payer: MEDICARE

## 2018-09-10 VITALS
TEMPERATURE: 99.2 F | DIASTOLIC BLOOD PRESSURE: 80 MMHG | RESPIRATION RATE: 18 BRPM | OXYGEN SATURATION: 98 % | SYSTOLIC BLOOD PRESSURE: 102 MMHG | HEART RATE: 88 BPM

## 2018-09-10 PROCEDURE — G0299 HHS/HOSPICE OF RN EA 15 MIN: HCPCS

## 2018-09-10 ASSESSMENT — ENCOUNTER SYMPTOMS
RESPIRATORY SYMPTOMS COMMENTS: DENIES SOB, RESP EVEN AND UNLABORED
POOR JUDGMENT: 1
DIFFICULTY THINKING: 1
NAUSEA: DENIES
DEPRESSED MOOD: 1

## 2018-09-11 ENCOUNTER — HOME CARE VISIT (OUTPATIENT)
Dept: HOME HEALTH SERVICES | Facility: HOME HEALTHCARE | Age: 38
End: 2018-09-11
Payer: MEDICARE

## 2018-09-11 PROCEDURE — G0156 HHCP-SVS OF AIDE,EA 15 MIN: HCPCS

## 2018-09-12 VITALS
TEMPERATURE: 99.2 F | RESPIRATION RATE: 16 BRPM | SYSTOLIC BLOOD PRESSURE: 120 MMHG | OXYGEN SATURATION: 98 % | HEART RATE: 81 BPM | DIASTOLIC BLOOD PRESSURE: 80 MMHG

## 2018-09-14 ENCOUNTER — HOME CARE VISIT (OUTPATIENT)
Dept: HOME HEALTH SERVICES | Facility: HOME HEALTHCARE | Age: 38
End: 2018-09-14
Payer: MEDICARE

## 2018-09-14 VITALS
SYSTOLIC BLOOD PRESSURE: 136 MMHG | RESPIRATION RATE: 18 BRPM | HEART RATE: 88 BPM | TEMPERATURE: 99.5 F | OXYGEN SATURATION: 99 % | DIASTOLIC BLOOD PRESSURE: 80 MMHG

## 2018-09-14 PROCEDURE — G0156 HHCP-SVS OF AIDE,EA 15 MIN: HCPCS

## 2018-09-14 PROCEDURE — G0299 HHS/HOSPICE OF RN EA 15 MIN: HCPCS

## 2018-09-14 ASSESSMENT — ENCOUNTER SYMPTOMS
NAUSEA: DENIES
DEPRESSED MOOD: 1
POOR JUDGMENT: 1
DIFFICULTY THINKING: 1
RESPIRATORY SYMPTOMS COMMENTS: DENIES SOB, RSP EVEN AND UNLABORED

## 2018-09-16 VITALS
HEART RATE: 79 BPM | OXYGEN SATURATION: 99 % | DIASTOLIC BLOOD PRESSURE: 82 MMHG | RESPIRATION RATE: 17 BRPM | TEMPERATURE: 99.5 F | SYSTOLIC BLOOD PRESSURE: 115 MMHG

## 2018-09-17 ENCOUNTER — HOME CARE VISIT (OUTPATIENT)
Dept: HOME HEALTH SERVICES | Facility: HOME HEALTHCARE | Age: 38
End: 2018-09-17
Payer: MEDICARE

## 2018-09-17 VITALS
SYSTOLIC BLOOD PRESSURE: 118 MMHG | OXYGEN SATURATION: 98 % | DIASTOLIC BLOOD PRESSURE: 82 MMHG | RESPIRATION RATE: 18 BRPM | TEMPERATURE: 99.2 F | HEART RATE: 94 BPM

## 2018-09-17 PROCEDURE — G0299 HHS/HOSPICE OF RN EA 15 MIN: HCPCS

## 2018-09-17 PROCEDURE — G0156 HHCP-SVS OF AIDE,EA 15 MIN: HCPCS

## 2018-09-18 VITALS
DIASTOLIC BLOOD PRESSURE: 82 MMHG | TEMPERATURE: 99.2 F | HEART RATE: 84 BPM | DIASTOLIC BLOOD PRESSURE: 68 MMHG | OXYGEN SATURATION: 98 % | SYSTOLIC BLOOD PRESSURE: 122 MMHG | RESPIRATION RATE: 16 BRPM | OXYGEN SATURATION: 98 % | TEMPERATURE: 99.2 F | SYSTOLIC BLOOD PRESSURE: 118 MMHG | RESPIRATION RATE: 18 BRPM | HEART RATE: 71 BPM

## 2018-09-18 ASSESSMENT — ENCOUNTER SYMPTOMS
POOR JUDGMENT: 1
NAUSEA: DENIES
DEPRESSED MOOD: 1
DIFFICULTY THINKING: 1
POOR JUDGMENT: 1
DIFFICULTY THINKING: 1
DEPRESSED MOOD: 1
NAUSEA: DENIES
RESPIRATORY SYMPTOMS COMMENTS: DENIES SOB, RESP EVEN AND UNLABORED

## 2018-09-20 ENCOUNTER — HOME CARE VISIT (OUTPATIENT)
Dept: HOME HEALTH SERVICES | Facility: HOME HEALTHCARE | Age: 38
End: 2018-09-20
Payer: MEDICARE

## 2018-09-20 PROCEDURE — G0299 HHS/HOSPICE OF RN EA 15 MIN: HCPCS

## 2018-09-21 ENCOUNTER — HOME CARE VISIT (OUTPATIENT)
Dept: HOME HEALTH SERVICES | Facility: HOME HEALTHCARE | Age: 38
End: 2018-09-21
Payer: MEDICARE

## 2018-09-21 PROCEDURE — G0156 HHCP-SVS OF AIDE,EA 15 MIN: HCPCS

## 2018-09-23 VITALS
OXYGEN SATURATION: 98 % | HEART RATE: 18 BPM | RESPIRATION RATE: 18 BRPM | TEMPERATURE: 99.1 F | DIASTOLIC BLOOD PRESSURE: 80 MMHG | SYSTOLIC BLOOD PRESSURE: 124 MMHG

## 2018-09-24 ENCOUNTER — HOME CARE VISIT (OUTPATIENT)
Dept: HOME HEALTH SERVICES | Facility: HOME HEALTHCARE | Age: 38
End: 2018-09-24
Payer: MEDICARE

## 2018-09-24 VITALS
RESPIRATION RATE: 18 BRPM | SYSTOLIC BLOOD PRESSURE: 106 MMHG | HEART RATE: 86 BPM | DIASTOLIC BLOOD PRESSURE: 58 MMHG | OXYGEN SATURATION: 98 % | TEMPERATURE: 97.2 F

## 2018-09-24 PROCEDURE — G0156 HHCP-SVS OF AIDE,EA 15 MIN: HCPCS

## 2018-09-24 ASSESSMENT — ENCOUNTER SYMPTOMS
DEPRESSED MOOD: 1
NAUSEA: DENIES
DIFFICULTY THINKING: 1
POOR JUDGMENT: 1
RESPIRATORY SYMPTOMS COMMENTS: DENIES SOB, RESP EVEN AND UNLABORED

## 2018-09-25 ENCOUNTER — HOME CARE VISIT (OUTPATIENT)
Dept: HOME HEALTH SERVICES | Facility: HOME HEALTHCARE | Age: 38
End: 2018-09-25
Payer: MEDICARE

## 2018-09-25 VITALS
SYSTOLIC BLOOD PRESSURE: 116 MMHG | TEMPERATURE: 99.4 F | HEART RATE: 62 BPM | OXYGEN SATURATION: 98 % | DIASTOLIC BLOOD PRESSURE: 58 MMHG | RESPIRATION RATE: 18 BRPM

## 2018-09-25 PROCEDURE — G0299 HHS/HOSPICE OF RN EA 15 MIN: HCPCS

## 2018-09-25 ASSESSMENT — ENCOUNTER SYMPTOMS
DIFFICULTY THINKING: 1
DEPRESSED MOOD: 1
POOR JUDGMENT: 1

## 2018-09-27 ENCOUNTER — HOME CARE VISIT (OUTPATIENT)
Dept: HOME HEALTH SERVICES | Facility: HOME HEALTHCARE | Age: 38
End: 2018-09-27
Payer: MEDICARE

## 2018-09-27 VITALS
DIASTOLIC BLOOD PRESSURE: 82 MMHG | TEMPERATURE: 98.9 F | HEART RATE: 92 BPM | RESPIRATION RATE: 18 BRPM | SYSTOLIC BLOOD PRESSURE: 130 MMHG | OXYGEN SATURATION: 98 %

## 2018-09-27 PROCEDURE — G0156 HHCP-SVS OF AIDE,EA 15 MIN: HCPCS

## 2018-09-28 ENCOUNTER — HOME CARE VISIT (OUTPATIENT)
Dept: HOME HEALTH SERVICES | Facility: HOME HEALTHCARE | Age: 38
End: 2018-09-28
Payer: MEDICARE

## 2018-09-28 PROCEDURE — G0493 RN CARE EA 15 MIN HH/HOSPICE: HCPCS

## 2018-10-01 VITALS
DIASTOLIC BLOOD PRESSURE: 64 MMHG | TEMPERATURE: 99.2 F | RESPIRATION RATE: 18 BRPM | OXYGEN SATURATION: 98 % | HEART RATE: 87 BPM | SYSTOLIC BLOOD PRESSURE: 112 MMHG

## 2018-10-01 SDOH — ECONOMIC STABILITY: HOUSING INSECURITY: UNSAFE APPLIANCES: 0

## 2018-10-01 SDOH — ECONOMIC STABILITY: HOUSING INSECURITY: UNSAFE COOKING RANGE AREA: 0

## 2018-10-01 ASSESSMENT — ACTIVITIES OF DAILY LIVING (ADL)
OASIS_M1830: 06
HOME_HEALTH_OASIS: 01

## 2018-10-01 ASSESSMENT — ENCOUNTER SYMPTOMS
DEPRESSED MOOD: 1
POOR JUDGMENT: 1
DIFFICULTY THINKING: 1

## 2018-10-03 VITALS
TEMPERATURE: 99 F | HEART RATE: 80 BPM | OXYGEN SATURATION: 95 % | RESPIRATION RATE: 19 BRPM | SYSTOLIC BLOOD PRESSURE: 130 MMHG | DIASTOLIC BLOOD PRESSURE: 80 MMHG

## 2018-11-20 ENCOUNTER — OFFICE VISIT (OUTPATIENT)
Dept: NEUROLOGY | Facility: MEDICAL CENTER | Age: 38
End: 2018-11-20
Payer: MEDICARE

## 2018-11-20 VITALS
SYSTOLIC BLOOD PRESSURE: 120 MMHG | HEART RATE: 86 BPM | OXYGEN SATURATION: 96 % | TEMPERATURE: 99.8 F | BODY MASS INDEX: 23.1 KG/M2 | DIASTOLIC BLOOD PRESSURE: 82 MMHG | WEIGHT: 165 LBS | HEIGHT: 71 IN

## 2018-11-20 DIAGNOSIS — G35 MS (MULTIPLE SCLEROSIS) (HCC): ICD-10-CM

## 2018-11-20 DIAGNOSIS — M62.838 MUSCLE SPASM: ICD-10-CM

## 2018-11-20 PROCEDURE — 99214 OFFICE O/P EST MOD 30 MIN: CPT | Performed by: PSYCHIATRY & NEUROLOGY

## 2018-11-20 NOTE — ASSESSMENT & PLAN NOTE
Pt was having trouble finding a better living situation. Pt states that he is having trouble with the caregivers.

## 2018-11-26 NOTE — PROGRESS NOTES
"Dictation #1  MRN:[id number]  CSN:[id number]  CHIEF COMPLAINT  Chief Complaint   Patient presents with   • Follow-Up     MS       HPI  Jerzy Juan III is a 35 y.o. male who presents for treatment of his MS today with a recent baclofen pump and which is made his arms feel slightly weak.  MS (multiple sclerosis) ([facility])  Pt was having trouble finding a better living situation. Pt states that he is having trouble with the caregivers.    Muscle spasm  This has improved with his baclofen pump but the settings are not perfect yet.     REVIEW OF SYSTEMS  Pertinent Positives:fatigue, anxiety, paraesthesias   All other systems are negative.     PAST MEDICAL HISTORY  Past Medical History:   Diagnosis Date   • Anxiety    • Back pain    • Blood transfusion    • Bowel habit changes     constipation   • Breath shortness     \"r/t to medication Ocrevus and Baclofen\" \"side effect   • Dental disorder     full denture   • Depression     PTSD/clinical depression   • Fall     4/6/2016   • Headache(784.0)    • Heart burn    • Heart murmur    • Hypertension    • Indigestion    • MS (multiple sclerosis) ([facility]) DX 8/20/2013   • Pain 06/20/2018    \"everywhere\"   • Urinary bladder disorder     UTI/catheter/bladder stone   • Urinary incontinence     catheter in place       SOCIAL HISTORY  Social History     Social History   • Marital status:      Spouse name: N/A   • Number of children: N/A   • Years of education: N/A     Occupational History   • Not on file.     Social History Main Topics   • Smoking status: Former Smoker     Packs/day: 1.00     Years: 20.00     Start date: 8/1/1997     Quit date: 8/1/2013   • Smokeless tobacco: Never Used   • Alcohol use 0.0 oz/week      Comment: 2 per day   • Drug use: Yes     Types: Inhaled      Comment: medical marijuana/and oral   • Sexual activity: Not on file      Comment: once daily for 23 yrs     Other Topics Concern   • Not on file     Social History Narrative   • No narrative on file "       SURGICAL HISTORY  Past Surgical History:   Procedure Laterality Date   • PUMP INSERT/REMOVE  6/26/2018    Procedure: PUMP INSERT/REMOVE - BACLOFEN;  Surgeon: Andrew Gomez M.D.;  Location: [facility];  Service: Pain Management   • CYSTOSCOPY  5/7/2018    Procedure: CYSTOSCOPY;  Surgeon: Kwasi Sapp M.D.;  Location: SURGERY [facility];  Service: Urology   • LASERTRIPSY  5/7/2018    Procedure: LASERTRIPSY - FOR: LITHOLAPAXY;  Surgeon: Kwasi Sapp M.D.;  Location: SURGERY [facility];  Service: Urology   • BLOCK EPIDURAL STEROID INJECTION N/A 3/27/2018    Procedure: BLOCK EPIDURAL STEROID INJECTION - BACLOFEN PUMP TRIAL;  Surgeon: Andrew Gomez M.D.;  Location: [facility];  Service: Pain Management   • GASTROSCOPY  1/14/2017    Procedure: GASTROSCOPY WITH FECAL MATTER TRANSPLANT;  Surgeon: Obinna Douglas M.D.;  Location: SURGERY [facility];  Service:    • OPEN REDUCTION Left 1988    left thumb       CURRENT MEDICATIONS  Current Outpatient Prescriptions   Medication Sig Dispense Refill   • NON SPECIFIED      • docusate sodium (COLACE) 250 MG capsule Take 250 mg by mouth 2 Times a Day.     • methenamine hip (HIPPREX) 1 GM Tab Take 1 g by mouth 2 times a day.     • Ascorbic Acid (VITAMIN C) 1000 MG Tab Take  by mouth 2 Times a Day.     • therapeutic multivitamin-minerals (THERAGRAN-M) Tab Take 1 Tab by mouth every day.     • ondansetron (ZOFRAN ODT) 4 MG TABLET DISPERSIBLE Take 4 mg by mouth as needed.     • losartan (COZAAR) 50 MG Tab Take 50 mg by mouth every evening.     • Magnesium 300 MG Cap Take 300 mg by mouth every day. supplement     • buPROPion SR (WELLBUTRIN-SR) 150 MG TABLET SR 12 HR sustained-release tablet Take 1 Tab by mouth 2 Times a Day. 60 Tab    • escitalopram (LEXAPRO) 20 MG tablet Take 1 Tab by mouth every day. 30 Tab    • oxybutynin SR (DITROPAN-XL) 10 MG CR tablet Take 1 Tab by mouth every evening. (Patient taking differently: Take 10  "mg by mouth every evening.) 30 Tab    • Cholecalciferol (VITAMIN D3) 5000 UNITS Tab Take 5,000 Units by mouth every day.       No current facility-administered medications for this visit.        ALLERGIES  Allergies   Allergen Reactions   • Asa [Aspirin] Unspecified     Bleeding in stomach as a baby   • Gabapentin Anxiety     anger and severe mood swing   • Pregabalin Anxiety     Anger and severe mood swing       PHYSICAL EXAM  VITAL SIGNS: /82 (BP Location: Left arm, Patient Position: Sitting, BP Cuff Size: Adult)   Pulse 86   Temp 37.7 °C (99.8 °F) (Temporal)   Ht 1.803 m (5' 11\")   Wt 74.8 kg (165 lb) Comment: pt in wheelchair  SpO2 96%   BMI 23.01 kg/m²   Constitutional: Well developed, Well nourished, patient tearful during the appointment.  HENT: normocephalic, atraumatic, dry mucous membranes   Eyes: fundi-disc sharp, perrl   Neck: Normal range of motion, No tenderness, Supple, No stridor.   Cardiovascular: Normal heart rate, Normal rhythm, No murmurs, No rubs, No gallops.   Thorax & Lungs: Normal breath sounds, No respiratory distress, No wheezing, No chest tenderness.   Abdomen: Bowel sounds normal, tender, No tenderness, No masses, No pulsatile masses.   Skin: Warm, Dry, No erythema, No rash, positive tenting  Back: No tenderness, No CVA tenderness.   Extremities: Intact distal pulses, No edema, No tenderness, No cyanosis, No clubbing.   Neurologic: A&Ox3, CN:2-12:nystagmus, motor: increased tone, sensory; decreased in his legs, wheelchair bound  Psychiatric: Affect abnormal, Judgment abnormal, Mood depressed.   Lab: Reviewed with patient in detail and as noted in results.        RADIOLOGY/PROCEDURES  MRI of the brain reviewed in the PACS system with the patient.      FINDINGS:  There are multiple ovoid and ushaped areas of increased T2 signal intensity in the immediate periventricular and subcortical white matter. Several lesions are also noted in the zabrina at the level of the brachium pontis. " "The size and number of lesions has   not changed significantly since previous exam. There are no \"active\" enhancing lesions in the brain parenchyma.    The calvariae are unremarkable.  There are no extra-axial fluid collections.  The ventricular system and basal cisterns are within normal limits.  There are no mass effects or shift of midline structures.  There are no hemorrhagic lesions.  The diffusion   weighted axial images show no evidence of acute cerebral infarction.    The postcontrast images show no areas of abnormal parenchymal or meningeal enhancement.    The brainstem and posterior fossa structures are unremarkable.    Vascular flow voids in the vertebrobasilar and carotid arteries, [place], and dural venous sinuses are intact.    The paranasal sinuses and mastoids in the field of view are unremarkable.         Impression          1.  Numerous periventricular, subcortical, and pontine areas of demyelination unchanged from previous exam of 2/29/16 and consistent with the patient's known diagnosis of multiple sclerosis.    2.  No \"active\" enhancing lesions in the brain parenchyma.           ASSESSMENT AND PLAN  1. MS (multiple sclerosis)  Plan to continue Ocrevus for progressive multiple sclerosis unless patient continues to suffer from frequent infections. Currently patient appears to have stabilized since I last saw him when he was hospitalized. Patient is doing better after the bladder stones were taking care of and the baclofen pump was placed. We discussed care treatment options for the patient when he goes to [place]. We discussed the cognitive issues related to his frontal lobe lesions.    2. Muscle spasm    The baclofen bump has helped significantly with his leg spasticity but he feels is causing some arm weakness. He should discuss his concerns with Dr. Gomez. Recommend continue physical therapy and occupational therapy at home. Patient would benefit from full-time 24-hour skilled " nursing facility and his family is looking for one potential in [place] where he has family and lived previously.       I spent 35 minutes with this patient and his caregiver mother face-to-face, over fifty percent was spent counseling patient on their condition, best management practices, reviewing test results and risks and benefits of treatment.

## 2018-11-29 ENCOUNTER — HOSPITAL ENCOUNTER (OUTPATIENT)
Dept: LAB | Facility: MEDICAL CENTER | Age: 38
End: 2018-11-29
Attending: PHYSICIAN ASSISTANT
Payer: MEDICARE

## 2018-11-29 PROCEDURE — 84153 ASSAY OF PSA TOTAL: CPT

## 2018-11-29 PROCEDURE — 84154 ASSAY OF PSA FREE: CPT

## 2018-11-29 PROCEDURE — 36415 COLL VENOUS BLD VENIPUNCTURE: CPT

## 2018-12-01 LAB
PSA FREE MFR SERPL: 25 %
PSA FREE SERPL-MCNC: 0.1 NG/ML
PSA SERPL-MCNC: 0.4 NG/ML (ref 0–4)

## 2019-01-23 ENCOUNTER — OUTPATIENT INFUSION SERVICES (OUTPATIENT)
Dept: ONCOLOGY | Facility: MEDICAL CENTER | Age: 39
End: 2019-01-23
Attending: PSYCHIATRY & NEUROLOGY
Payer: MEDICARE

## 2019-01-23 ENCOUNTER — HOSPITAL ENCOUNTER (OUTPATIENT)
Dept: LAB | Facility: MEDICAL CENTER | Age: 39
End: 2019-01-23
Attending: PSYCHIATRY & NEUROLOGY
Payer: MEDICARE

## 2019-01-23 VITALS
SYSTOLIC BLOOD PRESSURE: 132 MMHG | TEMPERATURE: 98.7 F | BODY MASS INDEX: 23.1 KG/M2 | HEART RATE: 86 BPM | HEIGHT: 71 IN | RESPIRATION RATE: 18 BRPM | DIASTOLIC BLOOD PRESSURE: 86 MMHG | OXYGEN SATURATION: 97 % | WEIGHT: 165 LBS

## 2019-01-23 DIAGNOSIS — G35 MS (MULTIPLE SCLEROSIS) (HCC): ICD-10-CM

## 2019-01-23 LAB
ALBUMIN SERPL BCP-MCNC: 4.6 G/DL (ref 3.2–4.9)
ALBUMIN/GLOB SERPL: 2 G/DL
ALP SERPL-CCNC: 50 U/L (ref 30–99)
ALT SERPL-CCNC: 19 U/L (ref 2–50)
ANION GAP SERPL CALC-SCNC: 5 MMOL/L (ref 0–11.9)
AST SERPL-CCNC: 16 U/L (ref 12–45)
BASOPHILS # BLD AUTO: 1.2 % (ref 0–1.8)
BASOPHILS # BLD: 0.06 K/UL (ref 0–0.12)
BILIRUB SERPL-MCNC: 0.8 MG/DL (ref 0.1–1.5)
BUN SERPL-MCNC: 18 MG/DL (ref 8–22)
CALCIUM SERPL-MCNC: 9.5 MG/DL (ref 8.5–10.5)
CHLORIDE SERPL-SCNC: 106 MMOL/L (ref 96–112)
CO2 SERPL-SCNC: 31 MMOL/L (ref 20–33)
CREAT SERPL-MCNC: 0.71 MG/DL (ref 0.5–1.4)
EOSINOPHIL # BLD AUTO: 0.13 K/UL (ref 0–0.51)
EOSINOPHIL NFR BLD: 2.6 % (ref 0–6.9)
ERYTHROCYTE [DISTWIDTH] IN BLOOD BY AUTOMATED COUNT: 41.6 FL (ref 35.9–50)
GLOBULIN SER CALC-MCNC: 2.3 G/DL (ref 1.9–3.5)
GLUCOSE SERPL-MCNC: 103 MG/DL (ref 65–99)
HBV CORE AB SERPL QL IA: NEGATIVE
HCT VFR BLD AUTO: 50.9 % (ref 42–52)
HCV AB SER QL: NEGATIVE
HGB BLD-MCNC: 16.9 G/DL (ref 14–18)
IMM GRANULOCYTES # BLD AUTO: 0.02 K/UL (ref 0–0.11)
IMM GRANULOCYTES NFR BLD AUTO: 0.4 % (ref 0–0.9)
LYMPHOCYTES # BLD AUTO: 1.49 K/UL (ref 1–4.8)
LYMPHOCYTES NFR BLD: 30.1 % (ref 22–41)
MCH RBC QN AUTO: 30.4 PG (ref 27–33)
MCHC RBC AUTO-ENTMCNC: 33.2 G/DL (ref 33.7–35.3)
MCV RBC AUTO: 91.5 FL (ref 81.4–97.8)
MONOCYTES # BLD AUTO: 0.57 K/UL (ref 0–0.85)
MONOCYTES NFR BLD AUTO: 11.5 % (ref 0–13.4)
NEUTROPHILS # BLD AUTO: 2.68 K/UL (ref 1.82–7.42)
NEUTROPHILS NFR BLD: 54.2 % (ref 44–72)
NRBC # BLD AUTO: 0 K/UL
NRBC BLD-RTO: 0 /100 WBC
PLATELET # BLD AUTO: 276 K/UL (ref 164–446)
PMV BLD AUTO: 9 FL (ref 9–12.9)
POTASSIUM SERPL-SCNC: 3.8 MMOL/L (ref 3.6–5.5)
PROT SERPL-MCNC: 6.9 G/DL (ref 6–8.2)
RBC # BLD AUTO: 5.56 M/UL (ref 4.7–6.1)
SODIUM SERPL-SCNC: 142 MMOL/L (ref 135–145)
WBC # BLD AUTO: 5 K/UL (ref 4.8–10.8)

## 2019-01-23 PROCEDURE — 96375 TX/PRO/DX INJ NEW DRUG ADDON: CPT

## 2019-01-23 PROCEDURE — 85025 COMPLETE CBC W/AUTO DIFF WBC: CPT

## 2019-01-23 PROCEDURE — 96413 CHEMO IV INFUSION 1 HR: CPT

## 2019-01-23 PROCEDURE — 82784 ASSAY IGA/IGD/IGG/IGM EACH: CPT

## 2019-01-23 PROCEDURE — 306780 HCHG STAT FOR TRANSFUSION PER CASE

## 2019-01-23 PROCEDURE — 86704 HEP B CORE ANTIBODY TOTAL: CPT

## 2019-01-23 PROCEDURE — 88184 FLOWCYTOMETRY/ TC 1 MARKER: CPT

## 2019-01-23 PROCEDURE — 88185 FLOWCYTOMETRY/TC ADD-ON: CPT

## 2019-01-23 PROCEDURE — 36415 COLL VENOUS BLD VENIPUNCTURE: CPT

## 2019-01-23 PROCEDURE — 96415 CHEMO IV INFUSION ADDL HR: CPT

## 2019-01-23 PROCEDURE — 700105 HCHG RX REV CODE 258: Performed by: PSYCHIATRY & NEUROLOGY

## 2019-01-23 PROCEDURE — 700111 HCHG RX REV CODE 636 W/ 250 OVERRIDE (IP): Performed by: PSYCHIATRY & NEUROLOGY

## 2019-01-23 PROCEDURE — 86803 HEPATITIS C AB TEST: CPT

## 2019-01-23 PROCEDURE — 80053 COMPREHEN METABOLIC PANEL: CPT

## 2019-01-23 RX ORDER — BACLOFEN 500 UG/ML
10 INJECTION, SOLUTION INTRATHECAL ONCE
COMMUNITY
End: 2019-01-29

## 2019-01-23 RX ORDER — METHYLPREDNISOLONE SODIUM SUCCINATE 125 MG/2ML
125 INJECTION, POWDER, LYOPHILIZED, FOR SOLUTION INTRAMUSCULAR; INTRAVENOUS ONCE
Status: COMPLETED | OUTPATIENT
Start: 2019-01-23 | End: 2019-01-23

## 2019-01-23 RX ADMIN — OCRELIZUMAB 600 MG: 300 INJECTION INTRAVENOUS at 10:25

## 2019-01-23 RX ADMIN — DIPHENHYDRAMINE HYDROCHLORIDE 50 MG: 50 INJECTION INTRAMUSCULAR; INTRAVENOUS at 09:57

## 2019-01-23 RX ADMIN — METHYLPREDNISOLONE SODIUM SUCCINATE 125 MG: 125 INJECTION, POWDER, FOR SOLUTION INTRAMUSCULAR; INTRAVENOUS at 09:57

## 2019-01-23 NOTE — PROGRESS NOTES
Arrives to infusion with mother in motorized wheelchair for Ocrevus infusion. Denies any active infections or live vaccines. Patient transferred to chair with two person assist. PIV established with good blood return. Pre meds given per mar, ocrevus infused titrating 40ml/hr every 30 minutes to max rate of 200ml/hr. Tolerated well. Monitored for one hour post infusion with no reactions. PIV flushed and removed with tip intact. Gauze and coban placed over site. Next appointment scheduled. Left [facility] in no apparent distress.

## 2019-01-24 LAB
ANNOTATION COMMENT IMP: ABNORMAL
CD19 CELLS NFR SPEC: <1 % (ref 6–23)
CD3 CELLS # BLD: 1385 CELLS/UL (ref 570–2400)
CD3 CELLS NFR SPEC: 91 % (ref 62–87)
CD3+CD4+ CELLS # BLD: 853 CELLS/UL (ref 430–1800)
CD3+CD4+ CELLS NFR BLD: 56 % (ref 32–64)
CD3+CD4+ CELLS/CD3+CD8+ CLL BLD: 1.65 RATIO (ref 0.8–3.9)
CD3+CD8+ CELLS # BLD: 524 CELLS/UL (ref 210–1200)
CD3+CD8+ CELLS NFR SPEC: 34 % (ref 15–46)
CD3-CD16+CD56+ CELLS # SPEC: 129 CELLS/UL (ref 78–470)
CD3-CD16+CD56+ CELLS NFR SPEC: 9 % (ref 4–26)
CELLS.CD3-CD19+ [#/VOLUME] IN BLOOD: 1 CELLS/UL (ref 91–610)
IGA SERPL-MCNC: 98 MG/DL (ref 68–408)
IGG SERPL-MCNC: 805 MG/DL (ref 768–1632)
IGM SERPL-MCNC: 24 MG/DL (ref 35–263)

## 2019-01-29 ENCOUNTER — HOSPITAL ENCOUNTER (INPATIENT)
Facility: MEDICAL CENTER | Age: 39
LOS: 16 days | DRG: 389 | End: 2019-02-15
Attending: EMERGENCY MEDICINE | Admitting: HOSPITALIST
Payer: MEDICARE

## 2019-01-29 ENCOUNTER — APPOINTMENT (OUTPATIENT)
Dept: RADIOLOGY | Facility: MEDICAL CENTER | Age: 39
DRG: 389 | End: 2019-01-29
Attending: EMERGENCY MEDICINE
Payer: MEDICARE

## 2019-01-29 DIAGNOSIS — G35 MS (MULTIPLE SCLEROSIS) (HCC): ICD-10-CM

## 2019-01-29 DIAGNOSIS — K56.7 ILEUS (HCC): ICD-10-CM

## 2019-01-29 PROBLEM — F33.40 RECURRENT MAJOR DEPRESSIVE DISORDER, IN REMISSION (HCC): Status: ACTIVE | Noted: 2019-01-29

## 2019-01-29 PROBLEM — R11.15 INTRACTABLE CYCLICAL VOMITING WITH NAUSEA: Status: ACTIVE | Noted: 2019-01-29

## 2019-01-29 LAB
ALBUMIN SERPL BCP-MCNC: 4.3 G/DL (ref 3.2–4.9)
ALBUMIN/GLOB SERPL: 1.8 G/DL
ALP SERPL-CCNC: 54 U/L (ref 30–99)
ALT SERPL-CCNC: 15 U/L (ref 2–50)
ANION GAP SERPL CALC-SCNC: 10 MMOL/L (ref 0–11.9)
AST SERPL-CCNC: 15 U/L (ref 12–45)
BASOPHILS # BLD AUTO: 1.3 % (ref 0–1.8)
BASOPHILS # BLD: 0.06 K/UL (ref 0–0.12)
BILIRUB SERPL-MCNC: 0.8 MG/DL (ref 0.1–1.5)
BUN SERPL-MCNC: 13 MG/DL (ref 8–22)
CALCIUM SERPL-MCNC: 9.1 MG/DL (ref 8.5–10.5)
CHLORIDE SERPL-SCNC: 104 MMOL/L (ref 96–112)
CO2 SERPL-SCNC: 26 MMOL/L (ref 20–33)
CREAT SERPL-MCNC: 0.88 MG/DL (ref 0.5–1.4)
EOSINOPHIL # BLD AUTO: 0.04 K/UL (ref 0–0.51)
EOSINOPHIL NFR BLD: 0.9 % (ref 0–6.9)
ERYTHROCYTE [DISTWIDTH] IN BLOOD BY AUTOMATED COUNT: 43.9 FL (ref 35.9–50)
GLOBULIN SER CALC-MCNC: 2.4 G/DL (ref 1.9–3.5)
GLUCOSE SERPL-MCNC: 105 MG/DL (ref 65–99)
HCT VFR BLD AUTO: 54.4 % (ref 42–52)
HGB BLD-MCNC: 17.1 G/DL (ref 14–18)
IMM GRANULOCYTES # BLD AUTO: 0.01 K/UL (ref 0–0.11)
IMM GRANULOCYTES NFR BLD AUTO: 0.2 % (ref 0–0.9)
LIPASE SERPL-CCNC: 23 U/L (ref 11–82)
LYMPHOCYTES # BLD AUTO: 0.97 K/UL (ref 1–4.8)
LYMPHOCYTES NFR BLD: 21.4 % (ref 22–41)
MCH RBC QN AUTO: 30.8 PG (ref 27–33)
MCHC RBC AUTO-ENTMCNC: 31.4 G/DL (ref 33.7–35.3)
MCV RBC AUTO: 98 FL (ref 81.4–97.8)
MONOCYTES # BLD AUTO: 0.87 K/UL (ref 0–0.85)
MONOCYTES NFR BLD AUTO: 19.2 % (ref 0–13.4)
NEUTROPHILS # BLD AUTO: 2.58 K/UL (ref 1.82–7.42)
NEUTROPHILS NFR BLD: 57 % (ref 44–72)
NRBC # BLD AUTO: 0 K/UL
NRBC BLD-RTO: 0 /100 WBC
PLATELET # BLD AUTO: 272 K/UL (ref 164–446)
PMV BLD AUTO: 8.6 FL (ref 9–12.9)
POTASSIUM SERPL-SCNC: 3.8 MMOL/L (ref 3.6–5.5)
PROT SERPL-MCNC: 6.7 G/DL (ref 6–8.2)
RBC # BLD AUTO: 5.55 M/UL (ref 4.7–6.1)
SODIUM SERPL-SCNC: 140 MMOL/L (ref 135–145)
WBC # BLD AUTO: 4.5 K/UL (ref 4.8–10.8)

## 2019-01-29 PROCEDURE — 83690 ASSAY OF LIPASE: CPT

## 2019-01-29 PROCEDURE — 85025 COMPLETE CBC W/AUTO DIFF WBC: CPT

## 2019-01-29 PROCEDURE — 36415 COLL VENOUS BLD VENIPUNCTURE: CPT

## 2019-01-29 PROCEDURE — 80053 COMPREHEN METABOLIC PANEL: CPT

## 2019-01-29 PROCEDURE — 700102 HCHG RX REV CODE 250 W/ 637 OVERRIDE(OP): Performed by: HOSPITALIST

## 2019-01-29 PROCEDURE — 700105 HCHG RX REV CODE 258: Performed by: EMERGENCY MEDICINE

## 2019-01-29 PROCEDURE — 99285 EMERGENCY DEPT VISIT HI MDM: CPT

## 2019-01-29 PROCEDURE — 51702 INSERT TEMP BLADDER CATH: CPT

## 2019-01-29 PROCEDURE — 96368 THER/DIAG CONCURRENT INF: CPT

## 2019-01-29 PROCEDURE — 96375 TX/PRO/DX INJ NEW DRUG ADDON: CPT

## 2019-01-29 PROCEDURE — A9270 NON-COVERED ITEM OR SERVICE: HCPCS | Performed by: HOSPITALIST

## 2019-01-29 PROCEDURE — 74018 RADEX ABDOMEN 1 VIEW: CPT

## 2019-01-29 PROCEDURE — 303105 HCHG CATHETER EXTRA

## 2019-01-29 PROCEDURE — 96374 THER/PROPH/DIAG INJ IV PUSH: CPT

## 2019-01-29 PROCEDURE — 700111 HCHG RX REV CODE 636 W/ 250 OVERRIDE (IP): Performed by: HOSPITALIST

## 2019-01-29 PROCEDURE — 99220 PR INITIAL OBSERVATION CARE,LEVL III: CPT | Performed by: HOSPITALIST

## 2019-01-29 PROCEDURE — G0378 HOSPITAL OBSERVATION PER HR: HCPCS

## 2019-01-29 PROCEDURE — 700111 HCHG RX REV CODE 636 W/ 250 OVERRIDE (IP): Performed by: EMERGENCY MEDICINE

## 2019-01-29 PROCEDURE — 96376 TX/PRO/DX INJ SAME DRUG ADON: CPT

## 2019-01-29 RX ORDER — METOCLOPRAMIDE HYDROCHLORIDE 5 MG/ML
10 INJECTION INTRAMUSCULAR; INTRAVENOUS EVERY 6 HOURS
Status: DISCONTINUED | OUTPATIENT
Start: 2019-01-29 | End: 2019-01-31

## 2019-01-29 RX ORDER — POLYETHYLENE GLYCOL 3350 17 G/17G
1 POWDER, FOR SOLUTION ORAL
Status: DISCONTINUED | OUTPATIENT
Start: 2019-01-29 | End: 2019-02-15 | Stop reason: HOSPADM

## 2019-01-29 RX ORDER — OXYBUTYNIN CHLORIDE 10 MG/1
10 TABLET, EXTENDED RELEASE ORAL 2 TIMES DAILY
Status: ON HOLD | COMMUNITY
End: 2019-07-18

## 2019-01-29 RX ORDER — ONDANSETRON 2 MG/ML
4 INJECTION INTRAMUSCULAR; INTRAVENOUS ONCE
Status: COMPLETED | OUTPATIENT
Start: 2019-01-29 | End: 2019-01-29

## 2019-01-29 RX ORDER — ESCITALOPRAM OXALATE 10 MG/1
20 TABLET ORAL DAILY
Status: DISCONTINUED | OUTPATIENT
Start: 2019-01-30 | End: 2019-02-15 | Stop reason: HOSPADM

## 2019-01-29 RX ORDER — BISACODYL 10 MG
10 SUPPOSITORY, RECTAL RECTAL
Status: DISCONTINUED | OUTPATIENT
Start: 2019-01-29 | End: 2019-02-15 | Stop reason: HOSPADM

## 2019-01-29 RX ORDER — LOSARTAN POTASSIUM 50 MG/1
50 TABLET ORAL EVERY EVENING
Status: DISCONTINUED | OUTPATIENT
Start: 2019-01-29 | End: 2019-02-15 | Stop reason: HOSPADM

## 2019-01-29 RX ORDER — SODIUM CHLORIDE 9 MG/ML
1000 INJECTION, SOLUTION INTRAVENOUS ONCE
Status: COMPLETED | OUTPATIENT
Start: 2019-01-29 | End: 2019-01-29

## 2019-01-29 RX ORDER — BUPROPION HYDROCHLORIDE 150 MG/1
150 TABLET, EXTENDED RELEASE ORAL 2 TIMES DAILY
Status: DISCONTINUED | OUTPATIENT
Start: 2019-01-29 | End: 2019-02-15 | Stop reason: HOSPADM

## 2019-01-29 RX ORDER — AMOXICILLIN 250 MG
2 CAPSULE ORAL 2 TIMES DAILY
Status: DISCONTINUED | OUTPATIENT
Start: 2019-01-29 | End: 2019-02-15 | Stop reason: HOSPADM

## 2019-01-29 RX ORDER — METHENAMINE HIPPURATE 1000 MG/1
1 TABLET ORAL 2 TIMES DAILY
Status: DISCONTINUED | OUTPATIENT
Start: 2019-01-29 | End: 2019-02-15 | Stop reason: HOSPADM

## 2019-01-29 RX ORDER — ONDANSETRON 2 MG/ML
4 INJECTION INTRAMUSCULAR; INTRAVENOUS EVERY 4 HOURS PRN
Status: DISCONTINUED | OUTPATIENT
Start: 2019-01-29 | End: 2019-02-15 | Stop reason: HOSPADM

## 2019-01-29 RX ORDER — SODIUM CHLORIDE 9 MG/ML
INJECTION, SOLUTION INTRAVENOUS CONTINUOUS
Status: DISCONTINUED | OUTPATIENT
Start: 2019-01-29 | End: 2019-02-02

## 2019-01-29 RX ORDER — MAGNESIUM OXIDE/MAG AA CHELATE 300 MG
300 CAPSULE ORAL DAILY
Status: DISCONTINUED | OUTPATIENT
Start: 2019-01-30 | End: 2019-01-29

## 2019-01-29 RX ORDER — ONDANSETRON 2 MG/ML
4 INJECTION INTRAMUSCULAR; INTRAVENOUS PRN
Status: DISCONTINUED | OUTPATIENT
Start: 2019-01-29 | End: 2019-02-13

## 2019-01-29 RX ORDER — OXYBUTYNIN CHLORIDE 10 MG/1
10 TABLET, EXTENDED RELEASE ORAL 2 TIMES DAILY
Status: DISCONTINUED | OUTPATIENT
Start: 2019-01-29 | End: 2019-02-04

## 2019-01-29 RX ADMIN — LOSARTAN POTASSIUM 50 MG: 50 TABLET ORAL at 23:10

## 2019-01-29 RX ADMIN — SENNOSIDES AND DOCUSATE SODIUM 2 TABLET: 8.6; 5 TABLET ORAL at 23:10

## 2019-01-29 RX ADMIN — FAMOTIDINE 20 MG: 10 INJECTION, SOLUTION INTRAVENOUS at 23:09

## 2019-01-29 RX ADMIN — SODIUM CHLORIDE 1000 ML: 9 INJECTION, SOLUTION INTRAVENOUS at 17:24

## 2019-01-29 RX ADMIN — METOCLOPRAMIDE 10 MG: 5 INJECTION, SOLUTION INTRAMUSCULAR; INTRAVENOUS at 20:06

## 2019-01-29 RX ADMIN — ONDANSETRON 4 MG: 2 INJECTION INTRAMUSCULAR; INTRAVENOUS at 20:27

## 2019-01-29 RX ADMIN — ONDANSETRON 4 MG: 2 INJECTION INTRAMUSCULAR; INTRAVENOUS at 17:24

## 2019-01-29 ASSESSMENT — LIFESTYLE VARIABLES
ON A TYPICAL DAY WHEN YOU DRINK ALCOHOL HOW MANY DRINKS DO YOU HAVE: 2
HOW MANY TIMES IN THE PAST YEAR HAVE YOU HAD 5 OR MORE DRINKS IN A DAY: 0
CONSUMPTION TOTAL: NEGATIVE
AVERAGE NUMBER OF DAYS PER WEEK YOU HAVE A DRINK CONTAINING ALCOHOL: 7
EVER HAD A DRINK FIRST THING IN THE MORNING TO STEADY YOUR NERVES TO GET RID OF A HANGOVER: NO
TOTAL SCORE: 0
TOTAL SCORE: 0
HAVE YOU EVER FELT YOU SHOULD CUT DOWN ON YOUR DRINKING: NO
EVER_SMOKED: NEVER
EVER FELT BAD OR GUILTY ABOUT YOUR DRINKING: NO
TOTAL SCORE: 0
ALCOHOL_USE: YES
HAVE PEOPLE ANNOYED YOU BY CRITICIZING YOUR DRINKING: NO

## 2019-01-29 ASSESSMENT — PATIENT HEALTH QUESTIONNAIRE - PHQ9
8. MOVING OR SPEAKING SO SLOWLY THAT OTHER PEOPLE COULD HAVE NOTICED. OR THE OPPOSITE, BEING SO FIGETY OR RESTLESS THAT YOU HAVE BEEN MOVING AROUND A LOT MORE THAN USUAL: NOT AT ALL
1. LITTLE INTEREST OR PLEASURE IN DOING THINGS: MORE THAN HALF THE DAYS
7. TROUBLE CONCENTRATING ON THINGS, SUCH AS READING THE NEWSPAPER OR WATCHING TELEVISION: MORE THAN HALF THE DAYS
2. FEELING DOWN, DEPRESSED, IRRITABLE, OR HOPELESS: MORE THAN HALF THE DAYS
4. FEELING TIRED OR HAVING LITTLE ENERGY: MORE THAN HALF THE DAYS
5. POOR APPETITE OR OVEREATING: NOT AT ALL
9. THOUGHTS THAT YOU WOULD BE BETTER OFF DEAD, OR OF HURTING YOURSELF: MORE THAN HALF THE DAYS
SUM OF ALL RESPONSES TO PHQ QUESTIONS 1-9: 12
3. TROUBLE FALLING OR STAYING ASLEEP OR SLEEPING TOO MUCH: NOT AT ALL
SUM OF ALL RESPONSES TO PHQ9 QUESTIONS 1 AND 2: 4
6. FEELING BAD ABOUT YOURSELF - OR THAT YOU ARE A FAILURE OR HAVE LET YOURSELF OR YOUR FAMILY DOWN: MORE THAN HALF THE DAYS

## 2019-01-29 ASSESSMENT — COGNITIVE AND FUNCTIONAL STATUS - GENERAL
STANDING UP FROM CHAIR USING ARMS: A LOT
HELP NEEDED FOR BATHING: TOTAL
WALKING IN HOSPITAL ROOM: TOTAL
SUGGESTED CMS G CODE MODIFIER DAILY ACTIVITY: CL
MOBILITY SCORE: 11
DRESSING REGULAR UPPER BODY CLOTHING: TOTAL
PERSONAL GROOMING: A LITTLE
DRESSING REGULAR LOWER BODY CLOTHING: TOTAL
MOVING TO AND FROM BED TO CHAIR: A LOT
EATING MEALS: A LITTLE
SUGGESTED CMS G CODE MODIFIER MOBILITY: CL
DAILY ACTIVITIY SCORE: 10
MOVING FROM LYING ON BACK TO SITTING ON SIDE OF FLAT BED: UNABLE
CLIMB 3 TO 5 STEPS WITH RAILING: TOTAL
TOILETING: TOTAL

## 2019-01-29 ASSESSMENT — ENCOUNTER SYMPTOMS
PSYCHIATRIC NEGATIVE: 1
CONSTITUTIONAL NEGATIVE: 1
RESPIRATORY NEGATIVE: 1
SENSORY CHANGE: 1
MUSCULOSKELETAL NEGATIVE: 1
CARDIOVASCULAR NEGATIVE: 1
VOMITING: 1
NAUSEA: 1
FOCAL WEAKNESS: 1

## 2019-01-30 LAB
ANION GAP SERPL CALC-SCNC: 11 MMOL/L (ref 0–11.9)
BUN SERPL-MCNC: 15 MG/DL (ref 8–22)
CALCIUM SERPL-MCNC: 8.7 MG/DL (ref 8.5–10.5)
CHLORIDE SERPL-SCNC: 106 MMOL/L (ref 96–112)
CO2 SERPL-SCNC: 25 MMOL/L (ref 20–33)
CREAT SERPL-MCNC: 0.81 MG/DL (ref 0.5–1.4)
ERYTHROCYTE [DISTWIDTH] IN BLOOD BY AUTOMATED COUNT: 39.7 FL (ref 35.9–50)
GLUCOSE SERPL-MCNC: 83 MG/DL (ref 65–99)
HCT VFR BLD AUTO: 45.2 % (ref 42–52)
HGB BLD-MCNC: 15.3 G/DL (ref 14–18)
MCH RBC QN AUTO: 30.6 PG (ref 27–33)
MCHC RBC AUTO-ENTMCNC: 33.8 G/DL (ref 33.7–35.3)
MCV RBC AUTO: 90.4 FL (ref 81.4–97.8)
PLATELET # BLD AUTO: 258 K/UL (ref 164–446)
PMV BLD AUTO: 8.7 FL (ref 9–12.9)
POTASSIUM SERPL-SCNC: 3.6 MMOL/L (ref 3.6–5.5)
RBC # BLD AUTO: 5 M/UL (ref 4.7–6.1)
SODIUM SERPL-SCNC: 142 MMOL/L (ref 135–145)
WBC # BLD AUTO: 3.6 K/UL (ref 4.8–10.8)

## 2019-01-30 PROCEDURE — 36415 COLL VENOUS BLD VENIPUNCTURE: CPT

## 2019-01-30 PROCEDURE — 700105 HCHG RX REV CODE 258: Performed by: HOSPITALIST

## 2019-01-30 PROCEDURE — 80048 BASIC METABOLIC PNL TOTAL CA: CPT

## 2019-01-30 PROCEDURE — 96376 TX/PRO/DX INJ SAME DRUG ADON: CPT

## 2019-01-30 PROCEDURE — 700102 HCHG RX REV CODE 250 W/ 637 OVERRIDE(OP): Performed by: HOSPITALIST

## 2019-01-30 PROCEDURE — 700111 HCHG RX REV CODE 636 W/ 250 OVERRIDE (IP): Performed by: HOSPITALIST

## 2019-01-30 PROCEDURE — G0378 HOSPITAL OBSERVATION PER HR: HCPCS

## 2019-01-30 PROCEDURE — 99232 SBSQ HOSP IP/OBS MODERATE 35: CPT | Performed by: HOSPITALIST

## 2019-01-30 PROCEDURE — A9270 NON-COVERED ITEM OR SERVICE: HCPCS | Performed by: HOSPITALIST

## 2019-01-30 PROCEDURE — 85027 COMPLETE CBC AUTOMATED: CPT

## 2019-01-30 PROCEDURE — 770006 HCHG ROOM/CARE - MED/SURG/GYN SEMI*

## 2019-01-30 RX ADMIN — BUPROPION HYDROCHLORIDE 150 MG: 150 TABLET, EXTENDED RELEASE ORAL at 17:16

## 2019-01-30 RX ADMIN — METOCLOPRAMIDE 10 MG: 5 INJECTION, SOLUTION INTRAMUSCULAR; INTRAVENOUS at 17:17

## 2019-01-30 RX ADMIN — MAGNESIUM HYDROXIDE 30 ML: 400 SUSPENSION ORAL at 20:07

## 2019-01-30 RX ADMIN — OXYBUTYNIN CHLORIDE 10 MG: 10 TABLET, EXTENDED RELEASE ORAL at 09:58

## 2019-01-30 RX ADMIN — OXYBUTYNIN CHLORIDE 10 MG: 10 TABLET, EXTENDED RELEASE ORAL at 17:16

## 2019-01-30 RX ADMIN — ESCITALOPRAM OXALATE 20 MG: 10 TABLET ORAL at 06:00

## 2019-01-30 RX ADMIN — METHENAMINE HIPPURATE 1 G: 1 TABLET ORAL at 20:04

## 2019-01-30 RX ADMIN — SENNOSIDES AND DOCUSATE SODIUM 2 TABLET: 8.6; 5 TABLET ORAL at 09:58

## 2019-01-30 RX ADMIN — FAMOTIDINE 20 MG: 10 INJECTION, SOLUTION INTRAVENOUS at 05:36

## 2019-01-30 RX ADMIN — FAMOTIDINE 20 MG: 10 INJECTION, SOLUTION INTRAVENOUS at 17:17

## 2019-01-30 RX ADMIN — LOSARTAN POTASSIUM 50 MG: 50 TABLET ORAL at 17:17

## 2019-01-30 RX ADMIN — METOCLOPRAMIDE 10 MG: 5 INJECTION, SOLUTION INTRAMUSCULAR; INTRAVENOUS at 05:36

## 2019-01-30 RX ADMIN — BUPROPION HYDROCHLORIDE 150 MG: 150 TABLET, EXTENDED RELEASE ORAL at 09:58

## 2019-01-30 RX ADMIN — METOCLOPRAMIDE 10 MG: 5 INJECTION, SOLUTION INTRAMUSCULAR; INTRAVENOUS at 12:45

## 2019-01-30 RX ADMIN — SODIUM CHLORIDE: 9 INJECTION, SOLUTION INTRAVENOUS at 09:52

## 2019-01-30 RX ADMIN — SODIUM CHLORIDE: 9 INJECTION, SOLUTION INTRAVENOUS at 01:15

## 2019-01-30 RX ADMIN — METHENAMINE HIPPURATE 1 G: 1 TABLET ORAL at 09:58

## 2019-01-30 NOTE — DISCHARGE PLANNING
Anticipated Discharge Disposition: Long Term Care Facility    Action: LSW met with ptJerzy and pt's mom, Kandi bedside. Pt moved here from [place] about 3 years ago due to his MS. Pt has been to [facility] in the past and does not want to go back.  Pt is on services with [employer] [place] 26 hrs/week and lives alone. Kandi tries to care for him when [facility] isnt there. This leaves pt alone from about 7:30pm until the next morning.   Kandi has been working with Isabel Triana with Medicaid ([employer] Waiver Program). She can be reached at [phone].  Kandi and Isabel have been working with [facility] for a long term bed for pt.  Kandi is agreeable to this worker calling [facility] and [facility]. She is also agreeable to have a blanket SNF referral (NOT [facility] or [facility]) sent out to [place]/ facilities if [facility] does not have a LT bed available.    LSW made tc to Aging & Disability, Isabel Triana ([phone]) and  for a call back.    LSW made tc to Jimmy with [facility] ([phone]) and  for a call back.    LSW faxed SNF Choice forms to Lisa WANG.      Barriers to Discharge: Medical Clearance; Accepting SNF    Plan: LSW to f/u with MD, CCA, A&D and pt    Care Transition Team Assessment    Information Source  Orientation : Oriented x 4  Information Given By: Patient, Parent  Informant's Name: Kandi Leyva  Who is responsible for making decisions for patient? : Patient (Mom)    Readmission Evaluation  Is this a readmission?: No    Elopement Risk  Legal Hold: No  Ambulatory or Self Mobile in Wheelchair: Yes  Disoriented: No  Psychiatric Symptoms: None  History of Wandering: No  Elopement this Admit: No  Vocalizing Wanting to Leave: No  Displays Behaviors, Body Language Wanting to Leave: No-Not at Risk for Elopement  Elopement Risk: Not at Risk for Elopement    Interdisciplinary Discharge Planning  Primary Care Physician: Dr. Jw Pfeiffer  Lives with - Patient's Self Care Capacity: Alone and  Unable to Care For Self  Patient or legal guardian wants to designate a caregiver (see row info): Yes  Caregiver name: Kandi  Caregiver relationship to patient: Mother  Caregiver contact info: [phone]  [place] [place] for Release of Health Information has been completed: Yes  Support Systems: Family Member(s), Parent  Housing / Facility: 1 Story Apartment / Condo  Do You Take your Prescribed Medications Regularly: Yes  Mobility Issues: Yes  Prior Services: [facility] Services, Continuous (24 Hour) Care Giving Per Service ([facility]), Continuous (24 Hour) Care  Giving Family, Aging Services  Patient Expects to be Discharged to: SNF  Assistance Needed: Yes    Discharge Preparedness  What is your plan after discharge?: Uncertain - pending medical team collaboration  What are your discharge supports?: Parent  Prior Functional Level: Wheelchair Dependent  Difficulity with ADLs: Bathing, Brushing teeth, Dressing, Eating, Toileting, Walking  Difficulty with ADLs Comment: pt is bedbound and has MS  Difficulity with IADLs: Cooking, Driving, Keeping track of finances, Laundry, Managing medication, Shopping, Using the telephone or computer  Difficulity with IADL Comments: Pt has caregivers 26 hours a week through [facility]    Functional Assesment  Prior Functional Level: Wheelchair Dependent    Finances  Financial Barriers to Discharge: No  Average Monthly Income: 1616 $  Source of Income: Social Security Disability  Prescription Coverage: Yes    Vision / Hearing Impairment  Vision Impairment : Yes  Right Eye Vision: Other (Comments) (light sensitive )  Left Eye Vision: Other (Comments) (light sensitive )  Hearing Impairment : No    Values / Beliefs / Concerns  Values / Beliefs Concerns : No    Advance Directive  Advance Directive?: DPOA for Health Care  Durable Power of  Name and Contact : Kandi Leyva ([phone])    Domestic Abuse  Have you ever been the victim of abuse or violence?:  No  Physical Abuse or Sexual Abuse: No  Verbal Abuse or Emotional Abuse: No    Psychological Assessment  History of Substance Abuse: None  History of Psychiatric Problems: Yes (Depression)    Discharge Risks or Barriers  Discharge risks or barriers?: No  Patient risk factors: Complex medical needs    Anticipated Discharge Information  Anticipated discharge disposition: Discharge needs currently unknown

## 2019-01-30 NOTE — PROGRESS NOTES
2 RN skin check performed.     Bilateral heels red blanchable  Right ankle small red blanchable spot  Pain pump noted under abdominal skin to RLQ  Epperson intact    No other skin isues noted.     Patient encouraged to reposition Q2H

## 2019-01-30 NOTE — ED NOTES
Pt states he self caths at home about every 4 hours.    In and cath done by primary RN, sample sent to lab.

## 2019-01-30 NOTE — DISCHARGE PLANNING
Agency/Facility Name: [facility]  Spoke To: Jimmy  Outcome: Patient declined- LTC needs and age.

## 2019-01-30 NOTE — ED NOTES
Med rec updated and complete.  Allergies reviewed.  Updated med rec per information provided by   Family ( mother) at bedside.  No oral antibiotic use in last 30 days at home.

## 2019-01-30 NOTE — H&P
Hospital Medicine History & Physical Note    Date of Service  1/29/2019    Primary Care Physician  NAOMIE Emmanuel    Consultants  NONE  Code Status  Full    Chief Complaint  Nausea vomiting    History of Presenting Illness  38 y.o. male who presented 1/29/2019 with past medical history of multiple sclerosis, hypertension, depression with a 3-day history of nausea and vomiting.  It is is unrelenting.  Vomiting is bilious.  He denies any diarrhea.  Mother states that she has been giving him daily suppositories to have bowel movement.. She is concerned that MS may be causing this difficulty in moving his bowel.  X-ray done in the emergency shows a mild ileus.  The patient  Is paraplegic and wheel chair bound for years    Patient states he has urine retention and straight caths 4 times a day    Review of Systems  Review of Systems   Constitutional: Negative.    HENT: Negative.    Respiratory: Negative.    Cardiovascular: Negative.    Gastrointestinal: Positive for nausea and vomiting.   Genitourinary: Negative.    Musculoskeletal: Negative.    Skin: Negative.    Neurological: Positive for sensory change and focal weakness.   Psychiatric/Behavioral: Negative.        Past Medical History   has a past medical history of Anxiety; Back pain; Blood transfusion; Bowel habit changes; Breath shortness; Dental disorder; Depression; Fall; Headache(784.0); Heart burn; Heart murmur; Hypertension; Indigestion; MS (multiple sclerosis) ([facility]) (DX 8/20/2013); Pain (06/20/2018); Urinary bladder disorder; and Urinary incontinence. He also has no past medical history of Meningitis.    Surgical History   has a past surgical history that includes open reduction (Left, 1988); gastroscopy (1/14/2017); block epidural steroid injection (N/A, 3/27/2018); cystoscopy (5/7/2018); lasertripsy (5/7/2018); and pump insert/remove (6/26/2018).     Family History  family history includes Hypertension in his father and mother; Psychiatry in his father;  Thyroid in his mother.     Social History   reports that he quit smoking about 5 years ago. He started smoking about 21 years ago. He has a 20.00 pack-year smoking history. He has never used smokeless tobacco. He reports that he drinks alcohol. He reports that he uses drugs, including Inhaled.    Allergies  Allergies   Allergen Reactions   • Asa [Aspirin] Unspecified     Bleeding in stomach as a baby   • Gabapentin Anxiety     anger and severe mood swing   • Pregabalin Anxiety     Anger and severe mood swing       Medications  Prior to Admission Medications   Prescriptions Last Dose Informant Patient Reported? Taking?   Ascorbic Acid (VITAMIN C) 1000 MG Tab 1/29/2019 at 0800 Family Member Yes No   Sig: Take 1,000 mg by mouth 2 Times a Day.   Cholecalciferol (VITAMIN D3) 5000 UNITS Tab 1/29/2019 at 0800 Family Member Yes No   Sig: Take 5,000 Units by mouth every day.   Magnesium 300 MG Cap 1/29/2019 at 0800 Family Member Yes No   Sig: Take 300 mg by mouth every day. supplement   Pain Pump (PATIENT SUPPLIED) XX CHAO 1/29/2019 at continuous Family Member Yes Yes   Sig: 3.75 mcg by Injection route Continuous. Patient's Supplied Pain Pump (placed and maintained as an outpatient)  Medication: baclofen  500.00 mcg/ml  Route:intracatheral  Volume: 39.0 ml  Rate:3.75 mcg/hour /89.92 mcg in 24 hour  Bolus 5.00 mcg 1 hour max 6 /day max dose 119.53 mcg in 24 hour  Lockout 1 hour   buPROPion SR (WELLBUTRIN-SR) 150 MG TABLET SR 12 HR sustained-release tablet 1/29/2019 at 0800 Family Member No No   Sig: Take 1 Tab by mouth 2 Times a Day.   docusate sodium (COLACE) 250 MG capsule 1/29/2019 at 0800 Family Member Yes No   Sig: Take 250 mg by mouth 2 Times a Day.   escitalopram (LEXAPRO) 20 MG tablet 1/29/2019 at 0800 Family Member No No   Sig: Take 1 Tab by mouth every day.   losartan (COZAAR) 50 MG Tab 1/28/2019 at 1900 Family Member Yes No   Sig: Take 50 mg by mouth every evening.   methenamine hip (HIPPREX) 1 GM Tab 1/29/2019 at  0800 Family Member Yes No   Sig: Take 1 g by mouth 2 times a day.   ondansetron (ZOFRAN ODT) 4 MG TABLET DISPERSIBLE 1/28/2019 at 2030 Family Member Yes No   Sig: Take 4 mg by mouth as needed.   oxybutynin SR (DITROPAN-XL) 10 MG CR tablet 1/29/2019 at 0800 Family Member Yes Yes   Sig: Take 10 mg by mouth 2 Times a Day.   therapeutic multivitamin-minerals (THERAGRAN-M) Tab 1/29/2019 at 0800 Family Member Yes No   Sig: Take 1 Tab by mouth every day.      Facility-Administered Medications: None       Physical Exam  Temp:  [37.1 °C (98.8 °F)-37.5 °C (99.5 °F)] 37.5 °C (99.5 °F)  Pulse:  [] 81  Resp:  [14-21] 20  BP: (130-131)/(87-89) 130/87  SpO2:  [92 %-98 %] 98 %    Physical Exam   Constitutional: He appears well-developed. He appears distressed.   HENT:   Head: Normocephalic and atraumatic.   Mouth/Throat: No oropharyngeal exudate.   Eyes: Pupils are equal, round, and reactive to light. EOM are normal. Left eye exhibits no discharge. No scleral icterus.   Neck: Neck supple. No JVD present. No thyromegaly present.   Cardiovascular: Normal rate and intact distal pulses.  Exam reveals no gallop and no friction rub.    No murmur heard.  Pulmonary/Chest: Effort normal and breath sounds normal. No respiratory distress. He has no wheezes. He has no rales. He exhibits no tenderness.   Abdominal: He exhibits no distension. There is no tenderness.   Bowel sounds in all 4 quadrants diminished   Musculoskeletal: He exhibits no edema.   Neurological: He is alert.   Paraplegia with bilateral leg weakness    Patient states he has decreased sensation from his abdomen down    Skin: No rash noted. No erythema. No pallor.   Psychiatric:   Depressed mood       Laboratory:  Recent Labs      01/29/19   1725   WBC  4.5*   RBC  5.55   HEMOGLOBIN  17.1   HEMATOCRIT  54.4*   MCV  98.0*   MCH  30.8   MCHC  31.4*   RDW  43.9   PLATELETCT  272   MPV  8.6*     Recent Labs      01/29/19   1725   SODIUM  140   POTASSIUM  3.8   CHLORIDE  104    CO2  26   GLUCOSE  105*   BUN  13   CREATININE  0.88   CALCIUM  9.1     Recent Labs      01/29/19   1725   ALTSGPT  15   ASTSGOT  15   ALKPHOSPHAT  54   TBILIRUBIN  0.8   LIPASE  23   GLUCOSE  105*                 No results for input(s): TROPONINI in the last 72 hours.    Urinalysis:    No results found     Imaging:  [id number] VIEW   Final Result      Mild ileus.            Assessment/Plan:  I anticipate this patient is appropriate for observation status at this time.    * Intractable cyclical vomiting with nausea- (present on admission)   Assessment & Plan    Clear liquid diet    P.o. and IV Zofran    IV Pepcid start 20 iv q12    IV Reglan start 10mg iv q6     MS (multiple sclerosis) (HCC)- (present on admission)   Assessment & Plan    Chronic issue     continue home medication     Essential hypertension- (present on admission)   Assessment & Plan    Continue home medication     Recurrent major depressive disorder, in remission (HCC)- (present on admission)   Assessment & Plan    Wellbutrin and Lexapro     Urinary retention- (present on admission)   Assessment & Plan    Epperson catheter to be placed         VTE prophylaxis: LOVENOX

## 2019-01-30 NOTE — CARE PLAN
Problem: Bowel/Gastric:  Goal: Normal bowel function is maintained or improved    Intervention: Educate patient and significant other/support system about diet, fluid intake, medications and activity to promote bowel function  Taking fluids well. Clear liquid diet. Unable to mobilize.      Problem: Skin Integrity  Goal: Risk for impaired skin integrity will decrease    Intervention: Assess risk factors for impaired skin integrity and/or pressure ulcers  Assisted with turning and repositioning as needed.

## 2019-01-30 NOTE — PROGRESS NOTES
Received patient from ED. Patient AOx4. Patient is unable to ambulate due to MS. 2 RN skin check performed. Admission assessment performed, orders reviewed and explained to patient. Patient has matias. Patient has no complaints at this time. Reinforced safety, use call bell for assistance, bed in lowest position.

## 2019-01-30 NOTE — PROGRESS NOTES
Aox4. JOSÉ, very limited movement to LE's due to MS. Assisted with repositioning as needed. Epperson to gravity. BS+. No n/v at this time. Tolerated clear liquid breakfast tray well. Able to make needs known. Pt's mother at bedside. SW in to speak with pt's mother per request.

## 2019-01-30 NOTE — ASSESSMENT & PLAN NOTE
Chronic issue with significant debility. Needs 24/7 care  - continue home medication  - PT/OT  - SW following, looking for long-term care; difficult dispo   lidocaine patch  - has baclofen pain pump

## 2019-01-30 NOTE — CARE PLAN
Problem: Safety  Goal: Will remain free from injury  Outcome: PROGRESSING AS EXPECTED  Reinforced safety, use call bell for assistance, bed in lowest position. Hourly rounding in place.     Problem: Knowledge Deficit  Goal: Knowledge of disease process/condition, treatment plan, diagnostic tests, and medications will improve  Outcome: PROGRESSING AS EXPECTED  Reviewed medications and plan of care with patient. Encouraged to ask questions.        Launch [facility] and print the 'Prescriptions from this Visit' Report

## 2019-01-30 NOTE — ED PROVIDER NOTES
ED Provider Note    Scribed for John Alvarez M.D. by Sd Clayton. 1/29/2019, 5:01 PM.    Primary care provider: NAOMIE Emmanuel  Means of arrival: Ambulance  History obtained from: Patient  History limited by: None    CHIEF COMPLAINT  Chief Complaint   Patient presents with   • N/V     N/V for 2 days.  no known fever at home.  pt denies pain        HPI  Jerzy Juan III is a 38 y.o. male who presents to the Emergency Department for evaluation of constant nausea onset 3 days ago. The patient confirms emesis and photophobia, but denies any diarrhea. He has not been able to tolerate oral intake. He has taken Zofran at home without relief. The patient has a history of MS, hypertension for which he takes losartan, and depression for which he takes Wellbutrin. He also takes Methenamine. He had an infusion of MS medication 1 week ago, so his mother does not believe he should still notice the side effects.    REVIEW OF SYSTEMS  See HPI for further details. All other systems are negative.     PAST MEDICAL HISTORY   has a past medical history of Anxiety; Back pain; Blood transfusion; Bowel habit changes; Breath shortness; Dental disorder; Depression; Fall; Headache(784.0); Heart burn; Heart murmur; Hypertension; Indigestion; MS (multiple sclerosis) (HCC) (DX 8/20/2013); Pain (06/20/2018); Urinary bladder disorder; and Urinary incontinence.    SURGICAL HISTORY   has a past surgical history that includes open reduction (Left, 1988); gastroscopy (1/14/2017); block epidural steroid injection (N/A, 3/27/2018); cystoscopy (5/7/2018); lasertripsy (5/7/2018); and pump insert/remove (6/26/2018).    SOCIAL HISTORY  Social History   Substance Use Topics   • Smoking status: Former Smoker     Packs/day: 1.00     Years: 20.00     Start date: 8/1/1997     Quit date: 8/1/2013   • Smokeless tobacco: Never Used   • Alcohol use 0.0 oz/week      Comment: 2 per day      History   Drug Use   • Types: Inhaled     Comment:  "medical marijuana/and oral       FAMILY HISTORY  Family History   Problem Relation Age of Onset   • Thyroid Mother    • Hypertension Mother    • Hypertension Father    • Psychiatry Father        CURRENT MEDICATIONS  Reviewed.  See Encounter Summary.     ALLERGIES  Allergies   Allergen Reactions   • Asa [Aspirin] Unspecified     Bleeding in stomach as a baby   • Gabapentin Anxiety     anger and severe mood swing   • Pregabalin Anxiety     Anger and severe mood swing       PHYSICAL EXAM  VITAL SIGNS: /89   Pulse 86   Temp 37.1 °C (98.8 °F) (Temporal)   Resp 16   Ht 1.803 m (5' 11\")   Wt 72.6 kg (160 lb)   BMI 22.32 kg/m²   Constitutional: Alert in mild distress.  HENT: No signs of trauma, Bilateral external ears normal, Nose normal. Slightly dry mucus membranes.  Eyes: Pupils are equal and reactive, Conjunctiva normal, Non-icteric.   Neck: Normal range of motion, No tenderness, Supple, No stridor.   Cardiovascular: Regular rate and rhythm, no murmurs.   Thorax & Lungs: Normal breath sounds, No respiratory distress, No wheezing, No chest tenderness.   Abdomen: Bowel sounds normal, Soft, No tenderness, No masses, No pulsatile masses. No peritoneal signs.  Skin: Warm, Dry, No erythema, No rash.   Back: No bony tenderness, No CVA tenderness.   Extremities: Intact distal pulses, No edema, No tenderness, No cyanosis  Musculoskeletal: Good range of motion in all major joints. No tenderness to palpation or major deformities noted.   Neurologic: Alert , Normal motor function, Normal sensory function. Slightly slurred speech, History of MS.  Psychiatric: Affect normal, Judgment normal, Mood normal.     DIAGNOSTIC STUDIES / PROCEDURES     LABS  Results for orders placed or performed during the hospital encounter of 01/29/19   CBC WITH DIFFERENTIAL   Result Value Ref Range    WBC 4.5 (L) 4.8 - 10.8 K/uL    RBC 5.55 4.70 - 6.10 M/uL    Hemoglobin 17.1 14.0 - 18.0 g/dL    Hematocrit 54.4 (H) 42.0 - 52.0 %    MCV 98.0 " (H) 81.4 - 97.8 fL    MCH 30.8 27.0 - 33.0 pg    MCHC 31.4 (L) 33.7 - 35.3 g/dL    RDW 43.9 35.9 - 50.0 fL    Platelet Count 272 164 - 446 K/uL    MPV 8.6 (L) 9.0 - 12.9 fL    Neutrophils-Polys 57.00 44.00 - 72.00 %    Lymphocytes 21.40 (L) 22.00 - 41.00 %    Monocytes 19.20 (H) 0.00 - 13.40 %    Eosinophils 0.90 0.00 - 6.90 %    Basophils 1.30 0.00 - 1.80 %    Immature Granulocytes 0.20 0.00 - 0.90 %    Nucleated RBC 0.00 /100 WBC    Neutrophils (Absolute) 2.58 1.82 - 7.42 K/uL    Lymphs (Absolute) 0.97 (L) 1.00 - 4.80 K/uL    Monos (Absolute) 0.87 (H) 0.00 - 0.85 K/uL    Eos (Absolute) 0.04 0.00 - 0.51 K/uL    Baso (Absolute) 0.06 0.00 - 0.12 K/uL    Immature Granulocytes (abs) 0.01 0.00 - 0.11 K/uL    NRBC (Absolute) 0.00 K/uL   COMP METABOLIC PANEL   Result Value Ref Range    Sodium 140 135 - 145 mmol/L    Potassium 3.8 3.6 - 5.5 mmol/L    Chloride 104 96 - 112 mmol/L    Co2 26 20 - 33 mmol/L    Anion Gap 10.0 0.0 - 11.9    Glucose 105 (H) 65 - 99 mg/dL    Bun 13 8 - 22 mg/dL    Creatinine 0.88 0.50 - 1.40 mg/dL    Calcium 9.1 8.5 - 10.5 mg/dL    AST(SGOT) 15 12 - 45 U/L    ALT(SGPT) 15 2 - 50 U/L    Alkaline Phosphatase 54 30 - 99 U/L    Total Bilirubin 0.8 0.1 - 1.5 mg/dL    Albumin 4.3 3.2 - 4.9 g/dL    Total Protein 6.7 6.0 - 8.2 g/dL    Globulin 2.4 1.9 - 3.5 g/dL    A-G Ratio 1.8 g/dL   LIPASE   Result Value Ref Range    Lipase 23 11 - 82 U/L   ESTIMATED GFR   Result Value Ref Range    GFR If African American >60 >60 mL/min/1.73 m 2    GFR If Non African American >60 >60 mL/min/1.73 m 2     All labs were reviewed by me.    RADIOLOGY  [id number] VIEW   Final Result      Mild ileus.        The radiologist's interpretation of all radiological studies and images have been reviewed by me.    COURSE & MEDICAL DECISION MAKING  Pertinent Labs & Imaging studies reviewed. (See chart for details)    5:01 PM - Patient seen and examined at bedside. Patient will be treated with Zofran 4 mg. Ordered DX abdomen, CBC,  CMP and lipase to evaluate his symptoms. I informed the patient that I would like to try IV Zofran for treatment, but Phenergan would be the next step. He will need to undergo blood work for evaluation of electrolyte abnormalities and an X-Ray for bowel obstruction.    6:20 PM I reevaluated the patient and he was found to be resting in bed. I informed him of his lab results and that he will be admitted for evaluation. The patient understands and agrees to admission.    7:06 PM Hospitalist paged.    7:10 PM Dr. Lopez, Hospitalist, consulted and agrees to admit the patient.    HYDRATION: Based on the patient's presentation of Dehydration the patient was given IV fluids. IV Hydration was used because oral hydration was not adequate alone. Upon recheck following hydration, the patient was found to have a positive response.    Decision Making:  This is a 38 y.o. year old male who presents with above complaint.  History of MS.  Mostly sedentary.  Finding of ileus today.  Patient has been given IV fluids for ongoing hydration and he has been kept n.p.o.  Labs otherwise reassuring.  We will have the patient brought in for ongoing inpatient care for this finding.  Furthermore the mother notes that she is doing a significant amount of his daily care and they are looking for placement.  For this reason case management has also become involved and will need to be continued on the floor for ongoing outpatient disposition options.    DISPOSITION:  Patient will be admitted to Dr. Lopez, Hospitalist, in guarded condition.    FINAL IMPRESSION  1. Ileus (HCC)          Sd TOM (Wadeibe), am scribing for, and in the presence of, John Alvarez M.D..    Electronically signed by: Sd Clayton (Wadeibe), 1/29/2019    John TOM M.D. personally performed the services described in this documentation, as scribed by Sd Clayton in my presence, and it is both accurate and complete. C    The note accurately  reflects work and decisions made by me.  John Alvraez  1/30/2019  12:28 AM

## 2019-01-30 NOTE — DISCHARGE PLANNING
Pt was admitted to [facility]. LSW left message w/ unit SW to provide update on pt and pt's mom's concerns and possible barriers to discharge.

## 2019-01-30 NOTE — DISCHARGE PLANNING
Anticipated Discharge Disposition: LTC vs Home    Action: LSW met w/ pt and pt's mom, Kandi, at bedside in ER. Kandi states that she has been trying for the past 2 years to find LTC for the pt. Kandi is pt's primary caregiver and she states that she is not able to do it any longer. Kandi reports that [employer] caregivers come in 26 hours a week and she has to be there when caregivers are unable. Kandi also works for [employer] and has not been able to increase these hours. Kandi also reports that she has been working with Isabel Triana / Medicaid and Claudia at [facility]. Kandi further states that, per [facility], the pt has been accepted but due to staffing issues they are unable to take the pt at this time.     LSW made PC to [facility]/ [facility] ([phone]) and left  requesting a return call to discuss pt's referral status.     Barriers to Discharge: Caregiver fatigue, LTC placement needed.    Plan: Await return call from [facility]. LSW to continue to follow.

## 2019-01-30 NOTE — PROGRESS NOTES
[facility] Medicine Daily Progress Note    Date of Service  1/30/2019    Chief Complaint  38 y.o. Male with MS, HTN, depression admitted 1/29/2019 with N/V and failure to thrive.     Interval Problem Update  Sleeping soundly, mother at bedside. No acute overnight events. Continues to have nausea but no vomiting.    Consultants/Specialty  None    Code Status  Full    Disposition  Needs longterm care.    Review of Systems  Review of Systems   Unable to perform ROS: Other      Limited by patient participation.    Physical Exam  Temp:  [37.1 °C (98.8 °F)-37.5 °C (99.5 °F)] 37.1 °C (98.8 °F)  Pulse:  [] 77  Resp:  [14-21] 18  BP: (111-131)/(73-89) 111/73  SpO2:  [92 %-98 %] 93 %    Physical Exam   Constitutional: He is oriented to person, place, and time. He appears well-developed. He appears listless. No distress.   HENT:   Head: Normocephalic.   Mouth/Throat: Oropharynx is clear and moist.   Eyes: Pupils are equal, round, and reactive to light. EOM are normal. No scleral icterus.   Neck: Normal range of motion. Neck supple. No tracheal deviation present.   Cardiovascular: Normal rate, regular rhythm and intact distal pulses.    Pulmonary/Chest: Effort normal and breath sounds normal. No respiratory distress. He has no rales.   Abdominal: Soft. He exhibits no distension. Bowel sounds are decreased. There is no tenderness. There is no rebound.   Musculoskeletal: He exhibits no edema or tenderness.   Neurological: He is oriented to person, place, and time. He appears listless.   paraplegia   Skin: Skin is warm and dry.   Psychiatric: His behavior is normal. He exhibits a depressed mood.   Vitals reviewed.      Fluids    Intake/Output Summary (Last 24 hours) at 01/30/19 0720  Last data filed at 01/30/19 0600   Gross per 24 hour   Intake                0 ml   Output              210 ml   Net             -210 ml       Laboratory  Recent Labs      01/29/19   1725  01/30/19   0326   WBC  4.5*  3.6*   RBC  5.55  5.00    HEMOGLOBIN  17.1  15.3   HEMATOCRIT  54.4*  45.2   MCV  98.0*  90.4   MCH  30.8  30.6   MCHC  31.4*  33.8   RDW  43.9  39.7   PLATELETCT  272  258   MPV  8.6*  8.7*     Recent Labs      01/29/19   1725  01/30/19   0326   SODIUM  140  142   POTASSIUM  3.8  3.6   CHLORIDE  104  106   CO2  26  25   GLUCOSE  105*  83   BUN  13  15   CREATININE  0.88  0.81   CALCIUM  9.1  8.7                   Imaging  [id number] VIEW   Final Result      Mild ileus.           Assessment/Plan  * Intractable cyclical vomiting with nausea- (present on admission)   Assessment & Plan    Ileus on XR.   - Clear liquid diet  - P.o. and IV Zofran  - scheduled reglan for short term  - pepcit     MS (multiple sclerosis) (HCC)- (present on admission)   Assessment & Plan    Chronic issue  - continue home medication  - referral for SNF, will need longterm care     Essential hypertension- (present on admission)   Assessment & Plan    Normotensive.  - Continue home losartan     Recurrent major depressive disorder, in remission (HCC)- (present on admission)   Assessment & Plan    Wellbutrin and Lexapro     Urinary retention- (present on admission)   Assessment & Plan    Straight caths at home.  - matias catheter to be placed          VTE prophylaxis: SCDs

## 2019-01-30 NOTE — ED NOTES
Mother went home. Mother has POA. Mother requests that he doesn't be released without her approval.

## 2019-01-30 NOTE — DISCHARGE PLANNING
Received Choice form at 1123  Agency/Facility Name: [facility] [place] [facility]  Referral sent per Choice form @ 1128

## 2019-01-31 LAB
ANION GAP SERPL CALC-SCNC: 6 MMOL/L (ref 0–11.9)
BUN SERPL-MCNC: 8 MG/DL (ref 8–22)
CALCIUM SERPL-MCNC: 8.3 MG/DL (ref 8.5–10.5)
CHLORIDE SERPL-SCNC: 111 MMOL/L (ref 96–112)
CO2 SERPL-SCNC: 25 MMOL/L (ref 20–33)
CREAT SERPL-MCNC: 0.71 MG/DL (ref 0.5–1.4)
ERYTHROCYTE [DISTWIDTH] IN BLOOD BY AUTOMATED COUNT: 38.5 FL (ref 35.9–50)
GLUCOSE SERPL-MCNC: 98 MG/DL (ref 65–99)
HCT VFR BLD AUTO: 41.5 % (ref 42–52)
HGB BLD-MCNC: 14.4 G/DL (ref 14–18)
MCH RBC QN AUTO: 30.9 PG (ref 27–33)
MCHC RBC AUTO-ENTMCNC: 34.7 G/DL (ref 33.7–35.3)
MCV RBC AUTO: 89.1 FL (ref 81.4–97.8)
PLATELET # BLD AUTO: 229 K/UL (ref 164–446)
PMV BLD AUTO: 8.4 FL (ref 9–12.9)
POTASSIUM SERPL-SCNC: 3.6 MMOL/L (ref 3.6–5.5)
RBC # BLD AUTO: 4.66 M/UL (ref 4.7–6.1)
SODIUM SERPL-SCNC: 142 MMOL/L (ref 135–145)
WBC # BLD AUTO: 3.8 K/UL (ref 4.8–10.8)

## 2019-01-31 PROCEDURE — 700102 HCHG RX REV CODE 250 W/ 637 OVERRIDE(OP): Performed by: HOSPITALIST

## 2019-01-31 PROCEDURE — 700105 HCHG RX REV CODE 258: Performed by: HOSPITALIST

## 2019-01-31 PROCEDURE — 36415 COLL VENOUS BLD VENIPUNCTURE: CPT

## 2019-01-31 PROCEDURE — 80048 BASIC METABOLIC PNL TOTAL CA: CPT

## 2019-01-31 PROCEDURE — A9270 NON-COVERED ITEM OR SERVICE: HCPCS | Performed by: HOSPITALIST

## 2019-01-31 PROCEDURE — 85027 COMPLETE CBC AUTOMATED: CPT

## 2019-01-31 PROCEDURE — 700111 HCHG RX REV CODE 636 W/ 250 OVERRIDE (IP): Performed by: HOSPITALIST

## 2019-01-31 PROCEDURE — 770006 HCHG ROOM/CARE - MED/SURG/GYN SEMI*

## 2019-01-31 PROCEDURE — 99232 SBSQ HOSP IP/OBS MODERATE 35: CPT | Performed by: HOSPITALIST

## 2019-01-31 RX ORDER — METOCLOPRAMIDE 10 MG/1
10 TABLET ORAL EVERY 6 HOURS
Status: DISCONTINUED | OUTPATIENT
Start: 2019-01-31 | End: 2019-02-02

## 2019-01-31 RX ORDER — FAMOTIDINE 20 MG/1
20 TABLET, FILM COATED ORAL 2 TIMES DAILY
Status: DISCONTINUED | OUTPATIENT
Start: 2019-01-31 | End: 2019-02-15 | Stop reason: HOSPADM

## 2019-01-31 RX ADMIN — BUPROPION HYDROCHLORIDE 150 MG: 150 TABLET, EXTENDED RELEASE ORAL at 10:13

## 2019-01-31 RX ADMIN — METOCLOPRAMIDE 10 MG: 5 INJECTION, SOLUTION INTRAMUSCULAR; INTRAVENOUS at 13:27

## 2019-01-31 RX ADMIN — METOCLOPRAMIDE 10 MG: 10 TABLET ORAL at 23:18

## 2019-01-31 RX ADMIN — SODIUM CHLORIDE: 9 INJECTION, SOLUTION INTRAVENOUS at 10:25

## 2019-01-31 RX ADMIN — ESCITALOPRAM OXALATE 20 MG: 10 TABLET ORAL at 10:13

## 2019-01-31 RX ADMIN — LOSARTAN POTASSIUM 50 MG: 50 TABLET ORAL at 18:08

## 2019-01-31 RX ADMIN — METOCLOPRAMIDE 10 MG: 5 INJECTION, SOLUTION INTRAMUSCULAR; INTRAVENOUS at 00:03

## 2019-01-31 RX ADMIN — METOCLOPRAMIDE 10 MG: 5 INJECTION, SOLUTION INTRAMUSCULAR; INTRAVENOUS at 05:25

## 2019-01-31 RX ADMIN — SENNOSIDES AND DOCUSATE SODIUM 2 TABLET: 8.6; 5 TABLET ORAL at 18:08

## 2019-01-31 RX ADMIN — SENNOSIDES AND DOCUSATE SODIUM 2 TABLET: 8.6; 5 TABLET ORAL at 10:14

## 2019-01-31 RX ADMIN — FAMOTIDINE 20 MG: 10 INJECTION, SOLUTION INTRAVENOUS at 05:25

## 2019-01-31 RX ADMIN — METOCLOPRAMIDE 10 MG: 10 TABLET ORAL at 18:08

## 2019-01-31 RX ADMIN — OXYBUTYNIN CHLORIDE 10 MG: 10 TABLET, EXTENDED RELEASE ORAL at 10:11

## 2019-01-31 RX ADMIN — BUPROPION HYDROCHLORIDE 150 MG: 150 TABLET, EXTENDED RELEASE ORAL at 18:08

## 2019-01-31 RX ADMIN — SODIUM CHLORIDE: 9 INJECTION, SOLUTION INTRAVENOUS at 05:32

## 2019-01-31 RX ADMIN — FAMOTIDINE 20 MG: 20 TABLET ORAL at 18:08

## 2019-01-31 ASSESSMENT — ENCOUNTER SYMPTOMS
NAUSEA: 1
CHILLS: 0
SHORTNESS OF BREATH: 0
VOMITING: 0
FEVER: 0
PSYCHIATRIC NEGATIVE: 1
FALLS: 0
LOSS OF CONSCIOUSNESS: 0
ABDOMINAL PAIN: 0
PALPITATIONS: 0
FOCAL WEAKNESS: 1
HEADACHES: 0

## 2019-01-31 NOTE — PROGRESS NOTES
"Hospital Medicine Daily Progress Note    Date of Service  1/31/2019    Chief Complaint  38 y.o. Male with MS, HTN, depression admitted 1/29/2019 with N/V and failure to thrive.     Interval Problem Update  Wakes easily to voice.  Much more interactive today.  States that he is feeling \"normal\".  No acute overnight events.    Consultants/Specialty  None    Code Status  Full    Disposition  Needs longterm care. SNF referral sent.    Review of Systems  Review of Systems   Constitutional: Negative for chills, fever and malaise/fatigue.   HENT: Negative for congestion.    Respiratory: Negative for shortness of breath.    Cardiovascular: Negative for chest pain, palpitations and leg swelling.   Gastrointestinal: Positive for nausea (Greatly improved). Negative for abdominal pain and vomiting.   Genitourinary: Negative for dysuria.   Musculoskeletal: Negative for falls.   Neurological: Positive for focal weakness (At baseline, paraplegia). Negative for loss of consciousness and headaches.   Psychiatric/Behavioral: Negative.    All other systems reviewed and are negative.    Physical Exam  Temp:  [36.8 °C (98.2 °F)-37.2 °C (99 °F)] 37 °C (98.6 °F)  Pulse:  [76-97] 77  Resp:  [17-18] 17  BP: (102-130)/(64-87) 128/80  SpO2:  [92 %-98 %] 92 %    Physical Exam   Constitutional: He is oriented to person, place, and time. He appears well-developed. He appears listless. No distress.   HENT:   Head: Normocephalic.   Mouth/Throat: Oropharynx is clear and moist.   Eyes: EOM are normal. No scleral icterus.   Neck: Normal range of motion. Neck supple. No tracheal deviation present.   Cardiovascular: Normal rate, regular rhythm and intact distal pulses.    Pulmonary/Chest: Effort normal and breath sounds normal. No respiratory distress. He has no rales.   Abdominal: Soft. He exhibits no distension. Bowel sounds are decreased. There is no tenderness. There is no rebound.   Musculoskeletal: He exhibits no edema or tenderness. "   Neurological: He is oriented to person, place, and time. He appears listless.   Paraplegia; weakness of bilateral upper extremities with some contracture more rigidity on the left > right   Skin: Skin is warm and dry. He is not diaphoretic.   Psychiatric: His behavior is normal. He exhibits a depressed mood.   Vitals reviewed.      Fluids    Intake/Output Summary (Last 24 hours) at 01/31/19 0723  Last data filed at 01/31/19 0600   Gross per 24 hour   Intake             2840 ml   Output             3300 ml   Net             -460 ml       Laboratory  Recent Labs      01/29/19   1725  01/30/19   0326  01/31/19   0301   WBC  4.5*  3.6*  3.8*   RBC  5.55  5.00  4.66*   HEMOGLOBIN  17.1  15.3  14.4   HEMATOCRIT  54.4*  45.2  41.5*   MCV  98.0*  90.4  89.1   MCH  30.8  30.6  30.9   MCHC  31.4*  33.8  34.7   RDW  43.9  39.7  38.5   PLATELETCT  272  258  229   MPV  8.6*  8.7*  8.4*     Recent Labs      01/29/19   1725  01/30/19   0326  01/31/19   0301   SODIUM  140  142  142   POTASSIUM  3.8  3.6  3.6   CHLORIDE  104  106  111   CO2  26  25  25   GLUCOSE  105*  83  98   BUN  13  15  8   CREATININE  0.88  0.81  0.71   CALCIUM  9.1  8.7  8.3*                   Imaging  [id number] VIEW   Final Result      Mild ileus.           Assessment/Plan  * Intractable cyclical vomiting with nausea- (present on admission)   Assessment & Plan    Ileus on XR.  Had one BM in the last 24 hours.  - Advance to full liquid diet then as tolerated  - P.o. and IV Zofran  - scheduled reglan for short term  - pepcid     MS (multiple sclerosis) (HCC)- (present on admission)   Assessment & Plan    Chronic issue with significant debility.  - continue home medication  - PT/OT  - referral for SNF, will need longterm care     Essential hypertension- (present on admission)   Assessment & Plan    Normotensive.  - Continue home losartan     Recurrent major depressive disorder, in remission (HCC)- (present on admission)   Assessment & Plan    Stable at this  time.  Appears to be in good spirits this a.m.  -Wellbutrin and Lexapro     Urinary retention- (present on admission)   Assessment & Plan    Straight caths at home.  - matias catheter placed on admission  -Can DC Matias at discharge and continue with straight cath          VTE prophylaxis: SCDs

## 2019-01-31 NOTE — PROGRESS NOTES
Patient is resting in bed. Patient is A&Ox4. Patient denies pain. Patient denies nausea.  Patient had two BMs tonight. Encouraged patient to turn every 2 hours. SCDs applied. Epperson in place. Bed in lowest position. Hourly rounding in place.

## 2019-01-31 NOTE — PROGRESS NOTES
2 RN skin check complete with Shania SIMON.  Pain pump noted in RLQ. Heels pink, blanching. Otherwise skin intact.

## 2019-01-31 NOTE — DISCHARGE PLANNING
Anticipated Discharge Disposition: Long Term Care Faclity    Action: LSW requested Lisa WANG to f/u with outstanding SNF referrals.    LSW spoke with pt's mom, Kandi and provided her with a status update on placement.    LSW requested Lisa WANG send a referral to [facility].    Barriers to Discharge: Medical Clearance; SNF Acceptance    Plan: LSW to f/u with [facility]

## 2019-01-31 NOTE — CARE PLAN
Problem: Venous Thromboembolism (VTW)/Deep Vein Thrombosis (DVT) Prevention:  Goal: Patient will participate in Venous Thrombosis (VTE)/Deep Vein Thrombosis (DVT)Prevention Measures  SCDs on.     Problem: Skin Integrity  Goal: Risk for impaired skin integrity will decrease  Outcome: PROGRESSING AS EXPECTED  Education provided on the importance of q2hr turning.

## 2019-01-31 NOTE — PROGRESS NOTES
Aox4. ESTEFANÍA, biltaeral LE's 1/5 per baseline. Declined up to chair for breakfast. Epperson to gravity. BS+, no n/v. Tolerating clear liquids well. Asking for diet advancement. Able to make needs known.

## 2019-01-31 NOTE — DISCHARGE PLANNING
Agency/Facility Name: [facility]  Spoke To: Rita  Outcome: Patient declined- no LTC beds available.    Agency/Facility Name: [facility]   Outcome: Attempted to follow up, however,no answer. Voicemail left for Glenny(Admissions).    Agency/Facility Name: [facility]  Outcome: Attempted to follow up, however, no answer. Voicemail left for Mallika(Admissions).    Agency/Facility Name: [facility]  Spoke To: Savanah  Outcome: Referral resent to updated fax:[phone].

## 2019-01-31 NOTE — PROGRESS NOTES
2 RN skin check complete: Pain pump noted in RLQ. Heels pink, blanching. Otherwise skin intact. Encouraged patient to turn q2hrs.

## 2019-01-31 NOTE — DISCHARGE PLANNING
Agency/Facility Name: [facility]  Spoke To: Debra  Outcome: Patient declined- no skilled need, LTC.    Agency/Facility Name: [facility]  Spoke To: Niko  Outcome: Patient declined- LTC.    Agency/Facility Name: [facility]  Spoke To: Dorothy  Outcome: Patient declined- no skilled need, LTC.    Agency/Facility Name: [facility]  Spoke To: Jessika  Outcome: Patient declined- no skilled need, LTC.    Agency/Facility Name: [facility]  Spoke To: Ashley  Outcome: Patient declined- LTC, not contracted with Medicaid.

## 2019-01-31 NOTE — CARE PLAN
Problem: Bowel/Gastric:  Goal: Will not experience complications related to bowel motility    Intervention: Assess baseline bowel pattern  BS+. BM x2 last night per night RN. Patient requesting diet advancement.      Problem: Skin Integrity  Goal: Risk for impaired skin integrity will decrease    Intervention: Assess risk factors for impaired skin integrity and/or pressure ulcers  Turns self well in bed. Assistance provided as needed.

## 2019-02-01 PROBLEM — N31.9 NEUROGENIC BLADDER: Status: ACTIVE | Noted: 2017-07-12

## 2019-02-01 PROCEDURE — 700102 HCHG RX REV CODE 250 W/ 637 OVERRIDE(OP): Performed by: HOSPITALIST

## 2019-02-01 PROCEDURE — A9270 NON-COVERED ITEM OR SERVICE: HCPCS | Performed by: HOSPITALIST

## 2019-02-01 PROCEDURE — 770006 HCHG ROOM/CARE - MED/SURG/GYN SEMI*

## 2019-02-01 PROCEDURE — 99232 SBSQ HOSP IP/OBS MODERATE 35: CPT | Performed by: HOSPITALIST

## 2019-02-01 RX ADMIN — FAMOTIDINE 20 MG: 20 TABLET ORAL at 06:22

## 2019-02-01 RX ADMIN — METOCLOPRAMIDE 10 MG: 10 TABLET ORAL at 18:52

## 2019-02-01 RX ADMIN — METOCLOPRAMIDE 10 MG: 10 TABLET ORAL at 13:28

## 2019-02-01 RX ADMIN — OXYBUTYNIN CHLORIDE 10 MG: 10 TABLET, EXTENDED RELEASE ORAL at 06:22

## 2019-02-01 RX ADMIN — LOSARTAN POTASSIUM 50 MG: 50 TABLET ORAL at 18:52

## 2019-02-01 RX ADMIN — ESCITALOPRAM OXALATE 20 MG: 10 TABLET ORAL at 06:22

## 2019-02-01 RX ADMIN — BUPROPION HYDROCHLORIDE 150 MG: 150 TABLET, EXTENDED RELEASE ORAL at 06:22

## 2019-02-01 RX ADMIN — FAMOTIDINE 20 MG: 20 TABLET ORAL at 18:52

## 2019-02-01 RX ADMIN — BUPROPION HYDROCHLORIDE 150 MG: 150 TABLET, EXTENDED RELEASE ORAL at 18:52

## 2019-02-01 RX ADMIN — METHENAMINE HIPPURATE 1 G: 1 TABLET ORAL at 19:38

## 2019-02-01 RX ADMIN — METOCLOPRAMIDE 10 MG: 10 TABLET ORAL at 06:22

## 2019-02-01 RX ADMIN — OXYBUTYNIN CHLORIDE 10 MG: 10 TABLET, EXTENDED RELEASE ORAL at 18:52

## 2019-02-01 RX ADMIN — METHENAMINE HIPPURATE 1 G: 1 TABLET ORAL at 09:41

## 2019-02-01 ASSESSMENT — ENCOUNTER SYMPTOMS
FOCAL WEAKNESS: 1
FEVER: 0
WEAKNESS: 1
CHILLS: 0
NAUSEA: 0
VOMITING: 0
LOSS OF CONSCIOUSNESS: 0
FALLS: 0
PALPITATIONS: 0
SHORTNESS OF BREATH: 0
PSYCHIATRIC NEGATIVE: 1
ABDOMINAL PAIN: 0
HEADACHES: 0

## 2019-02-01 NOTE — DISCHARGE PLANNING
Anticipated Discharge Disposition: Long Term Care Facility    Action: LSW requested Lisa WANG f/u with outstanding SNF referrals.    LSW made a tc to [facility] ([phone]) and spoke with Sylvie. They hope to have approval by the State on Monday. She will have someone call this worker to discuss a referral.     Barriers to Discharge: SNF Acceptance    Plan: LSW to f/u with [facility]

## 2019-02-01 NOTE — PROGRESS NOTES
Physician put in order to dc matias. Patient refused to have matias discontinued. Will notify physician. NICOLE

## 2019-02-01 NOTE — DISCHARGE PLANNING
Agency/Facility Name: [facility]    Spoke To: Glenny  Outcome: Patient declined, not admitting patients currently.

## 2019-02-01 NOTE — DISCHARGE PLANNING
Agency/Facility Name: [facility]  Outcome: Patient declined- no male beds.    Agency/Facility Name: [facility]  Spoke To: Mallika  Outcome: Patient declined- LTC needs.

## 2019-02-01 NOTE — CARE PLAN
Problem: Bowel/Gastric:  Goal: Normal bowel function is maintained or improved  Patient had large bowel movement. Cleaned and barrier cream applied. WCTM

## 2019-02-01 NOTE — CARE PLAN
Problem: Safety  Goal: Will remain free from falls  Outcome: PROGRESSING AS EXPECTED  Bed alarm on, bed locked in lowest position, call light and personal items within reach, non skids socks on.    Problem: Skin Integrity  Goal: Risk for impaired skin integrity will decrease  Outcome: PROGRESSING AS EXPECTED  Waffle mattress and Q2 turns in place.

## 2019-02-01 NOTE — CARE PLAN
Problem: Skin Integrity  Goal: Risk for impaired skin integrity will decrease  Patient refused to have matias discontinued. Mother states must be cathed (st) more frequently than Q6 hrs.

## 2019-02-01 NOTE — PROGRESS NOTES
Assumed pt care at 1900 and received bedside report.    Pt alert and oriented X 4. Pt denies chest pain, sob, nausea and vomiting, headache, and blurry or double vision. Pt c/o generalized numbness, states baseline. Pt denies pain. Pt bed bound at baseline, waffle mattress and Q2 turns in place.   POC discussed and education provided on administered medications. All questions and concerns addressed. Fall precautions, hourly rounding and Q4 hour neuro checks in place.

## 2019-02-01 NOTE — PROGRESS NOTES
Received bedside report and assumed care. Patient lying down in bed resting quietly. Requested to be covered up. Denies having any pain and/or discomfort at this time. TM

## 2019-02-01 NOTE — PROGRESS NOTES
Spoke to Physician and she stated ok to leave in one more day. Must be removed Sat 2/2 for infection concerns.. Spoke to patient's mother. Mother stated that her son knows when he needs to be straight cathed, states his legs tell him when he needs to be st. Cathed. Patient's mother states he must be st cathed for sure after drinking coffee. 6 hrs is too long inbetween being st. Cathed. WCTM

## 2019-02-01 NOTE — PROGRESS NOTES
"[facility] Medicine Daily Progress Note    Date of Service  2/1/2019    Chief Complaint  38 y.o. Male with MS, HTN, depression admitted 1/29/2019 with N/V and failure to thrive.     Interval Problem Update  Doing well this morning, states that he has no more nausea.  Requests that the Epperson be kept in \"give [himself] a break\" from having to straight cath himself.    Consultants/Specialty  None    Code Status  Full    Disposition  Needs longterm care. SNF referral sent.    Review of Systems  Review of Systems   Constitutional: Negative for chills, fever and malaise/fatigue.   HENT: Negative for congestion.    Respiratory: Negative for shortness of breath.    Cardiovascular: Negative for chest pain, palpitations and leg swelling.   Gastrointestinal: Negative for abdominal pain, nausea and vomiting.   Genitourinary: Negative for dysuria.   Musculoskeletal: Negative for falls.   Neurological: Positive for focal weakness (At baseline, paraplegia) and weakness. Negative for loss of consciousness and headaches.   Psychiatric/Behavioral: Negative.    All other systems reviewed and are negative.    Physical Exam  Temp:  [36.7 °C (98.1 °F)-37.5 °C (99.5 °F)] 37.2 °C (98.9 °F)  Pulse:  [] 80  Resp:  [16-20] 18  BP: (105-127)/(71-83) 105/72  SpO2:  [92 %-95 %] 93 %    Physical Exam   Constitutional: He is oriented to person, place, and time. He appears well-developed. No distress.   HENT:   Head: Normocephalic.   Mouth/Throat: Oropharynx is clear and moist.   Eyes: EOM are normal. No scleral icterus.   Neck: Normal range of motion. Neck supple. No tracheal deviation present.   Cardiovascular: Normal rate, regular rhythm and intact distal pulses.    Pulmonary/Chest: Effort normal and breath sounds normal. No respiratory distress. He has no rales.   Abdominal: Soft. He exhibits no distension. Bowel sounds are decreased. There is no tenderness. There is no rebound.   Musculoskeletal: He exhibits no edema or tenderness. "   Neurological: He is alert and oriented to person, place, and time.   Paraplegia; weakness of bilateral upper extremities with some contracture more rigidity on the left > right   Skin: Skin is warm and dry. He is not diaphoretic.   Psychiatric: His behavior is normal. His speech is delayed.   Flat affect   Vitals reviewed.      Fluids    Intake/Output Summary (Last 24 hours) at 02/01/19 0726  Last data filed at 02/01/19 0600   Gross per 24 hour   Intake             3042 ml   Output             1750 ml   Net             1292 ml       Laboratory  Recent Labs      01/29/19   1725  01/30/19   0326  01/31/19   0301   WBC  4.5*  3.6*  3.8*   RBC  5.55  5.00  4.66*   HEMOGLOBIN  17.1  15.3  14.4   HEMATOCRIT  54.4*  45.2  41.5*   MCV  98.0*  90.4  89.1   MCH  30.8  30.6  30.9   MCHC  31.4*  33.8  34.7   RDW  43.9  39.7  38.5   PLATELETCT  272  258  229   MPV  8.6*  8.7*  8.4*     Recent Labs      01/29/19   1725  01/30/19   0326  01/31/19   0301   SODIUM  140  142  142   POTASSIUM  3.8  3.6  3.6   CHLORIDE  104  106  111   CO2  26  25  25   GLUCOSE  105*  83  98   BUN  13  15  8   CREATININE  0.88  0.81  0.71   CALCIUM  9.1  8.7  8.3*                   Imaging  [id number] VIEW   Final Result      Mild ileus.           Assessment/Plan  * Intractable cyclical vomiting with nausea- (present on admission)   Assessment & Plan    Resolved.  Ileus on XR.  Has had 2 bowel movements since admission.  - Advance diet as tolerated, tolerated GI soft diet for dinner yesterday  - P.o. and IV Zofran  - scheduled reglan for short term  -Continue famotidine     MS (multiple sclerosis) (HCC)- (present on admission)   Assessment & Plan    Chronic issue with significant debility.  - continue home medication  - PT/OT  -SW following, looking for long-term care     Essential hypertension- (present on admission)   Assessment & Plan    Normotensive.  - Continue home losartan     Recurrent major depressive disorder, in remission (HCC)- (present  on admission)   Assessment & Plan    Stable at this time.  Appears to be in good spirits this a.m.  -Wellbutrin and Lexapro     Neurogenic bladder- (present on admission)   Assessment & Plan    Straight caths at home.  - matias catheter placed on admission, patient requested and remain  -Will need to DC Matias in the next day or 2 and resume straight caths           VTE prophylaxis: SCDs

## 2019-02-01 NOTE — PROGRESS NOTES
2 RN skin check performed with AURORA Kidd. Pt has pain pump to RLQ. Heels pink, blanching. No other skin issues noted. Waffle mattress and Q2 turns in place.

## 2019-02-02 LAB
ANION GAP SERPL CALC-SCNC: 7 MMOL/L (ref 0–11.9)
BUN SERPL-MCNC: 7 MG/DL (ref 8–22)
CALCIUM SERPL-MCNC: 9.4 MG/DL (ref 8.5–10.5)
CHLORIDE SERPL-SCNC: 107 MMOL/L (ref 96–112)
CO2 SERPL-SCNC: 26 MMOL/L (ref 20–33)
CREAT SERPL-MCNC: 0.74 MG/DL (ref 0.5–1.4)
ERYTHROCYTE [DISTWIDTH] IN BLOOD BY AUTOMATED COUNT: 39.6 FL (ref 35.9–50)
GLUCOSE SERPL-MCNC: 122 MG/DL (ref 65–99)
HCT VFR BLD AUTO: 49.8 % (ref 42–52)
HGB BLD-MCNC: 17.1 G/DL (ref 14–18)
MAGNESIUM SERPL-MCNC: 2.2 MG/DL (ref 1.5–2.5)
MCH RBC QN AUTO: 30.9 PG (ref 27–33)
MCHC RBC AUTO-ENTMCNC: 34.3 G/DL (ref 33.7–35.3)
MCV RBC AUTO: 89.9 FL (ref 81.4–97.8)
PLATELET # BLD AUTO: 304 K/UL (ref 164–446)
PMV BLD AUTO: 8.5 FL (ref 9–12.9)
POTASSIUM SERPL-SCNC: 3.9 MMOL/L (ref 3.6–5.5)
RBC # BLD AUTO: 5.54 M/UL (ref 4.7–6.1)
SODIUM SERPL-SCNC: 140 MMOL/L (ref 135–145)
WBC # BLD AUTO: 7.7 K/UL (ref 4.8–10.8)

## 2019-02-02 PROCEDURE — 700105 HCHG RX REV CODE 258: Performed by: HOSPITALIST

## 2019-02-02 PROCEDURE — 83735 ASSAY OF MAGNESIUM: CPT

## 2019-02-02 PROCEDURE — 700102 HCHG RX REV CODE 250 W/ 637 OVERRIDE(OP): Performed by: HOSPITALIST

## 2019-02-02 PROCEDURE — A9270 NON-COVERED ITEM OR SERVICE: HCPCS | Performed by: HOSPITALIST

## 2019-02-02 PROCEDURE — 85027 COMPLETE CBC AUTOMATED: CPT

## 2019-02-02 PROCEDURE — 80048 BASIC METABOLIC PNL TOTAL CA: CPT

## 2019-02-02 PROCEDURE — 36415 COLL VENOUS BLD VENIPUNCTURE: CPT

## 2019-02-02 PROCEDURE — 99231 SBSQ HOSP IP/OBS SF/LOW 25: CPT | Performed by: HOSPITALIST

## 2019-02-02 PROCEDURE — 770006 HCHG ROOM/CARE - MED/SURG/GYN SEMI*

## 2019-02-02 RX ORDER — METOCLOPRAMIDE 10 MG/1
10 TABLET ORAL EVERY 6 HOURS
Status: COMPLETED | OUTPATIENT
Start: 2019-02-02 | End: 2019-02-02

## 2019-02-02 RX ADMIN — ESCITALOPRAM OXALATE 20 MG: 10 TABLET ORAL at 09:48

## 2019-02-02 RX ADMIN — METOCLOPRAMIDE HYDROCHLORIDE 10 MG: 10 TABLET ORAL at 23:50

## 2019-02-02 RX ADMIN — BUPROPION HYDROCHLORIDE 150 MG: 150 TABLET, EXTENDED RELEASE ORAL at 09:48

## 2019-02-02 RX ADMIN — OXYBUTYNIN CHLORIDE 10 MG: 10 TABLET, EXTENDED RELEASE ORAL at 09:49

## 2019-02-02 RX ADMIN — SODIUM CHLORIDE: 9 INJECTION, SOLUTION INTRAVENOUS at 00:33

## 2019-02-02 RX ADMIN — METOCLOPRAMIDE 10 MG: 10 TABLET ORAL at 00:00

## 2019-02-02 RX ADMIN — METHENAMINE HIPPURATE 1 G: 1 TABLET ORAL at 19:45

## 2019-02-02 RX ADMIN — BUPROPION HYDROCHLORIDE 150 MG: 150 TABLET, EXTENDED RELEASE ORAL at 19:44

## 2019-02-02 RX ADMIN — METOCLOPRAMIDE 10 MG: 10 TABLET ORAL at 11:55

## 2019-02-02 RX ADMIN — METOCLOPRAMIDE HYDROCHLORIDE 10 MG: 10 TABLET ORAL at 19:44

## 2019-02-02 RX ADMIN — FAMOTIDINE 20 MG: 20 TABLET ORAL at 19:44

## 2019-02-02 RX ADMIN — OXYBUTYNIN CHLORIDE 10 MG: 10 TABLET, EXTENDED RELEASE ORAL at 19:44

## 2019-02-02 RX ADMIN — SENNOSIDES AND DOCUSATE SODIUM 2 TABLET: 8.6; 5 TABLET ORAL at 09:48

## 2019-02-02 RX ADMIN — METOCLOPRAMIDE 10 MG: 10 TABLET ORAL at 09:49

## 2019-02-02 RX ADMIN — FAMOTIDINE 20 MG: 20 TABLET ORAL at 09:48

## 2019-02-02 RX ADMIN — METHENAMINE HIPPURATE 1 G: 1 TABLET ORAL at 09:49

## 2019-02-02 RX ADMIN — SENNOSIDES AND DOCUSATE SODIUM 2 TABLET: 8.6; 5 TABLET ORAL at 19:44

## 2019-02-02 RX ADMIN — LOSARTAN POTASSIUM 50 MG: 50 TABLET ORAL at 19:44

## 2019-02-02 ASSESSMENT — ENCOUNTER SYMPTOMS
NAUSEA: 0
ABDOMINAL PAIN: 0
FEVER: 0
VOMITING: 0
FALLS: 0
CHILLS: 0
WEAKNESS: 1
SORE THROAT: 0
FOCAL WEAKNESS: 1
COUGH: 0
PSYCHIATRIC NEGATIVE: 1
HEADACHES: 0
SHORTNESS OF BREATH: 0
LOSS OF CONSCIOUSNESS: 0
PALPITATIONS: 0

## 2019-02-02 ASSESSMENT — PATIENT HEALTH QUESTIONNAIRE - PHQ9
4. FEELING TIRED OR HAVING LITTLE ENERGY: MORE THAN HALF THE DAYS
6. FEELING BAD ABOUT YOURSELF - OR THAT YOU ARE A FAILURE OR HAVE LET YOURSELF OR YOUR FAMILY DOWN: MORE THAN HALF THE DAYS
SUM OF ALL RESPONSES TO PHQ9 QUESTIONS 1 AND 2: 4
5. POOR APPETITE OR OVEREATING: NOT AT ALL
2. FEELING DOWN, DEPRESSED, IRRITABLE, OR HOPELESS: MORE THAN HALF THE DAYS
7. TROUBLE CONCENTRATING ON THINGS, SUCH AS READING THE NEWSPAPER OR WATCHING TELEVISION: MORE THAN HALF THE DAYS
1. LITTLE INTEREST OR PLEASURE IN DOING THINGS: MORE THAN HALF THE DAYS
SUM OF ALL RESPONSES TO PHQ QUESTIONS 1-9: 12
8. MOVING OR SPEAKING SO SLOWLY THAT OTHER PEOPLE COULD HAVE NOTICED. OR THE OPPOSITE, BEING SO FIGETY OR RESTLESS THAT YOU HAVE BEEN MOVING AROUND A LOT MORE THAN USUAL: NOT AT ALL
3. TROUBLE FALLING OR STAYING ASLEEP OR SLEEPING TOO MUCH: NOT AT ALL
9. THOUGHTS THAT YOU WOULD BE BETTER OFF DEAD, OR OF HURTING YOURSELF: MORE THAN HALF THE DAYS

## 2019-02-02 NOTE — PROGRESS NOTES
· 2 RN skin check complete with AURORA Mccollum.  · No areas of skin breakdown noted  · The following interventions in place Q 2 turns are in place.

## 2019-02-02 NOTE — CARE PLAN
Problem: Knowledge Deficit  Goal: Knowledge of disease process/condition, treatment plan, diagnostic tests, and medications will improve  Outcome: PROGRESSING AS EXPECTED    Intervention: Explain information regarding disease process/condition, treatment plan, diagnostic tests, and medications and document in education  Reviewed medications with patient and family and the uses.       Problem: Skin Integrity  Goal: Risk for impaired skin integrity will decrease  Outcome: PROGRESSING AS EXPECTED    Intervention: Implement precautions to protect skin integrity in collaboration with the interdisciplinary team  Q 2 turns in place.   Skin remains intact at this time with no areas of skin breakdown.       Problem: Mobility  Goal: Risk for activity intolerance will decrease  Outcome: PROGRESSING AS EXPECTED

## 2019-02-02 NOTE — CARE PLAN
Problem: Communication  Goal: The ability to communicate needs accurately and effectively will improve  Outcome: PROGRESSING AS EXPECTED  Pt encouraged to use call light to make needs known.    Problem: Safety  Goal: Will remain free from falls  Outcome: PROGRESSING AS EXPECTED  Bed alarm on, bed locked in lowest position, upper side rails up, call light and personal items within reach, non skids socks on.

## 2019-02-02 NOTE — PROGRESS NOTES
[facility] Medicine Daily Progress Note    Date of Service  2/2/2019    Chief Complaint  38 y.o. Male with MS, HTN, depression admitted 1/29/2019 with N/V and failure to thrive.     Interval Problem Update  Mother at the bedside.  States that if Epperson is removed, straight cath will have to be at least every 4 hours and more if he is drinking caffeine.  She states that she will bring in home supplies so that he can do it himself when needed.  He denies any new concerns or symptoms.    Consultants/Specialty  None    Code Status  Full    Disposition  Needs longterm care. SNF referral sent.    Review of Systems  Review of Systems   Constitutional: Negative for chills, fever and malaise/fatigue.   HENT: Negative for congestion and sore throat.    Respiratory: Negative for cough and shortness of breath.    Cardiovascular: Negative for chest pain, palpitations and leg swelling.   Gastrointestinal: Negative for abdominal pain, nausea and vomiting.   Genitourinary: Negative for dysuria.   Musculoskeletal: Negative for falls.   Neurological: Positive for focal weakness (At baseline, paraplegia) and weakness (Baseline). Negative for loss of consciousness and headaches.   Psychiatric/Behavioral: Negative.    All other systems reviewed and are negative.    Physical Exam  Temp:  [36.7 °C (98.1 °F)-37.3 °C (99.2 °F)] 36.7 °C (98.1 °F)  Pulse:  [80-89] 80  Resp:  [16-18] 18  BP: (105-121)/(71-78) 121/76  SpO2:  [94 %-95 %] 95 %    Physical Exam   Constitutional: He is oriented to person, place, and time. He appears well-developed. No distress.   HENT:   Head: Normocephalic.   Mouth/Throat: Oropharynx is clear and moist.   Eyes: Pupils are equal, round, and reactive to light. EOM are normal. No scleral icterus.   Neck: Normal range of motion. No tracheal deviation present.   Cardiovascular: Normal rate, regular rhythm and intact distal pulses.    Pulmonary/Chest: Effort normal and breath sounds normal. No stridor. No respiratory  distress. He has no rales.   Abdominal: Soft. He exhibits no distension. Bowel sounds are decreased. There is no tenderness.   Musculoskeletal: He exhibits no edema or tenderness.   Neurological: He is alert and oriented to person, place, and time.   Paraplegia; weakness of bilateral upper extremities with some contracture more rigidity on the left > right   Skin: Skin is warm and dry. He is not diaphoretic.   Psychiatric: His behavior is normal. His speech is delayed.   Flat affect   Vitals reviewed.      Fluids    Intake/Output Summary (Last 24 hours) at 02/02/19 0736  Last data filed at 02/02/19 0700   Gross per 24 hour   Intake             1766 ml   Output             1950 ml   Net             -184 ml       Laboratory  Recent Labs      01/31/19   0301   WBC  3.8*   RBC  4.66*   HEMOGLOBIN  14.4   HEMATOCRIT  41.5*   MCV  89.1   MCH  30.9   MCHC  34.7   RDW  38.5   PLATELETCT  229   MPV  8.4*     Recent Labs      01/31/19   0301   SODIUM  142   POTASSIUM  3.6   CHLORIDE  111   CO2  25   GLUCOSE  98   BUN  8   CREATININE  0.71   CALCIUM  8.3*                   Imaging  [id number] VIEW   Final Result      Mild ileus.           Assessment/Plan  * Intractable cyclical vomiting with nausea- (present on admission)   Assessment & Plan    Resolved.  Ileus on XR.  Has had 2 bowel movements since admission.  - Advance diet as tolerated, now on a regular diet  - P.o. and IV Zofran as needed  - scheduled reglan for short term, will discontinue after today  -Continue famotidine     MS (multiple sclerosis) (HCC)- (present on admission)   Assessment & Plan    Chronic issue with significant debility.  - continue home medication  - PT/OT  -SW following, looking for long-term care     Essential hypertension- (present on admission)   Assessment & Plan    Normotensive.  - Continue home losartan     Recurrent major depressive disorder, in remission (HCC)- (present on admission)   Assessment & Plan    Stable at this time.  Appears  to be in good spirits this a.m.  -Wellbutrin and Lexapro     Neurogenic bladder- (present on admission)   Assessment & Plan    Straight caths at home.  - matias catheter placed on admission, patient requested that remain  -Mother is to bring in home supplies for self straight cath when Matias is removed          VTE prophylaxis: SCDs

## 2019-02-02 NOTE — PROGRESS NOTES
"Assumed pt care at 1900 and received bedside report.     Pt alert and oriented X 4. Pt denies chest pain, sob, nausea and vomiting, headache, and blurry or double vision. Pt c/o generalized numbness, states baseline. Pt denies pain. Pt bed bound at baseline, waffle mattress and Q2 turns in place.   POC discussed and education provided on administered medications. All questions and concerns addressed. Fall precautions, hourly rounding and Q4 hour neuro checks in place.     0100: Educated patient that PIV needs to be changed d/t it being placed PTA, pt refusing new PIV placement, states \"Just take the IV out and don't put a new one in if you need to change it\". Will inform day shift RN.   "

## 2019-02-02 NOTE — PROGRESS NOTES
2 RN skin check performed with UARORA Kidd. Pt has pain pump to RLQ. Heels pink, blanching. No other skin issues noted. Waffle mattress and Q2 turns in place.

## 2019-02-02 NOTE — PROGRESS NOTES
Received report from night nurse at the bedside. Assume care of Pt at 0700. Assessment completed Pt A&O X 4. Pt has generalized numbness and tingling throughout body at baseline. Assist of two. Zeenat in place to gravity, will discuss with MD. Pt refused to have it removed. Pt also refused morning medications until after breakfast. Pt in bed, bed in lowest position, call light in place, bed alarm is on, Mom is at bedside, treaded slipper socks on.

## 2019-02-03 PROCEDURE — 302146: Performed by: HOSPITALIST

## 2019-02-03 PROCEDURE — 770006 HCHG ROOM/CARE - MED/SURG/GYN SEMI*

## 2019-02-03 PROCEDURE — 700101 HCHG RX REV CODE 250: Performed by: HOSPITALIST

## 2019-02-03 PROCEDURE — 700102 HCHG RX REV CODE 250 W/ 637 OVERRIDE(OP): Performed by: HOSPITALIST

## 2019-02-03 PROCEDURE — 99231 SBSQ HOSP IP/OBS SF/LOW 25: CPT | Performed by: HOSPITALIST

## 2019-02-03 PROCEDURE — A9270 NON-COVERED ITEM OR SERVICE: HCPCS | Performed by: HOSPITALIST

## 2019-02-03 RX ORDER — LIDOCAINE 50 MG/G
1 PATCH TOPICAL EVERY 24 HOURS
Status: DISCONTINUED | OUTPATIENT
Start: 2019-02-03 | End: 2019-02-15 | Stop reason: HOSPADM

## 2019-02-03 RX ADMIN — ESCITALOPRAM OXALATE 20 MG: 10 TABLET ORAL at 09:47

## 2019-02-03 RX ADMIN — SENNOSIDES AND DOCUSATE SODIUM 2 TABLET: 8.6; 5 TABLET ORAL at 19:09

## 2019-02-03 RX ADMIN — METHENAMINE HIPPURATE 1 G: 1 TABLET ORAL at 19:09

## 2019-02-03 RX ADMIN — FAMOTIDINE 20 MG: 20 TABLET ORAL at 19:10

## 2019-02-03 RX ADMIN — FAMOTIDINE 20 MG: 20 TABLET ORAL at 09:48

## 2019-02-03 RX ADMIN — METHENAMINE HIPPURATE 1 G: 1 TABLET ORAL at 09:47

## 2019-02-03 RX ADMIN — LIDOCAINE 1 PATCH: 50 PATCH TOPICAL at 09:49

## 2019-02-03 RX ADMIN — BUPROPION HYDROCHLORIDE 150 MG: 150 TABLET, EXTENDED RELEASE ORAL at 19:09

## 2019-02-03 RX ADMIN — OXYBUTYNIN CHLORIDE 10 MG: 10 TABLET, EXTENDED RELEASE ORAL at 19:09

## 2019-02-03 RX ADMIN — SENNOSIDES AND DOCUSATE SODIUM 2 TABLET: 8.6; 5 TABLET ORAL at 09:48

## 2019-02-03 RX ADMIN — OXYBUTYNIN CHLORIDE 10 MG: 10 TABLET, EXTENDED RELEASE ORAL at 09:47

## 2019-02-03 RX ADMIN — BUPROPION HYDROCHLORIDE 150 MG: 150 TABLET, EXTENDED RELEASE ORAL at 09:47

## 2019-02-03 RX ADMIN — LOSARTAN POTASSIUM 50 MG: 50 TABLET ORAL at 19:09

## 2019-02-03 ASSESSMENT — ENCOUNTER SYMPTOMS
HEADACHES: 0
NAUSEA: 0
FALLS: 0
SORE THROAT: 0
CHILLS: 0
SHORTNESS OF BREATH: 0
FOCAL WEAKNESS: 1
NERVOUS/ANXIOUS: 0
MYALGIAS: 1
FEVER: 0
PSYCHIATRIC NEGATIVE: 1
LOSS OF CONSCIOUSNESS: 0
DIZZINESS: 0
COUGH: 0
ABDOMINAL PAIN: 0
PALPITATIONS: 0
WEAKNESS: 1
VOMITING: 0

## 2019-02-03 NOTE — PROGRESS NOTES
[facility] Medicine Daily Progress Note    Date of Service  2/3/2019    Chief Complaint  38 y.o. Male with MS, HTN, depression admitted 1/29/2019 with N/V and failure to thrive.     Interval Problem Update  Doing well this AM, is having generalized body pain (for which he normally uses marijuana). Does not want pain medications. Offered lidocaine patch.    Consultants/Specialty  None    Code Status  Full    Disposition  Needs longterm care. SNF referral sent.    Review of Systems  Review of Systems   Constitutional: Negative for chills, fever and malaise/fatigue.   HENT: Negative for congestion and sore throat.    Respiratory: Negative for cough and shortness of breath.    Cardiovascular: Negative for chest pain, palpitations and leg swelling.   Gastrointestinal: Negative for abdominal pain, nausea and vomiting.   Genitourinary: Negative for dysuria.   Musculoskeletal: Positive for myalgias (at baseline). Negative for falls.   Neurological: Positive for focal weakness (At baseline, paraplegia) and weakness (Baseline). Negative for dizziness, loss of consciousness and headaches.   Psychiatric/Behavioral: Negative.  The patient is not nervous/anxious.    All other systems reviewed and are negative.    Physical Exam  Temp:  [36.4 °C (97.6 °F)-37.1 °C (98.8 °F)] 36.4 °C (97.6 °F)  Pulse:  [71-94] 81  Resp:  [17-18] 18  BP: ()/(61-87) 94/61  SpO2:  [94 %-97 %] 95 %    Physical Exam   Constitutional: He is oriented to person, place, and time. He appears well-developed. No distress.   HENT:   Head: Normocephalic.   Mouth/Throat: Oropharynx is clear and moist.   Eyes: EOM are normal. No scleral icterus.   Neck: Normal range of motion. No tracheal deviation present.   Cardiovascular: Normal rate, regular rhythm and intact distal pulses.    Pulmonary/Chest: Effort normal. No stridor. No respiratory distress.   Abdominal: Soft. He exhibits no distension. Bowel sounds are decreased. There is no tenderness.    Musculoskeletal: He exhibits no edema or tenderness.   Neurological: He is alert and oriented to person, place, and time.   Paraplegia; weakness of bilateral upper extremities with some contracture more rigidity on the left > right   Skin: Skin is warm and dry. He is not diaphoretic.   Psychiatric: His behavior is normal. His speech is delayed.   Flat affect   Vitals reviewed.      Fluids    Intake/Output Summary (Last 24 hours) at 02/03/19 0739  Last data filed at 02/03/19 0700   Gross per 24 hour   Intake                0 ml   Output             2025 ml   Net            -2025 ml       Laboratory  Recent Labs      02/02/19   1251   WBC  7.7   RBC  5.54   HEMOGLOBIN  17.1   HEMATOCRIT  49.8   MCV  89.9   MCH  30.9   MCHC  34.3   RDW  39.6   PLATELETCT  304   MPV  8.5*     Recent Labs      02/02/19   1251   SODIUM  140   POTASSIUM  3.9   CHLORIDE  107   CO2  26   GLUCOSE  122*   BUN  7*   CREATININE  0.74   CALCIUM  9.4                   Imaging  [id number] VIEW   Final Result      Mild ileus.           Assessment/Plan  * Intractable cyclical vomiting with nausea- (present on admission)   Assessment & Plan    Resolved.  Ileus on XR.  Has had 2 bowel movements since admission.  - Advance diet as tolerated, now on a regular diet  - P.o. and IV Zofran as needed  - scheduled reglan for short term, discontinued on 2/2  -Continue famotidine     MS (multiple sclerosis) (HCC)- (present on admission)   Assessment & Plan    Chronic issue with significant debility.  - continue home medication  - PT/OT  -SW following, looking for long-term care  -start lidocaine patch     Essential hypertension- (present on admission)   Assessment & Plan    Normotensive.  - Continue home losartan     Recurrent major depressive disorder, in remission (HCC)- (present on admission)   Assessment & Plan    Stable at this time.  Appears to be in good spirits this a.m.  -Wellbutrin and Lexapro     Neurogenic bladder- (present on admission)    Assessment & Plan    Straight caths at home roughly q4 hrs  - matias catheter placed on admission, patient requested that remain  -Mother is to bring in home supplies for self straight cath when Matias is removed          VTE prophylaxis: SCDs

## 2019-02-03 NOTE — PROGRESS NOTES
Pt resting in bed at this time. Refusing morning medications until after he eats but is also refusing to eat breakfast.   Pt states he has pain all over his body but declines oral pain medications. Says he manages his pain with weed at home.

## 2019-02-03 NOTE — CARE PLAN
Problem: Venous Thromboembolism (VTW)/Deep Vein Thrombosis (DVT) Prevention:  Goal: Patient will participate in Venous Thrombosis (VTE)/Deep Vein Thrombosis (DVT)Prevention Measures  Outcome: PROGRESSING AS EXPECTED  SCDs are in use.       Problem: Skin Integrity  Goal: Risk for impaired skin integrity will decrease  Outcome: PROGRESSING SLOWER THAN EXPECTED  Q 2 turns are in place.

## 2019-02-04 PROCEDURE — 770006 HCHG ROOM/CARE - MED/SURG/GYN SEMI*

## 2019-02-04 PROCEDURE — A9270 NON-COVERED ITEM OR SERVICE: HCPCS | Performed by: HOSPITALIST

## 2019-02-04 PROCEDURE — 700102 HCHG RX REV CODE 250 W/ 637 OVERRIDE(OP): Performed by: HOSPITALIST

## 2019-02-04 PROCEDURE — 700101 HCHG RX REV CODE 250: Performed by: HOSPITALIST

## 2019-02-04 PROCEDURE — 99232 SBSQ HOSP IP/OBS MODERATE 35: CPT | Performed by: HOSPITALIST

## 2019-02-04 RX ORDER — OXYBUTYNIN CHLORIDE 10 MG/1
10 TABLET, EXTENDED RELEASE ORAL DAILY
Status: DISCONTINUED | OUTPATIENT
Start: 2019-02-05 | End: 2019-02-15 | Stop reason: HOSPADM

## 2019-02-04 RX ADMIN — METHENAMINE HIPPURATE 1 G: 1 TABLET ORAL at 09:59

## 2019-02-04 RX ADMIN — BUPROPION HYDROCHLORIDE 150 MG: 150 TABLET, EXTENDED RELEASE ORAL at 18:31

## 2019-02-04 RX ADMIN — LIDOCAINE 1 PATCH: 50 PATCH TOPICAL at 10:00

## 2019-02-04 RX ADMIN — OXYBUTYNIN CHLORIDE 10 MG: 10 TABLET, EXTENDED RELEASE ORAL at 09:59

## 2019-02-04 RX ADMIN — FAMOTIDINE 20 MG: 20 TABLET ORAL at 18:31

## 2019-02-04 RX ADMIN — METHENAMINE HIPPURATE 1 G: 1 TABLET ORAL at 19:49

## 2019-02-04 RX ADMIN — ESCITALOPRAM OXALATE 20 MG: 10 TABLET ORAL at 09:59

## 2019-02-04 RX ADMIN — BUPROPION HYDROCHLORIDE 150 MG: 150 TABLET, EXTENDED RELEASE ORAL at 09:59

## 2019-02-04 RX ADMIN — FAMOTIDINE 20 MG: 20 TABLET ORAL at 10:00

## 2019-02-04 RX ADMIN — LOSARTAN POTASSIUM 50 MG: 50 TABLET ORAL at 18:31

## 2019-02-04 ASSESSMENT — ENCOUNTER SYMPTOMS
PSYCHIATRIC NEGATIVE: 1
FALLS: 0
LOSS OF CONSCIOUSNESS: 0
SHORTNESS OF BREATH: 0
NAUSEA: 0
ABDOMINAL PAIN: 0
NERVOUS/ANXIOUS: 0
HEADACHES: 0
PHOTOPHOBIA: 1
BLOOD IN STOOL: 0
MYALGIAS: 1
FOCAL WEAKNESS: 1
VOMITING: 0
SORE THROAT: 0
WEAKNESS: 1
PALPITATIONS: 0
COUGH: 0
FEVER: 0
DIZZINESS: 0
CHILLS: 0

## 2019-02-04 NOTE — PROGRESS NOTES
Low airloss bed ordered.  Patient has decreased movement due to MS.  Will continue to turn q 2 hours.

## 2019-02-04 NOTE — PROGRESS NOTES
Received report from AURORA Hughes. Assumed patient care at this time. Pt A&Ox4. Patient reports double vision, baseline for years r/t MS per patient. Patient has Epperson in place for neurogenic bladder at this time, will discuss with MD. POC discussed and all questions and concerns addressed at this time. Fall precautions, Q2 turns, and hourly rounding in place. Bed alarm on, bed locked and in low position, call light and belongings in reach.

## 2019-02-04 NOTE — PROGRESS NOTES
"Hospital Medicine Daily Progress Note    Date of Service  2/4/2019    Chief Complaint  38 y.o. Male with MS, HTN, depression admitted 1/29/2019 with N/V and failure to thrive.     Interval Problem Update  Doing \"fair\" this AM. Has mother and other visitors at the bedside. No acute overnight events.    Consultants/Specialty  None    Code Status  Full    Disposition  Needs longterm care. SNF referral sent.    Review of Systems  Review of Systems   Constitutional: Negative for chills, fever and malaise/fatigue.   HENT: Negative for congestion and sore throat.    Eyes: Positive for photophobia (baseline).   Respiratory: Negative for cough and shortness of breath.    Cardiovascular: Negative for chest pain, palpitations and leg swelling.   Gastrointestinal: Negative for abdominal pain, blood in stool, nausea and vomiting.   Genitourinary: Negative for dysuria.   Musculoskeletal: Positive for myalgias (at baseline). Negative for falls.   Skin: Positive for itching (eyebrows).   Neurological: Positive for focal weakness (At baseline, paraplegia) and weakness (Baseline). Negative for dizziness, loss of consciousness and headaches.   Psychiatric/Behavioral: Negative.  The patient is not nervous/anxious.    All other systems reviewed and are negative.    Physical Exam  Temp:  [36.4 °C (97.6 °F)-36.7 °C (98.1 °F)] 36.4 °C (97.6 °F)  Pulse:  [68-85] 85  Resp:  [17-18] 18  BP: (116-135)/(68-84) 135/84  SpO2:  [93 %-97 %] 97 %    Physical Exam   Constitutional: He is oriented to person, place, and time. He appears well-developed. He is cooperative. No distress.   HENT:   Head: Normocephalic.   Mouth/Throat: Oropharynx is clear and moist.   Eyes: EOM are normal. No scleral icterus.   Neck: No tracheal deviation present.   Cardiovascular: Normal rate, regular rhythm and intact distal pulses.    Pulmonary/Chest: Effort normal. No respiratory distress.   Abdominal: Soft. He exhibits no distension. Bowel sounds are decreased. There is " no tenderness.   Musculoskeletal: He exhibits no edema or tenderness.   Neurological: He is oriented to person, place, and time.   Paraplegia; weakness of bilateral upper extremities with some contracture, more rigidity on the left > right   Skin: Skin is warm and dry. He is not diaphoretic.   Psychiatric: His behavior is normal. His speech is delayed.   Flat affect   Vitals reviewed.      Fluids    Intake/Output Summary (Last 24 hours) at 02/04/19 0738  Last data filed at 02/04/19 0600   Gross per 24 hour   Intake                0 ml   Output             1300 ml   Net            -1300 ml       Laboratory  Recent Labs      02/02/19   1251   WBC  7.7   RBC  5.54   HEMOGLOBIN  17.1   HEMATOCRIT  49.8   MCV  89.9   MCH  30.9   MCHC  34.3   RDW  39.6   PLATELETCT  304   MPV  8.5*     Recent Labs      02/02/19   1251   SODIUM  140   POTASSIUM  3.9   CHLORIDE  107   CO2  26   GLUCOSE  122*   BUN  7*   CREATININE  0.74   CALCIUM  9.4                   Imaging  [id number] VIEW   Final Result      Mild ileus.           Assessment/Plan  * Intractable cyclical vomiting with nausea- (present on admission)   Assessment & Plan    Resolved.  Ileus on XR but has several BMs since admission.  - Advance diet as tolerated, now on a regular diet  - P.o. and IV Zofran as needed  - scheduled reglan for short term, discontinued on 2/2  - Continue famotidine     MS (multiple sclerosis) (HCC)- (present on admission)   Assessment & Plan    Chronic issue with significant debility. Needs 24/7 care  - continue home medication  - PT/OT  - SW following, looking for long-term care; difficult dispo  - start lidocaine patch 2/3     Essential hypertension- (present on admission)   Assessment & Plan    Normotensive.  - home losartan     Recurrent major depressive disorder, in remission (HCC)- (present on admission)   Assessment & Plan    Stable at this time.  Appears to be in good spirits this a.m.  - Wellbutrin and Lexapro     Neurogenic bladder-  (present on admission)   Assessment & Plan    Straight caths at home roughly q4 hrs  - matias catheter placed on admission, patient requested that it remain  - Mother is to bring in home supplies for self straight cath when Matias is removed, will need to update her  - continue oxybutinin          VTE prophylaxis: SCDs

## 2019-02-05 PROCEDURE — A9270 NON-COVERED ITEM OR SERVICE: HCPCS | Performed by: HOSPITALIST

## 2019-02-05 PROCEDURE — 700102 HCHG RX REV CODE 250 W/ 637 OVERRIDE(OP): Performed by: HOSPITALIST

## 2019-02-05 PROCEDURE — 99231 SBSQ HOSP IP/OBS SF/LOW 25: CPT | Performed by: HOSPITALIST

## 2019-02-05 PROCEDURE — 700101 HCHG RX REV CODE 250: Performed by: HOSPITALIST

## 2019-02-05 PROCEDURE — 770006 HCHG ROOM/CARE - MED/SURG/GYN SEMI*

## 2019-02-05 RX ORDER — KETOROLAC TROMETHAMINE 30 MG/ML
30 INJECTION, SOLUTION INTRAMUSCULAR; INTRAVENOUS 2 TIMES DAILY PRN
Status: ACTIVE | OUTPATIENT
Start: 2019-02-05 | End: 2019-02-07

## 2019-02-05 RX ORDER — KETOROLAC TROMETHAMINE 30 MG/ML
30 INJECTION, SOLUTION INTRAMUSCULAR; INTRAVENOUS EVERY 6 HOURS PRN
Status: DISCONTINUED | OUTPATIENT
Start: 2019-02-05 | End: 2019-02-05

## 2019-02-05 RX ADMIN — METHENAMINE HIPPURATE 1 G: 1 TABLET ORAL at 10:57

## 2019-02-05 RX ADMIN — LIDOCAINE 1 PATCH: 50 PATCH TOPICAL at 10:56

## 2019-02-05 RX ADMIN — BUPROPION HYDROCHLORIDE 150 MG: 150 TABLET, EXTENDED RELEASE ORAL at 10:56

## 2019-02-05 RX ADMIN — FAMOTIDINE 20 MG: 20 TABLET ORAL at 18:48

## 2019-02-05 RX ADMIN — OXYBUTYNIN CHLORIDE 10 MG: 10 TABLET, EXTENDED RELEASE ORAL at 10:56

## 2019-02-05 RX ADMIN — METHENAMINE HIPPURATE 1 G: 1 TABLET ORAL at 20:12

## 2019-02-05 RX ADMIN — LOSARTAN POTASSIUM 50 MG: 50 TABLET ORAL at 18:48

## 2019-02-05 RX ADMIN — ESCITALOPRAM OXALATE 20 MG: 10 TABLET ORAL at 10:56

## 2019-02-05 RX ADMIN — FAMOTIDINE 20 MG: 20 TABLET ORAL at 10:57

## 2019-02-05 RX ADMIN — BUPROPION HYDROCHLORIDE 150 MG: 150 TABLET, EXTENDED RELEASE ORAL at 20:12

## 2019-02-05 ASSESSMENT — ENCOUNTER SYMPTOMS
VOMITING: 0
FEVER: 0
COUGH: 0
CHILLS: 0
WHEEZING: 0
NAUSEA: 0
WEIGHT LOSS: 0
PALPITATIONS: 0
SHORTNESS OF BREATH: 0

## 2019-02-05 NOTE — PROGRESS NOTES
Assumed care of patient at 0700.    Received report from night RN.    Pt alert and oriented x 4, has complaints of pain in left leg.  Medication given per order. Pt with implanted baclofen pump.  Pt resting comfortably in bed.   Bed alarm on.  Hourly rounding implemented.

## 2019-02-05 NOTE — CARE PLAN
Problem: Safety  Goal: Will remain free from injury  Outcome: PROGRESSING AS EXPECTED  Bed alarm in use during beginning of shift, discontinued per patient request after patient demonstrated calling appropriately. Patient turns self side to side in bed, occasionally will ask for assistance. Patient has bed locked and in low position, call light and belongings in reach.    Problem: Skin Integrity  Goal: Risk for impaired skin integrity will decrease  Outcome: PROGRESSING AS EXPECTED  See skin note for more details. Barrier cream applied, low air loss mattress, assisting with turns, patient turns self side to side.    Problem: Mobility  Goal: Risk for activity intolerance will decrease  Outcome: PROGRESSING SLOWER THAN EXPECTED  Patient declined attempting to get up to chair for this RN during shift. Bedbound status at this time.

## 2019-02-05 NOTE — PROGRESS NOTES
2 RN Skin check completed with AURORA Miner. Patient has redness to bilateral heels, blanching. Patient has some excoriation to perianal area. Heels dry, lotion applied.     Interventions in place: Q2 turns assistance, barrier cream, lotion to feet, pillows to float heels, pillows to assist with turns, low air loss mattress.

## 2019-02-05 NOTE — PROGRESS NOTES
2 RN skin check performed with AURORA Judge. Heels pink, blanching. Excoriation to perianal area, barrier cream applied. Pt on low air loss mattress, Q2 turns in place.

## 2019-02-05 NOTE — CARE PLAN
Problem: Communication  Goal: The ability to communicate needs accurately and effectively will improve  Outcome: PROGRESSING AS EXPECTED  Pt encouraged to use call light to make needs known.    Problem: Skin Integrity  Goal: Risk for impaired skin integrity will decrease  Outcome: PROGRESSING AS EXPECTED  Pt on low air loss mattress and Q2 turns in place.

## 2019-02-05 NOTE — PROGRESS NOTES
Assumed pt care at 1900 and received bedside report.     Pt alert and oriented X 4. Pt denies chest pain, sob, nausea and vomiting, headache, and blurry or double vision. Pt c/o generalized numbness, states baseline. Pt denies pain. Pt bed bound at baseline, on low air loss mattress and Q2 turns in place.   POC discussed and education provided on administered medications. All questions and concerns addressed. Fall precautions, hourly rounding and Q4 hour neuro checks in place.

## 2019-02-05 NOTE — PROGRESS NOTES
"Hospital Medicine Daily Progress Note    Date of Service  2/5/2019    Chief Complaint  38 y.o. Male with MS, HTN, depression admitted 1/29/2019 with N/V and failure to thrive.     Interval Problem Update  Stable. Wants pain for \"whole body\" pain.  Allergic to Neurontin/Lyrica. On Lidocaine. Avoid opioids as able.  Will try Toradol    Consultants/Specialty  None    Code Status  Full    Disposition  Needs longterm care. SNF referral sent.    Review of Systems  Review of Systems   Constitutional: Negative for chills, fever and weight loss.   Respiratory: Negative for cough, shortness of breath and wheezing.    Cardiovascular: Negative for chest pain and palpitations.   Gastrointestinal: Negative for nausea and vomiting.   Genitourinary: Negative for dysuria and urgency.   All other systems reviewed and are negative.    Physical Exam  Temp:  [36.2 °C (97.1 °F)-37.2 °C (98.9 °F)] 36.7 °C (98 °F)  Pulse:  [73-96] 73  Resp:  [16-18] 16  BP: (108-142)/(73-86) 127/86  SpO2:  [92 %-98 %] 92 %    Physical Exam   Constitutional: He is oriented to person, place, and time. He appears well-developed. He is cooperative. No distress.   HENT:   Head: Normocephalic.   Mouth/Throat: Oropharynx is clear and moist.   Eyes: EOM are normal. No scleral icterus.   Neck: No tracheal deviation present.   Cardiovascular: Normal rate, regular rhythm and intact distal pulses.    Pulmonary/Chest: Effort normal. No respiratory distress.   Abdominal: Soft. Normal appearance and bowel sounds are normal. He exhibits no distension. There is no tenderness.   Baclofen pump RLQ   Musculoskeletal: He exhibits no edema or tenderness.   Neurological: He is alert and oriented to person, place, and time.   Paraplegia; weakness of bilateral upper extremities; can wiggle toes   Skin: Skin is warm and dry. He is not diaphoretic.   Psychiatric: His behavior is normal. His speech is not delayed.   Vitals reviewed.      Fluids    Intake/Output Summary (Last 24 " hours) at 02/05/19 1352  Last data filed at 02/05/19 0600   Gross per 24 hour   Intake              280 ml   Output              400 ml   Net             -120 ml       Laboratory                        Imaging  [id number] [id number]   Final Result      Mild ileus.           Assessment/Plan  * Intractable cyclical vomiting with nausea- (present on admission)   Assessment & Plan    Resolved.  Ileus on XR but has several BMs since admission.  - Advance diet as tolerated, now on a regular diet  - P.o. and IV Zofran as needed  - scheduled reglan for short term, discontinued on 2/2  - Continue famotidine     MS (multiple sclerosis) (HCC)- (present on admission)   Assessment & Plan    Chronic issue with significant debility. Needs 24/7 care  - continue home medication  - PT/OT  - SW following, looking for long-term care; difficult dispo  - start lidocaine patch 2/3     Essential hypertension- (present on admission)   Assessment & Plan    Normotensive.  - home losartan     Recurrent major depressive disorder, in remission (HCC)- (present on admission)   Assessment & Plan    Stable at this time.  Appears to be in good spirits this a.m.  - Wellbutrin and Lexapro     Neurogenic bladder- (present on admission)   Assessment & Plan    Straight caths at home roughly q4 hrs  - matias catheter placed on admission, patient requested that it remain  - Mother is to bring in home supplies for self straight cath when Matias is removed, will need to update her  - continue oxybutinin          VTE prophylaxis: SCDs

## 2019-02-05 NOTE — DISCHARGE PLANNING
Anticipated Discharge Disposition: Long Term Care Facility    Action: FLORY made tc to [facility] and spoke with Rina in admissions ([phone]) They are awaiting the State to certify them once this has been completed they can accept pt's. Rina added this worker to her list of people to call once they are accepting.     FLORY made a tc to [facility] ([phone]) and spoke with Shelia.  They are awaiting certification by the State. She placed pt on her list to review for acceptance and will call this worker once they are certified.     Barriers to Discharge: SNF Acceptance    Plan: LSW to f/u with pt

## 2019-02-06 PROCEDURE — A9270 NON-COVERED ITEM OR SERVICE: HCPCS | Performed by: HOSPITALIST

## 2019-02-06 PROCEDURE — 700102 HCHG RX REV CODE 250 W/ 637 OVERRIDE(OP): Performed by: HOSPITALIST

## 2019-02-06 PROCEDURE — 99231 SBSQ HOSP IP/OBS SF/LOW 25: CPT | Performed by: HOSPITALIST

## 2019-02-06 PROCEDURE — 770006 HCHG ROOM/CARE - MED/SURG/GYN SEMI*

## 2019-02-06 PROCEDURE — 700101 HCHG RX REV CODE 250: Performed by: HOSPITALIST

## 2019-02-06 RX ADMIN — ESCITALOPRAM OXALATE 20 MG: 10 TABLET ORAL at 08:45

## 2019-02-06 RX ADMIN — BUPROPION HYDROCHLORIDE 150 MG: 150 TABLET, EXTENDED RELEASE ORAL at 18:34

## 2019-02-06 RX ADMIN — SENNOSIDES AND DOCUSATE SODIUM 2 TABLET: 8.6; 5 TABLET ORAL at 08:45

## 2019-02-06 RX ADMIN — LOSARTAN POTASSIUM 50 MG: 50 TABLET ORAL at 18:35

## 2019-02-06 RX ADMIN — BUPROPION HYDROCHLORIDE 150 MG: 150 TABLET, EXTENDED RELEASE ORAL at 08:45

## 2019-02-06 RX ADMIN — METHENAMINE HIPPURATE 1 G: 1 TABLET ORAL at 08:45

## 2019-02-06 RX ADMIN — OXYBUTYNIN CHLORIDE 10 MG: 10 TABLET, EXTENDED RELEASE ORAL at 08:45

## 2019-02-06 RX ADMIN — FAMOTIDINE 20 MG: 20 TABLET ORAL at 18:35

## 2019-02-06 RX ADMIN — METHENAMINE HIPPURATE 1 G: 1 TABLET ORAL at 18:35

## 2019-02-06 RX ADMIN — FAMOTIDINE 20 MG: 20 TABLET ORAL at 08:46

## 2019-02-06 RX ADMIN — LIDOCAINE 1 PATCH: 50 PATCH TOPICAL at 08:46

## 2019-02-06 ASSESSMENT — PATIENT HEALTH QUESTIONNAIRE - PHQ9
4. FEELING TIRED OR HAVING LITTLE ENERGY: MORE THAN HALF THE DAYS
5. POOR APPETITE OR OVEREATING: NOT AT ALL
1. LITTLE INTEREST OR PLEASURE IN DOING THINGS: MORE THAN HALF THE DAYS
9. THOUGHTS THAT YOU WOULD BE BETTER OFF DEAD, OR OF HURTING YOURSELF: MORE THAN HALF THE DAYS
7. TROUBLE CONCENTRATING ON THINGS, SUCH AS READING THE NEWSPAPER OR WATCHING TELEVISION: MORE THAN HALF THE DAYS
SUM OF ALL RESPONSES TO PHQ9 QUESTIONS 1 AND 2: 4
SUM OF ALL RESPONSES TO PHQ QUESTIONS 1-9: 12
8. MOVING OR SPEAKING SO SLOWLY THAT OTHER PEOPLE COULD HAVE NOTICED. OR THE OPPOSITE, BEING SO FIGETY OR RESTLESS THAT YOU HAVE BEEN MOVING AROUND A LOT MORE THAN USUAL: NOT AT ALL
3. TROUBLE FALLING OR STAYING ASLEEP OR SLEEPING TOO MUCH: NOT AT ALL
2. FEELING DOWN, DEPRESSED, IRRITABLE, OR HOPELESS: MORE THAN HALF THE DAYS
6. FEELING BAD ABOUT YOURSELF - OR THAT YOU ARE A FAILURE OR HAVE LET YOURSELF OR YOUR FAMILY DOWN: MORE THAN HALF THE DAYS

## 2019-02-06 ASSESSMENT — ENCOUNTER SYMPTOMS
COUGH: 0
CHILLS: 0
NAUSEA: 0
PALPITATIONS: 0
WHEEZING: 0
FEVER: 0
WEIGHT LOSS: 0
VOMITING: 0
SHORTNESS OF BREATH: 0

## 2019-02-06 NOTE — PROGRESS NOTES
[facility] Medicine Daily Progress Note    Date of Service  2/6/2019    Chief Complaint  38 y.o. Male with MS, HTN, depression admitted 1/29/2019 with N/V and failure to thrive.     Interval Problem Update  Pain ---toradol, acetaminophen  Ileus --- resolved. Multiple BMs. No emesis. Abd NTND  MS --- limited participation with self-care; including self cath; attempting placement    Consultants/Specialty  None    Code Status  Full    Disposition  Needs longterm care. SNF referral sent.    Review of Systems  Review of Systems   Constitutional: Negative for chills, fever and weight loss.   Respiratory: Negative for cough, shortness of breath and wheezing.    Cardiovascular: Negative for chest pain and palpitations.   Gastrointestinal: Negative for nausea and vomiting.   Genitourinary: Negative for dysuria and urgency.   All other systems reviewed and are negative.    Physical Exam  Temp:  [36.2 °C (97.1 °F)-36.7 °C (98.1 °F)] 36.7 °C (98.1 °F)  Pulse:  [] 81  Resp:  [16-19] 16  BP: (113-128)/(74-87) 125/77  SpO2:  [92 %-96 %] 96 %    Physical Exam   Constitutional: He is oriented to person, place, and time. He appears well-developed. He is cooperative. No distress.   HENT:   Head: Normocephalic.   Mouth/Throat: Oropharynx is clear and moist.   Eyes: EOM are normal. No scleral icterus.   Neck: No tracheal deviation present.   Cardiovascular: Normal rate, regular rhythm and intact distal pulses.    Pulmonary/Chest: Effort normal. No respiratory distress.   Abdominal: Soft. Normal appearance and bowel sounds are normal. He exhibits no distension. There is no tenderness.   Baclofen pump RLQ   Musculoskeletal: He exhibits no edema or tenderness.   Neurological: He is alert and oriented to person, place, and time.   Paraplegia; weakness of bilateral upper extremities; can wiggle toes   Skin: Skin is warm and dry. He is not diaphoretic.   Psychiatric: His behavior is normal. His speech is not delayed.   Vitals  reviewed.      Fluids    Intake/Output Summary (Last 24 hours) at 02/06/19 0833  Last data filed at 02/06/19 0500   Gross per 24 hour   Intake              120 ml   Output             1600 ml   Net            -1480 ml       Laboratory                        Imaging  [id number] VIEW   Final Result      Mild ileus.           Assessment/Plan  * Intractable cyclical vomiting with nausea- (present on admission)   Assessment & Plan    Resolved.  Ileus on XR but has several BMs since admission.  - Advance diet as tolerated, now on a regular diet  - P.o. and IV Zofran as needed  - scheduled reglan for short term, discontinued on 2/2  - Continue famotidine     MS (multiple sclerosis) (HCC)- (present on admission)   Assessment & Plan    Chronic issue with significant debility. Needs 24/7 care  - continue home medication  - PT/OT  - SW following, looking for long-term care; difficult dispo  - start lidocaine patch 2/3  - has baclofen pain pump     Essential hypertension- (present on admission)   Assessment & Plan    Normotensive.  - home losartan     Recurrent major depressive disorder, in remission (HCC)- (present on admission)   Assessment & Plan    Stable at this time.  Appears to be in good spirits this a.m.  - Wellbutrin and Lexapro     Neurogenic bladder- (present on admission)   Assessment & Plan    Straight caths at home roughly q4 hrs  - matias catheter placed on admission, patient requested that it remain  - Mother is to bring in home supplies for self straight cath when Matias is removed, will need to update her  - continue oxybutinin          VTE prophylaxis: SCDs

## 2019-02-06 NOTE — CARE PLAN
Problem: Safety  Goal: Will remain free from falls  Outcome: PROGRESSING AS EXPECTED  Bed alarm on, bed locked in lowest position, all four side rails up for low air loss mattress, call light and personal items within reach, non skids socks on.    Problem: Skin Integrity  Goal: Risk for impaired skin integrity will decrease  Outcome: PROGRESSING AS EXPECTED  Pt on low air loss mattress, Q2 turns in place.

## 2019-02-06 NOTE — DISCHARGE PLANNING
Anticipated Discharge Disposition: LTACH VS SNF (Long Term Care)    Action: LSW contacted by [facility] Liaison, Marya Santana. Dr. Mayfield has requested she screen pt for acceptance.       LSW requested Lisa WANG referral to [facility] to be screened for acceptance.    Pt discussed in rounds. Pt remains medically clear to transfer. LSW requested LTACH order be placed.    Barriers to Discharge: SNF/LTACH Acceptance; LTACH order    Plan: LSW to f/u with [facility] and MD

## 2019-02-06 NOTE — DISCHARGE PLANNING
Agency/Facility Name: [facility]  Plan or Request: Updated referral sent to [facility] per Yolanda's(FLORY) request.

## 2019-02-06 NOTE — DISCHARGE PLANNING
Agency/Facility Name: [facility]  Spoke To: Jessika  Outcome: Patient declined- no male LTC beds available. Yolanda(RESHMAW) notified.

## 2019-02-07 PROCEDURE — 770006 HCHG ROOM/CARE - MED/SURG/GYN SEMI*

## 2019-02-07 PROCEDURE — 700102 HCHG RX REV CODE 250 W/ 637 OVERRIDE(OP): Performed by: NURSE PRACTITIONER

## 2019-02-07 PROCEDURE — 700102 HCHG RX REV CODE 250 W/ 637 OVERRIDE(OP): Performed by: HOSPITALIST

## 2019-02-07 PROCEDURE — 99231 SBSQ HOSP IP/OBS SF/LOW 25: CPT | Performed by: HOSPITALIST

## 2019-02-07 PROCEDURE — A9270 NON-COVERED ITEM OR SERVICE: HCPCS | Performed by: NURSE PRACTITIONER

## 2019-02-07 PROCEDURE — 700101 HCHG RX REV CODE 250: Performed by: HOSPITALIST

## 2019-02-07 PROCEDURE — A9270 NON-COVERED ITEM OR SERVICE: HCPCS | Performed by: HOSPITALIST

## 2019-02-07 RX ORDER — LACTULOSE 20 G/30ML
30 SOLUTION ORAL 3 TIMES DAILY PRN
Status: DISCONTINUED | OUTPATIENT
Start: 2019-02-07 | End: 2019-02-08

## 2019-02-07 RX ADMIN — LOSARTAN POTASSIUM 50 MG: 50 TABLET ORAL at 20:35

## 2019-02-07 RX ADMIN — LACTULOSE 30 ML: 20 SOLUTION ORAL at 17:58

## 2019-02-07 RX ADMIN — POLYETHYLENE GLYCOL 3350 1 PACKET: 17 POWDER, FOR SOLUTION ORAL at 09:46

## 2019-02-07 RX ADMIN — SENNOSIDES AND DOCUSATE SODIUM 2 TABLET: 8.6; 5 TABLET ORAL at 09:47

## 2019-02-07 RX ADMIN — FAMOTIDINE 20 MG: 20 TABLET ORAL at 09:49

## 2019-02-07 RX ADMIN — BUPROPION HYDROCHLORIDE 150 MG: 150 TABLET, EXTENDED RELEASE ORAL at 20:35

## 2019-02-07 RX ADMIN — ESCITALOPRAM OXALATE 20 MG: 10 TABLET ORAL at 09:46

## 2019-02-07 RX ADMIN — BUPROPION HYDROCHLORIDE 150 MG: 150 TABLET, EXTENDED RELEASE ORAL at 09:46

## 2019-02-07 RX ADMIN — METHENAMINE HIPPURATE 1 G: 1 TABLET ORAL at 20:35

## 2019-02-07 RX ADMIN — FAMOTIDINE 20 MG: 20 TABLET ORAL at 20:35

## 2019-02-07 RX ADMIN — LIDOCAINE 1 PATCH: 50 PATCH TOPICAL at 09:46

## 2019-02-07 RX ADMIN — METHENAMINE HIPPURATE 1 G: 1 TABLET ORAL at 09:47

## 2019-02-07 RX ADMIN — SENNOSIDES AND DOCUSATE SODIUM 2 TABLET: 8.6; 5 TABLET ORAL at 20:35

## 2019-02-07 RX ADMIN — OXYBUTYNIN CHLORIDE 10 MG: 10 TABLET, EXTENDED RELEASE ORAL at 09:47

## 2019-02-07 ASSESSMENT — ENCOUNTER SYMPTOMS
SHORTNESS OF BREATH: 0
FEVER: 0
PALPITATIONS: 0
CHILLS: 0
WHEEZING: 0
NAUSEA: 0
VOMITING: 0
WEIGHT LOSS: 0
COUGH: 0

## 2019-02-07 NOTE — PROGRESS NOTES
"Hospital Medicine Daily Progress Note    Date of Service  2/7/2019    Chief Complaint  38 y.o. Male with MS, HTN, depression admitted 1/29/2019 with N/V and failure to thrive.     Interval Problem Update  Pain ---controlled  Ileus --- resolved. Says he hasn't had BM \"in 3 days\". Start lactulose TID PRN. Nurse aware and working w pt.  MS --- limited participation with self-care; including self cath; attempting placement    Consultants/Specialty  None    Code Status  Full    Disposition  Needs longterm care. SNF referral sent.    Review of Systems  Review of Systems   Constitutional: Negative for chills, fever and weight loss.   Respiratory: Negative for cough, shortness of breath and wheezing.    Cardiovascular: Negative for chest pain and palpitations.   Gastrointestinal: Negative for nausea and vomiting.   Genitourinary: Negative for dysuria and urgency.   All other systems reviewed and are negative.    Physical Exam  Temp:  [36.2 °C (97.2 °F)-36.9 °C (98.5 °F)] 36.5 °C (97.7 °F)  Pulse:  [76-99] 76  Resp:  [16-18] 16  BP: (116-120)/(69-84) 117/84  SpO2:  [93 %-96 %] 96 %    Physical Exam   Constitutional: He is oriented to person, place, and time. He appears well-developed. He is cooperative. No distress.   HENT:   Head: Normocephalic.   Mouth/Throat: Oropharynx is clear and moist.   Eyes: EOM are normal. No scleral icterus.   Neck: No tracheal deviation present.   Cardiovascular: Normal rate, regular rhythm and intact distal pulses.    Pulmonary/Chest: Effort normal. No respiratory distress.   Abdominal: Soft. Normal appearance and bowel sounds are normal. He exhibits no distension. There is no tenderness.   Baclofen pump RLQ   Musculoskeletal: He exhibits no edema or tenderness.   Neurological: He is alert and oriented to person, place, and time.   Paraplegia; weakness of bilateral upper extremities; can wiggle toes   Skin: Skin is warm and dry. He is not diaphoretic.   Psychiatric: His behavior is normal. His " speech is not delayed.   Vitals reviewed.      Fluids    Intake/Output Summary (Last 24 hours) at 02/07/19 1129  Last data filed at 02/07/19 0945   Gross per 24 hour   Intake              840 ml   Output              700 ml   Net              140 ml       Laboratory                        Imaging  [id number] VIEW   Final Result      Mild ileus.           Assessment/Plan  * Intractable cyclical vomiting with nausea- (present on admission)   Assessment & Plan    Resolved.  Ileus on XR but has several BMs since admission.  - Advance diet as tolerated, now on a regular diet  - P.o. and IV Zofran as needed  - scheduled reglan for short term, discontinued on 2/2  - Continue famotidine     MS (multiple sclerosis) (HCC)- (present on admission)   Assessment & Plan    Chronic issue with significant debility. Needs 24/7 care  - continue home medication  - PT/OT  - SW following, looking for long-term care; difficult dispo  - start lidocaine patch 2/3  - has baclofen pain pump     Essential hypertension- (present on admission)   Assessment & Plan    Normotensive.  - home losartan     Recurrent major depressive disorder, in remission (HCC)- (present on admission)   Assessment & Plan    Stable at this time.  Appears to be in good spirits this a.m.  - Wellbutrin and Lexapro     Constipation- (present on admission)   Assessment & Plan    Lactulose TID PRN     Neurogenic bladder- (present on admission)   Assessment & Plan    Straight caths at home roughly q4 hrs  - matias catheter placed on admission, patient requested that it remain  - Mother is to bring in home supplies for self straight cath when Matias is removed, will need to update her  - continue oxybutinin          VTE prophylaxis: SCDs

## 2019-02-07 NOTE — DISCHARGE PLANNING
Anticipated Discharge Disposition: SNF (Long Term Care)    Action: LSW contacted by [facility] Liaison, Marya Santana. Pt's insurance will not authorize LTACH level of care.    LSW met with pt, Jerzy and his father, Zen gray. This worker provided a status update on placement. Jerzy requested that this worker send a referral to [place], as his mom will be moving there. Also discussed possibly finding a GH with his Caregivers from [facility] (26 hrs/week).    LSW requested Lisa WANG send SNF referral to:    [facility]   [address] [place] [address]  [phone]    [address], [place] [address].  [phone]      Barriers to Discharge: Long Term Care; Complex Medical Needs    Plan: LSW to f/u with [facility]

## 2019-02-07 NOTE — CARE PLAN
Problem: Bowel/Gastric:  Goal: Normal bowel function is maintained or improved  Outcome: PROGRESSING SLOWER THAN EXPECTED    Goal: Will not experience complications related to bowel motility  Outcome: PROGRESSING SLOWER THAN EXPECTED      Problem: Knowledge Deficit  Goal: Knowledge of disease process/condition, treatment plan, diagnostic tests, and medications will improve  Outcome: PROGRESSING AS EXPECTED    Goal: Knowledge of the prescribed therapeutic regimen will improve  Outcome: PROGRESSING AS EXPECTED      Problem: Discharge Barriers/Planning  Goal: Patient's continuum of care needs will be met  Outcome: PROGRESSING AS EXPECTED

## 2019-02-07 NOTE — PROGRESS NOTES
2 RN skin check performed with AURORA Judge. Heels pink, blanching. Sacrum pink, blanching. Pt on low air loss mattress, Q2 turns in place.

## 2019-02-07 NOTE — DISCHARGE PLANNING
Agency/Facility Name: [facility]  [facility] Referral sent to [phone] per Yolanda's(FLORY) request.

## 2019-02-07 NOTE — PROGRESS NOTES
Two RN Skin check completed with Veda SIMON.  Heels are red but blanchable. Old scar on sacrum.  Scratch on left hip.  No pressure ulcers/skin breakdown noted.

## 2019-02-08 PROBLEM — R11.15 INTRACTABLE CYCLICAL VOMITING WITH NAUSEA: Status: RESOLVED | Noted: 2019-01-29 | Resolved: 2019-02-08

## 2019-02-08 PROBLEM — Z87.898 HISTORY OF SELF-CARE DEFICIT: Status: ACTIVE | Noted: 2019-02-08

## 2019-02-08 PROBLEM — F41.9 ANXIETY: Status: ACTIVE | Noted: 2019-02-08

## 2019-02-08 PROCEDURE — A9270 NON-COVERED ITEM OR SERVICE: HCPCS | Performed by: HOSPITALIST

## 2019-02-08 PROCEDURE — 700102 HCHG RX REV CODE 250 W/ 637 OVERRIDE(OP): Performed by: HOSPITALIST

## 2019-02-08 PROCEDURE — 99231 SBSQ HOSP IP/OBS SF/LOW 25: CPT | Performed by: HOSPITALIST

## 2019-02-08 PROCEDURE — 770006 HCHG ROOM/CARE - MED/SURG/GYN SEMI*

## 2019-02-08 RX ORDER — BUSPIRONE HYDROCHLORIDE 10 MG/1
5 TABLET ORAL 3 TIMES DAILY PRN
Status: DISCONTINUED | OUTPATIENT
Start: 2019-02-08 | End: 2019-02-15 | Stop reason: HOSPADM

## 2019-02-08 RX ORDER — LACTULOSE 20 G/30ML
30 SOLUTION ORAL
Status: DISCONTINUED | OUTPATIENT
Start: 2019-02-08 | End: 2019-02-15 | Stop reason: HOSPADM

## 2019-02-08 RX ADMIN — SENNOSIDES AND DOCUSATE SODIUM 2 TABLET: 8.6; 5 TABLET ORAL at 10:36

## 2019-02-08 RX ADMIN — FAMOTIDINE 20 MG: 20 TABLET ORAL at 16:42

## 2019-02-08 RX ADMIN — OXYBUTYNIN CHLORIDE 10 MG: 10 TABLET, EXTENDED RELEASE ORAL at 10:39

## 2019-02-08 RX ADMIN — METHENAMINE HIPPURATE 1 G: 1 TABLET ORAL at 10:37

## 2019-02-08 RX ADMIN — FAMOTIDINE 20 MG: 20 TABLET ORAL at 10:38

## 2019-02-08 RX ADMIN — ESCITALOPRAM OXALATE 20 MG: 10 TABLET ORAL at 10:35

## 2019-02-08 RX ADMIN — BUPROPION HYDROCHLORIDE 150 MG: 150 TABLET, EXTENDED RELEASE ORAL at 16:42

## 2019-02-08 RX ADMIN — BUPROPION HYDROCHLORIDE 150 MG: 150 TABLET, EXTENDED RELEASE ORAL at 10:39

## 2019-02-08 RX ADMIN — LOSARTAN POTASSIUM 50 MG: 50 TABLET ORAL at 16:42

## 2019-02-08 RX ADMIN — METHENAMINE HIPPURATE 1 G: 1 TABLET ORAL at 20:27

## 2019-02-08 ASSESSMENT — ENCOUNTER SYMPTOMS
SHORTNESS OF BREATH: 0
NAUSEA: 0
WHEEZING: 0
FEVER: 0
CHILLS: 0
WEIGHT LOSS: 0
PALPITATIONS: 0
VOMITING: 0
COUGH: 0

## 2019-02-08 NOTE — CARE PLAN
Problem: Communication  Goal: The ability to communicate needs accurately and effectively will improve  Outcome: PROGRESSING AS EXPECTED  Pt. Instructed to speak slowly and clearly. Repetition and non-verbal stratagies utilized in order to understand patient.     Problem: Knowledge Deficit  Goal: Knowledge of disease process/condition, treatment plan, diagnostic tests, and medications will improve  Outcome: PROGRESSING SLOWER THAN EXPECTED  When pt. Notified by MD that he will not be ordering him beer, pt. Made away why MD did not want to make this order and the effects beer might have on his treatment.

## 2019-02-08 NOTE — PROGRESS NOTES
Patient is alert and oriented x4 at this time, denies pain, and has breakdown precautions in place. Rn has no overnight events to report, will continue to monitor.

## 2019-02-08 NOTE — DISCHARGE PLANNING
Anticipated Discharge Disposition: Long Term Care Facility (SNF vs. GH)    Action: LSW requested Lisa WANG f/u with [facility] for [facility].    LSW requested Lisa send a new referral to [facility] ([facility]) as they have an open LTC bed.    Barriers to Discharge: Long Term Care; Complex Medical Needs    Plan: LSW to f/u with [facility]

## 2019-02-08 NOTE — DISCHARGE PLANNING
Agency/Facility Name: [facility]  Spoke To: Lokesh  Outcome: Referral received and is being reviewed by ZAC.

## 2019-02-08 NOTE — DISCHARGE PLANNING
Agency/Facility Name: [facility]  Outcome: Updated referral resent to [facility] per Yolanda's(FLORY) request.

## 2019-02-08 NOTE — PROGRESS NOTES
2 RN skin check completed with Marya SIMON  Hector heels pink and blanching  Sacrum pink and blanching  All other skin clean, dry, and intact  Low air loss mattress and Q2T in place

## 2019-02-08 NOTE — PROGRESS NOTES
2 rn skin note   Patient's heels are blanching bilaterally, sacrum is pink and blanchable, and patient has skin breakdown precautions in place.

## 2019-02-08 NOTE — PROGRESS NOTES
"Hospital Medicine Daily Progress Note    Date of Service  2/8/2019    Chief Complaint  38 y.o. Male with MS, HTN, depression admitted 1/29/2019 with N/V and failure to thrive.     Interval Problem Update  Pain ---controlled  Ileus --- resolved. Says he hasn't had BM \"in 3 days\". Start lactulose TID PRN. BM x2 reported 2/8; change lactulose to QD  MS --- limited participation with self-care; including self cath; attempting placement  Self care deficit --- working on placement  Anxiety --- says he is going through ETOH withdrawal and needs a beer as he isn't going to stop. Currently on HD#10. Added low dose buspar for anxiety.    Consultants/Specialty  None    Code Status  Full    Disposition  Needs longterm care. SNF referral sent.    Review of Systems  Review of Systems   Constitutional: Negative for chills, fever and weight loss.   Respiratory: Negative for cough, shortness of breath and wheezing.    Cardiovascular: Negative for chest pain and palpitations.   Gastrointestinal: Negative for nausea and vomiting.   Genitourinary: Negative for dysuria and urgency.   Psychiatric/Behavioral:        Anxious   All other systems reviewed and are negative.    Physical Exam  Temp:  [36.9 °C (98.4 °F)-37 °C (98.6 °F)] 36.9 °C (98.4 °F)  Pulse:  [] 89  Resp:  [16-18] 18  BP: (114-124)/(62-92) 114/62  SpO2:  [93 %-95 %] 93 %    Physical Exam   Constitutional: He is oriented to person, place, and time. He appears well-developed. He is cooperative. No distress.   HENT:   Head: Normocephalic.   Mouth/Throat: Oropharynx is clear and moist.   Eyes: EOM are normal. No scleral icterus.   Neck: No tracheal deviation present.   Cardiovascular: Normal rate, regular rhythm and intact distal pulses.    Pulmonary/Chest: Effort normal. No respiratory distress.   Abdominal: Soft. Normal appearance and bowel sounds are normal. He exhibits no distension. There is no tenderness.   Baclofen pump RLQ   Musculoskeletal: He exhibits no edema or " tenderness.   Neurological: He is alert and oriented to person, place, and time.   Paraplegia; weakness of bilateral upper extremities; can wiggle toes   Skin: Skin is warm and dry. He is not diaphoretic.   Psychiatric: His behavior is normal. His speech is not delayed.   Vitals reviewed.      Fluids    Intake/Output Summary (Last 24 hours) at 02/08/19 0755  Last data filed at 02/08/19 0600   Gross per 24 hour   Intake             1020 ml   Output              900 ml   Net              120 ml       Laboratory                        Imaging  [id number] VIEW   Final Result      Mild ileus.           Assessment/Plan  * MS (multiple sclerosis) ([facility])- (present on admission)   Assessment & Plan    Chronic issue with significant debility. Needs 24/7 care  - continue home medication  - PT/OT  - SW following, looking for long-term care; difficult dispo  - start lidocaine patch 2/3  - has baclofen pain pump     Essential hypertension- (present on admission)   Assessment & Plan    Normotensive.  - home losartan     Anxiety   Assessment & Plan    Trial low dose buspar     History of self-care deficit   Assessment & Plan    Assessed by therapies and deemed unable to go back to previous living situation     Recurrent major depressive disorder, in remission ([facility])- (present on admission)   Assessment & Plan    Stable at this time.  Appears to be in good spirits this a.m.  - Wellbutrin and Lexapro     Constipation- (present on admission)   Assessment & Plan    Lactulose QD  Hold for loose stool     Neurogenic bladder- (present on admission)   Assessment & Plan    Straight caths at home roughly q4 hrs  - matias catheter placed on admission, patient requested that it remain  - Mother is to bring in home supplies for self straight cath when Matias is removed, will need to update her  - continue oxybutinin          VTE prophylaxis: SCDs

## 2019-02-09 PROCEDURE — 700102 HCHG RX REV CODE 250 W/ 637 OVERRIDE(OP): Performed by: HOSPITALIST

## 2019-02-09 PROCEDURE — 360976 HOYER LARGE SLING UP TO 300LBS: Performed by: HOSPITALIST

## 2019-02-09 PROCEDURE — 700101 HCHG RX REV CODE 250: Performed by: HOSPITALIST

## 2019-02-09 PROCEDURE — 99231 SBSQ HOSP IP/OBS SF/LOW 25: CPT | Performed by: HOSPITALIST

## 2019-02-09 PROCEDURE — 770006 HCHG ROOM/CARE - MED/SURG/GYN SEMI*

## 2019-02-09 PROCEDURE — A9270 NON-COVERED ITEM OR SERVICE: HCPCS | Performed by: HOSPITALIST

## 2019-02-09 RX ADMIN — METHENAMINE HIPPURATE 1 G: 1 TABLET ORAL at 08:52

## 2019-02-09 RX ADMIN — SENNOSIDES AND DOCUSATE SODIUM 2 TABLET: 8.6; 5 TABLET ORAL at 08:54

## 2019-02-09 RX ADMIN — BUPROPION HYDROCHLORIDE 150 MG: 150 TABLET, EXTENDED RELEASE ORAL at 16:45

## 2019-02-09 RX ADMIN — FAMOTIDINE 20 MG: 20 TABLET ORAL at 16:45

## 2019-02-09 RX ADMIN — OXYBUTYNIN CHLORIDE 10 MG: 10 TABLET, EXTENDED RELEASE ORAL at 08:53

## 2019-02-09 RX ADMIN — FAMOTIDINE 20 MG: 20 TABLET ORAL at 08:53

## 2019-02-09 RX ADMIN — LOSARTAN POTASSIUM 50 MG: 50 TABLET ORAL at 16:45

## 2019-02-09 RX ADMIN — METHENAMINE HIPPURATE 1 G: 1 TABLET ORAL at 21:38

## 2019-02-09 RX ADMIN — BUPROPION HYDROCHLORIDE 150 MG: 150 TABLET, EXTENDED RELEASE ORAL at 08:53

## 2019-02-09 RX ADMIN — ESCITALOPRAM OXALATE 20 MG: 10 TABLET ORAL at 08:54

## 2019-02-09 RX ADMIN — LIDOCAINE 1 PATCH: 50 PATCH TOPICAL at 09:01

## 2019-02-09 ASSESSMENT — ENCOUNTER SYMPTOMS
VOMITING: 0
SHORTNESS OF BREATH: 0
PALPITATIONS: 0
CHILLS: 0
NAUSEA: 0
FEVER: 0
COUGH: 0
WHEEZING: 0
WEIGHT LOSS: 0

## 2019-02-09 NOTE — PROGRESS NOTES
[facility] Medicine Daily Progress Note    Date of Service  2/9/2019    Chief Complaint  38 y.o. Male with MS, HTN, depression admitted 1/29/2019 with N/V and failure to thrive.     Interval Problem Update  Pain ---controlled  Ileus --- resolved.  MS --- working on placement; not safe for home  Anxiety --- Asking for a beer daily    Consultants/Specialty  None    Code Status  Full    Disposition  Needs longterm care. SNF referral sent.    Review of Systems  Review of Systems   Constitutional: Negative for chills, fever and weight loss.   Respiratory: Negative for cough, shortness of breath and wheezing.    Cardiovascular: Negative for chest pain and palpitations.   Gastrointestinal: Negative for nausea and vomiting.   Genitourinary: Negative for dysuria and urgency.   Psychiatric/Behavioral:        Anxious   All other systems reviewed and are negative.    Physical Exam  Temp:  [36.4 °C (97.6 °F)-36.9 °C (98.5 °F)] 36.4 °C (97.6 °F)  Pulse:  [] 81  Resp:  [16] 16  BP: (121-134)/(72-95) 121/78  SpO2:  [92 %-95 %] 95 %    Physical Exam   Constitutional: He is oriented to person, place, and time. He appears well-developed. He is cooperative. No distress.   HENT:   Head: Normocephalic.   Mouth/Throat: Oropharynx is clear and moist.   Eyes: EOM are normal. No scleral icterus.   Neck: No tracheal deviation present.   Cardiovascular: Normal rate, regular rhythm and intact distal pulses.    Pulmonary/Chest: Effort normal. No respiratory distress.   Abdominal: Soft. Normal appearance and bowel sounds are normal. He exhibits no distension. There is no tenderness.   Baclofen pump RLQ   Musculoskeletal: He exhibits no edema or tenderness.   Neurological: He is alert and oriented to person, place, and time.   Paraplegia; weakness of bilateral upper extremities; can wiggle toes   Skin: Skin is warm and dry. He is not diaphoretic.   Psychiatric: His behavior is normal. His speech is not delayed.   Vitals  reviewed.      Fluids    Intake/Output Summary (Last 24 hours) at 02/09/19 1351  Last data filed at 02/09/19 0830   Gross per 24 hour   Intake              720 ml   Output             1025 ml   Net             -305 ml       Laboratory                        Imaging  [id number] VIEW   Final Result      Mild ileus.           Assessment/Plan  * MS (multiple sclerosis) (HCC)- (present on admission)   Assessment & Plan    Chronic issue with significant debility. Needs 24/7 care  - continue home medication  - PT/OT  - SW following, looking for long-term care; difficult dispo  - start lidocaine patch 2/3  - has baclofen pain pump     Essential hypertension- (present on admission)   Assessment & Plan    Normotensive.  - home losartan     Anxiety   Assessment & Plan    Trial low dose buspar     History of self-care deficit   Assessment & Plan    Assessed by therapies and deemed unable to go back to previous living situation     Recurrent major depressive disorder, in remission ([facility])- (present on admission)   Assessment & Plan    Stable at this time.  Appears to be in good spirits this a.m.  - Wellbutrin and Lexapro     Constipation- (present on admission)   Assessment & Plan    Lactulose QD  Hold for loose stool     Neurogenic bladder- (present on admission)   Assessment & Plan    Straight caths at home roughly q4 hrs  - matias catheter placed on admission, patient requested that it remain  - Mother is to bring in home supplies for self straight cath when Matias is removed, will need to update her  - continue oxybutinin          VTE prophylaxis: SCDs

## 2019-02-09 NOTE — PROGRESS NOTES
Pt A&Ox4, denies pain. Pt resting comfortably in bed. Encouraging/assisting pt with Q2H turns. Call light and personal belongings within reach.

## 2019-02-09 NOTE — PLAN OF CARE (IOPOC)
Pt. VSS, PERRLA. Pt. Given meds with breakfast. Turns self. SCDs in place. Lidocaine patch applied to right shoulder. Bowel regiment maintained. Will continue to monitor.

## 2019-02-09 NOTE — PROGRESS NOTES
Pt requesting to have 0600 medications rescheduled for 0900 indefinitely as pt prefers to have Rx after breakfast. Rescheduled 0600 Rx for 0900 for this morning, will inform day RN of pt request for future Rx administration.

## 2019-02-09 NOTE — CARE PLAN
Problem: Venous Thromboembolism (VTW)/Deep Vein Thrombosis (DVT) Prevention:  Goal: Patient will participate in Venous Thrombosis (VTE)/Deep Vein Thrombosis (DVT)Prevention Measures    Intervention: Ensure patient wears graduated elastic stockings (ES hose) and/or SCDs, if ordered, when in bed or chair (Remove at least once per shift for skin check)  Placed SCDs on pt. Edu pt that SCDs help prevent formation of DVT in LE.       Problem: Skin Integrity  Goal: Risk for impaired skin integrity will decrease    Intervention: Implement precautions to protect skin integrity in collaboration with the interdisciplinary team  Pt on low air loss mattress. Qshift 2 RN skin check. Assisting/encouraging pt with Q2H turns

## 2019-02-09 NOTE — PROGRESS NOTES
2 RN skin check    Bilateral pink blanching heels    Pt on low air loss mattress. Q2h turns in place.

## 2019-02-09 NOTE — CARE PLAN
Problem: Safety  Goal: Will remain free from injury  Outcome: PROGRESSING AS EXPECTED  Pt. Instructed to use call light appropriately. Pt turning Q2. 1:1 feed maintained.     Problem: Skin Integrity  Goal: Risk for impaired skin integrity will decrease  Outcome: PROGRESSING AS EXPECTED  Pt. Turned Q2, kept dry and monitored for skin breakdown.

## 2019-02-10 PROCEDURE — 700102 HCHG RX REV CODE 250 W/ 637 OVERRIDE(OP): Performed by: HOSPITALIST

## 2019-02-10 PROCEDURE — A9270 NON-COVERED ITEM OR SERVICE: HCPCS | Performed by: HOSPITALIST

## 2019-02-10 PROCEDURE — 99231 SBSQ HOSP IP/OBS SF/LOW 25: CPT | Performed by: HOSPITALIST

## 2019-02-10 PROCEDURE — 700101 HCHG RX REV CODE 250: Performed by: HOSPITALIST

## 2019-02-10 PROCEDURE — 770006 HCHG ROOM/CARE - MED/SURG/GYN SEMI*

## 2019-02-10 RX ADMIN — BUPROPION HYDROCHLORIDE 150 MG: 150 TABLET, EXTENDED RELEASE ORAL at 09:06

## 2019-02-10 RX ADMIN — FAMOTIDINE 20 MG: 20 TABLET ORAL at 09:05

## 2019-02-10 RX ADMIN — METHENAMINE HIPPURATE 1 G: 1 TABLET ORAL at 21:19

## 2019-02-10 RX ADMIN — LIDOCAINE 1 PATCH: 50 PATCH TOPICAL at 09:06

## 2019-02-10 RX ADMIN — LOSARTAN POTASSIUM 50 MG: 50 TABLET ORAL at 17:41

## 2019-02-10 RX ADMIN — ESCITALOPRAM OXALATE 20 MG: 10 TABLET ORAL at 09:05

## 2019-02-10 RX ADMIN — METHENAMINE HIPPURATE 1 G: 1 TABLET ORAL at 09:05

## 2019-02-10 RX ADMIN — FAMOTIDINE 20 MG: 20 TABLET ORAL at 17:41

## 2019-02-10 RX ADMIN — BUPROPION HYDROCHLORIDE 150 MG: 150 TABLET, EXTENDED RELEASE ORAL at 17:41

## 2019-02-10 RX ADMIN — OXYBUTYNIN CHLORIDE 10 MG: 10 TABLET, EXTENDED RELEASE ORAL at 09:06

## 2019-02-10 ASSESSMENT — ENCOUNTER SYMPTOMS
WHEEZING: 0
VOMITING: 0
COUGH: 0
NAUSEA: 0
SHORTNESS OF BREATH: 0
PALPITATIONS: 0
FEVER: 0
WEIGHT LOSS: 0
CHILLS: 0

## 2019-02-10 NOTE — PROGRESS NOTES
2 RN skin check    Bilateral pink blanching heels  Implant in RLQ of abdomen  Epperson in place and secured    Pt on low air loss mattress. Q2h turns in place.

## 2019-02-10 NOTE — CARE PLAN
Problem: Safety  Goal: Will remain free from injury  Outcome: PROGRESSING SLOWER THAN EXPECTED  Call light usage and the necessity of turning in bed reinforced.     Problem: Skin Integrity  Goal: Risk for impaired skin integrity will decrease  Outcome: PROGRESSING SLOWER THAN EXPECTED  Turning and proper position to maintain skin integrity reinforced.

## 2019-02-11 PROCEDURE — 770006 HCHG ROOM/CARE - MED/SURG/GYN SEMI*

## 2019-02-11 PROCEDURE — 700102 HCHG RX REV CODE 250 W/ 637 OVERRIDE(OP): Performed by: HOSPITALIST

## 2019-02-11 PROCEDURE — A9270 NON-COVERED ITEM OR SERVICE: HCPCS | Performed by: HOSPITALIST

## 2019-02-11 PROCEDURE — 99232 SBSQ HOSP IP/OBS MODERATE 35: CPT | Performed by: HOSPITALIST

## 2019-02-11 PROCEDURE — 700101 HCHG RX REV CODE 250: Performed by: HOSPITALIST

## 2019-02-11 RX ADMIN — ESCITALOPRAM OXALATE 20 MG: 10 TABLET ORAL at 08:53

## 2019-02-11 RX ADMIN — LIDOCAINE 1 PATCH: 50 PATCH TOPICAL at 08:54

## 2019-02-11 RX ADMIN — OXYBUTYNIN CHLORIDE 10 MG: 10 TABLET, EXTENDED RELEASE ORAL at 08:54

## 2019-02-11 RX ADMIN — LOSARTAN POTASSIUM 50 MG: 50 TABLET ORAL at 18:24

## 2019-02-11 RX ADMIN — BUPROPION HYDROCHLORIDE 150 MG: 150 TABLET, EXTENDED RELEASE ORAL at 18:25

## 2019-02-11 RX ADMIN — FAMOTIDINE 20 MG: 20 TABLET ORAL at 18:25

## 2019-02-11 RX ADMIN — FAMOTIDINE 20 MG: 20 TABLET ORAL at 08:54

## 2019-02-11 RX ADMIN — BUPROPION HYDROCHLORIDE 150 MG: 150 TABLET, EXTENDED RELEASE ORAL at 08:53

## 2019-02-11 RX ADMIN — SENNOSIDES AND DOCUSATE SODIUM 2 TABLET: 8.6; 5 TABLET ORAL at 08:53

## 2019-02-11 RX ADMIN — SENNOSIDES AND DOCUSATE SODIUM 2 TABLET: 8.6; 5 TABLET ORAL at 18:24

## 2019-02-11 RX ADMIN — METHENAMINE HIPPURATE 1 G: 1 TABLET ORAL at 20:29

## 2019-02-11 RX ADMIN — METHENAMINE HIPPURATE 1 G: 1 TABLET ORAL at 08:54

## 2019-02-11 ASSESSMENT — ENCOUNTER SYMPTOMS
COUGH: 0
CHILLS: 0
FEVER: 0
PALPITATIONS: 0
SHORTNESS OF BREATH: 0
NAUSEA: 0
WEIGHT LOSS: 0
WHEEZING: 0
VOMITING: 0

## 2019-02-11 NOTE — PROGRESS NOTES
2 RN skin check completed with AURORA Humphries.  Pt's skin is intact.  Some blanchable redness noted to right elbow, but no other areas of concern noted.

## 2019-02-11 NOTE — PROGRESS NOTES
Assumed care of pt at 0700.  Pt alert and oriented x4; speech is slurred/mumbled (baseline per pt).  Denies any pain or discomfort.  No more nausea; tolerating diet well.  Pt able to turn and position self in bed with assistance to move feet every so often.  Bed low and locked, alarm set and call bell within reach.  Hourly rounding continues.    1330: pt very emotional this shift; his mom alerted him that he no longer has an apartment since he will be going to a SNF.  Pt crying/upset about this, understandably so, but also very excited for the connections he will make and care he will receive at the SNF.  Emotional support and encouragement provided.

## 2019-02-11 NOTE — PROGRESS NOTES
Pt. TONY INFANTE. Pt. Lethargic today but tolerating diet well. Continuing to take medication with food. Pt. Turning self in bed. Will discuss with patient need to d/c florencio. Will continue to monitor.

## 2019-02-11 NOTE — DISCHARGE PLANNING
Anticipated Discharge Disposition: Long Term Care Facility (SNF vs. GH)     Action: LSW requested CCA, Lisa f/u with [facility] (aka [facility]) for acceptance.     Barriers to Discharge: Long Term Care; Complex Medical Needs     Plan: LSW to f/u with [facility]

## 2019-02-11 NOTE — PROGRESS NOTES
Pt A&Ox4, denies pain. Pt is able to self turn in bed. PT stating not wanting to DC matias yet. Call light and personal belongings within reach

## 2019-02-11 NOTE — PROGRESS NOTES
[facility] Medicine Daily Progress Note    Date of Service  2/10/2019    Chief Complaint  38 y.o. Male with MS, HTN, depression admitted 1/29/2019 with N/V and failure to thrive.     Interval Problem Update  Pain ---controlled  Ileus --- resolved.  MS --- working on placement; not safe for home  Anxiety --- Asking for a beer daily  Urinary retention: Will remove his Epperson catheter.  Will order straight cath     Consultants/Specialty  None    Code Status  Full    Disposition  Needs longterm care. SNF referral sent.    Review of Systems  Review of Systems   Constitutional: Negative for chills, fever and weight loss.   Respiratory: Negative for cough, shortness of breath and wheezing.    Cardiovascular: Negative for chest pain and palpitations.   Gastrointestinal: Negative for nausea and vomiting.   Genitourinary: Negative for dysuria and urgency.   Psychiatric/Behavioral:        Anxious   All other systems reviewed and are negative.    Physical Exam  Temp:  [36.3 °C (97.4 °F)-36.9 °C (98.4 °F)] 36.9 °C (98.4 °F)  Pulse:  [70-96] 96  Resp:  [16-18] 18  BP: (109-124)/(67-77) 110/67  SpO2:  [95 %-97 %] 96 %    Physical Exam   Constitutional: He is oriented to person, place, and time. He appears well-developed. He is cooperative. No distress.   HENT:   Head: Normocephalic.   Mouth/Throat: Oropharynx is clear and moist.   Eyes: EOM are normal. No scleral icterus.   Neck: No tracheal deviation present.   Cardiovascular: Normal rate, regular rhythm and intact distal pulses.    Pulmonary/Chest: Effort normal. No respiratory distress.   Abdominal: Soft. Normal appearance and bowel sounds are normal. He exhibits no distension. There is no tenderness.   Baclofen pump RLQ   Musculoskeletal: He exhibits no edema or tenderness.   Neurological: He is alert and oriented to person, place, and time.   Paraplegia; weakness of bilateral upper extremities; can wiggle toes   Skin: Skin is warm and dry. He is not diaphoretic.   Psychiatric:  His behavior is normal. His speech is not delayed.   Vitals reviewed.      Fluids    Intake/Output Summary (Last 24 hours) at 02/10/19 1643  Last data filed at 02/10/19 0500   Gross per 24 hour   Intake              380 ml   Output              250 ml   Net              130 ml       Laboratory                        Imaging  [id number] VIEW   Final Result      Mild ileus.           Assessment/Plan  * MS (multiple sclerosis) (HCC)- (present on admission)   Assessment & Plan    Chronic issue with significant debility. Needs 24/7 care  - continue home medication  - PT/OT  - SW following, looking for long-term care; difficult dispo  - start lidocaine patch 2/3  - has baclofen pain pump     Essential hypertension- (present on admission)   Assessment & Plan    Normotensive.  - home losartan     Anxiety   Assessment & Plan    Trial low dose buspar     History of self-care deficit   Assessment & Plan    Assessed by therapies and deemed unable to go back to previous living situation     Recurrent major depressive disorder, in remission ([facility])- (present on admission)   Assessment & Plan    Stable at this time.  Appears to be in good spirits this a.m.  - Wellbutrin and Lexapro     Constipation- (present on admission)   Assessment & Plan    Lactulose QD  Hold for loose stool     Neurogenic bladder- (present on admission)   Assessment & Plan    Straight caths at home roughly q4 hrs  - matias catheter placed on admission, patient requested that it remain  - Mother is to bring in home supplies for self straight cath when Matias is removed, will need to update her  - continue oxybutinin          VTE prophylaxis: SCDs

## 2019-02-11 NOTE — CARE PLAN
Problem: Safety  Goal: Will remain free from injury  Outcome: PROGRESSING AS EXPECTED  Bed low and locked, alarm set, call bell within reach    Problem: Mobility  Goal: Risk for activity intolerance will decrease  Outcome: PROGRESSING AS EXPECTED  Pt able to turn self in bed; RN or CNA adjusts feet when need be

## 2019-02-12 PROCEDURE — 99231 SBSQ HOSP IP/OBS SF/LOW 25: CPT | Performed by: HOSPITALIST

## 2019-02-12 PROCEDURE — 770006 HCHG ROOM/CARE - MED/SURG/GYN SEMI*

## 2019-02-12 PROCEDURE — A9270 NON-COVERED ITEM OR SERVICE: HCPCS | Performed by: HOSPITALIST

## 2019-02-12 PROCEDURE — 700102 HCHG RX REV CODE 250 W/ 637 OVERRIDE(OP): Performed by: HOSPITALIST

## 2019-02-12 PROCEDURE — 700101 HCHG RX REV CODE 250: Performed by: HOSPITALIST

## 2019-02-12 RX ORDER — ESCITALOPRAM OXALATE 10 MG/1
10 TABLET ORAL ONCE
Status: COMPLETED | OUTPATIENT
Start: 2019-02-12 | End: 2019-02-12

## 2019-02-12 RX ORDER — AMOXICILLIN 250 MG
CAPSULE ORAL
Status: ACTIVE
Start: 2019-02-12 | End: 2019-02-13

## 2019-02-12 RX ADMIN — BUPROPION HYDROCHLORIDE 150 MG: 150 TABLET, EXTENDED RELEASE ORAL at 09:44

## 2019-02-12 RX ADMIN — ESCITALOPRAM OXALATE 10 MG: 10 TABLET, FILM COATED ORAL at 11:50

## 2019-02-12 RX ADMIN — ESCITALOPRAM OXALATE 20 MG: 10 TABLET ORAL at 09:44

## 2019-02-12 RX ADMIN — SENNOSIDES AND DOCUSATE SODIUM 2 TABLET: 8.6; 5 TABLET ORAL at 22:35

## 2019-02-12 RX ADMIN — OXYBUTYNIN CHLORIDE 10 MG: 10 TABLET, EXTENDED RELEASE ORAL at 09:43

## 2019-02-12 RX ADMIN — METHENAMINE HIPPURATE 1 G: 1 TABLET ORAL at 09:44

## 2019-02-12 RX ADMIN — METHENAMINE HIPPURATE 1 G: 1 TABLET ORAL at 18:19

## 2019-02-12 RX ADMIN — SENNOSIDES AND DOCUSATE SODIUM 2 TABLET: 8.6; 5 TABLET ORAL at 09:43

## 2019-02-12 RX ADMIN — BUPROPION HYDROCHLORIDE 150 MG: 150 TABLET, EXTENDED RELEASE ORAL at 18:19

## 2019-02-12 RX ADMIN — LOSARTAN POTASSIUM 50 MG: 50 TABLET ORAL at 18:19

## 2019-02-12 RX ADMIN — FAMOTIDINE 20 MG: 20 TABLET ORAL at 18:19

## 2019-02-12 RX ADMIN — FAMOTIDINE 20 MG: 20 TABLET ORAL at 09:44

## 2019-02-12 RX ADMIN — LIDOCAINE 1 PATCH: 50 PATCH TOPICAL at 09:46

## 2019-02-12 ASSESSMENT — ENCOUNTER SYMPTOMS
WEIGHT LOSS: 0
PALPITATIONS: 0
CHILLS: 0
COUGH: 0
FEVER: 0
NAUSEA: 0
SHORTNESS OF BREATH: 0
VOMITING: 0
WHEEZING: 0

## 2019-02-12 NOTE — CARE PLAN
Problem: Venous Thromboembolism (VTW)/Deep Vein Thrombosis (DVT) Prevention:  Goal: Patient will participate in Venous Thrombosis (VTE)/Deep Vein Thrombosis (DVT)Prevention Measures  Outcome: PROGRESSING AS EXPECTED  SCDs on.     Problem: Skin Integrity  Goal: Risk for impaired skin integrity will decrease  Outcome: PROGRESSING AS EXPECTED  Patient on a low airloss mattress.

## 2019-02-12 NOTE — PROGRESS NOTES
Hospital Medicine Daily Progress Note    Date of Service  2/12/2019    Chief Complaint  38 y.o. Male with MS, HTN, depression admitted 1/29/2019 with N/V and failure to thrive.     Interval Problem Update  Pain ---controlled  Ileus --- resolved.  MS --- working on placement; not safe for home  Anxiety --- Asking for a beer daily  Urinary retention: matias risks previously discussed. He was told that it should be changed every 30 days    Consultants/Specialty  None    Code Status  Full    Disposition  Needs longterm care. SNF referral sent.    Review of Systems  Review of Systems   Constitutional: Negative for chills, fever and weight loss.   Respiratory: Negative for cough, shortness of breath and wheezing.    Cardiovascular: Negative for chest pain and palpitations.   Gastrointestinal: Negative for nausea and vomiting.   Genitourinary: Negative for dysuria and urgency.   All other systems reviewed and are negative.    Physical Exam  Temp:  [36.4 °C (97.5 °F)-37.3 °C (99.1 °F)] 36.4 °C (97.5 °F)  Pulse:  [] 74  Resp:  [16-18] 16  BP: (118-154)/() 124/79  SpO2:  [93 %-96 %] 96 %    Physical Exam   Constitutional: He is oriented to person, place, and time. He appears well-developed. He is cooperative. No distress.   HENT:   Head: Normocephalic.   Mouth/Throat: Oropharynx is clear and moist.   Eyes: EOM are normal. No scleral icterus.   Neck: No tracheal deviation present.   Cardiovascular: Normal rate, regular rhythm and intact distal pulses.    Pulmonary/Chest: Effort normal. No respiratory distress.   Abdominal: Soft. Normal appearance and bowel sounds are normal. He exhibits no distension. There is no tenderness.   Baclofen pump RLQ   Musculoskeletal: He exhibits no edema or tenderness.   Neurological: He is alert and oriented to person, place, and time.   Paraplegia; weakness of bilateral upper extremities; can wiggle toes   Skin: Skin is warm and dry. He is not diaphoretic.   Psychiatric: His behavior  is normal. His speech is not delayed.   Vitals reviewed.      Fluids    Intake/Output Summary (Last 24 hours) at 02/12/19 1026  Last data filed at 02/11/19 2100   Gross per 24 hour   Intake                0 ml   Output              875 ml   Net             -875 ml       Laboratory                        Imaging  [id number] VIEW   Final Result      Mild ileus.           Assessment/Plan  * MS (multiple sclerosis) (HCC)- (present on admission)   Assessment & Plan    Chronic issue with significant debility. Needs 24/7 care  - continue home medication  - PT/OT  - SW following, looking for long-term care; difficult dispo   lidocaine patch  - has baclofen pain pump     Essential hypertension- (present on admission)   Assessment & Plan    Normotensive.  - home losartan     Anxiety   Assessment & Plan    low dose buspar     History of self-care deficit- (present on admission)   Assessment & Plan    Assessed by therapies and deemed unable to go back to previous living situation     Recurrent major depressive disorder, in remission ([facility])- (present on admission)   Assessment & Plan    Stable at this time.  Appears to be in good spirits this a.m.  - Wellbutrin and Lexapro     Constipation- (present on admission)   Assessment & Plan    Lactulose QD  Hold for loose stool     Neurogenic bladder- (present on admission)   Assessment & Plan    Epperson cath     Change every 30 days          VTE prophylaxis: SCDs

## 2019-02-12 NOTE — PROGRESS NOTES
Patient is A&Ox4. Patient denies pain. SCDs on. Epperson in place. Call light within reach. POC discussed with patient. Hourly rounding.

## 2019-02-12 NOTE — CARE PLAN
Problem: Safety  Goal: Will remain free from injury  Outcome: PROGRESSING AS EXPECTED  Bed low and locked, call bell within reach and room near RN station    Problem: Mobility  Goal: Risk for activity intolerance will decrease  Outcome: PROGRESSING AS EXPECTED  Pt unable to ambulate d/t weakness/paraplegia from MS but he has upper body strength to turn and position self and lift self up to top of bed

## 2019-02-12 NOTE — PROGRESS NOTES
Hospital Medicine Daily Progress Note    Date of Service  2/11/2019    Chief Complaint  38 y.o. Male with MS, HTN, depression admitted 1/29/2019 with N/V and failure to thrive.     Interval Problem Update  Pain ---controlled  Ileus --- resolved.  MS --- working on placement; not safe for home  Anxiety --- Asking for a beer daily  Urinary retention: After discussing the problems and the risks of keeping a Epperson catheter in long-term including UTIs.  Patient decided to keep the Epperson catheter in place.  He does not wants to self catheterize or have nurses catheterize for him.  The patient denied having a suprapubic cath.    Consultants/Specialty  None    Code Status  Full    Disposition  Needs longterm care. SNF referral sent.    Review of Systems  Review of Systems   Constitutional: Negative for chills, fever and weight loss.   Respiratory: Negative for cough, shortness of breath and wheezing.    Cardiovascular: Negative for chest pain and palpitations.   Gastrointestinal: Negative for nausea and vomiting.   Genitourinary: Negative for dysuria and urgency.   Psychiatric/Behavioral:        Anxious   All other systems reviewed and are negative.    Physical Exam  Temp:  [36.3 °C (97.3 °F)-37.3 °C (99.1 °F)] 37.3 °C (99.1 °F)  Pulse:  [68-98] 98  Resp:  [16-18] 18  BP: ()/(64-81) 154/81  SpO2:  [93 %-96 %] 96 %    Physical Exam   Constitutional: He is oriented to person, place, and time. He appears well-developed. He is cooperative. No distress.   HENT:   Head: Normocephalic.   Mouth/Throat: Oropharynx is clear and moist.   Eyes: EOM are normal. No scleral icterus.   Neck: No tracheal deviation present.   Cardiovascular: Normal rate, regular rhythm and intact distal pulses.    Pulmonary/Chest: Effort normal. No respiratory distress.   Abdominal: Soft. Normal appearance and bowel sounds are normal. He exhibits no distension. There is no tenderness.   Baclofen pump RLQ   Musculoskeletal: He exhibits no edema or  tenderness.   Neurological: He is alert and oriented to person, place, and time.   Paraplegia; weakness of bilateral upper extremities; can wiggle toes   Skin: Skin is warm and dry. He is not diaphoretic.   Psychiatric: His behavior is normal. His speech is not delayed.   Vitals reviewed.      Fluids    Intake/Output Summary (Last 24 hours) at 02/11/19 1808  Last data filed at 02/11/19 0400   Gross per 24 hour   Intake              120 ml   Output              250 ml   Net             -130 ml       Laboratory                        Imaging  [id number] VIEW   Final Result      Mild ileus.           Assessment/Plan  * MS (multiple sclerosis) ([facility])- (present on admission)   Assessment & Plan    Chronic issue with significant debility. Needs 24/7 care  - continue home medication  - PT/OT  - SW following, looking for long-term care; difficult dispo  - start lidocaine patch 2/3  - has baclofen pain pump     Essential hypertension- (present on admission)   Assessment & Plan    Normotensive.  - home losartan     Anxiety   Assessment & Plan    Trial low dose buspar     History of self-care deficit   Assessment & Plan    Assessed by therapies and deemed unable to go back to previous living situation     Recurrent major depressive disorder, in remission ([facility])- (present on admission)   Assessment & Plan    Stable at this time.  Appears to be in good spirits this a.m.  - Wellbutrin and Lexapro     Constipation- (present on admission)   Assessment & Plan    Lactulose QD  Hold for loose stool     Neurogenic bladder- (present on admission)   Assessment & Plan    Straight caths at home roughly q4 hrs  - matias catheter placed on admission, patient requested that it remain  - continue oxybutinin  -After discussing with the patient with the risk of keeping a Matias catheter.  The patient decided to keep the Matias catheter despite the risk of infection.  He does not want to self catheterize himself at ordered and have nurses do it.   The patient also does not want a suprapubic cath.          VTE prophylaxis: SCDs

## 2019-02-12 NOTE — DISCHARGE PLANNING
Agency/Facility Name: [facility]  Outcome: Attempted to follow up, however, no answer. Voicemail left for Niko(Admissions).    Agency/Facility Name: [facility]  Spoke To: Lokesh  Outcome: Patient declined-LTC.

## 2019-02-12 NOTE — PROGRESS NOTES
Assumed care of pt at 0700.  Pt alert and oriented x4.  He has slurred speech baseline.  Denies any pain or discomfort.  Pt able to turn and position self q2h.  Epperson catheter in place draining jose alberto colored urine.  Bed low and locked, call bell within reach.  Hourly rounding continues.

## 2019-02-12 NOTE — DISCHARGE PLANNING
Anticipated Discharge Disposition: Long Term Care Facility (SNF vs. GH)     Action: LSW requested CCA, Lisa f/u with [facility] ([facility]) for acceptance.     Barriers to Discharge: Long Term Care; Complex Medical Needs     Plan: LSW to f/u with [facility]

## 2019-02-12 NOTE — PROGRESS NOTES
2 RN skin check complete: Implant to RLQ of abdomen. Bilateral heels pink, blanching. Otherwise skin intact.      Patient in low air loss mattress.

## 2019-02-13 PROCEDURE — 700102 HCHG RX REV CODE 250 W/ 637 OVERRIDE(OP): Performed by: HOSPITALIST

## 2019-02-13 PROCEDURE — 770006 HCHG ROOM/CARE - MED/SURG/GYN SEMI*

## 2019-02-13 PROCEDURE — 700101 HCHG RX REV CODE 250: Performed by: HOSPITALIST

## 2019-02-13 PROCEDURE — A9270 NON-COVERED ITEM OR SERVICE: HCPCS | Performed by: HOSPITALIST

## 2019-02-13 PROCEDURE — 99231 SBSQ HOSP IP/OBS SF/LOW 25: CPT | Performed by: HOSPITALIST

## 2019-02-13 RX ADMIN — LOSARTAN POTASSIUM 50 MG: 50 TABLET ORAL at 18:37

## 2019-02-13 RX ADMIN — SENNOSIDES AND DOCUSATE SODIUM 2 TABLET: 8.6; 5 TABLET ORAL at 10:48

## 2019-02-13 RX ADMIN — FAMOTIDINE 20 MG: 20 TABLET ORAL at 10:48

## 2019-02-13 RX ADMIN — BUPROPION HYDROCHLORIDE 150 MG: 150 TABLET, EXTENDED RELEASE ORAL at 10:48

## 2019-02-13 RX ADMIN — BUPROPION HYDROCHLORIDE 150 MG: 150 TABLET, EXTENDED RELEASE ORAL at 18:37

## 2019-02-13 RX ADMIN — LIDOCAINE 1 PATCH: 50 PATCH TOPICAL at 10:47

## 2019-02-13 RX ADMIN — OXYBUTYNIN CHLORIDE 10 MG: 10 TABLET, EXTENDED RELEASE ORAL at 10:49

## 2019-02-13 RX ADMIN — METHENAMINE HIPPURATE 1 G: 1 TABLET ORAL at 21:04

## 2019-02-13 RX ADMIN — ESCITALOPRAM OXALATE 20 MG: 10 TABLET ORAL at 10:48

## 2019-02-13 RX ADMIN — FAMOTIDINE 20 MG: 20 TABLET ORAL at 18:00

## 2019-02-13 RX ADMIN — METHENAMINE HIPPURATE 1 G: 1 TABLET ORAL at 10:48

## 2019-02-13 ASSESSMENT — ENCOUNTER SYMPTOMS
VOMITING: 0
COUGH: 0
PALPITATIONS: 0
SHORTNESS OF BREATH: 0
CHILLS: 0
FEVER: 0
NAUSEA: 0
WHEEZING: 0
WEIGHT LOSS: 0

## 2019-02-13 NOTE — DISCHARGE PLANNING
Agency/Facility Name: [facility]  Outcome: Attempted to follow up, however, no answer. Voicemail left.    [facility]Facility Name: [facility]  Plan or Request: Referral sent to [facility] per Yolanda's(FLORY) request.

## 2019-02-13 NOTE — CARE PLAN
Problem: Bowel/Gastric:  Goal: Normal bowel function is maintained or improved  Outcome: PROGRESSING AS EXPECTED      Problem: Skin Integrity  Goal: Risk for impaired skin integrity will decrease  Low air loss mattress, patient turns self.

## 2019-02-13 NOTE — PROGRESS NOTES
Patient went down to hospital entrance at beginning of shift with day shift RN. Patient is A&Ox4. Patient denies pain. . SCDs on. Epperson in place. Call light within reach. POC discussed with patient. Hourly rounding.

## 2019-02-13 NOTE — PROGRESS NOTES
[facility] Medicine Daily Progress Note    Date of Service  2/13/2019    Chief Complaint  38 y.o. Male with MS, HTN, depression admitted 1/29/2019 with N/V and failure to thrive.     Interval Problem Update  Pain ---controlled  Ileus --- resolved.  MS --- working on placement; not safe for home  Anxiety ---stable  Urinary retention: matias risks previously discussed. He was told that it should be changed every 30 days    Consultants/Specialty  None    Code Status  Full    Disposition  Needs longterm care. SNF referral sent.    Review of Systems  Review of Systems   Constitutional: Negative for chills, fever and weight loss.   Respiratory: Negative for cough, shortness of breath and wheezing.    Cardiovascular: Negative for chest pain and palpitations.   Gastrointestinal: Negative for nausea and vomiting.   Genitourinary: Negative for dysuria and urgency.   All other systems reviewed and are negative.    Physical Exam  Temp:  [36.4 °C (97.5 °F)-36.8 °C (98.2 °F)] 36.4 °C (97.5 °F)  Pulse:  [] 84  Resp:  [16] 16  BP: (112-139)/(70-91) 112/78  SpO2:  [95 %-100 %] 96 %    Physical Exam   Constitutional: He is oriented to person, place, and time. He appears well-developed. He is cooperative. No distress.   HENT:   Head: Normocephalic.   Mouth/Throat: Oropharynx is clear and moist.   Eyes: EOM are normal. No scleral icterus.   Neck: No tracheal deviation present.   Cardiovascular: Normal rate, regular rhythm and intact distal pulses.    Pulmonary/Chest: Effort normal. No respiratory distress.   Abdominal: Soft. Normal appearance and bowel sounds are normal. He exhibits no distension. There is no tenderness.   Baclofen pump RLQ   Musculoskeletal: He exhibits no edema or tenderness.   Neurological: He is alert and oriented to person, place, and time.   Paraplegia; weakness of bilateral upper extremities; can wiggle toes   Skin: Skin is warm and dry. He is not diaphoretic.   Psychiatric: His behavior is normal. His speech  is not delayed.   Vitals reviewed.      Fluids  No intake or output data in the 24 hours ending 02/13/19 1324    Laboratory                        Imaging  [id number] VIEW   Final Result      Mild ileus.           Assessment/Plan  * MS (multiple sclerosis) (HCC)- (present on admission)   Assessment & Plan    Chronic issue with significant debility. Needs 24/7 care  - continue home medication  - PT/OT  - SW following, looking for long-term care; difficult dispo   lidocaine patch  - has baclofen pain pump     Essential hypertension- (present on admission)   Assessment & Plan    Normotensive.  - home losartan     Anxiety   Assessment & Plan    low dose buspar     History of self-care deficit- (present on admission)   Assessment & Plan    Assessed by therapies and deemed unable to go back to previous living situation     Recurrent major depressive disorder, in remission ([facility])- (present on admission)   Assessment & Plan    Stable at this time.  Appears to be in good spirits this a.m.  - Wellbutrin and Lexapro     Constipation- (present on admission)   Assessment & Plan    Lactulose QD  Hold for loose stool     Neurogenic bladder- (present on admission)   Assessment & Plan    Epperson cath     Change every 30 days          VTE prophylaxis: SCDs

## 2019-02-13 NOTE — CARE PLAN
Problem: Venous Thromboembolism (VTW)/Deep Vein Thrombosis (DVT) Prevention:  Goal: Patient will participate in Venous Thrombosis (VTE)/Deep Vein Thrombosis (DVT)Prevention Measures  Outcome: PROGRESSING AS EXPECTED  SCDs in place    Problem: Skin Integrity  Goal: Risk for impaired skin integrity will decrease  Outcome: PROGRESSING AS EXPECTED  q 2hr turn continued

## 2019-02-13 NOTE — DISCHARGE PLANNING
Anticipated Discharge Disposition: Long Term Care Facility (SNF vs. GH)    Action: LSW made tc to pt's mom, Kandi ([phone]). This worker provided her with an update on locating a LTC facility.  She is agreeable for referrals to be sent out to [place]. LSW mentioned the idea of Group Homes but Kandi is hesitant as caregivers have not been very good. Group Home has been tabled for now.   There is a place in [place] that pt's Neurologist mentioned. She will get the name of it. Until then this worker will continue to have referrals sent out in .     LSW faxed Lisa WANG eight (8) SNF Choices for [place].    [facility]  [address]   [place] [address]  [phone]    [facility]  [address] [place] [facility] [place] [address]  [phone]    [facility]  [address]    [place] [address]  [phone]    [facility]  [address] [place] [address]   [place] [address]  [phone]    [facility] [place]  [address] [place]    [place] [address]  [phone]    [facility]  [address] [facility] [place] [address]  [phone]    [facility]  [address] [facility] [place] [address]  [phone]    [facility] [place]  [address] [place] [address]  [phone]    Barriers to Discharge: Long Term Care; Complex Medical Needs    Plan: LSW to f/u with [facility]

## 2019-02-13 NOTE — PROGRESS NOTES
Pt's uncle is here in [place] to take his cat (from pt's old apartment), to [place] where he lives since pt will be discharging to a SNF.  Pt extremely emotional/crying in regards to giving cat up.  Uncle has cat in car and they were wondering if patient can be taken down to hospital entrance to say goodbye to the cat.  Dr. Phillips called and agreed/gave verbal order that patient is allowed to leave unit with RN, to hospital entrace in order to see cat.  This RN will accompany patient at the end of this shift (1900), to hospital entrance.

## 2019-02-13 NOTE — PROGRESS NOTES
2 RN skin check complete: Implant to RLQ of abdomen. Bilateral heels pink, blanching. Otherwise skin intact.      Patient on low air loss mattress. Patient turns self.

## 2019-02-13 NOTE — PROGRESS NOTES
Pt AAOx4, speech slurred. Moves BUE with 4/5 strength and decreased coordination. BLE with trace movement in feet/toes and decreased sensation. Eating with assistance. Epperson in place. BM this am. Q 2 hr turn continued.

## 2019-02-13 NOTE — DISCHARGE PLANNING
Agency/Facility Name: [facility]  Outcome: Patient declined- care exceeds capacity.

## 2019-02-14 PROCEDURE — 99231 SBSQ HOSP IP/OBS SF/LOW 25: CPT | Performed by: HOSPITALIST

## 2019-02-14 PROCEDURE — A9270 NON-COVERED ITEM OR SERVICE: HCPCS | Performed by: HOSPITALIST

## 2019-02-14 PROCEDURE — 700102 HCHG RX REV CODE 250 W/ 637 OVERRIDE(OP): Performed by: HOSPITALIST

## 2019-02-14 PROCEDURE — 700101 HCHG RX REV CODE 250: Performed by: HOSPITALIST

## 2019-02-14 PROCEDURE — 770006 HCHG ROOM/CARE - MED/SURG/GYN SEMI*

## 2019-02-14 RX ORDER — ACETAMINOPHEN 325 MG/1
650 TABLET ORAL EVERY 6 HOURS PRN
Status: DISCONTINUED | OUTPATIENT
Start: 2019-02-14 | End: 2019-02-15 | Stop reason: HOSPADM

## 2019-02-14 RX ADMIN — ACETAMINOPHEN 650 MG: 325 TABLET, FILM COATED ORAL at 12:28

## 2019-02-14 RX ADMIN — METHENAMINE HIPPURATE 1 G: 1 TABLET ORAL at 20:44

## 2019-02-14 RX ADMIN — METHENAMINE HIPPURATE 1 G: 1 TABLET ORAL at 09:19

## 2019-02-14 RX ADMIN — FAMOTIDINE 20 MG: 20 TABLET ORAL at 09:19

## 2019-02-14 RX ADMIN — BUPROPION HYDROCHLORIDE 150 MG: 150 TABLET, EXTENDED RELEASE ORAL at 09:19

## 2019-02-14 RX ADMIN — OXYBUTYNIN CHLORIDE 10 MG: 10 TABLET, EXTENDED RELEASE ORAL at 09:19

## 2019-02-14 RX ADMIN — BUPROPION HYDROCHLORIDE 150 MG: 150 TABLET, EXTENDED RELEASE ORAL at 18:15

## 2019-02-14 RX ADMIN — LOSARTAN POTASSIUM 50 MG: 50 TABLET ORAL at 18:15

## 2019-02-14 RX ADMIN — FAMOTIDINE 20 MG: 20 TABLET ORAL at 18:15

## 2019-02-14 RX ADMIN — ESCITALOPRAM OXALATE 20 MG: 10 TABLET ORAL at 09:19

## 2019-02-14 RX ADMIN — LIDOCAINE 1 PATCH: 50 PATCH TOPICAL at 09:21

## 2019-02-14 ASSESSMENT — ENCOUNTER SYMPTOMS
SHORTNESS OF BREATH: 0
NAUSEA: 0
CHILLS: 0
PALPITATIONS: 0
COUGH: 0
WEIGHT LOSS: 0
VOMITING: 0
FEVER: 0
WHEEZING: 0

## 2019-02-14 ASSESSMENT — PATIENT HEALTH QUESTIONNAIRE - PHQ9
1. LITTLE INTEREST OR PLEASURE IN DOING THINGS: NOT AT ALL
SUM OF ALL RESPONSES TO PHQ9 QUESTIONS 1 AND 2: 0
2. FEELING DOWN, DEPRESSED, IRRITABLE, OR HOPELESS: NOT AT ALL

## 2019-02-14 NOTE — PROGRESS NOTES
"Patient is resting in bed. Patient upset and frustrated at beginning of shift, asking to speak to charge RN. Patient stating that bed was not firm enough, patient on a low air loss mattress. Refusing assessment for this RN. Charge RN notified. Patient able to calm down, stating \"im not mad at you\" to this RN. Patient had two BM tonight encouraged to turn every two hours. Call light within reach. Hourly rounding in place.   "

## 2019-02-14 NOTE — PROGRESS NOTES
[facility] Medicine Daily Progress Note    Date of Service  2/14/2019    Chief Complaint  38 y.o. Male with MS, HTN, depression admitted 1/29/2019 with N/V and failure to thrive.     Interval Problem Update  Pain ---controlled  Ileus --- resolved.  MS --- working on placement--> awaiting bed  Anxiety ---stable  Urinary retention: matias risks previously discussed. He was told that it should be changed every 30 days    Consultants/Specialty  None    Code Status  Full    Disposition  Needs longterm care. SNF referral sent.    Review of Systems  Review of Systems   Constitutional: Negative for chills, fever and weight loss.   Respiratory: Negative for cough, shortness of breath and wheezing.    Cardiovascular: Negative for chest pain and palpitations.   Gastrointestinal: Negative for nausea and vomiting.   Genitourinary: Negative for dysuria and urgency.   All other systems reviewed and are negative.    Physical Exam  Temp:  [36.6 °C (97.8 °F)-36.8 °C (98.2 °F)] 36.7 °C (98.1 °F)  Pulse:  [66-92] 66  Resp:  [16-18] 18  BP: (121-133)/(75-89) 130/84  SpO2:  [90 %-96 %] 90 %    Physical Exam   Constitutional: He is oriented to person, place, and time. He appears well-developed. He is cooperative. No distress.   HENT:   Head: Normocephalic.   Mouth/Throat: Oropharynx is clear and moist.   Eyes: EOM are normal. No scleral icterus.   Neck: No tracheal deviation present.   Cardiovascular: Normal rate, regular rhythm and intact distal pulses.    Pulmonary/Chest: Effort normal. No respiratory distress.   Abdominal: Soft. Normal appearance and bowel sounds are normal. He exhibits no distension. There is no tenderness.   Baclofen pump RLQ   Musculoskeletal: He exhibits no edema or tenderness.   Neurological: He is alert and oriented to person, place, and time.   Paraplegia; weakness of bilateral upper extremities; can wiggle toes   Skin: Skin is warm and dry. He is not diaphoretic.   Psychiatric: His behavior is normal. His speech  is not delayed.   Vitals reviewed.      Fluids    Intake/Output Summary (Last 24 hours) at 02/14/19 1058  Last data filed at 02/14/19 0700   Gross per 24 hour   Intake              100 ml   Output             1150 ml   Net            -1050 ml       Laboratory                        Imaging  [id number] VIEW   Final Result      Mild ileus.           Assessment/Plan  * MS (multiple sclerosis) (HCC)- (present on admission)   Assessment & Plan    Chronic issue with significant debility. Needs 24/7 care  - continue home medication  - PT/OT  - SW following, looking for long-term care; difficult dispo   lidocaine patch  - has baclofen pain pump     Essential hypertension- (present on admission)   Assessment & Plan    Normotensive.  - home losartan     Anxiety   Assessment & Plan    low dose buspar     History of self-care deficit- (present on admission)   Assessment & Plan    Assessed by therapies and deemed unable to go back to previous living situation     Recurrent major depressive disorder, in remission ([facility])- (present on admission)   Assessment & Plan    Stable at this time.  Appears to be in good spirits this a.m.  - Wellbutrin and Lexapro     Constipation- (present on admission)   Assessment & Plan    Lactulose QD  Hold for loose stool     Neurogenic bladder- (present on admission)   Assessment & Plan    Epperson cath     Change every 30 days        Dc summary dictated  VTE prophylaxis: SCDs

## 2019-02-14 NOTE — PROGRESS NOTES
No change in neuro status. Pt eating with assistance. Epperson in pace. Refuses bed alarm, calls appropriately. VSS. C/O right shoulder pain, tylenol given.

## 2019-02-14 NOTE — DISCHARGE PLANNING
Anticipated Discharge Disposition: Long Term Care Facility (SNF)    Action: RESHMAW requested Lisa WANG f/u with [facility] ([facility]) for acceptance.    RESHMAW notified that [facility] ( aka [facility]) has accepted pt pending a bed.    RESHMAW made tc to pt's mom, Kandi ([phone]) and provided her with accepting SNF's address. She is going to go over and take a look and call this worker back. Once she has approved LSW will make arrangements for transfer.    FLORY received a VM from pt's mom, Kandi. She met with Niko at [facility]. She would like pt transferred there once there is a bed available.      Barriers to Discharge: Bed Availability     Plan: LSW to f/u with [facility]

## 2019-02-14 NOTE — CARE PLAN
Problem: Safety  Goal: Will remain free from injury  Outcome: PROGRESSING AS EXPECTED  Pt refuses bed alarm, calls appropriately    Problem: Venous Thromboembolism (VTW)/Deep Vein Thrombosis (DVT) Prevention:  Goal: Patient will participate in Venous Thrombosis (VTE)/Deep Vein Thrombosis (DVT)Prevention Measures  Outcome: PROGRESSING AS EXPECTED  SCDs in place

## 2019-02-14 NOTE — PROGRESS NOTES
2 RN skin check completed  - heels pink, blanching  -pump implant in abdomen noted  - buttocks pink, blanching  No other issues seen.

## 2019-02-14 NOTE — PROGRESS NOTES
2 RN skin check completed  -heels and buttocks pink, blanching  -implanted pump in abdomen apparent  No other skin issues seen

## 2019-02-14 NOTE — DISCHARGE PLANNING
Agency/Facility Name: [facility]  Spoke To: Niko  Outcome: Patient accepted pending bed. Yolanda(FLORY) notified.

## 2019-02-14 NOTE — DISCHARGE SUMMARY
Discharge Summary    CHIEF COMPLAINT ON ADMISSION  Chief Complaint   Patient presents with   • N/V     N/V for 2 days.  no known fever at home.  pt denies pain        Reason for Admission  n/v     Admission Date  1/29/2019    CODE STATUS  Full Code    HPI & HOSPITAL COURSE  38 y.o. male who presented 1/29/2019 with past medical history of multiple sclerosis, hypertension, depression with a 3-day history of nausea and vomiting.  It is is unrelenting.  Vomiting is bilious.  He denies any diarrhea.  Mother states that she has been giving him daily suppositories to have bowel movement.. She is concerned that MS may be causing this difficulty in moving his bowel.  X-ray done in the emergency shows a mild ileus.  The patient  Is paraplegic and wheel chair bound for years     Patient states he has urine retention and straight caths 4 times a day    He was hospitalized. Epperson placed for neurogenic bladder. Epperson should be changed every 30 days. It was placed on 1/29.    He had mild ileus/constipation that resolved with bowel protocol    He was referred to snf as he can no longer care for himself    He was c/o right shoulder pain that was deemed to be due to OA. Use tylenol prn    He was continued on buspar for his hx of anxiety    He has a baclofen pump     He was continued on cozaar for his HTN       Therefore, he is discharged in good and stable condition to skilled nursing facility.    The patient met 2-midnight criteria for an inpatient stay at the time of discharge.    Discharge Date  2/15/2019    FOLLOW UP ITEMS POST DISCHARGE      DISCHARGE DIAGNOSES  Principal Problem:    MS (multiple sclerosis) (HCC) POA: Yes  Active Problems:    Essential hypertension POA: Yes    Neurogenic bladder POA: Yes    Recurrent major depressive disorder, in remission (HCC) POA: Yes    History of self-care deficit POA: Yes    Anxiety POA: Unknown    Primary osteoarthritis of left shoulder POA: Clinically Undetermined  Resolved Problems:     Constipation POA: Yes    Intractable cyclical vomiting with nausea POA: Yes      FOLLOW UP  Future Appointments  Date Time Provider Department Center   2/28/2019 11:00 AM [employer] [employer] D.O. [facility] None   7/23/2019 9:30 AM RN 7 [facility]     NIKO [email or web address]  [address]  [place] [address]  [phone]            MEDICATIONS ON DISCHARGE     Medication List      START taking these medications      Instructions   busPIRone 5 MG tablet  Commonly known as:  BUSPAR   Take 1 Tab by mouth 3 times a day as needed (agitation/anxiety).  Dose:  5 mg     famotidine 20 MG Tabs  Commonly known as:  PEPCID   Take 1 Tab by mouth 2 Times a Day.  Dose:  20 mg     lactulose 20 GM/30ML Soln   Take 30 mL by mouth 1 time daily as needed.  Dose:  30 mL     lidocaine 5 % Ptch  Start taking on:  2/16/2019  Commonly known as:  LIDODERM   Apply 1 Patch to skin as directed every 24 hours.  Dose:  1 Patch        CONTINUE taking these medications      Instructions   buPROPion  MG Tb12 sustained-release tablet  Commonly known as:  WELLBUTRIN-SR   Take 1 Tab by mouth 2 Times a Day.  Dose:  150 mg     docusate sodium 250 MG capsule  Commonly known as:  COLACE   Take 250 mg by mouth 2 Times a Day.  Dose:  250 mg     escitalopram 20 MG tablet  Commonly known as:  LEXAPRO   Take 1 Tab by mouth every day.  Dose:  20 mg     losartan 50 MG Tabs  Commonly known as:  COZAAR   Take 50 mg by mouth every evening.  Dose:  50 mg     methenamine hip 1 GM Tabs  Commonly known as:  HIPPREX   Take 1 g by mouth 2 times a day.  Dose:  1 g     ondansetron 4 MG Tbdp  Commonly known as:  ZOFRAN ODT   Take 4 mg by mouth as needed.  Dose:  4 mg     oxybutynin SR 10 MG CR tablet  Commonly known as:  DITROPAN-XL   Take 10 mg by mouth 2 Times a Day.  Dose:  10 mg     Pain Pump Gayla  Commonly known as:  patient supplied   3.75 mcg by Injection route Continuous. Patient's Supplied Pain Pump (placed and maintained as an outpatient) Medication: baclofen   500.00 mcg/ml Route:intracatheral Volume: 39.0 ml Rate:3.75 mcg/hour /89.92 mcg in 24 hour Bolus 5.00 mcg 1 hour max 6 /day max dose 119.53 mcg in 24 hour Lockout 1 hour  Dose:  3.75 mcg     therapeutic multivitamin-minerals Tabs   Take 1 Tab by mouth every day.  Dose:  1 Tab     Vitamin C 1000 MG Tabs   Take 1,000 mg by mouth 2 Times a Day.  Dose:  1000 mg     Vitamin D3 5000 units Tabs   Take 5,000 Units by mouth every day.  Dose:  5000 Units        STOP taking these medications    Magnesium 300 MG Caps            Allergies  Allergies   Allergen Reactions   • Asa [Aspirin] Unspecified     Bleeding in stomach as a baby   • Gabapentin Anxiety     anger and severe mood swing   • Pregabalin Anxiety     Anger and severe mood swing       DIET  Orders Placed This Encounter   Procedures   • Diet Order Regular (Requests anything with eggs for breakfast, no syrup foods like pancakes or Kyrgyz toast. Also NO BELL PEPPERS)     Standing Status:   Standing     Number of Occurrences:   1     Order Specific Question:   Diet:     Answer:   Regular [1]     Comments:   Requests anything with eggs for breakfast, no syrup foods like pancakes or Kyrgyz toast. Also NO BELL PEPPERS       ACTIVITY  As tolerated.  Weight bearing as tolerated    CONSULTATIONS  none    PROCEDURES  n/a    LABORATORY  Lab Results   Component Value Date    SODIUM 140 02/02/2019    POTASSIUM 3.9 02/02/2019    CHLORIDE 107 02/02/2019    CO2 26 02/02/2019    GLUCOSE 122 (H) 02/02/2019    BUN 7 (L) 02/02/2019    CREATININE 0.74 02/02/2019        Lab Results   Component Value Date    WBC 7.7 02/02/2019    HEMOGLOBIN 17.1 02/02/2019    HEMATOCRIT 49.8 02/02/2019    PLATELETCT 304 02/02/2019        Total time of the discharge process exceeds 42 minutes.

## 2019-02-14 NOTE — PROGRESS NOTES
2 RN skin check complete: Heels pink, blanching. Implant on RLQ of abdomen. Otherwise skin intact.     Patient on low air loss mattress. Encouraged patient to turn.

## 2019-02-15 VITALS
TEMPERATURE: 97.4 F | SYSTOLIC BLOOD PRESSURE: 120 MMHG | RESPIRATION RATE: 20 BRPM | BODY MASS INDEX: 23.3 KG/M2 | HEIGHT: 71 IN | DIASTOLIC BLOOD PRESSURE: 84 MMHG | WEIGHT: 166.45 LBS | HEART RATE: 84 BPM | OXYGEN SATURATION: 94 %

## 2019-02-15 PROBLEM — M19.012 PRIMARY OSTEOARTHRITIS OF LEFT SHOULDER: Status: ACTIVE | Noted: 2019-02-15

## 2019-02-15 PROBLEM — K59.00 CONSTIPATION: Status: RESOLVED | Noted: 2018-03-19 | Resolved: 2019-02-15

## 2019-02-15 PROCEDURE — A9270 NON-COVERED ITEM OR SERVICE: HCPCS | Performed by: HOSPITALIST

## 2019-02-15 PROCEDURE — 700102 HCHG RX REV CODE 250 W/ 637 OVERRIDE(OP): Performed by: HOSPITALIST

## 2019-02-15 PROCEDURE — 700101 HCHG RX REV CODE 250: Performed by: HOSPITALIST

## 2019-02-15 PROCEDURE — 99239 HOSP IP/OBS DSCHRG MGMT >30: CPT | Performed by: HOSPITALIST

## 2019-02-15 RX ORDER — LACTULOSE 20 G/30ML
30 SOLUTION ORAL
Qty: 30 EACH | Status: ON HOLD
Start: 2019-02-15 | End: 2019-07-18

## 2019-02-15 RX ORDER — BUSPIRONE HYDROCHLORIDE 5 MG/1
5 TABLET ORAL 3 TIMES DAILY PRN
Qty: 90 TAB | Status: ON HOLD
Start: 2019-02-15 | End: 2019-07-18

## 2019-02-15 RX ORDER — LIDOCAINE 50 MG/G
1 PATCH TOPICAL EVERY 24 HOURS
Qty: 10 PATCH | Status: ON HOLD
Start: 2019-02-16 | End: 2019-07-18

## 2019-02-15 RX ORDER — FAMOTIDINE 20 MG/1
20 TABLET, FILM COATED ORAL 2 TIMES DAILY
Qty: 60 TAB | Status: ON HOLD
Start: 2019-02-15 | End: 2019-07-18

## 2019-02-15 RX ADMIN — SENNOSIDES AND DOCUSATE SODIUM 2 TABLET: 8.6; 5 TABLET ORAL at 09:23

## 2019-02-15 RX ADMIN — OXYBUTYNIN CHLORIDE 10 MG: 10 TABLET, EXTENDED RELEASE ORAL at 09:22

## 2019-02-15 RX ADMIN — METHENAMINE HIPPURATE 1 G: 1 TABLET ORAL at 09:22

## 2019-02-15 RX ADMIN — ESCITALOPRAM OXALATE 20 MG: 10 TABLET ORAL at 09:22

## 2019-02-15 RX ADMIN — FAMOTIDINE 20 MG: 20 TABLET ORAL at 09:23

## 2019-02-15 RX ADMIN — LIDOCAINE 1 PATCH: 50 PATCH TOPICAL at 09:21

## 2019-02-15 RX ADMIN — BUPROPION HYDROCHLORIDE 150 MG: 150 TABLET, EXTENDED RELEASE ORAL at 09:22

## 2019-02-15 ASSESSMENT — ENCOUNTER SYMPTOMS
VOMITING: 0
CHILLS: 0
WEIGHT LOSS: 0
COUGH: 0
MYALGIAS: 1
FEVER: 0
SHORTNESS OF BREATH: 0
WHEEZING: 0
NAUSEA: 0
PALPITATIONS: 0

## 2019-02-15 NOTE — DISCHARGE PLANNING
Anticipated Discharge Disposition: [facility]    Action: Pt is set to transport @ 1500 to [facility]. LSW informed bedside RN, MD and pt's mother, Kandi. LSW completed COBRA & put in pt's chart.     Barriers to Discharge: none    Plan: assist if d/c needs arise

## 2019-02-15 NOTE — CARE PLAN
Problem: Venous Thromboembolism (VTW)/Deep Vein Thrombosis (DVT) Prevention:  Goal: Patient will participate in Venous Thrombosis (VTE)/Deep Vein Thrombosis (DVT)Prevention Measures  Outcome: PROGRESSING AS EXPECTED      Problem: Knowledge Deficit  Goal: Knowledge of disease process/condition, treatment plan, diagnostic tests, and medications will improve  Outcome: PROGRESSING AS EXPECTED

## 2019-02-15 NOTE — DISCHARGE INSTRUCTIONS
Discharge Instructions    Discharged to other by ambulance with escort. Discharged via ambulance, hospital escort: Refused.  Special equipment needed: Not Applicable    Be sure to schedule a follow-up appointment with your primary care doctor or any specialists as instructed.     Discharge Plan:   Influenza Vaccine Indication: Patient Refuses    I understand that a diet low in cholesterol, fat, and sodium is recommended for good health. Unless I have been given specific instructions below for another diet, I accept this instruction as my diet prescription.   Other diet: Regular 1:1 feed    Special Instructions: None    · Is patient discharged on Warfarin / Coumadin?   No     Depression / Suicide Risk    As you are discharged from this Critical access hospital facility, it is important to learn how to keep safe from harming yourself.    Recognize the warning signs:  · Abrupt changes in personality, positive or negative- including increase in energy   · Giving away possessions  · Change in eating patterns- significant weight changes-  positive or negative  · Change in sleeping patterns- unable to sleep or sleeping all the time   · Unwillingness or inability to communicate  · Depression  · Unusual sadness, discouragement and loneliness  · Talk of wanting to die  · Neglect of personal appearance   · Rebelliousness- reckless behavior  · Withdrawal from people/activities they love  · Confusion- inability to concentrate     If you or a loved one observes any of these behaviors or has concerns about self-harm, here's what you can do:  · Talk about it- your feelings and reasons for harming yourself  · Remove any means that you might use to hurt yourself (examples: pills, rope, extension cords, firearm)  · Get professional help from the community (Mental Health, Substance Abuse, psychological counseling)  · Do not be alone:Call your Safe Contact- someone whom you trust who will be there for you.  · Call your local CRISIS HOTLINE  638-1762 or 301-554-7303  · Call your local Children's Mobile Crisis Response Team [place] (624) 564-0667 or www.[facility].com  · Call the toll free National Suicide Prevention Hotlines   · National Suicide Prevention Lifeline 607-380-JCUI (8162)  · National i[employer] Line Network 800-SUICIDE (453-6504)

## 2019-02-15 NOTE — CARE PLAN
Problem: Communication  Goal: The ability to communicate needs accurately and effectively will improve  Outcome: PROGRESSING AS EXPECTED      Problem: Knowledge Deficit  Goal: Knowledge of disease process/condition, treatment plan, diagnostic tests, and medications will improve  Outcome: PROGRESSING AS EXPECTED      Problem: Psychosocial Needs:  Goal: Level of anxiety will decrease  Outcome: PROGRESSING AS EXPECTED

## 2019-02-15 NOTE — PROGRESS NOTES
[facility] Medicine Daily Progress Note    Date of Service  2/15/2019    Chief Complaint  38 y.o. Male with MS, HTN, depression admitted 1/29/2019 with N/V and failure to thrive.     Interval Problem Update  Pt c/o right shoulder pain    Intensity 4/10, dull ache, worse with movement, no radiation    Afebrile    Await placement    Consultants/Specialty  None    Code Status  Full    Disposition  Needs longterm care. SNF referral sent.    Review of Systems  Review of Systems   Constitutional: Negative for chills, fever and weight loss.   Respiratory: Negative for cough, shortness of breath and wheezing.    Cardiovascular: Negative for chest pain and palpitations.   Gastrointestinal: Negative for nausea and vomiting.   Genitourinary: Negative for dysuria and urgency.   Musculoskeletal: Positive for joint pain and myalgias.   All other systems reviewed and are negative.    Physical Exam  Temp:  [36.1 °C (97 °F)-36.7 °C (98 °F)] 36.3 °C (97.4 °F)  Pulse:  [68-84] 84  Resp:  [16-20] 20  BP: (120-127)/(72-84) 120/84  SpO2:  [92 %-97 %] 94 %    Physical Exam   Constitutional: He is oriented to person, place, and time. He appears well-developed. He is cooperative. No distress.   HENT:   Head: Normocephalic.   Mouth/Throat: Oropharynx is clear and moist.   Eyes: EOM are normal. No scleral icterus.   Neck: No tracheal deviation present.   Cardiovascular: Normal rate, regular rhythm and intact distal pulses.    Pulmonary/Chest: Effort normal. No respiratory distress.   Abdominal: Soft. Normal appearance and bowel sounds are normal. He exhibits no distension. There is no tenderness.   Baclofen pump RLQ   Musculoskeletal: He exhibits no edema or tenderness.   Right shoulder in socket    No bruising    Crackles with passive movement c/o osteoarthritis. Movement reproduces the pain   Neurological: He is alert and oriented to person, place, and time.   Paraplegia; weakness of bilateral upper extremities; can wiggle toes   Skin: Skin  is warm and dry. He is not diaphoretic.   Psychiatric: His behavior is normal. His speech is not delayed.   Vitals reviewed.      Fluids    Intake/Output Summary (Last 24 hours) at 02/15/19 1303  Last data filed at 02/14/19 1841   Gross per 24 hour   Intake              240 ml   Output              900 ml   Net             -660 ml       Laboratory                        Imaging  [id number] VIEW   Final Result      Mild ileus.           Assessment/Plan  * MS (multiple sclerosis) (HCC)- (present on admission)   Assessment & Plan    Chronic issue with significant debility. Needs 24/7 care  - continue home medication  - PT/OT  - SW following, looking for long-term care; difficult dispo   lidocaine patch  - has baclofen pain pump     Essential hypertension- (present on admission)   Assessment & Plan    Normotensive.  - home losartan     Primary osteoarthritis of left shoulder   Assessment & Plan    Tylenol prn     Anxiety   Assessment & Plan    low dose buspar     History of self-care deficit- (present on admission)   Assessment & Plan    Assessed by therapies and deemed unable to go back to previous living situation     Recurrent major depressive disorder, in remission ([facility])- (present on admission)   Assessment & Plan    Stable at this time.  Appears to be in good spirits this a.m.  - Wellbutrin and Lexapro     Constipation- (present on admission)   Assessment & Plan    Lactulose QD  Hold for loose stool     Neurogenic bladder- (present on admission)   Assessment & Plan    Epperson cath     Change every 30 days        Dc summary dictated  VTE prophylaxis: SCDs

## 2019-02-15 NOTE — DISCHARGE PLANNING
Anticipated Discharge Disposition: [facility]     Action: LSW requested [facility] to f/u with [facility] re bed availability.     Barriers to Discharge: bed availability     Plan: LSW to f/u with [facility]

## 2019-02-15 NOTE — DISCHARGE PLANNING
Received Transport Form at 1350.  Spoke to Eleni at [facility]    Transport is scheduled for 2/15 at 1500 going to [facility] ([place]).    RESHMAW Carmen and Niko with admissions at [facility] notified of transport time.       [facility] authorization number: [id number]

## 2019-02-15 NOTE — DISCHARGE PLANNING
Anticipated Discharge Disposition: [facility]    Action: [facility] has a bed available for pt today. LSW to set up transportation. Bedside RN notified. MD notified via tiger text    Barriers to Discharge: none    Plan: set up transportation and complete COBRA

## 2019-02-16 NOTE — PROGRESS NOTES
Pt discharged to [facility].  Pt left with all belongings.  [facility]   Report given to Minnie

## 2019-02-28 ENCOUNTER — OFFICE VISIT (OUTPATIENT)
Dept: NEUROLOGY | Facility: MEDICAL CENTER | Age: 39
End: 2019-02-28
Payer: MEDICARE

## 2019-02-28 VITALS
HEIGHT: 71 IN | HEART RATE: 119 BPM | TEMPERATURE: 99.1 F | BODY MASS INDEX: 23.21 KG/M2 | OXYGEN SATURATION: 98 % | DIASTOLIC BLOOD PRESSURE: 80 MMHG | SYSTOLIC BLOOD PRESSURE: 120 MMHG

## 2019-02-28 DIAGNOSIS — G35 MS (MULTIPLE SCLEROSIS) (HCC): ICD-10-CM

## 2019-02-28 PROCEDURE — 99215 OFFICE O/P EST HI 40 MIN: CPT | Performed by: PSYCHIATRY & NEUROLOGY

## 2019-02-28 RX ORDER — DIVALPROEX SODIUM 125 MG/1
125 CAPSULE, COATED PELLETS ORAL 2 TIMES DAILY
Status: ON HOLD | COMMUNITY
End: 2019-07-18

## 2019-02-28 NOTE — PROGRESS NOTES
CC: Multiple Sclerosis       HPI:    Jerzy Juan III is a pleasant 38 y.o. male who presents today in neurologic follow up for multiple sclerosis. The patient is currently being treated with Ocrevus. He is here with his mother Kandi today.     Over one month ago, Mr. Juan was living in an independent apartment with caregivers. He was spending a great deal of time alone, though. One day, he called his mother because of intractable vomiting.     Mr. Juan was recently discharged from the hospital for intractable bilious nausea and vomiting. He is wheelchair bound at baseline and has a Baclofen pump in place. After discharge, he was referred for nursing home placement. He is currently living at [facility].     It was discovered that he had a bowel obstruction and since then, he has become fecal incontinent. He had been having more tremors prior to Ocrevus. THe Ocrevus did not help with this.      Prior to his diagnosis, he was living in [place], [place], in October 2013. He was having gait dysfunction and speech abnormalities at this time. He improved and went back to [place] and his mother had to drive back and forth to help him very frequently. He returned to [place] in October 2015. By that time, he was in a wheelchair. He was with his parents until he got an apartment.       MS History:  Diagnosed: 2013  Lumbar puncture:    First presentation:   Disease modifying treatment:    Current: Ocrevus 1/23/19   Previous: Tysabri, Tecfidera, Aubagio,   Former or other neurologist: Dr. Melissa Bloch  Last attack:   Last MRI: Brain and C=Spine 6/8/16, Thoracic 6/9/16      ROS:   Constitutional: No fevers or chills.  Eyes: No blurry vision or eye pain.  ENT: No dysphagia or hearing loss.  Respiratory: No cough or shortness of breath.  Cardiovascular: No chest pain or palpitations.  GI: + recent nausea, vomiting,which has resolved. + continued bowel incontinence  : + suprapubic  "catheter..  Musculoskeletal: No joint swelling or arthralgias.  Skin: No skin rashes.  Neuro: No headaches, dizziness, or tremors.  Endocrine: No heat or cold intolerance. No polydipsia or polyuria.  Psych: + depression, anxiety - works with a psychologist.   Heme/Lymph: No easy bruising or swollen lymph nodes.  All other review of systems were reviewed and were negative.     Past Medical History:   Past Medical History:   Diagnosis Date   • Pain 06/20/2018    \"everywhere\"   • Anxiety    • Back pain    • Blood transfusion    • Bowel habit changes     constipation   • Breath shortness     \"r/t to medication Ocrevus and Baclofen\" \"side effect   • Dental disorder     full denture   • Depression     PTSD/clinical depression   • Fall     4/6/2016   • Headache(784.0)    • Heart burn    • Heart murmur    • Hypertension    • Indigestion    • MS (multiple sclerosis) ([facility]) DX 8/20/2013   • Urinary bladder disorder     UTI/catheter/bladder stone   • Urinary incontinence     catheter in place       Past Surgical History:   Past Surgical History:   Procedure Laterality Date   • PUMP INSERT/REMOVE  6/26/2018    Procedure: PUMP INSERT/REMOVE - BACLOFEN;  Surgeon: Andrew Gomez M.D.;  Location: [facility];  Service: Pain Management   • CYSTOSCOPY  5/7/2018    Procedure: CYSTOSCOPY;  Surgeon: Kwasi Sapp M.D.;  Location: [facility];  Service: Urology   • LASERTRIPSY  5/7/2018    Procedure: LASERTRIPSY - FOR: LITHOLAPAXY;  Surgeon: Kwasi Sapp M.D.;  Location: [facility];  Service: Urology   • BLOCK EPIDURAL STEROID INJECTION N/A 3/27/2018    Procedure: BLOCK EPIDURAL STEROID INJECTION - BACLOFEN PUMP TRIAL;  Surgeon: Andrew Gomez M.D.;  Location: [facility];  Service: Pain Management   • GASTROSCOPY  1/14/2017    Procedure: GASTROSCOPY WITH FECAL MATTER TRANSPLANT;  Surgeon: Obinna Douglas M.D.;  Location: [facility];  Service:    • OPEN REDUCTION " Left 1988    left thumb       Social History:   Social History     Social History   • Marital status:      Spouse name: N/A   • Number of children: N/A   • Years of education: N/A     Occupational History   • Not on file.     Social History Main Topics   • Smoking status: Former Smoker     Packs/day: 1.00     Years: 20.00     Start date: 8/1/1997     Quit date: 8/1/2013   • Smokeless tobacco: Never Used   • Alcohol use 0.0 oz/week      Comment: 2 per day   • Drug use: Yes     Types: Inhaled      Comment: medical marijuana/and oral   • Sexual activity: Not on file      Comment: once daily for 23 yrs     Other Topics Concern   • Not on file     Social History Narrative   • No narrative on file       Family Hx:   Family History   Problem Relation Age of Onset   • Thyroid Mother    • Hypertension Mother    • Hypertension Father    • Psychiatry Father        Current Medications:   Current Outpatient Prescriptions:   •  Dextromethorphan-Quinidine 20-10 MG Cap, Take 1 Tab by mouth 2 Times a Day for 30 days., Disp: 60 Cap, Rfl: 2  •  busPIRone (BUSPAR) 5 MG tablet, Take 1 Tab by mouth 3 times a day as needed (agitation/anxiety)., Disp: 90 Tab, Rfl:   •  lidocaine (LIDODERM) 5 % Patch, Apply 1 Patch to skin as directed every 24 hours., Disp: 10 Patch, Rfl:   •  lactulose 20 GM/30ML Solution, Take 30 mL by mouth 1 time daily as needed., Disp: 30 Each, Rfl:   •  famotidine (PEPCID) 20 MG Tab, Take 1 Tab by mouth 2 Times a Day., Disp: 60 Tab, Rfl:   •  oxybutynin SR (DITROPAN-XL) 10 MG CR tablet, Take 10 mg by mouth 2 Times a Day., Disp: , Rfl:   •  docusate sodium (COLACE) 250 MG capsule, Take 250 mg by mouth 2 Times a Day., Disp: , Rfl:   •  methenamine hip (HIPPREX) 1 GM Tab, Take 1 g by mouth 2 times a day., Disp: , Rfl:   •  Ascorbic Acid (VITAMIN C) 1000 MG Tab, Take 1,000 mg by mouth 2 Times a Day., Disp: , Rfl:   •  therapeutic multivitamin-minerals (THERAGRAN-M) Tab, Take 1 Tab by mouth every day., Disp: , Rfl:  "  •  ondansetron (ZOFRAN ODT) 4 MG TABLET DISPERSIBLE, Take 4 mg by mouth as needed., Disp: , Rfl:   •  losartan (COZAAR) 50 MG Tab, Take 50 mg by mouth every evening., Disp: , Rfl:   •  buPROPion SR (WELLBUTRIN-SR) 150 MG TABLET SR 12 HR sustained-release tablet, Take 1 Tab by mouth 2 Times a Day., Disp: 60 Tab, Rfl:   •  escitalopram (LEXAPRO) 20 MG tablet, Take 1 Tab by mouth every day., Disp: 30 Tab, Rfl:   •  Divalproex Sodium (DEPAKOTE) 125 MG Capsule Delayed Release Sprinkle, Take 125 mg by mouth 2 Times a Day., Disp: , Rfl:   •  Pain Pump (PATIENT SUPPLIED) XX CHAO, 3.75 mcg by Injection route Continuous. Patient's Supplied Pain Pump (placed and maintained as an outpatient) Medication: baclofen  500.00 mcg/ml Route:intracatheral Volume: 39.0 ml Rate:3.75 mcg/hour /89.92 mcg in 24 hour Bolus 5.00 mcg 1 hour max 6 /day max dose 119.53 mcg in 24 hour Lockout 1 hour, Disp: , Rfl:   •  Cholecalciferol (VITAMIN D3) 5000 UNITS Tab, Take 5,000 Units by mouth every day., Disp: , Rfl:     Allergies:   Allergies   Allergen Reactions   • Asa [Aspirin] Unspecified     Bleeding in stomach as a baby   • Gabapentin Anxiety     anger and severe mood swing   • Pregabalin Anxiety     Anger and severe mood swing         Physical Exam:   Ambulatory Vitals  Encounter Vitals  Temperature: 37.3 °C (99.1 °F)  Blood Pressure: 120/80  Pulse: (!) 119  Pulse Oximetry: 98 %  Weight:  (wheelchair)  Height: 180.3 cm (5' 11\")    Constitutional: Well-developed, well-nourished, good hygiene. Appears stated age.  Cardiovascular: RRR, with no murmurs, rubs or gallops. No carotid bruits. No peripheral edema, pedal pulses intact.   Respiratory: Lungs CTA B/L, no W/R/R.   Skin: Warm, dry, intact. No rashes observed.  Eyes: Sclera anicteric.  Neurologic:   Mental Status: Awake, alert, oriented x 3.   Speech: Fluent with normal prosody.   Memory: Able to recall recent and remote events accurately.    Concentration: Attentive. Able to focus on " history and follow multi-step commands.   Fund of Knowledge: Appropriate.   Cranial Nerves:    CN II: PERRL. No afferent pupillary defect.    CN III, IV, VI: Good eye closure, EOM marked by broken saccades.     CN V: Facial sensation intact and symmetric.     CN VII: No facial asymmetry.     CN VIII: Hearing intact.     CN IX and X: Palate elevates symmetrically. Normal gag reflex.    CN XI: Symmetric shoulder shrug.     CN XII: Tongue midline.    Sensory: Intact light touch, vibration and temperature.    Coordination: Unable to test finger to nose testing, no dysdiadochokinesia.   DTR's: Hyperpathic   Babinski: Toes upgoing bilaterally.   Romberg: Negative.   Movements: No tremors observed.   Musculoskeletal:    Strength:   Deltoids      R 5/5 L 5/5  Biceps        R 5/5 L 5/5  Triceps       R 5/5 L 5/5  Wrist Ext    R 5/5 L 5/5  Finger Flex R 5/5 L 5/5  Finger Ext   R 5/5 L 5/5  Hip Flex      R 0/5 L 0/5  Knee Flex   R 0/5 L 0/5  Knee Ext    R 0/5 L 0/5  Ankle DF    R 0/5 L 0/5  Ankle PF    R 0/5 L 0/5   Gait: Wheelchair bound.    Tone: Decreased bulk, Increased tone.   Joints: No swelling.   Psychiatric:    Affect: Several emotional outbursts during our encounter - likely pseudo bulbar.     Labs Reviewed:  Results for JAYNA MOODY III (MRN [id number]) as of 2/28/2019 09:11   Ref. Range 2/2/2019 12:51   WBC Latest Ref Range: 4.8 - 10.8 K/uL 7.7   RBC Latest Ref Range: 4.70 - 6.10 M/uL 5.54   Hemoglobin Latest Ref Range: 14.0 - 18.0 g/dL 17.1   Hematocrit Latest Ref Range: 42.0 - 52.0 % 49.8   MCV Latest Ref Range: 81.4 - 97.8 fL 89.9   MCH Latest Ref Range: 27.0 - 33.0 pg 30.9   MCHC Latest Ref Range: 33.7 - 35.3 g/dL 34.3   RDW Latest Ref Range: 35.9 - 50.0 fL 39.6   Platelet Count Latest Ref Range: 164 - 446 K/uL 304   MPV Latest Ref Range: 9.0 - 12.9 fL 8.5 (L)   Sodium Latest Ref Range: 135 - 145 mmol/L 140   Potassium Latest Ref Range: 3.6 - 5.5 mmol/L 3.9   Chloride Latest Ref Range: 96 - 112 mmol/L 107  "  Co2 Latest Ref Range: 20 - 33 mmol/L 26   Anion Gap Latest Ref Range: 0.0 - 11.9  7.0   Glucose Latest Ref Range: 65 - 99 mg/dL 122 (H)   Bun Latest Ref Range: 8 - 22 mg/dL 7 (L)   Creatinine Latest Ref Range: 0.50 - 1.40 mg/dL 0.74   GFR If  Latest Ref Range: >60 mL/min/1.73 m 2 >60   GFR If Non  Latest Ref Range: >60 mL/min/1.73 m 2 >60   Calcium Latest Ref Range: 8.5 - 10.5 mg/dL 9.4   Magnesium Latest Ref Range: 1.5 - 2.5 mg/dL 2.2     Imaging:   MRI Brain w/wo contrast from 6/8/16  1.  Numerous periventricular, subcortical, and pontine areas of demyelination unchanged from previous exam of 2/29/16 and consistent with the patient's known diagnosis of multiple sclerosis.    2.  No \"active\" enhancing lesions in the brain parenchyma.      Assessment/Plan:  MS (multiple sclerosis) (HCC)  39 yo M with h/o htn and depression who was diagnosed with Multiple sclerosis in 2013.     Plan:  1. Cotinue Ocrecus inusions q 6 months  2.. Comtinue with maintenance PT.       Greater than 50% of this 40 minute face to face follow up encounter was devoted to disease state counseling on Multiple Sclerosis and coordination of care. (see above assessment and plan for further details of discussion).       Libertad Steel D.O., M.P.H  MS specialist.   Board Certified Neurologist.  Neurology Clerkship Director, [facility].    Neurology,  [facility].   Tel: [phone]  Fax: [phone]    "

## 2019-03-04 NOTE — ASSESSMENT & PLAN NOTE
39 yo M with h/o htn and depression who was diagnosed with Multiple sclerosis in 2013.     Plan:  1. Cotinue Ocrecus inusions q 6 months  2.. Comtinue with maintenance PT.

## 2019-07-01 ENCOUNTER — OFFICE VISIT (OUTPATIENT)
Dept: NEUROLOGY | Facility: MEDICAL CENTER | Age: 39
End: 2019-07-01
Payer: MEDICARE

## 2019-07-01 VITALS
WEIGHT: 155 LBS | DIASTOLIC BLOOD PRESSURE: 78 MMHG | HEIGHT: 71 IN | HEART RATE: 102 BPM | SYSTOLIC BLOOD PRESSURE: 122 MMHG | BODY MASS INDEX: 21.7 KG/M2 | OXYGEN SATURATION: 93 % | TEMPERATURE: 99.1 F

## 2019-07-01 DIAGNOSIS — R25.2 SPASTICITY: ICD-10-CM

## 2019-07-01 DIAGNOSIS — G35 MULTIPLE SCLEROSIS (HCC): ICD-10-CM

## 2019-07-01 DIAGNOSIS — F33.2 SEVERE EPISODE OF RECURRENT MAJOR DEPRESSIVE DISORDER, WITHOUT PSYCHOTIC FEATURES (HCC): ICD-10-CM

## 2019-07-01 DIAGNOSIS — G35 MS (MULTIPLE SCLEROSIS) (HCC): ICD-10-CM

## 2019-07-01 DIAGNOSIS — N31.9 NEUROGENIC BLADDER: ICD-10-CM

## 2019-07-01 PROCEDURE — 99215 OFFICE O/P EST HI 40 MIN: CPT | Performed by: PSYCHIATRY & NEUROLOGY

## 2019-07-01 RX ORDER — L. ACIDOPHILUS/L.BULGARICUS 100MM CELL
1 GRANULES IN PACKET (EA) ORAL 3 TIMES DAILY
Status: ON HOLD | COMMUNITY
End: 2019-07-18

## 2019-07-01 ASSESSMENT — ENCOUNTER SYMPTOMS
TREMORS: 1
MYALGIAS: 1
FEVER: 0
SHORTNESS OF BREATH: 0
CHILLS: 0
WEAKNESS: 1
EYE PAIN: 0
WEIGHT LOSS: 1
NERVOUS/ANXIOUS: 1
SPEECH CHANGE: 1
DOUBLE VISION: 1
DIARRHEA: 1
BLURRED VISION: 1
DEPRESSION: 1

## 2019-07-01 NOTE — PROGRESS NOTES
CC: Multiple sclerosis.    HPI:   Mr. Jerzy Russell is a 39-year-old male with Baclofen pump who presents today for neurologic follow-up for multiple sclerosis.  He was last seen on February 28, 2019. He is again accompanied by his mother to today's visit. He is currently on disease modifying treatment with Ocrevus with his next infusion scheduled for July 23, 2019. His mother relates that Jerzy had been doing well after a fecal transplant for c. Diff infection, but due to UTI requiring antibiotic therapy, he had a recurrence of C. Diff. His UTI was severe and the setback also led to worsening depression. His Psychiatrist had adjusted his antidepressant medications, increasing his Wellbutrin, but he has not been able to get back for therapy sessions due to his recent hospitalizations. His psychiatrist also recommended against Nudexta.       MS History:  Diagnosed: 2013  Lumbar puncture:    First presentation:   Disease modifying treatment:    Current: Ocrevus first infusion 7/26/17.    Previous: Tysabri, Tecfidera, Aubagio.  Former or other neurologist: Dr. Melissa Bloch.  Last MRI: Brain and C=Spine 6/8/16, Thoracic 6/9/16.    Review of Systems   Constitutional: Positive for malaise/fatigue and weight loss. Negative for chills and fever.   HENT: Negative for hearing loss.    Eyes: Positive for blurred vision and double vision. Negative for pain.   Respiratory: Negative for shortness of breath.    Cardiovascular: Negative for chest pain.   Gastrointestinal: Positive for diarrhea.   Genitourinary: Positive for frequency.   Musculoskeletal: Positive for myalgias.   Skin: Negative for rash.   Neurological: Positive for tremors, speech change and weakness.   Psychiatric/Behavioral: Positive for depression and suicidal ideas. The patient is nervous/anxious.      Physical Examination:  Ambulatory Vitals  Encounter Vitals  Temperature: 37.3 °C (99.1 °F)  Temp src: Temporal  Blood Pressure: 122/78  Pulse: (!) 102  Pulse  "Oximetry: 93 %  Weight: 70.3 kg (155 lb) (unable to stand/on wheelchair/weight stated)  Height: 180.3 cm (5' 11\")  BMI (Calculated): 21.62  Constitutional:  male in electric wheelchair wearing sunglasses.   Cardiovascular: RRR, with no murmurs, rubs or gallops. No carotid bruits.   Respiratory: Lungs CTA B/L, no W/R/R.   Abdomen: Soft Non-tender to Palpation. Non-distended.  Extremities: No peripheral edema, pedal pulses intact.   Skin: Warm, dry, intact. No rashes observed.  Eyes: Sclera anicteric. No nystagmus observed.   Neurologic:   Mental Status: Awake and alert.    Speech: Speech is fluent with normal prosody.   .           Fund of Knowledge: Appropriate.   Cranial Nerves:    CN II: Pupils are equal and react appropriately. No APD noted.    CN III, IV, VI: Good eye closure. Left eye drifts inward.     CN V: Facial sensation is intact and symmetric.     CN VII: No evidence of facial droop.     CN VIII: Hearing is grossly intact.    CN IX and X: Palate elevates symmetrically.    CN XI: Shoulder shrug is symmetric.     CN XII: Tongue is midline.   Sensory: Sensory level around T5-T6.    Coordination: Bilateral upper extremity ataxia and truncal ataxia.    DTR's: Reflexes are hyperpathic.   Romberg: Unable to test.   Movements: No tremors noted.  Musculoskeletal:    Strength:   Deltoids      R 4/5 L 4/5  Biceps        R 5/5 L 5/5  Triceps       R 5/5 L 5/5  Wrist Ext    R 3/5 L 3/5  Finger Flex R 3/5 L 3/5  Finger Ext   R 3/5 L 3/5  Hip Flex      R 3/5 L 3/5  Knee Flex   R 0/5 L 0/5  Knee Ext    R 0/5 L 0/5  Ankle DF    R 0/5 L 0/5  Ankle PF    R 0/5 L 0/5   Gait: Wheelchair bound.   Tone: Decreased bulk and increased tone throughout.    Joints: No joint swelling.   Psychiatric: Frequent crying jags during our encounter today.        Assessment and Plan:     MS (multiple sclerosis) ([facility])  Mr. Jerzy Juan is a 40 yo M with pmhx of hypertension and depression diagnosed with Multiple Sclerosis in " "2013 after sudden onset lower extremity weakness which improved followed by subsequent relapses without improvement, requiring a wheelchair within six months of onset. He was previously treated with disease modifying treatments including Aubagio, Tecfidera, and Tysabri, and was switched to Ocrevus in July 2017.     Today I had a lengthy discussion with the patient and his mother regarding his treatment. He has been struggling with multiple infections including UTI's and C.Diff. This unfortunate patient struggles with neurogenic bladder with chronic indwelling matias requiring replacement every 2-4 days which has resulted in frequent UTI's requiring antibiotic therapy which then leads to C. Diff infections in a continuous cycle. His last UTI was severe and his mother said that she \"didn't think he was going to make it.\" I explained that I do not believe the benefits outweigh the risks to him at this time and recommended we discontinue Ocrevus. We discussed his initial diagnosis attempting to discern whether he had primary versus secondary progressive disease. There are no other FDA approved therapies for primary progressive MS and now two approved for \"active secondary progressive MS.\" I suspect that the has primary progressive disease and we discussed the high dose biotin trial which is expected to result in early 2020. I suggested he consider obtaining high dose biotin from a compounding pharmacy, but his mother thinks it is unlikely he will be able to pay for this given his nursing home takes most of his social security.     Plan:  1. Stop Ocrevus infusions.   2. Consider high dose Biotin 100mg three times a day through a compounding pharmacy until the clinical trials have resulted.     Neurogenic bladder  Given his frequent UTI's in the context of indwelling matias requiring frequent replacement, I recommended they follow up with his Urologist to discuss suprapubic catheter placement.     Plan:  1. Follow up w " "Urology to discuss suprapubic catheter.    Spasticity  Currently stable with Baclofen pump managed by Dr. Gomez.     Depression  Mr. Juan has severe depression with frequent emotional outbursts and crying jags during our encounter. He makes frequent reference to \"prolonging the torture\" when discussing treatments. Given his sudden outbursts, pseudobulbar palsy was considered, but his Psychiatrist advised against Nudexta. He may be experiencing a reactive depression from his recent clinical worsening. Regardless, he would likely benefit from additional medication adjustment and psychotherapy.     Plan:  1. Encouraged to follow up with his Psychiatrist.         Libertad Steel D.O., M.P.H  MS specialist.   Board Certified Neurologist.  Neurology Clerkship Director, [facility].    Neurology,  [facility].   Tel: [phone]  Fax: [phone]          "

## 2019-07-02 NOTE — ASSESSMENT & PLAN NOTE
"Mr. Juan has severe depression with frequent emotional outbursts and crying jags during our encounter. He makes frequent reference to \"prolonging the torture\" when discussing treatments. Given his sudden outbursts, pseudobulbar palsy was considered, but his Psychiatrist advised against Nudexta. He may be experiencing a reactive depression from his recent clinical worsening. Regardless, he would likely benefit from additional medication adjustment and psychotherapy.     Plan:  1. Encouraged to follow up with his Psychiatrist.   "

## 2019-07-02 NOTE — ASSESSMENT & PLAN NOTE
Given his frequent UTI's in the context of indwelling matias requiring frequent replacement, I recommended they follow up with his Urologist to discuss suprapubic catheter placement.     Plan:  1. Follow up w Urology to discuss suprapubic catheter.

## 2019-07-02 NOTE — ASSESSMENT & PLAN NOTE
"Mr. Jerzy Juan is a 40 yo M with pmhx of hypertension and depression diagnosed with Multiple Sclerosis in 2013 after sudden onset lower extremity weakness which improved followed by subsequent relapses without improvement, requiring a wheelchair within six months of onset. He was previously treated with disease modifying treatments including Aubagio, Tecfidera, and Tysabri, and was switched to Ocrevus in July 2017.     Today I had a lengthy discussion with the patient and his mother regarding his treatment. He has been struggling with multiple infections including UTI's and C.Diff. This unfortunate patient struggles with neurogenic bladder with chronic indwelling matias requiring replacement every 2-4 days which has resulted in frequent UTI's requiring antibiotic therapy which then leads to C. Diff infections in a continuous cycle. His last UTI was severe and his mother said that she \"didn't think he was going to make it.\" I explained that I do not believe the benefits outweigh the risks to him at this time and recommended we discontinue Ocrevus. We discussed his initial diagnosis attempting to discern whether he had primary versus secondary progressive disease. There are no other FDA approved therapies for primary progressive MS and now two approved for \"active secondary progressive MS.\" I suspect that the has primary progressive disease and we discussed the high dose biotin trial which is expected to result in early 2020. I suggested he consider obtaining high dose biotin from a compounding pharmacy, but his mother thinks it is unlikely he will be able to pay for this given his nursing home takes most of his social security.     Plan:  1. Stop Ocrevus infusions.   2. Consider high dose Biotin 100mg three times a day through a compounding pharmacy until the clinical trials have resulted.   "

## 2019-07-18 ENCOUNTER — APPOINTMENT (OUTPATIENT)
Dept: RADIOLOGY | Facility: MEDICAL CENTER | Age: 39
End: 2019-07-18
Attending: PHYSICIAN ASSISTANT
Payer: MEDICARE

## 2019-07-18 ENCOUNTER — HOSPITAL ENCOUNTER (OUTPATIENT)
Facility: MEDICAL CENTER | Age: 39
End: 2019-07-18
Attending: UROLOGY | Admitting: UROLOGY
Payer: MEDICARE

## 2019-07-18 VITALS
DIASTOLIC BLOOD PRESSURE: 89 MMHG | HEART RATE: 97 BPM | BODY MASS INDEX: 21.02 KG/M2 | OXYGEN SATURATION: 99 % | RESPIRATION RATE: 16 BRPM | TEMPERATURE: 98.8 F | WEIGHT: 150.13 LBS | SYSTOLIC BLOOD PRESSURE: 126 MMHG | HEIGHT: 71 IN

## 2019-07-18 DIAGNOSIS — N31.9 NEUROMUSCULAR DYSFUNCTION OF BLADDER: ICD-10-CM

## 2019-07-18 DIAGNOSIS — R33.8 OTHER RETENTION OF URINE: ICD-10-CM

## 2019-07-18 DIAGNOSIS — G35 MULTIPLE SCLEROSIS (HCC): ICD-10-CM

## 2019-07-18 LAB
INR PPP: 1 (ref 0.87–1.13)
PLATELET # BLD AUTO: 285 K/UL (ref 164–446)
PROTHROMBIN TIME: 13.4 SEC (ref 12–14.6)

## 2019-07-18 PROCEDURE — 700111 HCHG RX REV CODE 636 W/ 250 OVERRIDE (IP): Performed by: RADIOLOGY

## 2019-07-18 PROCEDURE — 51102 DRAIN BL W/CATH INSERTION: CPT

## 2019-07-18 PROCEDURE — 85610 PROTHROMBIN TIME: CPT

## 2019-07-18 PROCEDURE — 160002 HCHG RECOVERY MINUTES (STAT)

## 2019-07-18 PROCEDURE — 85049 AUTOMATED PLATELET COUNT: CPT

## 2019-07-18 PROCEDURE — 36415 COLL VENOUS BLD VENIPUNCTURE: CPT

## 2019-07-18 PROCEDURE — 700117 HCHG RX CONTRAST REV CODE 255: Performed by: RADIOLOGY

## 2019-07-18 PROCEDURE — 700111 HCHG RX REV CODE 636 W/ 250 OVERRIDE (IP)

## 2019-07-18 PROCEDURE — 700105 HCHG RX REV CODE 258: Performed by: UROLOGY

## 2019-07-18 RX ORDER — MORPHINE SULFATE 10 MG/ML
4 INJECTION, SOLUTION INTRAMUSCULAR; INTRAVENOUS
Status: CANCELLED | OUTPATIENT
Start: 2019-07-18 | End: 2019-07-19

## 2019-07-18 RX ORDER — OXYCODONE HYDROCHLORIDE 10 MG/1
10 TABLET ORAL
Status: CANCELLED | OUTPATIENT
Start: 2019-07-18 | End: 2019-07-19

## 2019-07-18 RX ORDER — SODIUM CHLORIDE 9 MG/ML
500 INJECTION, SOLUTION INTRAVENOUS
Status: DISCONTINUED | OUTPATIENT
Start: 2019-07-18 | End: 2019-07-18 | Stop reason: HOSPADM

## 2019-07-18 RX ORDER — BUPROPION HYDROCHLORIDE 100 MG/1
100 TABLET, EXTENDED RELEASE ORAL 3 TIMES DAILY
COMMUNITY
End: 2021-01-01

## 2019-07-18 RX ORDER — ONDANSETRON 2 MG/ML
4 INJECTION INTRAMUSCULAR; INTRAVENOUS EVERY 8 HOURS PRN
Status: CANCELLED | OUTPATIENT
Start: 2019-07-18 | End: 2019-07-19

## 2019-07-18 RX ORDER — OXYCODONE HYDROCHLORIDE 5 MG/1
5 TABLET ORAL
Status: CANCELLED | OUTPATIENT
Start: 2019-07-18 | End: 2019-07-19

## 2019-07-18 RX ORDER — SODIUM CHLORIDE 9 MG/ML
1000 INJECTION, SOLUTION INTRAVENOUS CONTINUOUS
Status: DISCONTINUED | OUTPATIENT
Start: 2019-07-18 | End: 2019-07-18 | Stop reason: HOSPADM

## 2019-07-18 RX ORDER — MIDAZOLAM HYDROCHLORIDE 1 MG/ML
INJECTION INTRAMUSCULAR; INTRAVENOUS
Status: COMPLETED
Start: 2019-07-18 | End: 2019-07-18

## 2019-07-18 RX ORDER — MIDAZOLAM HYDROCHLORIDE 1 MG/ML
.5-2 INJECTION INTRAMUSCULAR; INTRAVENOUS PRN
Status: DISCONTINUED | OUTPATIENT
Start: 2019-07-18 | End: 2019-07-18 | Stop reason: HOSPADM

## 2019-07-18 RX ORDER — ONDANSETRON 2 MG/ML
4 INJECTION INTRAMUSCULAR; INTRAVENOUS PRN
Status: DISCONTINUED | OUTPATIENT
Start: 2019-07-18 | End: 2019-07-18 | Stop reason: HOSPADM

## 2019-07-18 RX ADMIN — MIDAZOLAM 1 MG: 1 INJECTION INTRAMUSCULAR; INTRAVENOUS at 13:50

## 2019-07-18 RX ADMIN — SODIUM CHLORIDE: 9 INJECTION, SOLUTION INTRAVENOUS at 13:01

## 2019-07-18 RX ADMIN — MIDAZOLAM 1 MG: 1 INJECTION INTRAMUSCULAR; INTRAVENOUS at 13:54

## 2019-07-18 RX ADMIN — MIDAZOLAM HYDROCHLORIDE 1 MG: 1 INJECTION, SOLUTION INTRAMUSCULAR; INTRAVENOUS at 13:50

## 2019-07-18 RX ADMIN — FENTANYL CITRATE 50 MCG: 0.05 INJECTION, SOLUTION INTRAMUSCULAR; INTRAVENOUS at 13:50

## 2019-07-18 RX ADMIN — IOHEXOL 57 ML: 300 INJECTION, SOLUTION INTRAVENOUS at 14:00

## 2019-07-18 NOTE — OR NURSING
The pt is awake and oriented. Respirations are regular and easy. Pain is controlled, the pt is comfortable. Dressing dry and intact.Catheter patent draining clear pink-yellow.

## 2019-07-18 NOTE — PROGRESS NOTES
"History and Physical    Date: 7/18/2019    PCP: NAOMIE Emmanuel      CC: Urinary retention.     HPI: This is a 39 y.o. male who is presenting for SP catheter placement secondary to urinary retention.     Past Medical History:   Diagnosis Date   • Anxiety    • Back pain    • Blood transfusion    • Bowel habit changes     constipation   • Breath shortness     \"r/t to medication Ocrevus and Baclofen\" \"side effect   • Dental disorder     full denture   • Depression     PTSD/clinical depression   • Fall     4/6/2016   • Headache(784.0)    • Heart burn    • Heart murmur    • Heat intolerance     will vomit   • Hypertension    • Indigestion    • MS (multiple sclerosis) ([facility]) DX 8/20/2013    multiple lesions brain/spine   • Pain 06/20/2018    \"everywhere\"   • Presence of intrathecal baclofen pump     right side   • Urinary bladder disorder     UTI/catheter/bladder stone   • Urinary incontinence     catheter in place       Past Surgical History:   Procedure Laterality Date   • PUMP INSERT/REMOVE  6/26/2018    Procedure: PUMP INSERT/REMOVE - BACLOFEN;  Surgeon: Andrew Gomez M.D.;  Location: [facility];  Service: Pain Management   • CYSTOSCOPY  5/7/2018    Procedure: CYSTOSCOPY;  Surgeon: Kwasi Sapp M.D.;  Location: [facility];  Service: Urology   • LASERTRIPSY  5/7/2018    Procedure: LASERTRIPSY - FOR: LITHOLAPAXY;  Surgeon: Kwasi Sapp M.D.;  Location: [facility];  Service: Urology   • BLOCK EPIDURAL STEROID INJECTION N/A 3/27/2018    Procedure: BLOCK EPIDURAL STEROID INJECTION - BACLOFEN PUMP TRIAL;  Surgeon: Andrew Gomez M.D.;  Location: [facility];  Service: Pain Management   • GASTROSCOPY  1/14/2017    Procedure: GASTROSCOPY WITH FECAL MATTER TRANSPLANT;  Surgeon: Obinna Douglas M.D.;  Location: [facility];  Service:    • OPEN REDUCTION Left 1988    left thumb       Current Facility-Administered Medications   Medication Dose Route " Frequency Provider Last Rate Last Dose   • NS infusion 1,000 mL  1,000 mL Intravenous Continuous Evangelist Martinez M.D.            Social History     Social History   • Marital status:      Spouse name: N/A   • Number of children: N/A   • Years of education: N/A     Occupational History   • Not on file.     Social History Main Topics   • Smoking status: Former Smoker     Packs/day: 1.00     Years: 20.00     Start date: 8/1/1997     Quit date: 8/1/2013   • Smokeless tobacco: Never Used   • Alcohol use 0.0 oz/week      Comment: 2 per day   • Drug use: Yes     Types: Inhaled      Comment: medical marijuana/and oral   • Sexual activity: Not on file      Comment: once daily for 23 yrs     Other Topics Concern   • Not on file     Social History Narrative   • No narrative on file       Family History   Problem Relation Age of Onset   • Thyroid Mother    • Hypertension Mother    • Hypertension Father    • Psychiatry Father        Allergies:  Asa [aspirin]; Gabapentin; and Pregabalin    Review of Systems:  Negative     Physical Exam    Vital Signs  Blood Pressure: 125/94   Temperature: 37.3 °C (99.1 °F)   Pulse: 94   Respiration: 18   Pulse Oximetry: 94 %        Labs:  Recent Labs      07/18/19   1227   PLATELETCT  285         Recent Labs      07/18/19   1227   INR  1.00     Recent Labs      07/18/19   1227   INR  1.00       Radiology:  IR-DRAIN-BLADDER SUPRAPUB W/CATH    (Results Pending)             Assessment and Plan:This is a 39 y.o. Male with severe MS and urinary retention. A suprapubic catheter will be placed in IR.

## 2019-07-18 NOTE — PROGRESS NOTES
1517  pt received in PACU 2 - report received and care assumed. Pt laying comf. On rt side. Denies pain or nausea at present. Cath to gravity draining  1550 discharge instructions reviewed with pt and family. All questions and concerns addressed. Pt hoisted into the w/c   1609 pt discharged via w/c to private car - acc by family

## 2019-07-18 NOTE — OR SURGEON
Immediate Post- Operative Note        PostOp Diagnosis: Urinary retention.       Procedure(s): SP catheter placement.       Estimated Blood Loss: Less than 5 ml        Complications: None            7/18/2019     1404 PM     Saman Olguin

## 2019-07-18 NOTE — DISCHARGE INSTRUCTIONS
ACTIVITY: Rest and take it easy for the first 24 hours.  A responsible adult is recommended to remain with you during that time.  It is normal to feel sleepy.  We encourage you to not do anything that requires balance, judgment or coordination.    MILD FLU-LIKE SYMPTOMS ARE NORMAL. YOU MAY EXPERIENCE GENERALIZED MUSCLE ACHES, THROAT IRRITATION, HEADACHE AND/OR SOME NAUSEA.    FOR 24 HOURS DO NOT:  Drive, operate machinery or run household appliances.  Drink beer or alcoholic beverages.   Make important decisions or sign legal documents.    SPECIAL INSTRUCTIONS: PER MD  KEEP THE SUPRAPUBIC AREA CLEAN    DIET: To avoid nausea, slowly advance diet as tolerated, avoiding spicy or greasy foods for the first day.  Add more substantial food to your diet according to your physician's instructions.  Babies can be fed formula or breast milk as soon as they are hungry.  INCREASE FLUIDS AND FIBER TO AVOID CONSTIPATION.    SURGICAL DRESSING/BATHING: PER MD    FOLLOW-UP APPOINTMENT:  A follow-up appointment should be arranged with your doctor in PER MD; call to schedule.    You should CALL YOUR PHYSICIAN if you develop:  Fever greater than 101 degrees F.  Pain not relieved by medication, or persistent nausea or vomiting.  Excessive bleeding (blood soaking through dressing) or unexpected drainage from the wound.  Extreme redness or swelling around the incision site, drainage of pus or foul smelling drainage.  Inability to urinate or empty your bladder within 8 hours.  Problems with breathing or chest pain.    You should call 911 if you develop problems with breathing or chest pain.  If you are unable to contact your doctor or surgical center, you should go to the nearest emergency room or urgent care center.    If any questions arise, call your doctor.  If your doctor is not available, please feel free to call the Surgical Center at {Surgical Dept Numbers:[phone]}.  The Center is open Monday through Friday from 7AM to 7PM.  You  can also call the HEALTH HOTLINE open 24 hours/day, 7 days/week and speak to a nurse at [phone], or toll free at [phone].    A registered nurse may call you a few days after your surgery to see how you are doing after your procedure.    MEDICATIONS: Resume taking daily medication.  Take prescribed pain medication with food.  If no medication is prescribed, you may take non-aspirin pain medication if needed.  PAIN MEDICATION CAN BE VERY CONSTIPATING.  Take a stool softener or laxative such as senokot, pericolace, or milk of magnesia if needed.    Prescription given for NA.  Last pain medication given at NA.    If your physician has prescribed pain medication that includes Acetaminophen (Tylenol), do not take additional Acetaminophen (Tylenol) while taking the prescribed medication.    Depression / Suicide Risk    As you are discharged from this [facility] facility, it is important to learn how to keep safe from harming yourself.    Recognize the warning signs:  · Abrupt changes in personality, positive or negative- including increase in energy   · Giving away possessions  · Change in eating patterns- significant weight changes-  positive or negative  · Change in sleeping patterns- unable to sleep or sleeping all the time   · Unwillingness or inability to communicate  · Depression  · Unusual sadness, discouragement and loneliness  · Talk of wanting to die  · Neglect of personal appearance   · Rebelliousness- reckless behavior  · Withdrawal from people/activities they love  · Confusion- inability to concentrate     If you or a loved one observes any of these behaviors or has concerns about self-harm, here's what you can do:  · Talk about it- your feelings and reasons for harming yourself  · Remove any means that you might use to hurt yourself (examples: pills, rope, extension cords, firearm)  · Get professional help from the community (Mental Health, Substance Abuse, psychological counseling)  · Do not  be alone:Call your Safe Contact- someone whom you trust who will be there for you.  · Call your local CRISIS HOTLINE 171-0648 or 441-107-8712  · Call your local Children's Mobile Crisis Response Team [place] (738) 681-9217 or www.Tarari  · Call the toll free National Suicide Prevention Hotlines   · National Suicide Prevention Lifeline 380-201-BCGQ (3853)  · National [employer] Line Network 800-SUICIDE (555-0212)

## 2019-07-18 NOTE — PROGRESS NOTES
Procedure Confirmed with MD, RN, RT and patient pre procedure.  Suprpubic Catheter Placement by MD Olguin assisted by RT Dick, Lower Abdominal access site.    End Tidal CO2 range 23-36 throughout procedure.    Lower Abdominal access site with locking look pigtail, covered with gauze and Primapore, C/D/I.    Patient tolerated procedure, hemodynamically stable; pt drowsy but talking post procedure; report given to AURORA Jones.  Patient transported to [facility] pod 4a via IR RN monitored then transferred care to report RN.    Suprapubic Catheter:  [employer], Flexima APDL Locking Pigtail Drainage Catheter System, 14f x 25cm, REF# [id number], LOT# [id number], Exp. Date 3/3/2022.

## 2019-08-12 RX ORDER — SODIUM CHLORIDE 9 MG/ML
INJECTION, SOLUTION INTRAVENOUS CONTINUOUS
Status: CANCELLED | OUTPATIENT
Start: 2019-08-16

## 2019-08-16 ENCOUNTER — HOSPITAL ENCOUNTER (OUTPATIENT)
Facility: MEDICAL CENTER | Age: 39
End: 2019-08-16
Attending: RADIOLOGY | Admitting: RADIOLOGY
Payer: MEDICARE

## 2019-08-16 ENCOUNTER — APPOINTMENT (OUTPATIENT)
Dept: RADIOLOGY | Facility: MEDICAL CENTER | Age: 39
End: 2019-08-16
Attending: PHYSICIAN ASSISTANT
Payer: MEDICARE

## 2019-08-16 VITALS
WEIGHT: 152.12 LBS | HEIGHT: 71 IN | SYSTOLIC BLOOD PRESSURE: 138 MMHG | DIASTOLIC BLOOD PRESSURE: 89 MMHG | BODY MASS INDEX: 21.3 KG/M2 | HEART RATE: 89 BPM | TEMPERATURE: 97.8 F | RESPIRATION RATE: 18 BRPM | OXYGEN SATURATION: 97 %

## 2019-08-16 DIAGNOSIS — N31.9 NEUROMUSCULAR DYSFUNCTION OF BLADDER, UNSPECIFIED: ICD-10-CM

## 2019-08-16 PROCEDURE — 160002 HCHG RECOVERY MINUTES (STAT)

## 2019-08-16 PROCEDURE — 700111 HCHG RX REV CODE 636 W/ 250 OVERRIDE (IP)

## 2019-08-16 PROCEDURE — 49423 EXCHANGE DRAINAGE CATHETER: CPT

## 2019-08-16 PROCEDURE — 700105 HCHG RX REV CODE 258

## 2019-08-16 RX ORDER — MIDAZOLAM HYDROCHLORIDE 1 MG/ML
INJECTION INTRAMUSCULAR; INTRAVENOUS
Status: COMPLETED
Start: 2019-08-16 | End: 2019-08-16

## 2019-08-16 RX ORDER — SODIUM CHLORIDE 9 MG/ML
INJECTION, SOLUTION INTRAVENOUS CONTINUOUS
Status: DISCONTINUED | OUTPATIENT
Start: 2019-08-16 | End: 2019-08-16 | Stop reason: HOSPADM

## 2019-08-16 RX ORDER — HYDROMORPHONE HYDROCHLORIDE 1 MG/ML
0.5 INJECTION, SOLUTION INTRAMUSCULAR; INTRAVENOUS; SUBCUTANEOUS
Status: DISCONTINUED | OUTPATIENT
Start: 2019-08-16 | End: 2019-08-16 | Stop reason: HOSPADM

## 2019-08-16 RX ORDER — ONDANSETRON 2 MG/ML
4 INJECTION INTRAMUSCULAR; INTRAVENOUS EVERY 8 HOURS PRN
Status: DISCONTINUED | OUTPATIENT
Start: 2019-08-16 | End: 2019-08-16 | Stop reason: HOSPADM

## 2019-08-16 RX ORDER — SODIUM CHLORIDE 9 MG/ML
INJECTION, SOLUTION INTRAVENOUS
Status: COMPLETED
Start: 2019-08-16 | End: 2019-08-16

## 2019-08-16 RX ORDER — ONDANSETRON 2 MG/ML
4 INJECTION INTRAMUSCULAR; INTRAVENOUS PRN
Status: DISCONTINUED | OUTPATIENT
Start: 2019-08-16 | End: 2019-08-16 | Stop reason: HOSPADM

## 2019-08-16 RX ORDER — SODIUM CHLORIDE 9 MG/ML
1000 INJECTION, SOLUTION INTRAVENOUS
Status: COMPLETED | OUTPATIENT
Start: 2019-08-16 | End: 2019-08-16

## 2019-08-16 RX ORDER — MIDAZOLAM HYDROCHLORIDE 1 MG/ML
.5-2 INJECTION INTRAMUSCULAR; INTRAVENOUS PRN
Status: DISCONTINUED | OUTPATIENT
Start: 2019-08-16 | End: 2019-08-16 | Stop reason: HOSPADM

## 2019-08-16 RX ORDER — SODIUM CHLORIDE 9 MG/ML
500 INJECTION, SOLUTION INTRAVENOUS
Status: CANCELLED | OUTPATIENT
Start: 2019-08-16 | End: 2019-08-16

## 2019-08-16 RX ORDER — MIDAZOLAM HYDROCHLORIDE 1 MG/ML
.5-2 INJECTION INTRAMUSCULAR; INTRAVENOUS PRN
Status: CANCELLED | OUTPATIENT
Start: 2019-08-16 | End: 2019-08-16

## 2019-08-16 RX ORDER — SODIUM CHLORIDE 9 MG/ML
500 INJECTION, SOLUTION INTRAVENOUS
Status: DISCONTINUED | OUTPATIENT
Start: 2019-08-16 | End: 2019-08-16 | Stop reason: HOSPADM

## 2019-08-16 RX ORDER — ONDANSETRON 2 MG/ML
4 INJECTION INTRAMUSCULAR; INTRAVENOUS PRN
Status: CANCELLED | OUTPATIENT
Start: 2019-08-16 | End: 2019-08-16

## 2019-08-16 RX ORDER — OXYCODONE HYDROCHLORIDE 5 MG/1
5 TABLET ORAL
Status: DISCONTINUED | OUTPATIENT
Start: 2019-08-16 | End: 2019-08-16 | Stop reason: HOSPADM

## 2019-08-16 RX ADMIN — SODIUM CHLORIDE 1000 ML: 9 INJECTION, SOLUTION INTRAVENOUS at 09:20

## 2019-08-16 RX ADMIN — FENTANYL CITRATE 50 MCG: 50 INJECTION, SOLUTION INTRAMUSCULAR; INTRAVENOUS at 10:20

## 2019-08-16 RX ADMIN — MIDAZOLAM HYDROCHLORIDE 1 MG: 1 INJECTION INTRAMUSCULAR; INTRAVENOUS at 10:20

## 2019-08-16 RX ADMIN — MIDAZOLAM 1 MG: 1 INJECTION INTRAMUSCULAR; INTRAVENOUS at 10:20

## 2019-08-16 SDOH — HEALTH STABILITY: MENTAL HEALTH: HOW OFTEN DO YOU HAVE A DRINK CONTAINING ALCOHOL?: NEVER

## 2019-08-16 NOTE — OR NURSING
At 1055 rec pt from IR, A&O denies pain, suprapubic cath site with gauze and scant amount of blood around catheter.  Catheter draining bloody urine, no clots.  Pt had BM, cleaned and changed.  Pt oriented to room.    At 1120, pt's mother at bedside.  No changes with pt status.  Requesting to eat.  Pt sat up and mother assisted with feeding.    At 1200, pt completed eating, dc criteria met, no changes with cath site.  Emptied urinary catheter for 50 mls of blood urine.  DC instructions reviewed w/pt and mother, questions answered.  Pt had BM, cleaned and changed.  Assisted with changing clothes.  Pt transferred from bed to electric w/c using talia lift.  Pt tolerated well no complications.      At 1235, pt dc'd home with mother with all belongings.  Pt in stable condition with urinary catheter continuing to drain bloody urine.  Encouraged pt to drink fluids.

## 2019-08-16 NOTE — DISCHARGE INSTRUCTIONS
ACTIVITY: Rest and take it easy for the first 24 hours.  A responsible adult is recommended to remain with you during that time.  It is normal to feel sleepy.  We encourage you to not do anything that requires balance, judgment or coordination.    MILD FLU-LIKE SYMPTOMS ARE NORMAL. YOU MAY EXPERIENCE GENERALIZED MUSCLE ACHES, THROAT IRRITATION, HEADACHE AND/OR SOME NAUSEA.    FOR 24 HOURS DO NOT:  Drive, operate machinery or run household appliances.  Drink beer or alcoholic beverages.   Make important decisions or sign legal documents.    SPECIAL INSTRUCTIONS: *follow any instructions given to you by your doctor**    DIET: To avoid nausea, slowly advance diet as tolerated, avoiding spicy or greasy foods for the first day.  Add more substantial food to your diet according to your physician's instructions.  Babies can be fed formula or breast milk as soon as they are hungry.  INCREASE FLUIDS AND FIBER TO AVOID CONSTIPATION.    SURGICAL DRESSING/BATHING: *    Epperson to gravity  Irrigate catheter with 120 mL saline daily  Change dressing as needed**    FOLLOW-UP APPOINTMENT:  A follow-up appointment should be arranged with your doctor.    You should CALL YOUR PHYSICIAN if you develop:  Fever greater than 101 degrees F.  Pain not relieved by medication, or persistent nausea or vomiting.  Excessive bleeding (blood soaking through dressing) or unexpected drainage from the wound.  Extreme redness or swelling around the incision site, drainage of pus or foul smelling drainage.  Inability to urinate or empty your bladder within 8 hours.  Problems with breathing or chest pain.    You should call 911 if you develop problems with breathing or chest pain.  If you are unable to contact your doctor or surgical center, you should go to the nearest emergency room or urgent care center.      If any questions arise, call your doctor.  If your doctor is not available, please feel free to call the Surgical Center at [phone].  The  Center is open Monday through Friday from 7AM to 7PM.  You can also call the HEALTH HOTLINE open 24 hours/day, 7 days/week and speak to a nurse at [phone], or toll free at [phone].    A registered nurse may call you a few days after your surgery to see how you are doing after your procedure.    MEDICATIONS: Resume taking daily medication.  Take prescribed pain medication with food.  If no medication is prescribed, you may take non-aspirin pain medication if needed.  PAIN MEDICATION CAN BE VERY CONSTIPATING.  Take a stool softener or laxative such as senokot, pericolace, or milk of magnesia if needed.    Prescription given for **none given*.  Last pain medication given at **none given*.    If your physician has prescribed pain medication that includes Acetaminophen (Tylenol), do not take additional Acetaminophen (Tylenol) while taking the prescribed medication.    Depression / Suicide Risk    As you are discharged from this [facility] facility, it is important to learn how to keep safe from harming yourself.    Recognize the warning signs:  · Abrupt changes in personality, positive or negative- including increase in energy   · Giving away possessions  · Change in eating patterns- significant weight changes-  positive or negative  · Change in sleeping patterns- unable to sleep or sleeping all the time   · Unwillingness or inability to communicate  · Depression  · Unusual sadness, discouragement and loneliness  · Talk of wanting to die  · Neglect of personal appearance   · Rebelliousness- reckless behavior  · Withdrawal from people/activities they love  · Confusion- inability to concentrate     If you or a loved one observes any of these behaviors or has concerns about self-harm, here's what you can do:  · Talk about it- your feelings and reasons for harming yourself  · Remove any means that you might use to hurt yourself (examples: pills, rope, extension cords, firearm)  · Get professional help from  the community (Mental Health, Substance Abuse, psychological counseling)  · Do not be alone:Call your Safe Contact- someone whom you trust who will be there for you.  · Call your local CRISIS HOTLINE 267-7677 or 405-617-8771  · Call your local Children's Mobile Crisis Response Team [place] (282) 576-1594 or www.PeerReach  · Call the toll free National Suicide Prevention Hotlines   · National Suicide Prevention Lifeline 163-946-MBWL (6876)  · National Hope Line Network 800-SUICIDE (223-7363)

## 2019-08-16 NOTE — OR NURSING
Pre op admission completed.  Pt and mother educated on surgical plan of care, all questions answered.  Bed in low position and call light within reach.  Hourly rounding in place.  Pt is a paraplegic and has limited use of UE.

## 2019-08-16 NOTE — PROGRESS NOTES
Patient and mother consented for procedure by Dr. Lee.  All questions regarding procedure and moderate sedation answered.  Patient transported to IR1 via bed.  Patient transferred onto procedure table.  Monitors and oxygen applied.  Patient prepped and draped in sterile fashion.  Time out performed.  All team members in agreement.  Suprapubic catheter exchanged.  16F Epperson catheter tube to lower abdomen draining red-tinged urine to standard collection bag.  Split gauze and medipore tape dressing applied.  Patient received 1 mg Versed and 50 mcg Fentanyl during procedure.  Patient tolerated well.  Report called to AURORA Ricci.  Patient transferred back onto bed.  SpO2 98% RA.  Patient transported back to [facility].

## 2019-08-16 NOTE — OR SURGEON
Immediate Post- Operative Note        PostOp Diagnosis: MS      Procedure(s): suprapubic tube upsize      Estimated Blood Loss: Less than 5 ml        Complications: None            8/16/2019     10:34 AM     Mark Lee

## 2019-09-19 ENCOUNTER — OFFICE VISIT (OUTPATIENT)
Dept: NEUROLOGY | Facility: MEDICAL CENTER | Age: 39
End: 2019-09-19
Payer: MEDICARE

## 2019-09-19 VITALS
WEIGHT: 150 LBS | TEMPERATURE: 98.4 F | SYSTOLIC BLOOD PRESSURE: 126 MMHG | BODY MASS INDEX: 21 KG/M2 | HEART RATE: 104 BPM | OXYGEN SATURATION: 98 % | DIASTOLIC BLOOD PRESSURE: 78 MMHG | HEIGHT: 71 IN | RESPIRATION RATE: 16 BRPM

## 2019-09-19 DIAGNOSIS — R25.2 SPASTICITY: ICD-10-CM

## 2019-09-19 DIAGNOSIS — G35 MS (MULTIPLE SCLEROSIS) (HCC): ICD-10-CM

## 2019-09-19 PROCEDURE — 99215 OFFICE O/P EST HI 40 MIN: CPT | Performed by: PSYCHIATRY & NEUROLOGY

## 2019-09-19 RX ORDER — METHENAMINE HIPPURATE 1000 MG/1
1 TABLET ORAL 2 TIMES DAILY
COMMUNITY
End: 2021-01-01

## 2019-09-19 RX ORDER — ONDANSETRON 4 MG/1
4 TABLET, ORALLY DISINTEGRATING ORAL EVERY 4 HOURS PRN
COMMUNITY
End: 2021-01-01

## 2019-09-19 RX ORDER — ESCITALOPRAM OXALATE 10 MG/1
20 TABLET ORAL DAILY
COMMUNITY
End: 2021-01-01

## 2019-09-19 RX ORDER — LIDOCAINE 50 MG/G
1 PATCH TOPICAL EVERY 12 HOURS
COMMUNITY
End: 2021-01-01

## 2019-09-19 RX ORDER — OXYBUTYNIN CHLORIDE 5 MG/1
5 TABLET ORAL 2 TIMES DAILY
COMMUNITY
End: 2021-01-01

## 2019-09-19 NOTE — ASSESSMENT & PLAN NOTE
Mr. Jerzy Juan III is a 38 yo M with h/o htn, hpd, diagnosed with SM in 2013 after B/L LE weakness with secondary progression. He has been treated with multiple disease modifying treatments including Ocrevus which was discontinued. Biotin too expensive for his income. We discussed nursing home care with MS programming and information was provided to the patient and his mother. There are two such centers in [place] where he is originally from.     I am concerned about the redness around the suprapubic catheter site. It may be uinfected which is driving his increased spasticity. Documentation provided to that effect for his nursing home facility.    He will follow up with Dr. Melissa Bloch in 2-3 months.     Plan:  1. Remain off of Ocrevus per patient celestine.   2. Follow up with Dr. Bloch in 2- 3 months.  3. Nursing home to further assess suprapubic catheter.

## 2019-09-19 NOTE — PROGRESS NOTES
"CC: Multiple Sclerosis       HPI:    Jerzy Juan III is a pleasant 39 y.o. male who presents today in neurologic follow up for multiple sclerosis. He is again accompanied by his mother to today's visit. He is wheelchair bound with significant spasticity and a baclofen pump. He feels his right LE spasticity has worsened recently. He has noted some redness around his suprapubic catheter site as well. His mother had looked into high dose biotin through a compounding pharmacy but the cost was prohibitive. He has stopped Ocrevus infusions and would like to continue off of the medication. His mood continues to be pseudo-bulbar and depressed.        MS History:  Diagnosed: 2013  Lumbar puncture:    First presentation:   Disease modifying treatment:               Current: Ocrevus first infusion 7/26/17.               Previous: Tysabri, Tecfidera, Aubagio.  Former or other neurologist: Dr. Melissa Bloch.  Last MRI: Brain and C-Spine 6/8/16, Thoracic 6/9/16.        ROS:   A comprehensive ROS was performed and was negative except for that mentioned in the HPI.     Past Medical History:   Past Medical History:   Diagnosis Date   • Pain 06/20/2018    \"everywhere\"   • Anxiety    • Back pain    • Blood transfusion    • Bowel habit changes     constipation   • Breath shortness     \"r/t to medication Ocrevus and Baclofen\" \"side effect   • Dental disorder     full denture   • Depression     PTSD/clinical depression   • Fall     4/6/2016   • Headache(784.0)    • Heart burn    • Heat intolerance     will vomit   • Hypertension    • Indigestion    • MS (multiple sclerosis) ([facility]) DX 8/20/2013    multiple lesions brain/spine   • Presence of intrathecal baclofen pump     right side   • Urinary bladder disorder     UTI/catheter/bladder stone   • Urinary incontinence     catheter in place       Past Surgical History:   Past Surgical History:   Procedure Laterality Date   • PUMP INSERT/REMOVE  6/26/2018    Procedure: PUMP " INSERT/REMOVE - BACLOFEN;  Surgeon: Andrew Gomez M.D.;  Location: SURGERY [facility];  Service: Pain Management   • CYSTOSCOPY  2018    Procedure: CYSTOSCOPY;  Surgeon: Kwasi Sapp M.D.;  Location: SURGERY [facility];  Service: Urology   • LASERTRIPSY  2018    Procedure: LASERTRIPSY - FOR: LITHOLAPAXY;  Surgeon: Kwasi Sapp M.D.;  Location: SURGERY [facility];  Service: Urology   • BLOCK EPIDURAL STEROID INJECTION N/A 3/27/2018    Procedure: BLOCK EPIDURAL STEROID INJECTION - BACLOFEN PUMP TRIAL;  Surgeon: Andrew Gomez M.D.;  Location: SURGERY [facility];  Service: Pain Management   • GASTROSCOPY  2017    Procedure: GASTROSCOPY WITH FECAL MATTER TRANSPLANT;  Surgeon: Obinna Douglas M.D.;  Location: SURGERY [facility];  Service:    • OPEN REDUCTION Left 1988    left thumb       Social History:   Social History     Socioeconomic History   • Marital status:      Spouse name: Not on file   • Number of children: Not on file   • Years of education: Not on file   • Highest education level: Not on file   Occupational History   • Not on file   Social Needs   • Financial resource strain: Not on file   • Food insecurity:     Worry: Not on file     Inability: Not on file   • Transportation needs:     Medical: Not on file     Non-medical: Not on file   Tobacco Use   • Smoking status: Former Smoker     Packs/day: 1.00     Years: 20.00     Pack years: 20.00     Start date: 1997     Last attempt to quit: 2013     Years since quittin.2   • Smokeless tobacco: Never Used   Substance and Sexual Activity   • Alcohol use: Yes     Alcohol/week: 0.0 oz     Frequency: Never   • Drug use: Not Currently     Types: Inhaled     Comment: stopped 2019   • Sexual activity: Not on file     Comment: once daily for 23 yrs   Lifestyle   • Physical activity:     Days per week: Not on file     Minutes per session: Not on file   • Stress: Not on file   Relationships   •  Social connections:     Talks on phone: Not on file     Gets together: Not on file     Attends Sikhism service: Not on file     Active member of club or organization: Not on file     Attends meetings of clubs or organizations: Not on file     Relationship status: Not on file   • Intimate partner violence:     Fear of current or ex partner: Not on file     Emotionally abused: Not on file     Physically abused: Not on file     Forced sexual activity: Not on file   Other Topics Concern   • Not on file   Social History Narrative   • Not on file       Family Hx:   Family History   Problem Relation Age of Onset   • Thyroid Mother    • Hypertension Mother    • Hypertension Father    • Psychiatric Illness Father        Current Medications:   Current Outpatient Medications:   •  escitalopram (LEXAPRO) 10 MG Tab, Take 20 mg by mouth every day., Disp: , Rfl:   •  lidocaine (LIDODERM) 5 % Patch, Apply 1 Patch to skin as directed every 12 hours., Disp: , Rfl:   •  methenamine hip (HIPPREX) 1 GM Tab, Take 1 g by mouth 2 times a day., Disp: , Rfl:   •  oxybutynin (DITROPAN) 5 MG Tab, Take 5 mg by mouth 2 Times a Day., Disp: , Rfl:   •  Acetaminophen (TYLENOL) 325 MG Cap, Take  by mouth 3 times a day as needed., Disp: , Rfl:   •  ondansetron (ZOFRAN ODT) 4 MG TABLET DISPERSIBLE, Take 4 mg by mouth every 6 hours as needed for Nausea., Disp: , Rfl:   •  vitamin D (CHOLECALCIFEROL) 1000 UNIT Tab, Take 1,000 Units by mouth every day., Disp: , Rfl:   •  buPROPion SR (WELLBUTRIN-SR) 100 MG TABLET SR 12 HR, Take 200 mg by mouth 2 times a day., Disp: , Rfl:   •  Lactobacillus (ULTIMATE PROBIOTIC FORMULA) Cap, Take 1 Cap by mouth 3 times a day., Disp: , Rfl:   •  NON SPECIFIED, 26.02 mcg by Intrathecal route 3 times a day as needed. Baclofen Pump, Disp: , Rfl:     Allergies:   Allergies   Allergen Reactions   • Asa [Aspirin] Unspecified     Bleeding in stomach as a baby   • Gabapentin Anxiety     anger and severe mood swing   •  "Methocarbamol      emesis   • Pregabalin Anxiety     Anger and severe mood swing         Physical Exam:   Ambulatory Vitals  Encounter Vitals  Temperature: 36.9 °C (98.4 °F)  Temp src: Temporal  Blood Pressure: 126/78  Pulse: (!) 104  Respiration: 16  Pulse Oximetry: 98 %  Weight: 68 kg (150 lb)(per patient unable to stand)  Height: 180.3 cm (5' 11\")  BMI (Calculated): 20.92      Constitutional: Well-developed, well-nourished, good hygiene. Appears stated age.  Cardiovascular: RRR, with no murmurs, rubs or gallops. No carotid bruits. No peripheral edema, pedal pulses intact.   Respiratory: Lungs CTA B/L, no W/R/R.   Skin: Warm, dry, intact. No rashes observed.  Eyes: Sclera anicteric.   Funduscopic: Optic discs flat with no evidence of papilledema or pallor. Normal posterior segments.  Neurologic:   Mental Status: Awake, alert, oriented x 3.   Speech: Dysarthric.   Memory: Able to recall recent and remote events accurately.    Concentration: Attentive. Able to focus on history and follow multi-step commands.   Fund of Knowledge: Appropriate.   Cranial Nerves:    CN II: PERRL. No afferent pupillary defect.    CN III, IV, VI: Good eye closure, EOMI.     CN V: Facial sensation intact and symmetric.     CN VII: No facial asymmetry.     CN VIII: Hearing intact.     CN IX and X: Palate elevates symmetrically. Normal gag reflex.    CN XI: Symmetric shoulder shrug.     CN XII: Tongue midline.    Sensory: Sen isidoro level at T5-T6.   Coordination: No evidence of past-pointing on finger to nose testing, no dysdiadochokinesia. Heel to shin intact.    DTR's: Hyperpathic.   Babinski: Toes downgoing bilaterally.   Romberg: Deferred.   Movements: No tremors observed.   Musculoskeletal:            Strength:   Deltoids      R 4/5 L 4/5  Biceps        R 5/5 L 5/5  Triceps       R 5/5 L 5/5  Wrist Ext    R 3/5 L 3/5  Finger Flex R 3/5 L 3/5  Finger Ext   R 3/5 L 3/5  Hip Flex      R 3/5 L 3/5  Knee Flex   R 0/5 L 0/5  Knee Ext    R 0/5 " L 0/5  Ankle DF    R 0/5 L 0/5  Ankle PF    R 0/5 L 0/5              Gait: Wheelchair bound.              Tone: Decreased bulk and increased tone throughout.    Joints: No swelling.       Assessment/Plan:  MS (multiple sclerosis) ([facility])  Mr. Jerzy Juan III is a 38 yo M with h/o htn, hpd, diagnosed with SM in 2013 after B/L LE weakness with secondary progression. He has been treated with multiple disease modifying treatments including Ocrevus which was discontinued. Biotin too expensive for his income. We discussed nursing home care with MS programming and information was provided to the patient and his mother. There are two such centers in [place] where he is originally from.     I am concerned about the redness around the suprapubic catheter site. It may be uinfected which is driving his increased spasticity. Documentation provided to that effect for his nursing home facility.    He will follow up with Dr. Melissa Bloch in 2-3 months.     Plan:  1. Remain off of Ocrevus per patient decisino.   2. Follow up with Dr. Bloch in 2- 3 months.  3. Nursing home to further assess suprapubic catheter.    Spasticity  Multiple spasticity medications recommended today to try in sequence given the refractory spasms he has been having. First, he should have infection ruled out (see above), but if this does not help he was given instructions on meds to try. See scanned sheet hand written for nursing home.         Greater than 50% of this 40 minute face to face follow up encounter was devoted to disease state counseling on Multiple Sclerosis and coordination of care. (see above assessment and plan for further details of discussion). This note was completed using dictation software resulting in occasional speech recognition errors.      Libertad Steel D.O., M.P.H  MS specialist.   Board Certified Neurologist.  Neurology Clerkship Director, [employer].    Neurology,   [facility].   Tel: [phone]  Fax: [phone]

## 2019-10-17 NOTE — ASSESSMENT & PLAN NOTE
Multiple spasticity medications recommended today to try in sequence given the refractory spasms he has been having. First, he should have infection ruled out (see above), but if this does not help he was given instructions on meds to try. See scanned sheet hand written for nursing home.

## 2020-08-13 ENCOUNTER — OFFICE VISIT (OUTPATIENT)
Dept: NEUROLOGY | Facility: MEDICAL CENTER | Age: 40
End: 2020-08-13
Payer: MEDICARE

## 2020-08-13 ENCOUNTER — HOSPITAL ENCOUNTER (OUTPATIENT)
Dept: LAB | Facility: MEDICAL CENTER | Age: 40
End: 2020-08-13
Attending: REGISTERED NURSE
Payer: MEDICARE

## 2020-08-13 VITALS
WEIGHT: 150 LBS | SYSTOLIC BLOOD PRESSURE: 106 MMHG | TEMPERATURE: 99.2 F | HEART RATE: 78 BPM | DIASTOLIC BLOOD PRESSURE: 64 MMHG | HEIGHT: 71 IN | OXYGEN SATURATION: 96 % | BODY MASS INDEX: 21 KG/M2

## 2020-08-13 DIAGNOSIS — G35 MULTIPLE SCLEROSIS (HCC): ICD-10-CM

## 2020-08-13 LAB
ALBUMIN SERPL BCP-MCNC: 4.8 G/DL (ref 3.2–4.9)
ALBUMIN/GLOB SERPL: 2.1 G/DL
ALP SERPL-CCNC: 72 U/L (ref 30–99)
ALT SERPL-CCNC: 33 U/L (ref 2–50)
ANION GAP SERPL CALC-SCNC: 12 MMOL/L (ref 7–16)
AST SERPL-CCNC: 15 U/L (ref 12–45)
BASOPHILS # BLD AUTO: 1.1 % (ref 0–1.8)
BASOPHILS # BLD: 0.06 K/UL (ref 0–0.12)
BILIRUB SERPL-MCNC: 0.4 MG/DL (ref 0.1–1.5)
BUN SERPL-MCNC: 17 MG/DL (ref 8–22)
CALCIUM SERPL-MCNC: 9.6 MG/DL (ref 8.5–10.5)
CHLORIDE SERPL-SCNC: 103 MMOL/L (ref 96–112)
CO2 SERPL-SCNC: 27 MMOL/L (ref 20–33)
CREAT SERPL-MCNC: 0.79 MG/DL (ref 0.5–1.4)
EOSINOPHIL # BLD AUTO: 0.23 K/UL (ref 0–0.51)
EOSINOPHIL NFR BLD: 4.3 % (ref 0–6.9)
ERYTHROCYTE [DISTWIDTH] IN BLOOD BY AUTOMATED COUNT: 42 FL (ref 35.9–50)
GLOBULIN SER CALC-MCNC: 2.3 G/DL (ref 1.9–3.5)
GLUCOSE SERPL-MCNC: 98 MG/DL (ref 65–99)
HAV IGM SERPL QL IA: NORMAL
HBV CORE IGM SER QL: NORMAL
HBV SURFACE AG SER QL: NORMAL
HCT VFR BLD AUTO: 48.7 % (ref 42–52)
HCV AB SER QL: NORMAL
HGB BLD-MCNC: 16.5 G/DL (ref 14–18)
IMM GRANULOCYTES # BLD AUTO: 0.01 K/UL (ref 0–0.11)
IMM GRANULOCYTES NFR BLD AUTO: 0.2 % (ref 0–0.9)
LYMPHOCYTES # BLD AUTO: 1.55 K/UL (ref 1–4.8)
LYMPHOCYTES NFR BLD: 28.8 % (ref 22–41)
MCH RBC QN AUTO: 30.6 PG (ref 27–33)
MCHC RBC AUTO-ENTMCNC: 33.9 G/DL (ref 33.7–35.3)
MCV RBC AUTO: 90.4 FL (ref 81.4–97.8)
MONOCYTES # BLD AUTO: 0.47 K/UL (ref 0–0.85)
MONOCYTES NFR BLD AUTO: 8.7 % (ref 0–13.4)
NEUTROPHILS # BLD AUTO: 3.06 K/UL (ref 1.82–7.42)
NEUTROPHILS NFR BLD: 56.9 % (ref 44–72)
NRBC # BLD AUTO: 0 K/UL
NRBC BLD-RTO: 0 /100 WBC
PLATELET # BLD AUTO: 297 K/UL (ref 164–446)
PMV BLD AUTO: 9 FL (ref 9–12.9)
POTASSIUM SERPL-SCNC: 4.2 MMOL/L (ref 3.6–5.5)
PROT SERPL-MCNC: 7.1 G/DL (ref 6–8.2)
RBC # BLD AUTO: 5.39 M/UL (ref 4.7–6.1)
SODIUM SERPL-SCNC: 142 MMOL/L (ref 135–145)
WBC # BLD AUTO: 5.4 K/UL (ref 4.8–10.8)

## 2020-08-13 PROCEDURE — 80053 COMPREHEN METABOLIC PANEL: CPT

## 2020-08-13 PROCEDURE — 85025 COMPLETE CBC W/AUTO DIFF WBC: CPT

## 2020-08-13 PROCEDURE — 99215 OFFICE O/P EST HI 40 MIN: CPT | Performed by: REGISTERED NURSE

## 2020-08-13 PROCEDURE — 80074 ACUTE HEPATITIS PANEL: CPT

## 2020-08-13 PROCEDURE — 86480 TB TEST CELL IMMUN MEASURE: CPT

## 2020-08-13 PROCEDURE — 36415 COLL VENOUS BLD VENIPUNCTURE: CPT

## 2020-08-13 RX ORDER — MULTIVIT WITH MINERALS/LUTEIN
1000 TABLET ORAL DAILY
COMMUNITY
End: 2021-01-01

## 2020-08-13 ASSESSMENT — FIBROSIS 4 INDEX: FIB4 SCORE: 0.57

## 2020-08-13 NOTE — PROGRESS NOTES
"Chief Complaint   Patient presents with   • Follow-Up     MS        Problem List Items Addressed This Visit     MS (multiple sclerosis) ([facility])     No MS meds x 1 year.  Speech worse, upper strength worse, difficult to cough,         Relevant Orders    CBC WITH DIFFERENTIAL    Comp Metabolic Panel    Quantiferon Gold Plus TB (4 tube)    HEPATITIS PANEL ACUTE(4 COMPONENTS)          History of present illness:  Jerzy Juan III 40 y.o. male presents today for follow up MS. He is here with his mother and is wheelchair bound.   Patient is currently in a SNF and on no treatment at this time.  Previously on OCREVUS but was stopped ~ a year ago. Here to re-initiate DMT. PMH includes hx of C-diff, recurrent UTI's, has chronic indwelling catheter for which needs exchanges q 2-3 days.  He does have a baclofen pump.    MS HISTORY  First Diagnosed: 2013  Presenting Symptoms:  BLE weakness  Disease Modifying Agents in past:  Tysabri, tecfidera, AUbagio Ocrevus     Current Disease Modifying Agent: To be discussed today        Prior brain imaging: Yes    Currently employed: Yes    Past medical history:   Past Medical History:   Diagnosis Date   • Anxiety    • Back pain    • Blood transfusion    • Bowel habit changes     constipation   • Breath shortness     \"r/t to medication Ocrevus and Baclofen\" \"side effect   • Dental disorder     full denture   • Depression     PTSD/clinical depression   • Fall     4/6/2016   • Headache(784.0)    • Heart burn    • Heat intolerance     will vomit   • Hypertension    • Indigestion    • MS (multiple sclerosis) ([facility]) DX 8/20/2013    multiple lesions brain/spine   • Pain 06/20/2018    \"everywhere\"   • Presence of intrathecal baclofen pump     right side   • Urinary bladder disorder     UTI/catheter/bladder stone   • Urinary incontinence     catheter in place       Past surgical history:   Past Surgical History:   Procedure Laterality Date   • PUMP INSERT/REMOVE  6/26/2018    Procedure: PUMP " INSERT/REMOVE - BACLOFEN;  Surgeon: Andrew Gomez M.D.;  Location: SURGERY [facility];  Service: Pain Management   • CYSTOSCOPY  2018    Procedure: CYSTOSCOPY;  Surgeon: Kwasi Sapp M.D.;  Location: SURGERY [facility];  Service: Urology   • LASERTRIPSY  2018    Procedure: LASERTRIPSY - FOR: LITHOLAPAXY;  Surgeon: Kwasi Sapp M.D.;  Location: SURGERY [facility];  Service: Urology   • BLOCK EPIDURAL STEROID INJECTION N/A 3/27/2018    Procedure: BLOCK EPIDURAL STEROID INJECTION - BACLOFEN PUMP TRIAL;  Surgeon: Andrew Gomez M.D.;  Location: SURGERY [facility];  Service: Pain Management   • GASTROSCOPY  2017    Procedure: GASTROSCOPY WITH FECAL MATTER TRANSPLANT;  Surgeon: Obinna Douglas M.D.;  Location: SURGERY [facility];  Service:    • OPEN REDUCTION Left 1988    left thumb       Family history:   Family History   Problem Relation Age of Onset   • Thyroid Mother    • Hypertension Mother    • Hypertension Father    • Psychiatric Illness Father        Social history:   Social History     Socioeconomic History   • Marital status:      Spouse name: Not on file   • Number of children: Not on file   • Years of education: Not on file   • Highest education level: Not on file   Occupational History   • Not on file   Social Needs   • Financial resource strain: Not on file   • Food insecurity     Worry: Not on file     Inability: Not on file   • Transportation needs     Medical: Not on file     Non-medical: Not on file   Tobacco Use   • Smoking status: Former Smoker     Packs/day: 1.00     Years: 20.00     Pack years: 20.00     Start date: 1997     Quit date: 2013     Years since quittin.0   • Smokeless tobacco: Never Used   Substance and Sexual Activity   • Alcohol use: Yes     Alcohol/week: 0.0 oz     Frequency: Never   • Drug use: Not Currently     Types: Inhaled     Comment: stopped 2019   • Sexual activity: Not on file     Comment: once daily  for 23 yrs   Lifestyle   • Physical activity     Days per week: Not on file     Minutes per session: Not on file   • Stress: Not on file   Relationships   • Social connections     Talks on phone: Not on file     Gets together: Not on file     Attends Evangelical service: Not on file     Active member of club or organization: Not on file     Attends meetings of clubs or organizations: Not on file     Relationship status: Not on file   • Intimate partner violence     Fear of current or ex partner: Not on file     Emotionally abused: Not on file     Physically abused: Not on file     Forced sexual activity: Not on file   Other Topics Concern   • Not on file   Social History Narrative   • Not on file       Current medications:   Current Outpatient Medications   Medication   • Ascorbic Acid (VITAMIN C) 1000 MG Tab   • escitalopram (LEXAPRO) 10 MG Tab   • lidocaine (LIDODERM) 5 % Patch   • methenamine hip (HIPPREX) 1 GM Tab   • oxybutynin (DITROPAN) 5 MG Tab   • Acetaminophen (TYLENOL) 325 MG Cap   • ondansetron (ZOFRAN ODT) 4 MG TABLET DISPERSIBLE   • vitamin D (CHOLECALCIFEROL) 1000 UNIT Tab   • buPROPion SR (WELLBUTRIN-SR) 100 MG TABLET SR 12 HR   • Lactobacillus (ULTIMATE PROBIOTIC FORMULA) Cap   • NON SPECIFIED     No current facility-administered medications for this visit.        Medication Allergy:  Allergies   Allergen Reactions   • Asa [Aspirin] Unspecified     Bleeding in stomach as a baby   • Gabapentin Anxiety     anger and severe mood swing   • Methocarbamol      emesis   • Pregabalin Anxiety     Anger and severe mood swing       Review of systems:   ROS:   Constitutional: No fevers or chills.  Eyes: + blurry vision, difficult to watch TV, or to focus  ENT: No dysphagia or hearing loss.  Respiratory: No cough or shortness of breath.  Cardiovascular: No chest pain or palpitations.  GI: No nausea, vomiting, or diarrhea.  : chronic indwelling catheter with sediment noted in tubing  Musculoskeletal: No joint  "swelling or arthralgias.  Skin: No skin rashes. Multiple tattoos throughout  Neuro:  + dysarthria ,No headaches, dizziness, or tremors.  Endocrine: No heat or cold intolerance. No polydipsia or polyuria.  Psych:  On wellbutrin, and lexapro  Heme/Lymph: No easy bruising or swollen lymph nodes.  All other review of systems were reviewed and were negative    Physical examination:   Vitals:    08/13/20 1017   BP: 106/64   BP Location: Left arm   Patient Position: Sitting   BP Cuff Size: Adult   Pulse: 78   Temp: 37.3 °C (99.2 °F)   TempSrc: Temporal   SpO2: 96%   Weight: 68 kg (150 lb)   Height: 1.803 m (5' 11\")     General: Patient in no acute distress, pleasant and cooperative.  HEENT: Normocephalic, no signs of acute trauma.   Neck: supple, no meningeal signs or carotid bruits. There is normal range of motion. No tenderness on exam.   Chest: clear to auscultation. No cough.   CV: RRR, no murmurs.   Abdomen: soft, non distended or tender.   Skin: no signs of acute rashes or trauma.   Musculoskeletal:  No purposeful movement from waist down.  Wheelchair bound.  Needs transfer via talia lift out of bed.   Psychiatric: No hallucinatory behavior. Denies symptoms of depression or suicidal ideation. Mood and affect appear normal on exam.     NEUROLOGY EXAM  Eyes: Sclera anicteric.  Neurologic:              Mental Status: Awake, alert, oriented x 3.              Speech: Fluent with normal prosody.              Memory: Able to recall recent and remote events accurately.               Concentration: Attentive. Able to focus on history and follow multi-step commands.              Fund of Knowledge: Appropriate.              Cranial Nerves:                          CN II: PERRL. No afferent pupillary defect.                          CN III, IV, VI: Good eye closure, SAMI                           CN V: Facial sensation intact and symmetric.                           CN VII: No facial asymmetry.                           CN VIII: " Hearing intact.                           CN IX and X: Palate elevates symmetrically. Normal gag reflex.                          CN XI: Symmetric shoulder shrug.                           CN XII: Tongue midline.               Sensory: + sensation to pin prick bilateral side from waist up, decreased sensation to BLE's.              Romberg: Negative.              Movements: fine tremors noted when holding both arms upward  Musculoskeletal:               Strength:3 /5 in upper and  0/5 lower extremities bilaterally.              Gait:  Wheelchair bound               Tone:  decreased muscle mass/atrophy to lower extremities, able to lift hips off wheelchair with bilateral arms                     Joints: No swelling.    ANCILLARY DATA REVIEWED:      Lab Data Review:  Results for JAYNA MOODY III (MRN [id number]) as of 8/13/2020 15:17   Ref. Range 2/2/2019 12:51 7/18/2019 12:27   WBC Latest Ref Range: 4.8 - 10.8 K/uL 7.7    RBC Latest Ref Range: 4.70 - 6.10 M/uL 5.54    Hemoglobin Latest Ref Range: 14.0 - 18.0 g/dL 17.1    Hematocrit Latest Ref Range: 42.0 - 52.0 % 49.8    MCV Latest Ref Range: 81.4 - 97.8 fL 89.9    MCH Latest Ref Range: 27.0 - 33.0 pg 30.9    MCHC Latest Ref Range: 33.7 - 35.3 g/dL 34.3    RDW Latest Ref Range: 35.9 - 50.0 fL 39.6    Platelet Count Latest Ref Range: 164 - 446 K/uL 304 285   MPV Latest Ref Range: 9.0 - 12.9 fL 8.5 (L)    Sodium Latest Ref Range: 135 - 145 mmol/L 140    Potassium Latest Ref Range: 3.6 - 5.5 mmol/L 3.9    Chloride Latest Ref Range: 96 - 112 mmol/L 107    Co2 Latest Ref Range: 20 - 33 mmol/L 26    Anion Gap Latest Ref Range: 0.0 - 11.9  7.0    Glucose Latest Ref Range: 65 - 99 mg/dL 122 (H)    Bun Latest Ref Range: 8 - 22 mg/dL 7 (L)    Creatinine Latest Ref Range: 0.50 - 1.40 mg/dL 0.74    GFR If  Latest Ref Range: >60 mL/min/1.73 m 2 >60    GFR If Non  Latest Ref Range: >60 mL/min/1.73 m 2 >60    Calcium Latest Ref Range: 8.5 -  10.5 mg/dL 9.4    Magnesium Latest Ref Range: 1.5 - 2.5 mg/dL 2.2    INR Latest Ref Range: 0.87 - 1.13   1.00   PT Latest Ref Range: 12.0 - 14.6 sec  13.4   Results for JAYNA MOODY III (MRN [id number]) as of 8/13/2020 15:17   Ref. Range 1/31/2019 03:01 2/2/2019 12:51   Sodium Latest Ref Range: 135 - 145 mmol/L 142 140   Potassium Latest Ref Range: 3.6 - 5.5 mmol/L 3.6 3.9   Chloride Latest Ref Range: 96 - 112 mmol/L 111 107   Co2 Latest Ref Range: 20 - 33 mmol/L 25 26   Anion Gap Latest Ref Range: 0.0 - 11.9  6.0 7.0   Glucose Latest Ref Range: 65 - 99 mg/dL 98 122 (H)   Bun Latest Ref Range: 8 - 22 mg/dL 8 7 (L)   Creatinine Latest Ref Range: 0.50 - 1.40 mg/dL 0.71 0.74   GFR If  Latest Ref Range: >60 mL/min/1.73 m 2 >60 >60   GFR If Non  Latest Ref Range: >60 mL/min/1.73 m 2 >60 >60   Calcium Latest Ref Range: 8.5 - 10.5 mg/dL 8.3 (L) 9.4   Magnesium Latest Ref Range: 1.5 - 2.5 mg/dL  2.2   Results for JAYNA MOODY III (MRN [id number]) as of 8/13/2020 15:17   Ref. Range 1/23/2019 06:46   Immunoglobulin A Latest Ref Range: 68 - 408 mg/dL 98   Immunoglobulin G Latest Ref Range: 768 - 1632 mg/dL 805   Immunoglobulin M Latest Ref Range: 35 - 263 mg/dL 24 (L)   Hepatitis B Core Ab, Total Latest Ref Range: Negative  Negative   Hepatitis C Antibody Latest Ref Range: Negative  Negative       Imaging Reviewed:  Yes     ASSESSMENT AND PLAN:  Jayna Moody III is a 40 year old male with MS who is now wheelchair dependent and has been off DMT for > than a year.  Will start Mavenclad if labs are within normal.    Pt and his mother are in agreement that they want to try Mavenclad and risks and benefits discussed in detail.      1. MS (multiple sclerosis) ([facility])    - CBC WITH DIFFERENTIAL; Future  - Comp Metabolic Panel; Future  - Quantiferon Gold Plus TB (4 tube); Standing  - HEPATITIS PANEL ACUTE(4 COMPONENTS); Future  - Quantiferon Gold Plus TB (4 tube)    Paperwork filled  out for his SNF and PT recommended to prevent decreased flexibilty.    FOLLOW-UP:   Three months    EDUCATION AND COUNSELING:  The patient was educated on diagnosis and prognosis. I re-interated that MS is a chronic progressive disorder for which there is currently no cure. Despite best efforts in management, the condition is likely to progress and carries significant risk for disability including physical and cognitive.     Effectiveness, indications,side effects, and safety profile of available Disease Modifying Agents (DMA’s) reviewed.     Discussed importance of regular exercise program.  I stressed the importance of sleep hygiene.    The patient will require frequent follow up including:  Laboratory monitoring -Imaging of entire neuro-axis (brain and spinal cord) with MRI’s w/wo contrast, every year and prn   Patient/family counseled on medication compliance.     The patient understands and agrees that due to the complexity of his/her diagnosis, results of any testing and further recommendations will typically be discussed/made during a face to face encounter in my office. The patient and/or family further understands it is their responsibility to keep proper follow up.     Patient was seen for 60 minutes face to face of which > 50% of appointment time was spent on counseling and coordination of care regarding the above.    I Vane MEDINA am scribing for and in the presence of Dr. Bloch.        Vane Larkin NP-MARA  Neurology      I, Dr. Bloch personally performed the services described in this documentation, as scribed by Vane Larkin in my presence, and it is both accurate and complete. I spent  60 minutes with this patient, over fifty percent was spent counseling patient on their condition, best management practices, reviewing test results and risks and benefits of treatment.

## 2020-08-14 LAB
GAMMA INTERFERON BACKGROUND BLD IA-ACNC: 0.06 IU/ML
M TB IFN-G BLD-IMP: NEGATIVE
M TB IFN-G CD4+ BCKGRND COR BLD-ACNC: -0.01 IU/ML
MITOGEN IGNF BCKGRD COR BLD-ACNC: >10 IU/ML
QFT TB2 - NIL TBQ2: 0.04 IU/ML

## 2020-08-24 ENCOUNTER — TELEPHONE (OUTPATIENT)
Dept: NEUROLOGY | Facility: MEDICAL CENTER | Age: 40
End: 2020-08-24

## 2020-08-24 NOTE — TELEPHONE ENCOUNTER
[employer] [facility] called to clarify script Mavenclad for pt.  Phone [phone]  Fax [phone]  Called back spoke with Sabi verbally gave [facility]avenclad Rx with directions. They will deliver to pt address on form.

## 2020-12-05 ENCOUNTER — HOSPITAL ENCOUNTER (OUTPATIENT)
Facility: MEDICAL CENTER | Age: 40
End: 2020-12-05
Payer: COMMERCIAL

## 2020-12-06 LAB
COVID ORDER STATUS COVID19: NORMAL
SARS-COV-2 RNA RESP QL NAA+PROBE: NOTDETECTED
SPECIMEN SOURCE: NORMAL

## 2021-01-01 ENCOUNTER — HOME CARE VISIT (OUTPATIENT)
Dept: HOSPICE | Facility: HOSPICE | Age: 41
End: 2021-01-01
Payer: MEDICARE

## 2021-01-01 ENCOUNTER — APPOINTMENT (OUTPATIENT)
Dept: RADIOLOGY | Facility: MEDICAL CENTER | Age: 41
DRG: 853 | End: 2021-01-01
Attending: STUDENT IN AN ORGANIZED HEALTH CARE EDUCATION/TRAINING PROGRAM
Payer: MEDICARE

## 2021-01-01 ENCOUNTER — ANESTHESIA EVENT (OUTPATIENT)
Dept: SURGERY | Facility: MEDICAL CENTER | Age: 41
DRG: 853 | End: 2021-01-01
Payer: MEDICARE

## 2021-01-01 ENCOUNTER — PATIENT OUTREACH (OUTPATIENT)
Dept: HEALTH INFORMATION MANAGEMENT | Facility: OTHER | Age: 41
End: 2021-01-01

## 2021-01-01 ENCOUNTER — APPOINTMENT (OUTPATIENT)
Dept: RADIOLOGY | Facility: MEDICAL CENTER | Age: 41
DRG: 699 | End: 2021-01-01
Attending: FAMILY MEDICINE
Payer: MEDICARE

## 2021-01-01 ENCOUNTER — ANESTHESIA EVENT (OUTPATIENT)
Dept: SURGERY | Facility: MEDICAL CENTER | Age: 41
DRG: 699 | End: 2021-01-01
Payer: MEDICARE

## 2021-01-01 ENCOUNTER — APPOINTMENT (OUTPATIENT)
Dept: RADIOLOGY | Facility: MEDICAL CENTER | Age: 41
DRG: 853 | End: 2021-01-01
Attending: NURSE PRACTITIONER
Payer: MEDICARE

## 2021-01-01 ENCOUNTER — HOSPITAL ENCOUNTER (INPATIENT)
Facility: MEDICAL CENTER | Age: 41
LOS: 6 days | DRG: 853 | End: 2021-12-31
Attending: EMERGENCY MEDICINE | Admitting: STUDENT IN AN ORGANIZED HEALTH CARE EDUCATION/TRAINING PROGRAM
Payer: MEDICARE

## 2021-01-01 ENCOUNTER — APPOINTMENT (OUTPATIENT)
Dept: RADIOLOGY | Facility: MEDICAL CENTER | Age: 41
DRG: 872 | End: 2021-01-01
Attending: EMERGENCY MEDICINE
Payer: MEDICARE

## 2021-01-01 ENCOUNTER — APPOINTMENT (OUTPATIENT)
Dept: RADIOLOGY | Facility: MEDICAL CENTER | Age: 41
DRG: 853 | End: 2021-01-01
Attending: EMERGENCY MEDICINE
Payer: MEDICARE

## 2021-01-01 ENCOUNTER — ANESTHESIA EVENT (OUTPATIENT)
Dept: SURGERY | Facility: MEDICAL CENTER | Age: 41
DRG: 872 | End: 2021-01-01
Payer: MEDICARE

## 2021-01-01 ENCOUNTER — APPOINTMENT (OUTPATIENT)
Dept: RADIOLOGY | Facility: MEDICAL CENTER | Age: 41
DRG: 853 | End: 2021-01-01
Attending: FAMILY MEDICINE
Payer: MEDICARE

## 2021-01-01 ENCOUNTER — PATIENT MESSAGE (OUTPATIENT)
Dept: HEALTH INFORMATION MANAGEMENT | Facility: OTHER | Age: 41
End: 2021-01-01

## 2021-01-01 ENCOUNTER — DOCUMENTATION (OUTPATIENT)
Dept: NEUROLOGY | Facility: MEDICAL CENTER | Age: 41
End: 2021-01-01

## 2021-01-01 ENCOUNTER — HOSPITAL ENCOUNTER (INPATIENT)
Facility: MEDICAL CENTER | Age: 41
LOS: 6 days | DRG: 699 | End: 2021-10-30
Attending: EMERGENCY MEDICINE | Admitting: INTERNAL MEDICINE
Payer: MEDICARE

## 2021-01-01 ENCOUNTER — HOSPITAL ENCOUNTER (INPATIENT)
Facility: MEDICAL CENTER | Age: 41
LOS: 1 days | DRG: 951 | End: 2021-12-31
Attending: INTERNAL MEDICINE | Admitting: INTERNAL MEDICINE
Payer: COMMERCIAL

## 2021-01-01 ENCOUNTER — HOSPITAL ENCOUNTER (OUTPATIENT)
Facility: MEDICAL CENTER | Age: 41
End: 2021-01-01
Attending: UROLOGY | Admitting: UROLOGY
Payer: MEDICARE

## 2021-01-01 ENCOUNTER — OFFICE VISIT (OUTPATIENT)
Dept: NEUROLOGY | Facility: MEDICAL CENTER | Age: 41
End: 2021-01-01
Attending: PSYCHIATRY & NEUROLOGY
Payer: MEDICARE

## 2021-01-01 ENCOUNTER — ANESTHESIA (OUTPATIENT)
Dept: SURGERY | Facility: MEDICAL CENTER | Age: 41
DRG: 853 | End: 2021-01-01
Payer: MEDICARE

## 2021-01-01 ENCOUNTER — TELEPHONE (OUTPATIENT)
Dept: NEUROLOGY | Facility: MEDICAL CENTER | Age: 41
End: 2021-01-01

## 2021-01-01 ENCOUNTER — PRE-ADMISSION TESTING (OUTPATIENT)
Dept: ADMISSIONS | Facility: MEDICAL CENTER | Age: 41
End: 2021-01-01
Attending: UROLOGY
Payer: MEDICARE

## 2021-01-01 ENCOUNTER — HOSPICE ADMISSION (OUTPATIENT)
Dept: HOSPICE | Facility: HOSPICE | Age: 41
End: 2021-01-01
Payer: MEDICARE

## 2021-01-01 ENCOUNTER — ANESTHESIA (OUTPATIENT)
Dept: SURGERY | Facility: MEDICAL CENTER | Age: 41
DRG: 699 | End: 2021-01-01
Payer: MEDICARE

## 2021-01-01 ENCOUNTER — APPOINTMENT (OUTPATIENT)
Dept: RADIOLOGY | Facility: MEDICAL CENTER | Age: 41
DRG: 699 | End: 2021-01-01
Attending: UROLOGY
Payer: MEDICARE

## 2021-01-01 ENCOUNTER — APPOINTMENT (OUTPATIENT)
Dept: RADIOLOGY | Facility: MEDICAL CENTER | Age: 41
DRG: 853 | End: 2021-01-01
Attending: HOSPITALIST
Payer: MEDICARE

## 2021-01-01 ENCOUNTER — OFFICE VISIT (OUTPATIENT)
Dept: NEUROLOGY | Facility: MEDICAL CENTER | Age: 41
End: 2021-01-01
Attending: REGISTERED NURSE
Payer: MEDICARE

## 2021-01-01 ENCOUNTER — APPOINTMENT (OUTPATIENT)
Dept: RADIOLOGY | Facility: MEDICAL CENTER | Age: 41
DRG: 699 | End: 2021-01-01
Attending: EMERGENCY MEDICINE
Payer: MEDICARE

## 2021-01-01 ENCOUNTER — ANESTHESIA (OUTPATIENT)
Dept: SURGERY | Facility: MEDICAL CENTER | Age: 41
DRG: 872 | End: 2021-01-01
Payer: MEDICARE

## 2021-01-01 ENCOUNTER — HOSPITAL ENCOUNTER (INPATIENT)
Facility: MEDICAL CENTER | Age: 41
LOS: 3 days | DRG: 872 | End: 2021-11-22
Attending: EMERGENCY MEDICINE | Admitting: INTERNAL MEDICINE
Payer: MEDICARE

## 2021-01-01 VITALS
DIASTOLIC BLOOD PRESSURE: 61 MMHG | HEART RATE: 95 BPM | WEIGHT: 127 LBS | BODY MASS INDEX: 17.78 KG/M2 | TEMPERATURE: 97.8 F | RESPIRATION RATE: 14 BRPM | SYSTOLIC BLOOD PRESSURE: 92 MMHG | OXYGEN SATURATION: 95 % | HEIGHT: 71 IN

## 2021-01-01 VITALS — WEIGHT: 133.82 LBS | BODY MASS INDEX: 18.66 KG/M2

## 2021-01-01 VITALS
RESPIRATION RATE: 18 BRPM | TEMPERATURE: 98.3 F | WEIGHT: 121.25 LBS | HEART RATE: 98 BPM | SYSTOLIC BLOOD PRESSURE: 139 MMHG | DIASTOLIC BLOOD PRESSURE: 92 MMHG | BODY MASS INDEX: 16.91 KG/M2 | OXYGEN SATURATION: 95 %

## 2021-01-01 VITALS
WEIGHT: 125 LBS | TEMPERATURE: 99.8 F | HEART RATE: 123 BPM | SYSTOLIC BLOOD PRESSURE: 120 MMHG | DIASTOLIC BLOOD PRESSURE: 78 MMHG | BODY MASS INDEX: 17.5 KG/M2 | HEIGHT: 71 IN | OXYGEN SATURATION: 98 %

## 2021-01-01 VITALS
BODY MASS INDEX: 18.73 KG/M2 | HEART RATE: 135 BPM | TEMPERATURE: 99.1 F | DIASTOLIC BLOOD PRESSURE: 100 MMHG | RESPIRATION RATE: 22 BRPM | HEIGHT: 71 IN | SYSTOLIC BLOOD PRESSURE: 144 MMHG | WEIGHT: 133.82 LBS | OXYGEN SATURATION: 97 %

## 2021-01-01 VITALS
BODY MASS INDEX: 20.92 KG/M2 | SYSTOLIC BLOOD PRESSURE: 118 MMHG | DIASTOLIC BLOOD PRESSURE: 74 MMHG | HEART RATE: 126 BPM | TEMPERATURE: 98.7 F | HEIGHT: 71 IN | OXYGEN SATURATION: 96 %

## 2021-01-01 VITALS — RESPIRATION RATE: 14 BRPM

## 2021-01-01 DIAGNOSIS — E87.1 HYPONATREMIA: ICD-10-CM

## 2021-01-01 DIAGNOSIS — N39.0 UTI DUE TO EXTENDED-SPECTRUM BETA LACTAMASE (ESBL) PRODUCING ESCHERICHIA COLI: ICD-10-CM

## 2021-01-01 DIAGNOSIS — E55.9 VITAMIN D DEFICIENCY: ICD-10-CM

## 2021-01-01 DIAGNOSIS — F33.2 SEVERE EPISODE OF RECURRENT MAJOR DEPRESSIVE DISORDER, WITHOUT PSYCHOTIC FEATURES (HCC): ICD-10-CM

## 2021-01-01 DIAGNOSIS — K52.9 ACUTE COLITIS: ICD-10-CM

## 2021-01-01 DIAGNOSIS — R68.89: ICD-10-CM

## 2021-01-01 DIAGNOSIS — A04.71 RECURRENT CLOSTRIDIUM DIFFICILE DIARRHEA: ICD-10-CM

## 2021-01-01 DIAGNOSIS — R19.7 DIARRHEA, UNSPECIFIED TYPE: ICD-10-CM

## 2021-01-01 DIAGNOSIS — R50.9 FEVER, UNSPECIFIED FEVER CAUSE: ICD-10-CM

## 2021-01-01 DIAGNOSIS — I10 ESSENTIAL HYPERTENSION: ICD-10-CM

## 2021-01-01 DIAGNOSIS — G35 MULTIPLE SCLEROSIS (HCC): ICD-10-CM

## 2021-01-01 DIAGNOSIS — D72.829 LEUKOCYTOSIS, UNSPECIFIED TYPE: ICD-10-CM

## 2021-01-01 DIAGNOSIS — E83.42 HYPOMAGNESEMIA: ICD-10-CM

## 2021-01-01 DIAGNOSIS — Z87.440 HISTORY OF URINARY TRACT INFECTION: ICD-10-CM

## 2021-01-01 DIAGNOSIS — N30.01 ACUTE CYSTITIS WITH HEMATURIA: ICD-10-CM

## 2021-01-01 DIAGNOSIS — M62.838 MUSCLE SPASM: ICD-10-CM

## 2021-01-01 DIAGNOSIS — J96.01 ACUTE RESPIRATORY FAILURE WITH HYPOXIA (HCC): ICD-10-CM

## 2021-01-01 DIAGNOSIS — F32.A DEPRESSIVE DISORDER: ICD-10-CM

## 2021-01-01 DIAGNOSIS — R31.0 GROSS HEMATURIA: ICD-10-CM

## 2021-01-01 DIAGNOSIS — T83.010A SUPRAPUBIC CATHETER DYSFUNCTION, INITIAL ENCOUNTER (HCC): ICD-10-CM

## 2021-01-01 DIAGNOSIS — Z16.12 UTI DUE TO EXTENDED-SPECTRUM BETA LACTAMASE (ESBL) PRODUCING ESCHERICHIA COLI: ICD-10-CM

## 2021-01-01 DIAGNOSIS — R25.2 SPASTICITY: ICD-10-CM

## 2021-01-01 DIAGNOSIS — E87.6 HYPOKALEMIA: ICD-10-CM

## 2021-01-01 DIAGNOSIS — N39.0 URINARY TRACT INFECTION WITH HEMATURIA, SITE UNSPECIFIED: ICD-10-CM

## 2021-01-01 DIAGNOSIS — R31.9 URINARY TRACT INFECTION WITH HEMATURIA, SITE UNSPECIFIED: ICD-10-CM

## 2021-01-01 DIAGNOSIS — A04.72 C. DIFFICILE DIARRHEA: ICD-10-CM

## 2021-01-01 DIAGNOSIS — M19.012 PRIMARY OSTEOARTHRITIS OF LEFT SHOULDER: ICD-10-CM

## 2021-01-01 DIAGNOSIS — Z71.89 ADVANCE CARE PLANNING: ICD-10-CM

## 2021-01-01 DIAGNOSIS — Z12.5 ENCOUNTER FOR SCREENING FOR MALIGNANT NEOPLASM OF PROSTATE: ICD-10-CM

## 2021-01-01 DIAGNOSIS — F33.40 RECURRENT MAJOR DEPRESSIVE DISORDER, IN REMISSION (HCC): ICD-10-CM

## 2021-01-01 DIAGNOSIS — F41.9 ANXIETY AND DEPRESSION: ICD-10-CM

## 2021-01-01 DIAGNOSIS — J31.2 CHRONIC SORE THROAT: ICD-10-CM

## 2021-01-01 DIAGNOSIS — Z87.898 HISTORY OF SELF-CARE DEFICIT: ICD-10-CM

## 2021-01-01 DIAGNOSIS — F32.A ANXIETY AND DEPRESSION: ICD-10-CM

## 2021-01-01 DIAGNOSIS — A04.72 C. DIFFICILE COLITIS: ICD-10-CM

## 2021-01-01 DIAGNOSIS — Z86.19 H/O CLOSTRIDIUM DIFFICILE INFECTION: ICD-10-CM

## 2021-01-01 DIAGNOSIS — G82.22 CHRONIC INCOMPLETE SPASTIC PARAPLEGIA (HCC): ICD-10-CM

## 2021-01-01 DIAGNOSIS — Z51.81 THERAPEUTIC DRUG MONITORING: ICD-10-CM

## 2021-01-01 DIAGNOSIS — F41.9 ANXIETY: ICD-10-CM

## 2021-01-01 DIAGNOSIS — R03.0 FINDING OF ABOVE NORMAL BLOOD PRESSURE: ICD-10-CM

## 2021-01-01 DIAGNOSIS — K56.41 FECAL IMPACTION (HCC): ICD-10-CM

## 2021-01-01 DIAGNOSIS — R78.81 POSITIVE BLOOD CULTURES: ICD-10-CM

## 2021-01-01 DIAGNOSIS — B96.29 UTI DUE TO EXTENDED-SPECTRUM BETA LACTAMASE (ESBL) PRODUCING ESCHERICHIA COLI: ICD-10-CM

## 2021-01-01 DIAGNOSIS — A41.9 SEPSIS WITHOUT ACUTE ORGAN DYSFUNCTION, DUE TO UNSPECIFIED ORGANISM (HCC): ICD-10-CM

## 2021-01-01 DIAGNOSIS — R53.1 WEAKNESS: ICD-10-CM

## 2021-01-01 DIAGNOSIS — N31.9 NEUROGENIC BLADDER: ICD-10-CM

## 2021-01-01 DIAGNOSIS — Z87.440 HISTORY OF RECURRENT UTIS: ICD-10-CM

## 2021-01-01 DIAGNOSIS — D64.9 NORMOCYTIC ANEMIA: ICD-10-CM

## 2021-01-01 DIAGNOSIS — Z91.89 AT RISK FOR CLOSTRIDIUM DIFFICILE INFECTION: ICD-10-CM

## 2021-01-01 DIAGNOSIS — D64.9 ANEMIA, UNSPECIFIED TYPE: ICD-10-CM

## 2021-01-01 DIAGNOSIS — N39.0 ACUTE UTI: ICD-10-CM

## 2021-01-01 DIAGNOSIS — R19.7 DIARRHEA OF PRESUMED INFECTIOUS ORIGIN: ICD-10-CM

## 2021-01-01 DIAGNOSIS — N31.9 NEUROGENIC DYSFUNCTION OF THE URINARY BLADDER: ICD-10-CM

## 2021-01-01 LAB
ABO GROUP BLD: NORMAL
ALBUMIN SERPL BCP-MCNC: 2 G/DL (ref 3.2–4.9)
ALBUMIN SERPL BCP-MCNC: 2.1 G/DL (ref 3.2–4.9)
ALBUMIN SERPL BCP-MCNC: 2.2 G/DL (ref 3.2–4.9)
ALBUMIN SERPL BCP-MCNC: 2.4 G/DL (ref 3.2–4.9)
ALBUMIN SERPL BCP-MCNC: 2.4 G/DL (ref 3.2–4.9)
ALBUMIN SERPL BCP-MCNC: 2.5 G/DL (ref 3.2–4.9)
ALBUMIN SERPL BCP-MCNC: 2.6 G/DL (ref 3.2–4.9)
ALBUMIN SERPL BCP-MCNC: 2.7 G/DL (ref 3.2–4.9)
ALBUMIN SERPL BCP-MCNC: 3.1 G/DL (ref 3.2–4.9)
ALBUMIN SERPL BCP-MCNC: 3.3 G/DL (ref 3.2–4.9)
ALBUMIN SERPL BCP-MCNC: 3.5 G/DL (ref 3.2–4.9)
ALBUMIN SERPL BCP-MCNC: 3.7 G/DL (ref 3.2–4.9)
ALBUMIN/GLOB SERPL: 0.8 G/DL
ALBUMIN/GLOB SERPL: 0.8 G/DL
ALBUMIN/GLOB SERPL: 0.9 G/DL
ALBUMIN/GLOB SERPL: 1 G/DL
ALBUMIN/GLOB SERPL: 1.1 G/DL
ALBUMIN/GLOB SERPL: 1.3 G/DL
ALP SERPL-CCNC: 114 U/L (ref 30–99)
ALP SERPL-CCNC: 136 U/L (ref 30–99)
ALP SERPL-CCNC: 161 U/L (ref 30–99)
ALP SERPL-CCNC: 69 U/L (ref 30–99)
ALP SERPL-CCNC: 79 U/L (ref 30–99)
ALP SERPL-CCNC: 79 U/L (ref 30–99)
ALP SERPL-CCNC: 80 U/L (ref 30–99)
ALP SERPL-CCNC: 84 U/L (ref 30–99)
ALP SERPL-CCNC: 85 U/L (ref 30–99)
ALP SERPL-CCNC: 85 U/L (ref 30–99)
ALP SERPL-CCNC: 88 U/L (ref 30–99)
ALP SERPL-CCNC: 95 U/L (ref 30–99)
ALT SERPL-CCNC: 13 U/L (ref 2–50)
ALT SERPL-CCNC: 14 U/L (ref 2–50)
ALT SERPL-CCNC: 15 U/L (ref 2–50)
ALT SERPL-CCNC: 16 U/L (ref 2–50)
ALT SERPL-CCNC: 17 U/L (ref 2–50)
ALT SERPL-CCNC: 23 U/L (ref 2–50)
ALT SERPL-CCNC: 28 U/L (ref 2–50)
ALT SERPL-CCNC: 33 U/L (ref 2–50)
ANION GAP SERPL CALC-SCNC: 10 MMOL/L (ref 7–16)
ANION GAP SERPL CALC-SCNC: 11 MMOL/L (ref 7–16)
ANION GAP SERPL CALC-SCNC: 12 MMOL/L (ref 7–16)
ANION GAP SERPL CALC-SCNC: 13 MMOL/L (ref 7–16)
ANION GAP SERPL CALC-SCNC: 14 MMOL/L (ref 7–16)
ANION GAP SERPL CALC-SCNC: 14 MMOL/L (ref 7–16)
ANION GAP SERPL CALC-SCNC: 16 MMOL/L (ref 7–16)
ANION GAP SERPL CALC-SCNC: 7 MMOL/L (ref 7–16)
ANION GAP SERPL CALC-SCNC: 7 MMOL/L (ref 7–16)
ANION GAP SERPL CALC-SCNC: 8 MMOL/L (ref 7–16)
ANION GAP SERPL CALC-SCNC: 9 MMOL/L (ref 7–16)
APPEARANCE UR: ABNORMAL
APPEARANCE UR: ABNORMAL
APPEARANCE UR: CLEAR
APTT PPP: 41.1 SEC (ref 24.7–36)
AST SERPL-CCNC: 11 U/L (ref 12–45)
AST SERPL-CCNC: 15 U/L (ref 12–45)
AST SERPL-CCNC: 19 U/L (ref 12–45)
AST SERPL-CCNC: 20 U/L (ref 12–45)
AST SERPL-CCNC: 23 U/L (ref 12–45)
AST SERPL-CCNC: 24 U/L (ref 12–45)
AST SERPL-CCNC: 27 U/L (ref 12–45)
AST SERPL-CCNC: 27 U/L (ref 12–45)
AST SERPL-CCNC: 30 U/L (ref 12–45)
AST SERPL-CCNC: 34 U/L (ref 12–45)
AST SERPL-CCNC: 37 U/L (ref 12–45)
AST SERPL-CCNC: 45 U/L (ref 12–45)
BACTERIA #/AREA URNS HPF: ABNORMAL /HPF
BACTERIA #/AREA URNS HPF: ABNORMAL /HPF
BACTERIA #/AREA URNS HPF: NEGATIVE /HPF
BACTERIA BLD CULT: ABNORMAL
BACTERIA BLD CULT: ABNORMAL
BACTERIA BLD CULT: NORMAL
BACTERIA SPEC ANAEROBE CULT: NORMAL
BACTERIA UR CULT: ABNORMAL
BACTERIA WND AEROBE CULT: ABNORMAL
BACTERIA WND AEROBE CULT: ABNORMAL
BASE EXCESS BLDA CALC-SCNC: 4 MMOL/L (ref -4–3)
BASOPHILS # BLD AUTO: 0 % (ref 0–1.8)
BASOPHILS # BLD AUTO: 0.1 % (ref 0–1.8)
BASOPHILS # BLD AUTO: 0.3 % (ref 0–1.8)
BASOPHILS # BLD AUTO: 0.3 % (ref 0–1.8)
BASOPHILS # BLD AUTO: 0.4 % (ref 0–1.8)
BASOPHILS # BLD AUTO: 0.5 % (ref 0–1.8)
BASOPHILS # BLD AUTO: 0.7 % (ref 0–1.8)
BASOPHILS # BLD AUTO: 0.8 % (ref 0–1.8)
BASOPHILS # BLD AUTO: 0.9 % (ref 0–1.8)
BASOPHILS # BLD: 0 K/UL (ref 0–0.12)
BASOPHILS # BLD: 0.01 K/UL (ref 0–0.12)
BASOPHILS # BLD: 0.03 K/UL (ref 0–0.12)
BASOPHILS # BLD: 0.04 K/UL (ref 0–0.12)
BASOPHILS # BLD: 0.05 K/UL (ref 0–0.12)
BILIRUB SERPL-MCNC: 0.3 MG/DL (ref 0.1–1.5)
BILIRUB SERPL-MCNC: 0.4 MG/DL (ref 0.1–1.5)
BILIRUB SERPL-MCNC: 0.4 MG/DL (ref 0.1–1.5)
BILIRUB SERPL-MCNC: 0.6 MG/DL (ref 0.1–1.5)
BILIRUB SERPL-MCNC: 0.6 MG/DL (ref 0.1–1.5)
BILIRUB SERPL-MCNC: 0.7 MG/DL (ref 0.1–1.5)
BILIRUB SERPL-MCNC: 0.7 MG/DL (ref 0.1–1.5)
BILIRUB SERPL-MCNC: 0.8 MG/DL (ref 0.1–1.5)
BILIRUB UR QL STRIP.AUTO: NEGATIVE
BLD GP AB SCN SERPL QL: NORMAL
BODY TEMPERATURE: ABNORMAL CENTIGRADE
BUN SERPL-MCNC: 10 MG/DL (ref 8–22)
BUN SERPL-MCNC: 12 MG/DL (ref 8–22)
BUN SERPL-MCNC: 14 MG/DL (ref 8–22)
BUN SERPL-MCNC: 15 MG/DL (ref 8–22)
BUN SERPL-MCNC: 16 MG/DL (ref 8–22)
BUN SERPL-MCNC: 16 MG/DL (ref 8–22)
BUN SERPL-MCNC: 17 MG/DL (ref 8–22)
BUN SERPL-MCNC: 17 MG/DL (ref 8–22)
BUN SERPL-MCNC: 18 MG/DL (ref 8–22)
BUN SERPL-MCNC: 21 MG/DL (ref 8–22)
BUN SERPL-MCNC: 25 MG/DL (ref 8–22)
BUN SERPL-MCNC: 38 MG/DL (ref 8–22)
BUN SERPL-MCNC: 5 MG/DL (ref 8–22)
BUN SERPL-MCNC: 5 MG/DL (ref 8–22)
BUN SERPL-MCNC: 7 MG/DL (ref 8–22)
C DIFF DNA SPEC QL NAA+PROBE: NEGATIVE
C DIFF TOX A+B STL QL IA: POSITIVE
C DIFF TOX A+B STL QL IA: POSITIVE
C DIFF TOX GENS STL QL NAA+PROBE: NEGATIVE
C DIFF TOX GENS STL QL NAA+PROBE: NORMAL
C DIFF TOX GENS STL QL NAA+PROBE: NORMAL
CALCIUM SERPL-MCNC: 7.3 MG/DL (ref 8.4–10.2)
CALCIUM SERPL-MCNC: 7.5 MG/DL (ref 8.4–10.2)
CALCIUM SERPL-MCNC: 7.7 MG/DL (ref 8.4–10.2)
CALCIUM SERPL-MCNC: 7.8 MG/DL (ref 8.4–10.2)
CALCIUM SERPL-MCNC: 7.9 MG/DL (ref 8.4–10.2)
CALCIUM SERPL-MCNC: 7.9 MG/DL (ref 8.4–10.2)
CALCIUM SERPL-MCNC: 8 MG/DL (ref 8.4–10.2)
CALCIUM SERPL-MCNC: 8.3 MG/DL (ref 8.4–10.2)
CALCIUM SERPL-MCNC: 8.3 MG/DL (ref 8.4–10.2)
CALCIUM SERPL-MCNC: 8.4 MG/DL (ref 8.4–10.2)
CALCIUM SERPL-MCNC: 8.5 MG/DL (ref 8.4–10.2)
CALCIUM SERPL-MCNC: 8.6 MG/DL (ref 8.4–10.2)
CALCIUM SERPL-MCNC: 8.7 MG/DL (ref 8.4–10.2)
CALCIUM SERPL-MCNC: 8.9 MG/DL (ref 8.4–10.2)
CALCIUM SERPL-MCNC: 9 MG/DL (ref 8.4–10.2)
CALCIUM SERPL-MCNC: 9.2 MG/DL (ref 8.4–10.2)
CALCIUM SERPL-MCNC: 9.2 MG/DL (ref 8.4–10.2)
CHLORIDE SERPL-SCNC: 100 MMOL/L (ref 96–112)
CHLORIDE SERPL-SCNC: 100 MMOL/L (ref 96–112)
CHLORIDE SERPL-SCNC: 101 MMOL/L (ref 96–112)
CHLORIDE SERPL-SCNC: 101 MMOL/L (ref 96–112)
CHLORIDE SERPL-SCNC: 102 MMOL/L (ref 96–112)
CHLORIDE SERPL-SCNC: 103 MMOL/L (ref 96–112)
CHLORIDE SERPL-SCNC: 104 MMOL/L (ref 96–112)
CHLORIDE SERPL-SCNC: 91 MMOL/L (ref 96–112)
CHLORIDE SERPL-SCNC: 93 MMOL/L (ref 96–112)
CHLORIDE SERPL-SCNC: 98 MMOL/L (ref 96–112)
CHLORIDE SERPL-SCNC: 99 MMOL/L (ref 96–112)
CO2 SERPL-SCNC: 18 MMOL/L (ref 20–33)
CO2 SERPL-SCNC: 20 MMOL/L (ref 20–33)
CO2 SERPL-SCNC: 22 MMOL/L (ref 20–33)
CO2 SERPL-SCNC: 22 MMOL/L (ref 20–33)
CO2 SERPL-SCNC: 23 MMOL/L (ref 20–33)
CO2 SERPL-SCNC: 24 MMOL/L (ref 20–33)
CO2 SERPL-SCNC: 24 MMOL/L (ref 20–33)
CO2 SERPL-SCNC: 25 MMOL/L (ref 20–33)
CO2 SERPL-SCNC: 26 MMOL/L (ref 20–33)
CO2 SERPL-SCNC: 27 MMOL/L (ref 20–33)
CO2 SERPL-SCNC: 29 MMOL/L (ref 20–33)
COLOR UR: YELLOW
COMMENT 1642: NORMAL
CREAT SERPL-MCNC: 0.3 MG/DL (ref 0.5–1.4)
CREAT SERPL-MCNC: 0.32 MG/DL (ref 0.5–1.4)
CREAT SERPL-MCNC: 0.36 MG/DL (ref 0.5–1.4)
CREAT SERPL-MCNC: 0.43 MG/DL (ref 0.5–1.4)
CREAT SERPL-MCNC: 0.44 MG/DL (ref 0.5–1.4)
CREAT SERPL-MCNC: 0.53 MG/DL (ref 0.5–1.4)
CREAT SERPL-MCNC: 0.53 MG/DL (ref 0.5–1.4)
CREAT SERPL-MCNC: 0.62 MG/DL (ref 0.5–1.4)
CREAT SERPL-MCNC: 0.62 MG/DL (ref 0.5–1.4)
CREAT SERPL-MCNC: 0.63 MG/DL (ref 0.5–1.4)
CREAT SERPL-MCNC: 0.64 MG/DL (ref 0.5–1.4)
CREAT SERPL-MCNC: 0.66 MG/DL (ref 0.5–1.4)
CREAT SERPL-MCNC: 0.68 MG/DL (ref 0.5–1.4)
CREAT SERPL-MCNC: 0.71 MG/DL (ref 0.5–1.4)
CREAT SERPL-MCNC: 0.82 MG/DL (ref 0.5–1.4)
CREAT SERPL-MCNC: 0.85 MG/DL (ref 0.5–1.4)
CREAT SERPL-MCNC: 0.86 MG/DL (ref 0.5–1.4)
CRP SERPL HS-MCNC: 28.37 MG/DL (ref 0–0.75)
D DIMER PPP IA.FEU-MCNC: 3.74 UG/ML (FEU) (ref 0–0.5)
D DIMER PPP IA.FEU-MCNC: 4.18 UG/ML (FEU) (ref 0–0.5)
DOHLE BOD BLD QL SMEAR: NORMAL
EKG IMPRESSION: NORMAL
EOSINOPHIL # BLD AUTO: 0 K/UL (ref 0–0.51)
EOSINOPHIL # BLD AUTO: 0.02 K/UL (ref 0–0.51)
EOSINOPHIL # BLD AUTO: 0.03 K/UL (ref 0–0.51)
EOSINOPHIL # BLD AUTO: 0.05 K/UL (ref 0–0.51)
EOSINOPHIL # BLD AUTO: 0.1 K/UL (ref 0–0.51)
EOSINOPHIL # BLD AUTO: 0.11 K/UL (ref 0–0.51)
EOSINOPHIL # BLD AUTO: 0.2 K/UL (ref 0–0.51)
EOSINOPHIL # BLD AUTO: 0.24 K/UL (ref 0–0.51)
EOSINOPHIL # BLD AUTO: 0.25 K/UL (ref 0–0.51)
EOSINOPHIL NFR BLD: 0 % (ref 0–6.9)
EOSINOPHIL NFR BLD: 0.2 % (ref 0–6.9)
EOSINOPHIL NFR BLD: 0.3 % (ref 0–6.9)
EOSINOPHIL NFR BLD: 0.4 % (ref 0–6.9)
EOSINOPHIL NFR BLD: 1 % (ref 0–6.9)
EOSINOPHIL NFR BLD: 1.2 % (ref 0–6.9)
EOSINOPHIL NFR BLD: 3.2 % (ref 0–6.9)
EOSINOPHIL NFR BLD: 3.5 % (ref 0–6.9)
EOSINOPHIL NFR BLD: 5.5 % (ref 0–6.9)
EPI CELLS #/AREA URNS HPF: ABNORMAL /HPF
EPI CELLS #/AREA URNS HPF: NEGATIVE /HPF
ERYTHROCYTE [DISTWIDTH] IN BLOOD BY AUTOMATED COUNT: 43.1 FL (ref 35.9–50)
ERYTHROCYTE [DISTWIDTH] IN BLOOD BY AUTOMATED COUNT: 43.2 FL (ref 35.9–50)
ERYTHROCYTE [DISTWIDTH] IN BLOOD BY AUTOMATED COUNT: 44 FL (ref 35.9–50)
ERYTHROCYTE [DISTWIDTH] IN BLOOD BY AUTOMATED COUNT: 45.6 FL (ref 35.9–50)
ERYTHROCYTE [DISTWIDTH] IN BLOOD BY AUTOMATED COUNT: 45.7 FL (ref 35.9–50)
ERYTHROCYTE [DISTWIDTH] IN BLOOD BY AUTOMATED COUNT: 46.3 FL (ref 35.9–50)
ERYTHROCYTE [DISTWIDTH] IN BLOOD BY AUTOMATED COUNT: 49.1 FL (ref 35.9–50)
ERYTHROCYTE [DISTWIDTH] IN BLOOD BY AUTOMATED COUNT: 49.4 FL (ref 35.9–50)
ERYTHROCYTE [DISTWIDTH] IN BLOOD BY AUTOMATED COUNT: 49.5 FL (ref 35.9–50)
ERYTHROCYTE [DISTWIDTH] IN BLOOD BY AUTOMATED COUNT: 49.6 FL (ref 35.9–50)
ERYTHROCYTE [DISTWIDTH] IN BLOOD BY AUTOMATED COUNT: 50.3 FL (ref 35.9–50)
ERYTHROCYTE [DISTWIDTH] IN BLOOD BY AUTOMATED COUNT: 50.7 FL (ref 35.9–50)
ERYTHROCYTE [DISTWIDTH] IN BLOOD BY AUTOMATED COUNT: 51.1 FL (ref 35.9–50)
ERYTHROCYTE [DISTWIDTH] IN BLOOD BY AUTOMATED COUNT: 52.4 FL (ref 35.9–50)
ERYTHROCYTE [DISTWIDTH] IN BLOOD BY AUTOMATED COUNT: 52.6 FL (ref 35.9–50)
ERYTHROCYTE [DISTWIDTH] IN BLOOD BY AUTOMATED COUNT: 53.2 FL (ref 35.9–50)
ERYTHROCYTE [SEDIMENTATION RATE] IN BLOOD BY WESTERGREN METHOD: 13 MM/HOUR (ref 0–20)
EXTERNAL QUALITY CONTROL: NORMAL
EXTERNAL QUALITY CONTROL: NORMAL
FLUAV RNA SPEC QL NAA+PROBE: NEGATIVE
FLUBV RNA SPEC QL NAA+PROBE: NEGATIVE
FUNGUS SPEC FUNGUS STN: NORMAL
GLOBULIN SER CALC-MCNC: 2.1 G/DL (ref 1.9–3.5)
GLOBULIN SER CALC-MCNC: 2.4 G/DL (ref 1.9–3.5)
GLOBULIN SER CALC-MCNC: 2.6 G/DL (ref 1.9–3.5)
GLOBULIN SER CALC-MCNC: 2.7 G/DL (ref 1.9–3.5)
GLOBULIN SER CALC-MCNC: 2.8 G/DL (ref 1.9–3.5)
GLOBULIN SER CALC-MCNC: 3 G/DL (ref 1.9–3.5)
GLOBULIN SER CALC-MCNC: 3.1 G/DL (ref 1.9–3.5)
GLOBULIN SER CALC-MCNC: 3.7 G/DL (ref 1.9–3.5)
GLUCOSE SERPL-MCNC: 100 MG/DL (ref 65–99)
GLUCOSE SERPL-MCNC: 101 MG/DL (ref 65–99)
GLUCOSE SERPL-MCNC: 104 MG/DL (ref 65–99)
GLUCOSE SERPL-MCNC: 106 MG/DL (ref 65–99)
GLUCOSE SERPL-MCNC: 119 MG/DL (ref 65–99)
GLUCOSE SERPL-MCNC: 122 MG/DL (ref 65–99)
GLUCOSE SERPL-MCNC: 122 MG/DL (ref 65–99)
GLUCOSE SERPL-MCNC: 132 MG/DL (ref 65–99)
GLUCOSE SERPL-MCNC: 139 MG/DL (ref 65–99)
GLUCOSE SERPL-MCNC: 157 MG/DL (ref 65–99)
GLUCOSE SERPL-MCNC: 79 MG/DL (ref 65–99)
GLUCOSE SERPL-MCNC: 86 MG/DL (ref 65–99)
GLUCOSE SERPL-MCNC: 90 MG/DL (ref 65–99)
GLUCOSE SERPL-MCNC: 91 MG/DL (ref 65–99)
GLUCOSE SERPL-MCNC: 93 MG/DL (ref 65–99)
GLUCOSE SERPL-MCNC: 98 MG/DL (ref 65–99)
GLUCOSE SERPL-MCNC: 99 MG/DL (ref 65–99)
GLUCOSE UR STRIP.AUTO-MCNC: NEGATIVE MG/DL
GRAM STN SPEC: ABNORMAL
GRAM STN SPEC: NORMAL
HCO3 BLDA-SCNC: 28 MMOL/L (ref 17–25)
HCT VFR BLD AUTO: 35.1 % (ref 42–52)
HCT VFR BLD AUTO: 35.3 % (ref 42–52)
HCT VFR BLD AUTO: 36.1 % (ref 42–52)
HCT VFR BLD AUTO: 36.8 % (ref 42–52)
HCT VFR BLD AUTO: 37.2 % (ref 42–52)
HCT VFR BLD AUTO: 38.7 % (ref 42–52)
HCT VFR BLD AUTO: 39.7 % (ref 42–52)
HCT VFR BLD AUTO: 41.4 % (ref 42–52)
HCT VFR BLD AUTO: 41.7 % (ref 42–52)
HCT VFR BLD AUTO: 42.2 % (ref 42–52)
HCT VFR BLD AUTO: 42.4 % (ref 42–52)
HCT VFR BLD AUTO: 43.3 % (ref 42–52)
HCT VFR BLD AUTO: 43.7 % (ref 42–52)
HCT VFR BLD AUTO: 46.3 % (ref 42–52)
HCT VFR BLD AUTO: 48.7 % (ref 42–52)
HCT VFR BLD AUTO: 50 % (ref 42–52)
HGB BLD-MCNC: 11.1 G/DL (ref 14–18)
HGB BLD-MCNC: 11.8 G/DL (ref 14–18)
HGB BLD-MCNC: 11.9 G/DL (ref 14–18)
HGB BLD-MCNC: 12.1 G/DL (ref 14–18)
HGB BLD-MCNC: 12.5 G/DL (ref 14–18)
HGB BLD-MCNC: 13 G/DL (ref 14–18)
HGB BLD-MCNC: 13.2 G/DL (ref 14–18)
HGB BLD-MCNC: 13.4 G/DL (ref 14–18)
HGB BLD-MCNC: 13.5 G/DL (ref 14–18)
HGB BLD-MCNC: 13.8 G/DL (ref 14–18)
HGB BLD-MCNC: 13.9 G/DL (ref 14–18)
HGB BLD-MCNC: 14.4 G/DL (ref 14–18)
HGB BLD-MCNC: 14.5 G/DL (ref 14–18)
HGB BLD-MCNC: 15.9 G/DL (ref 14–18)
HGB BLD-MCNC: 16.3 G/DL (ref 14–18)
HGB BLD-MCNC: 16.9 G/DL (ref 14–18)
HYALINE CASTS #/AREA URNS LPF: ABNORMAL /LPF
IMM GRANULOCYTES # BLD AUTO: 0.01 K/UL (ref 0–0.11)
IMM GRANULOCYTES # BLD AUTO: 0.01 K/UL (ref 0–0.11)
IMM GRANULOCYTES # BLD AUTO: 0.03 K/UL (ref 0–0.11)
IMM GRANULOCYTES # BLD AUTO: 0.03 K/UL (ref 0–0.11)
IMM GRANULOCYTES # BLD AUTO: 0.05 K/UL (ref 0–0.11)
IMM GRANULOCYTES # BLD AUTO: 0.07 K/UL (ref 0–0.11)
IMM GRANULOCYTES # BLD AUTO: 0.11 K/UL (ref 0–0.11)
IMM GRANULOCYTES # BLD AUTO: 0.14 K/UL (ref 0–0.11)
IMM GRANULOCYTES # BLD AUTO: 0.14 K/UL (ref 0–0.11)
IMM GRANULOCYTES # BLD AUTO: 0.15 K/UL (ref 0–0.11)
IMM GRANULOCYTES # BLD AUTO: 0.15 K/UL (ref 0–0.11)
IMM GRANULOCYTES NFR BLD AUTO: 0.2 % (ref 0–0.9)
IMM GRANULOCYTES NFR BLD AUTO: 0.2 % (ref 0–0.9)
IMM GRANULOCYTES NFR BLD AUTO: 0.3 % (ref 0–0.9)
IMM GRANULOCYTES NFR BLD AUTO: 0.4 % (ref 0–0.9)
IMM GRANULOCYTES NFR BLD AUTO: 0.4 % (ref 0–0.9)
IMM GRANULOCYTES NFR BLD AUTO: 0.5 % (ref 0–0.9)
IMM GRANULOCYTES NFR BLD AUTO: 1 % (ref 0–0.9)
IMM GRANULOCYTES NFR BLD AUTO: 1 % (ref 0–0.9)
IMM GRANULOCYTES NFR BLD AUTO: 1.4 % (ref 0–0.9)
IMM GRANULOCYTES NFR BLD AUTO: 1.5 % (ref 0–0.9)
IMM GRANULOCYTES NFR BLD AUTO: 1.6 % (ref 0–0.9)
INHALED O2 FLOW RATE: 75 L/MIN (ref 2–10)
INR PPP: 1.09 (ref 0.87–1.13)
KETONES UR STRIP.AUTO-MCNC: NEGATIVE MG/DL
LACTATE BLD-SCNC: 1 MMOL/L (ref 0.5–2)
LACTATE BLD-SCNC: 1.2 MMOL/L (ref 0.5–2)
LACTATE BLD-SCNC: 1.3 MMOL/L (ref 0.5–2)
LACTATE BLD-SCNC: 1.5 MMOL/L (ref 0.5–2)
LACTATE BLD-SCNC: 1.5 MMOL/L (ref 0.5–2)
LACTATE BLD-SCNC: 1.6 MMOL/L (ref 0.5–2)
LACTATE BLD-SCNC: 1.7 MMOL/L (ref 0.5–2)
LACTATE BLD-SCNC: 1.7 MMOL/L (ref 0.5–2)
LACTATE BLD-SCNC: 2.4 MMOL/L (ref 0.5–2)
LACTATE BLD-SCNC: 2.7 MMOL/L (ref 0.5–2)
LACTATE BLD-SCNC: 3.6 MMOL/L (ref 0.5–2)
LEUKOCYTE ESTERASE UR QL STRIP.AUTO: ABNORMAL
LEUKOCYTE ESTERASE UR QL STRIP.AUTO: ABNORMAL
LEUKOCYTE ESTERASE UR QL STRIP.AUTO: NEGATIVE
LYMPHOCYTES # BLD AUTO: 0.24 K/UL (ref 1–4.8)
LYMPHOCYTES # BLD AUTO: 0.41 K/UL (ref 1–4.8)
LYMPHOCYTES # BLD AUTO: 0.41 K/UL (ref 1–4.8)
LYMPHOCYTES # BLD AUTO: 0.42 K/UL (ref 1–4.8)
LYMPHOCYTES # BLD AUTO: 0.63 K/UL (ref 1–4.8)
LYMPHOCYTES # BLD AUTO: 0.77 K/UL (ref 1–4.8)
LYMPHOCYTES # BLD AUTO: 0.96 K/UL (ref 1–4.8)
LYMPHOCYTES # BLD AUTO: 1.02 K/UL (ref 1–4.8)
LYMPHOCYTES # BLD AUTO: 1.35 K/UL (ref 1–4.8)
LYMPHOCYTES # BLD AUTO: 1.5 K/UL (ref 1–4.8)
LYMPHOCYTES # BLD AUTO: 1.5 K/UL (ref 1–4.8)
LYMPHOCYTES # BLD AUTO: 1.62 K/UL (ref 1–4.8)
LYMPHOCYTES # BLD AUTO: 1.63 K/UL (ref 1–4.8)
LYMPHOCYTES # BLD AUTO: 1.92 K/UL (ref 1–4.8)
LYMPHOCYTES # BLD AUTO: 2.08 K/UL (ref 1–4.8)
LYMPHOCYTES NFR BLD: 1.6 % (ref 22–41)
LYMPHOCYTES NFR BLD: 11 % (ref 22–41)
LYMPHOCYTES NFR BLD: 13 % (ref 22–41)
LYMPHOCYTES NFR BLD: 14 % (ref 22–41)
LYMPHOCYTES NFR BLD: 17.8 % (ref 22–41)
LYMPHOCYTES NFR BLD: 2 % (ref 22–41)
LYMPHOCYTES NFR BLD: 2 % (ref 22–41)
LYMPHOCYTES NFR BLD: 21.9 % (ref 22–41)
LYMPHOCYTES NFR BLD: 30.3 % (ref 22–41)
LYMPHOCYTES NFR BLD: 32.9 % (ref 22–41)
LYMPHOCYTES NFR BLD: 4 % (ref 22–41)
LYMPHOCYTES NFR BLD: 7.1 % (ref 22–41)
LYMPHOCYTES NFR BLD: 7.2 % (ref 22–41)
LYMPHOCYTES NFR BLD: 8.3 % (ref 22–41)
LYMPHOCYTES NFR BLD: 9.1 % (ref 22–41)
MAGNESIUM SERPL-MCNC: 1.6 MG/DL (ref 1.5–2.5)
MAGNESIUM SERPL-MCNC: 1.8 MG/DL (ref 1.5–2.5)
MAGNESIUM SERPL-MCNC: 1.9 MG/DL (ref 1.5–2.5)
MAGNESIUM SERPL-MCNC: 1.9 MG/DL (ref 1.5–2.5)
MAGNESIUM SERPL-MCNC: 2 MG/DL (ref 1.5–2.5)
MANUAL DIFF BLD: ABNORMAL
MANUAL DIFF BLD: NORMAL
MCH RBC QN AUTO: 29.3 PG (ref 27–33)
MCH RBC QN AUTO: 29.4 PG (ref 27–33)
MCH RBC QN AUTO: 29.5 PG (ref 27–33)
MCH RBC QN AUTO: 29.6 PG (ref 27–33)
MCH RBC QN AUTO: 29.7 PG (ref 27–33)
MCH RBC QN AUTO: 29.7 PG (ref 27–33)
MCH RBC QN AUTO: 29.8 PG (ref 27–33)
MCH RBC QN AUTO: 29.9 PG (ref 27–33)
MCH RBC QN AUTO: 30 PG (ref 27–33)
MCH RBC QN AUTO: 30 PG (ref 27–33)
MCH RBC QN AUTO: 30.2 PG (ref 27–33)
MCH RBC QN AUTO: 30.3 PG (ref 27–33)
MCHC RBC AUTO-ENTMCNC: 31.4 G/DL (ref 33.7–35.3)
MCHC RBC AUTO-ENTMCNC: 32.4 G/DL (ref 33.7–35.3)
MCHC RBC AUTO-ENTMCNC: 32.4 G/DL (ref 33.7–35.3)
MCHC RBC AUTO-ENTMCNC: 32.5 G/DL (ref 33.7–35.3)
MCHC RBC AUTO-ENTMCNC: 32.7 G/DL (ref 33.7–35.3)
MCHC RBC AUTO-ENTMCNC: 32.7 G/DL (ref 33.7–35.3)
MCHC RBC AUTO-ENTMCNC: 32.9 G/DL (ref 33.7–35.3)
MCHC RBC AUTO-ENTMCNC: 32.9 G/DL (ref 33.7–35.3)
MCHC RBC AUTO-ENTMCNC: 33 G/DL (ref 33.7–35.3)
MCHC RBC AUTO-ENTMCNC: 33.5 G/DL (ref 33.7–35.3)
MCHC RBC AUTO-ENTMCNC: 33.5 G/DL (ref 33.7–35.3)
MCHC RBC AUTO-ENTMCNC: 33.6 G/DL (ref 33.7–35.3)
MCHC RBC AUTO-ENTMCNC: 33.8 G/DL (ref 33.7–35.3)
MCHC RBC AUTO-ENTMCNC: 33.9 G/DL (ref 33.7–35.3)
MCHC RBC AUTO-ENTMCNC: 34.1 G/DL (ref 33.7–35.3)
MCHC RBC AUTO-ENTMCNC: 34.3 G/DL (ref 33.7–35.3)
MCV RBC AUTO: 87.8 FL (ref 81.4–97.8)
MCV RBC AUTO: 87.9 FL (ref 81.4–97.8)
MCV RBC AUTO: 88.2 FL (ref 81.4–97.8)
MCV RBC AUTO: 88.4 FL (ref 81.4–97.8)
MCV RBC AUTO: 88.7 FL (ref 81.4–97.8)
MCV RBC AUTO: 89 FL (ref 81.4–97.8)
MCV RBC AUTO: 89.4 FL (ref 81.4–97.8)
MCV RBC AUTO: 89.5 FL (ref 81.4–97.8)
MCV RBC AUTO: 90 FL (ref 81.4–97.8)
MCV RBC AUTO: 90.1 FL (ref 81.4–97.8)
MCV RBC AUTO: 90.1 FL (ref 81.4–97.8)
MCV RBC AUTO: 90.6 FL (ref 81.4–97.8)
MCV RBC AUTO: 90.7 FL (ref 81.4–97.8)
MCV RBC AUTO: 90.9 FL (ref 81.4–97.8)
MCV RBC AUTO: 91.6 FL (ref 81.4–97.8)
MCV RBC AUTO: 94.9 FL (ref 81.4–97.8)
METAMYELOCYTES NFR BLD MANUAL: 2 %
MICRO URNS: ABNORMAL
MONOCYTES # BLD AUTO: 0.53 K/UL (ref 0–0.85)
MONOCYTES # BLD AUTO: 0.58 K/UL (ref 0–0.85)
MONOCYTES # BLD AUTO: 0.68 K/UL (ref 0–0.85)
MONOCYTES # BLD AUTO: 0.78 K/UL (ref 0–0.85)
MONOCYTES # BLD AUTO: 0.88 K/UL (ref 0–0.85)
MONOCYTES # BLD AUTO: 0.93 K/UL (ref 0–0.85)
MONOCYTES # BLD AUTO: 0.93 K/UL (ref 0–0.85)
MONOCYTES # BLD AUTO: 0.96 K/UL (ref 0–0.85)
MONOCYTES # BLD AUTO: 0.97 K/UL (ref 0–0.85)
MONOCYTES # BLD AUTO: 1.04 K/UL (ref 0–0.85)
MONOCYTES # BLD AUTO: 1.23 K/UL (ref 0–0.85)
MONOCYTES # BLD AUTO: 1.44 K/UL (ref 0–0.85)
MONOCYTES # BLD AUTO: 1.46 K/UL (ref 0–0.85)
MONOCYTES # BLD AUTO: 1.81 K/UL (ref 0–0.85)
MONOCYTES # BLD AUTO: 3.59 K/UL (ref 0–0.85)
MONOCYTES NFR BLD AUTO: 10.5 % (ref 0–13.4)
MONOCYTES NFR BLD AUTO: 10.5 % (ref 0–13.4)
MONOCYTES NFR BLD AUTO: 11.6 % (ref 0–13.4)
MONOCYTES NFR BLD AUTO: 11.7 % (ref 0–13.4)
MONOCYTES NFR BLD AUTO: 12.8 % (ref 0–13.4)
MONOCYTES NFR BLD AUTO: 14 % (ref 0–13.4)
MONOCYTES NFR BLD AUTO: 19 % (ref 0–13.4)
MONOCYTES NFR BLD AUTO: 6 % (ref 0–13.4)
MONOCYTES NFR BLD AUTO: 6.1 % (ref 0–13.4)
MONOCYTES NFR BLD AUTO: 6.8 % (ref 0–13.4)
MONOCYTES NFR BLD AUTO: 7 % (ref 0–13.4)
MONOCYTES NFR BLD AUTO: 8.4 % (ref 0–13.4)
MONOCYTES NFR BLD AUTO: 8.9 % (ref 0–13.4)
MONOCYTES NFR BLD AUTO: 9.1 % (ref 0–13.4)
MONOCYTES NFR BLD AUTO: 9.9 % (ref 0–13.4)
MUCOUS THREADS #/AREA URNS HPF: ABNORMAL /HPF
NEUTROPHILS # BLD AUTO: 11.15 K/UL (ref 1.82–7.42)
NEUTROPHILS # BLD AUTO: 13.23 K/UL (ref 1.82–7.42)
NEUTROPHILS # BLD AUTO: 13.89 K/UL (ref 1.82–7.42)
NEUTROPHILS # BLD AUTO: 17.92 K/UL (ref 1.82–7.42)
NEUTROPHILS # BLD AUTO: 18.86 K/UL (ref 1.82–7.42)
NEUTROPHILS # BLD AUTO: 2.23 K/UL (ref 1.82–7.42)
NEUTROPHILS # BLD AUTO: 3.58 K/UL (ref 1.82–7.42)
NEUTROPHILS # BLD AUTO: 4.35 K/UL (ref 1.82–7.42)
NEUTROPHILS # BLD AUTO: 6.39 K/UL (ref 1.82–7.42)
NEUTROPHILS # BLD AUTO: 7.03 K/UL (ref 1.82–7.42)
NEUTROPHILS # BLD AUTO: 7.33 K/UL (ref 1.82–7.42)
NEUTROPHILS # BLD AUTO: 7.59 K/UL (ref 1.82–7.42)
NEUTROPHILS # BLD AUTO: 8.36 K/UL (ref 1.82–7.42)
NEUTROPHILS # BLD AUTO: 8.99 K/UL (ref 1.82–7.42)
NEUTROPHILS # BLD AUTO: 9.03 K/UL (ref 1.82–7.42)
NEUTROPHILS NFR BLD: 44 % (ref 44–72)
NEUTROPHILS NFR BLD: 48.9 % (ref 44–72)
NEUTROPHILS NFR BLD: 56.4 % (ref 44–72)
NEUTROPHILS NFR BLD: 57 % (ref 44–72)
NEUTROPHILS NFR BLD: 63.6 % (ref 44–72)
NEUTROPHILS NFR BLD: 69.8 % (ref 44–72)
NEUTROPHILS NFR BLD: 72 % (ref 44–72)
NEUTROPHILS NFR BLD: 78 % (ref 44–72)
NEUTROPHILS NFR BLD: 79 % (ref 44–72)
NEUTROPHILS NFR BLD: 79.1 % (ref 44–72)
NEUTROPHILS NFR BLD: 79.1 % (ref 44–72)
NEUTROPHILS NFR BLD: 79.6 % (ref 44–72)
NEUTROPHILS NFR BLD: 84 % (ref 44–72)
NEUTROPHILS NFR BLD: 86 % (ref 44–72)
NEUTROPHILS NFR BLD: 91 % (ref 44–72)
NEUTS BAND NFR BLD MANUAL: 13 % (ref 0–10)
NEUTS BAND NFR BLD MANUAL: 15 % (ref 0–10)
NEUTS BAND NFR BLD MANUAL: 29 % (ref 0–10)
NEUTS BAND NFR BLD MANUAL: 8 % (ref 0–10)
NITRITE UR QL STRIP.AUTO: NEGATIVE
NITRITE UR QL STRIP.AUTO: NEGATIVE
NITRITE UR QL STRIP.AUTO: POSITIVE
NRBC # BLD AUTO: 0 K/UL
NRBC BLD-RTO: 0 /100 WBC
NT-PROBNP SERPL IA-MCNC: 356 PG/ML (ref 0–125)
PATHOLOGY CONSULT NOTE: NORMAL
PATHOLOGY CONSULT NOTE: NORMAL
PCO2 BLDA: 36.2 MMHG (ref 26–37)
PH BLDA: 7.5 [PH] (ref 7.4–7.5)
PH UR STRIP.AUTO: 5 [PH] (ref 5–8)
PH UR STRIP.AUTO: 5.5 [PH] (ref 5–8)
PH UR STRIP.AUTO: 7 [PH] (ref 5–8)
PHOSPHATE SERPL-MCNC: 1.4 MG/DL (ref 2.5–4.5)
PLATELET # BLD AUTO: 174 K/UL (ref 164–446)
PLATELET # BLD AUTO: 191 K/UL (ref 164–446)
PLATELET # BLD AUTO: 192 K/UL (ref 164–446)
PLATELET # BLD AUTO: 196 K/UL (ref 164–446)
PLATELET # BLD AUTO: 207 K/UL (ref 164–446)
PLATELET # BLD AUTO: 217 K/UL (ref 164–446)
PLATELET # BLD AUTO: 244 K/UL (ref 164–446)
PLATELET # BLD AUTO: 252 K/UL (ref 164–446)
PLATELET # BLD AUTO: 256 K/UL (ref 164–446)
PLATELET # BLD AUTO: 257 K/UL (ref 164–446)
PLATELET # BLD AUTO: 317 K/UL (ref 164–446)
PLATELET # BLD AUTO: 328 K/UL (ref 164–446)
PLATELET # BLD AUTO: 342 K/UL (ref 164–446)
PLATELET # BLD AUTO: 352 K/UL (ref 164–446)
PLATELET # BLD AUTO: 355 K/UL (ref 164–446)
PLATELET # BLD AUTO: 412 K/UL (ref 164–446)
PLATELET BLD QL SMEAR: NORMAL
PMV BLD AUTO: 8.3 FL (ref 9–12.9)
PMV BLD AUTO: 8.3 FL (ref 9–12.9)
PMV BLD AUTO: 8.5 FL (ref 9–12.9)
PMV BLD AUTO: 8.5 FL (ref 9–12.9)
PMV BLD AUTO: 8.6 FL (ref 9–12.9)
PMV BLD AUTO: 8.7 FL (ref 9–12.9)
PMV BLD AUTO: 8.7 FL (ref 9–12.9)
PMV BLD AUTO: 8.9 FL (ref 9–12.9)
PMV BLD AUTO: 8.9 FL (ref 9–12.9)
PMV BLD AUTO: 9 FL (ref 9–12.9)
PMV BLD AUTO: 9.2 FL (ref 9–12.9)
PMV BLD AUTO: 9.2 FL (ref 9–12.9)
PMV BLD AUTO: 9.3 FL (ref 9–12.9)
PMV BLD AUTO: 9.5 FL (ref 9–12.9)
PO2 BLDA: 76.5 MMHG (ref 64–87)
POTASSIUM SERPL-SCNC: 3 MMOL/L (ref 3.6–5.5)
POTASSIUM SERPL-SCNC: 3.2 MMOL/L (ref 3.6–5.5)
POTASSIUM SERPL-SCNC: 3.3 MMOL/L (ref 3.6–5.5)
POTASSIUM SERPL-SCNC: 3.3 MMOL/L (ref 3.6–5.5)
POTASSIUM SERPL-SCNC: 3.4 MMOL/L (ref 3.6–5.5)
POTASSIUM SERPL-SCNC: 3.5 MMOL/L (ref 3.6–5.5)
POTASSIUM SERPL-SCNC: 3.5 MMOL/L (ref 3.6–5.5)
POTASSIUM SERPL-SCNC: 3.6 MMOL/L (ref 3.6–5.5)
POTASSIUM SERPL-SCNC: 3.6 MMOL/L (ref 3.6–5.5)
POTASSIUM SERPL-SCNC: 3.8 MMOL/L (ref 3.6–5.5)
POTASSIUM SERPL-SCNC: 4 MMOL/L (ref 3.6–5.5)
POTASSIUM SERPL-SCNC: 4.1 MMOL/L (ref 3.6–5.5)
POTASSIUM SERPL-SCNC: 4.2 MMOL/L (ref 3.6–5.5)
POTASSIUM SERPL-SCNC: 4.4 MMOL/L (ref 3.6–5.5)
POTASSIUM SERPL-SCNC: 4.8 MMOL/L (ref 3.6–5.5)
PROCALCITONIN SERPL-MCNC: 0.15 NG/ML
PROCALCITONIN SERPL-MCNC: 0.84 NG/ML
PROCALCITONIN SERPL-MCNC: 3.39 NG/ML
PROCALCITONIN SERPL-MCNC: 3.99 NG/ML
PROMYELOCYTES NFR BLD MANUAL: 2 %
PROT SERPL-MCNC: 4.3 G/DL (ref 6–8.2)
PROT SERPL-MCNC: 4.5 G/DL (ref 6–8.2)
PROT SERPL-MCNC: 4.6 G/DL (ref 6–8.2)
PROT SERPL-MCNC: 5.1 G/DL (ref 6–8.2)
PROT SERPL-MCNC: 5.3 G/DL (ref 6–8.2)
PROT SERPL-MCNC: 5.4 G/DL (ref 6–8.2)
PROT SERPL-MCNC: 5.5 G/DL (ref 6–8.2)
PROT SERPL-MCNC: 5.7 G/DL (ref 6–8.2)
PROT SERPL-MCNC: 6.1 G/DL (ref 6–8.2)
PROT SERPL-MCNC: 6.3 G/DL (ref 6–8.2)
PROT SERPL-MCNC: 6.3 G/DL (ref 6–8.2)
PROT SERPL-MCNC: 7.4 G/DL (ref 6–8.2)
PROT UR QL STRIP: 30 MG/DL
PROT UR QL STRIP: NEGATIVE MG/DL
PROT UR QL STRIP: NEGATIVE MG/DL
PROTHROMBIN TIME: 13.3 SEC (ref 12–14.6)
RBC # BLD AUTO: 3.72 M/UL (ref 4.7–6.1)
RBC # BLD AUTO: 3.97 M/UL (ref 4.7–6.1)
RBC # BLD AUTO: 3.97 M/UL (ref 4.7–6.1)
RBC # BLD AUTO: 4.11 M/UL (ref 4.7–6.1)
RBC # BLD AUTO: 4.13 M/UL (ref 4.7–6.1)
RBC # BLD AUTO: 4.41 M/UL (ref 4.7–6.1)
RBC # BLD AUTO: 4.44 M/UL (ref 4.7–6.1)
RBC # BLD AUTO: 4.57 M/UL (ref 4.7–6.1)
RBC # BLD AUTO: 4.6 M/UL (ref 4.7–6.1)
RBC # BLD AUTO: 4.63 M/UL (ref 4.7–6.1)
RBC # BLD AUTO: 4.69 M/UL (ref 4.7–6.1)
RBC # BLD AUTO: 4.85 M/UL (ref 4.7–6.1)
RBC # BLD AUTO: 4.91 M/UL (ref 4.7–6.1)
RBC # BLD AUTO: 5.27 M/UL (ref 4.7–6.1)
RBC # BLD AUTO: 5.47 M/UL (ref 4.7–6.1)
RBC # BLD AUTO: 5.64 M/UL (ref 4.7–6.1)
RBC # URNS HPF: ABNORMAL /HPF
RBC BLD AUTO: NORMAL
RBC UR QL AUTO: ABNORMAL
RH BLD: NORMAL
RSV RNA SPEC QL NAA+PROBE: NEGATIVE
SAO2 % BLDA: 95.1 % (ref 93–99)
SARS-COV+SARS-COV-2 AG RESP QL IA.RAPID: NEGATIVE
SARS-COV+SARS-COV-2 AG RESP QL IA.RAPID: NEGATIVE
SARS-COV-2 RNA RESP QL NAA+PROBE: NOTDETECTED
SIGNIFICANT IND 70042: ABNORMAL
SIGNIFICANT IND 70042: NORMAL
SITE SITE: ABNORMAL
SITE SITE: NORMAL
SODIUM SERPL-SCNC: 127 MMOL/L (ref 135–145)
SODIUM SERPL-SCNC: 128 MMOL/L (ref 135–145)
SODIUM SERPL-SCNC: 132 MMOL/L (ref 135–145)
SODIUM SERPL-SCNC: 134 MMOL/L (ref 135–145)
SODIUM SERPL-SCNC: 134 MMOL/L (ref 135–145)
SODIUM SERPL-SCNC: 135 MMOL/L (ref 135–145)
SODIUM SERPL-SCNC: 136 MMOL/L (ref 135–145)
SODIUM SERPL-SCNC: 136 MMOL/L (ref 135–145)
SODIUM SERPL-SCNC: 137 MMOL/L (ref 135–145)
SODIUM SERPL-SCNC: 138 MMOL/L (ref 135–145)
SODIUM SERPL-SCNC: 138 MMOL/L (ref 135–145)
SOURCE SOURCE: ABNORMAL
SOURCE SOURCE: NORMAL
SP GR UR STRIP.AUTO: 1.01
SP GR UR STRIP.AUTO: 1.02
SP GR UR STRIP.AUTO: >=1.03
SPECIMEN SOURCE: NORMAL
TROPONIN T SERPL-MCNC: 12 NG/L (ref 6–19)
TROPONIN T SERPL-MCNC: 12 NG/L (ref 6–19)
UNIDENT CRYS URNS QL MICRO: ABNORMAL /HPF
WBC # BLD AUTO: 10.4 K/UL (ref 4.8–10.8)
WBC # BLD AUTO: 10.5 K/UL (ref 4.8–10.8)
WBC # BLD AUTO: 10.5 K/UL (ref 4.8–10.8)
WBC # BLD AUTO: 11.5 K/UL (ref 4.8–10.8)
WBC # BLD AUTO: 14.1 K/UL (ref 4.8–10.8)
WBC # BLD AUTO: 15.3 K/UL (ref 4.8–10.8)
WBC # BLD AUTO: 18.9 K/UL (ref 4.8–10.8)
WBC # BLD AUTO: 20.5 K/UL (ref 4.8–10.8)
WBC # BLD AUTO: 20.6 K/UL (ref 4.8–10.8)
WBC # BLD AUTO: 4.6 K/UL (ref 4.8–10.8)
WBC # BLD AUTO: 5.3 K/UL (ref 4.8–10.8)
WBC # BLD AUTO: 6.3 K/UL (ref 4.8–10.8)
WBC # BLD AUTO: 6.9 K/UL (ref 4.8–10.8)
WBC # BLD AUTO: 8.9 K/UL (ref 4.8–10.8)
WBC # BLD AUTO: 9.2 K/UL (ref 4.8–10.8)
WBC # BLD AUTO: 9.3 K/UL (ref 4.8–10.8)
WBC #/AREA URNS HPF: ABNORMAL /HPF
YEAST #/AREA URNS HPF: ABNORMAL /HPF
YEAST BUDDING URNS QL: PRESENT /HPF

## 2021-01-01 PROCEDURE — 665036 HSPC NOTICE OF ELECTION NOE

## 2021-01-01 PROCEDURE — 700102 HCHG RX REV CODE 250 W/ 637 OVERRIDE(OP): Performed by: STUDENT IN AN ORGANIZED HEALTH CARE EDUCATION/TRAINING PROGRAM

## 2021-01-01 PROCEDURE — 700101 HCHG RX REV CODE 250: Performed by: FAMILY MEDICINE

## 2021-01-01 PROCEDURE — 700111 HCHG RX REV CODE 636 W/ 250 OVERRIDE (IP): Performed by: STUDENT IN AN ORGANIZED HEALTH CARE EDUCATION/TRAINING PROGRAM

## 2021-01-01 PROCEDURE — 87106 FUNGI IDENTIFICATION YEAST: CPT

## 2021-01-01 PROCEDURE — 85027 COMPLETE CBC AUTOMATED: CPT

## 2021-01-01 PROCEDURE — 99223 1ST HOSP IP/OBS HIGH 75: CPT | Performed by: INTERNAL MEDICINE

## 2021-01-01 PROCEDURE — 99239 HOSP IP/OBS DSCHRG MGMT >30: CPT | Performed by: FAMILY MEDICINE

## 2021-01-01 PROCEDURE — 160048 HCHG OR STATISTICAL LEVEL 1-5: Performed by: SURGERY

## 2021-01-01 PROCEDURE — 85025 COMPLETE CBC W/AUTO DIFF WBC: CPT

## 2021-01-01 PROCEDURE — 770001 HCHG ROOM/CARE - MED/SURG/GYN PRIV*

## 2021-01-01 PROCEDURE — 87493 C DIFF AMPLIFIED PROBE: CPT

## 2021-01-01 PROCEDURE — 36415 COLL VENOUS BLD VENIPUNCTURE: CPT

## 2021-01-01 PROCEDURE — 700102 HCHG RX REV CODE 250 W/ 637 OVERRIDE(OP): Performed by: INTERNAL MEDICINE

## 2021-01-01 PROCEDURE — 700117 HCHG RX CONTRAST REV CODE 255: Performed by: EMERGENCY MEDICINE

## 2021-01-01 PROCEDURE — 500042 HCHG BAG, URINARY DRAINAGE (CLOSED): Performed by: UROLOGY

## 2021-01-01 PROCEDURE — 700102 HCHG RX REV CODE 250 W/ 637 OVERRIDE(OP): Performed by: NURSE PRACTITIONER

## 2021-01-01 PROCEDURE — 700102 HCHG RX REV CODE 250 W/ 637 OVERRIDE(OP): Performed by: HOSPITALIST

## 2021-01-01 PROCEDURE — 700105 HCHG RX REV CODE 258: Performed by: INTERNAL MEDICINE

## 2021-01-01 PROCEDURE — C9803 HOPD COVID-19 SPEC COLLECT: HCPCS | Performed by: HOSPITALIST

## 2021-01-01 PROCEDURE — 160009 HCHG ANES TIME/MIN: Performed by: SURGERY

## 2021-01-01 PROCEDURE — 85379 FIBRIN DEGRADATION QUANT: CPT

## 2021-01-01 PROCEDURE — 99285 EMERGENCY DEPT VISIT HI MDM: CPT

## 2021-01-01 PROCEDURE — 84145 PROCALCITONIN (PCT): CPT | Mod: 91

## 2021-01-01 PROCEDURE — 99232 SBSQ HOSP IP/OBS MODERATE 35: CPT | Performed by: FAMILY MEDICINE

## 2021-01-01 PROCEDURE — G0299 HHS/HOSPICE OF RN EA 15 MIN: HCPCS

## 2021-01-01 PROCEDURE — 87086 URINE CULTURE/COLONY COUNT: CPT

## 2021-01-01 PROCEDURE — 700111 HCHG RX REV CODE 636 W/ 250 OVERRIDE (IP): Performed by: SURGERY

## 2021-01-01 PROCEDURE — 160009 HCHG ANES TIME/MIN: Performed by: UROLOGY

## 2021-01-01 PROCEDURE — 700105 HCHG RX REV CODE 258: Performed by: STUDENT IN AN ORGANIZED HEALTH CARE EDUCATION/TRAINING PROGRAM

## 2021-01-01 PROCEDURE — 87186 SC STD MICRODIL/AGAR DIL: CPT

## 2021-01-01 PROCEDURE — 96375 TX/PRO/DX INJ NEW DRUG ADDON: CPT

## 2021-01-01 PROCEDURE — 81001 URINALYSIS AUTO W/SCOPE: CPT

## 2021-01-01 PROCEDURE — 700111 HCHG RX REV CODE 636 W/ 250 OVERRIDE (IP): Performed by: FAMILY MEDICINE

## 2021-01-01 PROCEDURE — 99239 HOSP IP/OBS DSCHRG MGMT >30: CPT | Mod: GW | Performed by: STUDENT IN AN ORGANIZED HEALTH CARE EDUCATION/TRAINING PROGRAM

## 2021-01-01 PROCEDURE — 85730 THROMBOPLASTIN TIME PARTIAL: CPT

## 2021-01-01 PROCEDURE — 87040 BLOOD CULTURE FOR BACTERIA: CPT

## 2021-01-01 PROCEDURE — 700111 HCHG RX REV CODE 636 W/ 250 OVERRIDE (IP): Performed by: HOSPITALIST

## 2021-01-01 PROCEDURE — 501330 HCHG SET, CYSTO IRRIG TUBING: Performed by: UROLOGY

## 2021-01-01 PROCEDURE — 70553 MRI BRAIN STEM W/O & W/DYE: CPT | Mod: MG

## 2021-01-01 PROCEDURE — 83605 ASSAY OF LACTIC ACID: CPT | Mod: 91

## 2021-01-01 PROCEDURE — 99223 1ST HOSP IP/OBS HIGH 75: CPT | Mod: AI | Performed by: INTERNAL MEDICINE

## 2021-01-01 PROCEDURE — 87040 BLOOD CULTURE FOR BACTERIA: CPT | Mod: 91

## 2021-01-01 PROCEDURE — A9270 NON-COVERED ITEM OR SERVICE: HCPCS | Performed by: EMERGENCY MEDICINE

## 2021-01-01 PROCEDURE — 72156 MRI NECK SPINE W/O & W/DYE: CPT | Mod: MG

## 2021-01-01 PROCEDURE — 0DCP7ZZ EXTIRPATION OF MATTER FROM RECTUM, VIA NATURAL OR ARTIFICIAL OPENING: ICD-10-PCS | Performed by: SURGERY

## 2021-01-01 PROCEDURE — 99233 SBSQ HOSP IP/OBS HIGH 50: CPT | Performed by: FAMILY MEDICINE

## 2021-01-01 PROCEDURE — 96367 TX/PROPH/DG ADDL SEQ IV INF: CPT

## 2021-01-01 PROCEDURE — 99232 SBSQ HOSP IP/OBS MODERATE 35: CPT | Performed by: HOSPITALIST

## 2021-01-01 PROCEDURE — 80053 COMPREHEN METABOLIC PANEL: CPT

## 2021-01-01 PROCEDURE — 87324 CLOSTRIDIUM AG IA: CPT

## 2021-01-01 PROCEDURE — 700101 HCHG RX REV CODE 250: Performed by: INTERNAL MEDICINE

## 2021-01-01 PROCEDURE — 8E0ZXY6 ISOLATION: ICD-10-PCS | Performed by: EMERGENCY MEDICINE

## 2021-01-01 PROCEDURE — 74176 CT ABD & PELVIS W/O CONTRAST: CPT | Mod: ME

## 2021-01-01 PROCEDURE — 160035 HCHG PACU - 1ST 60 MINS PHASE I: Performed by: SURGERY

## 2021-01-01 PROCEDURE — 99292 CRITICAL CARE ADDL 30 MIN: CPT | Performed by: STUDENT IN AN ORGANIZED HEALTH CARE EDUCATION/TRAINING PROGRAM

## 2021-01-01 PROCEDURE — 87426 SARSCOV CORONAVIRUS AG IA: CPT | Performed by: SURGERY

## 2021-01-01 PROCEDURE — 700102 HCHG RX REV CODE 250 W/ 637 OVERRIDE(OP): Performed by: FAMILY MEDICINE

## 2021-01-01 PROCEDURE — 770006 HCHG ROOM/CARE - MED/SURG/GYN SEMI*

## 2021-01-01 PROCEDURE — 85007 BL SMEAR W/DIFF WBC COUNT: CPT

## 2021-01-01 PROCEDURE — 85027 COMPLETE CBC AUTOMATED: CPT | Mod: 91

## 2021-01-01 PROCEDURE — 99223 1ST HOSP IP/OBS HIGH 75: CPT | Performed by: STUDENT IN AN ORGANIZED HEALTH CARE EDUCATION/TRAINING PROGRAM

## 2021-01-01 PROCEDURE — A9270 NON-COVERED ITEM OR SERVICE: HCPCS | Performed by: HOSPITALIST

## 2021-01-01 PROCEDURE — 86140 C-REACTIVE PROTEIN: CPT

## 2021-01-01 PROCEDURE — T2045 HOSPICE GENERAL CARE: HCPCS

## 2021-01-01 PROCEDURE — A9270 NON-COVERED ITEM OR SERVICE: HCPCS | Performed by: STUDENT IN AN ORGANIZED HEALTH CARE EDUCATION/TRAINING PROGRAM

## 2021-01-01 PROCEDURE — A9270 NON-COVERED ITEM OR SERVICE: HCPCS | Performed by: INTERNAL MEDICINE

## 2021-01-01 PROCEDURE — 87205 SMEAR GRAM STAIN: CPT

## 2021-01-01 PROCEDURE — 700105 HCHG RX REV CODE 258: Performed by: FAMILY MEDICINE

## 2021-01-01 PROCEDURE — 700105 HCHG RX REV CODE 258: Performed by: EMERGENCY MEDICINE

## 2021-01-01 PROCEDURE — 71045 X-RAY EXAM CHEST 1 VIEW: CPT

## 2021-01-01 PROCEDURE — 51705 CHANGE OF BLADDER TUBE: CPT

## 2021-01-01 PROCEDURE — 700111 HCHG RX REV CODE 636 W/ 250 OVERRIDE (IP): Performed by: INTERNAL MEDICINE

## 2021-01-01 PROCEDURE — 770020 HCHG ROOM/CARE - TELE (206)

## 2021-01-01 PROCEDURE — 160031 HCHG SURGERY MINUTES - 1ST 30 MINS LEVEL 5: Performed by: SURGERY

## 2021-01-01 PROCEDURE — 94760 N-INVAS EAR/PLS OXIMETRY 1: CPT

## 2021-01-01 PROCEDURE — 700111 HCHG RX REV CODE 636 W/ 250 OVERRIDE (IP): Performed by: NURSE PRACTITIONER

## 2021-01-01 PROCEDURE — 160038 HCHG SURGERY MINUTES - EA ADDL 1 MIN LEVEL 2: Performed by: SURGERY

## 2021-01-01 PROCEDURE — A9270 NON-COVERED ITEM OR SERVICE: HCPCS | Performed by: FAMILY MEDICINE

## 2021-01-01 PROCEDURE — 99215 OFFICE O/P EST HI 40 MIN: CPT | Performed by: REGISTERED NURSE

## 2021-01-01 PROCEDURE — 700111 HCHG RX REV CODE 636 W/ 250 OVERRIDE (IP): Performed by: ANESTHESIOLOGY

## 2021-01-01 PROCEDURE — 302111 WAFER OST 2.25IN N IMG RD 2 PC (BARRIER): Performed by: FAMILY MEDICINE

## 2021-01-01 PROCEDURE — 83735 ASSAY OF MAGNESIUM: CPT

## 2021-01-01 PROCEDURE — 700101 HCHG RX REV CODE 250: Performed by: SURGERY

## 2021-01-01 PROCEDURE — 85652 RBC SED RATE AUTOMATED: CPT

## 2021-01-01 PROCEDURE — 83605 ASSAY OF LACTIC ACID: CPT

## 2021-01-01 PROCEDURE — 99211 OFF/OP EST MAY X REQ PHY/QHP: CPT | Performed by: REGISTERED NURSE

## 2021-01-01 PROCEDURE — 84145 PROCALCITONIN (PCT): CPT

## 2021-01-01 PROCEDURE — 99233 SBSQ HOSP IP/OBS HIGH 50: CPT | Performed by: STUDENT IN AN ORGANIZED HEALTH CARE EDUCATION/TRAINING PROGRAM

## 2021-01-01 PROCEDURE — 87426 SARSCOV CORONAVIRUS AG IA: CPT | Performed by: UROLOGY

## 2021-01-01 PROCEDURE — 302113 2 1/4 HIGH OUTPUT POUCH": Performed by: STUDENT IN AN ORGANIZED HEALTH CARE EDUCATION/TRAINING PROGRAM

## 2021-01-01 PROCEDURE — A9576 INJ PROHANCE MULTIPACK: HCPCS | Performed by: FAMILY MEDICINE

## 2021-01-01 PROCEDURE — 80048 BASIC METABOLIC PNL TOTAL CA: CPT

## 2021-01-01 PROCEDURE — 93005 ELECTROCARDIOGRAM TRACING: CPT | Performed by: STUDENT IN AN ORGANIZED HEALTH CARE EDUCATION/TRAINING PROGRAM

## 2021-01-01 PROCEDURE — 0241U HCHG SARS-COV-2 COVID-19 NFCT DS RESP RNA 4 TRGT MIC: CPT

## 2021-01-01 PROCEDURE — 99233 SBSQ HOSP IP/OBS HIGH 50: CPT | Performed by: INTERNAL MEDICINE

## 2021-01-01 PROCEDURE — 99215 OFFICE O/P EST HI 40 MIN: CPT | Performed by: PSYCHIATRY & NEUROLOGY

## 2021-01-01 PROCEDURE — G0155 HHCP-SVS OF CSW,EA 15 MIN: HCPCS

## 2021-01-01 PROCEDURE — 700101 HCHG RX REV CODE 250: Performed by: ANESTHESIOLOGY

## 2021-01-01 PROCEDURE — 700105 HCHG RX REV CODE 258: Performed by: SURGERY

## 2021-01-01 PROCEDURE — 84484 ASSAY OF TROPONIN QUANT: CPT

## 2021-01-01 PROCEDURE — 160002 HCHG RECOVERY MINUTES (STAT): Performed by: UROLOGY

## 2021-01-01 PROCEDURE — 700111 HCHG RX REV CODE 636 W/ 250 OVERRIDE (IP): Performed by: EMERGENCY MEDICINE

## 2021-01-01 PROCEDURE — 84100 ASSAY OF PHOSPHORUS: CPT

## 2021-01-01 PROCEDURE — 302111 WAFER OST 2.25IN N IMG RD 2 PC (BARRIER): Performed by: STUDENT IN AN ORGANIZED HEALTH CARE EDUCATION/TRAINING PROGRAM

## 2021-01-01 PROCEDURE — 0D1B0Z4 BYPASS ILEUM TO CUTANEOUS, OPEN APPROACH: ICD-10-PCS | Performed by: SURGERY

## 2021-01-01 PROCEDURE — 302098 PASTE RING (FLAT): Performed by: FAMILY MEDICINE

## 2021-01-01 PROCEDURE — 94640 AIRWAY INHALATION TREATMENT: CPT

## 2021-01-01 PROCEDURE — 99497 ADVNCD CARE PLAN 30 MIN: CPT | Performed by: NURSE PRACTITIONER

## 2021-01-01 PROCEDURE — 99220 PR INITIAL OBSERVATION CARE,LEVL III: CPT | Performed by: INTERNAL MEDICINE

## 2021-01-01 PROCEDURE — 74176 CT ABD & PELVIS W/O CONTRAST: CPT | Mod: MG

## 2021-01-01 PROCEDURE — 501329 HCHG SET, CYSTO IRRIG Y TUR: Performed by: UROLOGY

## 2021-01-01 PROCEDURE — 87077 CULTURE AEROBIC IDENTIFY: CPT | Mod: 91

## 2021-01-01 PROCEDURE — 160042 HCHG SURGERY MINUTES - EA ADDL 1 MIN LEVEL 5: Performed by: SURGERY

## 2021-01-01 PROCEDURE — 700105 HCHG RX REV CODE 258: Performed by: HOSPITALIST

## 2021-01-01 PROCEDURE — 99291 CRITICAL CARE FIRST HOUR: CPT | Performed by: STUDENT IN AN ORGANIZED HEALTH CARE EDUCATION/TRAINING PROGRAM

## 2021-01-01 PROCEDURE — 99232 SBSQ HOSP IP/OBS MODERATE 35: CPT | Performed by: INTERNAL MEDICINE

## 2021-01-01 PROCEDURE — 99233 SBSQ HOSP IP/OBS HIGH 50: CPT | Performed by: HOSPITALIST

## 2021-01-01 PROCEDURE — 160039 HCHG SURGERY MINUTES - EA ADDL 1 MIN LEVEL 3: Performed by: UROLOGY

## 2021-01-01 PROCEDURE — 700101 HCHG RX REV CODE 250: Performed by: NURSE PRACTITIONER

## 2021-01-01 PROCEDURE — 302098 PASTE RING (FLAT): Performed by: STUDENT IN AN ORGANIZED HEALTH CARE EDUCATION/TRAINING PROGRAM

## 2021-01-01 PROCEDURE — 87070 CULTURE OTHR SPECIMN AEROBIC: CPT

## 2021-01-01 PROCEDURE — 93010 ELECTROCARDIOGRAM REPORT: CPT | Performed by: INTERNAL MEDICINE

## 2021-01-01 PROCEDURE — 88307 TISSUE EXAM BY PATHOLOGIST: CPT

## 2021-01-01 PROCEDURE — 0DTE0ZZ RESECTION OF LARGE INTESTINE, OPEN APPROACH: ICD-10-PCS | Performed by: SURGERY

## 2021-01-01 PROCEDURE — 85007 BL SMEAR W/DIFF WBC COUNT: CPT | Mod: 91

## 2021-01-01 PROCEDURE — 160027 HCHG SURGERY MINUTES - 1ST 30 MINS LEVEL 2: Performed by: SURGERY

## 2021-01-01 PROCEDURE — 74018 RADEX ABDOMEN 1 VIEW: CPT

## 2021-01-01 PROCEDURE — 87075 CULTR BACTERIA EXCEPT BLOOD: CPT

## 2021-01-01 PROCEDURE — 700101 HCHG RX REV CODE 250: Performed by: HOSPITALIST

## 2021-01-01 PROCEDURE — 302113 2 1/4 HIGH OUTPUT POUCH": Performed by: FAMILY MEDICINE

## 2021-01-01 PROCEDURE — 86850 RBC ANTIBODY SCREEN: CPT

## 2021-01-01 PROCEDURE — 93005 ELECTROCARDIOGRAM TRACING: CPT | Performed by: EMERGENCY MEDICINE

## 2021-01-01 PROCEDURE — 99498 ADVNCD CARE PLAN ADDL 30 MIN: CPT | Performed by: NURSE PRACTITIONER

## 2021-01-01 PROCEDURE — A9270 NON-COVERED ITEM OR SERVICE: HCPCS | Performed by: NURSE PRACTITIONER

## 2021-01-01 PROCEDURE — 700102 HCHG RX REV CODE 250 W/ 637 OVERRIDE(OP): Performed by: EMERGENCY MEDICINE

## 2021-01-01 PROCEDURE — 99239 HOSP IP/OBS DSCHRG MGMT >30: CPT | Performed by: HOSPITALIST

## 2021-01-01 PROCEDURE — 88300 SURGICAL PATH GROSS: CPT

## 2021-01-01 PROCEDURE — 85610 PROTHROMBIN TIME: CPT

## 2021-01-01 PROCEDURE — 83880 ASSAY OF NATRIURETIC PEPTIDE: CPT

## 2021-01-01 PROCEDURE — 87077 CULTURE AEROBIC IDENTIFY: CPT

## 2021-01-01 PROCEDURE — 700111 HCHG RX REV CODE 636 W/ 250 OVERRIDE (IP)

## 2021-01-01 PROCEDURE — 87147 CULTURE TYPE IMMUNOLOGIC: CPT

## 2021-01-01 PROCEDURE — 88309 TISSUE EXAM BY PATHOLOGIST: CPT

## 2021-01-01 PROCEDURE — 86901 BLOOD TYPING SEROLOGIC RH(D): CPT

## 2021-01-01 PROCEDURE — 51798 US URINE CAPACITY MEASURE: CPT

## 2021-01-01 PROCEDURE — 74177 CT ABD & PELVIS W/CONTRAST: CPT | Mod: ME

## 2021-01-01 PROCEDURE — A4338 INDWELLING CATHETER LATEX: HCPCS | Performed by: UROLOGY

## 2021-01-01 PROCEDURE — 160035 HCHG PACU - 1ST 60 MINS PHASE I: Performed by: UROLOGY

## 2021-01-01 PROCEDURE — 160002 HCHG RECOVERY MINUTES (STAT): Performed by: SURGERY

## 2021-01-01 PROCEDURE — 160036 HCHG PACU - EA ADDL 30 MINS PHASE I: Performed by: SURGERY

## 2021-01-01 PROCEDURE — 160028 HCHG SURGERY MINUTES - 1ST 30 MINS LEVEL 3: Performed by: UROLOGY

## 2021-01-01 PROCEDURE — 93005 ELECTROCARDIOGRAM TRACING: CPT | Performed by: FAMILY MEDICINE

## 2021-01-01 PROCEDURE — 99212 OFFICE O/P EST SF 10 MIN: CPT | Performed by: PSYCHIATRY & NEUROLOGY

## 2021-01-01 PROCEDURE — 500892 HCHG PACK, PERI-GYN: Performed by: UROLOGY

## 2021-01-01 PROCEDURE — 87102 FUNGUS ISOLATION CULTURE: CPT

## 2021-01-01 PROCEDURE — 700105 HCHG RX REV CODE 258: Performed by: NURSE PRACTITIONER

## 2021-01-01 PROCEDURE — 70450 CT HEAD/BRAIN W/O DYE: CPT | Mod: ME

## 2021-01-01 PROCEDURE — 99221 1ST HOSP IP/OBS SF/LOW 40: CPT | Performed by: INTERNAL MEDICINE

## 2021-01-01 PROCEDURE — 302102 BAG OST N IMG 2.25IN 2PC (FECAL): Performed by: FAMILY MEDICINE

## 2021-01-01 PROCEDURE — 74177 CT ABD & PELVIS W/CONTRAST: CPT | Mod: MG

## 2021-01-01 PROCEDURE — 96374 THER/PROPH/DIAG INJ IV PUSH: CPT

## 2021-01-01 PROCEDURE — 501838 HCHG SUTURE GENERAL: Performed by: SURGERY

## 2021-01-01 PROCEDURE — 87150 DNA/RNA AMPLIFIED PROBE: CPT

## 2021-01-01 PROCEDURE — 700105 HCHG RX REV CODE 258: Performed by: ANESTHESIOLOGY

## 2021-01-01 PROCEDURE — 160048 HCHG OR STATISTICAL LEVEL 1-5: Performed by: UROLOGY

## 2021-01-01 PROCEDURE — 80053 COMPREHEN METABOLIC PANEL: CPT | Mod: 91

## 2021-01-01 PROCEDURE — 0TCB8ZZ EXTIRPATION OF MATTER FROM BLADDER, VIA NATURAL OR ARTIFICIAL OPENING ENDOSCOPIC: ICD-10-PCS | Performed by: UROLOGY

## 2021-01-01 PROCEDURE — 700117 HCHG RX CONTRAST REV CODE 255: Performed by: FAMILY MEDICINE

## 2021-01-01 PROCEDURE — 86900 BLOOD TYPING SEROLOGIC ABO: CPT

## 2021-01-01 PROCEDURE — 302106 OSTOMY POWDER: Performed by: FAMILY MEDICINE

## 2021-01-01 PROCEDURE — C9803 HOPD COVID-19 SPEC COLLECT: HCPCS | Performed by: EMERGENCY MEDICINE

## 2021-01-01 PROCEDURE — 82803 BLOOD GASES ANY COMBINATION: CPT

## 2021-01-01 PROCEDURE — 96365 THER/PROPH/DIAG IV INF INIT: CPT

## 2021-01-01 RX ORDER — BUPROPION HYDROCHLORIDE 100 MG/1
TABLET ORAL
COMMUNITY
End: 2021-01-01

## 2021-01-01 RX ORDER — HYDROMORPHONE HYDROCHLORIDE 1 MG/ML
0.4 INJECTION, SOLUTION INTRAMUSCULAR; INTRAVENOUS; SUBCUTANEOUS
Status: DISCONTINUED | OUTPATIENT
Start: 2021-01-01 | End: 2021-01-01 | Stop reason: HOSPADM

## 2021-01-01 RX ORDER — HYDROMORPHONE HYDROCHLORIDE 1 MG/ML
0.1 INJECTION, SOLUTION INTRAMUSCULAR; INTRAVENOUS; SUBCUTANEOUS
Status: DISCONTINUED | OUTPATIENT
Start: 2021-01-01 | End: 2021-01-01 | Stop reason: HOSPADM

## 2021-01-01 RX ORDER — ESCITALOPRAM OXALATE 10 MG/1
20 TABLET ORAL DAILY
Status: DISCONTINUED | OUTPATIENT
Start: 2021-01-01 | End: 2021-01-01

## 2021-01-01 RX ORDER — OXYBUTYNIN CHLORIDE 10 MG/1
10 TABLET, EXTENDED RELEASE ORAL 3 TIMES DAILY
COMMUNITY

## 2021-01-01 RX ORDER — LIDOCAINE 50 MG/G
1 PATCH TOPICAL EVERY 24 HOURS
Status: DISCONTINUED | OUTPATIENT
Start: 2021-01-01 | End: 2021-01-01

## 2021-01-01 RX ORDER — BACILLUS COAGULANS/INULIN 1B-250 MG
CAPSULE ORAL
Status: ON HOLD | COMMUNITY
End: 2021-01-01

## 2021-01-01 RX ORDER — HYDROMORPHONE HYDROCHLORIDE 2 MG/ML
2 INJECTION, SOLUTION INTRAMUSCULAR; INTRAVENOUS; SUBCUTANEOUS
Status: DISCONTINUED | OUTPATIENT
Start: 2021-01-01 | End: 2021-01-01

## 2021-01-01 RX ORDER — HYDROMORPHONE HYDROCHLORIDE 2 MG/ML
8 INJECTION, SOLUTION INTRAMUSCULAR; INTRAVENOUS; SUBCUTANEOUS
Status: DISCONTINUED | OUTPATIENT
Start: 2021-01-01 | End: 2021-01-01 | Stop reason: HOSPADM

## 2021-01-01 RX ORDER — MAGNESIUM SULFATE HEPTAHYDRATE 40 MG/ML
2 INJECTION, SOLUTION INTRAVENOUS ONCE
Status: COMPLETED | OUTPATIENT
Start: 2021-01-01 | End: 2021-01-01

## 2021-01-01 RX ORDER — LIDOCAINE 50 MG/G
2 PATCH TOPICAL DAILY
Status: DISCONTINUED | OUTPATIENT
Start: 2021-01-01 | End: 2021-01-01 | Stop reason: HOSPADM

## 2021-01-01 RX ORDER — POTASSIUM CHLORIDE 20 MEQ/1
40 TABLET, EXTENDED RELEASE ORAL EVERY 6 HOURS
Status: COMPLETED | OUTPATIENT
Start: 2021-01-01 | End: 2021-01-01

## 2021-01-01 RX ORDER — KETAMINE HYDROCHLORIDE 50 MG/ML
INJECTION, SOLUTION INTRAMUSCULAR; INTRAVENOUS ONCE
Status: ON HOLD | COMMUNITY
End: 2021-01-01

## 2021-01-01 RX ORDER — BACLOFEN 10 MG/1
10 TABLET ORAL 4 TIMES DAILY
Status: DISCONTINUED | OUTPATIENT
Start: 2021-01-01 | End: 2021-01-01 | Stop reason: HOSPADM

## 2021-01-01 RX ORDER — POLYETHYLENE GLYCOL 3350 17 G/17G
1 POWDER, FOR SOLUTION ORAL 2 TIMES DAILY
Status: DISCONTINUED | OUTPATIENT
Start: 2021-01-01 | End: 2021-01-01 | Stop reason: HOSPADM

## 2021-01-01 RX ORDER — ONDANSETRON 2 MG/ML
4 INJECTION INTRAMUSCULAR; INTRAVENOUS
Status: DISCONTINUED | OUTPATIENT
Start: 2021-01-01 | End: 2021-01-01 | Stop reason: HOSPADM

## 2021-01-01 RX ORDER — OXYCODONE HCL 5 MG/5 ML
10 SOLUTION, ORAL ORAL
Status: DISCONTINUED | OUTPATIENT
Start: 2021-01-01 | End: 2021-01-01 | Stop reason: HOSPADM

## 2021-01-01 RX ORDER — LACTULOSE 10 G/15ML
SOLUTION ORAL
COMMUNITY
Start: 2021-01-01 | End: 2021-01-01

## 2021-01-01 RX ORDER — BACLOFEN 10 MG/1
10 TABLET ORAL 4 TIMES DAILY
Status: DISCONTINUED | OUTPATIENT
Start: 2021-01-01 | End: 2021-01-01

## 2021-01-01 RX ORDER — DIAZEPAM 5 MG/ML
5 INJECTION, SOLUTION INTRAMUSCULAR; INTRAVENOUS EVERY 4 HOURS PRN
Status: DISCONTINUED | OUTPATIENT
Start: 2021-01-01 | End: 2021-01-01

## 2021-01-01 RX ORDER — CEFDINIR 300 MG/1
300 CAPSULE ORAL 2 TIMES DAILY
Qty: 11 CAPSULE | Refills: 0
Start: 2021-01-01 | End: 2021-01-01

## 2021-01-01 RX ORDER — HYDRALAZINE HYDROCHLORIDE 20 MG/ML
5 INJECTION INTRAMUSCULAR; INTRAVENOUS
Status: DISCONTINUED | OUTPATIENT
Start: 2021-01-01 | End: 2021-01-01 | Stop reason: HOSPADM

## 2021-01-01 RX ORDER — PROMETHAZINE HYDROCHLORIDE 25 MG/1
12.5-25 TABLET ORAL EVERY 4 HOURS PRN
Status: DISCONTINUED | OUTPATIENT
Start: 2021-01-01 | End: 2021-01-01 | Stop reason: HOSPADM

## 2021-01-01 RX ORDER — MEPERIDINE HYDROCHLORIDE 25 MG/ML
6.25 INJECTION INTRAMUSCULAR; INTRAVENOUS; SUBCUTANEOUS
Status: DISCONTINUED | OUTPATIENT
Start: 2021-01-01 | End: 2021-01-01 | Stop reason: HOSPADM

## 2021-01-01 RX ORDER — OXYCODONE HYDROCHLORIDE 5 MG/1
5-10 TABLET ORAL EVERY 4 HOURS PRN
Status: DISCONTINUED | OUTPATIENT
Start: 2021-01-01 | End: 2021-01-01

## 2021-01-01 RX ORDER — OXYCODONE HYDROCHLORIDE 5 MG/1
2.5 TABLET ORAL
Status: DISCONTINUED | OUTPATIENT
Start: 2021-01-01 | End: 2021-01-01 | Stop reason: HOSPADM

## 2021-01-01 RX ORDER — FAMOTIDINE 20 MG/1
20 TABLET, FILM COATED ORAL 2 TIMES DAILY
Status: DISCONTINUED | OUTPATIENT
Start: 2021-01-01 | End: 2021-01-01 | Stop reason: HOSPADM

## 2021-01-01 RX ORDER — SODIUM CHLORIDE 9 MG/ML
INJECTION, SOLUTION INTRAVENOUS CONTINUOUS
Status: DISCONTINUED | OUTPATIENT
Start: 2021-01-01 | End: 2021-01-01

## 2021-01-01 RX ORDER — ACETAMINOPHEN 325 MG/1
650 TABLET ORAL EVERY 6 HOURS PRN
Status: DISCONTINUED | OUTPATIENT
Start: 2021-01-01 | End: 2021-01-01

## 2021-01-01 RX ORDER — POTASSIUM CHLORIDE 20 MEQ/1
40 TABLET, EXTENDED RELEASE ORAL 2 TIMES DAILY
Status: DISPENSED | OUTPATIENT
Start: 2021-01-01 | End: 2021-01-01

## 2021-01-01 RX ORDER — METHENAMINE HIPPURATE 1000 MG/1
1 TABLET ORAL 2 TIMES DAILY
COMMUNITY

## 2021-01-01 RX ORDER — HYDROMORPHONE HYDROCHLORIDE 1 MG/ML
0.2 INJECTION, SOLUTION INTRAMUSCULAR; INTRAVENOUS; SUBCUTANEOUS
Status: DISCONTINUED | OUTPATIENT
Start: 2021-01-01 | End: 2021-01-01 | Stop reason: HOSPADM

## 2021-01-01 RX ORDER — LORAZEPAM 2 MG/ML
1 CONCENTRATE ORAL
Status: DISCONTINUED | OUTPATIENT
Start: 2021-01-01 | End: 2021-01-01

## 2021-01-01 RX ORDER — LORAZEPAM 2 MG/ML
0.5 INJECTION INTRAMUSCULAR EVERY 4 HOURS PRN
Status: DISCONTINUED | OUTPATIENT
Start: 2021-01-01 | End: 2021-01-01

## 2021-01-01 RX ORDER — ACETAMINOPHEN 325 MG/1
TABLET ORAL
COMMUNITY
End: 2021-01-01

## 2021-01-01 RX ORDER — LIDOCAINE 50 MG/G
OINTMENT TOPICAL EVERY 24 HOURS
Status: DISCONTINUED | OUTPATIENT
Start: 2021-01-01 | End: 2021-01-01

## 2021-01-01 RX ORDER — GRANULES FOR ORAL 3 G/1
3 POWDER ORAL ONCE
Status: DISCONTINUED | OUTPATIENT
Start: 2021-01-01 | End: 2021-01-01

## 2021-01-01 RX ORDER — SODIUM CHLORIDE, SODIUM LACTATE, POTASSIUM CHLORIDE, CALCIUM CHLORIDE 600; 310; 30; 20 MG/100ML; MG/100ML; MG/100ML; MG/100ML
1000 INJECTION, SOLUTION INTRAVENOUS ONCE
Status: COMPLETED | OUTPATIENT
Start: 2021-01-01 | End: 2021-01-01

## 2021-01-01 RX ORDER — ACETAMINOPHEN 500 MG
1000 TABLET ORAL ONCE
Status: COMPLETED | OUTPATIENT
Start: 2021-01-01 | End: 2021-01-01

## 2021-01-01 RX ORDER — METOPROLOL TARTRATE 1 MG/ML
5 INJECTION, SOLUTION INTRAVENOUS
Status: DISPENSED | OUTPATIENT
Start: 2021-01-01 | End: 2021-01-01

## 2021-01-01 RX ORDER — DIAZEPAM 5 MG/ML
5 INJECTION, SOLUTION INTRAMUSCULAR; INTRAVENOUS ONCE
Status: COMPLETED | OUTPATIENT
Start: 2021-01-01 | End: 2021-01-01

## 2021-01-01 RX ORDER — POTASSIUM CHLORIDE 7.45 MG/ML
10 INJECTION INTRAVENOUS ONCE
Status: DISCONTINUED | OUTPATIENT
Start: 2021-01-01 | End: 2021-01-01

## 2021-01-01 RX ORDER — FUROSEMIDE 10 MG/ML
INJECTION INTRAMUSCULAR; INTRAVENOUS
Status: COMPLETED
Start: 2021-01-01 | End: 2021-01-01

## 2021-01-01 RX ORDER — OXYBUTYNIN CHLORIDE 5 MG/1
10 TABLET, EXTENDED RELEASE ORAL 3 TIMES DAILY
Status: DISCONTINUED | OUTPATIENT
Start: 2021-01-01 | End: 2021-01-01 | Stop reason: HOSPADM

## 2021-01-01 RX ORDER — DEXTROSE MONOHYDRATE, SODIUM CHLORIDE, AND POTASSIUM CHLORIDE 50; 1.49; 9 G/1000ML; G/1000ML; G/1000ML
INJECTION, SOLUTION INTRAVENOUS CONTINUOUS
Status: DISCONTINUED | OUTPATIENT
Start: 2021-01-01 | End: 2021-01-01

## 2021-01-01 RX ORDER — ACETAMINOPHEN 325 MG/1
650 TABLET ORAL EVERY 6 HOURS PRN
Status: DISCONTINUED | OUTPATIENT
Start: 2021-01-01 | End: 2021-01-01 | Stop reason: HOSPADM

## 2021-01-01 RX ORDER — LORAZEPAM 2 MG/ML
0.5 INJECTION INTRAMUSCULAR EVERY 6 HOURS PRN
Status: DISCONTINUED | OUTPATIENT
Start: 2021-01-01 | End: 2021-01-01

## 2021-01-01 RX ORDER — SODIUM CHLORIDE, SODIUM GLUCONATE, SODIUM ACETATE, POTASSIUM CHLORIDE AND MAGNESIUM CHLORIDE 526; 502; 368; 37; 30 MG/100ML; MG/100ML; MG/100ML; MG/100ML; MG/100ML
INJECTION, SOLUTION INTRAVENOUS
Status: DISCONTINUED | OUTPATIENT
Start: 2021-01-01 | End: 2021-01-01 | Stop reason: SURG

## 2021-01-01 RX ORDER — LORAZEPAM 2 MG/ML
0.5 INJECTION INTRAMUSCULAR ONCE
Status: DISCONTINUED | OUTPATIENT
Start: 2021-01-01 | End: 2021-01-01

## 2021-01-01 RX ORDER — SODIUM CHLORIDE, SODIUM LACTATE, POTASSIUM CHLORIDE, CALCIUM CHLORIDE 600; 310; 30; 20 MG/100ML; MG/100ML; MG/100ML; MG/100ML
INJECTION, SOLUTION INTRAVENOUS CONTINUOUS
Status: DISCONTINUED | OUTPATIENT
Start: 2021-01-01 | End: 2021-01-01 | Stop reason: HOSPADM

## 2021-01-01 RX ORDER — FLUCONAZOLE 200 MG/1
200 TABLET ORAL ONCE
Status: COMPLETED | OUTPATIENT
Start: 2021-01-01 | End: 2021-01-01

## 2021-01-01 RX ORDER — ACETAMINOPHEN 325 MG/1
650 TABLET ORAL ONCE
Status: ACTIVE | OUTPATIENT
Start: 2021-01-01 | End: 2021-01-01

## 2021-01-01 RX ORDER — BUPROPION HYDROCHLORIDE 100 MG/1
100 TABLET, EXTENDED RELEASE ORAL 3 TIMES DAILY
Status: DISCONTINUED | OUTPATIENT
Start: 2021-01-01 | End: 2021-01-01 | Stop reason: HOSPADM

## 2021-01-01 RX ORDER — POTASSIUM CHLORIDE 7.45 MG/ML
10 INJECTION INTRAVENOUS
Status: COMPLETED | OUTPATIENT
Start: 2021-01-01 | End: 2021-01-01

## 2021-01-01 RX ORDER — SODIUM CHLORIDE, SODIUM LACTATE, POTASSIUM CHLORIDE, CALCIUM CHLORIDE 600; 310; 30; 20 MG/100ML; MG/100ML; MG/100ML; MG/100ML
INJECTION, SOLUTION INTRAVENOUS CONTINUOUS
Status: DISCONTINUED | OUTPATIENT
Start: 2021-01-01 | End: 2021-01-01

## 2021-01-01 RX ORDER — SCOLOPAMINE TRANSDERMAL SYSTEM 1 MG/1
1 PATCH, EXTENDED RELEASE TRANSDERMAL
Status: DISCONTINUED | OUTPATIENT
Start: 2021-01-01 | End: 2021-01-01 | Stop reason: HOSPADM

## 2021-01-01 RX ORDER — HALOPERIDOL 5 MG/ML
1 INJECTION INTRAMUSCULAR
Status: DISCONTINUED | OUTPATIENT
Start: 2021-01-01 | End: 2021-01-01 | Stop reason: HOSPADM

## 2021-01-01 RX ORDER — MORPHINE SULFATE 15 MG/1
15-30 TABLET ORAL EVERY 6 HOURS PRN
Status: DISCONTINUED | OUTPATIENT
Start: 2021-01-01 | End: 2021-01-01

## 2021-01-01 RX ORDER — LORAZEPAM 2 MG/ML
1 INJECTION INTRAMUSCULAR
Status: DISCONTINUED | OUTPATIENT
Start: 2021-01-01 | End: 2021-01-01

## 2021-01-01 RX ORDER — KETOROLAC TROMETHAMINE 30 MG/ML
INJECTION, SOLUTION INTRAMUSCULAR; INTRAVENOUS PRN
Status: DISCONTINUED | OUTPATIENT
Start: 2021-01-01 | End: 2021-01-01 | Stop reason: SURG

## 2021-01-01 RX ORDER — HYDROMORPHONE HYDROCHLORIDE 2 MG/ML
4 INJECTION, SOLUTION INTRAMUSCULAR; INTRAVENOUS; SUBCUTANEOUS
Status: DISCONTINUED | OUTPATIENT
Start: 2021-01-01 | End: 2021-01-01

## 2021-01-01 RX ORDER — MEPERIDINE HYDROCHLORIDE 25 MG/ML
12.5 INJECTION INTRAMUSCULAR; INTRAVENOUS; SUBCUTANEOUS
Status: DISCONTINUED | OUTPATIENT
Start: 2021-01-01 | End: 2021-01-01 | Stop reason: HOSPADM

## 2021-01-01 RX ORDER — ATROPINE SULFATE 10 MG/ML
2 SOLUTION/ DROPS OPHTHALMIC EVERY 4 HOURS PRN
Status: DISCONTINUED | OUTPATIENT
Start: 2021-01-01 | End: 2021-01-01 | Stop reason: HOSPADM

## 2021-01-01 RX ORDER — ONDANSETRON 2 MG/ML
4 INJECTION INTRAMUSCULAR; INTRAVENOUS EVERY 4 HOURS PRN
Status: DISCONTINUED | OUTPATIENT
Start: 2021-01-01 | End: 2021-01-01 | Stop reason: HOSPADM

## 2021-01-01 RX ORDER — ENEMA 19; 7 G/133ML; G/133ML
1 ENEMA RECTAL ONCE
Status: COMPLETED | OUTPATIENT
Start: 2021-01-01 | End: 2021-01-01

## 2021-01-01 RX ORDER — PROMETHAZINE HYDROCHLORIDE 25 MG/1
12.5-25 SUPPOSITORY RECTAL EVERY 4 HOURS PRN
Status: DISCONTINUED | OUTPATIENT
Start: 2021-01-01 | End: 2021-01-01 | Stop reason: HOSPADM

## 2021-01-01 RX ORDER — LIDOCAINE 50 MG/G
1 PATCH TOPICAL EVERY 24 HOURS
COMMUNITY

## 2021-01-01 RX ORDER — LACTOBACILLUS RHAMNOSUS GG 10B CELL
1 CAPSULE ORAL
Status: DISCONTINUED | OUTPATIENT
Start: 2021-01-01 | End: 2021-01-01 | Stop reason: HOSPADM

## 2021-01-01 RX ORDER — ONDANSETRON 2 MG/ML
8 INJECTION INTRAMUSCULAR; INTRAVENOUS EVERY 8 HOURS PRN
Status: DISCONTINUED | OUTPATIENT
Start: 2021-01-01 | End: 2021-01-01 | Stop reason: HOSPADM

## 2021-01-01 RX ORDER — ONDANSETRON 2 MG/ML
INJECTION INTRAMUSCULAR; INTRAVENOUS PRN
Status: DISCONTINUED | OUTPATIENT
Start: 2021-01-01 | End: 2021-01-01 | Stop reason: SURG

## 2021-01-01 RX ORDER — CEFTRIAXONE 1 G/1
1 INJECTION, POWDER, FOR SOLUTION INTRAMUSCULAR; INTRAVENOUS ONCE
Status: COMPLETED | OUTPATIENT
Start: 2021-01-01 | End: 2021-01-01

## 2021-01-01 RX ORDER — LIDOCAINE 50 MG/G
1 PATCH TOPICAL EVERY 24 HOURS
Status: DISCONTINUED | OUTPATIENT
Start: 2021-01-01 | End: 2021-01-01 | Stop reason: HOSPADM

## 2021-01-01 RX ORDER — OXYBUTYNIN CHLORIDE 5 MG/1
10 TABLET, EXTENDED RELEASE ORAL 3 TIMES DAILY
Status: DISCONTINUED | OUTPATIENT
Start: 2021-01-01 | End: 2021-01-01

## 2021-01-01 RX ORDER — LABETALOL HYDROCHLORIDE 5 MG/ML
10 INJECTION, SOLUTION INTRAVENOUS EVERY 4 HOURS PRN
Status: DISCONTINUED | OUTPATIENT
Start: 2021-01-01 | End: 2021-01-01 | Stop reason: HOSPADM

## 2021-01-01 RX ORDER — CEFDINIR 300 MG/1
300 CAPSULE ORAL EVERY 12 HOURS
Status: DISCONTINUED | OUTPATIENT
Start: 2021-01-01 | End: 2021-01-01 | Stop reason: HOSPADM

## 2021-01-01 RX ORDER — BUPROPION HYDROCHLORIDE 100 MG/1
100 TABLET, EXTENDED RELEASE ORAL 3 TIMES DAILY
Status: DISCONTINUED | OUTPATIENT
Start: 2021-01-01 | End: 2021-01-01

## 2021-01-01 RX ORDER — EUCALYPTUS/PEPPERMINT OIL
2 SOLUTION, NON-ORAL NASAL
COMMUNITY

## 2021-01-01 RX ORDER — LACTOBACILLUS RHAMNOSUS GG 10B CELL
1 CAPSULE ORAL
Qty: 30 CAPSULE | Refills: 0 | Status: SHIPPED | OUTPATIENT
Start: 2021-01-01

## 2021-01-01 RX ORDER — SODIUM CHLORIDE 9 MG/ML
1000 INJECTION, SOLUTION INTRAVENOUS ONCE
Status: COMPLETED | OUTPATIENT
Start: 2021-01-01 | End: 2021-01-01

## 2021-01-01 RX ORDER — AMOXICILLIN 250 MG
2 CAPSULE ORAL 2 TIMES DAILY
Status: DISCONTINUED | OUTPATIENT
Start: 2021-01-01 | End: 2021-01-01 | Stop reason: HOSPADM

## 2021-01-01 RX ORDER — ONDANSETRON 4 MG/1
TABLET, FILM COATED ORAL
COMMUNITY
Start: 2021-01-01 | End: 2021-01-01

## 2021-01-01 RX ORDER — BUPROPION HYDROCHLORIDE 100 MG/1
100 TABLET ORAL 3 TIMES DAILY
COMMUNITY
Start: 2021-01-01

## 2021-01-01 RX ORDER — DOCUSATE SODIUM 100 MG/1
100 CAPSULE, LIQUID FILLED ORAL 2 TIMES DAILY
Qty: 60 CAPSULE | Refills: 0
Start: 2021-01-01 | End: 2021-01-01

## 2021-01-01 RX ORDER — MAGNESIUM SULFATE HEPTAHYDRATE 40 MG/ML
4 INJECTION, SOLUTION INTRAVENOUS ONCE
Status: COMPLETED | OUTPATIENT
Start: 2021-01-01 | End: 2021-01-01

## 2021-01-01 RX ORDER — MIDAZOLAM HYDROCHLORIDE 1 MG/ML
1 INJECTION INTRAMUSCULAR; INTRAVENOUS
Status: DISCONTINUED | OUTPATIENT
Start: 2021-01-01 | End: 2021-01-01 | Stop reason: HOSPADM

## 2021-01-01 RX ORDER — FUROSEMIDE 10 MG/ML
20 INJECTION INTRAMUSCULAR; INTRAVENOUS
Status: DISCONTINUED | OUTPATIENT
Start: 2021-01-01 | End: 2021-01-01

## 2021-01-01 RX ORDER — SODIUM CHLORIDE 9 MG/ML
INJECTION, SOLUTION INTRAVENOUS
COMMUNITY
Start: 2021-01-01 | End: 2021-01-01

## 2021-01-01 RX ORDER — HYDROMORPHONE HYDROCHLORIDE 1 MG/ML
0.5 INJECTION, SOLUTION INTRAMUSCULAR; INTRAVENOUS; SUBCUTANEOUS
Status: DISCONTINUED | OUTPATIENT
Start: 2021-01-01 | End: 2021-01-01

## 2021-01-01 RX ORDER — ONDANSETRON 2 MG/ML
4 INJECTION INTRAMUSCULAR; INTRAVENOUS ONCE
Status: COMPLETED | OUTPATIENT
Start: 2021-01-01 | End: 2021-01-01

## 2021-01-01 RX ORDER — POLYVINYL ALCOHOL 14 MG/ML
2 SOLUTION/ DROPS OPHTHALMIC EVERY 6 HOURS PRN
Status: DISCONTINUED | OUTPATIENT
Start: 2021-01-01 | End: 2021-01-01 | Stop reason: HOSPADM

## 2021-01-01 RX ORDER — OXYCODONE HCL 5 MG/5 ML
5 SOLUTION, ORAL ORAL
Status: DISCONTINUED | OUTPATIENT
Start: 2021-01-01 | End: 2021-01-01 | Stop reason: HOSPADM

## 2021-01-01 RX ORDER — GLYCOPYRROLATE 0.2 MG/ML
0.2 INJECTION INTRAMUSCULAR; INTRAVENOUS EVERY 4 HOURS PRN
Status: DISCONTINUED | OUTPATIENT
Start: 2021-01-01 | End: 2021-01-01 | Stop reason: HOSPADM

## 2021-01-01 RX ORDER — PROCHLORPERAZINE EDISYLATE 5 MG/ML
5-10 INJECTION INTRAMUSCULAR; INTRAVENOUS EVERY 4 HOURS PRN
Status: DISCONTINUED | OUTPATIENT
Start: 2021-01-01 | End: 2021-01-01 | Stop reason: HOSPADM

## 2021-01-01 RX ORDER — CEFDINIR 300 MG/1
300 CAPSULE ORAL EVERY 12 HOURS
Status: DISCONTINUED | OUTPATIENT
Start: 2021-01-01 | End: 2021-01-01

## 2021-01-01 RX ORDER — CLONIDINE HYDROCHLORIDE 0.1 MG/1
0.1 TABLET ORAL EVERY 6 HOURS PRN
Status: DISCONTINUED | OUTPATIENT
Start: 2021-01-01 | End: 2021-01-01 | Stop reason: HOSPADM

## 2021-01-01 RX ORDER — GLYCOPYRROLATE 1 MG/1
1 TABLET ORAL 3 TIMES DAILY PRN
Status: DISCONTINUED | OUTPATIENT
Start: 2021-01-01 | End: 2021-01-01 | Stop reason: HOSPADM

## 2021-01-01 RX ORDER — BISACODYL 10 MG
10 SUPPOSITORY, RECTAL RECTAL
Status: DISCONTINUED | OUTPATIENT
Start: 2021-01-01 | End: 2021-01-01 | Stop reason: HOSPADM

## 2021-01-01 RX ORDER — SODIUM CHLORIDE, SODIUM LACTATE, POTASSIUM CHLORIDE, AND CALCIUM CHLORIDE .6; .31; .03; .02 G/100ML; G/100ML; G/100ML; G/100ML
500 INJECTION, SOLUTION INTRAVENOUS ONCE
Status: COMPLETED | OUTPATIENT
Start: 2021-01-01 | End: 2021-01-01

## 2021-01-01 RX ORDER — ONDANSETRON 2 MG/ML
INJECTION INTRAMUSCULAR; INTRAVENOUS
COMMUNITY
Start: 2021-01-01 | End: 2021-01-01

## 2021-01-01 RX ORDER — GLYCOPYRROLATE 0.2 MG/ML
0.2 INJECTION INTRAMUSCULAR; INTRAVENOUS 3 TIMES DAILY PRN
Status: DISCONTINUED | OUTPATIENT
Start: 2021-01-01 | End: 2021-01-01 | Stop reason: HOSPADM

## 2021-01-01 RX ORDER — CEFDINIR 300 MG/1
300 CAPSULE ORAL 2 TIMES DAILY
Status: ON HOLD | COMMUNITY
End: 2021-01-01

## 2021-01-01 RX ORDER — SERTRALINE HYDROCHLORIDE 25 MG/1
25 TABLET, FILM COATED ORAL DAILY
COMMUNITY
End: 2021-01-01

## 2021-01-01 RX ORDER — DIPHENHYDRAMINE HYDROCHLORIDE 50 MG/ML
12.5 INJECTION INTRAMUSCULAR; INTRAVENOUS
Status: DISCONTINUED | OUTPATIENT
Start: 2021-01-01 | End: 2021-01-01 | Stop reason: HOSPADM

## 2021-01-01 RX ORDER — LORAZEPAM 2 MG/ML
0.5 INJECTION INTRAMUSCULAR ONCE
Status: COMPLETED | OUTPATIENT
Start: 2021-01-01 | End: 2021-01-01

## 2021-01-01 RX ORDER — ONDANSETRON 4 MG/1
4 TABLET, ORALLY DISINTEGRATING ORAL EVERY 4 HOURS PRN
Status: DISCONTINUED | OUTPATIENT
Start: 2021-01-01 | End: 2021-01-01

## 2021-01-01 RX ORDER — HYDROMORPHONE HYDROCHLORIDE 2 MG/ML
4 INJECTION, SOLUTION INTRAMUSCULAR; INTRAVENOUS; SUBCUTANEOUS
Status: DISCONTINUED | OUTPATIENT
Start: 2021-01-01 | End: 2021-01-01 | Stop reason: HOSPADM

## 2021-01-01 RX ORDER — SODIUM CHLORIDE, SODIUM LACTATE, POTASSIUM CHLORIDE, CALCIUM CHLORIDE 600; 310; 30; 20 MG/100ML; MG/100ML; MG/100ML; MG/100ML
INJECTION, SOLUTION INTRAVENOUS CONTINUOUS
Status: ACTIVE | OUTPATIENT
Start: 2021-01-01 | End: 2021-01-01

## 2021-01-01 RX ORDER — ACETAMINOPHEN 325 MG/1
650 TABLET ORAL ONCE
Status: COMPLETED | OUTPATIENT
Start: 2021-01-01 | End: 2021-01-01

## 2021-01-01 RX ORDER — DEXAMETHASONE SODIUM PHOSPHATE 4 MG/ML
INJECTION, SOLUTION INTRA-ARTICULAR; INTRALESIONAL; INTRAMUSCULAR; INTRAVENOUS; SOFT TISSUE PRN
Status: DISCONTINUED | OUTPATIENT
Start: 2021-01-01 | End: 2021-01-01 | Stop reason: SURG

## 2021-01-01 RX ORDER — FAMOTIDINE 20 MG/1
20 TABLET, FILM COATED ORAL 2 TIMES DAILY
Status: DISCONTINUED | OUTPATIENT
Start: 2021-01-01 | End: 2021-01-01

## 2021-01-01 RX ORDER — POTASSIUM CHLORIDE 20 MEQ/1
40 TABLET, EXTENDED RELEASE ORAL ONCE
Status: COMPLETED | OUTPATIENT
Start: 2021-01-01 | End: 2021-01-01

## 2021-01-01 RX ORDER — LOPERAMIDE HYDROCHLORIDE 2 MG/1
2 TABLET ORAL EVERY 4 HOURS PRN
COMMUNITY

## 2021-01-01 RX ORDER — CIPROFLOXACIN 500 MG/1
500 TABLET, FILM COATED ORAL EVERY 12 HOURS
Status: DISCONTINUED | OUTPATIENT
Start: 2021-01-01 | End: 2021-01-01

## 2021-01-01 RX ORDER — CLADRIBINE 10 MG/1
TABLET ORAL
COMMUNITY
End: 2021-01-01

## 2021-01-01 RX ORDER — VITAMIN B COMPLEX
1000 TABLET ORAL DAILY
Status: DISCONTINUED | OUTPATIENT
Start: 2021-01-01 | End: 2021-01-01

## 2021-01-01 RX ORDER — LABETALOL HYDROCHLORIDE 5 MG/ML
5 INJECTION, SOLUTION INTRAVENOUS
Status: DISCONTINUED | OUTPATIENT
Start: 2021-01-01 | End: 2021-01-01 | Stop reason: HOSPADM

## 2021-01-01 RX ORDER — ONDANSETRON 4 MG/1
8 TABLET, ORALLY DISINTEGRATING ORAL EVERY 8 HOURS PRN
Status: DISCONTINUED | OUTPATIENT
Start: 2021-01-01 | End: 2021-01-01 | Stop reason: HOSPADM

## 2021-01-01 RX ORDER — LIDOCAINE HYDROCHLORIDE 40 MG/ML
SOLUTION TOPICAL PRN
Status: DISCONTINUED | OUTPATIENT
Start: 2021-01-01 | End: 2021-01-01 | Stop reason: SURG

## 2021-01-01 RX ORDER — ONDANSETRON 4 MG/1
4 TABLET, ORALLY DISINTEGRATING ORAL EVERY 4 HOURS PRN
Status: DISCONTINUED | OUTPATIENT
Start: 2021-01-01 | End: 2021-01-01 | Stop reason: HOSPADM

## 2021-01-01 RX ORDER — CLADRIBINE 10 MG/1
1-2 TABLET ORAL DAILY
COMMUNITY
Start: 2021-01-01

## 2021-01-01 RX ORDER — VANCOMYCIN HYDROCHLORIDE 125 MG/1
125 CAPSULE ORAL DAILY
Status: ON HOLD | COMMUNITY
End: 2021-01-01

## 2021-01-01 RX ORDER — OXYCODONE HYDROCHLORIDE 5 MG/1
5 TABLET ORAL
Status: DISCONTINUED | OUTPATIENT
Start: 2021-01-01 | End: 2021-01-01 | Stop reason: HOSPADM

## 2021-01-01 RX ORDER — SODIUM CHLORIDE, SODIUM LACTATE, POTASSIUM CHLORIDE, CALCIUM CHLORIDE 600; 310; 30; 20 MG/100ML; MG/100ML; MG/100ML; MG/100ML
INJECTION, SOLUTION INTRAVENOUS
Status: DISCONTINUED | OUTPATIENT
Start: 2021-01-01 | End: 2021-01-01 | Stop reason: SURG

## 2021-01-01 RX ORDER — GRANULES FOR ORAL 3 G/1
3 POWDER ORAL
Status: DISCONTINUED | OUTPATIENT
Start: 2021-01-01 | End: 2021-01-01

## 2021-01-01 RX ORDER — PHENYLEPHRINE HYDROCHLORIDE 10 MG/ML
INJECTION, SOLUTION INTRAMUSCULAR; INTRAVENOUS; SUBCUTANEOUS PRN
Status: DISCONTINUED | OUTPATIENT
Start: 2021-01-01 | End: 2021-01-01 | Stop reason: SURG

## 2021-01-01 RX ORDER — CEFTRIAXONE 1 G/1
INJECTION, POWDER, FOR SOLUTION INTRAMUSCULAR; INTRAVENOUS PRN
Status: DISCONTINUED | OUTPATIENT
Start: 2021-01-01 | End: 2021-01-01 | Stop reason: SURG

## 2021-01-01 RX ORDER — ESCITALOPRAM OXALATE 20 MG/1
TABLET ORAL
Status: ON HOLD | COMMUNITY
End: 2021-01-01

## 2021-01-01 RX ORDER — MAGNESIUM SULFATE 1 G/100ML
1 INJECTION INTRAVENOUS ONCE
Status: DISCONTINUED | OUTPATIENT
Start: 2021-01-01 | End: 2021-01-01

## 2021-01-01 RX ORDER — BUPROPION HYDROCHLORIDE 100 MG/1
200 TABLET, EXTENDED RELEASE ORAL 2 TIMES DAILY
Status: DISCONTINUED | OUTPATIENT
Start: 2021-01-01 | End: 2021-01-01 | Stop reason: HOSPADM

## 2021-01-01 RX ORDER — LABETALOL HYDROCHLORIDE 5 MG/ML
10 INJECTION, SOLUTION INTRAVENOUS EVERY 4 HOURS PRN
Status: DISCONTINUED | OUTPATIENT
Start: 2021-01-01 | End: 2021-01-01

## 2021-01-01 RX ORDER — POLYETHYLENE GLYCOL 3350 17 G/17G
1 POWDER, FOR SOLUTION ORAL
Status: DISCONTINUED | OUTPATIENT
Start: 2021-01-01 | End: 2021-01-01 | Stop reason: HOSPADM

## 2021-01-01 RX ORDER — HALOPERIDOL 5 MG/ML
10 INJECTION INTRAMUSCULAR ONCE
Status: COMPLETED | OUTPATIENT
Start: 2021-01-01 | End: 2021-01-01

## 2021-01-01 RX ORDER — ONDANSETRON 4 MG/1
TABLET, FILM COATED ORAL
COMMUNITY
End: 2021-01-01

## 2021-01-01 RX ORDER — ATROPINE SULFATE 10 MG/ML
2 SOLUTION/ DROPS OPHTHALMIC
Status: DISCONTINUED | OUTPATIENT
Start: 2021-01-01 | End: 2021-01-01 | Stop reason: HOSPADM

## 2021-01-01 RX ORDER — HYDROMORPHONE HYDROCHLORIDE 2 MG/ML
10 INJECTION, SOLUTION INTRAMUSCULAR; INTRAVENOUS; SUBCUTANEOUS
Status: DISCONTINUED | OUTPATIENT
Start: 2021-01-01 | End: 2021-01-01 | Stop reason: HOSPADM

## 2021-01-01 RX ORDER — CITRIC ACID, GLUCONOLACTONE AND MAGNESIUM CARBONATE 6.602; .198; 3.268 G/100ML; G/100ML; G/100ML
30 SOLUTION IRRIGATION
Status: ON HOLD | COMMUNITY
End: 2021-01-01

## 2021-01-01 RX ORDER — LORAZEPAM 2 MG/ML
4 INJECTION INTRAMUSCULAR
Status: DISCONTINUED | OUTPATIENT
Start: 2021-01-01 | End: 2021-01-01 | Stop reason: HOSPADM

## 2021-01-01 RX ORDER — SCOLOPAMINE TRANSDERMAL SYSTEM 1 MG/1
1 PATCH, EXTENDED RELEASE TRANSDERMAL
Status: DISCONTINUED | OUTPATIENT
Start: 2022-01-02 | End: 2021-01-01 | Stop reason: HOSPADM

## 2021-01-01 RX ORDER — ENALAPRILAT 1.25 MG/ML
1.25 INJECTION INTRAVENOUS EVERY 6 HOURS PRN
Status: DISCONTINUED | OUTPATIENT
Start: 2021-01-01 | End: 2021-01-01 | Stop reason: HOSPADM

## 2021-01-01 RX ORDER — BACLOFEN 10 MG/1
10 TABLET ORAL 4 TIMES DAILY
COMMUNITY

## 2021-01-01 RX ORDER — PHENYLEPHRINE HCL IN 0.9% NACL 0.5 MG/5ML
SYRINGE (ML) INTRAVENOUS PRN
Status: DISCONTINUED | OUTPATIENT
Start: 2021-01-01 | End: 2021-01-01 | Stop reason: SURG

## 2021-01-01 RX ORDER — KETAMINE HYDROCHLORIDE 50 MG/ML
INJECTION, SOLUTION INTRAMUSCULAR; INTRAVENOUS PRN
Status: DISCONTINUED | OUTPATIENT
Start: 2021-01-01 | End: 2021-01-01 | Stop reason: SURG

## 2021-01-01 RX ORDER — FUROSEMIDE 10 MG/ML
20 INJECTION INTRAMUSCULAR; INTRAVENOUS ONCE
Status: COMPLETED | OUTPATIENT
Start: 2021-01-01 | End: 2021-01-01

## 2021-01-01 RX ORDER — POLYETHYLENE GLYCOL 3350 17 G/17G
17 POWDER, FOR SOLUTION ORAL
Qty: 1 EACH | Refills: 3
Start: 2021-01-01 | End: 2021-01-01

## 2021-01-01 RX ORDER — FAMOTIDINE 20 MG/1
20 TABLET, FILM COATED ORAL 2 TIMES DAILY
COMMUNITY

## 2021-01-01 RX ORDER — LIDOCAINE HYDROCHLORIDE 20 MG/ML
INJECTION, SOLUTION EPIDURAL; INFILTRATION; INTRACAUDAL; PERINEURAL PRN
Status: DISCONTINUED | OUTPATIENT
Start: 2021-01-01 | End: 2021-01-01 | Stop reason: SURG

## 2021-01-01 RX ORDER — ONDANSETRON 2 MG/ML
4 INJECTION INTRAMUSCULAR; INTRAVENOUS EVERY 6 HOURS PRN
Status: DISCONTINUED | OUTPATIENT
Start: 2021-01-01 | End: 2021-01-01

## 2021-01-01 RX ORDER — SODIUM CHLORIDE, SODIUM LACTATE, POTASSIUM CHLORIDE, AND CALCIUM CHLORIDE .6; .31; .03; .02 G/100ML; G/100ML; G/100ML; G/100ML
30 INJECTION, SOLUTION INTRAVENOUS ONCE
Status: COMPLETED | OUTPATIENT
Start: 2021-01-01 | End: 2021-01-01

## 2021-01-01 RX ORDER — VANCOMYCIN HYDROCHLORIDE 125 MG/1
CAPSULE ORAL
COMMUNITY
Start: 2021-01-01 | End: 2021-01-01

## 2021-01-01 RX ORDER — HYDROMORPHONE HYDROCHLORIDE 1 MG/ML
0.25 INJECTION, SOLUTION INTRAMUSCULAR; INTRAVENOUS; SUBCUTANEOUS
Status: DISCONTINUED | OUTPATIENT
Start: 2021-01-01 | End: 2021-01-01 | Stop reason: HOSPADM

## 2021-01-01 RX ORDER — LACTULOSE 10 G/15ML
15 SOLUTION ORAL
COMMUNITY
End: 2021-01-01

## 2021-01-01 RX ORDER — LORAZEPAM 2 MG/ML
1 CONCENTRATE ORAL
Status: DISCONTINUED | OUTPATIENT
Start: 2021-01-01 | End: 2021-01-01 | Stop reason: HOSPADM

## 2021-01-01 RX ORDER — OXYBUTYNIN CHLORIDE 5 MG/1
10 TABLET, EXTENDED RELEASE ORAL DAILY
Status: DISCONTINUED | OUTPATIENT
Start: 2021-01-01 | End: 2021-01-01 | Stop reason: HOSPADM

## 2021-01-01 RX ADMIN — VANCOMYCIN HYDROCHLORIDE 500 MG: KIT ORAL at 06:18

## 2021-01-01 RX ADMIN — BACLOFEN 10 MG: 10 TABLET ORAL at 12:50

## 2021-01-01 RX ADMIN — ONDANSETRON 4 MG: 2 INJECTION INTRAMUSCULAR; INTRAVENOUS at 09:03

## 2021-01-01 RX ADMIN — ONDANSETRON 4 MG: 4 TABLET, ORALLY DISINTEGRATING ORAL at 09:13

## 2021-01-01 RX ADMIN — EPHEDRINE SULFATE 5 MG: 50 INJECTION INTRAMUSCULAR; INTRAVENOUS; SUBCUTANEOUS at 08:31

## 2021-01-01 RX ADMIN — ESCITALOPRAM OXALATE 20 MG: 10 TABLET ORAL at 05:24

## 2021-01-01 RX ADMIN — BUPROPION HYDROCHLORIDE 100 MG: 100 TABLET, FILM COATED, EXTENDED RELEASE ORAL at 21:29

## 2021-01-01 RX ADMIN — BUPROPION HYDROCHLORIDE 200 MG: 100 TABLET, FILM COATED, EXTENDED RELEASE ORAL at 05:33

## 2021-01-01 RX ADMIN — ONDANSETRON 4 MG: 4 TABLET, ORALLY DISINTEGRATING ORAL at 09:02

## 2021-01-01 RX ADMIN — MAGNESIUM SULFATE 2 G: 2 INJECTION INTRAVENOUS at 10:22

## 2021-01-01 RX ADMIN — HYDROMORPHONE HYDROCHLORIDE 2 MG: 2 INJECTION, SOLUTION INTRAMUSCULAR; INTRAVENOUS; SUBCUTANEOUS at 22:14

## 2021-01-01 RX ADMIN — LIDOCAINE 1 PATCH: 50 PATCH TOPICAL at 06:00

## 2021-01-01 RX ADMIN — LIDOCAINE 1 PATCH: 50 PATCH TOPICAL at 05:08

## 2021-01-01 RX ADMIN — BACLOFEN 10 MG: 10 TABLET ORAL at 09:32

## 2021-01-01 RX ADMIN — FAMOTIDINE 20 MG: 20 TABLET ORAL at 12:50

## 2021-01-01 RX ADMIN — FUROSEMIDE 20 MG: 10 INJECTION, SOLUTION INTRAMUSCULAR; INTRAVENOUS at 05:36

## 2021-01-01 RX ADMIN — BUPROPION HYDROCHLORIDE 200 MG: 100 TABLET, FILM COATED, EXTENDED RELEASE ORAL at 18:00

## 2021-01-01 RX ADMIN — BUPROPION HYDROCHLORIDE 100 MG: 100 TABLET, EXTENDED RELEASE ORAL at 15:05

## 2021-01-01 RX ADMIN — POTASSIUM CHLORIDE 10 MEQ: 7.46 INJECTION, SOLUTION INTRAVENOUS at 18:15

## 2021-01-01 RX ADMIN — VANCOMYCIN HYDROCHLORIDE 125 MG: KIT ORAL at 06:01

## 2021-01-01 RX ADMIN — HYDROMORPHONE HYDROCHLORIDE 4 MG: 2 INJECTION, SOLUTION INTRAMUSCULAR; INTRAVENOUS; SUBCUTANEOUS at 01:05

## 2021-01-01 RX ADMIN — ACETAMINOPHEN 650 MG: 325 TABLET, FILM COATED ORAL at 16:56

## 2021-01-01 RX ADMIN — BACLOFEN 10 MG: 10 TABLET ORAL at 13:50

## 2021-01-01 RX ADMIN — POTASSIUM CHLORIDE 40 MEQ: 1500 TABLET, EXTENDED RELEASE ORAL at 17:11

## 2021-01-01 RX ADMIN — HYDROMORPHONE HYDROCHLORIDE 8 MG: 2 INJECTION INTRAMUSCULAR; INTRAVENOUS; SUBCUTANEOUS at 07:44

## 2021-01-01 RX ADMIN — BUPROPION HYDROCHLORIDE 200 MG: 100 TABLET, FILM COATED, EXTENDED RELEASE ORAL at 05:42

## 2021-01-01 RX ADMIN — ACETAMINOPHEN 650 MG: 325 TABLET ORAL at 22:49

## 2021-01-01 RX ADMIN — FAMOTIDINE 20 MG: 20 TABLET ORAL at 18:05

## 2021-01-01 RX ADMIN — MEROPENEM 500 MG: 500 INJECTION, POWDER, FOR SOLUTION INTRAVENOUS at 18:37

## 2021-01-01 RX ADMIN — BACLOFEN 10 MG: 10 TABLET ORAL at 17:56

## 2021-01-01 RX ADMIN — METRONIDAZOLE 500 MG: 500 INJECTION, SOLUTION INTRAVENOUS at 05:43

## 2021-01-01 RX ADMIN — VANCOMYCIN HYDROCHLORIDE 125 MG: KIT ORAL at 17:11

## 2021-01-01 RX ADMIN — OXYBUTYNIN CHLORIDE 10 MG: 5 TABLET, EXTENDED RELEASE ORAL at 05:42

## 2021-01-01 RX ADMIN — VANCOMYCIN HYDROCHLORIDE 125 MG: KIT ORAL at 14:51

## 2021-01-01 RX ADMIN — OXYBUTYNIN CHLORIDE 10 MG: 5 TABLET, EXTENDED RELEASE ORAL at 09:04

## 2021-01-01 RX ADMIN — FAMOTIDINE 20 MG: 20 TABLET ORAL at 18:00

## 2021-01-01 RX ADMIN — LIDOCAINE 2 PATCH: 50 PATCH TOPICAL at 12:58

## 2021-01-01 RX ADMIN — MEROPENEM 500 MG: 500 INJECTION, POWDER, FOR SOLUTION INTRAVENOUS at 12:37

## 2021-01-01 RX ADMIN — CEFEPIME 2 G: 2 INJECTION, POWDER, FOR SOLUTION INTRAVENOUS at 00:16

## 2021-01-01 RX ADMIN — FOSFOMYCIN TROMETHAMINE 3 G: 3 POWDER ORAL at 18:05

## 2021-01-01 RX ADMIN — MEROPENEM 500 MG: 500 INJECTION, POWDER, FOR SOLUTION INTRAVENOUS at 17:11

## 2021-01-01 RX ADMIN — SODIUM CHLORIDE: 9 INJECTION, SOLUTION INTRAVENOUS at 21:24

## 2021-01-01 RX ADMIN — BACLOFEN 10 MG: 10 TABLET ORAL at 10:28

## 2021-01-01 RX ADMIN — OXYBUTYNIN CHLORIDE 10 MG: 5 TABLET, EXTENDED RELEASE ORAL at 21:29

## 2021-01-01 RX ADMIN — BACLOFEN 10 MG: 10 TABLET ORAL at 18:13

## 2021-01-01 RX ADMIN — SODIUM PHOSPHATE 133 ML: 7; 19 ENEMA RECTAL at 15:15

## 2021-01-01 RX ADMIN — POTASSIUM CHLORIDE 40 MEQ: 1500 TABLET, EXTENDED RELEASE ORAL at 00:32

## 2021-01-01 RX ADMIN — MAGNESIUM SULFATE IN WATER 4 G: 40 INJECTION, SOLUTION INTRAVENOUS at 00:43

## 2021-01-01 RX ADMIN — METRONIDAZOLE 500 MG: 500 INJECTION, SOLUTION INTRAVENOUS at 21:43

## 2021-01-01 RX ADMIN — FENTANYL CITRATE 50 MCG: 50 INJECTION, SOLUTION INTRAMUSCULAR; INTRAVENOUS at 21:52

## 2021-01-01 RX ADMIN — BACLOFEN 10 MG: 10 TABLET ORAL at 20:48

## 2021-01-01 RX ADMIN — PHENYLEPHRINE HYDROCHLORIDE 100 MCG: 10 INJECTION INTRAVENOUS at 08:06

## 2021-01-01 RX ADMIN — FUROSEMIDE 20 MG: 10 INJECTION, SOLUTION INTRAMUSCULAR; INTRAVENOUS at 08:54

## 2021-01-01 RX ADMIN — BACLOFEN 10 MG: 10 TABLET ORAL at 08:53

## 2021-01-01 RX ADMIN — ENOXAPARIN SODIUM 40 MG: 40 INJECTION SUBCUTANEOUS at 05:23

## 2021-01-01 RX ADMIN — SENNOSIDES AND DOCUSATE SODIUM 2 TABLET: 8.6; 5 TABLET ORAL at 18:05

## 2021-01-01 RX ADMIN — FAMOTIDINE 20 MG: 20 TABLET ORAL at 13:11

## 2021-01-01 RX ADMIN — LIDOCAINE 1 PATCH: 50 PATCH TOPICAL at 21:49

## 2021-01-01 RX ADMIN — SODIUM CHLORIDE, POTASSIUM CHLORIDE, SODIUM LACTATE AND CALCIUM CHLORIDE 1000 ML: 600; 310; 30; 20 INJECTION, SOLUTION INTRAVENOUS at 06:09

## 2021-01-01 RX ADMIN — SODIUM CHLORIDE, POTASSIUM CHLORIDE, SODIUM LACTATE AND CALCIUM CHLORIDE: 600; 310; 30; 20 INJECTION, SOLUTION INTRAVENOUS at 21:43

## 2021-01-01 RX ADMIN — SERTRALINE HYDROCHLORIDE 25 MG: 50 TABLET ORAL at 06:00

## 2021-01-01 RX ADMIN — KETOROLAC TROMETHAMINE 15 MG: 30 INJECTION, SOLUTION INTRAMUSCULAR at 09:03

## 2021-01-01 RX ADMIN — FAMOTIDINE 20 MG: 20 TABLET ORAL at 18:13

## 2021-01-01 RX ADMIN — HYDROMORPHONE HYDROCHLORIDE 8 MG: 2 INJECTION INTRAMUSCULAR; INTRAVENOUS; SUBCUTANEOUS at 04:19

## 2021-01-01 RX ADMIN — FENTANYL CITRATE 50 MCG: 50 INJECTION, SOLUTION INTRAMUSCULAR; INTRAVENOUS at 21:48

## 2021-01-01 RX ADMIN — SODIUM CHLORIDE, POTASSIUM CHLORIDE, SODIUM LACTATE AND CALCIUM CHLORIDE 500 ML: 600; 310; 30; 20 INJECTION, SOLUTION INTRAVENOUS at 20:22

## 2021-01-01 RX ADMIN — BACLOFEN 10 MG: 10 TABLET ORAL at 12:39

## 2021-01-01 RX ADMIN — ONDANSETRON 4 MG: 2 INJECTION INTRAMUSCULAR; INTRAVENOUS at 17:49

## 2021-01-01 RX ADMIN — FAMOTIDINE 20 MG: 20 TABLET ORAL at 00:00

## 2021-01-01 RX ADMIN — HYDROMORPHONE HYDROCHLORIDE 4 MG: 2 INJECTION, SOLUTION INTRAMUSCULAR; INTRAVENOUS; SUBCUTANEOUS at 00:30

## 2021-01-01 RX ADMIN — ONDANSETRON 4 MG: 2 INJECTION INTRAMUSCULAR; INTRAVENOUS at 12:27

## 2021-01-01 RX ADMIN — OXYBUTYNIN CHLORIDE 10 MG: 5 TABLET, EXTENDED RELEASE ORAL at 16:01

## 2021-01-01 RX ADMIN — BUPROPION HYDROCHLORIDE 200 MG: 100 TABLET, FILM COATED, EXTENDED RELEASE ORAL at 20:34

## 2021-01-01 RX ADMIN — LORAZEPAM 4 MG: 2 INJECTION INTRAMUSCULAR; INTRAVENOUS at 07:45

## 2021-01-01 RX ADMIN — POTASSIUM CHLORIDE 10 MEQ: 7.46 INJECTION, SOLUTION INTRAVENOUS at 11:24

## 2021-01-01 RX ADMIN — FLUCONAZOLE 200 MG: 200 TABLET ORAL at 18:40

## 2021-01-01 RX ADMIN — BACLOFEN 10 MG: 10 TABLET ORAL at 09:36

## 2021-01-01 RX ADMIN — ONDANSETRON 4 MG: 2 INJECTION INTRAMUSCULAR; INTRAVENOUS at 03:20

## 2021-01-01 RX ADMIN — CEFTRIAXONE SODIUM 1 G: 1 INJECTION, POWDER, FOR SOLUTION INTRAMUSCULAR; INTRAVENOUS at 08:00

## 2021-01-01 RX ADMIN — BACLOFEN 10 MG: 10 TABLET ORAL at 21:50

## 2021-01-01 RX ADMIN — ONDANSETRON 4 MG: 2 INJECTION INTRAMUSCULAR; INTRAVENOUS at 21:46

## 2021-01-01 RX ADMIN — BACLOFEN 10 MG: 10 TABLET ORAL at 16:54

## 2021-01-01 RX ADMIN — HYDROMORPHONE HYDROCHLORIDE 8 MG: 2 INJECTION INTRAMUSCULAR; INTRAVENOUS; SUBCUTANEOUS at 10:29

## 2021-01-01 RX ADMIN — CEFTRIAXONE SODIUM 1 G: 1 INJECTION, POWDER, FOR SOLUTION INTRAMUSCULAR; INTRAVENOUS at 00:05

## 2021-01-01 RX ADMIN — SODIUM CHLORIDE, POTASSIUM CHLORIDE, SODIUM LACTATE AND CALCIUM CHLORIDE: 600; 310; 30; 20 INJECTION, SOLUTION INTRAVENOUS at 19:36

## 2021-01-01 RX ADMIN — ACETAMINOPHEN 650 MG: 325 TABLET, FILM COATED ORAL at 04:14

## 2021-01-01 RX ADMIN — POTASSIUM CHLORIDE, DEXTROSE MONOHYDRATE AND SODIUM CHLORIDE: 150; 5; 900 INJECTION, SOLUTION INTRAVENOUS at 08:25

## 2021-01-01 RX ADMIN — VANCOMYCIN HYDROCHLORIDE 125 MG: KIT ORAL at 09:45

## 2021-01-01 RX ADMIN — VANCOMYCIN HYDROCHLORIDE 125 MG: KIT ORAL at 12:39

## 2021-01-01 RX ADMIN — LORAZEPAM 1 MG: 2 INJECTION INTRAMUSCULAR; INTRAVENOUS at 02:05

## 2021-01-01 RX ADMIN — LIDOCAINE 2 PATCH: 50 PATCH TOPICAL at 10:26

## 2021-01-01 RX ADMIN — BACLOFEN 10 MG: 10 TABLET ORAL at 16:56

## 2021-01-01 RX ADMIN — CEFTRIAXONE SODIUM 2 G: 2 INJECTION, POWDER, FOR SOLUTION INTRAMUSCULAR; INTRAVENOUS at 18:00

## 2021-01-01 RX ADMIN — BACLOFEN 10 MG: 10 TABLET ORAL at 20:23

## 2021-01-01 RX ADMIN — BACLOFEN 10 MG: 10 TABLET ORAL at 08:55

## 2021-01-01 RX ADMIN — FUROSEMIDE 20 MG: 10 INJECTION, SOLUTION INTRAMUSCULAR; INTRAVENOUS at 16:03

## 2021-01-01 RX ADMIN — DEXAMETHASONE SODIUM PHOSPHATE 4 MG: 4 INJECTION, SOLUTION INTRAMUSCULAR; INTRAVENOUS at 17:49

## 2021-01-01 RX ADMIN — FAMOTIDINE 20 MG: 20 TABLET ORAL at 02:36

## 2021-01-01 RX ADMIN — BACLOFEN 10 MG: 10 TABLET ORAL at 21:38

## 2021-01-01 RX ADMIN — ENOXAPARIN SODIUM 40 MG: 40 INJECTION SUBCUTANEOUS at 06:05

## 2021-01-01 RX ADMIN — ONDANSETRON 4 MG: 4 TABLET, ORALLY DISINTEGRATING ORAL at 21:55

## 2021-01-01 RX ADMIN — VANCOMYCIN HYDROCHLORIDE 125 MG: KIT ORAL at 16:04

## 2021-01-01 RX ADMIN — LORAZEPAM 4 MG: 2 INJECTION INTRAMUSCULAR; INTRAVENOUS at 04:38

## 2021-01-01 RX ADMIN — POTASSIUM CHLORIDE, DEXTROSE MONOHYDRATE AND SODIUM CHLORIDE: 150; 5; 900 INJECTION, SOLUTION INTRAVENOUS at 15:01

## 2021-01-01 RX ADMIN — BUPROPION HYDROCHLORIDE 100 MG: 100 TABLET, FILM COATED, EXTENDED RELEASE ORAL at 21:50

## 2021-01-01 RX ADMIN — BACLOFEN 10 MG: 10 TABLET ORAL at 21:29

## 2021-01-01 RX ADMIN — POTASSIUM CHLORIDE, DEXTROSE MONOHYDRATE AND SODIUM CHLORIDE: 150; 5; 900 INJECTION, SOLUTION INTRAVENOUS at 18:02

## 2021-01-01 RX ADMIN — ONDANSETRON 4 MG: 4 TABLET, ORALLY DISINTEGRATING ORAL at 06:26

## 2021-01-01 RX ADMIN — VANCOMYCIN HYDROCHLORIDE 250 MG: KIT ORAL at 00:00

## 2021-01-01 RX ADMIN — SODIUM CHLORIDE, POTASSIUM CHLORIDE, SODIUM LACTATE AND CALCIUM CHLORIDE: 600; 310; 30; 20 INJECTION, SOLUTION INTRAVENOUS at 08:33

## 2021-01-01 RX ADMIN — ACETAMINOPHEN 650 MG: 325 TABLET, FILM COATED ORAL at 07:34

## 2021-01-01 RX ADMIN — POLYETHYLENE GLYCOL 3350, SODIUM SULFATE ANHYDROUS, SODIUM BICARBONATE, SODIUM CHLORIDE, POTASSIUM CHLORIDE 4 L: 236; 22.74; 6.74; 5.86; 2.97 POWDER, FOR SOLUTION ORAL at 15:14

## 2021-01-01 RX ADMIN — OXYCODONE 10 MG: 5 TABLET ORAL at 14:59

## 2021-01-01 RX ADMIN — ESCITALOPRAM OXALATE 20 MG: 10 TABLET ORAL at 05:42

## 2021-01-01 RX ADMIN — HYDROMORPHONE HYDROCHLORIDE 4 MG: 2 INJECTION, SOLUTION INTRAMUSCULAR; INTRAVENOUS; SUBCUTANEOUS at 04:01

## 2021-01-01 RX ADMIN — Medication 1 CAPSULE: at 08:37

## 2021-01-01 RX ADMIN — ESCITALOPRAM OXALATE 20 MG: 10 TABLET ORAL at 05:10

## 2021-01-01 RX ADMIN — BACLOFEN 10 MG: 10 TABLET ORAL at 13:14

## 2021-01-01 RX ADMIN — METOPROLOL TARTRATE 5 MG: 5 INJECTION, SOLUTION INTRAVENOUS at 11:54

## 2021-01-01 RX ADMIN — POTASSIUM CHLORIDE 10 MEQ: 7.46 INJECTION, SOLUTION INTRAVENOUS at 18:00

## 2021-01-01 RX ADMIN — BUPROPION HYDROCHLORIDE 200 MG: 100 TABLET, FILM COATED, EXTENDED RELEASE ORAL at 05:48

## 2021-01-01 RX ADMIN — OXYBUTYNIN CHLORIDE 10 MG: 5 TABLET, EXTENDED RELEASE ORAL at 02:35

## 2021-01-01 RX ADMIN — BACLOFEN 10 MG: 10 TABLET ORAL at 08:36

## 2021-01-01 RX ADMIN — HYDROMORPHONE HYDROCHLORIDE 4 MG: 2 INJECTION, SOLUTION INTRAMUSCULAR; INTRAVENOUS; SUBCUTANEOUS at 02:28

## 2021-01-01 RX ADMIN — DIAZEPAM 5 MG: 5 INJECTION, SOLUTION INTRAMUSCULAR; INTRAVENOUS at 12:37

## 2021-01-01 RX ADMIN — HYDROMORPHONE HYDROCHLORIDE 4 MG: 2 INJECTION, SOLUTION INTRAMUSCULAR; INTRAVENOUS; SUBCUTANEOUS at 16:31

## 2021-01-01 RX ADMIN — HYDROMORPHONE HYDROCHLORIDE 0.5 MG: 1 INJECTION, SOLUTION INTRAMUSCULAR; INTRAVENOUS; SUBCUTANEOUS at 17:01

## 2021-01-01 RX ADMIN — IOHEXOL 90 ML: 350 INJECTION, SOLUTION INTRAVENOUS at 06:02

## 2021-01-01 RX ADMIN — LORAZEPAM 0.5 MG: 2 INJECTION INTRAMUSCULAR; INTRAVENOUS at 13:24

## 2021-01-01 RX ADMIN — LORAZEPAM 4 MG: 2 INJECTION INTRAMUSCULAR; INTRAVENOUS at 04:56

## 2021-01-01 RX ADMIN — ONDANSETRON 4 MG: 2 INJECTION INTRAMUSCULAR; INTRAVENOUS at 17:39

## 2021-01-01 RX ADMIN — CEFEPIME 2 G: 2 INJECTION, POWDER, FOR SOLUTION INTRAVENOUS at 14:54

## 2021-01-01 RX ADMIN — SODIUM CHLORIDE, POTASSIUM CHLORIDE, SODIUM LACTATE AND CALCIUM CHLORIDE 1000 ML: 600; 310; 30; 20 INJECTION, SOLUTION INTRAVENOUS at 04:07

## 2021-01-01 RX ADMIN — OXYBUTYNIN CHLORIDE 10 MG: 5 TABLET, EXTENDED RELEASE ORAL at 05:48

## 2021-01-01 RX ADMIN — LORAZEPAM 4 MG: 2 INJECTION INTRAMUSCULAR; INTRAVENOUS at 06:11

## 2021-01-01 RX ADMIN — LORAZEPAM 0.5 MG: 2 INJECTION INTRAMUSCULAR; INTRAVENOUS at 21:05

## 2021-01-01 RX ADMIN — HYDROMORPHONE HYDROCHLORIDE 8 MG: 2 INJECTION INTRAMUSCULAR; INTRAVENOUS; SUBCUTANEOUS at 05:30

## 2021-01-01 RX ADMIN — FENTANYL CITRATE 150 MCG: 50 INJECTION, SOLUTION INTRAMUSCULAR; INTRAVENOUS at 17:27

## 2021-01-01 RX ADMIN — METRONIDAZOLE 500 MG: 500 INJECTION, SOLUTION INTRAVENOUS at 21:20

## 2021-01-01 RX ADMIN — SERTRALINE HYDROCHLORIDE 25 MG: 50 TABLET ORAL at 09:45

## 2021-01-01 RX ADMIN — VANCOMYCIN HYDROCHLORIDE 125 MG: KIT ORAL at 05:18

## 2021-01-01 RX ADMIN — POTASSIUM CHLORIDE 10 MEQ: 7.46 INJECTION, SOLUTION INTRAVENOUS at 16:57

## 2021-01-01 RX ADMIN — ONDANSETRON 4 MG: 4 TABLET, ORALLY DISINTEGRATING ORAL at 17:37

## 2021-01-01 RX ADMIN — MEROPENEM 500 MG: 500 INJECTION, POWDER, FOR SOLUTION INTRAVENOUS at 23:56

## 2021-01-01 RX ADMIN — VANCOMYCIN HYDROCHLORIDE 125 MG: KIT ORAL at 05:50

## 2021-01-01 RX ADMIN — FAMOTIDINE 20 MG: 20 TABLET ORAL at 05:11

## 2021-01-01 RX ADMIN — LORAZEPAM 4 MG: 2 INJECTION INTRAMUSCULAR; INTRAVENOUS at 04:01

## 2021-01-01 RX ADMIN — BUPROPION HYDROCHLORIDE 100 MG: 100 TABLET, EXTENDED RELEASE ORAL at 15:00

## 2021-01-01 RX ADMIN — BUPROPION HYDROCHLORIDE 100 MG: 100 TABLET, EXTENDED RELEASE ORAL at 02:37

## 2021-01-01 RX ADMIN — VANCOMYCIN HYDROCHLORIDE 250 MG: KIT ORAL at 05:27

## 2021-01-01 RX ADMIN — POTASSIUM CHLORIDE 40 MEQ: 1500 TABLET, EXTENDED RELEASE ORAL at 09:44

## 2021-01-01 RX ADMIN — SODIUM CHLORIDE: 9 INJECTION, SOLUTION INTRAVENOUS at 03:20

## 2021-01-01 RX ADMIN — SERTRALINE HYDROCHLORIDE 25 MG: 50 TABLET ORAL at 06:05

## 2021-01-01 RX ADMIN — PROPOFOL 150 MG: 10 INJECTION, EMULSION INTRAVENOUS at 16:51

## 2021-01-01 RX ADMIN — POTASSIUM CHLORIDE 40 MEQ: 1500 TABLET, EXTENDED RELEASE ORAL at 17:18

## 2021-01-01 RX ADMIN — VANCOMYCIN HYDROCHLORIDE 250 MG: KIT ORAL at 12:25

## 2021-01-01 RX ADMIN — Medication: at 12:08

## 2021-01-01 RX ADMIN — BUPROPION HYDROCHLORIDE 100 MG: 100 TABLET, EXTENDED RELEASE ORAL at 21:38

## 2021-01-01 RX ADMIN — LORAZEPAM 4 MG: 2 INJECTION INTRAMUSCULAR; INTRAVENOUS at 04:18

## 2021-01-01 RX ADMIN — BACLOFEN 10 MG: 10 TABLET ORAL at 07:41

## 2021-01-01 RX ADMIN — BACLOFEN 10 MG: 10 TABLET ORAL at 20:22

## 2021-01-01 RX ADMIN — LIDOCAINE 2 PATCH: 50 PATCH TOPICAL at 05:42

## 2021-01-01 RX ADMIN — MEROPENEM 500 MG: 500 INJECTION, POWDER, FOR SOLUTION INTRAVENOUS at 05:36

## 2021-01-01 RX ADMIN — VANCOMYCIN HYDROCHLORIDE 125 MG: KIT ORAL at 05:33

## 2021-01-01 RX ADMIN — BUPROPION HYDROCHLORIDE 100 MG: 100 TABLET, FILM COATED, EXTENDED RELEASE ORAL at 09:05

## 2021-01-01 RX ADMIN — HYDROMORPHONE HYDROCHLORIDE 8 MG: 2 INJECTION INTRAMUSCULAR; INTRAVENOUS; SUBCUTANEOUS at 04:55

## 2021-01-01 RX ADMIN — HYDROMORPHONE HYDROCHLORIDE 8 MG: 2 INJECTION INTRAMUSCULAR; INTRAVENOUS; SUBCUTANEOUS at 04:39

## 2021-01-01 RX ADMIN — BACLOFEN 10 MG: 10 TABLET ORAL at 17:17

## 2021-01-01 RX ADMIN — FAMOTIDINE 20 MG: 20 TABLET ORAL at 16:54

## 2021-01-01 RX ADMIN — SODIUM CHLORIDE, POTASSIUM CHLORIDE, SODIUM LACTATE AND CALCIUM CHLORIDE: 600; 310; 30; 20 INJECTION, SOLUTION INTRAVENOUS at 08:08

## 2021-01-01 RX ADMIN — ONDANSETRON 4 MG: 2 INJECTION INTRAMUSCULAR; INTRAVENOUS at 04:27

## 2021-01-01 RX ADMIN — SODIUM CHLORIDE: 9 INJECTION, SOLUTION INTRAVENOUS at 05:45

## 2021-01-01 RX ADMIN — BUPROPION HYDROCHLORIDE 100 MG: 100 TABLET, EXTENDED RELEASE ORAL at 09:44

## 2021-01-01 RX ADMIN — LORAZEPAM 1 MG: 2 INJECTION INTRAMUSCULAR; INTRAVENOUS at 18:31

## 2021-01-01 RX ADMIN — SERTRALINE HYDROCHLORIDE 25 MG: 50 TABLET ORAL at 05:21

## 2021-01-01 RX ADMIN — HYDROMORPHONE HYDROCHLORIDE 0.5 MG: 1 INJECTION, SOLUTION INTRAMUSCULAR; INTRAVENOUS; SUBCUTANEOUS at 21:38

## 2021-01-01 RX ADMIN — LORAZEPAM 0.5 MG: 2 INJECTION INTRAMUSCULAR; INTRAVENOUS at 22:32

## 2021-01-01 RX ADMIN — CEFEPIME 2 G: 2 INJECTION, POWDER, FOR SOLUTION INTRAVENOUS at 00:47

## 2021-01-01 RX ADMIN — SODIUM CHLORIDE: 9 INJECTION, SOLUTION INTRAVENOUS at 09:36

## 2021-01-01 RX ADMIN — VANCOMYCIN HYDROCHLORIDE 125 MG: KIT ORAL at 00:08

## 2021-01-01 RX ADMIN — Medication 1000 UNITS: at 09:31

## 2021-01-01 RX ADMIN — SODIUM CHLORIDE, POTASSIUM CHLORIDE, SODIUM LACTATE AND CALCIUM CHLORIDE: 600; 310; 30; 20 INJECTION, SOLUTION INTRAVENOUS at 13:11

## 2021-01-01 RX ADMIN — FAMOTIDINE 20 MG: 20 TABLET ORAL at 05:26

## 2021-01-01 RX ADMIN — FAMOTIDINE 20 MG: 20 TABLET ORAL at 17:00

## 2021-01-01 RX ADMIN — SODIUM CHLORIDE 1000 ML: 9 INJECTION, SOLUTION INTRAVENOUS at 23:57

## 2021-01-01 RX ADMIN — Medication 1 CAPSULE: at 08:47

## 2021-01-01 RX ADMIN — Medication 1 CAPSULE: at 09:09

## 2021-01-01 RX ADMIN — FENTANYL CITRATE 50 MCG: 50 INJECTION, SOLUTION INTRAMUSCULAR; INTRAVENOUS at 07:53

## 2021-01-01 RX ADMIN — CEFEPIME 2 G: 2 INJECTION, POWDER, FOR SOLUTION INTRAVENOUS at 05:08

## 2021-01-01 RX ADMIN — SERTRALINE HYDROCHLORIDE 25 MG: 50 TABLET ORAL at 05:49

## 2021-01-01 RX ADMIN — LORAZEPAM 4 MG: 2 INJECTION INTRAMUSCULAR; INTRAVENOUS at 10:28

## 2021-01-01 RX ADMIN — LORAZEPAM 0.5 MG: 2 INJECTION INTRAMUSCULAR; INTRAVENOUS at 10:32

## 2021-01-01 RX ADMIN — EPHEDRINE SULFATE 5 MG: 50 INJECTION INTRAMUSCULAR; INTRAVENOUS; SUBCUTANEOUS at 08:22

## 2021-01-01 RX ADMIN — BUPROPION HYDROCHLORIDE 100 MG: 100 TABLET, EXTENDED RELEASE ORAL at 20:36

## 2021-01-01 RX ADMIN — BUPROPION HYDROCHLORIDE 100 MG: 100 TABLET, FILM COATED, EXTENDED RELEASE ORAL at 08:53

## 2021-01-01 RX ADMIN — BACLOFEN 10 MG: 10 TABLET ORAL at 12:58

## 2021-01-01 RX ADMIN — OXYBUTYNIN CHLORIDE 10 MG: 5 TABLET, EXTENDED RELEASE ORAL at 09:27

## 2021-01-01 RX ADMIN — SODIUM CHLORIDE, POTASSIUM CHLORIDE, SODIUM LACTATE AND CALCIUM CHLORIDE: 600; 310; 30; 20 INJECTION, SOLUTION INTRAVENOUS at 12:14

## 2021-01-01 RX ADMIN — VANCOMYCIN HYDROCHLORIDE 125 MG: KIT ORAL at 00:58

## 2021-01-01 RX ADMIN — MIDAZOLAM HYDROCHLORIDE 2 MG: 1 INJECTION, SOLUTION INTRAMUSCULAR; INTRAVENOUS at 21:34

## 2021-01-01 RX ADMIN — HYDROMORPHONE HYDROCHLORIDE 4 MG: 2 INJECTION, SOLUTION INTRAMUSCULAR; INTRAVENOUS; SUBCUTANEOUS at 23:15

## 2021-01-01 RX ADMIN — FAMOTIDINE 20 MG: 20 TABLET ORAL at 05:42

## 2021-01-01 RX ADMIN — MEROPENEM 500 MG: 500 INJECTION, POWDER, FOR SOLUTION INTRAVENOUS at 12:44

## 2021-01-01 RX ADMIN — BUPROPION HYDROCHLORIDE 200 MG: 100 TABLET, FILM COATED, EXTENDED RELEASE ORAL at 17:49

## 2021-01-01 RX ADMIN — CEFEPIME 2 G: 2 INJECTION, POWDER, FOR SOLUTION INTRAVENOUS at 06:20

## 2021-01-01 RX ADMIN — BACLOFEN 10 MG: 10 TABLET ORAL at 02:36

## 2021-01-01 RX ADMIN — BACLOFEN 10 MG: 10 TABLET ORAL at 08:47

## 2021-01-01 RX ADMIN — SERTRALINE HYDROCHLORIDE 25 MG: 50 TABLET ORAL at 05:26

## 2021-01-01 RX ADMIN — BUPROPION HYDROCHLORIDE 200 MG: 100 TABLET, FILM COATED, EXTENDED RELEASE ORAL at 05:25

## 2021-01-01 RX ADMIN — METRONIDAZOLE 500 MG: 500 INJECTION, SOLUTION INTRAVENOUS at 05:23

## 2021-01-01 RX ADMIN — MEROPENEM 500 MG: 500 INJECTION, POWDER, FOR SOLUTION INTRAVENOUS at 17:18

## 2021-01-01 RX ADMIN — OXYBUTYNIN CHLORIDE 10 MG: 5 TABLET, EXTENDED RELEASE ORAL at 05:25

## 2021-01-01 RX ADMIN — LORAZEPAM 0.5 MG: 2 INJECTION INTRAMUSCULAR; INTRAVENOUS at 06:06

## 2021-01-01 RX ADMIN — ONDANSETRON 4 MG: 2 INJECTION INTRAMUSCULAR; INTRAVENOUS at 20:20

## 2021-01-01 RX ADMIN — SODIUM CHLORIDE: 9 INJECTION, SOLUTION INTRAVENOUS at 11:50

## 2021-01-01 RX ADMIN — VANCOMYCIN HYDROCHLORIDE 250 MG: KIT ORAL at 17:00

## 2021-01-01 RX ADMIN — LORAZEPAM 1 MG: 2 INJECTION INTRAMUSCULAR; INTRAVENOUS at 02:56

## 2021-01-01 RX ADMIN — FAMOTIDINE 20 MG: 20 TABLET ORAL at 17:49

## 2021-01-01 RX ADMIN — ONDANSETRON 4 MG: 2 INJECTION INTRAMUSCULAR; INTRAVENOUS at 09:31

## 2021-01-01 RX ADMIN — BUPROPION HYDROCHLORIDE 200 MG: 100 TABLET, FILM COATED, EXTENDED RELEASE ORAL at 18:13

## 2021-01-01 RX ADMIN — LIDOCAINE HYDROCHLORIDE 4 ML: 40 SOLUTION TOPICAL at 16:55

## 2021-01-01 RX ADMIN — ESCITALOPRAM OXALATE 20 MG: 10 TABLET ORAL at 05:33

## 2021-01-01 RX ADMIN — KETAMINE HYDROCHLORIDE 50 MG: 50 INJECTION INTRAMUSCULAR; INTRAVENOUS at 17:10

## 2021-01-01 RX ADMIN — LORAZEPAM 1 MG: 2 INJECTION INTRAMUSCULAR; INTRAVENOUS at 00:07

## 2021-01-01 RX ADMIN — LORAZEPAM 4 MG: 2 INJECTION INTRAMUSCULAR; INTRAVENOUS at 05:29

## 2021-01-01 RX ADMIN — Medication 1000 UNITS: at 06:05

## 2021-01-01 RX ADMIN — METOPROLOL TARTRATE 5 MG: 5 INJECTION, SOLUTION INTRAVENOUS at 20:18

## 2021-01-01 RX ADMIN — PHENYLEPHRINE HYDROCHLORIDE 200 MCG: 10 INJECTION INTRAVENOUS at 08:14

## 2021-01-01 RX ADMIN — PHENYLEPHRINE HYDROCHLORIDE 200 MCG: 10 INJECTION INTRAVENOUS at 07:59

## 2021-01-01 RX ADMIN — PROCHLORPERAZINE EDISYLATE 10 MG: 5 INJECTION INTRAMUSCULAR; INTRAVENOUS at 01:39

## 2021-01-01 RX ADMIN — BACLOFEN 10 MG: 10 TABLET ORAL at 20:34

## 2021-01-01 RX ADMIN — PROPOFOL 100 MG: 10 INJECTION, EMULSION INTRAVENOUS at 07:53

## 2021-01-01 RX ADMIN — VANCOMYCIN HYDROCHLORIDE 250 MG: KIT ORAL at 23:56

## 2021-01-01 RX ADMIN — LIDOCAINE: 50 OINTMENT TOPICAL at 06:00

## 2021-01-01 RX ADMIN — VANCOMYCIN HYDROCHLORIDE 500 MG: KIT ORAL at 17:18

## 2021-01-01 RX ADMIN — ENOXAPARIN SODIUM 40 MG: 40 INJECTION SUBCUTANEOUS at 05:36

## 2021-01-01 RX ADMIN — SODIUM CHLORIDE, SODIUM GLUCONATE, SODIUM ACETATE, POTASSIUM CHLORIDE AND MAGNESIUM CHLORIDE: 526; 502; 368; 37; 30 INJECTION, SOLUTION INTRAVENOUS at 17:41

## 2021-01-01 RX ADMIN — ONDANSETRON 4 MG: 2 INJECTION INTRAMUSCULAR; INTRAVENOUS at 17:43

## 2021-01-01 RX ADMIN — MEROPENEM 500 MG: 500 INJECTION, POWDER, FOR SOLUTION INTRAVENOUS at 13:14

## 2021-01-01 RX ADMIN — LORAZEPAM 1 MG: 2 INJECTION INTRAMUSCULAR; INTRAVENOUS at 01:25

## 2021-01-01 RX ADMIN — LORAZEPAM 4 MG: 2 INJECTION INTRAMUSCULAR; INTRAVENOUS at 06:42

## 2021-01-01 RX ADMIN — BUPROPION HYDROCHLORIDE 100 MG: 100 TABLET, EXTENDED RELEASE ORAL at 21:42

## 2021-01-01 RX ADMIN — POTASSIUM CHLORIDE 10 MEQ: 7.46 INJECTION, SOLUTION INTRAVENOUS at 15:41

## 2021-01-01 RX ADMIN — SODIUM PHOSPHATE, MONOBASIC, MONOHYDRATE 30 MMOL: 276; 142 INJECTION, SOLUTION INTRAVENOUS at 10:10

## 2021-01-01 RX ADMIN — BACLOFEN 10 MG: 10 TABLET ORAL at 14:59

## 2021-01-01 RX ADMIN — ACETAMINOPHEN 650 MG: 325 TABLET ORAL at 17:43

## 2021-01-01 RX ADMIN — SUGAMMADEX 200 MG: 100 INJECTION, SOLUTION INTRAVENOUS at 18:15

## 2021-01-01 RX ADMIN — SODIUM CHLORIDE, POTASSIUM CHLORIDE, SODIUM LACTATE AND CALCIUM CHLORIDE: 600; 310; 30; 20 INJECTION, SOLUTION INTRAVENOUS at 02:55

## 2021-01-01 RX ADMIN — SERTRALINE HYDROCHLORIDE 25 MG: 50 TABLET ORAL at 05:07

## 2021-01-01 RX ADMIN — OXYBUTYNIN CHLORIDE 10 MG: 5 TABLET, EXTENDED RELEASE ORAL at 05:33

## 2021-01-01 RX ADMIN — BUPROPION HYDROCHLORIDE 100 MG: 100 TABLET, EXTENDED RELEASE ORAL at 09:31

## 2021-01-01 RX ADMIN — HYDROMORPHONE HYDROCHLORIDE 4 MG: 2 INJECTION, SOLUTION INTRAMUSCULAR; INTRAVENOUS; SUBCUTANEOUS at 03:14

## 2021-01-01 RX ADMIN — OXYCODONE 10 MG: 5 TABLET ORAL at 16:03

## 2021-01-01 RX ADMIN — OXYBUTYNIN CHLORIDE 10 MG: 5 TABLET, EXTENDED RELEASE ORAL at 05:10

## 2021-01-01 RX ADMIN — HALOPERIDOL LACTATE 10 MG: 5 INJECTION, SOLUTION INTRAMUSCULAR at 06:11

## 2021-01-01 RX ADMIN — PHENYLEPHRINE HYDROCHLORIDE 100 MCG: 10 INJECTION INTRAVENOUS at 08:18

## 2021-01-01 RX ADMIN — SODIUM CHLORIDE: 9 INJECTION, SOLUTION INTRAVENOUS at 13:42

## 2021-01-01 RX ADMIN — VANCOMYCIN HYDROCHLORIDE 125 MG: KIT ORAL at 16:00

## 2021-01-01 RX ADMIN — SODIUM CHLORIDE, POTASSIUM CHLORIDE, SODIUM LACTATE AND CALCIUM CHLORIDE: 600; 310; 30; 20 INJECTION, SOLUTION INTRAVENOUS at 03:57

## 2021-01-01 RX ADMIN — HYDROMORPHONE HYDROCHLORIDE 0.5 MG: 1 INJECTION, SOLUTION INTRAMUSCULAR; INTRAVENOUS; SUBCUTANEOUS at 15:01

## 2021-01-01 RX ADMIN — SODIUM CHLORIDE, POTASSIUM CHLORIDE, SODIUM LACTATE AND CALCIUM CHLORIDE: 600; 310; 30; 20 INJECTION, SOLUTION INTRAVENOUS at 21:07

## 2021-01-01 RX ADMIN — BACLOFEN 10 MG: 10 TABLET ORAL at 21:42

## 2021-01-01 RX ADMIN — LORAZEPAM 0.5 MG: 2 INJECTION INTRAMUSCULAR; INTRAVENOUS at 02:50

## 2021-01-01 RX ADMIN — Medication 400 MG: at 14:58

## 2021-01-01 RX ADMIN — BACLOFEN 10 MG: 10 TABLET ORAL at 10:17

## 2021-01-01 RX ADMIN — BUPROPION HYDROCHLORIDE 100 MG: 100 TABLET, EXTENDED RELEASE ORAL at 09:36

## 2021-01-01 RX ADMIN — SERTRALINE HYDROCHLORIDE 25 MG: 50 TABLET ORAL at 05:10

## 2021-01-01 RX ADMIN — LORAZEPAM 1 MG: 2 INJECTION INTRAMUSCULAR; INTRAVENOUS at 00:50

## 2021-01-01 RX ADMIN — CIPROFLOXACIN 500 MG: 500 TABLET, FILM COATED ORAL at 17:38

## 2021-01-01 RX ADMIN — SODIUM CHLORIDE, POTASSIUM CHLORIDE, SODIUM LACTATE AND CALCIUM CHLORIDE: 600; 310; 30; 20 INJECTION, SOLUTION INTRAVENOUS at 13:08

## 2021-01-01 RX ADMIN — POTASSIUM CHLORIDE 40 MEQ: 1500 TABLET, EXTENDED RELEASE ORAL at 07:35

## 2021-01-01 RX ADMIN — Medication 1 CAPSULE: at 07:41

## 2021-01-01 RX ADMIN — VANCOMYCIN HYDROCHLORIDE 500 MG: 1 INJECTION, POWDER, LYOPHILIZED, FOR SOLUTION INTRAVENOUS at 05:37

## 2021-01-01 RX ADMIN — MEROPENEM 500 MG: 500 INJECTION, POWDER, FOR SOLUTION INTRAVENOUS at 00:58

## 2021-01-01 RX ADMIN — VANCOMYCIN HYDROCHLORIDE 125 MG: KIT ORAL at 14:58

## 2021-01-01 RX ADMIN — CEFEPIME 2 G: 2 INJECTION, POWDER, FOR SOLUTION INTRAVENOUS at 18:06

## 2021-01-01 RX ADMIN — ONDANSETRON 4 MG: 4 TABLET, ORALLY DISINTEGRATING ORAL at 17:47

## 2021-01-01 RX ADMIN — OXYBUTYNIN CHLORIDE 10 MG: 5 TABLET, EXTENDED RELEASE ORAL at 14:51

## 2021-01-01 RX ADMIN — BUPROPION HYDROCHLORIDE 200 MG: 100 TABLET, FILM COATED, EXTENDED RELEASE ORAL at 05:10

## 2021-01-01 RX ADMIN — GADOTERIDOL 10 ML: 279.3 INJECTION, SOLUTION INTRAVENOUS at 13:24

## 2021-01-01 RX ADMIN — Medication 200 MCG: at 21:46

## 2021-01-01 RX ADMIN — VANCOMYCIN HYDROCHLORIDE 125 MG: KIT ORAL at 00:00

## 2021-01-01 RX ADMIN — SODIUM CHLORIDE, POTASSIUM CHLORIDE, SODIUM LACTATE AND CALCIUM CHLORIDE 1716 ML: 600; 310; 30; 20 INJECTION, SOLUTION INTRAVENOUS at 17:42

## 2021-01-01 RX ADMIN — METOPROLOL TARTRATE 5 MG: 5 INJECTION, SOLUTION INTRAVENOUS at 16:56

## 2021-01-01 RX ADMIN — ROCURONIUM BROMIDE 90 MG: 10 INJECTION, SOLUTION INTRAVENOUS at 16:51

## 2021-01-01 RX ADMIN — VANCOMYCIN HYDROCHLORIDE 250 MG: KIT ORAL at 05:20

## 2021-01-01 RX ADMIN — BUPROPION HYDROCHLORIDE 100 MG: 100 TABLET, EXTENDED RELEASE ORAL at 16:56

## 2021-01-01 RX ADMIN — OXYBUTYNIN CHLORIDE 10 MG: 5 TABLET, EXTENDED RELEASE ORAL at 08:53

## 2021-01-01 RX ADMIN — VANCOMYCIN HYDROCHLORIDE 500 MG: KIT ORAL at 00:32

## 2021-01-01 RX ADMIN — HYDROMORPHONE HYDROCHLORIDE 4 MG: 2 INJECTION, SOLUTION INTRAMUSCULAR; INTRAVENOUS; SUBCUTANEOUS at 01:45

## 2021-01-01 RX ADMIN — FAMOTIDINE 20 MG: 20 TABLET ORAL at 05:33

## 2021-01-01 RX ADMIN — VANCOMYCIN HYDROCHLORIDE 250 MG: KIT ORAL at 13:14

## 2021-01-01 RX ADMIN — VANCOMYCIN HYDROCHLORIDE 125 MG: KIT ORAL at 05:07

## 2021-01-01 RX ADMIN — METRONIDAZOLE 500 MG: 500 INJECTION, SOLUTION INTRAVENOUS at 14:00

## 2021-01-01 RX ADMIN — BACLOFEN 10 MG: 10 TABLET ORAL at 16:03

## 2021-01-01 RX ADMIN — EPHEDRINE SULFATE 10 MG: 50 INJECTION INTRAMUSCULAR; INTRAVENOUS; SUBCUTANEOUS at 08:44

## 2021-01-01 RX ADMIN — MEROPENEM 500 MG: 500 INJECTION, POWDER, FOR SOLUTION INTRAVENOUS at 00:32

## 2021-01-01 RX ADMIN — VANCOMYCIN HYDROCHLORIDE 500 MG: 1 INJECTION, POWDER, LYOPHILIZED, FOR SOLUTION INTRAVENOUS at 23:00

## 2021-01-01 RX ADMIN — CEFEPIME 2 G: 2 INJECTION, POWDER, FOR SOLUTION INTRAVENOUS at 08:08

## 2021-01-01 RX ADMIN — BACLOFEN 10 MG: 10 TABLET ORAL at 17:00

## 2021-01-01 RX ADMIN — MEROPENEM 500 MG: 500 INJECTION, POWDER, FOR SOLUTION INTRAVENOUS at 06:52

## 2021-01-01 RX ADMIN — SERTRALINE HYDROCHLORIDE 25 MG: 50 TABLET ORAL at 09:31

## 2021-01-01 RX ADMIN — LIDOCAINE 2 PATCH: 50 PATCH TOPICAL at 05:24

## 2021-01-01 RX ADMIN — BACLOFEN 10 MG: 10 TABLET ORAL at 11:49

## 2021-01-01 RX ADMIN — SCOPALAMINE 1 PATCH: 1 PATCH, EXTENDED RELEASE TRANSDERMAL at 16:31

## 2021-01-01 RX ADMIN — SODIUM CHLORIDE, SODIUM GLUCONATE, SODIUM ACETATE, POTASSIUM CHLORIDE AND MAGNESIUM CHLORIDE: 526; 502; 368; 37; 30 INJECTION, SOLUTION INTRAVENOUS at 16:29

## 2021-01-01 RX ADMIN — SERTRALINE HYDROCHLORIDE 25 MG: 50 TABLET ORAL at 05:34

## 2021-01-01 RX ADMIN — FAMOTIDINE 20 MG: 20 TABLET ORAL at 05:49

## 2021-01-01 RX ADMIN — POTASSIUM CHLORIDE 40 MEQ: 1500 TABLET, EXTENDED RELEASE ORAL at 05:21

## 2021-01-01 RX ADMIN — BUPROPION HYDROCHLORIDE 200 MG: 100 TABLET, FILM COATED, EXTENDED RELEASE ORAL at 18:05

## 2021-01-01 RX ADMIN — BUPROPION HYDROCHLORIDE 100 MG: 100 TABLET, FILM COATED, EXTENDED RELEASE ORAL at 09:27

## 2021-01-01 RX ADMIN — MEROPENEM 500 MG: 500 INJECTION, POWDER, FOR SOLUTION INTRAVENOUS at 06:05

## 2021-01-01 RX ADMIN — EPHEDRINE SULFATE 5 MG: 50 INJECTION INTRAMUSCULAR; INTRAVENOUS; SUBCUTANEOUS at 09:04

## 2021-01-01 RX ADMIN — HYDROMORPHONE HYDROCHLORIDE 4 MG: 2 INJECTION, SOLUTION INTRAMUSCULAR; INTRAVENOUS; SUBCUTANEOUS at 23:48

## 2021-01-01 RX ADMIN — ONDANSETRON 4 MG: 2 INJECTION INTRAMUSCULAR; INTRAVENOUS at 08:36

## 2021-01-01 RX ADMIN — ACETAMINOPHEN 650 MG: 325 TABLET ORAL at 02:52

## 2021-01-01 RX ADMIN — PIPERACILLIN AND TAZOBACTAM 4.5 G: 4; .5 INJECTION, POWDER, LYOPHILIZED, FOR SOLUTION INTRAVENOUS; PARENTERAL at 23:39

## 2021-01-01 RX ADMIN — VANCOMYCIN HYDROCHLORIDE 500 MG: 1 INJECTION, POWDER, LYOPHILIZED, FOR SOLUTION INTRAVENOUS at 14:45

## 2021-01-01 RX ADMIN — CEFEPIME 2 G: 2 INJECTION, POWDER, FOR SOLUTION INTRAVENOUS at 05:50

## 2021-01-01 RX ADMIN — ONDANSETRON 4 MG: 2 INJECTION INTRAMUSCULAR; INTRAVENOUS at 19:45

## 2021-01-01 RX ADMIN — METRONIDAZOLE 500 MG: 500 INJECTION, SOLUTION INTRAVENOUS at 14:49

## 2021-01-01 RX ADMIN — VANCOMYCIN HYDROCHLORIDE 1500 MG: 500 INJECTION, POWDER, LYOPHILIZED, FOR SOLUTION INTRAVENOUS at 09:58

## 2021-01-01 RX ADMIN — SERTRALINE HYDROCHLORIDE 25 MG: 50 TABLET ORAL at 05:42

## 2021-01-01 RX ADMIN — BUPROPION HYDROCHLORIDE 100 MG: 100 TABLET, FILM COATED, EXTENDED RELEASE ORAL at 16:01

## 2021-01-01 RX ADMIN — BUPROPION HYDROCHLORIDE 100 MG: 100 TABLET, EXTENDED RELEASE ORAL at 08:55

## 2021-01-01 RX ADMIN — VANCOMYCIN HYDROCHLORIDE 125 MG: KIT ORAL at 06:00

## 2021-01-01 RX ADMIN — BACLOFEN 10 MG: 10 TABLET ORAL at 21:24

## 2021-01-01 RX ADMIN — HYDROMORPHONE HYDROCHLORIDE 8 MG: 2 INJECTION INTRAMUSCULAR; INTRAVENOUS; SUBCUTANEOUS at 06:11

## 2021-01-01 RX ADMIN — OXYBUTYNIN CHLORIDE 10 MG: 5 TABLET, EXTENDED RELEASE ORAL at 21:50

## 2021-01-01 RX ADMIN — SODIUM CHLORIDE, POTASSIUM CHLORIDE, SODIUM LACTATE AND CALCIUM CHLORIDE: 600; 310; 30; 20 INJECTION, SOLUTION INTRAVENOUS at 07:48

## 2021-01-01 RX ADMIN — BACLOFEN 10 MG: 10 TABLET ORAL at 12:25

## 2021-01-01 RX ADMIN — IOHEXOL 100 ML: 350 INJECTION, SOLUTION INTRAVENOUS at 20:36

## 2021-01-01 RX ADMIN — BACLOFEN 10 MG: 10 TABLET ORAL at 09:04

## 2021-01-01 RX ADMIN — LORAZEPAM 1 MG: 2 INJECTION INTRAMUSCULAR; INTRAVENOUS at 22:29

## 2021-01-01 RX ADMIN — BACLOFEN 10 MG: 10 TABLET ORAL at 13:11

## 2021-01-01 RX ADMIN — FAMOTIDINE 20 MG: 20 TABLET ORAL at 12:39

## 2021-01-01 RX ADMIN — BACLOFEN 10 MG: 10 TABLET ORAL at 09:44

## 2021-01-01 RX ADMIN — FAMOTIDINE 20 MG: 20 TABLET ORAL at 05:27

## 2021-01-01 RX ADMIN — VANCOMYCIN HYDROCHLORIDE 125 MG: KIT ORAL at 05:41

## 2021-01-01 RX ADMIN — ONDANSETRON 4 MG: 2 INJECTION INTRAMUSCULAR; INTRAVENOUS at 10:16

## 2021-01-01 RX ADMIN — LIDOCAINE 1 PATCH: 50 PATCH TOPICAL at 22:37

## 2021-01-01 RX ADMIN — Medication 1000 UNITS: at 05:21

## 2021-01-01 RX ADMIN — OXYCODONE 10 MG: 5 TABLET ORAL at 09:44

## 2021-01-01 RX ADMIN — DEXAMETHASONE SODIUM PHOSPHATE 4 MG: 4 INJECTION, SOLUTION INTRAMUSCULAR; INTRAVENOUS at 07:53

## 2021-01-01 RX ADMIN — BUPROPION HYDROCHLORIDE 200 MG: 100 TABLET, FILM COATED, EXTENDED RELEASE ORAL at 16:53

## 2021-01-01 RX ADMIN — BACLOFEN 10 MG: 10 TABLET ORAL at 17:50

## 2021-01-01 RX ADMIN — DEXAMETHASONE SODIUM PHOSPHATE 4 MG: 4 INJECTION, SOLUTION INTRAMUSCULAR; INTRAVENOUS at 21:46

## 2021-01-01 RX ADMIN — VANCOMYCIN HYDROCHLORIDE 125 MG: KIT ORAL at 05:26

## 2021-01-01 RX ADMIN — ACETAMINOPHEN 1000 MG: 500 TABLET, FILM COATED ORAL at 01:13

## 2021-01-01 RX ADMIN — Medication 1000 UNITS: at 09:44

## 2021-01-01 RX ADMIN — BACLOFEN 10 MG: 10 TABLET ORAL at 09:26

## 2021-01-01 RX ADMIN — POTASSIUM CHLORIDE 40 MEQ: 1500 TABLET, EXTENDED RELEASE ORAL at 14:59

## 2021-01-01 RX ADMIN — SERTRALINE HYDROCHLORIDE 25 MG: 50 TABLET ORAL at 05:25

## 2021-01-01 RX ADMIN — ONDANSETRON 4 MG: 2 INJECTION INTRAMUSCULAR; INTRAVENOUS at 15:05

## 2021-01-01 RX ADMIN — FAMOTIDINE 20 MG: 20 TABLET ORAL at 20:34

## 2021-01-01 RX ADMIN — POTASSIUM CHLORIDE 10 MEQ: 7.46 INJECTION, SOLUTION INTRAVENOUS at 09:52

## 2021-01-01 RX ADMIN — DIAZEPAM 5 MG: 5 INJECTION, SOLUTION INTRAMUSCULAR; INTRAVENOUS at 08:02

## 2021-01-01 RX ADMIN — FAMOTIDINE 20 MG: 20 TABLET ORAL at 00:08

## 2021-01-01 RX ADMIN — LIDOCAINE HYDROCHLORIDE 50 MG: 20 INJECTION, SOLUTION EPIDURAL; INFILTRATION; INTRACAUDAL; PERINEURAL at 07:53

## 2021-01-01 RX ADMIN — BACLOFEN 10 MG: 10 TABLET ORAL at 13:07

## 2021-01-01 RX ADMIN — BACLOFEN 10 MG: 10 TABLET ORAL at 14:53

## 2021-01-01 RX ADMIN — FUROSEMIDE 20 MG: 10 INJECTION, SOLUTION INTRAMUSCULAR; INTRAVENOUS at 08:56

## 2021-01-01 RX ADMIN — MEROPENEM 500 MG: 500 INJECTION, POWDER, FOR SOLUTION INTRAVENOUS at 12:41

## 2021-01-01 RX ADMIN — BACLOFEN 10 MG: 10 TABLET ORAL at 09:09

## 2021-01-01 RX ADMIN — FENTANYL CITRATE 100 MCG: 50 INJECTION, SOLUTION INTRAMUSCULAR; INTRAVENOUS at 16:51

## 2021-01-01 RX ADMIN — LORAZEPAM 1 MG: 2 INJECTION INTRAMUSCULAR; INTRAVENOUS at 03:33

## 2021-01-01 RX ADMIN — BACLOFEN 10 MG: 10 TABLET ORAL at 18:05

## 2021-01-01 RX ADMIN — SODIUM CHLORIDE, POTASSIUM CHLORIDE, SODIUM LACTATE AND CALCIUM CHLORIDE: 600; 310; 30; 20 INJECTION, SOLUTION INTRAVENOUS at 05:47

## 2021-01-01 RX ADMIN — FAMOTIDINE 20 MG: 20 TABLET ORAL at 09:32

## 2021-01-01 RX ADMIN — LIDOCAINE 2 PATCH: 50 PATCH TOPICAL at 05:48

## 2021-01-01 RX ADMIN — BACLOFEN 10 MG: 10 TABLET ORAL at 20:15

## 2021-01-01 RX ADMIN — Medication 1000 UNITS: at 05:26

## 2021-01-01 RX ADMIN — BUPROPION HYDROCHLORIDE 100 MG: 100 TABLET, FILM COATED, EXTENDED RELEASE ORAL at 14:51

## 2021-01-01 RX ADMIN — SODIUM CHLORIDE, POTASSIUM CHLORIDE, SODIUM LACTATE AND CALCIUM CHLORIDE: 600; 310; 30; 20 INJECTION, SOLUTION INTRAVENOUS at 00:08

## 2021-01-01 RX ADMIN — POTASSIUM CHLORIDE, DEXTROSE MONOHYDRATE AND SODIUM CHLORIDE: 150; 5; 900 INJECTION, SOLUTION INTRAVENOUS at 05:19

## 2021-01-01 RX ADMIN — ENOXAPARIN SODIUM 40 MG: 40 INJECTION SUBCUTANEOUS at 09:43

## 2021-01-01 RX ADMIN — BACLOFEN 10 MG: 10 TABLET ORAL at 15:17

## 2021-01-01 RX ADMIN — SENNOSIDES AND DOCUSATE SODIUM 2 TABLET: 8.6; 5 TABLET ORAL at 17:49

## 2021-01-01 RX ADMIN — HYDROMORPHONE HYDROCHLORIDE 8 MG: 2 INJECTION INTRAMUSCULAR; INTRAVENOUS; SUBCUTANEOUS at 06:42

## 2021-01-01 RX ADMIN — LORAZEPAM 1 MG: 2 INJECTION INTRAMUSCULAR; INTRAVENOUS at 23:34

## 2021-01-01 RX ADMIN — MIDAZOLAM 5 MG/HR: 5 INJECTION INTRAMUSCULAR; INTRAVENOUS at 12:08

## 2021-01-01 SDOH — ECONOMIC STABILITY: GENERAL

## 2021-01-01 ASSESSMENT — ENCOUNTER SYMPTOMS
DIARRHEA: 1
WEAKNESS: 1
PERSON REPORTING PAIN: DIRECT OBSERVATION
BACK PAIN: 1
DOUBLE VISION: 0
FOCAL WEAKNESS: 1
HEADACHES: 0
PALPITATIONS: 0
DOUBLE VISION: 0
WHEEZING: 0
MYALGIAS: 0
ABDOMINAL PAIN: 1
MYALGIAS: 1
HEADACHES: 0
BLURRED VISION: 0
FOCAL WEAKNESS: 1
DIZZINESS: 0
NECK PAIN: 0
NAUSEA: 0
DIARRHEA: 1
DIARRHEA: 1
COUGH: 0
LOSS OF CONSCIOUSNESS: 0
SEIZURES: 0
MYALGIAS: 0
VOMITING: 0
DIZZINESS: 0
SHORTNESS OF BREATH: 1
COUGH: 0
ABDOMINAL PAIN: 1
MYALGIAS: 1
CHILLS: 0
PAIN LOCATION - PAIN SEVERITY: 5/10
HEADACHES: 0
DIZZINESS: 0
SINUS PAIN: 0
DIZZINESS: 0
BLURRED VISION: 0
DIARRHEA: 1
STOOL FREQUENCY: LESS THAN DAILY
FOCAL WEAKNESS: 1
SHORTNESS OF BREATH: 0
FOCAL WEAKNESS: 0
LOSS OF CONSCIOUSNESS: 0
DIARRHEA: 1
SPUTUM PRODUCTION: 0
BRUISES/BLEEDS EASILY: 0
CHILLS: 1
DYSPNEA ON EXERTION: 1
DYSPNEA ACTIVITY LEVEL: AT REST
SORE THROAT: 0
EYE DISCHARGE: 0
CONSTIPATION: 0
CONSTIPATION: 0
NAUSEA: 1
CHILLS: 0
SHORTNESS OF BREATH: 0
HIGHEST PAIN SEVERITY IN PAST 24 HOURS: 5/10
BLURRED VISION: 0
CHILLS: 1
ROS GI COMMENTS: DECREASED
DOUBLE VISION: 0
HEMOPTYSIS: 0
NAUSEA: 0
SENSORY CHANGE: 0
DIARRHEA: 1
VOMITING: 1
SEIZURES: 0
FOCAL WEAKNESS: 0
COUGH: 0
DEPRESSION: 0
TINGLING: 0
HEMOPTYSIS: 0
ABDOMINAL PAIN: 0
DIARRHEA: 1
DIZZINESS: 0
FOCAL WEAKNESS: 0
DIZZINESS: 0
SPEECH CHANGE: 0
SPUTUM PRODUCTION: 0
WEAKNESS: 1
COUGH: 0
SHORTNESS OF BREATH: 0
SHORTNESS OF BREATH: 0
COUGH: 0
VOMITING: 0
DEPRESSION: 0
FEVER: 0
CHILLS: 1
DIARRHEA: 1
WEAKNESS: 0
SINUS PAIN: 0
CHILLS: 1
BRUISES/BLEEDS EASILY: 0
FEVER: 1
MYALGIAS: 1
CHILLS: 0
ABDOMINAL PAIN: 1
CHILLS: 0
SHORTNESS OF BREATH: 0
DEPRESSION: 0
COUGH: 0
UNABLE TO COMMUNICATE PAIN: 1
MYALGIAS: 1
EYE DISCHARGE: 0
SENSORY CHANGE: 0
CHILLS: 0
FEVER: 0
BACK PAIN: 1
FOCAL WEAKNESS: 0
WEAKNESS: 1
DIARRHEA: 1
DIARRHEA: 1
HEADACHES: 0
EYE REDNESS: 0
DEPRESSION: 0
DEPRESSION: 0
SINUS PAIN: 0
FEVER: 0
HEADACHES: 0
BLURRED VISION: 0
FATIGUES EASILY: 1
VOMITING: 0
BOWEL INCONTINENCE: 1
PAIN LOCATION: GENERALIZED
CHILLS: 0
FEVER: 0
FEVER: 1
CONSTIPATION: 0
DEPRESSION: 0
DOUBLE VISION: 0
PALPITATIONS: 0
DIZZINESS: 0
WEAKNESS: 1
ABDOMINAL PAIN: 1
NAUSEA: 0
DIARRHEA: 0
FOCAL WEAKNESS: 1
ABDOMINAL PAIN: 0
COUGH: 0
NAUSEA: 1
VOMITING: 0
EYE PAIN: 0
CLAUDICATION: 0
CONSTIPATION: 1
CHANGE IN LEVEL OF CONSCIOUSNESS: 1
BACK PAIN: 0
STRIDOR: 0
WEAKNESS: 1
MYALGIAS: 1
FEVER: 0
COUGH: 0
COUGH: 0
WHEEZING: 0
NECK PAIN: 0
WEAKNESS: 0
CHILLS: 0
NAUSEA: 0
HEADACHES: 1
EYE DISCHARGE: 0
SHORTNESS OF BREATH: 0
SHORTNESS OF BREATH: 1
SORE THROAT: 0
SORE THROAT: 0
CHILLS: 0
NERVOUS/ANXIOUS: 0
CLAUDICATION: 0
ABDOMINAL PAIN: 1
EYE PAIN: 0
FALLS: 0
SORE THROAT: 0
HEADACHES: 0
FEVER: 1
DIARRHEA: 1
DIAPHORESIS: 0
SPEECH CHANGE: 0
FEVER: 0
MYALGIAS: 0
ABDOMINAL PAIN: 1
DIARRHEA: 1
SHORTNESS OF BREATH: 1
SPUTUM PRODUCTION: 0
WEAKNESS: 1
FEVER: 0
CONSTIPATION: 1
BACK PAIN: 0
BLURRED VISION: 1
DIZZINESS: 0
FOCAL WEAKNESS: 1
DEPRESSION: 0
DIAPHORESIS: 0
HEADACHES: 0
FEVER: 1
BLURRED VISION: 0
ABDOMINAL PAIN: 1
BLURRED VISION: 0
SEIZURES: 0
CHILLS: 0
FEVER: 0
CHILLS: 0
DIARRHEA: 1
ABDOMINAL PAIN: 0
MYALGIAS: 0
FEVER: 0
BLOOD IN STOOL: 0
CHILLS: 0
FALLS: 0
HEADACHES: 0
PALPITATIONS: 0
WEAKNESS: 1
FEVER: 0

## 2021-01-01 ASSESSMENT — COGNITIVE AND FUNCTIONAL STATUS - GENERAL
SUGGESTED CMS G CODE MODIFIER MOBILITY: CN
CLIMB 3 TO 5 STEPS WITH RAILING: TOTAL
MOBILITY SCORE: 6
MOVING TO AND FROM BED TO CHAIR: UNABLE
EATING MEALS: TOTAL
SUGGESTED CMS G CODE MODIFIER MOBILITY: CN
EATING MEALS: TOTAL
SUGGESTED CMS G CODE MODIFIER DAILY ACTIVITY: CN
WALKING IN HOSPITAL ROOM: TOTAL
STANDING UP FROM CHAIR USING ARMS: TOTAL
SUGGESTED CMS G CODE MODIFIER MOBILITY: CN
TOILETING: TOTAL
CLIMB 3 TO 5 STEPS WITH RAILING: TOTAL
CLIMB 3 TO 5 STEPS WITH RAILING: TOTAL
MOVING TO AND FROM BED TO CHAIR: UNABLE
PERSONAL GROOMING: TOTAL
PERSONAL GROOMING: TOTAL
TOILETING: TOTAL
HELP NEEDED FOR BATHING: TOTAL
STANDING UP FROM CHAIR USING ARMS: TOTAL
SUGGESTED CMS G CODE MODIFIER DAILY ACTIVITY: CN
WALKING IN HOSPITAL ROOM: TOTAL
MOBILITY SCORE: 6
WALKING IN HOSPITAL ROOM: TOTAL
STANDING UP FROM CHAIR USING ARMS: TOTAL
DRESSING REGULAR UPPER BODY CLOTHING: TOTAL
EATING MEALS: TOTAL
DRESSING REGULAR LOWER BODY CLOTHING: TOTAL
SUGGESTED CMS G CODE MODIFIER DAILY ACTIVITY: CN
MOVING FROM LYING ON BACK TO SITTING ON SIDE OF FLAT BED: UNABLE
TURNING FROM BACK TO SIDE WHILE IN FLAT BAD: UNABLE
DRESSING REGULAR UPPER BODY CLOTHING: TOTAL
SUGGESTED CMS G CODE MODIFIER DAILY ACTIVITY: CN
DAILY ACTIVITIY SCORE: 6
DRESSING REGULAR UPPER BODY CLOTHING: TOTAL
MOBILITY SCORE: 6
CLIMB 3 TO 5 STEPS WITH RAILING: TOTAL
TOILETING: TOTAL
DAILY ACTIVITIY SCORE: 6
PERSONAL GROOMING: TOTAL
DRESSING REGULAR LOWER BODY CLOTHING: TOTAL
MOBILITY SCORE: 6
TOILETING: TOTAL
TOILETING: TOTAL
EATING MEALS: TOTAL
DAILY ACTIVITIY SCORE: 6
DRESSING REGULAR LOWER BODY CLOTHING: TOTAL
TURNING FROM BACK TO SIDE WHILE IN FLAT BAD: UNABLE
DRESSING REGULAR UPPER BODY CLOTHING: TOTAL
MOVING FROM LYING ON BACK TO SITTING ON SIDE OF FLAT BED: UNABLE
MOVING TO AND FROM BED TO CHAIR: UNABLE
HELP NEEDED FOR BATHING: TOTAL
PERSONAL GROOMING: TOTAL
DRESSING REGULAR UPPER BODY CLOTHING: TOTAL
EATING MEALS: TOTAL
HELP NEEDED FOR BATHING: TOTAL
DAILY ACTIVITIY SCORE: 6
HELP NEEDED FOR BATHING: TOTAL
SUGGESTED CMS G CODE MODIFIER MOBILITY: CN
CLIMB 3 TO 5 STEPS WITH RAILING: TOTAL
MOVING FROM LYING ON BACK TO SITTING ON SIDE OF FLAT BED: UNABLE
TURNING FROM BACK TO SIDE WHILE IN FLAT BAD: UNABLE
HELP NEEDED FOR BATHING: TOTAL
TURNING FROM BACK TO SIDE WHILE IN FLAT BAD: UNABLE
MOVING TO AND FROM BED TO CHAIR: UNABLE
WALKING IN HOSPITAL ROOM: TOTAL
STANDING UP FROM CHAIR USING ARMS: TOTAL
PERSONAL GROOMING: TOTAL
DAILY ACTIVITIY SCORE: 6
SUGGESTED CMS G CODE MODIFIER DAILY ACTIVITY: CN
DRESSING REGULAR LOWER BODY CLOTHING: TOTAL
MOVING FROM LYING ON BACK TO SITTING ON SIDE OF FLAT BED: UNABLE
STANDING UP FROM CHAIR USING ARMS: TOTAL
TURNING FROM BACK TO SIDE WHILE IN FLAT BAD: UNABLE
MOBILITY SCORE: 6
SUGGESTED CMS G CODE MODIFIER MOBILITY: CN
DRESSING REGULAR LOWER BODY CLOTHING: TOTAL
WALKING IN HOSPITAL ROOM: TOTAL
MOVING TO AND FROM BED TO CHAIR: UNABLE
MOVING FROM LYING ON BACK TO SITTING ON SIDE OF FLAT BED: UNABLE

## 2021-01-01 ASSESSMENT — PAIN DESCRIPTION - PAIN TYPE
TYPE: CHRONIC PAIN
TYPE: ACUTE PAIN
TYPE: CHRONIC PAIN
TYPE: CHRONIC PAIN;SURGICAL PAIN
TYPE: CHRONIC PAIN
TYPE: CHRONIC PAIN
TYPE: ACUTE PAIN
TYPE: ACUTE PAIN
TYPE: ACUTE PAIN;CHRONIC PAIN
TYPE: CHRONIC PAIN
TYPE: CHRONIC PAIN
TYPE: CHRONIC PAIN;ACUTE PAIN
TYPE: ACUTE PAIN
TYPE: CHRONIC PAIN
TYPE: ACUTE PAIN
TYPE: CHRONIC PAIN
TYPE: CHRONIC PAIN
TYPE: SURGICAL PAIN
TYPE: ACUTE PAIN;CHRONIC PAIN
TYPE: CHRONIC PAIN
TYPE: CHRONIC PAIN
TYPE: ACUTE PAIN
TYPE: ACUTE PAIN
TYPE: CHRONIC PAIN
TYPE: ACUTE PAIN
TYPE: CHRONIC PAIN
TYPE: ACUTE PAIN;CHRONIC PAIN;SURGICAL PAIN
TYPE: ACUTE PAIN
TYPE: CHRONIC PAIN
TYPE: CHRONIC PAIN
TYPE: SURGICAL PAIN
TYPE: ACUTE PAIN
TYPE: CHRONIC PAIN
TYPE: SURGICAL PAIN
TYPE: CHRONIC PAIN
TYPE: ACUTE PAIN;CHRONIC PAIN
TYPE: ACUTE PAIN
TYPE: CHRONIC PAIN
TYPE: ACUTE PAIN;CHRONIC PAIN;SURGICAL PAIN
TYPE: CHRONIC PAIN
TYPE: ACUTE PAIN
TYPE: ACUTE PAIN;CHRONIC PAIN
TYPE: ACUTE PAIN
TYPE: SURGICAL PAIN
TYPE: ACUTE PAIN
TYPE: SURGICAL PAIN
TYPE: CHRONIC PAIN

## 2021-01-01 ASSESSMENT — LIFESTYLE VARIABLES
AVERAGE NUMBER OF DAYS PER WEEK YOU HAVE A DRINK CONTAINING ALCOHOL: 0
ALCOHOL_USE: NO
CONSUMPTION TOTAL: NEGATIVE
SUBSTANCE_ABUSE: 0
TOTAL SCORE: 0
TOTAL SCORE: 0
ON A TYPICAL DAY WHEN YOU DRINK ALCOHOL HOW MANY DRINKS DO YOU HAVE: 0
EVER HAD A DRINK FIRST THING IN THE MORNING TO STEADY YOUR NERVES TO GET RID OF A HANGOVER: NO
HAVE PEOPLE ANNOYED YOU BY CRITICIZING YOUR DRINKING: NO
HOW MANY TIMES IN THE PAST YEAR HAVE YOU HAD 5 OR MORE DRINKS IN A DAY: 0
TOTAL SCORE: 0
HAVE YOU EVER FELT YOU SHOULD CUT DOWN ON YOUR DRINKING: NO
SUBSTANCE_ABUSE: 0
EVER FELT BAD OR GUILTY ABOUT YOUR DRINKING: NO
SUBSTANCE_ABUSE: 0

## 2021-01-01 ASSESSMENT — PAIN SCALES - PAIN ASSESSMENT IN ADVANCED DEMENTIA (PAINAD)
FACIALEXPRESSION: 1 - SAD. FRIGHTENED. FROWN.
BODYLANGUAGE: 1 - TENSE. DISTRESSED PACING. FIDGETING.
NEGVOCALIZATION: 1 - OCCASIONAL MOAN OR GROAN. LOW-LEVEL SPEECH WITH A NEGATIVE OR DISAPPROVING QUALITY.
CONSOLABILITY: 1 - DISTRACTED OR REASSURED BY VOICE OR TOUCH.
TOTALSCORE: 5

## 2021-01-01 ASSESSMENT — PAIN SCALES - GENERAL
PAIN_LEVEL: 0
PAIN_LEVEL: 0

## 2021-01-01 ASSESSMENT — ACTIVITIES OF DAILY LIVING (ADL)
AMBULATION_REQUIRES_ASSISTANCE: 1
CONTINENCE_REQUIRES_ASSISTANCE: 1
MONEY MANAGEMENT (EXPENSES/BILLS): TOTALLY DEPENDENT
BATHING_REQUIRES_ASSISTANCE: 1
DRESSING_REQUIRES_ASSISTANCE: 1
EATING_REQUIRES_ASSISTANCE: 1
PHYSICAL_TRANSFER_REQUIRES_ASSISTANCE: 1

## 2021-01-01 ASSESSMENT — FIBROSIS 4 INDEX
FIB4 SCORE: 0.4
FIB4 SCORE: 0.53
FIB4 SCORE: 1.28
FIB4 SCORE: 1.23
FIB4 SCORE: 1.23
FIB4 SCORE: 1.17
FIB4 SCORE: 0.38

## 2021-01-01 ASSESSMENT — PATIENT HEALTH QUESTIONNAIRE - PHQ9
2. FEELING DOWN, DEPRESSED, IRRITABLE, OR HOPELESS: NOT AT ALL
SUM OF ALL RESPONSES TO PHQ9 QUESTIONS 1 AND 2: 0
2. FEELING DOWN, DEPRESSED, IRRITABLE, OR HOPELESS: NOT AT ALL
1. LITTLE INTEREST OR PLEASURE IN DOING THINGS: NOT AT ALL
CLINICAL INTERPRETATION OF PHQ2 SCORE: 0
1. LITTLE INTEREST OR PLEASURE IN DOING THINGS: NOT AT ALL
SUM OF ALL RESPONSES TO PHQ9 QUESTIONS 1 AND 2: 0

## 2021-03-15 NOTE — TELEPHONE ENCOUNTER
[phone]  Pt mother called states pt has been having nausea for a month states dr Silva lowered the dose on the pump for the hydromorphone but still having nausea. She states it could be pt MS. Let her know dr Bloch is out for 2 weeks.  Offered an appt but she states she wanted to know if Ayesha would recommend otherwise. Please advise

## 2021-03-17 NOTE — PROGRESS NOTES
Spoke with Mother Kandi regarding her sons nausea.  Advised decreasing pain pump, having patient get up out of bed as often as possible, especially for lunch and dinner, continuing zofran.  Will continue to monitor.    Vane FAITHC

## 2021-03-17 NOTE — TELEPHONE ENCOUNTER
Called Kandi mom she states she is out of town and wont be back until mid April.  Pt is in a home nursing facility and unable to hold phone or do Virtual visit himself.  Mother of pt wanted to know if she could get a quick call or advise as to what to do in the mean time. Please advise

## 2021-04-28 NOTE — PROGRESS NOTES
Outcome: Called PT to offer ROMEL, spoke with Mother, declined due to other health issues.         Attempt # 1

## 2021-06-14 NOTE — ASSESSMENT & PLAN NOTE
Resolved.  Ileus on XR but has several BMs since admission.  - Advance diet as tolerated, now on a regular diet  - P.o. and IV Zofran as needed  - scheduled reglan for short term, discontinued on 2/2  - Continue famotidine   Consent: The risks of the medication were reviewed with the patient.

## 2021-07-28 PROBLEM — N39.0 URINARY TRACT INFECTIOUS DISEASE: Status: ACTIVE | Noted: 2018-02-28

## 2021-07-28 PROBLEM — Z87.440 HISTORY OF URINARY TRACT INFECTION: Status: ACTIVE | Noted: 2018-12-03

## 2021-07-28 PROBLEM — Z12.5 ENCOUNTER FOR SCREENING FOR MALIGNANT NEOPLASM OF PROSTATE: Status: ACTIVE | Noted: 2018-11-27

## 2021-07-28 PROBLEM — F41.9 ANXIETY DISORDER: Status: ACTIVE | Noted: 2021-01-01

## 2021-07-28 PROBLEM — R03.0 FINDING OF ABOVE NORMAL BLOOD PRESSURE: Status: ACTIVE | Noted: 2021-01-01

## 2021-07-28 PROBLEM — F32.A DEPRESSIVE DISORDER: Status: ACTIVE | Noted: 2021-01-01

## 2021-07-28 PROBLEM — R68.89 CLINICAL FINDING PRESENT ON ADMISSION: Status: ACTIVE | Noted: 2018-04-06

## 2021-07-28 PROBLEM — N31.9 NEUROGENIC DYSFUNCTION OF THE URINARY BLADDER: Status: ACTIVE | Noted: 2018-03-05

## 2021-07-28 NOTE — PROGRESS NOTES
[facility]  MULTIPLE SCLEROSIS & NEUROIMMUNOLOGY  FOLLOW-UP VISIT    DISEASE SUMMARY:  MS History:  MS HISTORY  First Diagnosed: 2013  Presenting Symptoms:  BLE weakness  Disease Modifying Agents in past:  Tysabri, tecfidera, AUbagio Ocrevus   Current:  [facility]      CC: MS    INTERVAL HISTORY:  Jerzy Juan III is a 41 y.o.male with progressive MS.   Dr. Bloch last saw Mr. Juan in the Neurology Clinic on 08/13/2020.  At that time Dr. 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  She had looked it up on the Internet and believes that is what he had.  Symptoms seem to have lessened but he has had a continued sore throat for about a year.  He complains of constant dry mouth.  Would like workup for Sjorens as his sister has been diagnosed with the syndrome.  Mother believes that he is now in a primary progressive.  He is completely wheelchair-bound and total assist at his current skilled nursing facility.    [facility] orders were faxed today for year to week 1.  He is residing at [facility] and verbalizes some disdain but overall he is okay there it just depends on who is caring for him.  Baclofen pump can't be increased any more.  Will try Ketamine and is taking a pill form of baclofen.  Left hand curling up    A review of MS-related symptoms was notable for the following:    Fatigue: yes; horrible  Weakness: yes; completely bed ridden  Numbness: yes; hands and feet  Incoordination: yes; bed bound  Spasms/Spasticity: yes; all the time  Vision Impairment: yes; sensitive, dry eyes, wears sun glasses  Walking/Balance Problems: yes; unable to walk  Neuralgia: yes; scattered throughout  Bowel Symptoms: yes; alternates diarrhea and constipation  Bladder Symptoms: yes; catheter in place  Heat Sensitivity: yes; stays cool  Depression: yes; on  wellbutrin  Cognitive/Memory Problems: yes; forgetful  Sexual Dysfunction: N/D  Anxiety: yes; about prognosis    MEDICATIONS:  Current Outpatient Medications   Medication Sig   • Bacillus Coagulans-Inulin (PROBIOTIC) 1-250 BILLION-MG Cap Probiotic   • ondansetron (ZOFRAN) 4 MG Tab tablet ondansetron HCl 4 mg tablet   Take 1 tablet every 6 hours by oral route as needed.   • promethazine (PHENERGAN) 50 MG Suppos Insert 50 mg into the rectum every 6 hours as needed for Nausea/Vomiting.   • sertraline (ZOLOFT) 50 MG Tab Take 25 mg by mouth every day.   • magnesium hydroxide (MILK OF MAGNESIA) 400 MG/5ML Suspension 30 mL.   • baclofen (LIORESAL) 10 MG Tab Take 10 mg by mouth 3 times a day.   • Ascorbic Acid (VITAMIN C) 1000 MG Tab Take  by mouth.   • escitalopram (LEXAPRO) 10 MG Tab Take 20 mg by mouth every day.   • lidocaine (LIDODERM) 5 % Patch Apply 1 Patch to skin as directed every 12 hours.   • methenamine hip (HIPPREX) 1 GM Tab Take 1 g by mouth 2 times a day.   • oxybutynin (DITROPAN) 5 MG Tab Take 5 mg by mouth 2 Times a Day.   • Acetaminophen (TYLENOL) 325 MG Cap Take  by mouth 3 times a day as needed.   • ondansetron (ZOFRAN ODT) 4 MG TABLET DISPERSIBLE Take 4 mg by mouth every 6 hours as needed for Nausea.   • vitamin D (CHOLECALCIFEROL) 1000 UNIT Tab Take 1,000 Units by mouth every day.   • buPROPion SR (WELLBUTRIN-SR) 100 MG TABLET SR 12 HR Take 200 mg by mouth 2 times a day.   • Lactobacillus (ULTIMATE PROBIOTIC FORMULA) Cap Take 1 Cap by mouth 3 times a day.   • NON SPECIFIED 26.02 mcg by Intrathecal route 3 times a day as needed. Baclofen Pump     MEDICAL, SOCIAL, AND FAMILY HISTORY:  There is no change in the patient's ROS or medical, social, or family histories since the previous visit.    REVIEW OF SYSTEMS:  A ROS was completed.  Pertinent positives and negatives were included in the HPI, above.  All other systems were reviewed and are negative.    PHYSICAL EXAM:  General/Medical:  - NAD  - hair,  skin, nails, and joints were normal  - neck was supple without Lhermitte's phenomenon  - heart rate and rhythm were regular, no carotid bruits appreciated    Neuro:  MENTAL STATUS: awake and alert; no deficits of speech or language; oriented to person, place, and time; affect was appropriate to situation    CRANIAL NERVES:    II: , too sensitive to assess.  Wearing sunglasses.    III/IV/VI: versions: intact without nystagmus    V: facial sensation: symmetric to light touch    VII: facial expression: symmetric    VIII: hearing: intact to finger rub    IX/X: palate: elevates symmetrically    XI: shoulder shrug: symmetric    XII: tongue: midline    MOTOR:  - bulk: normal throughout  - tone: normal throughout  Upper Extremity Strength  (R/L)    2/2   Elbow flexion 1/1   Elbow extension 1/1   Shoulder abduction 1/1     Lower Extremity Strength  (R/L)   Hip flexion 0/0   Knee extension 0/0   Knee flexion 0/0   Ankle plantarflexion 0/0   Ankle dorsiflexion 0/0     - cannot walk on toes and heels  - pronator drift: barely able to lift, left drifts downward  - abnormal movements: spastic to BLE    SENSATION:  - light touch: equal on both sides   - vibration (R/L, seconds): N/T at the great toes  - pinprick: N/T  - proprioception: N/T  - Romberg: unable to perform    COORDINATION:  - finger to nose: abnormal + ataxia   - finger tapping: slow     REFLEXES:  Reflex Right Left   BR 2+ 2+   Biceps 2+ 2+   Triceps 2+ 2+   Patellae 1 1   Achilles 0 0   Toes N/T N/T     GAIT:N/A    QUANTITATIVE SCORES:  Timed 25-foot walk (sec): N/T.  Assistive device: none    REVIEW OF IMAGING STUDIES: I reviewed the following studies:  MRI Brain:  Date: 06/08/2016    Impression:  IMPRESSION:        1.  Numerous periventricular, subcortical, and pontine areas of demyelination unchanged from previous exam of 2/29/16 and consistent with the patient's known diagnosis of multiple sclerosis    MRI Cervical Spine:  Date:  "06/08/2016    Impression:  IMPRESSION:        1.  Multiple areas of demyelination in the cervical cord and zabrina similar in appearance to previous exam and consistent with areas of demyelination as can be seen in multiple sclerosis.     2.  No \"active\" enhancing lesions in the cervical cord.    MRI Thoracic Spine:  Date: 06/08/2016  Impression:  IMPRESSION:        1.  Multiple areas of demyelination in the cervical cord and zabrina similar in appearance to previous exam and consistent with areas of demyelination as can be seen in multiple sclerosis.     2.  No \"active\" enhancing lesions in the cervical cord.    REVIEW OF LABORATORY STUDIES:  Reviewed from SNF.  To be uploaded in media.    ASSESSMENT:  Jerzy Juan III is a 41 y.o.aggressive MS. He is wheelchair bound and completely dependent.  He will be starting year to week 1 of [facility].  The skilled nursing facility he is staying at faxed over his labs which will be uploaded into the media.  He has a Epperson catheter in place.  His mother accompanied him today.  He has had a chronic sore throat for about  year per mother.  She believed he had Bobby Black tongue.  I will refer to ENT.  Mother is also requesting a work-up for Sjorens as he had a sister diagnosed with it.  [facility] orders were faxed today for his second year dosing.  I wrote  written instructions for the SNF to resume his vitamin D, daily multivitamin, and gave future lab slips.  Overall his speech is labored and he appears weaker.  He is quite thin and is drinking protein shakes to substitute calories. He has no desire to obtain updated MRI's.    PLAN:  1. Multiple sclerosis (HCC)  [facility] orders faxed for year two.  - SSA 52 AND 60 (RO)(ITA) AB, IGG; Future    2. Chronic sore throat    - REFERRAL TO ENT    3. Vitamin D deficiency    - VITAMIN D,25 HYDROXY; Future    4. Therapeutic drug monitoring    - CBC WITH DIFFERENTIAL; Future  - Comp Metabolic Panel; Future    Follow-Up:  - Three " months      EDUCATION AND COUNSELIN minutes was spent of which greater than 5% was invloved with education and counseling.  The patient/family educated on diagnosis and prognosis. They understand MS is a chronic progressive disorder for which there is currently no cure. Despite best efforts in management, the condition is likely to progress and carries significant risk for disability including physical and cognitive.   -Effectiveness, indications, and safety profile of available Disease Modifying Agents (DMA’s) reviewed.   -Side effects of DMA’s were discussed, including: flu like symptoms, myalgias, injection site (infection, pain, bleeding), abnormal LFT’s, pancreatitis, weight changes, development of autoantibodies which may decrease effective of the medication, panic/anxiety attacks, abnormal blood counts (increase risk for bleeding, infections, cancer), increased risk for fetal complications (including congenital malformations, developmental/intellectual disability).    -The patient will require frequent follow up and monitoring.   -Laboratory monitoring every 3 months: CBC, CMP, thyroid panel, vitamin D, UA.   -Imaging of entire neuro-axis (brain and spinal cord) with MRI’s w/wo contrast, every six months.   -Patient/family counseled on medication compliance.     Signed: MANDO Brewer      -

## 2021-08-09 NOTE — ED NOTES
Melita to schedule lab appt for this week but per Dr. Ralph pt does not need BMP rechecked this week anymore because pt is currently in hospital and recent labs shows potassium level is normal. left msg again to advise pt.    To CT

## 2021-10-18 NOTE — PREPROCEDURE INSTRUCTIONS
"Hi Dr. Zelaya,  This patient will require a bariatric bed with rales due to MS spasms.  BMI WNL. Also requires a talia lift.  Has a baclofen/ketamine pump.  Dr. Sapp has ordered a UA with culture.  Any other recommendations? Thanks,   Mili. RN      Anesthesia Summary Report           Patient Name: Jerzy Juan III \"Epifanio\" MRN: [id number] Admission Date: Patient not admitted   Allergies: Asa [Aspirin], Gabapentin, Hydrocodone, Methocarbamol, Morphine, Pregabalin   Moderate Fall Risk - Click for more information      "

## 2021-10-18 NOTE — PREPROCEDURE INSTRUCTIONS
Pre-admit assessment completed with pt.  Mother his DPA of record.  Pt is currently living at [facility].  According to Mother, Epifanio is mostly bed bound and can only tolerate a wheelchair for a short period of time as it is painful.  He will require a talia lift for transfer. Pt has baclofen/Ketamine pump.  Also, has SP cath.    Hx and meds reviewed, pre-op instructions given, handouts reviewed, questions answered.  Patient instructed to continue regularly prescribed medications through day before surgery.  Instructed to discontinue all vitamins and supplements 2 weeks prior to DOS.  Per anesthesia protocol patient instructed to take these medications with a sip of water on the day of surgery.  Anesthesia fasting guidelines reviewed with patient's mother  Covid testing TBD.  Patient has been vaccinated for Covid.    Dr. Zelaya notified of patients condition.

## 2021-10-19 NOTE — OR NURSING
Dr Zelaya's response:    We will need to see whether those pumps have to be turned off or not prior to the procedure. Given the degree of debilitation, I am unsure how we can get this patient to be seen by a medical provider for a clearance. Please ask Dr. Sapp‘s office whether one is available. Thank you.  Judah Zelaya M.D.  [facility]      Faxed above response to Dr Sapp's office and LM at office with above response.

## 2021-10-24 PROBLEM — D72.829 LEUKOCYTOSIS: Status: ACTIVE | Noted: 2021-01-01

## 2021-10-24 PROBLEM — Z91.89 AT RISK FOR CLOSTRIDIUM DIFFICILE INFECTION: Status: ACTIVE | Noted: 2021-01-01

## 2021-10-24 PROBLEM — R31.9 HEMATURIA: Status: ACTIVE | Noted: 2021-01-01

## 2021-10-24 PROBLEM — E87.1 HYPONATREMIA: Status: ACTIVE | Noted: 2021-01-01

## 2021-10-24 NOTE — ASSESSMENT & PLAN NOTE
History of multiple  Episodes of Cdiff.  Last year required fecal transplant.  Therefore started po vancomycin daily for prophylaxis while on abx for UTI - continue until 5 days past last antibiotic dose  Lactobacillus po daily.  C diff negative on testing here

## 2021-10-24 NOTE — ASSESSMENT & PLAN NOTE
Abnormal urinalysis on admission for suprapubic catheter.  - urine culture with Providencia, Proteus and staph aureas (staph is 35,000cfu, likely not pathologic)    - pt almost certainly with a true UTI given his tachycardia, leukocytosis and very dirty appearing UA with hematuria on admission  - s/p Fosfomycin x 1 due to significant risk of c diff recurrence, but developed temp to 100.1 and tachycardia after urologic intervention - discussed with Pharmacy, starting Cefepime

## 2021-10-24 NOTE — PROGRESS NOTES
[facility] Medicine Daily Progress Note    Date of Service  10/24/2021    Chief Complaint  Jerzy Juan III is a 41 y.o. male admitted 10/23/2021 with hematuria after suprapubic catheter change.    Hospital Course    Jerzy Juan III is a 41 y.o. male who who resides at [facility] and history of C. difficile presented 10/23/2021 with blood via his penis.  Patient does have a history of end-stage MS, does have a chronic suprapubic catheter.  Suprapubic catheter gets changed on Fridays, did get change this Friday, today he was noted to have some blood in the tubing.  Patient is alert and oriented but did not speak much during the exam, information obtained from the mother.  She stated she saw a lot of bleeding, decision was made to change the suprapubic catheter again.  Once this was changed she was going to clean him up and then he began having a lot of bleeding out of his penis.  ERP did discuss the case with urology, they did recommend antibiotics.  Patient does have bladder stones, also has a planned procedure on Thursday, urologist on-call stated they would likely not do surgery prior to that since patient needed antibiotics.      Interval Problem Update  10/24: No further hematuria noted in suprapubic catheter Epperson bag.  Patient has dried blood noted at his penile meatus only.  No obvious trauma seen.  UA positive for -150.  Order to have urine sample sent for culture.  Per patient patient has had extensive history of C. difficile colitis with fecal transplants 1 year ago.  Therefore I did start patient on vancomycin p.o. 125 mg daily while on Rocephin IV antibiotics.  Also started lactobacillus per mother request.  Mother also requested to be able to bolus patient's baclofen pump to aid in his mobility.  He is wheelchair-bound at baseline.  Decreased IV fluids normal saline to 75 an hour.    I have personally seen and examined the patient at bedside. I discussed the plan  of care with patient, family, bedside RN, charge RN and .    Consultants/Specialty  Urology:  Dr. Wells, phone consult, treat infection, no surgical procedures until 10/28 possible.    Code Status  Full Code    Disposition  Patient is not medically cleared.   Anticipate discharge to to skilled nursing facility.  I have placed the appropriate orders for post-discharge needs.    Review of Systems  Review of Systems   Constitutional: Positive for chills. Negative for diaphoresis, fever and malaise/fatigue.   HENT: Negative for congestion and sore throat.    Eyes: Negative for pain and discharge.   Respiratory: Negative for cough, hemoptysis, sputum production, shortness of breath and wheezing.    Cardiovascular: Negative for chest pain, palpitations, claudication and leg swelling.   Gastrointestinal: Negative for abdominal pain, constipation, diarrhea, melena, nausea and vomiting.   Genitourinary: Positive for hematuria. Negative for dysuria, frequency and urgency.   Musculoskeletal: Negative for back pain, joint pain, myalgias and neck pain.   Skin: Negative for itching and rash.   Neurological: Negative for dizziness, sensory change, speech change, focal weakness, loss of consciousness, weakness and headaches.   Endo/Heme/Allergies: Does not bruise/bleed easily.   Psychiatric/Behavioral: Negative for depression, substance abuse and suicidal ideas.        Physical Exam  Temp:  [35.9 °C (96.7 °F)-37.7 °C (99.9 °F)] 37 °C (98.6 °F)  Pulse:  [] 90  Resp:  [17-20] 17  BP: ()/(67-93) 93/68  SpO2:  [95 %-97 %] 95 %    Physical Exam  Constitutional:       General: He is not in acute distress.     Appearance: He is ill-appearing. He is not diaphoretic.   HENT:      Head: Normocephalic and atraumatic.      Mouth/Throat:      Pharynx: No oropharyngeal exudate.   Eyes:      General: No scleral icterus.        Right eye: No discharge.         Left eye: No discharge.      Conjunctiva/sclera: Conjunctivae  normal.      Pupils: Pupils are equal, round, and reactive to light.   Neck:      Thyroid: No thyromegaly.      Vascular: No JVD.      Trachea: No tracheal deviation.   Cardiovascular:      Rate and Rhythm: Normal rate and regular rhythm.      Heart sounds: Normal heart sounds. No murmur heard.   No friction rub. No gallop.    Pulmonary:      Effort: Pulmonary effort is normal. No respiratory distress.      Breath sounds: Normal breath sounds. No wheezing or rales.   Chest:      Chest wall: No tenderness.   Abdominal:      General: Bowel sounds are normal. There is no distension.      Palpations: Abdomen is soft. There is no mass.      Tenderness: There is no abdominal tenderness. There is no guarding or rebound.      Comments: Baclofen pump RLQ -stable, no pain.   Genitourinary:     Penis: Normal.       Comments: Suprapubic catheter with clear yellow urine, large amount of sediment seen in tubing.  Dried blood seen near penile meatus, but no current bleeding or trauma seen.  Musculoskeletal:         General: No tenderness. Normal range of motion.      Cervical back: Normal range of motion and neck supple.   Lymphadenopathy:      Cervical: No cervical adenopathy.   Skin:     General: Skin is warm and dry.      Findings: No erythema or rash.   Neurological:      Mental Status: He is alert and oriented to person, place, and time. Mental status is at baseline.      Cranial Nerves: No cranial nerve deficit.      Motor: Weakness present. No abnormal muscle tone.      Gait: Gait abnormal.      Comments: Baseline WC bound without use of his lower legs.  Has upper arm strength intact.   Psychiatric:         Mood and Affect: Mood normal.         Behavior: Behavior normal.         Thought Content: Thought content normal.         Judgment: Judgment normal.         Fluids    Intake/Output Summary (Last 24 hours) at 10/24/2021 1421  Last data filed at 10/24/2021 1151  Gross per 24 hour   Intake 360 ml   Output 1200 ml   Net -840  ml       Laboratory  Recent Labs     10/23/21  2300   WBC 11.5*   RBC 4.57*   HEMOGLOBIN 13.4*   HEMATOCRIT 41.4*   MCV 90.6   MCH 29.3   MCHC 32.4*   RDW 44.0   PLATELETCT 352   MPV 8.3*     Recent Labs     10/23/21  2300   SODIUM 134*   POTASSIUM 4.4   CHLORIDE 98   CO2 29   GLUCOSE 119*   BUN 16   CREATININE 0.82   CALCIUM 9.2     Recent Labs     10/23/21  2300   APTT 41.1*   INR 1.09               Imaging  DX-CHEST-PORTABLE (1 VIEW)   Final Result      Tiny left mid lung opacity could represent atelectasis or tiny infiltrate. No pleural effusion.           Assessment/Plan  At risk for Clostridium difficile infection  Assessment & Plan  History of multiple  Episodes of Cdif.  Last year required fecal transplant.  Therefore started po vancomycin daily for prophylaxis while on abx for UTI.  Lactobacillus po daily.    Hyponatremia- (present on admission)  Assessment & Plan  -Mild, due to dehydration  -Start IV fluids  -Repeat BMP in the morning    Leukocytosis- (present on admission)  Assessment & Plan  -Mild, due to UTI    Hematuria- (present on admission)  Assessment & Plan  -Currently improved, apparently earlier did have blood in the suprapubic catheter tubing as well as from his penis  -Patient does have a urinary tract infection will be placed on Rocephin  -Continue to closely monitor for bleeding  -Does have bladder stones  10/24:  Ordered urine culture.  Bleeding stopped, no need for CBI.      Recurrent major depressive disorder, in remission (HCC)- (present on admission)  Assessment & Plan  -Continue home meds    Urinary tract infection- (present on admission)  Assessment & Plan  -Start IV Rocephin  -Start IV fluids  -Not causing sepsis    MS (multiple sclerosis) (HCC)- (present on admission)  Assessment & Plan  -Severe and chronic, continue baclofen pump       VTE prophylaxis: SCDs/TEDs    I have performed a physical exam and reviewed and updated ROS and Plan today (10/24/2021). In review of yesterday's  note (10/23/2021), there are no changes except as documented above.

## 2021-10-24 NOTE — ASSESSMENT & PLAN NOTE
-Currently improved, apparently earlier did have blood in the suprapubic catheter tubing as well as from his penis  -Received IV Rocephin in the ER - with his history of C diff, he is at high risk for recurrence with a cephalosporin.    - Received Fosfomycin x 1 (10/27/21) in an attempt to avoid C Diff prone drugs, but then became tachy and had temp to 100.1 this am, so Cefepime started 10/29  - While pt has had good UOP here, his matias was replaced at [facility] and subsequently had large urinary output - I suspect he had a distention associated hematuria   - urine culture with Providencia, Proteus and staph aureus (staph is 35,000cfu, likely not pathologic, most likely a skin contaminant from suprapubic cath)  - s/p cystolithalopaxy with Dr Sapp 10/28 for bladder stones

## 2021-10-24 NOTE — ED NOTES
Suprapubic catheter bag and tubing changed per ERP;    Cleaned the Pt up around his penis and suprapubic site per ERP;

## 2021-10-24 NOTE — PROGRESS NOTES
Pt is awake in bed. VSS. No pain or n/v at this time. Family at bedside. Pt is requesting to rest. RN discussed POC for the morning with pt and family. All questions answered. Pt and family requesting to place all 4 bed rails up for pt safety. Placed rails up per request. Pt positioned for comfort. Fall precautions in place.

## 2021-10-24 NOTE — PROGRESS NOTES
Med rec partial, need to call Dr. Gomez's office ([phone]) to verify pain pump  Allergies reviewed, per historical history   Received MAR from [facility]

## 2021-10-24 NOTE — ED PROVIDER NOTES
"ED Provider Note     Scribed for Abimbola Garg D.O. by Chris Hoffman. 10/23/2021, 11:17 PM.     Primary care provider: Janet Reddy M.D.  Means of arrival: EMS        History obtained from: Patient's Mother  History limited by: None    CHIEF COMPLAINT  Chief Complaint   Patient presents with   • Urinary Catheter Problem     Pt BIB [facility] from [place] for blood in superpubic catheter - [facility] stated that staff at [place] changed catheter and noticed blood draining out; Pt mom stated that Pt had hardly any urine in FC collection bag, so [place] staff changed catheter, then noticed \"a lot\" draining; Pt +nauseous but mom stated that this is normal; Pt has Hx of MS - very light sensitive; Pt mom stated that \"many bladder stones\" - has scheduled appt this Thursday here c MD Mayes; Pt mom stated that MD Reddy would like Pt to see cardiolog.     HPI  Jerzy Juan III is a 41 y.o. male with a history of multiple sclerosis who presents to the Emergency Department via EMS for a urinary catheter issue onset earlier today. Per mother, the patient had a suprapubic catheter changed today. He went in to check the balloon which the nurse informed him  was normal. Mother states that they noticed that there was blood draining out. She states that there was very little urine in his matias catheter collection bag and notes that it was \"bright red.\" He has had his catheter for 2 years. He additionally has bladder stones and is scheduled for surgery this Thursday with Dr. Sapp for laser removal.  Dr. Finn who wanted a cardiology consult prior to surgery for unknown reasons as the patient has no cardiac history.   He has associated symptoms of generalized pain and significant weight loss but denies fever, chest pain, or heart palpitations. Mother says that the patient is \"always in pain.\"  He has a baclofen pain pump and is seen by Dr. Gomez for this.  No alleviating or exacerbating factors reported.    REVIEW OF SYSTEMS  Pertinent " "positives include urinary catheter issue, generalized pain, and unexpected weight loss. Pertinent negatives include no fever, chest pain, or heart palpitations.   See HPI for further details. All other systems are negative.    PAST MEDICAL HISTORY  Past Medical History:   Diagnosis Date   • Anxiety    • Back pain    • Blood transfusion    • Bowel habit changes     constipation   • Breath shortness     \"r/t to medication Ocrevus and Baclofen\" \"side effect   • Dental disorder     poor dentation   • Depression     PTSD/clinical depression   • Eyes sensitive to light     Needs to wear sunglasses   • Fall     2016   • Headache(784.0)    • Heart burn     reflux   • Heat intolerance     will vomit   • Hypertension    • Indigestion    • MS (multiple sclerosis) ([facility]) DX 2013    multiple lesions brain/spine   • Multiple sclerosis ([facility])    • Pain 2018    \"everywhere\"   • Presence of intrathecal baclofen pump     right side   • Urinary bladder disorder     UTI/catheter/bladder stone   • Urinary incontinence     catheter in place       FAMILY HISTORY  Family History   Problem Relation Age of Onset   • Thyroid Mother    • Hypertension Mother    • Hypertension Father    • Psychiatric Illness Father        SOCIAL HISTORY  Social History     Tobacco Use   • Smoking status: Former Smoker     Packs/day: 1.00     Years: 20.00     Pack years: 20.00     Start date: 1997     Quit date: 2013     Years since quittin.2   • Smokeless tobacco: Never Used   Vaping Use   • Vaping Use: Former   Substance Use Topics   • Alcohol use: Yes     Alcohol/week: 0.0 oz   • Drug use: Not Currently     Types: Inhaled     Comment: stopped 2019      Social History     Substance and Sexual Activity   Drug Use Not Currently   • Types: Inhaled    Comment: stopped 2019       SURGICAL HISTORY  Past Surgical History:   Procedure Laterality Date   • PUMP INSERT/REMOVE  2018    Procedure: PUMP INSERT/REMOVE - BACLOFEN;  " Surgeon: Andrew Gomez M.D.;  Location: SURGERY [facility];  Service: Pain Management   • CYSTOSCOPY  5/7/2018    Procedure: CYSTOSCOPY;  Surgeon: Kwasi Sapp M.D.;  Location: SURGERY [facility];  Service: Urology   • LASERTRIPSY  5/7/2018    Procedure: LASERTRIPSY - FOR: LITHOLAPAXY;  Surgeon: Kwasi Sapp M.D.;  Location: SURGERY [facility];  Service: Urology   • BLOCK EPIDURAL STEROID INJECTION N/A 3/27/2018    Procedure: BLOCK EPIDURAL STEROID INJECTION - BACLOFEN PUMP TRIAL;  Surgeon: Andrew Gomez M.D.;  Location: SURGERY [facility];  Service: Pain Management   • GASTROSCOPY  1/14/2017    Procedure: GASTROSCOPY WITH FECAL MATTER TRANSPLANT;  Surgeon: Obinna Douglas M.D.;  Location: SURGERY [facility];  Service:    • OPEN REDUCTION Left 1988    left thumb       CURRENT MEDICATIONS  No current facility-administered medications for this encounter.    Current Outpatient Medications:   •  famotidine (PEPCID) 20 MG Tab, Take 20 mg by mouth 2 times a day., Disp: , Rfl:   •  ketamine 50 MG/ML Solution, by Other route one time. Infused with baclofen pump, Disp: , Rfl:   •  Bacillus Coagulans-Inulin (PROBIOTIC) 1-250 BILLION-MG Cap, Probiotic, Disp: , Rfl:   •  sertraline (ZOLOFT) 50 MG Tab, Take 25 mg by mouth every day., Disp: , Rfl:   •  magnesium hydroxide (MILK OF MAGNESIA) 400 MG/5ML Suspension, 30 mL., Disp: , Rfl:   •  baclofen (LIORESAL) 10 MG Tab, Take 10 mg by mouth 4 times a day., Disp: , Rfl:   •  Citric Rr-Gsqsazenfpw-Se Carb (RENACIDIN) Solution, 30 mL., Disp: , Rfl:   •  methenamine hip (HIPPREX) 1 GM Tab, methenamine hippurate 1 gram tablet  Take 1 tablet twice a day by oral route for 30 days., Disp: , Rfl:   •  escitalopram (LEXAPRO) 20 MG tablet, escitalopram 20 mg tablet  Take 1 tablet every day by oral route., Disp: , Rfl:   •  lidocaine (LIDODERM) 5 % Patch, Lidoderm 5 % topical patch  APPLY 1 PATCH BY TOPICAL ROUTE ONCE DAILY (MAY WEAR UP TO 12HOURS.),  "Disp: , Rfl:   •  oxybutynin SR (DITROPAN-XL) 10 MG CR tablet, oxybutynin chloride ER 10 mg tablet,extended release 24 hr  Take 1 tablet twice a day by oral route for 30 days., Disp: , Rfl:   •  Ascorbic Acid (VITAMIN C) 1000 MG Tab, Take  by mouth., Disp: , Rfl:   •  Acetaminophen (TYLENOL) 325 MG Cap, Take  by mouth 3 times a day as needed., Disp: , Rfl:   •  ondansetron (ZOFRAN ODT) 4 MG TABLET DISPERSIBLE, Take 4 mg by mouth every 6 hours as needed for Nausea., Disp: , Rfl:   •  vitamin D (CHOLECALCIFEROL) 1000 UNIT Tab, Take 1,000 Units by mouth every day., Disp: , Rfl:   •  buPROPion SR (WELLBUTRIN-SR) 100 MG TABLET SR 12 HR, Take 200 mg by mouth 2 times a day., Disp: , Rfl:   •  Lactobacillus (ULTIMATE PROBIOTIC FORMULA) Cap, Take 1 Cap by mouth 3 times a day., Disp: , Rfl:   •  NON SPECIFIED, 26.02 mcg by Intrathecal route 3 times a day as needed. Baclofen Pump with Ketamine, Disp: , Rfl:     ALLERGIES  Allergies   Allergen Reactions   • Asa [Aspirin] Unspecified     Bleeding in stomach as a baby   • Gabapentin Anxiety     anger and severe mood swing   • Hydrocodone Nausea   • Methocarbamol      emesis   • Morphine Nausea   • Pregabalin Anxiety     Anger and severe mood swing       PHYSICAL EXAM  VITAL SIGNS: /84   Pulse (!) 116   Temp 35.9 °C (96.7 °F) (Temporal)   Resp 18   Ht 1.803 m (5' 11\")   Wt 57.6 kg (127 lb)   SpO2 97%   BMI 17.71 kg/m²     Constitutional: Patient is very thin and cachectic, Ill-appearing, moderate distress from his chronic pain  HENT: Dry oral mucosa.  Sunken eyes, nares are patent and clear.  Cardiovascular: Tachycardic heart rate and Regular rhythm. No murmur.  Thorax & Lungs: Clear and equal breath sounds with good excursion. No respiratory distress, no rhonchi, wheezing or rales.   Abdomen: Extremely thin and non-tender with central pain pump subcutaneously, Suprapubic catheter in place with mild active bleeding and purulent serous fluid around the site at this " time  Skin: Warm, Dry, No erythema, No rashes.   Back: No cervical, thoracic, or lumbosacral tenderness. No CVA tenderness.   Genitalia: Dry blood on the pubic area and active blood coming from the head of penis  Extremities: Thin with significant muscle wasting (chronic)  Neurologic: Slow slurred speech (chronic), Diminished deep tendon reflexes with diminished motor functions of all 4 extremities.    DIAGNOSTICS/PROCEDURES    LABS  Results for orders placed or performed during the hospital encounter of 10/23/21   CBC WITH DIFFERENTIAL   Result Value Ref Range    WBC 11.5 (H) 4.8 - 10.8 K/uL    RBC 4.57 (L) 4.70 - 6.10 M/uL    Hemoglobin 13.4 (L) 14.0 - 18.0 g/dL    Hematocrit 41.4 (L) 42.0 - 52.0 %    MCV 90.6 81.4 - 97.8 fL    MCH 29.3 27.0 - 33.0 pg    MCHC 32.4 (L) 33.7 - 35.3 g/dL    RDW 44.0 35.9 - 50.0 fL    Platelet Count 352 164 - 446 K/uL    MPV 8.3 (L) 9.0 - 12.9 fL    Neutrophils-Polys 78.00 (H) 44.00 - 72.00 %    Lymphocytes 14.00 (L) 22.00 - 41.00 %    Monocytes 6.80 0.00 - 13.40 %    Eosinophils 0.40 0.00 - 6.90 %    Basophils 0.40 0.00 - 1.80 %    Immature Granulocytes 0.40 0.00 - 0.90 %    Nucleated RBC 0.00 /100 WBC    Neutrophils (Absolute) 8.99 (H) 1.82 - 7.42 K/uL    Lymphs (Absolute) 1.62 1.00 - 4.80 K/uL    Monos (Absolute) 0.78 0.00 - 0.85 K/uL    Eos (Absolute) 0.05 0.00 - 0.51 K/uL    Baso (Absolute) 0.05 0.00 - 0.12 K/uL    Immature Granulocytes (abs) 0.05 0.00 - 0.11 K/uL    NRBC (Absolute) 0.00 K/uL   COMP METABOLIC PANEL   Result Value Ref Range    Sodium 134 (L) 135 - 145 mmol/L    Potassium 4.4 3.6 - 5.5 mmol/L    Chloride 98 96 - 112 mmol/L    Co2 29 20 - 33 mmol/L    Anion Gap 7.0 7.0 - 16.0    Glucose 119 (H) 65 - 99 mg/dL    Bun 16 8 - 22 mg/dL    Creatinine 0.82 0.50 - 1.40 mg/dL    Calcium 9.2 8.4 - 10.2 mg/dL    AST(SGOT) 11 (L) 12 - 45 U/L    ALT(SGPT) 13 2 - 50 U/L    Alkaline Phosphatase 84 30 - 99 U/L    Total Bilirubin 0.4 0.1 - 1.5 mg/dL    Albumin 3.7 3.2 - 4.9 g/dL     Total Protein 7.4 6.0 - 8.2 g/dL    Globulin 3.7 (H) 1.9 - 3.5 g/dL    A-G Ratio 1.0 g/dL   PROTHROMBIN TIME (INR)   Result Value Ref Range    PT 13.3 12.0 - 14.6 sec    INR 1.09 0.87 - 1.13   APTT   Result Value Ref Range    APTT 41.1 (H) 24.7 - 36.0 sec   COD (ADULT)   Result Value Ref Range    ABO Grouping Only A     Rh Grouping Only POS     Antibody Screen-Cod NEG    URINALYSIS (UA)    Specimen: Blood   Result Value Ref Range    Color Yellow     Character Cloudy (A)     Specific Gravity 1.020 <1.035    Ph 7.0 5.0 - 8.0    Glucose Negative Negative mg/dL    Ketones Negative Negative mg/dL    Protein 30 (A) Negative mg/dL    Bilirubin Negative Negative    Nitrite Positive (A) Negative    Leukocyte Esterase Large (A) Negative    Occult Blood Moderate (A) Negative    Micro Urine Req Microscopic    ESTIMATED GFR   Result Value Ref Range    GFR If African American >60 >60 mL/min/1.73 m 2    GFR If Non African American >60 >60 mL/min/1.73 m 2   URINE MICROSCOPIC (W/UA)   Result Value Ref Range    -150 (A) /hpf    RBC 10-20 (A) /hpf    Bacteria Few (A) None /hpf    Epithelial Cells Few Few /hpf   EKG (NOW)   Result Value Ref Range    Report       [facility] Emergency Dept.    Test Date:  2021-10-24  Pt Name:    JAYNA MOODY                 Department: [facility]  MRN:        [id number]                      [facility]  Gender:     Male                         Technician: LOKI  :        1980                   Requested By:KEVIN TRENT  Order #:    [id number]                    Reading MD:    Measurements  Intervals                                Axis  Rate:       95                           P:          264  RI:         134                          QRS:        261  QRSD:       154                          T:          45  QT:         356  QTc:        448    Interpretive Statements  ECTOPIC ATRIAL RHYTHM  NONSPECIFIC IVCD WITH LAD  CONSIDER INFERIOR INFARCT  Compared to ECG  06/20/2018 12:55:46  Ectopic atrial rhythm now present  Intraventricular conduction delay now present  Myocardial infarct finding now present  Sinus rhythm no longer present  ST (T wave) deviation no longer present       Labs reviewed by me    RADIOLOGY  DX-CHEST-PORTABLE (1 VIEW)   Final Result      Tiny left mid lung opacity could represent atelectasis or tiny infiltrate. No pleural effusion.          COURSE & MEDICAL DECISION MAKING  Pertinent Labs & Imaging studies reviewed. (See chart for details)    10:08 PM - Patient seen and evaluated at bedside. Ordered for UA, COD, CBC w/ Diff, CMP, Prothrombin Time, and APTT to evaluate. Patient will be treated with IV Fluids for his symptoms. Differential diagnoses include, but are not limited to, Bladder Stones vs. UTI vs. Hemorrhagic Cystitis. I informed the patient's mother of my plan to check the catheter for any abnormalities. I suspect that the bleeding may be coming from his bladder. I will page urology to request the patient to be admitted into the OR on Monday.  Urinalysis shows gross infection with cloudy appearance, positive nitrite, large amount of leukocytes with moderate blood 100 150 white blood cells with 10-20 red blood cells few bacteria.  Twelve-lead EKG was performed showing an ectopic atrial rhythm at a rate of 95 bpm.  There is nonspecific interventricular conduction delay.  His chest x-ray shows tiny mid lung opacity on the left which could be atelectasis versus infiltrate.  His white count is slightly elevated 11.5 with a stable H&H no left shift.  CMP is unremarkable.    11:57 PM - Patient will be treated with Rocephin 1g injection for his urinary tract infection.    12:20 AM - Paged Urology    12:26 AM - I discussed the patient's case and the above findings with Dr. Wells (Urology) who informed me that he is not a candidate for the OR before Thursday due to his UTI.    12:32 AM - Ordered DX-Chest to evaluate. Patient will be treated with  Tylenol 1,000 mg PO.    1:16 AM - Patient was reevaluated at bedside. The patient has persistent bleeding coming from his penis. Jonh Hospitalist.    1:26 AM - I discussed the patient's case and the above findings with Dr. Delvalle (Hospitalist) who agrees to admit the patient for further evaluation.     HYDRATION: Based on the patient's presentation of Dehydration the patient was given IV fluids. IV Hydration was used because oral hydration was not adequate alone. Upon recheck following hydration, the patient was improved.        DISPOSITION:  Patient will be hospitalized by Dr. Delvalle in guarded condition.    FINAL IMPRESSION  1. Suprapubic catheter dysfunction, initial encounter (HCC)    2. Acute UTI    3. Multiple sclerosis (HCC)    4. Gross hematuria      Chris TOM (Scribe), am scribing for, and in the presence of, Abimbola Garg D.O..    Electronically signed by: Chris Hoffman (Scribe), 10/23/2021    IAbimbola D.O. personally performed the services described in this documentation, as scribed by Chris Hoffman in my presence, and it is both accurate and complete.    The note accurately reflects work and decisions made by me.  Abimbola Garg D.O.  10/24/2021  6:46 AM

## 2021-10-25 PROBLEM — R19.7 DIARRHEA: Status: ACTIVE | Noted: 2021-01-01

## 2021-10-25 NOTE — PROGRESS NOTES
[facility] Medicine Daily Progress Note    Date of Service  10/25/2021    Chief Complaint  Jerzy Juan III is a 41 y.o. male admitted 10/23/2021 with hematuria after suprapubic catheter change.    Hospital Course    Jerzy Juan III is a 41 y.o. male who who resides at [facility] and history of C. difficile presented 10/23/2021 with blood via his penis.  Patient does have a history of end-stage MS, does have a chronic suprapubic catheter.  Suprapubic catheter gets changed on Fridays, did get change this Friday, today he was noted to have some blood in the tubing.  Patient is alert and oriented but did not speak much during the exam, information obtained from the mother.  She stated she saw a lot of bleeding, decision was made to change the suprapubic catheter again.  Once this was changed she was going to clean him up and then he began having a lot of bleeding out of his penis.  ERP did discuss the case with urology, they did recommend antibiotics.  Patient does have bladder stones, also has a planned procedure on Thursday, urologist on-call stated they would likely not do surgery prior to that since patient needed antibiotics.      Interval Problem Update  10/24: No further hematuria noted in suprapubic catheter [place] bag.  Patient has dried blood noted at his penile meatus only.  No obvious trauma seen.  UA positive for -150.  Order to have urine sample sent for culture.  Per patient patient has had extensive history of C. difficile colitis with fecal transplants 1 year ago.  Therefore I did start patient on vancomycin p.o. 125 mg daily while on Rocephin IV antibiotics.  Also started lactobacillus per mother request.  Mother also requested to be able to bolus patient's baclofen pump to aid in his mobility.  He is wheelchair-bound at baseline.  Decreased IV fluids normal saline to 75 an hour.   10/25:  Urine culture pending.  Currently with continuous liquid diarrhea per  mother at bedside that smells of Cdif.  I have placed Cdif testing orders.  Also, added lidocaine patches to both shoulders q 24 hours, patient had been on patches at [facility].  VSS.  Wbc improved.  Patient stable, tolerating diet.  Since patient stable, dc'd Rocephin until final urine cultures resulted.  So far no growth from 10/23.    I have personally seen and examined the patient at bedside. I discussed the plan of care with patient, family, bedside RN, charge RN and .    Consultants/Specialty  Urology:  Dr. Wells, phone consult, treat infection, no surgical procedures until 10/28 possible.    Code Status  Full Code    Disposition  Patient is not medically cleared.   Anticipate discharge to to skilled nursing facility.  I have placed the appropriate orders for post-discharge needs.    Review of Systems  Review of Systems   Constitutional: Negative for chills, diaphoresis, fever and malaise/fatigue.   HENT: Negative for congestion and sore throat.    Eyes: Negative for pain and discharge.   Respiratory: Negative for cough, hemoptysis, sputum production, shortness of breath and wheezing.    Cardiovascular: Negative for chest pain, palpitations, claudication and leg swelling.   Gastrointestinal: Positive for diarrhea. Negative for abdominal pain, constipation, melena, nausea and vomiting.   Genitourinary: Negative for dysuria, frequency, hematuria and urgency.   Musculoskeletal: Negative for back pain, joint pain, myalgias and neck pain.   Skin: Negative for itching and rash.   Neurological: Negative for dizziness, sensory change, speech change, focal weakness, loss of consciousness, weakness and headaches.   Endo/Heme/Allergies: Does not bruise/bleed easily.   Psychiatric/Behavioral: Negative for depression, substance abuse and suicidal ideas.        Physical Exam  Temp:  [36.6 °C (97.8 °F)-36.7 °C (98.1 °F)] 36.7 °C (98.1 °F)  Pulse:  [] 100  Resp:  [16-18] 18  BP: (105-115)/(65-78)  105/65  SpO2:  [96 %-97 %] 96 %    Physical Exam  Constitutional:       General: He is not in acute distress.     Appearance: He is ill-appearing. He is not diaphoretic.   HENT:      Head: Normocephalic and atraumatic.      Mouth/Throat:      Pharynx: No oropharyngeal exudate.   Eyes:      General: No scleral icterus.        Right eye: No discharge.         Left eye: No discharge.      Conjunctiva/sclera: Conjunctivae normal.      Pupils: Pupils are equal, round, and reactive to light.   Neck:      Thyroid: No thyromegaly.      Vascular: No JVD.      Trachea: No tracheal deviation.   Cardiovascular:      Rate and Rhythm: Normal rate and regular rhythm.      Heart sounds: Normal heart sounds. No murmur heard.   No friction rub. No gallop.    Pulmonary:      Effort: Pulmonary effort is normal. No respiratory distress.      Breath sounds: Normal breath sounds. No wheezing or rales.   Chest:      Chest wall: No tenderness.   Abdominal:      General: Bowel sounds are normal. There is no distension.      Palpations: Abdomen is soft. There is no mass.      Tenderness: There is no abdominal tenderness. There is no guarding or rebound.      Comments: Baclofen pump RLQ -stable, no pain.   Genitourinary:     Penis: Normal.       Comments: Suprapubic catheter with clear yellow urine, large amount of sediment seen in tubing.  Dried blood seen near penile meatus, but no current bleeding or trauma seen.  Musculoskeletal:         General: No tenderness. Normal range of motion.      Cervical back: Normal range of motion and neck supple.   Lymphadenopathy:      Cervical: No cervical adenopathy.   Skin:     General: Skin is warm and dry.      Findings: No erythema or rash.   Neurological:      Mental Status: He is alert and oriented to person, place, and time. Mental status is at baseline.      Cranial Nerves: No cranial nerve deficit.      Motor: Weakness present. No abnormal muscle tone.      Gait: Gait abnormal.      Comments:  Baseline WC bound without use of his lower legs.  Has upper arm strength intact.   Psychiatric:         Mood and Affect: Mood normal.         Behavior: Behavior normal.         Thought Content: Thought content normal.         Judgment: Judgment normal.         Fluids    Intake/Output Summary (Last 24 hours) at 10/25/2021 1412  Last data filed at 10/25/2021 0600  Gross per 24 hour   Intake 120 ml   Output 970 ml   Net -850 ml       Laboratory  Recent Labs     10/23/21  2300 10/25/21  0413   WBC 11.5* 5.3   RBC 4.57* 3.72*   HEMOGLOBIN 13.4* 11.1*   HEMATOCRIT 41.4* 35.3*   MCV 90.6 94.9   MCH 29.3 29.8   MCHC 32.4* 31.4*   RDW 44.0 46.3   PLATELETCT 352 256   MPV 8.3* 9.0     Recent Labs     10/23/21  2300 10/25/21  0413   SODIUM 134* 137   POTASSIUM 4.4 4.4   CHLORIDE 98 102   CO2 29 23   GLUCOSE 119* 79   BUN 16 12   CREATININE 0.82 0.63   CALCIUM 9.2 8.5     Recent Labs     10/23/21  2300   APTT 41.1*   INR 1.09               Imaging  DX-CHEST-PORTABLE (1 VIEW)   Final Result      Tiny left mid lung opacity could represent atelectasis or tiny infiltrate. No pleural effusion.           Assessment/Plan  Diarrhea  Assessment & Plan  Long history of repeat Cdif infections, last being 1 year ago.  Required fecal transplant.  10/25:  liquidy constant diarrhea per mother.  Cdif testing ordered.  Currently on Rocephin for UTI, UCx pending.    At risk for Clostridium difficile infection  Assessment & Plan  History of multiple  Episodes of Cdif.  Last year required fecal transplant.  Therefore started po vancomycin daily for prophylaxis while on abx for UTI.  Lactobacillus po daily.    Hyponatremia- (present on admission)  Assessment & Plan  -Mild, due to dehydration  -Start IV fluids  -Repeat BMP in the morning    Leukocytosis- (present on admission)  Assessment & Plan  -Mild, due to UTI    Hematuria- (present on admission)  Assessment & Plan  -Currently improved, apparently earlier did have blood in the suprapubic  catheter tubing as well as from his penis  -started on Rocephin IV on admission given suprapubic UA with 100-150 wbc and multiple bacteria.  -Continue to closely monitor for bleeding  -Does have bladder stones  10/23:   urine culture no growth.    1/24:  Bleeding stopped, no need for CBI.    10/25:  Extensive Cdif history, UC no growth since 10/23.  Will hold Rocephin until urine culture results.    Recurrent major depressive disorder, in remission ([facility])- (present on admission)  Assessment & Plan  -Continue home meds    Urinary tract infection- (present on admission)  Assessment & Plan  Abnormal urinalysis on admission for suprapubic catheter.  10/23 urine culture no growth.  Hold on further abx until urine culture resulted given lack of symptoms other than hematuria.    MS (multiple sclerosis) ([facility])- (present on admission)  Assessment & Plan  -Severe and chronic, continue baclofen pump       VTE prophylaxis: SCDs/TEDs    I have performed a physical exam and reviewed and updated ROS and Plan today (10/25/2021). In review of yesterday's note (10/24/2021), there are no changes except as documented above.

## 2021-10-25 NOTE — DIETARY
"Nutrition services: Day 1 of admit.  Jerzy Juan III is a 41 y.o. male with admitting DX of UTI (urinary tract infection)    Consult received for low BMI      Assessment:  Height: 180.3 cm (5' 11\")  Weight: 57.6 kg (127 lb)(stated weight)  Body mass index is 17.71 kg/m²., BMI classification: underweight  Diet/Intake: regular/0 x 1, 25-50% x 1, and 50-75% x 1. PO improving.     Evaluation:   1. DX includes end stage MS.   2. Weight in Epic is stated weight. RD requested wt from nursing.  3. RD visited with pt and his mother. Per pt's mother, pt weighed 133 lb 2 months ago at the SNF. Weight loss noted of 6 lb (4.5%) x 2 months. Weight loss is not significant. Pt's mother said that she was not able to visit pt at the SNF for awhile due to COVID restrictions. She said pt does not like the food at the SNF. PO most likely was diminished during this time because he was not receiving food that she would bring in to him. Pt likes the food here and PO intake has been good. Pt was receiving Ensure supplements at the [facility]. He does not want Boost supplements because they may ruin his appetite for his meals. He is agreeable to receiving Chobani Smoothies TID with meals. Pt with blankets up to his chin during most of visit. Based on facial structure, pt does not appear to have muscle/fat loss that may indicate presence of malnutrition.     Malnutrition Risk: criteria not met    Recommendations/Plan:  1. Add Chobani Smoothies to meals TID   2. Encourage intake of >50%  3. Document intake of all meals as % taken in ADL's to provide interdisciplinary communication across all shifts.   4. Monitor weight.  5. Nutrition rep will continue to see patient for ongoing meal and snack preferences.     RD will follow.          "

## 2021-10-25 NOTE — DOCUMENTATION QUERY
Replaced by [facility]                                                                       Query Response Note      PATIENT:               JAYNA MOODY  ACCT #:                  [id number]  MRN:                     [id number]  :                      1980  ADMIT DATE:       10/23/2021 9:33 PM  DISCH DATE:          RESPONDING  PROVIDER #:        [id number]           QUERY TEXT:    Please clarify in documentation the relationship, if any, between UTI and suprapubic catheter    The patient's Clinical Indicators include:  - Findings:  H&P:   Patient does have a history of end-stage MS, does have  a chronic suprapubic catheter.  Urinary tract infection not causing sepsis  - Progress note 10/24/21:  leukocytosis, mild, due to UTI, does have bladder stones     - Treatments:  catheter change, urine cultures, UA, antibiotics, IVF     - Risk factors:  chronic suprapubic catheter, bladder stones, hematuria, MS     Thank You,  Swathi Clay RN  Clinical Documentation   [email or web address]  Connect via Voalte Messenger  Options provided:   -- UTI is due to or associated with suprapubic catheter   -- UTI is not due to or associated with suprapubic catheter   -- Unable to determine      Query created by: Swathi Clay on 10/24/2021 5:20 PM    RESPONSE TEXT:    UTI is due to or associated with suprapubic catheter          Electronically signed by:  JIGAR FREY DO 10/24/2021 11:18 PM

## 2021-10-25 NOTE — ASSESSMENT & PLAN NOTE
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  Required fecal transplant.  C diff negative, likely secondary to antibiotics and fecal impaction   Should be on a bowel regimen until return to [facility]

## 2021-10-25 NOTE — CARE PLAN
"  Problem: Knowledge Deficit - Standard  Goal: Patient and family/care givers will demonstrate understanding of plan of care, disease process/condition, diagnostic tests and medications  Outcome: Progressing     Problem: Pain - Standard  Goal: Alleviation of pain or a reduction in pain to the patient’s comfort goal  Outcome: Progressing     Problem: Skin Integrity  Goal: Skin integrity is maintained or improved  Outcome: Progressing     Problem: Fall Risk  Goal: Patient will remain free from falls  Outcome: Progressing   The patient is Stable - Low risk of patient condition declining or worsening    Shift Goals  Clinical Goals: rest comfortably   Patient Goals: rest    Progress made toward(s) clinical / shift goals:  all goals     Patient is not progressing towards the following goals:    \"Covid-19 surge in effect\"   "

## 2021-10-26 PROBLEM — D64.9 ANEMIA: Status: ACTIVE | Noted: 2021-01-01

## 2021-10-26 PROBLEM — G82.22 CHRONIC INCOMPLETE SPASTIC PARAPLEGIA (HCC): Status: ACTIVE | Noted: 2021-01-01

## 2021-10-26 NOTE — CARE PLAN
Problem: Knowledge Deficit - Standard  Goal: Patient and family/care givers will demonstrate understanding of plan of care, disease process/condition, diagnostic tests and medications  Outcome: Progressing     Problem: Pain - Standard  Goal: Alleviation of pain or a reduction in pain to the patient’s comfort goal  Outcome: Progressing     Problem: Skin Integrity  Goal: Skin integrity is maintained or improved  Outcome: Progressing     Problem: Fall Risk  Goal: Patient will remain free from falls  Outcome: Progressing   The patient is Stable - Low risk of patient condition declining or worsening    Shift Goals  Clinical Goals: rest comfortably   Patient Goals: rest    Progress made toward(s) clinical / shift goals:  all goals     Patient is not progressing towards the following goals:

## 2021-10-26 NOTE — PROGRESS NOTES
Med rec updated and complete  Allergies reviewed  Received MAR from [facility] and Dr. Gomez's office for pain pump

## 2021-10-26 NOTE — PROGRESS NOTES
Report received from night shift RN. Patient complains of nausea. Medicated per MAR. Mother at bedside. Breakfast heated. POC updated. Call light within reach. Bed locked, in lowest position. No additional needs at this time.    COVID-19 surge in effect.

## 2021-10-27 PROBLEM — K56.41 FECAL IMPACTION (HCC): Status: ACTIVE | Noted: 2021-01-01

## 2021-10-27 NOTE — ASSESSMENT & PLAN NOTE
- s/p disimpaction with subsequent BM, AXR still with residual constipation, will give enema, if ineffective, Dulcolax in addition

## 2021-10-27 NOTE — CARE PLAN
Problem: Knowledge Deficit - Standard  Goal: Patient and family/care givers will demonstrate understanding of plan of care, disease process/condition, diagnostic tests and medications  Outcome: Progressing     Problem: Pain - Standard  Goal: Alleviation of pain or a reduction in pain to the patient’s comfort goal  Outcome: Progressing     Problem: Skin Integrity  Goal: Skin integrity is maintained or improved  Outcome: Progressing   The patient is Stable - Low risk of patient condition declining or worsening    Shift Goals  Clinical Goals: rest comfortably   Patient Goals: Rest    Progress made toward(s) clinical / shift goals:      Patient is not progressing towards the following goals:

## 2021-10-27 NOTE — DISCHARGE PLANNING
Anticipated Discharge Disposition: SNF- [facility] (long term resident)    Action: LSW met with pt and pt's mother, Kandi at bedside. Pt and Kandi confirmed that pt is a long term resident at [facility]. Provided verbal consent to return to [facility] upon d/c.     Pt discussed during IDT rounds. Per Dr. Adkins pt anticipated to go to the OR procedure tomorrow. May be cleared for a late d/c and if not for d/c on Friday.      Barriers to Discharge: None     Plan: Await acceptance back to [facility], LSW to continue to follow for d/c needs     Addendum 1122  LSW placed SNF order per protocol.   LSW faxed SNF CHOICE to [facility].       Care Transition Team Assessment    Information Source  Orientation Level: Oriented X4  Information Given By: Other (Comments)  Who is responsible for making decisions for patient? : Patient    Elopement Risk  Legal Hold: No  Ambulatory or Self Mobile in Wheelchair: No-Not an Elopement Risk  Elopement Risk: Not at Risk for Elopement    Discharge Preparedness  What is your plan after discharge?: Skilled nursing facility  What are your discharge supports?: Parent, Sibling  Prior Functional Level: Needs Assist with Activities of Daily Living, Needs Assist with Medication Management    Functional Assesment  Prior Functional Level: Needs Assist with Activities of Daily Living, Needs Assist with Medication Management    Finances  Financial Barriers to Discharge: No  Prescription Coverage: Yes    Advance Directive  Advance Directive?: DPOA for Health Care    Domestic Abuse  Have you ever been the victim of abuse or violence?: No    Psychological Assessment  History of Substance Abuse: None  History of Psychiatric Problems: No    Discharge Risks or Barriers  Discharge risks or barriers?: No  Patient risk factors: Vulnerable adult    Anticipated Discharge Information  Discharge Disposition: Disch to a long term care facility (63)

## 2021-10-27 NOTE — PROGRESS NOTES
[facility] Medicine Daily Progress Note    Date of Service  10/27/2021    Chief Complaint  Jerzy Juan III is a 41 y.o. male admitted 10/23/2021 with hematuria after suprapubic catheter change.    Hospital Course    Jerzy Juan III is a 41 y.o. male who who resides at [facility] and history of C. difficile presented 10/23/2021 with blood via his penis.  Patient does have a history of end-stage MS, does have a chronic suprapubic catheter.  Suprapubic catheter gets changed on Fridays, did get change this Friday, today he was noted to have some blood in the tubing.  Patient is alert and oriented but did not speak much during the exam, information obtained from the mother.  She stated she saw a lot of bleeding, decision was made to change the suprapubic catheter again.  Once this was changed she was going to clean him up and then he began having a lot of bleeding out of his penis.  ERP did discuss the case with urology, they did recommend antibiotics.  Patient does have bladder stones, also has a planned procedure on Thursday, urologist on-call stated they would likely not do surgery prior to that since patient needed antibiotics.      Interval Problem Update  10/24: No further hematuria noted in suprapubic catheter [place] bag.  Patient has dried blood noted at his penile meatus only.  No obvious trauma seen.  UA positive for -150.  Order to have urine sample sent for culture.  Per patient patient has had extensive history of C. difficile colitis with fecal transplants 1 year ago.  Therefore I did start patient on vancomycin p.o. 125 mg daily while on Rocephin IV antibiotics.  Also started lactobacillus per mother request.  Mother also requested to be able to bolus patient's baclofen pump to aid in his mobility.  He is wheelchair-bound at baseline.  Decreased IV fluids normal saline to 75 an hour.   10/25:  Urine culture pending.  Currently with continuous liquid diarrhea per  mother at bedside that smells of Cdif.  I have placed Cdif testing orders.  Also, added lidocaine patches to both shoulders q 24 hours, patient had been on patches at [facility].  VSS.  Wbc improved.  Patient stable, tolerating diet.  Since patient stable, dc'd Rocephin until final urine cultures resulted.  So far no growth from 10/23.    10/26 - Patient without any hematuria to his suprapubic cath today per patient and nursing, matias bag with yellow urine.  Nursing does report scant light pink penile discharge.  Patient without acute complaints during my examination - will perform CT today to further evaluate cause of his hematuria.  Judicious usage of antibiotics is key as he has a history of C diff.  Will touch base with Urology after CT performed. Discussed with Pharmacy - as urine culture is positive with his hematuria, will treat with Cipro for now.      I have personally seen and examined the patient at bedside. I discussed the plan of care with patient, family, bedside RN, charge RN and .    Consultants/Specialty  Urology:  Dr. Wells, phone consult, treat infection, no surgical procedures until 10/28 possible.    Code Status  DNAR/DNI    Disposition  Patient is not medically cleared.   Anticipate discharge to to skilled nursing facility.  I have placed the appropriate orders for post-discharge needs.    Review of Systems  Review of Systems   Constitutional: Negative for chills and fever.   Gastrointestinal: Positive for diarrhea.   Genitourinary: Positive for hematuria. Negative for dysuria and urgency.   All other systems reviewed and are negative.       Physical Exam  Temp:  [36.6 °C (97.8 °F)-36.6 °C (97.9 °F)] 36.6 °C (97.8 °F)  Pulse:  [90-92] 90  Resp:  [18] 18  BP: (109-131)/(66-82) 131/82  SpO2:  [94 %-97 %] 94 %    Physical Exam  Constitutional:       General: He is not in acute distress.     Appearance: He is ill-appearing. He is not toxic-appearing or diaphoretic.   HENT:      Head:  Normocephalic and atraumatic.   Eyes:      Pupils: Pupils are equal, round, and reactive to light.   Neck:      Thyroid: No thyromegaly.      Vascular: No JVD.      Trachea: No tracheal deviation.   Cardiovascular:      Rate and Rhythm: Normal rate and regular rhythm.      Heart sounds: Normal heart sounds. No murmur heard.     Pulmonary:      Effort: Pulmonary effort is normal. No respiratory distress.      Breath sounds: Normal breath sounds. No wheezing or rales.   Abdominal:      General: Bowel sounds are normal. There is no distension.      Palpations: Abdomen is soft.      Tenderness: There is no abdominal tenderness. There is no guarding or rebound.      Comments: Baclofen pump RLQ -stable, no pain.   Genitourinary:     Penis: Normal.       Comments: Suprapubic catheter with clear yellow urine, large amount of sediment seen in tubing.  Dried blood seen near penile meatus, but no current bleeding or trauma seen.  Musculoskeletal:         General: No tenderness. Normal range of motion.      Comments: Some LE contractures   Skin:     General: Skin is warm and dry.      Findings: No erythema or rash.   Neurological:      Mental Status: He is alert and oriented to person, place, and time. Mental status is at baseline.      Cranial Nerves: No cranial nerve deficit.      Motor: Weakness present. No abnormal muscle tone.      Gait: Gait abnormal.      Comments: Baseline WC bound without use of his lower legs.  Has upper arm strength intact.   Psychiatric:         Mood and Affect: Mood normal.         Behavior: Behavior normal.         Thought Content: Thought content normal.         Judgment: Judgment normal.         Fluids    Intake/Output Summary (Last 24 hours) at 10/27/2021 1303  Last data filed at 10/27/2021 0400  Gross per 24 hour   Intake --   Output 1200 ml   Net -1200 ml       Laboratory  Recent Labs     10/25/21  0413 10/27/21  0416   WBC 5.3 4.6*   RBC 3.72* 4.11*   HEMOGLOBIN 11.1* 12.1*   HEMATOCRIT  35.3* 36.8*   MCV 94.9 89.5   MCH 29.8 29.4   MCHC 31.4* 32.9*   RDW 46.3 43.2   PLATELETCT 256 355   MPV 9.0 8.6*     Recent Labs     10/25/21  0413 10/27/21  0416   SODIUM 137 135   POTASSIUM 4.4 4.2   CHLORIDE 102 100   CO2 23 25   GLUCOSE 79 90   BUN 12 12   CREATININE 0.63 0.62   CALCIUM 8.5 9.0                   Imaging  CT-ABDOMEN-PELVIS W/O   Final Result         1.  Large quantity of stool in the colon with massive quantity of stool in the rectal vault, appearance compatible with severe rectal fecal impaction.   2.  Mild bilateral hydronephrosis and hydroureter, likely secondary due to mass effect due to large quantity of stool in the rectal vault.   3.  Fluid-filled distention of small bowel suggests component of ileus.   4.  Mild atherosclerosis   5.  Large bladder stones.      DX-CHEST-PORTABLE (1 VIEW)   Final Result      Tiny left mid lung opacity could represent atelectasis or tiny infiltrate. No pleural effusion.           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  Required fecal transplant.  C diff negative, likely secondary to antibiotics and fecal impaction   Unfortunately cannot avoid cephalosporins given the resistance pattern of his UTI    At risk for Clostridium difficile infection  Assessment & Plan  History of multiple  Episodes of Cdiff.  Last year required fecal transplant.  Therefore started po vancomycin daily for prophylaxis while on abx for UTI.  Lactobacillus po daily.  C diff negative on testing here, no stools today    Hyponatremia- (present on  admission)  Assessment & Plan  -Resolved with IVFs    Leukocytosis- (present on admission)  Assessment & Plan  -Mild, due to UTI    Hematuria- (present on admission)  Assessment & Plan  -Currently improved, apparently earlier did have blood in the suprapubic catheter tubing as well as from his penis  -Received IV Rocephin in the ER - with his history of C diff, he is at high risk for recurrence with a cephalosporin.  Will treat his UTI with oral Omnicef x 5 days given the resistance patter.   - While pt has had good UOP here, his matias was replaced at [facility] and subsequently had large urinary output - I suspect he had a distention associated hematuria   - urine culture with Providencia, Proteus and staph aureus (staph is 35,000cfu, likely not pathologic, most likely a skin contaminant from suprapubic cath)  - To go to the OR tomorrow as bladder stones are causing increased sediment leading to clogging of his catheter with subsequent bladder distention - has to flush his catheter frequently and replace it daily. RCRI score is 0, NSQIP scores with average to below average risk of all associated categories including death with the exception of higher risk for sepsis given his underlying UTI.     Recurrent major depressive disorder, in remission (HCC)- (present on admission)  Assessment & Plan  -Continue home meds    Urinary tract infection- (present on admission)  Assessment & Plan  Abnormal urinalysis on admission for suprapubic catheter.  - urine culture with Providencia, Proteus and staph aureas (staph is 35,000cfu, likely not pathologic)  - Will give oral Omnicef for now - pt almost certainly with a true UTI given his tachycardia, leukocytosis and very dirty appearing UA with hematuria on admission      MS (multiple sclerosis) (HCC)- (present on admission)  Assessment & Plan  -Severe and chronic, continue baclofen pump       VTE prophylaxis: SCDs/TEDs    I have performed a physical exam and reviewed and updated ROS  and Plan today (10/27/2021). In review of yesterday's note (10/26/2021), there are no changes except as documented above.

## 2021-10-27 NOTE — DISCHARGE PLANNING
Received Choice form at [phone]  Agency/Facility Name: [facility]   Referral sent per Choice form @ [phone]

## 2021-10-27 NOTE — ASSESSMENT & PLAN NOTE
- Pt with LE contractures and spacticity and is largely wheelchair bound at home due to MS with spacticity

## 2021-10-27 NOTE — OR NURSING
Called Dr. Sapp's office regarding patient is still on schedule and notes from yesterday after speaking with Tari that pt is to be cancelled due to recent hospital admit and needing clearance, left message with Mili after being transferred to Dr. Sapp's

## 2021-10-27 NOTE — PROGRESS NOTES
[facility] Medicine Daily Progress Note    Date of Service  10/26/2021    Chief Complaint  Jerzy Juan III is a 41 y.o. male admitted 10/23/2021 with hematuria after suprapubic catheter change.    Hospital Course    Jerzy Juan III is a 41 y.o. male who who resides at [facility] and history of C. difficile presented 10/23/2021 with blood via his penis.  Patient does have a history of end-stage MS, does have a chronic suprapubic catheter.  Suprapubic catheter gets changed on Fridays, did get change this Friday, today he was noted to have some blood in the tubing.  Patient is alert and oriented but did not speak much during the exam, information obtained from the mother.  She stated she saw a lot of bleeding, decision was made to change the suprapubic catheter again.  Once this was changed she was going to clean him up and then he began having a lot of bleeding out of his penis.  ERP did discuss the case with urology, they did recommend antibiotics.  Patient does have bladder stones, also has a planned procedure on Thursday, urologist on-call stated they would likely not do surgery prior to that since patient needed antibiotics.      Interval Problem Update  10/24: No further hematuria noted in suprapubic catheter Epperson bag.  Patient has dried blood noted at his penile meatus only.  No obvious trauma seen.  UA positive for -150.  Order to have urine sample sent for culture.  Per patient patient has had extensive history of C. difficile colitis with fecal transplants 1 year ago.  Therefore I did start patient on vancomycin p.o. 125 mg daily while on Rocephin IV antibiotics.  Also started lactobacillus per mother request.  Mother also requested to be able to bolus patient's baclofen pump to aid in his mobility.  He is wheelchair-bound at baseline.  Decreased IV fluids normal saline to 75 an hour.   10/25:  Urine culture pending.  Currently with continuous liquid diarrhea per  mother at bedside that smells of Cdif.  I have placed Cdif testing orders.  Also, added lidocaine patches to both shoulders q 24 hours, patient had been on patches at [facility].  VSS.  Wbc improved.  Patient stable, tolerating diet.  Since patient stable, dc'd Rocephin until final urine cultures resulted.  So far no growth from 10/23.    10/26 - Patient without any hematuria to his suprapubic cath today per patient and nursing, matias bag with yellow urine.  Nursing does report scant light pink penile discharge.  Patient without acute complaints during my examination - will perform CT today to further evaluate cause of his hematuria.  Judicious usage of antibiotics is key as he has a history of C diff.  Will touch base with Urology after CT performed. Discussed with Pharmacy - as urine culture is positive with his hematuria, will treat with Cipro for now.      I have personally seen and examined the patient at bedside. I discussed the plan of care with patient, family, bedside RN, charge RN and .    Consultants/Specialty  Urology:  Dr. Wells, phone consult, treat infection, no surgical procedures until 10/28 possible.    Code Status  Full Code    Disposition  Patient is not medically cleared.   Anticipate discharge to to skilled nursing facility.  I have placed the appropriate orders for post-discharge needs.    Review of Systems  Review of Systems   Constitutional: Negative for chills and fever.   Gastrointestinal: Positive for diarrhea.   Genitourinary: Positive for hematuria. Negative for dysuria and urgency.   All other systems reviewed and are negative.       Physical Exam  Temp:  [36.7 °C (98.1 °F)] 36.7 °C (98.1 °F)  Pulse:  [87-93] 87  Resp:  [18] 18  BP: (106-114)/(64-68) 114/68  SpO2:  [96 %-97 %] 97 %    Physical Exam  Constitutional:       General: He is not in acute distress.     Appearance: He is ill-appearing. He is not toxic-appearing or diaphoretic.   HENT:      Head: Normocephalic and  atraumatic.   Eyes:      Pupils: Pupils are equal, round, and reactive to light.   Neck:      Thyroid: No thyromegaly.      Vascular: No JVD.      Trachea: No tracheal deviation.   Cardiovascular:      Rate and Rhythm: Normal rate and regular rhythm.      Heart sounds: Normal heart sounds. No murmur heard.     Pulmonary:      Effort: Pulmonary effort is normal. No respiratory distress.      Breath sounds: Normal breath sounds. No wheezing or rales.   Abdominal:      General: Bowel sounds are normal. There is no distension.      Palpations: Abdomen is soft.      Tenderness: There is no abdominal tenderness. There is no guarding or rebound.      Comments: Baclofen pump RLQ -stable, no pain.   Genitourinary:     Penis: Normal.       Comments: Suprapubic catheter with clear yellow urine, large amount of sediment seen in tubing.  Dried blood seen near penile meatus, but no current bleeding or trauma seen.  Musculoskeletal:         General: No tenderness. Normal range of motion.      Comments: Some LE contractures   Skin:     General: Skin is warm and dry.      Findings: No erythema or rash.   Neurological:      Mental Status: He is alert and oriented to person, place, and time. Mental status is at baseline.      Cranial Nerves: No cranial nerve deficit.      Motor: Weakness present. No abnormal muscle tone.      Gait: Gait abnormal.      Comments: Baseline WC bound without use of his lower legs.  Has upper arm strength intact.   Psychiatric:         Mood and Affect: Mood normal.         Behavior: Behavior normal.         Thought Content: Thought content normal.         Judgment: Judgment normal.         Fluids    Intake/Output Summary (Last 24 hours) at 10/26/2021 1844  Last data filed at 10/26/2021 0900  Gross per 24 hour   Intake --   Output 3200 ml   Net -3200 ml       Laboratory  Recent Labs     10/23/21  2300 10/25/21  0413   WBC 11.5* 5.3   RBC 4.57* 3.72*   HEMOGLOBIN 13.4* 11.1*   HEMATOCRIT 41.4* 35.3*   MCV  90.6 94.9   MCH 29.3 29.8   MCHC 32.4* 31.4*   RDW 44.0 46.3   PLATELETCT 352 256   MPV 8.3* 9.0     Recent Labs     10/23/21  2300 10/25/21  0413   SODIUM 134* 137   POTASSIUM 4.4 4.4   CHLORIDE 98 102   CO2 29 23   GLUCOSE 119* 79   BUN 16 12   CREATININE 0.82 0.63   CALCIUM 9.2 8.5     Recent Labs     10/23/21  2300   APTT 41.1*   INR 1.09               Imaging  DX-CHEST-PORTABLE (1 VIEW)   Final Result      Tiny left mid lung opacity could represent atelectasis or tiny infiltrate. No pleural effusion.      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  Required fecal transplant.  C diff negative, likely secondary to antibiotics   Avoid cephalosporins due to risk of c diff when treating his hematuria/UTI    At risk for Clostridium difficile infection  Assessment & Plan  History of multiple  Episodes of Cdiff.  Last year required fecal transplant.  Therefore started po vancomycin daily for prophylaxis while on abx for UTI.  Lactobacillus po daily.  C diff negative on testing here, no stools today    Hyponatremia- (present on admission)  Assessment & Plan  -Resolved with IVFs    Leukocytosis- (present on admission)  Assessment & Plan  -Mild, due to UTI    Hematuria- (present on admission)  Assessment & Plan  -Currently improved, apparently earlier did have blood in the suprapubic catheter tubing as well as from his penis  -Received IV Rocephin in the ER - with his history of C diff, he is at high risk for recurrence with a cephalosporin.  Will treat his UTI with oral Cipro as white count normal and pt afebrile.   -Does have history of bladder  stones - will check CT to eval for pyelo, kidney stones etc. Additionally, had a distended bladder on 2018 CT despite catheter presence - distended bladder could cause hematuria if this recurred. While pt has had good UOP here, ER notes indicate his matias was replaced at [facility] and subsequently had large urinary output  - urine culture with Providencia, Proteus and staph aureus (staph is 35,000cfu, likely not pathologic, most likely a skin contaminant from suprapubic cath)      Recurrent major depressive disorder, in remission ([facility])- (present on admission)  Assessment & Plan  -Continue home meds    Urinary tract infection- (present on admission)  Assessment & Plan  Abnormal urinalysis on admission for suprapubic catheter.  - urine culture with Providencia, Proteus and staph aureas (staph is 35,000cfu, likely not pathologic)  - Will give oral cipro for now - pt almost certainly with a true UTI given his tachycardia, leukocytosis and very dirty appearing UA with hematuria on admission  - Check ct a/p given history of bladder stones and urinary retention despite catheter presence      MS (multiple sclerosis) ([facility])- (present on admission)  Assessment & Plan  -Severe and chronic, continue baclofen pump       VTE prophylaxis: SCDs/TEDs    I have performed a physical exam and reviewed and updated ROS and Plan today (10/26/2021). In review of yesterday's note (10/25/2021), there are no changes except as documented above.

## 2021-10-28 NOTE — OR NURSING
7291 Pt arrived from OR post   CYSTOSCOPY N/A General   LITHOTRIPSY, USING LASER - FOR CYSTOLITHALOPAXY     , report received. Pt breathing spontaneous and unlabored.    1000 Pt states pain is controled and tolerable, denies nausea. Pt tolerating PO fluids. Report to Joana SIMON    1005 Epperson flushed with 60cc sterile water.     1026 Pt taken to room by transport in stable condition.

## 2021-10-28 NOTE — ANESTHESIA POSTPROCEDURE EVALUATION
Patient: Jerzy Juan III    Procedure Summary     Date: 10/28/21 Room / Location:  OR  / SURGERY [facility]    Anesthesia Start: 0748 Anesthesia Stop: 0924    Procedures:       CYSTOSCOPY (N/A Ureter)      LITHOTRIPSY, USING LASER - FOR CYSTOLITHALOPAXY (N/A Ureter) Diagnosis: (URINARY BLADDER STONE)    Surgeons: Kwasi Sapp M.D. Responsible Provider: Lv Mcdowell M.D.    Anesthesia Type: general ASA Status: 3          Final Anesthesia Type: general  Last vitals  BP   Blood Pressure: 108/77    Temp   36 °C (96.8 °F)    Pulse   97   Resp   14    SpO2   97 %      Anesthesia Post Evaluation    Patient location during evaluation: PACU  Patient participation: complete - patient participated  Level of consciousness: awake and alert    Airway patency: patent  Anesthetic complications: no  Cardiovascular status: hemodynamically stable  Respiratory status: acceptable  Hydration status: euvolemic    PONV: none          No complications documented.     Nurse Pain Score: 0 (NPRS)

## 2021-10-28 NOTE — OP REPORT
DATE OF SERVICE:  10/28/2021     PREOPERATIVE DIAGNOSIS:  Bladder stones and neurogenic bladder.     POSTOPERATIVE DIAGNOSIS:  Bladder stones and neurogenic bladder.     PROCEDURE PERFORMED:  Cystolitholapaxy.     SURGEON:  Kwasi Sapp MD     ANESTHESIOLOGIST:  Lv Mcdowell MD     ANESTHESIA:  General.     INDICATIONS FOR PROCEDURE:  The patient is a 41-year-old male with neurogenic   bladder from end-stage MS, managed with a chronic suprapubic tube, recently   found to have 2 large bladder stones.  After discussing treatment options, he   elected for cystolitholapaxy.     DESCRIPTION OF PROCEDURE:  After obtaining informed consent, the patient was   brought to the operating room.  After the induction of general anesthesia, the   patient was placed in dorsal lithotomy position and his genitalia were   prepped and draped in sterile fashion.  The patient was already on   antibiotics, but was given a dose of ceftriaxone for a recent UTI and this was   per culture sensitivities.  I then clamped off his suprapubic tube and passed   a 25-[place]izen [place] cystoscope up the urethra and into the bladder.  There was some   inflammation of the bulbar urethra, but no stricture. There was a high bladder   neck, but the scope did pass into the bladder well.  There were 2 large   stones seen within the bladder lumen, each about 3-4 cm in size.  I used a   1000 micron holmium laser fiber to fragment these stones into small enough   fragments to irrigate out through the 25-Citizen [place] cystoscope. Once all the   bladder stones were irrigated out, I changed out his suprapubic tube for a   fresh one.  This was the 22-Citizen [place] with 10 mL of sterile water in the balloon   and hooked up to down drain.  The patient was then awoken from anesthesia and   taken to the recovery room in stable condition.     COMPLICATIONS:  None.     ESTIMATED BLOOD LOSS:  25 mL.     SPECIMEN:  Bladder stone fragments.     DRAIN:  A 22-Citizen [place] suprapubic tube.    Pt will  be observed overnight to manage his hematuria with irrigations prn.    ______________________________  MD RANDY Smith/JANNET    DD:  10/28/2021 09:23  DT:  10/28/2021 09:33    Job#:  [id number]

## 2021-10-28 NOTE — ANESTHESIA PROCEDURE NOTES
Airway    Date/Time: 10/28/2021 7:54 AM  Performed by: Lv Mcdowell M.D.  Authorized by: Lv Mcdowell M.D.     Location:  OR  Urgency:  Elective  Indications for Airway Management:  Anesthesia      Spontaneous Ventilation: absent    Sedation Level:  Deep  Preoxygenated: Yes    Mask Difficulty Assessment:  0 - not attempted  Final Airway Type:  Supraglottic airway  Final Supraglottic Airway:  Standard LMA    SGA Size:  4  Number of Attempts at Approach:  1

## 2021-10-28 NOTE — PROGRESS NOTES
[facility] Medicine Daily Progress Note    Date of Service  10/28/2021    Chief Complaint  Jerzy Juan III is a 41 y.o. male admitted 10/23/2021 with hematuria after suprapubic catheter change.    Hospital Course    Jerzy Juan III is a 41 y.o. male who who resides at [facility] and history of C. difficile presented 10/23/2021 with blood via his penis.  Patient does have a history of end-stage MS, does have a chronic suprapubic catheter.  Suprapubic catheter gets changed on Fridays, did get change this Friday, today he was noted to have some blood in the tubing.  Patient is alert and oriented but did not speak much during the exam, information obtained from the mother.  She stated she saw a lot of bleeding, decision was made to change the suprapubic catheter again.  Once this was changed she was going to clean him up and then he began having a lot of bleeding out of his penis.  ERP did discuss the case with urology, they did recommend antibiotics.  Patient does have bladder stones, also has a planned procedure on Thursday, urologist on-call stated they would likely not do surgery prior to that since patient needed antibiotics.      Interval Problem Update  10/24: No further hematuria noted in suprapubic catheter Epperson bag.  Patient has dried blood noted at his penile meatus only.  No obvious trauma seen.  UA positive for -150.  Order to have urine sample sent for culture.  Per patient patient has had extensive history of C. difficile colitis with fecal transplants 1 year ago.  Therefore I did start patient on vancomycin p.o. 125 mg daily while on Rocephin IV antibiotics.  Also started lactobacillus per mother request.  Mother also requested to be able to bolus patient's baclofen pump to aid in his mobility.  He is wheelchair-bound at baseline.  Decreased IV fluids normal saline to 75 an hour.   10/25:  Urine culture pending.  Currently with continuous liquid diarrhea per  mother at bedside that smells of Cdif.  I have placed Cdif testing orders.  Also, added lidocaine patches to both shoulders q 24 hours, patient had been on patches at [facility].  VSS.  Wbc improved.  Patient stable, tolerating diet.  Since patient stable, dc'd Rocephin until final urine cultures resulted.  So far no growth from 10/23.    10/26 - Patient without any hematuria to his suprapubic cath today per patient and nursing, matias bag with yellow urine.  Nursing does report scant light pink penile discharge.  Patient without acute complaints during my examination - will perform CT today to further evaluate cause of his hematuria.  Judicious usage of antibiotics is key as he has a history of C diff.  Will touch base with Urology after CT performed. Discussed with Pharmacy - as urine culture is positive with his hematuria, will treat with Cipro for now.     10/28 - Pt doing well today, no further hematuria, went to OR for cystolithalopaxy with Dr Sapp this morning. Per Dr Sapp, pt is to stay the night to monitor for hematuria.  Vitals are stable, received his dose of Fosfomycin yesterday, continue oral vanc.     I have personally seen and examined the patient at bedside. I discussed the plan of care with patient, family, bedside RN, charge RN and .    Consultants/Specialty  Urology:  Dr. Wells, phone consult, treat infection, no surgical procedures until 10/28 possible.    Code Status  DNAR/DNI    Disposition  Patient is medically cleared pending No hematuria today.   Anticipate discharge to to skilled nursing facility.  I have placed the appropriate orders for post-discharge needs.    Review of Systems  Review of Systems   Constitutional: Negative for chills and fever.   Gastrointestinal: Positive for diarrhea.   Genitourinary: Negative for dysuria, hematuria and urgency.   All other systems reviewed and are negative.       Physical Exam  Temp:  [36 °C (96.8 °F)-36.6 °C (97.9 °F)] 36.4 °C (97.6  °F)  Pulse:  [] 96  Resp:  [14-18] 16  BP: ()/(68-78) 108/68  SpO2:  [91 %-100 %] 95 %    Physical Exam  Constitutional:       General: He is not in acute distress.     Appearance: He is not ill-appearing, toxic-appearing or diaphoretic.   HENT:      Head: Normocephalic and atraumatic.   Eyes:      Pupils: Pupils are equal, round, and reactive to light.   Neck:      Thyroid: No thyromegaly.      Vascular: No JVD.      Trachea: No tracheal deviation.   Cardiovascular:      Rate and Rhythm: Normal rate and regular rhythm.      Heart sounds: Normal heart sounds. No murmur heard.     Pulmonary:      Effort: Pulmonary effort is normal. No respiratory distress.      Breath sounds: Normal breath sounds. No wheezing or rales.   Abdominal:      General: Bowel sounds are normal. There is no distension.      Palpations: Abdomen is soft.      Tenderness: There is no abdominal tenderness. There is no guarding or rebound.      Comments: Baclofen pump RLQ -stable, no pain.   Genitourinary:     Penis: Normal.       Comments: Suprapubic catheter with clear yellow urine  Musculoskeletal:         General: No tenderness. Normal range of motion.      Comments: Some LE contractures   Skin:     General: Skin is warm and dry.      Findings: No erythema or rash.   Neurological:      Mental Status: He is alert and oriented to person, place, and time. Mental status is at baseline.      Cranial Nerves: No cranial nerve deficit.      Motor: Weakness present. No abnormal muscle tone.      Gait: Gait abnormal.      Comments: Baseline WC bound without use of his lower legs.  Has upper arm strength intact.   Psychiatric:         Mood and Affect: Mood normal.         Behavior: Behavior normal.         Thought Content: Thought content normal.         Judgment: Judgment normal.         Fluids    Intake/Output Summary (Last 24 hours) at 10/28/2021 1307  Last data filed at 10/28/2021 0905  Gross per 24 hour   Intake 750 ml   Output 1805 ml    Net -1055 ml       Laboratory  Recent Labs     10/27/21  0416 10/28/21  0418   WBC 4.6* 6.3   RBC 4.11* 4.44*   HEMOGLOBIN 12.1* 13.0*   HEMATOCRIT 36.8* 39.7*   MCV 89.5 89.4   MCH 29.4 29.3   MCHC 32.9* 32.7*   RDW 43.2 43.1   PLATELETCT 355 412   MPV 8.6* 8.5*     Recent Labs     10/27/21  0416 10/28/21  0418   SODIUM 135 137   POTASSIUM 4.2 4.1   CHLORIDE 100 99   CO2 25 26   GLUCOSE 90 104*   BUN 12 14   CREATININE 0.62 0.71   CALCIUM 9.0 9.2                   Imaging  CT-ABDOMEN-PELVIS W/O   Final Result         1.  Large quantity of stool in the colon with massive quantity of stool in the rectal vault, appearance compatible with severe rectal fecal impaction.   2.  Mild bilateral hydronephrosis and hydroureter, likely secondary due to mass effect due to large quantity of stool in the rectal vault.   3.  Fluid-filled distention of small bowel suggests component of ileus.   4.  Mild atherosclerosis   5.  Large bladder stones.      DX-CHEST-PORTABLE (1 VIEW)   Final Result      Tiny left mid lung opacity could represent atelectasis or tiny infiltrate. No pleural effusion.           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  Required fecal transplant.  C diff negative, likely secondary to antibiotics and fecal impaction   Should be on a bowel regimen until return to [facility]    At risk for Clostridium difficile infection  Assessment & Plan  History of multiple  Episodes of Cdiff.  Last year required fecal transplant.  Therefore started po vancomycin daily for prophylaxis while on abx for UTI - continue until  10/31 (5d post fosfomycin dosing)  Lactobacillus po daily.  C diff negative on testing here, no stools today    Hyponatremia- (present on admission)  Assessment & Plan  -Resolved with IVFs    Leukocytosis- (present on admission)  Assessment & Plan  -Mild, due to UTI    Hematuria- (present on admission)  Assessment & Plan  -Currently improved, apparently earlier did have blood in the suprapubic catheter tubing as well as from his penis  -Received IV Rocephin in the ER - with his history of C diff, he is at high risk for recurrence with a cephalosporin.  Will treat his UTI with Fosfomycin x 1 (10/27/21)  - While pt has had good UOP here, his matias was replaced at [facility] and subsequently had large urinary output - I suspect he had a distention associated hematuria   - urine culture with Providencia, Proteus and staph aureus (staph is 35,000cfu, likely not pathologic, most likely a skin contaminant from suprapubic cath)  - s/p cystolithalopaxy with Dr Sapp this am for bladder stones    Recurrent major depressive disorder, in remission (HCC)- (present on admission)  Assessment & Plan  -Continue home meds    Urinary tract infection- (present on admission)  Assessment & Plan  Abnormal urinalysis on admission for suprapubic catheter.  - urine culture with Providencia, Proteus and staph aureas (staph is 35,000cfu, likely not pathologic)    - pt almost certainly with a true UTI given his tachycardia, leukocytosis and very dirty appearing UA with hematuria on admission  - s/p Fosfomycin x 1 due to significant risk of c diff recurrence      MS (multiple sclerosis) (HCC)- (present on admission)  Assessment & Plan  -Severe and chronic, continue baclofen pump       VTE prophylaxis: SCDs/TEDs    I have performed a physical exam and reviewed and updated ROS and Plan today (10/28/2021). In review of yesterday's note (10/27/2021), there are no changes except as documented above.

## 2021-10-28 NOTE — OR NURSING
0740  Pt to pre-op, mom at bedside offering support, covid screening completed, chg mouthwash and betadine nasal swabs, ivf initiated, confirmed allergies, cleaned pt of incontinent stool, changed into surgical gown

## 2021-10-28 NOTE — ANESTHESIA TIME REPORT
Anesthesia Start and Stop Event Times     Date Time Event    10/28/2021 0715 Ready for Procedure     0748 Anesthesia Start     0924 Anesthesia Stop        Responsible Staff  10/28/21    Name Role Begin End    Lv Mcdowell M.D. Anesth 0748 0924        Preop Diagnosis (Free Text):  Pre-op Diagnosis     URINARY BLADDER STONE        Preop Diagnosis (Codes):    Premium Reason  Non-Premium    Comments:

## 2021-10-28 NOTE — CARE PLAN
Problem: Nutritional:  Goal: Achieve adequate nutritional intake  Description: Patient will consume >50% of meals  Outcome: Met     PO is still adequate at 50-75% of meals. RD will continue to follow weekly or PRN.

## 2021-10-28 NOTE — PROGRESS NOTES
Urology Pre-op Note:  40 yo M with neurogenic bladder managed with suprapubic tube.  Recently found to have multiple bladder stones.  Now to OR for cystolithalopaxy.

## 2021-10-28 NOTE — CARE PLAN
The patient is Stable - Low risk of patient condition declining or worsening    Shift Goals  Clinical Goals: Free from falls and injury  Patient Goals: Rest and sleep, Bowel Movement    Progress made toward(s) clinical / shift goals:    Problem: Knowledge Deficit - Standard  Goal: Patient and family/care givers will demonstrate understanding of plan of care, disease process/condition, diagnostic tests and medications  Outcome: Progressing  Note: Patient and mother at bedside updated on the plan of care. Encouraged to voice feelings and to ask questions. Answered all questions and concerns. Agrees with the plan of care.

## 2021-10-28 NOTE — PROGRESS NOTES
Received bedside patient report from AURORA Malhotra. Patient resting comfortably in bed, no complaints at this time. Safety precautions in place. Will continue to monitor.

## 2021-10-28 NOTE — OR SURGEON
Immediate Post OP Note    PreOp Diagnosis: bladder stones; neurogenic bladder    PostOp Diagnosis: same    Procedure(s):  CYSTOLITHALOPAXY FOR LARGE BLADDER STONES - TOTAL 6CM- Wound Class: Clean Contaminated    Surgeon(s):  Kwasi Sapp M.D.    Anesthesiologist/Type of Anesthesia:  Anesthesiologist: Lv Mcdowell M.D./General    Surgical Staff:  Circulator: Yudelka Moreno R.N.  Laser Staff: Albin Trimble Circulator: Marya Mcgregor R.N.  Scrub Person: Grey Thapa    Specimens removed if any:  ID Type Source Tests Collected by Time Destination   A : bladder stone fragments Stone Bladder PATHOLOGY SPECIMEN Kwasi Sapp M.D. 10/28/2021  9:01 AM        Estimated Blood Loss: 25ml    Findings: 2 large bladder stones - each ~3-4cm    Complications: none    Drain:  22Fr 3-way matias (10ml balloon)    10/28/2021 9:18 AM Kwasi Sapp M.D.

## 2021-10-29 PROBLEM — R53.1 WEAKNESS: Status: ACTIVE | Noted: 2021-01-01

## 2021-10-29 NOTE — PROGRESS NOTES
Received bedside patient report from AURORA Bernard. Patient resting comfortably in bed, no complaints at this time. Family at bedside. Safety precautions in place. Will continue to monitor.

## 2021-10-29 NOTE — PROGRESS NOTES
"Urology Progress Note    S/p cystolitholapaxy 10/28.    S: Doing well. Denies pain. SPT in place with clear yellow output. Denies fevers, chills, nausea, vomiting.    O:   /64   Pulse (!) 118   Temp 37.8 °C (100.1 °F) (Temporal)   Resp 18   Ht 1.803 m (5' 11\")   Wt 57.6 kg (127 lb)   SpO2 95%   Recent Labs     10/27/21  0416 10/28/21  0418 10/29/21  0831   SODIUM 135 137 135   POTASSIUM 4.2 4.1 4.4   CHLORIDE 100 99 101   CO2 25 26 27   GLUCOSE 90 104* 101*   BUN 12 14 17   CREATININE 0.62 0.71 0.86   CALCIUM 9.0 9.2 8.6     Recent Labs     10/27/21  0416 10/28/21  0418 10/29/21  0831   WBC 4.6* 6.3 9.2   RBC 4.11* 4.44* 4.13*   HEMOGLOBIN 12.1* 13.0* 12.5*   HEMATOCRIT 36.8* 39.7* 37.2*   MCV 89.5 89.4 90.1   MCH 29.4 29.3 30.3   MCHC 32.9* 32.7* 33.6*   RDW 43.2 43.1 45.6   PLATELETCT 355 412 342   MPV 8.6* 8.5* 8.5*         Intake/Output Summary (Last 24 hours) at 10/29/2021 0941  Last data filed at 10/29/2021 0526  Gross per 24 hour   Intake --   Output 1200 ml   Net -1200 ml       Exam:  Gen:NAD   Abd: Abdomen soft, no TTP.   Urine: SPT with clear yellow output      A/P:    Active Hospital Problems    Diagnosis    • Fecal impaction (HCC) [K56.41]    • Anemia [D64.9]    • Chronic incomplete spastic paraplegia (HCC) [G82.22]    • Diarrhea [R19.7]    • Hematuria [R31.9]    • Leukocytosis [D72.829]    • Hyponatremia [E87.1]    • At risk for Clostridium difficile infection [Z91.89]    • Recurrent major depressive disorder, in remission ([facility]) [F33.40]    • Urinary tract infection [N39.0]    • MS (multiple sclerosis) ([facility]) [G35]      IMO load March 2020       40 yo Male with neurogenic bladder managed with a suprapubic tube, also with multiple bladder stones, now s/p cystolitholapaxy 10/28.  Plan:  - monitor pain control  - continue SPT, monitor for hematuria  - currently output via SPT clear. No further acute urologic intervention.  Can continue monthly SPT changes and follow up as a outpatient  - urology " signing off

## 2021-10-29 NOTE — ASSESSMENT & PLAN NOTE
- Initially concern for MS flare, but MRI w/wo performed without new enhancing lesions  - Likely secondary to acute illness with UTI and diarrhea

## 2021-10-29 NOTE — PROGRESS NOTES
[facility] Medicine Daily Progress Note    Date of Service  10/29/2021    Chief Complaint  Jerzy Juan III is a 41 y.o. male admitted 10/23/2021 with hematuria after suprapubic catheter change.    Hospital Course    Jerzy Juan III is a 41 y.o. male who who resides at [facility] and history of C. difficile presented 10/23/2021 with blood via his penis.  Patient does have a history of end-stage MS, does have a chronic suprapubic catheter.  Suprapubic catheter gets changed on Fridays, did get change this Friday, today he was noted to have some blood in the tubing.  Patient is alert and oriented but did not speak much during the exam, information obtained from the mother.  She stated she saw a lot of bleeding, decision was made to change the suprapubic catheter again.  Once this was changed she was going to clean him up and then he began having a lot of bleeding out of his penis.  ERP did discuss the case with urology, they did recommend antibiotics.  Patient does have bladder stones, also has a planned procedure on Thursday, urologist on-call stated they would likely not do surgery prior to that since patient needed antibiotics.      Interval Problem Update  10/24: No further hematuria noted in suprapubic catheter [place] bag.  Patient has dried blood noted at his penile meatus only.  No obvious trauma seen.  UA positive for -150.  Order to have urine sample sent for culture.  Per patient patient has had extensive history of C. difficile colitis with fecal transplants 1 year ago.  Therefore I did start patient on vancomycin p.o. 125 mg daily while on Rocephin IV antibiotics.  Also started lactobacillus per mother request.  Mother also requested to be able to bolus patient's baclofen pump to aid in his mobility.  He is wheelchair-bound at baseline.  Decreased IV fluids normal saline to 75 an hour.   10/25:  Urine culture pending.  Currently with continuous liquid diarrhea per  mother at bedside that smells of Cdif.  I have placed Cdif testing orders.  Also, added lidocaine patches to both shoulders q 24 hours, patient had been on patches at [facility].  VSS.  Wbc improved.  Patient stable, tolerating diet.  Since patient stable, dc'd Rocephin until final urine cultures resulted.  So far no growth from 10/23.    10/26 - Patient without any hematuria to his suprapubic cath today per patient and nursing, matias bag with yellow urine.  Nursing does report scant light pink penile discharge.  Patient without acute complaints during my examination - will perform CT today to further evaluate cause of his hematuria.  Judicious usage of antibiotics is key as he has a history of C diff.  Will touch base with Urology after CT performed. Discussed with Pharmacy - as urine culture is positive with his hematuria, will treat with Cipro for now.     10/28 - Pt doing well today, no further hematuria, went to OR for cystolithalopaxy with Dr Sapp this morning. Per Dr Sapp, pt is to stay the night to monitor for hematuria.  Vitals are stable, received his dose of Fosfomycin yesterday, continue oral vanc.     10/29 - Pt with continued diarrhea with suspicion for constipation with overflow - AXR confirms, will give enema and Dulcolax suppository if enema ineffective within 2-4 hours. Consider lactulose if those are ineffective. Developed tachycardia and temp to 100.1 this am with some very mild hematuria - discussed urine culture results with Pharmacy, starting Cefepime.  Has complaints of diffuse weakness and speech difficulty today - question of MS flare vs acute weakness due to UTI and diarrhea - MRI w/wo brain and C spine ordered.     I have personally seen and examined the patient at bedside. I discussed the plan of care with patient, family, bedside RN, charge RN and .    Consultants/Specialty  Urology    Code Status  DNAR/DNI    Disposition  Patient is not medically cleared.   Anticipate  discharge to to skilled nursing facility.  I have placed the appropriate orders for post-discharge needs.    Review of Systems  Review of Systems   Constitutional: Positive for fever. Negative for chills.   Gastrointestinal: Positive for diarrhea.   Genitourinary: Negative for dysuria, hematuria and urgency.   Neurological: Positive for weakness.   All other systems reviewed and are negative.       Physical Exam  Temp:  [36.9 °C (98.4 °F)-37.8 °C (100.1 °F)] 37.8 °C (100.1 °F)  Pulse:  [100-125] 118  Resp:  [17-18] 18  BP: (102-116)/(64-77) 114/64  SpO2:  [95 %-96 %] 95 %    Physical Exam  Constitutional:       General: He is not in acute distress.     Appearance: He is not ill-appearing, toxic-appearing or diaphoretic.   HENT:      Head: Normocephalic and atraumatic.   Eyes:      Pupils: Pupils are equal, round, and reactive to light.   Neck:      Thyroid: No thyromegaly.      Vascular: No JVD.      Trachea: No tracheal deviation.   Cardiovascular:      Rate and Rhythm: Normal rate and regular rhythm.      Heart sounds: Normal heart sounds. No murmur heard.     Pulmonary:      Effort: Pulmonary effort is normal. No respiratory distress.      Breath sounds: Normal breath sounds. No wheezing or rales.   Abdominal:      General: Bowel sounds are normal. There is no distension.      Palpations: Abdomen is soft.      Tenderness: There is no abdominal tenderness. There is no guarding or rebound.      Comments: Baclofen pump RLQ -stable, no pain.   Genitourinary:     Penis: Normal.       Comments: Suprapubic catheter with pink tinged urine   Musculoskeletal:         General: No tenderness. Normal range of motion.      Comments: Some LE contractures   Skin:     General: Skin is warm and dry.      Findings: No erythema or rash.   Neurological:      Mental Status: He is alert and oriented to person, place, and time. Mental status is at baseline.      Cranial Nerves: No cranial nerve deficit.      Motor: Weakness present. No  abnormal muscle tone.      Gait: Gait abnormal.      Comments: Baseline WC bound without use of his lower legs.  Has upper arm strength intact.   Psychiatric:         Mood and Affect: Mood normal.         Behavior: Behavior normal.         Thought Content: Thought content normal.         Judgment: Judgment normal.         Fluids    Intake/Output Summary (Last 24 hours) at 10/29/2021 1438  Last data filed at 10/29/2021 0526  Gross per 24 hour   Intake --   Output 1200 ml   Net -1200 ml       Laboratory  Recent Labs     10/27/21  0416 10/28/21  0418 10/29/21  0831   WBC 4.6* 6.3 9.2   RBC 4.11* 4.44* 4.13*   HEMOGLOBIN 12.1* 13.0* 12.5*   HEMATOCRIT 36.8* 39.7* 37.2*   MCV 89.5 89.4 90.1   MCH 29.4 29.3 30.3   MCHC 32.9* 32.7* 33.6*   RDW 43.2 43.1 45.6   PLATELETCT 355 412 342   MPV 8.6* 8.5* 8.5*     Recent Labs     10/27/21  0416 10/28/21  0418 10/29/21  0831   SODIUM 135 137 135   POTASSIUM 4.2 4.1 4.4   CHLORIDE 100 99 101   CO2 25 26 27   GLUCOSE 90 104* 101*   BUN 12 14 17   CREATININE 0.62 0.71 0.86   CALCIUM 9.0 9.2 8.6                   Imaging  MR-BRAIN-WITH & W/O   Final Result         Multiple white matter lesions compatible with the diagnosis of a demyelinating process such as multiple sclerosis, stable from previous study. No new lesions are identified. There is no abnormal intracranial enhancement.      [id number] VIEW   Final Result      Large amount of colonic stool. Diffuse bowel distention is likely related to constipation.      CT-ABDOMEN-PELVIS W/O   Final Result         1.  Large quantity of stool in the colon with massive quantity of stool in the rectal vault, appearance compatible with severe rectal fecal impaction.   2.  Mild bilateral hydronephrosis and hydroureter, likely secondary due to mass effect due to large quantity of stool in the rectal vault.   3.  Fluid-filled distention of small bowel suggests component of ileus.   4.  Mild atherosclerosis   5.  Large bladder stones.       DX-CHEST-PORTABLE (1 VIEW)   Final Result      Tiny left mid lung opacity could represent atelectasis or tiny infiltrate. No pleural effusion.      MR-CERVICAL SPINE-WITH & W/O    (Results Pending)        Assessment/Plan  Weakness  Assessment & Plan  - Initially concern for MS flare, but MRI w/wo performed without new enhancing lesions  - Likely secondary to acute illness with UTI and diarrhea     Fecal impaction (HCC)  Assessment & Plan  - s/p disimpaction with subsequent BM, AXR still with residual constipation, will give enema, if ineffective, Dulcolax in addition      Chronic incomplete spastic paraplegia (HCC)  Assessment & Plan  - Pt with LE contractures and spacticity and is largely wheelchair bound at home due to MS with spacticity     Anemia  Assessment & Plan  - Dilutional vs due to hematuria  - Stable    Diarrhea  Assessment & Plan  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  Required fecal transplant.  C diff negative, likely secondary to antibiotics and fecal impaction   Should be on a bowel regimen until return to [place]    At risk for Clostridium difficile infection  Assessment & Plan  History of multiple  Episodes of Cdiff.  Last year required fecal transplant.  Therefore started po vancomycin daily for prophylaxis while on abx for UTI - continue until 5 days past last antibiotic dose  Lactobacillus po daily.  C diff negative on testing here    Hyponatremia- (present on admission)  Assessment & Plan  -Resolved with IVFs    Leukocytosis- (present on admission)  Assessment & Plan  -Mild, due to UTI    Hematuria- (present on admission)  Assessment & Plan  -Currently improved, apparently earlier did have blood in the suprapubic catheter tubing as well as from his penis  -Received IV Rocephin in the ER - with his history of C diff, he is at high risk for recurrence with a cephalosporin.    - Received Fosfomycin x 1 (10/27/21) in an attempt to avoid C Diff prone drugs, but then became  tachy and had temp to 100.1 this am, so Cefepime started 10/29  - While pt has had good UOP here, his matias was replaced at [place] and subsequently had large urinary output - I suspect he had a distention associated hematuria   - urine culture with Providencia, Proteus and staph aureus (staph is 35,000cfu, likely not pathologic, most likely a skin contaminant from suprapubic cath)  - s/p cystolithalopaxy with Dr Sapp 10/28 for bladder stones    Recurrent major depressive disorder, in remission ([facility])- (present on admission)  Assessment & Plan  -Continue home meds    Urinary tract infection- (present on admission)  Assessment & Plan  Abnormal urinalysis on admission for suprapubic catheter.  - urine culture with Providencia, Proteus and staph aureas (staph is 35,000cfu, likely not pathologic)    - pt almost certainly with a true UTI given his tachycardia, leukocytosis and very dirty appearing UA with hematuria on admission  - s/p Fosfomycin x 1 due to significant risk of c diff recurrence, but developed temp to 100.1 and tachycardia after urologic intervention - discussed with Pharmacy, starting Cefepime        MS (multiple sclerosis) ([facility])- (present on admission)  Assessment & Plan  -Severe and chronic, continue baclofen pump  - MRI without signs of acute flare        VTE prophylaxis: SCDs/TEDs    I have performed a physical exam and reviewed and updated ROS and Plan today (10/29/2021). In review of yesterday's note (10/28/2021), there are no changes except as documented above.

## 2021-10-29 NOTE — CARE PLAN
The patient is Stable - Low risk of patient condition declining or worsening    Shift Goals  Clinical Goals: Remain free from falls  Patient Goals: Rest and sleep, Bowel Movement    Progress made toward(s) clinical / shift goals:    Problem: Knowledge Deficit - Standard  Goal: Patient and family/care givers will demonstrate understanding of plan of care, disease process/condition, diagnostic tests and medications  Outcome: Progressing  Note: Patient and family updated on the plan of care. Encouraged to voice feelings and to ask questions. Answered all questions and concerns. Agrees with the plan of care.

## 2021-10-30 NOTE — PROGRESS NOTES
Bedside report received from Joana SIMON. Patient is in bed and stable. Bed alarm is on. Bed locked and in lowest position, upper side rails up, call light in reach, whiteboard updated. Patient's mother at bedside. POC discussed and pt and mother verbalize understanding.

## 2021-10-30 NOTE — CARE PLAN
The patient is Stable - Low risk of patient condition declining or worsening    Shift Goals  Clinical Goals: pain control  Patient Goals: rest    Progress made toward(s) clinical / shift goals:  yes    Patient is not progressing towards the following goals:

## 2021-10-30 NOTE — DISCHARGE PLANNING
Anticipated Discharge Disposition: [facility]    Action: Notified by MD pt is medically cleared to return to [facility].   VM left for Niko Moreau requesting return call    Barriers to Discharge: None    Plan: F/U with [facility]      Update: Spoke with Niko Moreau and verified they can accept pt today. [facility] form completed and faxed to [facility]    COBRA completed and placed in pt's chart. Niko Moreau charles of transfer time. Per RN, pt's mother is aware of transfer time.

## 2021-10-30 NOTE — DISCHARGE SUMMARY
"Discharge Summary    CHIEF COMPLAINT ON ADMISSION  Chief Complaint   Patient presents with   • Urinary Catheter Problem     Pt BIB [facility] from [place] for blood in superpubic catheter - [facility] stated that staff at [place] changed catheter and noticed blood draining out; Pt mom stated that Pt had hardly any urine in FC collection bag, so [facility] staff changed catheter, then noticed \"a lot\" draining; Pt +nauseous but mom stated that this is normal; Pt has Hx of MS - very light sensitive; Pt mom stated that \"many bladder stones\" - has scheduled appt this Thursday here c MD Mayes; Pt mom stated that MD Reddy would like Pt to see cardiolog.       Reason for Admission  EMS     Admission Date  10/23/2021    CODE STATUS  DNAR/DNI    HPI & HOSPITAL COURSE  This is a 41 y.o. male here with MS and a chronic suprapubic cath for neurogenic bladder who resides at [facility]; he was admitted with an episode of britta hematuria.  His urine culture grew Proteus, Providencia and Staph aureus, and we initially attempted conservative treatment with fosfomycin given his history of recurrent C diff requiring FMT.  However, after the patient underwent cystolitholapaxy for bladder stones with Dr Sapp, he did spike a temp to 100.1 and become tachycardic - he was changed to Cefepime with resolution of the fever and tachycardia.  He will complete a seven day total course of Omnicef (11 doses remain) and will be on both lactobacillus daily, as well as Vancomycin x 10 days (5 days post cessation of antibiotics) for C diff prevention. During his stay, he had increased weakness of his upper extremities, however MRI brain/c-spine did not indicate an MS flare, and this was likely secondary to anesthetic effects, acute illness and oral baclofen - I did suggest holding the oral baclofen for his weakness but the patient felt that the benefit of taking it outweighed the risk and did not wish to stop. If this continues at [facility], I would suggest holding the oral baclofen. " The patient also had a fecal impaction with considerable constipation with overflow while here - I am placing him on a BID stool softener and would suggest daily miralax as needed vs scheduled depending on his BMs at [facility].  [facility] will contact pt with follow up appt - I did discuss suprapubic care with them, they instructed to continue flushing the catheter as had previously been done, but he now only needs catheter exchange every month.       Therefore, he is discharged in good and stable condition to skilled nursing facility.    The patient met 2-midnight criteria for an inpatient stay at the time of discharge.    Discharge Date  10/30/21    FOLLOW UP ITEMS POST DISCHARGE  [facility] - they will contact him with an appt     DISCHARGE DIAGNOSES  Active Problems:    MS (multiple sclerosis) ([facility]) POA: Yes      Overview: IMO load March 2020    Urinary tract infection POA: Yes    Recurrent major depressive disorder, in remission ([facility]) POA: Yes    Hematuria POA: Yes    Leukocytosis POA: Yes    Hyponatremia POA: Yes    At risk for Clostridium difficile infection POA: Unknown    Diarrhea POA: Unknown    Anemia POA: Unknown    Chronic incomplete spastic paraplegia ([facility]) POA: Unknown    Fecal impaction ([facility]) POA: Unknown    Weakness POA: Unknown  Resolved Problems:    * No resolved hospital problems. *      FOLLOW UP  Future Appointments   Date Time Provider Department Center   12/14/2021  2:40 PM Melissa P Bloch, M.D. [facility] None     Kwasi Sapp M.D.  [address]  [facility] [address]  [phone]      Their office will contact you with an appointment - if you don't hear from them wihtin a week, please call them    Janet Reddy M.D.  [address] [address]  [facility] [id number] [phone]            MEDICATIONS ON DISCHARGE     Medication List      START taking these medications      Instructions   cefdinir 300 MG Caps  Commonly known as: OMNICEF   Take 1 Capsule by mouth 2 times a day for 11 doses.  Dose: 300 mg      docusate sodium 100 MG Caps  Commonly known as: COLACE   Take 1 Capsule by mouth 2 times a day.  Dose: 100 mg     lactobacillus rhamnosus Caps capsule   Take 1 Capsule by mouth every morning with breakfast.  Dose: 1 Capsule     polyethylene glycol/lytes 17 g Pack  Commonly known as: MIRALAX   Take 1 Packet by mouth 1 time a day as needed (if sennosides and docusate ineffective after 24 hours).  Dose: 17 g     vancomycin 50 mg/mL 50 mg/mL Soln  Start taking on: October 31, 2021   Take 2.5 mL by mouth every day for 10 days.  Dose: 125 mg        CONTINUE taking these medications      Instructions   baclofen 10 MG Tabs  Commonly known as: LIORESAL   Take 10 mg by mouth 4 times a day.  Dose: 10 mg     buPROPion  MG Tb12  Commonly known as: WELLBUTRIN-SR   Take 100 mg by mouth in the morning, at noon, and at bedtime.  Dose: 100 mg     famotidine 20 MG Tabs  Commonly known as: PEPCID   Take 20 mg by mouth 2 times a day.  Dose: 20 mg     Lidoderm 5 % Ptch  Generic drug: lidocaine   Place 1 Patch on the skin every 24 hours. Per MAR apply's on right shoulder  Dose: 1 Patch     methenamine hip 1 GM Tabs  Commonly known as: HIPPREX   Take 1 g by mouth 2 times a day.  Dose: 1 g     multivitamin Tabs   Take 1 Tablet by mouth every day.  Dose: 1 Tablet     oxybutynin SR 10 MG CR tablet  Commonly known as: DITROPAN-XL   Take 10 mg by mouth in the morning, at noon, and at bedtime.  Dose: 10 mg     Pain Pump Gayla  Commonly known as: patient supplied   Inject 1 Each as directed continuous. Patient's Pain Pump (placed and maintained as an outpatient)    Medications/concentrations:   BACLOFEN (2,250.0mcg/ml)  KETAMINE (121.2mcg/ml)    Last changed: 9/28/2021   Refill pump before date: 11/30/2021    Continuous infusion rates (Drug/Rate):  BACLOFEN 1,019.0mcg (42.5 mcg/hr)  KETAMINE 54.89 mcg (2.29 mcg/hr)     myPTM setup:  Primary Drug:  BACLOFEN 49.9mcg  KETAMINE 2.69 mcg    Maximum daily dose:  BACLOFEN 1,310.1 mcg (+  28.6%)  KETAMINE 70.57 mcg       Patient activation lockout interval: 1 hr  Maximum activations per day: 6/day    MD office: Dr. Gomez's office   Phone number: [phone]  Dose: 1 Each     PROBIOTIC PO   Take 1 Capsule by mouth every day.  Dose: 1 Capsule     sertraline 25 MG tablet  Commonly known as: Zoloft   Take 25 mg by mouth every day.  Dose: 25 mg     Tylenol 325 MG Caps  Generic drug: Acetaminophen   Take 650 mg by mouth every 6 hours as needed. Indications: Fever  Dose: 650 mg     Vitamin C 1000 MG Tabs   Take 1,000 mg by mouth every day.  Dose: 1,000 mg     vitamin D 1000 Unit (25 mcg) Tabs  Commonly known as: cholecalciferol   Take 1,000 Units by mouth every day.  Dose: 1,000 Units     Zofran ODT 4 MG Tbdp  Generic drug: ondansetron   Take 4 mg by mouth every four hours as needed for Nausea.  Dose: 4 mg            Allergies  Allergies   Allergen Reactions   • Asa [Aspirin] Unspecified     Bleeding in stomach as a baby   • Gabapentin Anxiety     anger and severe mood swing   • Hydrocodone Nausea   • Methocarbamol      emesis   • Morphine Nausea   • Pregabalin Anxiety     Anger and severe mood swing       DIET  Orders Placed This Encounter   Procedures   • Diet Order Diet: Regular     Standing Status:   Standing     Number of Occurrences:   1     Order Specific Question:   Diet:     Answer:   Regular [1]       ACTIVITY  As tolerated and directed by skilled nursing.  Weight bearing as tolerated    CONSULTATIONS  Urology - Dr Sapp    PROCEDURES  Cystolitholapaxy    LABORATORY  Lab Results   Component Value Date    SODIUM 138 10/30/2021    POTASSIUM 4.0 10/30/2021    CHLORIDE 102 10/30/2021    CO2 24 10/30/2021    GLUCOSE 98 10/30/2021    BUN 17 10/30/2021    CREATININE 0.64 10/30/2021        Lab Results   Component Value Date    WBC 6.9 10/30/2021    HEMOGLOBIN 11.8 (L) 10/30/2021    HEMATOCRIT 36.1 (L) 10/30/2021    PLATELETCT 328 10/30/2021        Total time of the discharge process exceeds 35 minutes.

## 2021-10-30 NOTE — DISCHARGE PLANNING
Received Transport Form @ 1200  Spoke to Eleni @ [facility]    Transport is scheduled for 10/30 @1500 going to [place].    DPA contacted [facility] and spoke to Kaya, no transport benefits.     LSW notified.

## 2021-10-30 NOTE — DISCHARGE INSTRUCTIONS
Discharge Instructions    Discharged to other by ambulance with escort. Discharged via ambulance, hospital escort: Yes.  Special equipment needed: Not Applicable    Be sure to schedule a follow-up appointment with your primary care doctor or any specialists as instructed.     Discharge Plan:   Diet Plan: Discussed  Activity Level: Discussed  Confirmed Follow up Appointment: Patient to Call and Schedule Appointment  Confirmed Symptoms Management: Discussed  Medication Reconciliation Updated: Yes  Influenza Vaccine Indication: Patient Refuses    I understand that a diet low in cholesterol, fat, and sodium is recommended for good health. Unless I have been given specific instructions below for another diet, I accept this instruction as my diet prescription.   Other diet: regular    Special Instructions: None    · Is patient discharged on Warfarin / Coumadin?   No     Depression / Suicide Risk    As you are discharged from this [facility] facility, it is important to learn how to keep safe from harming yourself.    Recognize the warning signs:  · Abrupt changes in personality, positive or negative- including increase in energy   · Giving away possessions  · Change in eating patterns- significant weight changes-  positive or negative  · Change in sleeping patterns- unable to sleep or sleeping all the time   · Unwillingness or inability to communicate  · Depression  · Unusual sadness, discouragement and loneliness  · Talk of wanting to die  · Neglect of personal appearance   · Rebelliousness- reckless behavior  · Withdrawal from people/activities they love  · Confusion- inability to concentrate     If you or a loved one observes any of these behaviors or has concerns about self-harm, here's what you can do:  · Talk about it- your feelings and reasons for harming yourself  · Remove any means that you might use to hurt yourself (examples: pills, rope, extension cords, firearm)  · Get professional help from the community  (Mental Health, Substance Abuse, psychological counseling)  · Do not be alone:Call your Safe Contact- someone whom you trust who will be there for you.  · Call your local CRISIS HOTLINE 937-9978 or 340-569-2027  · Call your local Children's Mobile Crisis Response Team [place] (090) 153-7298 or www.[employer]  · Call the toll free National Suicide Prevention Hotlines   · National Suicide Prevention Lifeline 451-988-SIDN (7628)  · National Hope Line Network 800-SUICIDE (712-8632)    Diet: Diet Order Diet: Regular  Activity: As tolerated  Follow Up: [facility] will contact you with an appointment, please contact them within a week if you don't hear from them.  I spoke with them regarding your catheter - you can continue the flushes as you were previously, but can now exchange your suprapubic catheter once per month.   Disposition:[facility]  Diagnosis: Hematuria     Follow up with your Primary Care Provider Janet Reddy M.D. as scheduled or sooner if your symptoms persist or worsen.  Return to Emergency Room for severe chest pain, shortness of breath, signs of a stroke, or any other emergencies.

## 2021-10-30 NOTE — CARE PLAN
The patient is Stable - Low risk of patient condition declining or worsening    Shift Goals  Clinical Goals: Pain control, IV antibiotics  Patient Goals: Sleep    Progress made toward(s) clinical / shift goals:  Patient's pain is at its normal level and next antibiotic will be administered this morning scheduled for 0600.    Patient is not progressing towards the following goals: NA

## 2021-10-30 NOTE — PROGRESS NOTES
Pt discharged to [facility] in stable condition with [facility]. Instructions given to mom and report called to Nurse Margret.

## 2021-11-19 PROBLEM — E83.42 HYPOMAGNESEMIA: Status: ACTIVE | Noted: 2021-01-01

## 2021-11-19 NOTE — ASSESSMENT & PLAN NOTE
Secondary to diarrhea and calcium loss  Continue with potassium supplementation repeat potassium level in the morning

## 2021-11-19 NOTE — ASSESSMENT & PLAN NOTE
Initially presented with severe fecal impaction.  The patient's fecal impaction had to be removed under anesthesia manually.  Abdomen at this point soft, no longer impacted

## 2021-11-19 NOTE — ED NOTES
Assumed patient care and report received from AURORA Katz    Rounded on patient; mom at bedside and would like the patient not be disturbed and let him sleep.  Will round on patient later after the IV antibiotic is completed.

## 2021-11-19 NOTE — ED PROVIDER NOTES
ED Provider Note    CHIEF COMPLAINT  Chief Complaint   Patient presents with   • Fever     today   • Diarrhea     today   • N/V     since yesterday       HPI  Jerzy Juan III is a 41 y.o. male with a history of MS in a skilled nursing facility who presents with a chief complaint of nausea, vomiting, malodorous diarrhea, and fever that started 24 hours ago.  Mother is uncertain how high his fever has been.  He has been having multiple episodes of nonbloody, nonbilious emesis.  He did recently have antibiotic exposure for urinary tract infection as he has a suprapubic catheter in place and underwent bladder stone extraction by urology within the past several weeks.  He does not typically feel pain but feels generally unwell.  He has had C. difficile multiple times and his mother notes that his diarrhea smells similar to prior episodes.    REVIEW OF SYSTEMS  See HPI for further details.  Diarrhea.  Nausea.  Vomiting.  Fever.  All other systems are negative.     PAST MEDICAL HISTORY   has a past medical history of Anxiety, Back pain, Blood transfusion, Bowel habit changes, Breath shortness, Dental disorder, Depression, Eyes sensitive to light, Fall, Headache(784.0), Heart burn, Heat intolerance, Hypertension, Indigestion, MS (multiple sclerosis) ([facility]) (DX 2013), Multiple sclerosis (HCC) (), Pain (2018), Presence of intrathecal baclofen pump, Urinary bladder disorder, and Urinary incontinence.    SOCIAL HISTORY  Social History     Tobacco Use   • Smoking status: Former Smoker     Packs/day: 1.00     Years: 20.00     Pack years: 20.00     Start date: 1997     Quit date: 2013     Years since quittin.3   • Smokeless tobacco: Never Used   Vaping Use   • Vaping Use: Former   Substance and Sexual Activity   • Alcohol use: Yes     Alcohol/week: 0.0 oz   • Drug use: Not Currently     Types: Inhaled     Comment: stopped 2019   • Sexual activity: Not on file     Comment: once daily for 23  yrs       SURGICAL HISTORY   has a past surgical history that includes open reduction (Left, 1988); gastroscopy (1/14/2017); block epidural steroid injection (N/A, 3/27/2018); cystoscopy (5/7/2018); lasertripsy (5/7/2018); pump insert/remove (6/26/2018); cystourethroscopy (N/A, 10/28/2021); and lasertripsy (N/A, 10/28/2021).    CURRENT MEDICATIONS  Home Medications    **Home medications have not yet been reviewed for this encounter**         ALLERGIES  Allergies   Allergen Reactions   • Asa [Aspirin] Unspecified     Bleeding in stomach as a baby   • Gabapentin Anxiety     anger and severe mood swing   • Hydrocodone Nausea   • Methocarbamol      emesis   • Morphine Nausea   • Pregabalin Anxiety     Anger and severe mood swing       PHYSICAL EXAM  VITAL SIGNS: /84   Pulse (!) 136   Temp (!) 38.2 °C (100.8 °F) (Temporal)   Resp 20   Wt 57.2 kg (126 lb) Comment: bib [facility]  SpO2 92%   BMI 17.57 kg/m²    Pulse ox interpretation: I interpret this pulse ox as normal.  Constitutional: Alert, ill-appearing.  HENT: No signs of trauma, Bilateral external ears normal, Nose normal.  Dry mucous membranes.  Eyes: Pupils are equal and reactive, Conjunctiva normal, Non-icteric.   Neck: Normal range of motion, No tenderness, Supple, No stridor.   Lymphatic: No lymphadenopathy noted.   Cardiovascular: Tachycardic with regular rhythm, no murmurs. Pulses symmetrical.  Thorax & Lungs: Normal breath sounds, No respiratory distress, No wheezing, No chest tenderness.   Abdomen: Bowel sounds normal, Soft, No obvious tenderness, No masses, No pulsatile masses. No peritoneal signs.  Rectal: Significant loose stool covering the gluteal region.  No stool ball accessible in rectum.  Skin: Warm, Dry, No erythema, No rash.   Back: Normal alignment.  Extremities: No cyanosis.  Musculoskeletal: No major deformities noted.   Neurologic: Alert.   Psychiatric: Affect normal, Judgment normal, Mood normal.     DIAGNOSTIC STUDIES /  PROCEDURES    LABS  Results for orders placed or performed during the hospital encounter of 11/18/21   CBC WITH DIFFERENTIAL   Result Value Ref Range    WBC 14.1 (H) 4.8 - 10.8 K/uL    RBC 4.85 4.70 - 6.10 M/uL    Hemoglobin 14.4 14.0 - 18.0 g/dL    Hematocrit 43.7 42.0 - 52.0 %    MCV 90.1 81.4 - 97.8 fL    MCH 29.7 27.0 - 33.0 pg    MCHC 33.0 (L) 33.7 - 35.3 g/dL    RDW 50.3 (H) 35.9 - 50.0 fL    Platelet Count 257 164 - 446 K/uL    MPV 8.7 (L) 9.0 - 12.9 fL    Neutrophils-Polys 79.10 (H) 44.00 - 72.00 %    Lymphocytes 7.20 (L) 22.00 - 41.00 %    Monocytes 12.80 0.00 - 13.40 %    Eosinophils 0.00 0.00 - 6.90 %    Basophils 0.40 0.00 - 1.80 %    Immature Granulocytes 0.50 0.00 - 0.90 %    Nucleated RBC 0.00 /100 WBC    Neutrophils (Absolute) 11.15 (H) 1.82 - 7.42 K/uL    Lymphs (Absolute) 1.02 1.00 - 4.80 K/uL    Monos (Absolute) 1.81 (H) 0.00 - 0.85 K/uL    Eos (Absolute) 0.00 0.00 - 0.51 K/uL    Baso (Absolute) 0.05 0.00 - 0.12 K/uL    Immature Granulocytes (abs) 0.07 0.00 - 0.11 K/uL    NRBC (Absolute) 0.00 K/uL   COMP METABOLIC PANEL   Result Value Ref Range    Sodium 132 (L) 135 - 145 mmol/L    Potassium 3.5 (L) 3.6 - 5.5 mmol/L    Chloride 99 96 - 112 mmol/L    Co2 22 20 - 33 mmol/L    Anion Gap 11.0 7.0 - 16.0    Glucose 122 (H) 65 - 99 mg/dL    Bun 18 8 - 22 mg/dL    Creatinine 0.62 0.50 - 1.40 mg/dL    Calcium 8.4 8.4 - 10.2 mg/dL    AST(SGOT) 27 12 - 45 U/L    ALT(SGPT) 15 2 - 50 U/L    Alkaline Phosphatase 69 30 - 99 U/L    Total Bilirubin 0.7 0.1 - 1.5 mg/dL    Albumin 3.5 3.2 - 4.9 g/dL    Total Protein 6.3 6.0 - 8.2 g/dL    Globulin 2.8 1.9 - 3.5 g/dL    A-G Ratio 1.3 g/dL   URINALYSIS    Specimen: Urine   Result Value Ref Range    Micro Urine Req Microscopic    LACTIC ACID   Result Value Ref Range    Lactic Acid 1.5 0.5 - 2.0 mmol/L   ESTIMATED GFR   Result Value Ref Range    GFR If African American >60 >60 mL/min/1.73 m 2    GFR If Non African American >60 >60 mL/min/1.73 m 2      RADIOLOGY  CT-ABDOMEN-PELVIS WITH   Final Result         1.  Massive quantity of stool in the rectum, appearance compatible with severe fecal impaction.   2.  Irregular areas of low-density in the bilateral kidneys, appearance favors pyelonephritis, correlate with urinalysis.   3.  Bladder wall thickening with nondependent air within the bladder, could represent changes of cystitis.   4.  Linear densities the bilateral lung bases favor changes of atelectasis   5.  Atherosclerosis      DX-CHEST-PORTABLE (1 VIEW)   Final Result      No evidence of acute cardiopulmonary process.          COURSE & MEDICAL DECISION MAKING  Pertinent Labs & Imaging studies reviewed. (See chart for details)  Records obtained and reviewed: Patient was most recently admitted to this facility 10/23/2021 for 7 days for urinary tract infection with a culture that grew Proteus, [place] [facility], and staph aureus.  Conservative treatment was initially attempted with fosfomycin given history of recurrent C. difficile requiring FM T.  However, patient became septic and ultimately was started on cefepime and a 7-day total course of Omnicef.  He was started on vancomycin for 10 days for C. difficile prevention.  He did have a fecal impaction with considerable constipation with overflow during his visit and was started on a twice daily stool softener as well as daily MiraLAX.    This is a 41-year-old male with a history of MS currently in a nursing facility due to significant neurologic deficits who is here with fever, nausea and vomiting, and foul-smelling diarrhea for the past day.  He does have a history of C. difficile and was recently exposed to antibiotics for urinary tract infection; patient has a suprapubic catheter in place.  Patient does not have full sensation so difficult to determine if he has abdominal tenderness but his abdomen is soft.  He is tachycardic and febrile.  Blood pressure and oxygen are normal.  He appears dehydrated and  ill.    Differential diagnosis includes, but is not limited to, C. difficile, UTI, pneumonia, viral syndrome/gastroenteritis, intra-abdominal infection.    IV fluids started per sepsis protocol.  Patient given a dose of Tylenol for fever.    CBC demonstrates leukocytosis to 14.1 with neutrophilic predominance.  Metabolic panel reveals mild hyponatremia 132 with a potassium of 3.5.  Blood glucose is 122.  Lactic acid is normal.  He has normal renal and liver function.  Urinalysis demonstrates yeast as well as bacteria and trace leukocyte esterase but no nitrites.  Procalcitonin is elevated to 0.84.    Chest x-ray is without acute abnormality.    CT of the abdomen/pelvis was performed as I am unable to get a good exam given his multiple sclerosis leading to decreased sensation in the truncal region.  I do have concern for potential intra-abdominal source.  CT demonstrates massive quantity of stool in the rectum compatible with severe fecal impaction.  Also noted were areas of low density in the bilateral kidneys consistent with pyelonephritis.  Bladder wall thickening was felt to represent changes of cystitis.  Atherosclerosis and atelectasis were noted incidentally.    Patient was started on Zosyn for pyelonephritis and cystitis.  The decision to start Zosyn was made in conjunction with pharmacy to decrease risk for C. difficile.  Patient is already on oral vancomycin.  I do have a stool culture pending.  I did attempt a manual disimpaction but was unable to reach any stool whatsoever disimpact.    I spoke with our on-call hospitalist who is asked that I contact general surgery.  I spoke with Dr. Conroy, general surgery, who recommended MiraLAX, enemas, and GI consultation.  General surgery can be reconsulted if these interventions are not fruitful.    Patient was subsequently admitted in guarded condition.    Patient is critically ill.   The patient continues to have: Sepsis  The vital organ system that is affected  is the: All are at risk  If untreated there is a high chance of deterioration into: Septic shock, respiratory arrest, cardiac arrest, and eventually death.   The critical care that I am providing today is: Review of medical records, initial bedside evaluation resuscitation, interpretation of lab and imaging results, discussion with multiple consultants, frequent and multiple bedside reevaluations, and preparation of the medical record.  The critical that has been undertaken is medically complex.   There has been no overlap in critical care time.   Critical Care Time not including procedures: 35 minutes    HYDRATION  HYDRATION: Based on the patient's presentation of Sepsis the patient was given IV fluids. IV Hydration was used because oral hydration was not adequate alone. Upon recheck following hydration, the patient was Improved.    FINAL IMPRESSION  1.  Sepsis  2.  Fecal impaction  3.  Pyelonephritis    Electronically signed by: Yo Forrester M.D., 11/18/2021 5:01 PM

## 2021-11-19 NOTE — H&P
Hospital Medicine History & Physical Note    Date of Service  11/19/2021    Primary Care Physician  Janet Reddy M.D.    Consultants  None    Specialist Names: N/a    Code Status  DNAR/DNI has POLST    Chief Complaint  Chief Complaint   Patient presents with   • Fever     today   • Diarrhea     today   • N/V     since yesterday       History of Presenting Illness  Jerzy Juan III is a 41 y.o. male with h/o MS (lives in SNF) c/b neurogenic bladder with chronic matias, recurrent infections, c diff requiring fecal transplant who presented 11/18/2021 with fevers, diarrhea and nausea/vomiting.    Patient recently admitted to [facility] on 10/23 for UTI and underwent cystoscopy for bladder stones s/p cystolitholapaxy 10/28, ucx ended up growing proteus, providencia and staph and treated with 7 days of omnicef (and po vanc for prophylaxis).  He had fecal impaction during this hospitalization and was treated with enemas and miralax.      After discharge patient was improving, appetite came back and doing well.  However yesterday developed nausea/vomiting and then today developed diarrhea and fevers.  Mom reports he's given stool softners at SNF and if no BM in 3 days then typically they do suppository.      In ED, patient was febrile to 38.2, tachycardic to the 130s, normal respiratory rate, normal blood pressure and normal oxygen saturation on room air.  Labs remarkable for WBC 14 with a left shift, sodium 132, potassium 3.5, lactic acid 1.5, normal liver tests, normal creatinine, procalcitonin 0.84.  Patient was given a dose of Zosyn in the setting of recurrent C. difficile.  CT abdomen pelvis showed massive quantity of stool in the rectum, appearance compatible with severe fecal impaction.  Irregular area of low density in the bilateral kidneys, appearance favors pyelonephritis.  Bladder wall thickening with nondependent air within the bladder.  Linear densities in bilateral lung bases favor atelectasis.  ER  physician attempted to manually disimpact stool however was unable to reach any stool (besides loose stool coming around the impaction).    I discussed the plan of care with patient, family and ERP.    Review of Systems  Review of Systems   Constitutional: Positive for chills, fever and malaise/fatigue.   HENT: Negative for congestion and sore throat.    Eyes: Positive for blurred vision (worse than normal). Negative for discharge and redness.   Respiratory: Negative for cough and shortness of breath.    Cardiovascular: Negative for chest pain and leg swelling.   Gastrointestinal: Positive for constipation, diarrhea, nausea and vomiting. Negative for blood in stool and melena.   Genitourinary: Negative for hematuria.   Musculoskeletal: Negative for falls.   Skin: Negative for rash.   Neurological: Positive for headaches.   Psychiatric/Behavioral: Negative for substance abuse.       Past Medical History   has a past medical history of Anxiety, Back pain, Blood transfusion, Bowel habit changes, Breath shortness, Dental disorder, Depression, Eyes sensitive to light, Fall, Headache(784.0), Heart burn, Heat intolerance, Hypertension, Indigestion, MS (multiple sclerosis) ([facility]) (DX 8/20/2013), Multiple sclerosis ([facility]) (2013), Pain (06/20/2018), Presence of intrathecal baclofen pump, Urinary bladder disorder, and Urinary incontinence.    Surgical History   has a past surgical history that includes open reduction (Left, 1988); gastroscopy (1/14/2017); block epidural steroid injection (N/A, 3/27/2018); cystoscopy (5/7/2018); lasertripsy (5/7/2018); pump insert/remove (6/26/2018); pr cystourethroscopy (N/A, 10/28/2021); and lasertripsy (N/A, 10/28/2021).     Family History  family history includes Hypertension in his father and mother; Psychiatric Illness in his father; Thyroid in his mother.   Family history reviewed with patient. There is no family history that is pertinent to the chief complaint.     Social History    reports that he quit smoking about 8 years ago. He started smoking about 24 years ago. He has a 20.00 pack-year smoking history. He has never used smokeless tobacco. He reports current alcohol use. He reports previous drug use. Drug: Inhaled.    Allergies  Allergies   Allergen Reactions   • Asa [Aspirin] Unspecified     Bleeding in stomach as a baby   • Gabapentin Anxiety     anger and severe mood swing   • Hydrocodone Nausea   • Methocarbamol      emesis   • Morphine Nausea   • Pregabalin Anxiety     Anger and severe mood swing       Medications  Prior to Admission Medications   Prescriptions Last Dose Informant Patient Reported? Taking?   Acetaminophen (TYLENOL) 325 MG Cap 11/17/2021 at Unknown time MAR from Other Facility Yes No   Sig: Take 650 mg by mouth every 6 hours as needed. Indications: Fever   Ascorbic Acid (VITAMIN C) 1000 MG Tab 11/18/2021 at 0800 MAR from Other Facility Yes No   Sig: Take 1,000 mg by mouth every day.   Cladribine, 10 Tabs, (MAVENCLAD, 10 TABS,) 10 MG Tablet Therapy Pack 11/18/2021 at 0500  Yes Yes   Sig: As directed   Pain Pump (PATIENT SUPPLIED) XX CHAO unknown at Unknown time Other Facility Yes No   Sig: Inject 1 Each as directed continuous. Patient's Pain Pump (placed and maintained as an outpatient)    Medications/concentrations:   BACLOFEN (2,250.0mcg/ml)  KETAMINE (121.2mcg/ml)    Last changed: 9/28/2021   Refill pump before date: 11/30/2021    Continuous infusion rates (Drug/Rate):  BACLOFEN 1,019.0mcg (42.5 mcg/hr)  KETAMINE 54.89 mcg (2.29 mcg/hr)     myPT setup:  Primary Drug:  BACLOFEN 49.9mcg  KETAMINE 2.69 mcg    Maximum daily dose:  BACLOFEN 1,310.1 mcg (+ 28.6%)  KETAMINE 70.57 mcg       Patient activation lockout interval: 1 hr  Maximum activations per day: 6/day    MD office: Dr. Gomez's office   Phone number: [phone]   Probiotic Product (PROBIOTIC PO) 11/18/2021 at 0800 MAR from Other Facility Yes No   Sig: Take 1 Capsule by mouth every day.   baclofen (LIORESAL)  10 MG Tab 11/18/2021 at 1200 MAR from Other Facility Yes No   Sig: Take 10 mg by mouth 4 times a day.   buPROPion SR (WELLBUTRIN-SR) 100 MG TABLET SR 12 HR 11/18/2021 at 1200 MAR from Other Facility Yes No   Sig: Take 100 mg by mouth in the morning, at noon, and at bedtime.   cefTRIAXone 1 g/10 mL in sterile water 11/18/2021 at 1200  Yes Yes   Sig: Infuse  into a venous catheter every 24 hours. 100 ml   For 5 days   cefdinir (OMNICEF) 300 MG Cap complete at 11/10/21  Yes Yes   Sig: Take 300 mg by mouth 2 times a day. Completed 10 days on 11/10/21   famotidine (PEPCID) 20 MG Tab 11/18/2021 at 0800 MAR from Other Facility Yes No   Sig: Take 20 mg by mouth 2 times a day.   lactobacillus rhamnosus (CULTURELLE) Cap capsule 11/18/2021 at 0800  No No   Sig: Take 1 Capsule by mouth every morning with breakfast.   lidocaine (LIDODERM) 5 % Patch 11/18/2021 at 0800 MAR from Other Facility Yes No   Sig: Place 1 Patch on the skin every 24 hours. Per MAR apply's on right shoulder   methenamine hip (HIPPREX) 1 GM Tab 11/18/2021 at 0800 MAR from Other Facility Yes No   Sig: Take 1 g by mouth 2 times a day.   multivitamin (THERAGRAN) Tab 11/18/2021 at 0800 MAR from Other Facility Yes No   Sig: Take 1 Tablet by mouth every day.   ondansetron (ZOFRAN ODT) 4 MG TABLET DISPERSIBLE 11/17/2021 at Unknown time MAR from Other Facility Yes No   Sig: Take 4 mg by mouth every four hours as needed for Nausea.   oxybutynin SR (DITROPAN-XL) 10 MG CR tablet 11/18/2021 at 1230 MAR from Other Facility Yes No   Sig: Take 10 mg by mouth in the morning, at noon, and at bedtime.   sertraline (ZOLOFT) 25 MG tablet 11/18/2021 at 0800 MAR from Other Facility Yes No   Sig: Take 25 mg by mouth every day.   vancomycin (VANCOCIN) 125 MG capsule complete at 11/11/21  Yes Yes   Sig: Take 125 mg by mouth every day. Completed 10 days on 11/11/21   vitamin D (CHOLECALCIFEROL) 1000 UNIT Tab 11/18/2021 at 0800 MAR from Other Facility Yes No   Sig: Take 1,000 Units by  mouth every day.      Facility-Administered Medications: None       Physical Exam  Temp:  [37.7 °C (99.8 °F)-38.2 °C (100.8 °F)] 37.7 °C (99.8 °F)  Pulse:  [117-136] 121  Resp:  [13-21] 15  BP: (107-142)/(69-90) 114/78  SpO2:  [91 %-99 %] 95 %  Blood Pressure: 114/78   Temperature: 37.7 °C (99.8 °F)   Pulse: (!) 121   Respiration: 15   Pulse Oximetry: 95 %       Physical Exam  Vitals reviewed.   Constitutional:       General: He is not in acute distress.     Appearance: He is cachectic. He is ill-appearing. He is not diaphoretic.   HENT:      Head: Normocephalic and atraumatic.      Nose: Nose normal.      Mouth/Throat:      Mouth: Mucous membranes are dry.   Eyes:      General: No scleral icterus.     Conjunctiva/sclera: Conjunctivae normal.   Cardiovascular:      Rate and Rhythm: Regular rhythm. Tachycardia present.      Heart sounds: No murmur heard.  No friction rub. No gallop.    Pulmonary:      Effort: Pulmonary effort is normal.      Breath sounds: Rales present. No wheezing or rhonchi.   Abdominal:      General: Abdomen is flat. Bowel sounds are normal. There is no distension.      Palpations: Abdomen is soft.      Tenderness: There is no abdominal tenderness.      Comments: Pain pump in place, lower abd   Musculoskeletal:      Right lower leg: No edema.      Left lower leg: No edema.   Skin:     Coloration: Skin is not jaundiced.      Findings: No rash.   Neurological:      Mental Status: He is oriented to person, place, and time.   Psychiatric:         Mood and Affect: Mood normal.         Behavior: Behavior normal.         Laboratory:  Recent Labs     11/18/21  1646   WBC 14.1*   RBC 4.85   HEMOGLOBIN 14.4   HEMATOCRIT 43.7   MCV 90.1   MCH 29.7   MCHC 33.0*   RDW 50.3*   PLATELETCT 257   MPV 8.7*     Recent Labs     11/18/21  1646   SODIUM 132*   POTASSIUM 3.5*   CHLORIDE 99   CO2 22   GLUCOSE 122*   BUN 18   CREATININE 0.62   CALCIUM 8.4     Recent Labs     11/18/21  1646   ALTSGPT 15   ASTSGOT 27    ALKPHOSPHAT 69   TBILIRUBIN 0.7   GLUCOSE 122*         No results for input(s): NTPROBNP in the last 72 hours.      No results for input(s): TROPONINT in the last 72 hours.    Imaging:  CT-ABDOMEN-PELVIS WITH   Final Result         1.  Massive quantity of stool in the rectum, appearance compatible with severe fecal impaction.   2.  Irregular areas of low-density in the bilateral kidneys, appearance favors pyelonephritis, correlate with urinalysis.   3.  Bladder wall thickening with nondependent air within the bladder, could represent changes of cystitis.   4.  Linear densities the bilateral lung bases favor changes of atelectasis   5.  Atherosclerosis      DX-CHEST-PORTABLE (1 VIEW)   Final Result      No evidence of acute cardiopulmonary process.          X-Ray:  I have personally reviewed the images and compared with prior images.  CT AP:  I have personally reviewed the images and compared with prior images.    Assessment/Plan:  I anticipate this patient will require at least two midnights for appropriate medical management, necessitating inpatient admission.    Fecal impaction (HCC)- (present on admission)  Assessment & Plan  Very large fecal impaction  ERP unable to manual disimpact  We will trial enemas as well as oral bowel regimen, recommend reimage (via x-ray is probably fine) to make sure making progress otherwise likely need to consult GI    Neurogenic bladder- (present on admission)  Assessment & Plan  Suprapubic catheter in place  Likely needs to be replaced in setting of UTI      Spasticity- (present on admission)  Assessment & Plan  Continue home baclofen, monitor mental status    H/O Clostridium difficile infection- (present on admission)  Assessment & Plan  Recurrent C Diff  On oral vancomycin from last abx course  Will continue oral vancomycin while on antibiotics  c diff pending    Sepsis (HCC)- (present on admission)  Assessment & Plan  This is Sepsis Present on admission  SIRS criteria  identified on my evaluation include: Fever, with temperature greater than 101 deg F, Tachycardia, with heart rate greater than 90 BPM and Leukocytosis, with WBC greater than 12,000  Source is Urine  Sepsis protocol initiated  Fluid resuscitation ordered per protocol  IV antibiotics as appropriate for source of sepsis  While organ dysfunction may be noted elsewhere in this problem list or in the chart, degree of organ dysfunction does not meet CMS criteria for severe sepsis    CT AP performed: evidence of pyelo  Bcx pending  Ucx pending  Cefepime (per last admission culture data)      MS (multiple sclerosis) ([facility])- (present on admission)  Assessment & Plan  Diagnosed 2013  Spastic paralysis, neurogenic bladder, neurogenic colon  Lives in SNF  Mother very involved      VTE prophylaxis: enoxaparin ppx

## 2021-11-19 NOTE — ASSESSMENT & PLAN NOTE
Hypomagnesemia secondary to diarrhea and fecal magnesium loss.  Initially given 4 g of IV magnesium  Repeat magnesium level pending

## 2021-11-19 NOTE — ASSESSMENT & PLAN NOTE
Suprapubic catheter was evaluated and is positive for corynebacterium as well as Candida but both of these are less than 50,000 colony-forming units per mL no indication at this point in time for antibiotic bacterial or antifungal treatment

## 2021-11-19 NOTE — ASSESSMENT & PLAN NOTE
Due to multiple sclerosis patient has chronic spasticity to relieve the spasticity patient has spasmolytic management with baclofen

## 2021-11-19 NOTE — ASSESSMENT & PLAN NOTE
Sepsis at this point has resolved.  Vital signs at this point back to normal.  C. difficile colitis from stool positive  Candida and Corynebacterium positive from the chronic [place] catheter that with his suprapubic.  Per ID this is a contamination no further treatment needed

## 2021-11-19 NOTE — PROGRESS NOTES
Connie from lab called to report that patient stool is positive for Cdiff toxin    Lab result read back to Connie SHAH notified via voalte.

## 2021-11-19 NOTE — DIETARY
Nutrition services: Day 0 of admit.  Jerzy Juan III is a 41 y.o. male with admitting DX of Sepsis.  Consult received for BMI <19.    Assessment:  Weight: 55 kg (121 lb 4.1 oz)  Body mass index is 16.91 kg/m²., BMI classification: Underweight  Diet/Intake: NPO    Evaluation:   1. Pt seen for BMI <19. Pt states he has lost weight. Pt states he has weighed about 121 lb over past month and believes he is 118 lb. Current weight, however, is 121 lb. Per chart review, weight on 10/23/21 = 57.6 kg, but this was a stated weight.  2. Observed muscle wasting at temple. Unable to determine if muscle wasting related to nutrition status or related to disease progression as pt does have chronic MS.  3. Pt does report poor appetite. Currently NPO. Pt with fecal impaction as well as C.Diff.  4. No pressure injuries per chart review.    Malnutrition Risk: Unable to meet criteria at this time.    Recommendations/Plan:  1. Diet advancement per MD.  2. Consider addition of oral nutrition supplements as needed once diet advanced.   3. Monitor weight.    RD following

## 2021-11-19 NOTE — ASSESSMENT & PLAN NOTE
MS first diagnosed in 2013  Since then has been a chronically debilitating course  With neurogenic bladder patient is a suprapubic catheter  Continue with antispasmodic management and pain management.  Currently patient is unable to ambulate due to lower extremity weakness  Upper extremity still has some motor function left but weakness also present there.

## 2021-11-19 NOTE — PROGRESS NOTES
Patient arrived to floor via ER [facility]. Assisted with transfer from [place] to bed utilizing slide board.  Admission profile and assessment complete. Two RN Skin check complete. Waffle mat placed under patient and pillows in place for positioning. Patient was incontinent of stool upon arrival, patient was cleaned up and barrier cream applied to sacrum. Patient A&O4. White board has been updated. Safety precautions in place and call light in reach. No needs at this time.      COVID 19 surge in effect

## 2021-11-19 NOTE — ASSESSMENT & PLAN NOTE
Due to recurrence of this C. difficile colitis the patient will need oral vancomycin every 6 hours for a prolonged course

## 2021-11-19 NOTE — ED NOTES
Report to Sada SIMON.     Mom informed of the visitor's hours at 0800, verbalizes understanding.    LR infusing upon transfer to floor.      Cellphone, water bottle, , and pillows with patient.      Patient incontinent of stool and barrier cream applied, turn at patient's request.

## 2021-11-19 NOTE — ED NOTES
Rounded on patient; 1st antibiotic done, 2nd IV antibiotic infusing.  Patient incontinent of water brownish stool.  Patient cleaned with barrier applied and repositioned on the gurney.     Admit orders obtained and pending a bed assignment

## 2021-11-19 NOTE — ED NOTES
Pt to er via [facility] from [facility] where he has been x 2 years due to MS-here today for fever x 24 hrs. Per med rec from same. Pt recvd tylenol last night ar 2310  With blood cultures and cxr today. Pt has supra pubic cath in place. Is alert/oriented, hx also of chronic pain

## 2021-11-19 NOTE — ED NOTES
erp to bedside-stool and urine spec collected and to lab. Pt with freq foul smelling diarrhea. Ice chips po per erp

## 2021-11-19 NOTE — PROGRESS NOTES
Hospital Medicine Daily Progress Note    Date of Service  11/19/2021    Chief Complaint  Jerzy Juan III is a 41 y.o. male admitted 11/18/2021 with abdominal pain    Hospital Course  Jerzy Juan III is a 41 y.o. male who presents to us from a group home with abdominal pain and distention.  The patient has chronic MS and thus has chronic contractures.  He has a suprapubic catheter in place.  Patient's abdominal pain is accompanied by diarrhea.  He has a history of conservative differential colitis.  Initially presentation shows sepsis with elevated white blood cell count tachycardia and hypotension.  The patient was thus placed on fluid resuscitation and antibiotic management.  Given the fact he has history of C. difficile colitis oral vancomycin was started.  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  Infectious disease has been consulted.  At this point patient will be continued on aggressive rehydration and antibiotic management.    Interval Problem Update  Continue with fluid resuscitation  Continue with oral vancomycin  ID input appreciated  Replace magnesium  Replace potassium  Optimize pain management  Optimize spasticity management continue with suprapubic catheter care    I have personally seen and examined the patient at bedside. I discussed the plan of care with patient, bedside RN, charge RN,  and pharmacy.    Consultants/Specialty  infectious disease    Code Status  DNAR/DNI    Disposition  Patient is not medically cleared.   Anticipate discharge to Group home.  I have placed the appropriate orders for post-discharge needs.    Review of Systems  Review of Systems   Unable to perform ROS: Acuity of condition        Physical Exam  Temp:  [36.6 °C (97.9 °F)-38.3 °C (100.9 °F)] 37.6 °C (99.6 °F)  Pulse:  [117-136] 119  Resp:  [13-21] 18  BP: (107-143)/(69-93) 135/87  SpO2:  [91 %-99 %] 93 %    Physical Exam  Vitals  and nursing note reviewed. Exam conducted with a chaperone present.   Constitutional:       General: He is not in acute distress.     Appearance: He is well-developed and underweight. He is ill-appearing. He is not toxic-appearing or diaphoretic.   HENT:      Head: Normocephalic and atraumatic.      Right Ear: External ear normal.      Left Ear: External ear normal.      Nose: Nose normal.      Mouth/Throat:      Mouth: Mucous membranes are moist.      Pharynx: Oropharynx is clear.   Eyes:      Extraocular Movements: Extraocular movements intact.      Conjunctiva/sclera: Conjunctivae normal.      Pupils: Pupils are equal, round, and reactive to light.   Neck:      Thyroid: No thyromegaly.      Vascular: No JVD.   Cardiovascular:      Rate and Rhythm: Normal rate and regular rhythm.      Pulses: Normal pulses.      Heart sounds: Normal heart sounds.   Pulmonary:      Effort: Pulmonary effort is normal.      Breath sounds: Normal breath sounds.   Chest:      Chest wall: No tenderness.   Abdominal:      General: Abdomen is flat. Bowel sounds are normal. There is distension.      Palpations: Abdomen is soft. There is no mass.      Tenderness: There is abdominal tenderness. There is no guarding or rebound.   Musculoskeletal:         General: Normal range of motion.      Cervical back: Normal range of motion and neck supple.      Right lower leg: No edema.      Left lower leg: No edema.   Lymphadenopathy:      Cervical: No cervical adenopathy.   Skin:     General: Skin is warm and dry.      Capillary Refill: Capillary refill takes less than 2 seconds.      Findings: No rash.   Neurological:      Mental Status: He is alert. Mental status is at baseline. He is disoriented.      GCS: GCS eye subscore is 4. GCS verbal subscore is 5. GCS motor subscore is 6.      Deep Tendon Reflexes: Reflexes are normal and symmetric.   Psychiatric:         Attention and Perception: He is inattentive.         Mood and Affect: Mood normal.  Affect is blunt and flat.         Speech: Speech is delayed and slurred.         Behavior: Behavior is slowed and withdrawn. Behavior is cooperative.         Thought Content: Thought content normal.         Cognition and Memory: Cognition is impaired. Memory is impaired.         Judgment: Judgment is impulsive.         Fluids    Intake/Output Summary (Last 24 hours) at 11/19/2021 1425  Last data filed at 11/19/2021 0015  Gross per 24 hour   Intake 1816 ml   Output --   Net 1816 ml       Laboratory  Recent Labs     11/18/21  1646 11/19/21  0357   WBC 14.1* 10.4   RBC 4.85 4.69*   HEMOGLOBIN 14.4 13.9*   HEMATOCRIT 43.7 42.2   MCV 90.1 90.0   MCH 29.7 29.6   MCHC 33.0* 32.9*   RDW 50.3* 49.1   PLATELETCT 257 244   MPV 8.7* 8.9*     Recent Labs     11/18/21  1646 11/19/21  0357   SODIUM 132* 135   POTASSIUM 3.5* 3.3*   CHLORIDE 99 103   CO2 22 22   GLUCOSE 122* 132*   BUN 18 16   CREATININE 0.62 0.53   CALCIUM 8.4 8.3*                   Imaging  CT-ABDOMEN-PELVIS WITH   Final Result         1.  Massive quantity of stool in the rectum, appearance compatible with severe fecal impaction.   2.  Irregular areas of low-density in the bilateral kidneys, appearance favors pyelonephritis, correlate with urinalysis.   3.  Bladder wall thickening with nondependent air within the bladder, could represent changes of cystitis.   4.  Linear densities the bilateral lung bases favor changes of atelectasis   5.  Atherosclerosis      DX-CHEST-PORTABLE (1 VIEW)   Final Result      No evidence of acute cardiopulmonary process.           Assessment/Plan  H/O Clostridium difficile infection- (present on admission)  Assessment & Plan  Repeat C. difficile toxin is positive yes  Infectious diseases consulted and they are recommending at this point continuation of oral vancomycin.  Continue with fluid resuscitation  Monitor electrolytes and supplement as necessary  Magnesium is low and will be supplemented in the IV form  Potassium is low and  will be supplemented in the IV form    Sepsis (HCC)- (present on admission)  Assessment & Plan  This is Sepsis Present on admission  SIRS criteria identified on my evaluation include: Fever, with temperature greater than 101 deg F, Tachycardia, with heart rate greater than 90 BPM and Leukocytosis, with WBC greater than 12,000  Source is Urine  Sepsis protocol initiated  Fluid resuscitation ordered per protocol  IV antibiotics as appropriate for source of sepsis  While organ dysfunction may be noted elsewhere in this problem list or in the chart, degree of organ dysfunction does not meet CMS criteria for severe sepsis  Sepsis seems to be secondary to C. difficile colitis which is recurrent.  Will contact infectious disease and continue at this point with oral vancomycin  In the past patient has had a stool transfer as well as oral antibiotic management.  Patient currently having active diarrhea  White blood cell count is up  Lactic acid level will be monitored  Fluid resuscitation will be provided.      Fecal impaction ([facility])- (present on admission)  Assessment & Plan  Fecal impaction at this point which should be relieved with ongoing diarrhea from C. difficile.    MS (multiple sclerosis) ([facility])- (present on admission)  Assessment & Plan  Chronic MS since 2013  Continue at this point with Epperson catheter for neurogenic bladder  Neurogenic: Continue at this point with monitoring of bowel protocol currently having diarrhea previous to this was impacted with stool  Continue with spasticity management    Hypomagnesemia  Assessment & Plan  Give magnesium supplementation, 4 g of IV magnesium currently 1.6  With ongoing diarrhea anticipate this will drop again.    Hypokalemia- (present on admission)  Assessment & Plan  Potassium at 3.3 with ongoing diarrhea this will get worse thus at this point I am giving him 40 mg of IV potassium    Neurogenic bladder- (present on admission)  Assessment & Plan  Chronic suprapubic catheter  in place  Urine analysis shows budding yeast but this is chronic.  Discussed with ID no treatment at this point necessary      Spasticity- (present on admission)  Assessment & Plan  Continue with baclofen to alleviate spasticity         VTE prophylaxis: SCDs/TEDs    I have performed a physical exam and reviewed and updated ROS and Plan today (11/19/2021). In review of yesterday's note (11/18/2021), there are no changes except as documented above.

## 2021-11-19 NOTE — PROGRESS NOTES
4 Eyes Skin Assessment Completed by Sada, AURORA and AURORA Landry.    Head WDL  Ears WDL  Nose WDL  Mouth WDL  Neck WDL  Breast/Chest WDL  Shoulder Blades WDL  Spine WDL  (R) Arm/Elbow/Hand tattoos  (L) Arm/Elbow/Hand tattoos  Abdomen Redness around suprapubic cath  Groin Redness  Scrotum/Coccyx/Buttocks Redness and Excoriation  (R) Leg WDL  (L) Leg WDL  (R) Heel/Foot/Toe WDL  (L) Heel/Foot/Toe WDL          Devices In Places Pulse Ox and Epperson(suprapubic, present on admission) PIV      Interventions In Place Waffle Overlay, Pillows, Q2 Turns, Barrier Cream, Heels Loaded W/Pillows and Pressure Redistribution Mattress    Possible Skin Injury No    Pictures Uploaded Into Epic N/A  Wound Consult Placed N/A  RN Wound Prevention Protocol Ordered No

## 2021-11-19 NOTE — ED NOTES
"Pt resting quietly on side, ivf infusing, mariama care/check deferred at this time per mom request \"this is the most quiet and comfortable he has been\"  "

## 2021-11-19 NOTE — ED NOTES
Pt rounding upon return from ct-complete linin chg with mariama care for large soft runny brown stool, ice chips po

## 2021-11-19 NOTE — ED NOTES
Results noted by erp-to bedside for attempt at disimpaction with rn assist. Pt incon large amt grainy brown soft stool-complete linin chg repeated with application of barrier creme at thistime

## 2021-11-19 NOTE — CARE PLAN
Problem: Pain - Standard  Goal: Alleviation of pain or a reduction in pain to the patient’s comfort goal  Outcome: Progressing     Problem: Fluid Volume  Goal: Fluid volume balance will be maintained  Outcome: Progressing   The patient is Stable - Low risk of patient condition declining or worsening    Shift Goals  Clinical Goals: turns, skin cares, monitor stools  Patient Goals: rest, pain control,   Family Goals: turns, skin impairment prevention     Progress made toward(s) clinical / shift goals:  educate for turns, complete turns every 2 hours, monitor for skin impairments.     Patient is not progressing towards the following goals:

## 2021-11-19 NOTE — DISCHARGE PLANNING
ANTICIPATED DISCHARGE DISPOSITION: [facility]    ACTION: Chart reviewed. Patient discussed in IDT rounds. CDIFF positive; pending ID consult.     BARRIERS TO DISCHARGE: Medical clearance.    PLAN: Anticipate discharge back to SNF once medically cleared.

## 2021-11-20 NOTE — CONSULTS
Consults   INFECTIOUS DISEASES INPATIENT CONSULT NOTE     Date of Service: 11/20/2021    Consult Requested By: Kathi Cavazos M.D.    Reason for Consultation: Recurrent C. difficile infection    History of Present Illness:   Jerzy Juan III is a 41 y.o.  admitted 11/18/2021. Pt has a past medical history of multiple sclerosis complicated by neurogenic bladder with chronic Epperson, bladder stone and is status post cystolitholopaxy on 10/28 and suprapubic catheter urine culture at the time grew Proteus, Providencia seciues here and MSSA.  He was given an antibiotic course, reported as cefdinir.  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  He presented complaining of fevers, frequent watery stools as well as nausea and vomiting.     Hospital Course:   Patient febrile to 100.9, other vitals unremarkable.  Leukocytosis to 14.1 and now improving.  CT abdomen and pelvis on 11/18 with massive quantities stool in the rectum, severe fecal impaction.  Also notes irregular areas of low density in the kidneys favoring pyelonephritis bladder wall thickening potentially representing cystitis.  C. difficile tested and toxin positive on 11/18.  Patient went to the OR on 11/19 for manual stool disimpaction.  The patient was given Zosyn and has been continued on cefepime as well as p.o. vancomycin    Review Of Systems:  Review of Systems   Constitutional: Negative for chills, fever and malaise/fatigue.   HENT: Negative for hearing loss.    Eyes: Negative for blurred vision.   Respiratory: Negative for cough and shortness of breath.    Cardiovascular: Negative for chest pain.   Gastrointestinal: Positive for abdominal pain and diarrhea. Negative for constipation, nausea and vomiting.   Genitourinary: Negative for dysuria.   Musculoskeletal: Positive for myalgias.   Skin: Negative for rash.   Neurological: Positive for focal weakness and weakness.   Psychiatric/Behavioral: The patient is not  "nervous/anxious.          PMH:   Past Medical History:   Diagnosis Date   • Anxiety    • Back pain    • Blood transfusion    • Bowel habit changes     constipation   • Breath shortness     \"r/t to medication Ocrevus and Baclofen\" \"side effect   • Dental disorder     poor dentation   • Depression     PTSD/clinical depression   • Eyes sensitive to light     Needs to wear sunglasses   • Fall     4/6/2016   • Headache(784.0)    • Heart burn     reflux   • Heat intolerance     will vomit   • Hypertension    • Indigestion    • MS (multiple sclerosis) ([facility]) DX 8/20/2013    multiple lesions brain/spine   • Multiple sclerosis ([facility]) 2013   • Pain 06/20/2018    \"everywhere\"   • Presence of intrathecal baclofen pump     right side   • Urinary bladder disorder     UTI/catheter/bladder stone   • Urinary incontinence     catheter in place       PSH:  Past Surgical History:   Procedure Laterality Date   • PB CYSTOURETHROSCOPY N/A 10/28/2021    Procedure: CYSTOSCOPY;  Surgeon: Kwasi Sapp M.D.;  Location: [facility];  Service: Urology   • LASERTRIPSY N/A 10/28/2021    Procedure: LITHOTRIPSY, USING LASER - FOR CYSTOLITHALOPAXY;  Surgeon: Kwasi Sapp M.D.;  Location: [facility];  Service: Urology   • PUMP INSERT/REMOVE  6/26/2018    Procedure: PUMP INSERT/REMOVE - BACLOFEN;  Surgeon: Andrew Gomez M.D.;  Location: [facility];  Service: Pain Management   • CYSTOSCOPY  5/7/2018    Procedure: CYSTOSCOPY;  Surgeon: Kwasi Sapp M.D.;  Location: [facility];  Service: Urology   • LASERTRIPSY  5/7/2018    Procedure: LASERTRIPSY - FOR: LITHOLAPAXY;  Surgeon: Kwasi Sapp M.D.;  Location: [facility];  Service: Urology   • BLOCK EPIDURAL STEROID INJECTION N/A 3/27/2018    Procedure: BLOCK EPIDURAL STEROID INJECTION - BACLOFEN PUMP TRIAL;  Surgeon: Andrew Gomez M.D.;  Location: [facility];  Service: Pain Management   • GASTROSCOPY  1/14/2017    " Procedure: GASTROSCOPY WITH FECAL MATTER TRANSPLANT;  Surgeon: Obinna Douglas M.D.;  Location: [facility];  Service:    • OPEN REDUCTION Left 1988    left thumb       FAMILY HX:  Family History   Problem Relation Age of Onset   • Thyroid Mother    • Hypertension Mother    • Hypertension Father    • Psychiatric Illness Father        SOCIAL HX:  Social History     Socioeconomic History   • Marital status:      Spouse name: Not on file   • Number of children: Not on file   • Years of education: Not on file   • Highest education level: Not on file   Occupational History   • Not on file   Tobacco Use   • Smoking status: Former Smoker     Packs/day: 1.00     Years: 20.00     Pack years: 20.00     Start date: 1997     Quit date: 2013     Years since quittin.3   • Smokeless tobacco: Never Used   Vaping Use   • Vaping Use: Former   Substance and Sexual Activity   • Alcohol use: Yes     Alcohol/week: 0.0 oz   • Drug use: Not Currently     Types: Inhaled     Comment: stopped 2019   • Sexual activity: Not on file     Comment: once daily for 23 yrs   Other Topics Concern   • Not on file   Social History Narrative   • Not on file     Social Determinants of Health     Financial Resource Strain:    • Difficulty of Paying Living Expenses: Not on file   Food Insecurity:    • Worried About Running Out of Food in the Last Year: Not on file   • Ran Out of Food in the Last Year: Not on file   Transportation Needs:    • Lack of Transportation (Medical): Not on file   • Lack of Transportation (Non-Medical): Not on file   Physical Activity:    • Days of Exercise per Week: Not on file   • Minutes of Exercise per Session: Not on file   Stress:    • Feeling of Stress : Not on file   Social Connections:    • Frequency of Communication with Friends and Family: Not on file   • Frequency of Social Gatherings with Friends and Family: Not on file   • Attends [employer] Services: Not on file   • Active Member of  Clubs or Organizations: Not on file   • Attends Club or Organization Meetings: Not on file   • Marital Status: Not on file   Intimate Partner Violence:    • Fear of Current or Ex-Partner: Not on file   • Emotionally Abused: Not on file   • Physically Abused: Not on file   • Sexually Abused: Not on file   Housing Stability:    • Unable to Pay for Housing in the Last Year: Not on file   • Number of Places Lived in the Last Year: Not on file   • Unstable Housing in the Last Year: Not on file     Social History     Tobacco Use   Smoking Status Former Smoker   • Packs/day: 1.00   • Years: 20.00   • Pack years: 20.00   • Start date: 1997   • Quit date: 2013   • Years since quittin.3   Smokeless Tobacco Never Used     Social History     Substance and Sexual Activity   Alcohol Use Yes   • Alcohol/week: 0.0 oz       Allergies/Intolerances:  Allergies   Allergen Reactions   • Asa [Aspirin] Unspecified     Bleeding in stomach as a baby   • Gabapentin Anxiety     anger and severe mood swing   • Hydrocodone Nausea   • Methocarbamol      emesis   • Morphine Nausea   • Pregabalin Anxiety     Anger and severe mood swing       History reviewed with the patient and per chart  Other Current Medications:    Current Facility-Administered Medications:   •  baclofen (LIORESAL) tablet 10 mg, 10 mg, Oral, 4X/DAY, Kathi Cavazos M.D.  •  cefepime (Maxipime) 2 g in  mL IVPB, 2 g, Intravenous, Q8HRS, Kathi Cavazos M.D., Stopped at 21 0538  •  famotidine (PEPCID) tablet 20 mg, 20 mg, Oral, BID, Kathi Cavazos M.D.  •  oxybutynin SR (DITROPAN-XL) tablet 10 mg, 10 mg, Oral, TID, Kathi Cavazos M.D.  •  buPROPion SR (WELLBUTRIN-SR) tablet 100 mg, 100 mg, Oral, TID, Kathi Cavazos M.D.  •  Special Contact Isolation, , , CONTINUOUS **AND** [COMPLETED] C Diff by PCR rflx Toxin, , , Once **AND** Pharmacy Consult Request, 1 Each, Other, PHARMACY TO DOSE, Yo Forrester M.D.  •  lidocaine (LIDODERM) 5 % 1 Patch, 1 Patch,  Transdermal, Q24HRS, Nicky Crawford M.D., 1 Patch at 11/20/21 0508  •  sertraline (Zoloft) tablet 25 mg, 25 mg, Oral, DAILY, Nicky Crawford M.D., 25 mg at 11/19/21 0600  •  senna-docusate (PERICOLACE or SENOKOT S) 8.6-50 MG per tablet 2 Tablet, 2 Tablet, Oral, BID **AND** polyethylene glycol/lytes (MIRALAX) PACKET 1 Packet, 1 Packet, Oral, BID **AND** magnesium hydroxide (MILK OF MAGNESIA) suspension 30 mL, 30 mL, Oral, QDAY PRN **AND** bisacodyl (DULCOLAX) suppository 10 mg, 10 mg, Rectal, QDAY PRN, Nicky Crawford M.D.  •  lactated ringers infusion, , Intravenous, Continuous, Nicky Crawford M.D., Last Rate: 100 mL/hr at 11/19/21 1936, New Bag at 11/19/21 1936  •  enoxaparin (LOVENOX) inj 40 mg, 40 mg, Subcutaneous, DAILY, Nicky Crawford M.D.  •  acetaminophen (Tylenol) tablet 650 mg, 650 mg, Oral, Q6HRS PRN, Nicky Crawford M.D., 650 mg at 11/19/21 0252  •  Notify provider if pain remains uncontrolled, , , CONTINUOUS **AND** Use the Numeric Rating Scale (NRS), Mujica-Baker Faces (WBF), or FLACC on regular floors and Critical-Care Pain Observation Tool (CPOT) on ICUs/Trauma to assess pain, , , CONTINUOUS **AND** Pulse Ox, , , CONTINUOUS **AND** Pharmacy Consult Request ...Pain Management Review 1 Each, 1 Each, Other, PHARMACY TO DOSE **AND** If patient difficult to arouse and/or has respiratory depression (respiratory rate of 10 or less), stop any opiates that are currently infusing and call a Rapid Response., , , CONTINUOUS, Nicky Crawford M.D.  •  oxyCODONE immediate-release (ROXICODONE) tablet 2.5 mg, 2.5 mg, Oral, Q3HRS PRN **OR** oxyCODONE immediate-release (ROXICODONE) tablet 5 mg, 5 mg, Oral, Q3HRS PRN **OR** HYDROmorphone (Dilaudid) injection 0.25 mg, 0.25 mg, Intravenous, Q3HRS PRN, Nicky Crawford M.D.  •  ondansetron (ZOFRAN) syringe/vial injection 4 mg, 4 mg, Intravenous, Q4HRS PRN, Nicky Crawford M.D., 4 mg at 11/19/21 1945  •  ondansetron (ZOFRAN ODT) dispertab 4 mg, 4  mg, Oral, Q4HRS PRN, Nicky Crawford M.D.  •  promethazine (PHENERGAN) tablet 12.5-25 mg, 12.5-25 mg, Oral, Q4HRS PRN, Nicky Crawford M.D.  •  promethazine (PHENERGAN) suppository 12.5-25 mg, 12.5-25 mg, Rectal, Q4HRS PRN, Nicky Crawford M.D.  •  prochlorperazine (COMPAZINE) injection 5-10 mg, 5-10 mg, Intravenous, Q4HRS PRN, Nicky Crawford M.D.  •  vancomycin 50 mg/mL oral soln 125 mg, 125 mg, Oral, DAILY, Nicky Crawford M.D., 125 mg at 21 0518    Facility-Administered Medications Ordered in Other Encounters:   •  midazolam (VERSED) injection, , Intravenous, PRN, John Van M.D., 2 mg at 21  •  dexamethasone (DECADRON) injection, , Intravenous, PRN, John Van M.D., 4 mg at 21  •  ondansetron (ZOFRAN) syringe/vial injection, , Intravenous, PRN, John Van M.D., 4 mg at 21  •  phenylephrine (CLARISSA-SYNEPHRINE) 100 mcg/mL inj (IV Push Syringe), , Intravenous, PRN, John Van M.D., 200 mcg at 21  •  fentaNYL (SUBLIMAZE) injection, , Intravenous, PRN, John Van M.D., 50 mcg at 21  [unfilled]    Most Recent Vital Signs:  /64   Pulse 88   Temp 36.9 °C (98.4 °F) (Oral)   Resp 14   Wt 55 kg (121 lb 4.1 oz)   SpO2 95%   BMI 16.91 kg/m²   Temp  Av.2 °C (99 °F)  Min: 36.3 °C (97.4 °F)  Max: 38.3 °C (100.9 °F)    Physical Exam:  Physical Exam  Constitutional:       Appearance: Normal appearance.   HENT:      Head: Atraumatic.      Right Ear: External ear normal.      Left Ear: External ear normal.      Nose: Nose normal.      Mouth/Throat:      Mouth: Mucous membranes are moist.      Pharynx: Oropharynx is clear.   Eyes:      Extraocular Movements: Extraocular movements intact.      Conjunctiva/sclera: Conjunctivae normal.      Pupils: Pupils are equal, round, and reactive to light.   Cardiovascular:      Rate and Rhythm: Normal rate and regular rhythm.      Pulses: Normal pulses.   Pulmonary:      Effort:  Pulmonary effort is normal.      Breath sounds: Normal breath sounds.   Abdominal:      General: Abdomen is flat. Bowel sounds are normal. There is no distension.      Palpations: Abdomen is soft.      Tenderness: There is no abdominal tenderness. There is no guarding.      Comments: Suprapubic catheter in place, no surrounding erythema or discharge   Musculoskeletal:      Right lower leg: No edema.      Left lower leg: No edema.      Comments: Upper and lower extremity spasticity   Skin:     General: Skin is warm and dry.   Neurological:      Mental Status: He is alert.      Comments: Patient with partial paralysis associated with his MS   Psychiatric:         Mood and Affect: Mood normal.         Behavior: Behavior normal.             Pertinent Lab Results:  Recent Labs     11/18/21 1646 11/19/21  0357   WBC 14.1* 10.4      Recent Labs     11/18/21 1646 11/19/21 0357   HEMOGLOBIN 14.4 13.9*   HEMATOCRIT 43.7 42.2   MCV 90.1 90.0   MCH 29.7 29.6   PLATELETCT 257 244         Recent Labs     11/18/21 1646 11/19/21 0357   SODIUM 132* 135   POTASSIUM 3.5* 3.3*   CHLORIDE 99 103   CO2 22 22   CREATININE 0.62 0.53        Recent Labs     11/18/21 1646 11/19/21 0357   ALBUMIN 3.5 3.1*        Pertinent Micro:  Results     Procedure Component Value Units Date/Time    URINE CULTURE(NEW) [[id number]] Collected: 11/18/21 1725    Order Status: Completed Specimen: Other Updated: 11/19/21 1508     Significant Indicator NEG     Source URSP     Site Suprapubic     Culture Result No growth at 24 hours.     Gram Stain Result 11/19/2021  11:48  Rare WBCs.  Rare Yeast.      Narrative:      Indication for culture:->Evaluation for sepsis without a  clear source of infection  Indication for culture:->Evaluation for sepsis without a    GRAM STAIN [[id number]] Collected: 11/18/21 1725    Order Status: Completed Specimen: Other Updated: 11/19/21 1149     Significant Indicator .     Source URSP     Site Suprapubic     Gram Stain Result  "11/19/2021  11:48  Rare WBCs.  Rare Yeast.      Narrative:      Indication for culture:->Evaluation for sepsis without a  clear source of infection  Indication for culture:->Evaluation for sepsis without a    BLOOD CULTURE [[id number]] Collected: 11/18/21 1757    Order Status: Completed Specimen: Blood from Peripheral Updated: 11/19/21 0727     Significant Indicator NEG     Source BLD     Site PERIPHERAL     Culture Result No Growth  Note: Blood cultures are incubated for 5 days and  are monitored continuously.Positive blood cultures  are called to the RN and reported as soon as  they are identified.  Blood culture testing and Gram stain, if indicated, are  performed at [facility], [facility]  [employer] [place], [place].  Positive blood cultures are  sent to [facility], [address] [place] [address], for organism identification and  susceptibility testing.      Narrative:      Per Hospital Policy: Only change Specimen Src: to \"Line\" if  specified by physician order.  Right AC    BLOOD CULTURE [[id number]] Collected: 11/18/21 1646    Order Status: Completed Specimen: Blood from Peripheral Updated: 11/19/21 0727     Significant Indicator NEG     Source BLD     Site PERIPHERAL     Culture Result No Growth  Note: Blood cultures are incubated for 5 days and  are monitored continuously.Positive blood cultures  are called to the RN and reported as soon as  they are identified.  Blood culture testing and Gram stain, if indicated, are  performed at [facility], [facility]  [employer] [facility].[place], [place].  Positive blood cultures are  sent to [facility], [address] [place] [address], for organism identification and  susceptibility testing.      Narrative:      Per Hospital Policy: Only change Specimen Src: to \"Line\" if  specified by physician order.  Right Forearm/Arm    C Diff Toxin [[id number]]  (Abnormal) Collected: 11/18/21 1729    Order " "Status: Completed Updated: 11/19/21 0548     C.Diff Toxin A&B Positive     Comment: TOXIN POSITIVE  Toxin detected by EIA; C. difficile detected by PCR.  If clinically correlated, treatment indicated per guidelines.  Test of cure is not recommended.         Narrative:      Special Contact Isolation  Does this patient have risk factors for C-diff?->Yes  C-Diff Risk Factors->antibiotic exposure  Has patient taken stool softeners or laxatives in the last 5  days?->No  Indication for CDiff by PCR?->Greater than/equal to 3 stools  in 24 hr & \"takes shape of container\"    C Diff by PCR rflx Toxin [[id number]] Collected: 11/18/21 1729    Order Status: Completed Specimen: Urine from Stool Updated: 11/19/21 0544     C Diff by PCR See Toxin     027-NAP1-BI Presumptive Negative     Comment: Presumptive 027/NAP1/BI target DNA sequences are NOT DETECTED.       Narrative:      Special Contact Isolation  Does this patient have risk factors for C-diff?->Yes  C-Diff Risk Factors->antibiotic exposure  Has patient taken stool softeners or laxatives in the last 5  days?->No  Indication for CDiff by PCR?->Greater than/equal to 3 stools  in 24 hr & \"takes shape of container\"    URINALYSIS [[id number]]  (Abnormal) Collected: 11/18/21 1725    Order Status: Completed Specimen: Urine Updated: 11/18/21 1755     Color Yellow     Character Cloudy     Specific Gravity >=1.030     Ph 5.0     Glucose Negative mg/dL      Ketones Negative mg/dL      Protein Negative mg/dL      Bilirubin Negative     Nitrite Negative     Leukocyte Esterase Trace     Occult Blood Moderate     Micro Urine Req Microscopic    Narrative:      Indication for culture:->Evaluation for sepsis without a  clear source of infection    URINE CULTURE (ADD ON) [[id number]] Collected: 11/18/21 0000    Order Status: Canceled Specimen: Other from Urine, Suprapubic         Blood Culture   Date Value Ref Range Status   03/12/2018 No growth after 5 days of incubation.  Final    "     Studies:  CT-ABDOMEN-PELVIS W/O    Result Date: 10/27/2021   10/27/2021 3:03 AM HISTORY/REASON FOR EXAM:  Flank pain, kidney stone suspected. TECHNIQUE/EXAM DESCRIPTION: CT scan of the abdomen and pelvis without contrast. Noncontrast helical scanning was obtained from the diaphragmatic domes through the pubic symphysis. Low dose optimization technique was utilized for this CT exam including automated exposure control and adjustment of the mA and/or kV according to patient size. COMPARISON: May 18, 2018 FINDINGS: Lower Chest: Unremarkable. Liver: Normal. Spleen: Unremarkable. Pancreas: Unremarkable. Gallbladder: No calcified stones. Biliary: Nondilated. Adrenal glands: Normal. Kidneys: Mild bilateral hydronephrosis is seen. Mild prominence of the bilateral ureters is visualized. Bowel: Large quantity of stool is seen within the colon, massive quantity of stool is seen within the rectal vault. Fluid filled distention of the small bowel is seen. The appendix is not definitively identified. Lymph nodes: No adenopathy. Vasculature: Mild atherosclerotic changes are seen. Peritoneum: Unremarkable without ascites. Musculoskeletal: No acute or destructive process. Pelvis: No adenopathy or free fluid. Suprapubic catheter is seen within the bladder. Large bladder stones are seen. Medical device overlies the right lower quadrant with streak and scatter artifacts somewhat limiting diagnostic sensitivity of this exam.     1.  Large quantity of stool in the colon with massive quantity of stool in the rectal vault, appearance compatible with severe rectal fecal impaction. 2.  Mild bilateral hydronephrosis and hydroureter, likely secondary due to mass effect due to large quantity of stool in the rectal vault. 3.  Fluid-filled distention of small bowel suggests component of ileus. 4.  Mild atherosclerosis 5.  Large bladder stones.    CT-ABDOMEN-PELVIS WITH    Result Date: 11/18/2021 11/18/2021 8:01 PM HISTORY/REASON FOR EXAM:   Abdominal pain, fever. TECHNIQUE/EXAM DESCRIPTION:   CT scan of the abdomen and pelvis with contrast. Contrast-enhanced helical scanning was obtained from the diaphragmatic domes through the pubic symphysis following the bolus administration of nonionic contrast without complication. 100 mL of Omnipaque 350 nonionic contrast was administered without complication. Low dose optimization technique was utilized for this CT exam including automated exposure control and adjustment of the mA and/or kV according to patient size. COMPARISON: No prior studies available. FINDINGS: Lower Chest: Linear densities the bilateral lung bases are seen. Liver: Normal. Spleen: Unremarkable. Pancreas: Unremarkable. Gallbladder: No calcified stones. Biliary: Nondilated. Adrenal glands: Normal. Kidneys: Irregular areas of low-density in the bilateral kidneys is seen. Bowel: Massive quantity of stool is seen within the rectum. There is sigmoid and colonic wall thickening seen. The appendix appears within normal limits. Lymph nodes: No adenopathy. Vasculature: Atherosclerotic changes are seen. Peritoneum: Unremarkable without ascites. Musculoskeletal: No acute or destructive process. Pelvis: No adenopathy or free fluid. There is suprapubic catheter seen. Bladder wall thickening is seen with nondependent air within the bladder.     1.  Massive quantity of stool in the rectum, appearance compatible with severe fecal impaction. 2.  Irregular areas of low-density in the bilateral kidneys, appearance favors pyelonephritis, correlate with urinalysis. 3.  Bladder wall thickening with nondependent air within the bladder, could represent changes of cystitis. 4.  Linear densities the bilateral lung bases favor changes of atelectasis 5.  Atherosclerosis    [id number] VIEW    Result Date: 10/29/2021  10/29/2021 1:35 PM HISTORY/REASON FOR EXAM:  Abdominal Pain; Eval for fecal impaction. Abdominal distention TECHNIQUE/EXAM DESCRIPTION AND NUMBER OF  VIEWS:  1 view(s) of the abdomen. COMPARISON: 1/29/2017 and CT 10/27/2021 FINDINGS: AP view of the abdomen demonstrates diffuse small and large bowel distention. There is a large amount of colonic stool. There is a large amount of stool in the rectal vault, similar to the prior CT. No pneumatosis or portal venous air is identified. Stimulator device projects over the right lower quadrant. No suspicious bony lesions.     Large amount of colonic stool. Diffuse bowel distention is likely related to constipation.    DX-CHEST-PORTABLE (1 VIEW)    Result Date: 11/18/2021 11/18/2021 4:46 PM HISTORY/REASON FOR EXAM:  possible sepsis. Fever, diarrhea, nausea, vomiting TECHNIQUE/EXAM DESCRIPTION AND NUMBER OF VIEWS: Single AP view of the chest. COMPARISON: None FINDINGS: Heart: The cardiac silhouette is normal in size. Mediastinum: Normal contours. Lungs: The lungs are clear. Pleura: No pleural fluid. Bones: No suspicious bony lesions. Lines/tubes: None.     No evidence of acute cardiopulmonary process.    DX-CHEST-PORTABLE (1 VIEW)    Result Date: 10/24/2021  10/24/2021 12:32 AM HISTORY/REASON FOR EXAM:  Shortness of Breath. TECHNIQUE/EXAM DESCRIPTION AND NUMBER OF VIEWS: Single portable view of the chest. COMPARISON: 6/20/2018 FINDINGS: Cardiomediastinal silhouette is normal. Tiny subsegmental opacity in the left midlung could be atelectasis, tiny infiltrate or artifact. Lungs are otherwise clear. No pleural effusion or pneumothorax. No acute osseous abnormality.     Tiny left mid lung opacity could represent atelectasis or tiny infiltrate. No pleural effusion.    MR-BRAIN-WITH & W/O    Result Date: 10/29/2021  10/29/2021 12:48 PM HISTORY/REASON FOR EXAM: Demyelinating disease; History of MS, eval for active flare TECHNIQUE/EXAM DESCRIPTION: T1 sagittal, T2 axial, flair coronal, T1 coronal, and diffusion-weighted axial images were obtained of the brain pre-contrast followed by T1 coronal and axial images post intravenous  administration of 10 mL ProHance. COMPARISON: 6/8/2016 FINDINGS: Diffusion-weighted images are normal. No acute infarction is identified. Again noted are multiple foci of abnormal T2 signal involving the brainstem and cerebellum, stable from previous study. Confluent T2 signal abnormality in the left occipital periventricular white matter. No stable. Foci of signal abnormality involving the subcortical white matter in the right parietal, left frontal, left medial parietal region, right superior frontal region, right medial high parietal region, left precentral  and postcentral region, left prefrontal region, right superior frontal region are stable. No definite new lesions are identified on the FLAIR imaging. Volume loss in the posterior body of the corpus callosum is again noted, stable from the previous study. Postcontrast images do not demonstrate any abnormal intracranial enhancement. The Sella is within normal limits. The craniocervical junction is within normal limits. Visualized intracranial arterial flow voids are within normal limits. Bone marrow signal in the calvarium is within normal limits. Included portions of the paranasal sinuses are within normal limits. Included portions of the mastoid air cells are within normal limits. Included portions of the orbits are within normal limits     Multiple white matter lesions compatible with the diagnosis of a demyelinating process such as multiple sclerosis, stable from previous study. No new lesions are identified. There is no abnormal intracranial enhancement.    MR-CERVICAL SPINE-WITH & W/O    Result Date: 10/29/2021  10/29/2021 12:48 PM HISTORY/REASON FOR EXAM:  Demyelinating disease; Known MS, eval for active flare TECHNIQUE/EXAM DESCRIPTION: MRI of the cervical spine without and with contrast. The study was performed on a [employer] 1.5 Kayce MRI scanner. T1 sagittal, T2 fast spin-echo sagittal, T1 postcontrast fat-suppressed sagittal, and gradient-echo  axial images were obtained of the cervical spine. 10 mL ProHance contrast were administered  intravenously. COMPARISON:  6/8/2016 FINDINGS: The cervical vertebral bodies have a normal height and alignment. The disk spaces are well-maintained and have a normal appearance. Volume loss noted diffusely throughout the cervical spinal cord, which is a new finding since the previous examination. On the T2-weighted images, heterogeneous cord signal is noted diffusely with decreased conspicuous area of the previously seen discrete lesions noted at the cervicomedullary junction, at the C2-3 level and at the C5 level. There is no significant degenerative disease in the cervical spine. Findings specific to each level are described below: C2-3: Normal C3-4:  Normal C4-5:  Normal C5-6: Normal C6-7: Normal C7-T1: Normal Postcontrast images through the cervical spine do not demonstrate any abnormal enhancement in the cervical spine.     1.  Diffuse volume loss noted in the cervical spinal cord, new from the previous examination. 2.  Previously seen discrete lesions in the cervical cord are less conspicuous. There is heterogeneous cord signal throughout. No new lesions are seen. 3.  No abnormal enhancement is identified in the cervical spinal cord.      ASSESSMENT/PLAN:     41 y.o.  admitted 11/18/2021. Pt has a past medical history of multiple sclerosis complicated by neurogenic bladder with chronic suprapubic Epperson, bladder stone and is s/p cystolitholopaxy on 10/28 and suprapubic catheter urine culture at the time grew Proteus, Providencia seciues here and MSSA.  He was given an antibiotic course, reported as cefdinir.  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  He presented complaining of fevers, frequent watery stools as well as nausea and vomiting.     Hospital Course:   Patient febrile to 100.9.  Leukocytosis to 14.1 and now improving.  CT abdomen and pelvis on 11/18 with massive quantities  stool in the rectum, severe fecal impaction.  Also notes irregular areas of low density in the kidneys favoring pyelonephritis bladder wall thickening potentially representing cystitis.  C. difficile tested and toxin positive on 11/18.  Patient went to the OR on 11/19 for manual stool disimpaction.  The patient was given Zosyn and has been continued on cefepime as well as p.o. vancomycin    Problem List     Recurrent C. difficile infection, discordant testing with PCR negative and toxin positive however symptomatic with frequent watery stools.  This is in the setting of severe stool impaction so difficult to ascertain whether it is actually C. difficile infection or reaction to the fecal impaction.  He did have a white count and was febrile which supports infection so will treat.  -Status post multiple courses of antibiotics and fecal transplant  Severe fecal impaction  - S/p surgical disimpaction   Recent diagnosis of UTI in the setting of bladder stones and received an antibiotic course  MS, complicated by neurogenic bladder and suprapubic catheter    Plan     --- Agree with p.o. vancomycin 125 mg every 6 hours, anticipate a prolonged course with taper and will consider fidaxomicin given the recurrent nature  --- Stopped cefepime, no clear indication and will exacerbate C. Difficile  --- Recommend exercising extreme judiciousness with regard to antibiotics in this patient - only give if absolutely necessary with a very clear indication for a short a course as possible as he is at risk for recurrent episodes of C. difficile with associated morbidity and mortality  --- Urine culture with yeast only, from a suprapubic catheter so likely colonization - will not treat  --- Monitor labs      Plan of care discussed with HAYLEE Cavazos M.D.. Will continue to follow    Fartun Hdez M.D.

## 2021-11-20 NOTE — OR NURSING
1704: [facility] report form AURORA Moscoso. Will get pt from his room in time for his procedure.

## 2021-11-20 NOTE — ANESTHESIA PREPROCEDURE EVALUATION
Case: [id number] Date/Time: 11/19/21 2055    Procedure: MANUAL DISIMPACTION    Location:  OR 03 / SURGERY [facility]    Surgeons: Chris Conroy M.D.          Relevant Problems   PULMONARY   (positive) H/O Clostridium difficile infection   (positive) History of recurrent UTIs   (positive) History of urinary tract infection      NEURO   (positive) H/O Clostridium difficile infection   (positive) History of recurrent UTIs   (positive) History of self-care deficit   (positive) History of urinary tract infection      CARDIAC   (positive) Essential hypertension       Physical Exam    Airway   Mallampati: II  TM distance: >3 FB  Neck ROM: full       Cardiovascular - normal exam  Rhythm: regular  Rate: normal  (-) murmur     Dental - normal exam           Pulmonary - normal exam  Breath sounds clear to auscultation     Abdominal    Neurological - normal exam                 Anesthesia Plan    ASA 3       Plan - general       Airway plan will be LMA          Induction: intravenous    Postoperative Plan: Postoperative administration of opioids is intended.    Pertinent diagnostic labs and testing reviewed    Informed Consent:    Anesthetic plan and risks discussed with patient.    Use of blood products discussed with: patient whom consented to blood products.       ms MARA caballero

## 2021-11-20 NOTE — PROGRESS NOTES
Report received from Corrie SIMON. Pt awake and resting in bed at the time of report. Mom is at bedside. Plan of care discussed outside of room due to special contact precautions. White board has been updated. Safety precautions in place and call light within reach. No needs at this time.      COVID 19 surge in effect

## 2021-11-20 NOTE — OR NURSING
2128: Pt to OR, Patient allergies and NPO status verified, belongings secured. Patient verbalizes understanding of pain scale, expected course of stay and plan of care. Surgical site verified with patient. IV access established. Triple aim completed by RN. Pt would like to not suspend his DNR during surgery, risks explained, pt still wants to not suspend DNR, he is OK with intubation.

## 2021-11-20 NOTE — ANESTHESIA PROCEDURE NOTES
Airway    Date/Time: 11/19/2021 9:39 PM  Performed by: John Van M.D.  Authorized by: John Van M.D.     Location:  OR  Urgency:  Elective  Indications for Airway Management:  Anesthesia      Spontaneous Ventilation: absent    Sedation Level:  Deep  Preoxygenated: Yes    Mask Difficulty Assessment:  1 - vent by mask  Final Airway Type:  Supraglottic airway  Final Supraglottic Airway:  Standard LMA    Number of Attempts at Approach:  1

## 2021-11-20 NOTE — PROGRESS NOTES
Hospital Medicine Daily Progress Note    Date of Service  11/20/2021    Chief Complaint  Jerzy Juan III is a 41 y.o. male admitted 11/18/2021 with diarrhea    Hospital Course  11/19/2021-Jerzy Juan III is a 41 y.o. male who presents to us from a group home with abdominal pain and distention.  The patient has chronic MS and thus has chronic contractures.  He has a suprapubic catheter in place.  Patient's abdominal pain is accompanied by diarrhea.  He has a history of conservative differential colitis.  Initially presentation shows sepsis with elevated white blood cell count tachycardia and hypotension.  The patient was thus placed on fluid resuscitation and antibiotic management.  Given the fact he has history of C. difficile colitis oral vancomycin was started.  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  Infectious disease has been consulted.  At this point patient will be continued on aggressive rehydration and antibiotic management.  11/20/2021-patient now is status post mechanical disimpaction.  Patient still is ongoing with diarrhea from a combination of C. difficile colitis recurrence as well as saline enema during the disimpaction.  Patient for the C. difficile colitis recurrence is on vancomycin orally every 6 hours with a prolonged dose and taper per ID.  No need to treat at this point urinary tract contamination with yeast.  Resume at this point of diet.  Pain management and spasticity management.  Full updates given to the family including treatment plan and prognosis as well as future plans.    Interval Problem Update  Continue with contact isolation  Upgraded the oral vancomycin to every 6 hours 125 mg  Resume diet  Spasticity management  Pain management    I have personally seen and examined the patient at bedside. I discussed the plan of care with patient, family, bedside RN, charge RN,  and  pharmacy.    Consultants/Specialty  general surgery    Code Status  DNAR/DNI    Disposition  Patient is not medically cleared.   Anticipate discharge to to home with close outpatient follow-up.  I have placed the appropriate orders for post-discharge needs.    Review of Systems  Review of Systems   Unable to perform ROS: Acuity of condition        Physical Exam  Temp:  [36.3 °C (97.4 °F)-37.6 °C (99.6 °F)] 36.9 °C (98.4 °F)  Pulse:  [] 88  Resp:  [9-18] 14  BP: ()/(57-87) 107/64  SpO2:  [92 %-100 %] 95 %    Physical Exam  Vitals and nursing note reviewed. Exam conducted with a chaperone present.   Constitutional:       General: He is not in acute distress.     Appearance: Normal appearance. He is underweight. He is not toxic-appearing.   HENT:      Head: Normocephalic and atraumatic.      Right Ear: External ear normal.      Left Ear: External ear normal.      Nose: Nose normal.      Mouth/Throat:      Mouth: Mucous membranes are moist.      Pharynx: Oropharynx is clear.   Eyes:      Extraocular Movements: Extraocular movements intact.      Conjunctiva/sclera: Conjunctivae normal.      Pupils: Pupils are equal, round, and reactive to light.   Neck:      Thyroid: No thyromegaly.      Vascular: No JVD.   Cardiovascular:      Rate and Rhythm: Normal rate and regular rhythm.      Pulses: Normal pulses.      Heart sounds: Normal heart sounds. No murmur heard.      Pulmonary:      Effort: Pulmonary effort is normal.      Breath sounds: Normal breath sounds. No wheezing or rales.   Chest:      Chest wall: No tenderness.   Abdominal:      General: Abdomen is flat. Bowel sounds are normal. There is no distension.      Palpations: Abdomen is soft. There is no mass.      Tenderness: There is no abdominal tenderness. There is no guarding or rebound.   Musculoskeletal:         General: No tenderness. Normal range of motion.      Cervical back: Normal range of motion and neck supple.   Lymphadenopathy:      Cervical:  No cervical adenopathy.   Skin:     General: Skin is warm and dry.      Capillary Refill: Capillary refill takes less than 2 seconds.      Findings: No erythema or rash.   Neurological:      General: No focal deficit present.      Mental Status: He is alert. He is disoriented.      GCS: GCS eye subscore is 4. GCS verbal subscore is 4. GCS motor subscore is 5.      Cranial Nerves: No cranial nerve deficit.      Sensory: Sensory deficit present.      Motor: Weakness, atrophy and abnormal muscle tone present.      Coordination: Coordination abnormal. Finger-Nose-Finger Test abnormal and Heel to Shin Test abnormal. Impaired rapid alternating movements.      Gait: Gait abnormal and tandem walk abnormal.      Deep Tendon Reflexes:      Reflex Scores:       Tricep reflexes are 1+ on the right side and 1+ on the left side.       Bicep reflexes are 1+ on the right side and 1+ on the left side.       Patellar reflexes are 0 on the right side and 0 on the left side.       Achilles reflexes are 0 on the right side and 0 on the left side.     Comments: Upper extremities with reduced motor function lower extremities with absent motor function   Psychiatric:         Attention and Perception: He is inattentive.         Mood and Affect: Affect is flat.         Behavior: Behavior is slowed.         Cognition and Memory: Cognition is impaired. Memory is impaired.         Judgment: Judgment is impulsive.         Fluids    Intake/Output Summary (Last 24 hours) at 11/20/2021 1237  Last data filed at 11/20/2021 0508  Gross per 24 hour   Intake 500 ml   Output 1300 ml   Net -800 ml       Laboratory  Recent Labs     11/18/21  1646 11/19/21  0357   WBC 14.1* 10.4   RBC 4.85 4.69*   HEMOGLOBIN 14.4 13.9*   HEMATOCRIT 43.7 42.2   MCV 90.1 90.0   MCH 29.7 29.6   MCHC 33.0* 32.9*   RDW 50.3* 49.1   PLATELETCT 257 244   MPV 8.7* 8.9*     Recent Labs     11/18/21  1646 11/19/21  0357   SODIUM 132* 135   POTASSIUM 3.5* 3.3*   CHLORIDE 99 103   CO2  22 22   GLUCOSE 122* 132*   BUN 18 16   CREATININE 0.62 0.53   CALCIUM 8.4 8.3*                   Imaging  CT-ABDOMEN-PELVIS WITH   Final Result         1.  Massive quantity of stool in the rectum, appearance compatible with severe fecal impaction.   2.  Irregular areas of low-density in the bilateral kidneys, appearance favors pyelonephritis, correlate with urinalysis.   3.  Bladder wall thickening with nondependent air within the bladder, could represent changes of cystitis.   4.  Linear densities the bilateral lung bases favor changes of atelectasis   5.  Atherosclerosis      DX-CHEST-PORTABLE (1 VIEW)   Final Result      No evidence of acute cardiopulmonary process.           Assessment/Plan  H/O Clostridium difficile infection- (present on admission)  Assessment & Plan  Recurrent consider him differential colitis  ID input appreciated  Oral vancomycin 125 mg every 6 hours  Possible fidaxomicin therapy per ID    Sepsis (HCC)- (present on admission)  Assessment & Plan  Sepsis seems to be resolving  Initial sepsis was secondary to C. difficile recurrence      Fecal impaction (HCC)- (present on admission)  Assessment & Plan  Status post mechanical disimpaction under surgery and anesthesia.    MS (multiple sclerosis) (HCC)- (present on admission)  Assessment & Plan  Chronic multiple sclerosis since 2013  Suprapubic catheter in place with neurogenic bladder  Muscle spasm management  Patient has noticed considerable deterioration in strength since the initial diagnosis as has the mom.    Hypomagnesemia  Assessment & Plan  Monitor magnesium, was given 4 g of magnesium and I will repeat magnesium level in the morning.    Hypokalemia- (present on admission)  Assessment & Plan  Potassium given we will repeat level in the morning    Neurogenic bladder- (present on admission)  Assessment & Plan  Suprapubic in place  Has yeast in the urine  Per ID contaminant only no need to treat at this point in time      Spasticity-  (present on admission)  Assessment & Plan  Continue with baclofen as needed       VTE prophylaxis: SCDs/TEDs    I have performed a physical exam and reviewed and updated ROS and Plan today (11/20/2021). In review of yesterday's note (11/19/2021), there are no changes except as documented above.

## 2021-11-20 NOTE — CARE PLAN
The patient is Stable - Low risk of patient condition declining or worsening    Shift Goals  Clinical Goals: Q2 turns, prevent falls  Patient Goals: rest  Family Goals: turns, skin impairment prevention     Progress made toward(s) clinical / shift goals:        Problem: Pain - Standard  Goal: Alleviation of pain or a reduction in pain to the patient’s comfort goal  Outcome: Progressing     Problem: Skin Integrity  Goal: Skin integrity is maintained or improved  Outcome: Progressing  Q2 turns, waffle overlay in place, pillows in place for support and positioning, barrier cream   Problem: Fall Risk  Goal: Patient will remain free from falls  Outcome: Progressing   Bed alarm on, bed locked and in lowest position, call light in reach

## 2021-11-20 NOTE — CONSULTS
"Surgical Consultation    Date: 11/19/2021    Requesting Physician:  Dr. Forrester  PCP: Janet Reddy M.D.  Attending Physician: Chris Conroy M.D.    CC: Fecal impaction      HPI: This is a 41 y.o. male who is presenting with a history of multiple sclerosis and suprapubic catheter who was admitted for sepsis likely from a urology source.  He was also found to have a very large rectal stool ball and fecal disimpaction in the ER was attempted but inadequate.  He was admitted and placed on antibiotics.  I was consulted for help with management of this.  I saw the patient this evening.  He says he does not feel much better than when he came in.  He has been having bowel movements.  He has had a history of C. difficile colitis and his stool tested positive again.  No abdominal pain.  No current fever, chills, chest pain, shortness of breath,, hematemesis, hemoptysis, hematochezia, melena, neurologic changes.  He does have some mild nausea.    Past Medical History:   Diagnosis Date   • Anxiety    • Back pain    • Blood transfusion    • Bowel habit changes     constipation   • Breath shortness     \"r/t to medication Ocrevus and Baclofen\" \"side effect   • Dental disorder     poor dentation   • Depression     PTSD/clinical depression   • Eyes sensitive to light     Needs to wear sunglasses   • Fall     4/6/2016   • Headache(784.0)    • Heart burn     reflux   • Heat intolerance     will vomit   • Hypertension    • Indigestion    • MS (multiple sclerosis) ([facility]) DX 8/20/2013    multiple lesions brain/spine   • Multiple sclerosis (HCC) 2013   • Pain 06/20/2018    \"everywhere\"   • Presence of intrathecal baclofen pump     right side   • Urinary bladder disorder     UTI/catheter/bladder stone   • Urinary incontinence     catheter in place       Past Surgical History:   Procedure Laterality Date   • PB CYSTOURETHROSCOPY N/A 10/28/2021    Procedure: CYSTOSCOPY;  Surgeon: Kwasi Sapp M.D.;  Location: SURGERY [facility];  " Service: Urology   • LASERTRIPSY N/A 10/28/2021    Procedure: LITHOTRIPSY, USING LASER - FOR CYSTOLITHALOPAXY;  Surgeon: Kwasi Sapp M.D.;  Location: [facility];  Service: Urology   • PUMP INSERT/REMOVE  6/26/2018    Procedure: PUMP INSERT/REMOVE - BACLOFEN;  Surgeon: Andrew Gomez M.D.;  Location: [facility];  Service: Pain Management   • CYSTOSCOPY  5/7/2018    Procedure: CYSTOSCOPY;  Surgeon: Kwasi Sapp M.D.;  Location: SURGERY [facility];  Service: Urology   • LASERTRIPSY  5/7/2018    Procedure: LASERTRIPSY - FOR: LITHOLAPAXY;  Surgeon: Kwasi Sapp M.D.;  Location: [facility];  Service: Urology   • BLOCK EPIDURAL STEROID INJECTION N/A 3/27/2018    Procedure: BLOCK EPIDURAL STEROID INJECTION - BACLOFEN PUMP TRIAL;  Surgeon: Andrew Gomez M.D.;  Location: [facility];  Service: Pain Management   • GASTROSCOPY  1/14/2017    Procedure: GASTROSCOPY WITH FECAL MATTER TRANSPLANT;  Surgeon: Obinna Douglas M.D.;  Location: [facility];  Service:    • OPEN REDUCTION Left 1988    left thumb       Current Facility-Administered Medications   Medication Dose Route Frequency Provider Last Rate Last Admin   • magnesium sulfate IVPB premix 4 g  4 g Intravenous Once Kathi Cavazos M.D.       • potassium chloride (KCL) ivpb 10 mEq  10 mEq Intravenous Q HOUR Kathi Cavazos M.D.   Stopped at 11/19/21 5889   • Pharmacy Consult Request  1 Each Other PHARMACY TO DOSE Yo Forrester M.D.       • baclofen (LIORESAL) tablet 10 mg  10 mg Oral 4X/DAY Nicky Crawford M.D.   10 mg at 11/19/21 1453   • buPROPion SR (WELLBUTRIN-SR) tablet 100 mg  100 mg Oral TID Nicky Crawford M.D.   100 mg at 11/19/21 0237   • famotidine (PEPCID) tablet 20 mg  20 mg Oral BID Nicky Crawford M.D.   20 mg at 11/19/21 0236   • lidocaine (LIDODERM) 5 % 1 Patch  1 Patch Transdermal Q24HRS Nicky Crawford M.D.   1 Patch at 11/19/21 0600   • oxybutynin SR  (DITROPAN-XL) tablet 10 mg  10 mg Oral TID Nicky Crawford M.D.   10 mg at 11/19/21 0235   • sertraline (Zoloft) tablet 25 mg  25 mg Oral DAILY Nicky Crawford M.D.   25 mg at 11/19/21 0600   • senna-docusate (PERICOLACE or SENOKOT S) 8.6-50 MG per tablet 2 Tablet  2 Tablet Oral BID Nicky Crawford M.D.        And   • polyethylene glycol/lytes (MIRALAX) PACKET 1 Packet  1 Packet Oral BID Nicky Crawford M.D.        And   • magnesium hydroxide (MILK OF MAGNESIA) suspension 30 mL  30 mL Oral QDAY PRN Nicky Crawford M.D.        And   • bisacodyl (DULCOLAX) suppository 10 mg  10 mg Rectal QDAY PRN Nicky Crawford M.D.       • lactated ringers infusion   Intravenous Continuous Nicky Crawford M.D. 100 mL/hr at 11/19/21 0357 New Bag at 11/19/21 0357   • enoxaparin (LOVENOX) inj 40 mg  40 mg Subcutaneous DAILY Nicky Crawford M.D.       • acetaminophen (Tylenol) tablet 650 mg  650 mg Oral Q6HRS PRN Nicky Crawford M.D.   650 mg at 11/19/21 0252   • Pharmacy Consult Request ...Pain Management Review 1 Each  1 Each Other PHARMACY TO DOSE Nicky Crawford M.D.       • oxyCODONE immediate-release (ROXICODONE) tablet 2.5 mg  2.5 mg Oral Q3HRS PRN Nicky Crawford M.D.        Or   • oxyCODONE immediate-release (ROXICODONE) tablet 5 mg  5 mg Oral Q3HRS PRN Nicky Crawford M.D.        Or   • HYDROmorphone (Dilaudid) injection 0.25 mg  0.25 mg Intravenous Q3HRS PRN Nicky Crawford M.D.       • cefepime (Maxipime) 2 g in  mL IVPB  2 g Intravenous Q8HRS Nicky Crawford M.D.   Stopped at 11/19/21 1524   • ondansetron (ZOFRAN) syringe/vial injection 4 mg  4 mg Intravenous Q4HRS PRN Nicky Crawford M.D.   4 mg at 11/19/21 1016   • ondansetron (ZOFRAN ODT) dispertab 4 mg  4 mg Oral Q4HRS PRN Nicky Crawford M.D.       • promethazine (PHENERGAN) tablet 12.5-25 mg  12.5-25 mg Oral Q4HRS PRN Nicky Crawford M.D.       • promethazine (PHENERGAN) suppository 12.5-25 mg  12.5-25 mg Rectal  Q4HRS JANE Crawford M.D.       • prochlorperazine (COMPAZINE) injection 5-10 mg  5-10 mg Intravenous Q4HRS JANE Crawford M.D.       • vancomycin 50 mg/mL oral soln 125 mg  125 mg Oral DAILY Nicky Crawford M.D.   125 mg at 21 0601       Social History     Socioeconomic History   • Marital status:      Spouse name: Not on file   • Number of children: Not on file   • Years of education: Not on file   • Highest education level: Not on file   Occupational History   • Not on file   Tobacco Use   • Smoking status: Former Smoker     Packs/day: 1.00     Years: 20.00     Pack years: 20.00     Start date: 1997     Quit date: 2013     Years since quittin.3   • Smokeless tobacco: Never Used   Vaping Use   • Vaping Use: Former   Substance and Sexual Activity   • Alcohol use: Yes     Alcohol/week: 0.0 oz   • Drug use: Not Currently     Types: Inhaled     Comment: stopped 2019   • Sexual activity: Not on file     Comment: once daily for 23 yrs   Other Topics Concern   • Not on file   Social History Narrative   • Not on file     Social Determinants of Health     Financial Resource Strain:    • Difficulty of Paying Living Expenses: Not on file   Food Insecurity:    • Worried About Running Out of Food in the Last Year: Not on file   • Ran Out of Food in the Last Year: Not on file   Transportation Needs:    • Lack of Transportation (Medical): Not on file   • Lack of Transportation (Non-Medical): Not on file   Physical Activity:    • Days of Exercise per Week: Not on file   • Minutes of Exercise per Session: Not on file   Stress:    • Feeling of Stress : Not on file   Social Connections:    • Frequency of Communication with Friends and Family: Not on file   • Frequency of Social Gatherings with Friends and Family: Not on file   • Attends Islam Services: Not on file   • Active Member of Clubs or Organizations: Not on file   • Attends Club or Organization Meetings: Not on file   •  Marital Status: Not on file   Intimate Partner Violence:    • Fear of Current or Ex-Partner: Not on file   • Emotionally Abused: Not on file   • Physically Abused: Not on file   • Sexually Abused: Not on file   Housing Stability:    • Unable to Pay for Housing in the Last Year: Not on file   • Number of Places Lived in the Last Year: Not on file   • Unstable Housing in the Last Year: Not on file       Family History   Problem Relation Age of Onset   • Thyroid Mother    • Hypertension Mother    • Hypertension Father    • Psychiatric Illness Father        Allergies:  Asa [aspirin], Gabapentin, Hydrocodone, Methocarbamol, Morphine, and Pregabalin    Review of Systems:  Negative except as noted above in the HPI and 10 point review    Physical Exam:  /87   Pulse (!) 119   Temp 37.6 °C (99.6 °F) (Oral)   Resp 18   Wt 55 kg (121 lb 4.1 oz)   SpO2 93%     Constitutional: he is oriented to person, place, and time.  Weak, frail, but not significantly distressed  Head: Normocephalic and atraumatic.   Neck: Normal range of motion. Neck supple. No JVD present. No tracheal deviation present.   Cardiovascular: Tachycardia, regular rhythm  Pulmonary/Chest: Breathing is nonlabored on room air.  No stridor, respiratory distress  Abdominal: Soft, nontender, nondistended.  Suprapubic catheter in place.  Musculoskeletal: Normal range of motion. he exhibits no edema and no tenderness.   Neurological: he is alert and oriented to person, place, and time.  Detailed exam deferred r  Skin: Skin is warm and dry. No rash noted. he is not diaphoretic. No erythema. No pallor.   Psychiatric: Detailed exam deferred       Labs:  Recent Labs     11/18/21  1646 11/19/21  0357   WBC 14.1* 10.4   RBC 4.85 4.69*   HEMOGLOBIN 14.4 13.9*   HEMATOCRIT 43.7 42.2   MCV 90.1 90.0   MCH 29.7 29.6   MCHC 33.0* 32.9*   RDW 50.3* 49.1   PLATELETCT 257 244   MPV 8.7* 8.9*     Recent Labs     11/18/21  1646 11/19/21  0357   SODIUM 132* 135   POTASSIUM  3.5* 3.3*   CHLORIDE 99 103   CO2 22 22   GLUCOSE 122* 132*   BUN 18 16   CREATININE 0.62 0.53   CALCIUM 8.4 8.3*         Recent Labs     11/18/21  1646 11/19/21  0357   ASTSGOT 27 27   ALTSGPT 15 15   TBILIRUBIN 0.7 0.6   ALKPHOSPHAT 69 79   GLOBULIN 2.8 3.0       Radiology:  CT-ABDOMEN-PELVIS WITH   Final Result         1.  Massive quantity of stool in the rectum, appearance compatible with severe fecal impaction.   2.  Irregular areas of low-density in the bilateral kidneys, appearance favors pyelonephritis, correlate with urinalysis.   3.  Bladder wall thickening with nondependent air within the bladder, could represent changes of cystitis.   4.  Linear densities the bilateral lung bases favor changes of atelectasis   5.  Atherosclerosis      DX-CHEST-PORTABLE (1 VIEW)   Final Result      No evidence of acute cardiopulmonary process.          Assessment: This is a 41 y.o. male with a history of multiple sclerosis, suprapubic catheter, admitted for pyelonephritis and fecal impaction.  He is currently hemodynamically stable    Recommendations:   -Recommend fecal disimpaction in OR under anesthesia given significant findings on admission imaging.  The operation was discussed along with the risks, which include but are not limited to bleeding, failure to achieve goals of procedure, need for further procedures, perforation, death.  Patient understands his risks and wished to proceed.  -All further management per primary service      Chris Conroy M.D.  [facility]  [phone]

## 2021-11-20 NOTE — OR NURSING
2210: To PACU from OR via bed, sleeping, respirations spontaneous and non-labored via OPA.   2225: pt sleeping.  2233: opa dc'd   2240: pt resting, states pain is his normal level of pain, declines analgesia. Denies nausea.  2255: Pt resting, denies pain or nausea. Meets criteria to transfer to floor. Report called to Sada SIMON.

## 2021-11-20 NOTE — OP REPORT
Operative Report    Date: 11/19/2021    PreOp Diagnosis:   1.  Fecal impaction  2.  Multiple sclerosis  3.  C. difficile infection    PostOp Diagnosis: Same    Procedure(s):  MANUAL RECTAL DISIMPACTION     Surgeon(s):  Chris Conroy M.D.    Anesthesiologist/Type of Anesthesia:  Anesthesiologist: John Van M.D./* No anesthesia type entered *    Surgical Staff:  Circulator: Vern Bland R.N.  Scrub Person: Grey GUARDADO Alanis    Specimens removed if any:  * No specimens in log *    Estimated Blood Loss: None    Findings: Dilated rectum containing semisolid stool which was removed manually and then irrigated with warm saline    Complications: None noted    Outcome: Transferred to PACU stable condition    Indications:  41-year-old male with a history of C. difficile infection, multiple sclerosis, and fecal impaction for which surgery was consulted.  The procedure above is recommended given inability to clear his stool with none interventional measures on previous admissions.  He understood the procedure and wishes to proceed after discussion of the risk, benefits, alternatives.    Procedure in detail: The patient was identified in the pre-operative holding area and brought to the operating room. Correct side and site were identified. Pre-operative antibiotics were administered prior to the procedure. GETA was smoothly induced. The patient was prepped and draped in the usual sterile fashion.     The rectum was slowly dilated with lubricant until there is possible to place a hand within the rectal vault.  There is large amount of semisolid stool which was removed manually.  Once this was performed, a large amount of warm saline was used to irrigate the rectum.  This concluded the procedure.  Patient was allowed to emerge from anesthetic and transferred back to PACU in stable condition.    Chris Conroy M.D.  [facility]  [phone]      11/19/2021 10:00 PM Chris Conroy M.D.

## 2021-11-21 NOTE — PROGRESS NOTES
Hospital Medicine Daily Progress Note    Date of Service  11/21/2021    Chief Complaint  Jerzy Juan III is a 41 y.o. male admitted 11/18/2021 with diarrhea    Hospital Course  11/19/2021-Jerzy Juan III is a 41 y.o. male who presents to us from a group home with abdominal pain and distention.  The patient has chronic MS and thus has chronic contractures.  He has a suprapubic catheter in place.  Patient's abdominal pain is accompanied by diarrhea.  He has a history of conservative differential colitis.  Initially presentation shows sepsis with elevated white blood cell count tachycardia and hypotension.  The patient was thus placed on fluid resuscitation and antibiotic management.  Given the fact he has history of C. difficile colitis oral vancomycin was started.  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  Infectious disease has been consulted.  At this point patient will be continued on aggressive rehydration and antibiotic management.  11/20/2021-patient now is status post mechanical disimpaction.  Patient still is ongoing with diarrhea from a combination of C. difficile colitis recurrence as well as saline enema during the disimpaction.  Patient for the C. difficile colitis recurrence is on vancomycin orally every 6 hours with a prolonged dose and taper per ID.  No need to treat at this point urinary tract contamination with yeast.  Resume at this point of diet.  Pain management and spasticity management.  Full updates given to the family including treatment plan and prognosis as well as future plans.  11/21/2021-patient appears comfortable and improved overall.  Diarrhea still ongoing.  Abdominal pain better.  Will need prolonged antibiotic course for C. difficile colitis that is recurrent.  This impaction at this point has been alleviated.  At this point patient is stable to return back to the skilled facility where he came  from.    Interval Problem Update  Remains in contact isolation  Optimize antibiotic management  Continue with oral intake of nutrition and hydration  Discharge planning    I have personally seen and examined the patient at bedside. I discussed the plan of care with patient, family, bedside RN, charge RN,  and pharmacy.    Consultants/Specialty  general surgery    Code Status  DNAR/DNI    Disposition  Patient is medically cleared.   Anticipate discharge to to home with close outpatient follow-up.  I have placed the appropriate orders for post-discharge needs.    Review of Systems  Review of Systems   Unable to perform ROS: Acuity of condition        Physical Exam  Temp:  [36.3 °C (97.3 °F)-37.3 °C (99.2 °F)] 37.3 °C (99.2 °F)  Pulse:  [82-92] 88  Resp:  [16-18] 18  BP: ()/(46-75) 103/75  SpO2:  [94 %-98 %] 94 %    Physical Exam  Vitals and nursing note reviewed. Exam conducted with a chaperone present.   Constitutional:       Appearance: Normal appearance. He is normal weight. He is not ill-appearing.   HENT:      Head: Normocephalic and atraumatic.      Right Ear: External ear normal. There is no impacted cerumen.      Left Ear: External ear normal. There is no impacted cerumen.      Nose: Nose normal.      Mouth/Throat:      Mouth: Mucous membranes are moist.      Pharynx: Oropharynx is clear. No oropharyngeal exudate or posterior oropharyngeal erythema.   Eyes:      General:         Right eye: No discharge.         Left eye: No discharge.      Extraocular Movements: Extraocular movements intact.      Conjunctiva/sclera: Conjunctivae normal.      Pupils: Pupils are equal, round, and reactive to light.   Neck:      Thyroid: No thyromegaly.      Vascular: No JVD.   Cardiovascular:      Rate and Rhythm: Normal rate and regular rhythm.      Pulses: Normal pulses.      Heart sounds: Normal heart sounds. No murmur heard.  No friction rub.   Pulmonary:      Effort: Pulmonary effort is normal.      Breath  sounds: Normal breath sounds. No wheezing or rales.   Chest:      Chest wall: No tenderness.   Abdominal:      General: Abdomen is flat. Bowel sounds are normal. There is no distension.      Palpations: Abdomen is soft. There is no mass.      Tenderness: There is no abdominal tenderness. There is no guarding or rebound.      Hernia: No hernia is present.   Musculoskeletal:         General: No tenderness. Normal range of motion.      Cervical back: Normal range of motion and neck supple.   Lymphadenopathy:      Cervical: No cervical adenopathy.   Skin:     General: Skin is warm and dry.      Capillary Refill: Capillary refill takes less than 2 seconds.      Findings: No erythema or rash.   Neurological:      General: No focal deficit present.      Mental Status: He is alert. He is disoriented.      GCS: GCS eye subscore is 4. GCS verbal subscore is 4. GCS motor subscore is 5.      Cranial Nerves: No cranial nerve deficit.      Sensory: Sensory deficit present.      Motor: Weakness, atrophy and abnormal muscle tone present.      Coordination: Coordination abnormal. Finger-Nose-Finger Test abnormal and Heel to Shin Test abnormal. Impaired rapid alternating movements.      Gait: Gait abnormal and tandem walk abnormal.      Deep Tendon Reflexes:      Reflex Scores:       Tricep reflexes are 1+ on the right side and 1+ on the left side.       Bicep reflexes are 1+ on the right side and 1+ on the left side.       Patellar reflexes are 0 on the right side and 0 on the left side.       Achilles reflexes are 0 on the right side and 0 on the left side.     Comments: Upper extremities with reduced motor function lower extremities with absent motor function   Psychiatric:         Attention and Perception: He is inattentive.         Mood and Affect: Mood normal. Affect is flat.         Speech: Speech is delayed.         Behavior: Behavior is slowed.         Cognition and Memory: Cognition is impaired. Memory is impaired.          Judgment: Judgment is impulsive.         Fluids    Intake/Output Summary (Last 24 hours) at 11/21/2021 1319  Last data filed at 11/21/2021 1300  Gross per 24 hour   Intake 1080 ml   Output 4150 ml   Net -3070 ml       Laboratory  Recent Labs     11/18/21  1646 11/19/21  0357   WBC 14.1* 10.4   RBC 4.85 4.69*   HEMOGLOBIN 14.4 13.9*   HEMATOCRIT 43.7 42.2   MCV 90.1 90.0   MCH 29.7 29.6   MCHC 33.0* 32.9*   RDW 50.3* 49.1   PLATELETCT 257 244   MPV 8.7* 8.9*     Recent Labs     11/18/21  1646 11/19/21  0357   SODIUM 132* 135   POTASSIUM 3.5* 3.3*   CHLORIDE 99 103   CO2 22 22   GLUCOSE 122* 132*   BUN 18 16   CREATININE 0.62 0.53   CALCIUM 8.4 8.3*                   Imaging  CT-ABDOMEN-PELVIS WITH   Final Result         1.  Massive quantity of stool in the rectum, appearance compatible with severe fecal impaction.   2.  Irregular areas of low-density in the bilateral kidneys, appearance favors pyelonephritis, correlate with urinalysis.   3.  Bladder wall thickening with nondependent air within the bladder, could represent changes of cystitis.   4.  Linear densities the bilateral lung bases favor changes of atelectasis   5.  Atherosclerosis      DX-CHEST-PORTABLE (1 VIEW)   Final Result      No evidence of acute cardiopulmonary process.           Assessment/Plan  H/O Clostridium difficile infection- (present on admission)  Assessment & Plan  Due to recurrence of this C. difficile colitis the patient will need oral vancomycin every 6 hours for a prolonged course    Sepsis (HCC)- (present on admission)  Assessment & Plan  Sepsis at this point has resolved.  Vital signs at this point back to normal.  C. difficile colitis from stool positive  Candida and Corynebacterium positive from the chronic Epperson catheter that with his suprapubic.  Per ID this is a contamination no further treatment needed      Fecal impaction (HCC)- (present on admission)  Assessment & Plan  Initially presented with severe fecal impaction.  The  patient's fecal impaction had to be removed under anesthesia manually.  Abdomen at this point soft, no longer impacted    MS (multiple sclerosis) (HCC)- (present on admission)  Assessment & Plan  MS first diagnosed in 2013  Since then has been a chronically debilitating course  With neurogenic bladder patient is a suprapubic catheter  Continue with antispasmodic management and pain management.  Currently patient is unable to ambulate due to lower extremity weakness  Upper extremity still has some motor function left but weakness also present there.      Hypomagnesemia  Assessment & Plan  Hypomagnesemia secondary to diarrhea and fecal magnesium loss.  Initially given 4 g of IV magnesium  Repeat magnesium level pending    Hypokalemia- (present on admission)  Assessment & Plan  Secondary to diarrhea and calcium loss  Continue with potassium supplementation repeat potassium level in the morning    Neurogenic bladder- (present on admission)  Assessment & Plan  Suprapubic catheter was evaluated and is positive for corynebacterium as well as Candida but both of these are less than 50,000 colony-forming units per mL no indication at this point in time for antibiotic bacterial or antifungal treatment      Spasticity- (present on admission)  Assessment & Plan  Due to multiple sclerosis patient has chronic spasticity to relieve the spasticity patient has spasmolytic management with baclofen       VTE prophylaxis: SCDs/TEDs    I have performed a physical exam and reviewed and updated ROS and Plan today (11/21/2021). In review of yesterday's note (11/20/2021), there are no changes except as documented above.

## 2021-11-21 NOTE — PROGRESS NOTES
Patient had mildly lower BP overnight, 88/53, 92/49, 87/46, did report some lightheadedness, afebrile, LR bolus ordered.

## 2021-11-21 NOTE — DISCHARGE PLANNING
Anticipated Discharge Disposition: [facility]      Action: Pt came from [facility], plan to discharge pt back. Order and referral sent. Called [facility] to see if bed available. Admissions department unavailable.      Barriers to Discharge: Acceptance to [facility]     Plan: Case management to follow up medical team with any other needs

## 2021-11-21 NOTE — PROGRESS NOTES
Infectious Disease Progress Note    Author: Fartun Hdez M.D. Date & Time of service: 2021  8:34 AM    Chief Complaint:   Recurrent C. difficile infection    Interval History:    Review of Systems:  Review of Systems   Unable to perform ROS: Medical condition       Hemodynamics:  Temp (24hrs), Av.7 °C (98 °F), Min:36.3 °C (97.3 °F), Max:37 °C (98.6 °F)  Temperature: 37 °C (98.6 °F)  Pulse  Av.1  Min: 76  Max: 136   Blood Pressure: 104/64       Physical Exam:  Physical Exam  Constitutional:       Appearance: Normal appearance.   Cardiovascular:      Rate and Rhythm: Normal rate and regular rhythm.      Heart sounds: Normal heart sounds.   Pulmonary:      Effort: Pulmonary effort is normal.      Breath sounds: Normal breath sounds.   Abdominal:      General: Abdomen is flat. Bowel sounds are normal. There is no distension.      Palpations: Abdomen is soft.      Tenderness: There is no abdominal tenderness. There is no guarding.   Musculoskeletal:      Right lower leg: No edema.      Left lower leg: No edema.   Skin:     General: Skin is warm and dry.   Neurological:      Mental Status: He is alert.      Comments: Contractures and partial paralysis due to MS   Psychiatric:         Mood and Affect: Mood normal.         Behavior: Behavior normal.         Meds:    Current Facility-Administered Medications:   •  acetaminophen  •  baclofen  •  famotidine  •  oxybutynin SR  •  buPROPion SR  •  vancomycin 50 mg/mL  •  lidocaine  •  Special Contact Isolation **AND** [COMPLETED] C Diff by PCR rflx Toxin **AND** Pharmacy  •  sertraline  •  senna-docusate **AND** polyethylene glycol/lytes **AND** magnesium hydroxide **AND** bisacodyl  •  LR  •  enoxaparin  •  acetaminophen  •  Notify provider if pain remains uncontrolled **AND** Use the Numeric Rating Scale (NRS), Mujica-Baker Faces (WBF), or FLACC on regular floors and Critical-Care Pain Observation Tool (CPOT) on ICUs/Trauma to assess pain **AND** Pulse Ox  **AND** Pharmacy Consult Request **AND** If patient difficult to arouse and/or has respiratory depression (respiratory rate of 10 or less), stop any opiates that are currently infusing and call a Rapid Response.  •  oxyCODONE immediate-release **OR** oxyCODONE immediate-release **OR** HYDROmorphone  •  ondansetron  •  ondansetron  •  promethazine  •  promethazine  •  prochlorperazine    Facility-Administered Medications Ordered in Other Encounters:   •  midazolam  •  dexamethasone  •  ondansetron  •  phenylephrine  •  fentaNYL    Labs:  Recent Labs     11/18/21  1646 11/19/21  0357   WBC 14.1* 10.4   RBC 4.85 4.69*   HEMOGLOBIN 14.4 13.9*   HEMATOCRIT 43.7 42.2   MCV 90.1 90.0   MCH 29.7 29.6   RDW 50.3* 49.1   PLATELETCT 257 244   MPV 8.7* 8.9*   NEUTSPOLYS 79.10* 44.00   LYMPHOCYTES 7.20* 13.00*   MONOCYTES 12.80 14.00*   EOSINOPHILS 0.00 0.00   BASOPHILS 0.40 0.00   RBCMORPHOLO  --  Normal     Recent Labs     11/18/21  1646 11/19/21  0357   SODIUM 132* 135   POTASSIUM 3.5* 3.3*   CHLORIDE 99 103   CO2 22 22   GLUCOSE 122* 132*   BUN 18 16     Recent Labs     11/18/21  1646 11/19/21  0357   ALBUMIN 3.5 3.1*   TBILIRUBIN 0.7 0.6   ALKPHOSPHAT 69 79   TOTPROTEIN 6.3 6.1   ALTSGPT 15 15   ASTSGOT 27 27   CREATININE 0.62 0.53       Imaging:  CT-ABDOMEN-PELVIS W/O    Result Date: 10/27/2021   10/27/2021 3:03 AM HISTORY/REASON FOR EXAM:  Flank pain, kidney stone suspected. TECHNIQUE/EXAM DESCRIPTION: CT scan of the abdomen and pelvis without contrast. Noncontrast helical scanning was obtained from the diaphragmatic domes through the pubic symphysis. Low dose optimization technique was utilized for this CT exam including automated exposure control and adjustment of the mA and/or kV according to patient size. COMPARISON: May 18, 2018 FINDINGS: Lower Chest: Unremarkable. Liver: Normal. Spleen: Unremarkable. Pancreas: Unremarkable. Gallbladder: No calcified stones. Biliary: Nondilated. Adrenal glands: Normal. Kidneys: Mild  bilateral hydronephrosis is seen. Mild prominence of the bilateral ureters is visualized. Bowel: Large quantity of stool is seen within the colon, massive quantity of stool is seen within the rectal vault. Fluid filled distention of the small bowel is seen. The appendix is not definitively identified. Lymph nodes: No adenopathy. Vasculature: Mild atherosclerotic changes are seen. Peritoneum: Unremarkable without ascites. Musculoskeletal: No acute or destructive process. Pelvis: No adenopathy or free fluid. Suprapubic catheter is seen within the bladder. Large bladder stones are seen. Medical device overlies the right lower quadrant with streak and scatter artifacts somewhat limiting diagnostic sensitivity of this exam.     1.  Large quantity of stool in the colon with massive quantity of stool in the rectal vault, appearance compatible with severe rectal fecal impaction. 2.  Mild bilateral hydronephrosis and hydroureter, likely secondary due to mass effect due to large quantity of stool in the rectal vault. 3.  Fluid-filled distention of small bowel suggests component of ileus. 4.  Mild atherosclerosis 5.  Large bladder stones.    CT-ABDOMEN-PELVIS WITH    Result Date: 11/18/2021 11/18/2021 8:01 PM HISTORY/REASON FOR EXAM:  Abdominal pain, fever. TECHNIQUE/EXAM DESCRIPTION:   CT scan of the abdomen and pelvis with contrast. Contrast-enhanced helical scanning was obtained from the diaphragmatic domes through the pubic symphysis following the bolus administration of nonionic contrast without complication. 100 mL of Omnipaque 350 nonionic contrast was administered without complication. Low dose optimization technique was utilized for this CT exam including automated exposure control and adjustment of the mA and/or kV according to patient size. COMPARISON: No prior studies available. FINDINGS: Lower Chest: Linear densities the bilateral lung bases are seen. Liver: Normal. Spleen: Unremarkable. Pancreas: Unremarkable.  Gallbladder: No calcified stones. Biliary: Nondilated. Adrenal glands: Normal. Kidneys: Irregular areas of low-density in the bilateral kidneys is seen. Bowel: Massive quantity of stool is seen within the rectum. There is sigmoid and colonic wall thickening seen. The appendix appears within normal limits. Lymph nodes: No adenopathy. Vasculature: Atherosclerotic changes are seen. Peritoneum: Unremarkable without ascites. Musculoskeletal: No acute or destructive process. Pelvis: No adenopathy or free fluid. There is suprapubic catheter seen. Bladder wall thickening is seen with nondependent air within the bladder.     1.  Massive quantity of stool in the rectum, appearance compatible with severe fecal impaction. 2.  Irregular areas of low-density in the bilateral kidneys, appearance favors pyelonephritis, correlate with urinalysis. 3.  Bladder wall thickening with nondependent air within the bladder, could represent changes of cystitis. 4.  Linear densities the bilateral lung bases favor changes of atelectasis 5.  Atherosclerosis    [id number] VIEW    Result Date: 10/29/2021  10/29/2021 1:35 PM HISTORY/REASON FOR EXAM:  Abdominal Pain; Eval for fecal impaction. Abdominal distention TECHNIQUE/EXAM DESCRIPTION AND NUMBER OF VIEWS:  1 view(s) of the abdomen. COMPARISON: 1/29/2017 and CT 10/27/2021 FINDINGS: AP view of the abdomen demonstrates diffuse small and large bowel distention. There is a large amount of colonic stool. There is a large amount of stool in the rectal vault, similar to the prior CT. No pneumatosis or portal venous air is identified. Stimulator device projects over the right lower quadrant. No suspicious bony lesions.     Large amount of colonic stool. Diffuse bowel distention is likely related to constipation.    DX-CHEST-PORTABLE (1 VIEW)    Result Date: 11/18/2021 11/18/2021 4:46 PM HISTORY/REASON FOR EXAM:  possible sepsis. Fever, diarrhea, nausea, vomiting TECHNIQUE/EXAM DESCRIPTION AND NUMBER  OF VIEWS: Single AP view of the chest. COMPARISON: None FINDINGS: Heart: The cardiac silhouette is normal in size. Mediastinum: Normal contours. Lungs: The lungs are clear. Pleura: No pleural fluid. Bones: No suspicious bony lesions. Lines/tubes: None.     No evidence of acute cardiopulmonary process.    DX-CHEST-PORTABLE (1 VIEW)    Result Date: 10/24/2021  10/24/2021 12:32 AM HISTORY/REASON FOR EXAM:  Shortness of Breath. TECHNIQUE/EXAM DESCRIPTION AND NUMBER OF VIEWS: Single portable view of the chest. COMPARISON: 6/20/2018 FINDINGS: Cardiomediastinal silhouette is normal. Tiny subsegmental opacity in the left midlung could be atelectasis, tiny infiltrate or artifact. Lungs are otherwise clear. No pleural effusion or pneumothorax. No acute osseous abnormality.     Tiny left mid lung opacity could represent atelectasis or tiny infiltrate. No pleural effusion.    MR-BRAIN-WITH & W/O    Result Date: 10/29/2021  10/29/2021 12:48 PM HISTORY/REASON FOR EXAM: Demyelinating disease; History of MS, eval for active flare TECHNIQUE/EXAM DESCRIPTION: T1 sagittal, T2 axial, flair coronal, T1 coronal, and diffusion-weighted axial images were obtained of the brain pre-contrast followed by T1 coronal and axial images post intravenous administration of 10 mL ProHance. COMPARISON: 6/8/2016 FINDINGS: Diffusion-weighted images are normal. No acute infarction is identified. Again noted are multiple foci of abnormal T2 signal involving the brainstem and cerebellum, stable from previous study. Confluent T2 signal abnormality in the left occipital periventricular white matter. No stable. Foci of signal abnormality involving the subcortical white matter in the right parietal, left frontal, left medial parietal region, right superior frontal region, right medial high parietal region, left precentral  and postcentral region, left prefrontal region, right superior frontal region are stable. No definite new lesions are identified on the  FLAIR imaging. Volume loss in the posterior body of the corpus callosum is again noted, stable from the previous study. Postcontrast images do not demonstrate any abnormal intracranial enhancement. The Sella is within normal limits. The craniocervical junction is within normal limits. Visualized intracranial arterial flow voids are within normal limits. Bone marrow signal in the calvarium is within normal limits. Included portions of the paranasal sinuses are within normal limits. Included portions of the mastoid air cells are within normal limits. Included portions of the orbits are within normal limits     Multiple white matter lesions compatible with the diagnosis of a demyelinating process such as multiple sclerosis, stable from previous study. No new lesions are identified. There is no abnormal intracranial enhancement.    MR-CERVICAL SPINE-WITH & W/O    Result Date: 10/29/2021  10/29/2021 12:48 PM HISTORY/REASON FOR EXAM:  Demyelinating disease; Known MS, eval for active flare TECHNIQUE/EXAM DESCRIPTION: MRI of the cervical spine without and with contrast. The study was performed on a [employer] Signa 1.5 Kayce MRI scanner. T1 sagittal, T2 fast spin-echo sagittal, T1 postcontrast fat-suppressed sagittal, and gradient-echo axial images were obtained of the cervical spine. 10 mL ProHance contrast were administered  intravenously. COMPARISON:  6/8/2016 FINDINGS: The cervical vertebral bodies have a normal height and alignment. The disk spaces are well-maintained and have a normal appearance. Volume loss noted diffusely throughout the cervical spinal cord, which is a new finding since the previous examination. On the T2-weighted images, heterogeneous cord signal is noted diffusely with decreased conspicuous area of the previously seen discrete lesions noted at the cervicomedullary junction, at the C2-3 level and at the C5 level. There is no significant degenerative disease in the cervical spine. Findings specific to  each level are described below: C2-3: Normal C3-4:  Normal C4-5:  Normal C5-6: Normal C6-7: Normal C7-T1: Normal Postcontrast images through the cervical spine do not demonstrate any abnormal enhancement in the cervical spine.     1.  Diffuse volume loss noted in the cervical spinal cord, new from the previous examination. 2.  Previously seen discrete lesions in the cervical cord are less conspicuous. There is heterogeneous cord signal throughout. No new lesions are seen. 3.  No abnormal enhancement is identified in the cervical spinal cord.      Micro:  Results     Procedure Component Value Units Date/Time    URINE CULTURE(NEW) [[id number]]  (Abnormal) Collected: 11/18/21 1725    Order Status: Completed Specimen: Other Updated: 11/20/21 1814     Significant Indicator POS     Source URSP     Site Suprapubic     Culture Result -     Gram Stain Result Rare WBCs.  Rare Yeast.       Culture Result Candida parapsilosis  10-50,000 cfu/mL        Corynebacterium striatum group  10-50,000 cfu/mL      Narrative:      Indication for culture:->Evaluation for sepsis without a  clear source of infection  Indication for culture:->Evaluation for sepsis without a    GRAM STAIN [[id number]] Collected: 11/18/21 1725    Order Status: Completed Specimen: Other Updated: 11/19/21 1149     Significant Indicator .     Source URSP     Site Suprapubic     Gram Stain Result 11/19/2021  11:48  Rare WBCs.  Rare Yeast.      Narrative:      Indication for culture:->Evaluation for sepsis without a  clear source of infection  Indication for culture:->Evaluation for sepsis without a    BLOOD CULTURE [[id number]] Collected: 11/18/21 1757    Order Status: Completed Specimen: Blood from Peripheral Updated: 11/19/21 0727     Significant Indicator NEG     Source BLD     Site PERIPHERAL     Culture Result No Growth  Note: Blood cultures are incubated for 5 days and  are monitored continuously.Positive blood cultures  are called to the RN and reported as soon  "as  they are identified.  Blood culture testing and Gram stain, if indicated, are  performed at [facility], [address]  [address] [facility].[place], [place].  Positive blood cultures are  sent to [facility], [address] [place] [address], for organism identification and  susceptibility testing.      Narrative:      Per Hospital Policy: Only change Specimen Src: to \"Line\" if  specified by physician order.  Right AC    BLOOD CULTURE [[id number]] Collected: 11/18/21 1646    Order Status: Completed Specimen: Blood from Peripheral Updated: 11/19/21 0727     Significant Indicator NEG     Source BLD     Site PERIPHERAL     Culture Result No Growth  Note: Blood cultures are incubated for 5 days and  are monitored continuously.Positive blood cultures  are called to the RN and reported as soon as  they are identified.  Blood culture testing and Gram stain, if indicated, are  performed at [facility], [facility]  [address] [facility].[place], [place].  Positive blood cultures are  sent to [facility], [address] [place] [address], for organism identification and  susceptibility testing.      Narrative:      Per Hospital Policy: Only change Specimen Src: to \"Line\" if  specified by physician order.  Right Forearm/Arm    C Diff Toxin [[id number]]  (Abnormal) Collected: 11/18/21 1729    Order Status: Completed Updated: 11/19/21 0548     C.Diff Toxin A&B Positive     Comment: TOXIN POSITIVE  Toxin detected by EIA; C. difficile detected by PCR.  If clinically correlated, treatment indicated per guidelines.  Test of cure is not recommended.         Narrative:      Special Contact Isolation  Does this patient have risk factors for C-diff?->Yes  C-Diff Risk Factors->antibiotic exposure  Has patient taken stool softeners or laxatives in the last 5  days?->No  Indication for CDiff by PCR?->Greater than/equal to 3 stools  in 24 hr & \"takes shape of container\"    C Diff by " "PCR rflx Toxin [[id number]] Collected: 11/18/21 1729    Order Status: Completed Specimen: Urine from Stool Updated: 11/19/21 0544     C Diff by PCR See Toxin     027-NAP1-BI Presumptive Negative     Comment: Presumptive 027/NAP1/BI target DNA sequences are NOT DETECTED.       Narrative:      Special Contact Isolation  Does this patient have risk factors for C-diff?->Yes  C-Diff Risk Factors->antibiotic exposure  Has patient taken stool softeners or laxatives in the last 5  days?->No  Indication for CDiff by PCR?->Greater than/equal to 3 stools  in 24 hr & \"takes shape of container\"    URINALYSIS [[id number]]  (Abnormal) Collected: 11/18/21 1725    Order Status: Completed Specimen: Urine Updated: 11/18/21 1755     Color Yellow     Character Cloudy     Specific Gravity >=1.030     Ph 5.0     Glucose Negative mg/dL      Ketones Negative mg/dL      Protein Negative mg/dL      Bilirubin Negative     Nitrite Negative     Leukocyte Esterase Trace     Occult Blood Moderate     Micro Urine Req Microscopic    Narrative:      Indication for culture:->Evaluation for sepsis without a  clear source of infection    URINE CULTURE (ADD ON) [[id number]] Collected: 11/18/21 0000    Order Status: Canceled Specimen: Other from Urine, Suprapubic           Assessment:  Active Hospital Problems    Diagnosis    • Hypomagnesemia [E83.42]    • Fecal impaction (HCC) [K56.41]    • Neurogenic bladder [N31.9]    • Spasticity [R25.2]    • H/O Clostridium difficile infection [Z86.19]    • Sepsis (HCC) [A41.9]    • Hypokalemia [E87.6]    • MS (multiple sclerosis) (HCC) [G35]      Interval 24 hours:      AF, O2 RA  Labs reviewed, new lab  Micro reviewed    Still with frequent watery stools per nursing. Patient did on p.o. vancomycin as below.    Assessment:  41 y.o.  admitted 11/18/2021. Pt has a past medical history of multiple sclerosis complicated by neurogenic bladder with chronic suprapubic Epperson, bladder stone and is s/p cystolitholopaxy on 10/28 " and suprapubic catheter urine culture at the time grew Proteus, Providencia seciues here and MSSA.  He was given an antibiotic course, reported as cefdinir.  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  He presented complaining of fevers, frequent watery stools as well as nausea and vomiting.      Hospital Course:   Patient febrile to 100.9.  Leukocytosis to 14.1 and now improving.  CT abdomen and pelvis on 11/18 with massive quantities stool in the rectum, severe fecal impaction.  Also notes irregular areas of low density in the kidneys favoring pyelonephritis bladder wall thickening potentially representing cystitis.  C. difficile tested and toxin positive on 11/18.  Patient went to the OR on 11/19 for manual stool disimpaction.  The patient was given Zosyn and has been continued on cefepime as well as p.o. vancomycin     Problem List      Recurrent C. difficile infection, discordant testing with PCR negative and toxin positive however symptomatic with frequent watery stools.  This is in the setting of severe stool impaction so difficult to ascertain whether it is actually C. difficile infection or reaction to the fecal impaction.  He did have a white count and was febrile which supports infection so will treat.  -Status post multiple courses of antibiotics and fecal transplant  Severe fecal impaction  - S/p surgical disimpaction on 11/19   Recent diagnosis of UTI in the setting of bladder stones and received an antibiotic course  MS, complicated by neurogenic bladder and suprapubic catheter     Plan      --- Agree with p.o. vancomycin 125 mg every 6 hours, anticipate a prolonged course with taper and will consider fidaxomicin for taper given the recurrent nature  --- Stopped cefepime, no clear indication and will exacerbate C. Difficile  --- Recommend exercising extreme judiciousness with regard to antibiotics in this patient - only give if absolutely necessary with a very clear  indication for a short a course as possible as he is at risk for recurrent episodes of C. difficile with associated morbidity and mortality  --- Urine culture with yeast and corynebacterium, from a suprapubic catheter so likely colonization - will not treat  --- Monitor labs-ordered for a.m.        Plan of care discussed with nurse.  Will continue to follow.

## 2021-11-21 NOTE — DISCHARGE PLANNING
Received Choice form at 1906  Agency/Facility Name: [facility]   Referral sent per Choice form @ [phone]

## 2021-11-21 NOTE — PROGRESS NOTES
Report received from Nika SIMON. Pt awake and resting at the time of report. Plan of care discussed outside of room due to special contact precautions. White board has been updated.  Safety precautions in place and call light within reach. No needs at this time.    COVID 19 surge in effect      1951: Patient hypotensive. BP 88/53, 92/49, 87/46  Patient light headed  Other VS stable  MD notified via voalte  Orders received, see MAR.    2117: patient hypotensive after bolus. BP 84/48, 91/53  MD notified via voalte  MD instructed RN to administer another 500mL LR bolus if patient becomes symptomatic and BP is in the 80's at next vital check

## 2021-11-22 NOTE — DISCHARGE PLANNING
Agency/Facility Name: [facility]  Spoke To: Niko  Outcome: Advised of [facility]  11/22 @ 1300      CM informed

## 2021-11-22 NOTE — PROGRESS NOTES
Report received from AURORA Cunningham . Pt is resting comfortably, safety precautions in place, and call light within reach. No needs at this time.     Covid-19 surge in effect.

## 2021-11-22 NOTE — PROGRESS NOTES
IV removed .Pt verbalized understanding of discharge instructions with no further questions at this time. Patient unable to sign paperwork. 2 RN sign with AURORA Kern.   Patient discharge to [facility]. Report given to AURORA Gonzalez. Patient transported by [facility] with belongings.

## 2021-11-22 NOTE — DISCHARGE PLANNING
DC Transport Scheduled    Received request at: 1001    Transport Company Scheduled: [employer]  Spoke with Fartun at [facility] schedule transport.    Scheduled Date: 11/22/21  Scheduled Time: 1300    Destination: [facility]  [address] [facility] [place] [address]    Notified care team of scheduled transport via Voalte.     If there are any changes needed to the DC transportation scheduled, please contact [facility] at ext. [phone] between the hours of 5967-7556 Mon-Fri. If outside those hours, contact the ED Case Manager at ext. [phone].

## 2021-11-22 NOTE — DISCHARGE PLANNING
Agency/Facility Name: [facility]  Spoke To: Niko  Outcome: Bed available 11/22. We need to set up transport.    CM informed

## 2021-11-22 NOTE — DISCHARGE PLANNING
Anticipated Discharge Disposition:   SNF, [facility]     Action:   Chart review complete.     Per MD, patient is medically cleared to discharge to SNF.     Patient was previously at [facility] and has been accepted back. LSW to DPA to follow up with [facility] for a bed.     0945: [facility] has a bed today but will need transport set up. Patient has MS and is a 3x assist.     1000: Transport forms faxed to ride line for [facility] set up. Transport requested for 1300.     1118: Transport to [facility] via [facility] confirmed for 1300.     Barriers to Discharge:   None     Plan:   Hospital care management will continue to assist with discharge planning needs.

## 2021-11-22 NOTE — DISCHARGE PLANNING
Anticipated Discharge Disposition: [facility] ()    Action: LSW informed that pt has transport at 1300 back to [place].    LSW met with pt at bedside. Pt gave verbal consent for cobra. Pt requested his mother Kandi be notified. LSW provided cobra to AURORA Gonzalez. LSW requested RN notify pt's mother of transport.     Barriers to Discharge: None    Plan: LSW to follow and assist as needed.

## 2021-11-22 NOTE — DISCHARGE INSTRUCTIONS
Discharge Instructions    Discharged to other by medical transportation with escort. Discharged via ambulance, hospital escort: Yes.  Special equipment needed: Not Applicable    Be sure to schedule a follow-up appointment with your primary care doctor or any specialists as instructed.     Discharge Plan:   Diet Plan: Discussed  Activity Level: Discussed  Confirmed Follow up Appointment: No (Comments)  Confirmed Symptoms Management: Discussed  Medication Reconciliation Updated: Yes  Influenza Vaccine Indication: Patient Refuses    I understand that a diet low in cholesterol, fat, and sodium is recommended for good health. Unless I have been given specific instructions below for another diet, I accept this instruction as my diet prescription.   Other diet: Regular    Special Instructions: None    · Is patient discharged on Warfarin / Coumadin?   No     Depression / Suicide Risk    As you are discharged from this [facility] facility, it is important to learn how to keep safe from harming yourself.    Recognize the warning signs:  · Abrupt changes in personality, positive or negative- including increase in energy   · Giving away possessions  · Change in eating patterns- significant weight changes-  positive or negative  · Change in sleeping patterns- unable to sleep or sleeping all the time   · Unwillingness or inability to communicate  · Depression  · Unusual sadness, discouragement and loneliness  · Talk of wanting to die  · Neglect of personal appearance   · Rebelliousness- reckless behavior  · Withdrawal from people/activities they love  · Confusion- inability to concentrate     If you or a loved one observes any of these behaviors or has concerns about self-harm, here's what you can do:  · Talk about it- your feelings and reasons for harming yourself  · Remove any means that you might use to hurt yourself (examples: pills, rope, extension cords, firearm)  · Get professional help from the community (Mental  Health, Substance Abuse, psychological counseling)  · Do not be alone:Call your Safe Contact- someone whom you trust who will be there for you.  · Call your local CRISIS HOTLINE 461-4279 or 281-075-5789  · Call your local Children's Mobile Crisis Response Team [place] (254) 117-6431 or www.Redeem&Get  · Call the toll free National Suicide Prevention Hotlines   · National Suicide Prevention Lifeline 198-403-HIUB (2013)  · National Hope Line Network 800-SUICIDE (988-7578)

## 2021-11-22 NOTE — CARE PLAN
Problem: Nutritional:  Goal: Achieve adequate nutritional intake  Description: Advance diet as tolerated. Patient will consume >50% of meals.  Outcome: Progressing     PO intake has continued to improve x 2 days since advancing to a regular diet.     RD to follow.

## 2021-11-22 NOTE — ANESTHESIA POSTPROCEDURE EVALUATION
Patient: Jerzy Juan III    Procedure Summary     Date: 11/19/21 Room / Location:  OR  / SURGERY [facility]    Anesthesia Start: 2131 Anesthesia Stop:     Procedure: MANUAL DISIMPACTION (N/A Anus) Diagnosis: (fecal impaction)    Surgeons: Chris Conroy M.D. Responsible Provider: John Van M.D.    Anesthesia Type: general ASA Status: 3          Final Anesthesia Type: general  Last vitals  BP   Blood Pressure: 119/76    Temp   37 °C (98.6 °F)    Pulse   86   Resp   18    SpO2   98 %      Anesthesia Post Evaluation    Patient location during evaluation: PACU  Patient participation: complete - patient participated  Level of consciousness: awake and alert  Pain score: 0    Airway patency: patent  Anesthetic complications: no  Cardiovascular status: hemodynamically stable  Respiratory status: acceptable  Hydration status: euvolemic    PONV: none          No complications documented.     Nurse Pain Score: 0 (NPRS)

## 2021-12-15 NOTE — PROGRESS NOTES
"Chief Complaint   Patient presents with   • Follow-Up     MS       Problem List Items Addressed This Visit     MS (multiple sclerosis) ([facility])     Epifanio has progressive multiple sclerosis and is currently taking a second course of Mavenclad.  He has been living at [facility] for the last few years.  He has been doing okay with the facility there with the help of his mother.  He was hospitalized in October for bladder stone and bleeding and then he was hospitalized in November for fecal impaction and he got C. difficile.  Today he just finished his course of vancomycin.  Patient states that he is having worsening movement in his arms.  Sometimes he does get deconditioned after an illness.  He is working with a physical therapist at [place].         Relevant Medications    MAVENCLAD, 6 TABS, 10 MG Tablet Therapy Pack          History of present illness:  Jerzy Juan III 41 y.o. male presents today for treatment of secondary progressive multiple sclerosis.    Past medical history:   Past Medical History:   Diagnosis Date   • Anxiety    • Back pain    • Blood transfusion    • Bowel habit changes     constipation   • Breath shortness     \"r/t to medication Ocrevus and Baclofen\" \"side effect   • Dental disorder     poor dentation   • Depression     PTSD/clinical depression   • Eyes sensitive to light     Needs to wear sunglasses   • Fall     4/6/2016   • Headache(784.0)    • Heart burn     reflux   • Heat intolerance     will vomit   • Hypertension    • Indigestion    • MS (multiple sclerosis) ([facility]) DX 8/20/2013    multiple lesions brain/spine   • Multiple sclerosis ([facility]) 2013   • Pain 06/20/2018    \"everywhere\"   • Presence of intrathecal baclofen pump     right side   • Urinary bladder disorder     UTI/catheter/bladder stone   • Urinary incontinence     catheter in place       Past surgical history:   Past Surgical History:   Procedure Laterality Date   • WOUND EXPLORATION GENERAL N/A 11/19/2021    " Procedure: MANUAL DISIMPACTION;  Surgeon: Chris Conroy M.D.;  Location: [facility];  Service: General   • PB CYSTOURETHROSCOPY N/A 10/28/2021    Procedure: CYSTOSCOPY;  Surgeon: Kwasi Sapp M.D.;  Location: [facility];  Service: Urology   • LASERTRIPSY N/A 10/28/2021    Procedure: LITHOTRIPSY, USING LASER - FOR CYSTOLITHALOPAXY;  Surgeon: Kwasi Sapp M.D.;  Location: [facility];  Service: Urology   • PUMP INSERT/REMOVE  6/26/2018    Procedure: PUMP INSERT/REMOVE - BACLOFEN;  Surgeon: Andrew Gomez M.D.;  Location: [facility];  Service: Pain Management   • CYSTOSCOPY  5/7/2018    Procedure: CYSTOSCOPY;  Surgeon: Kwasi Sapp M.D.;  Location: [facility];  Service: Urology   • LASERTRIPSY  5/7/2018    Procedure: LASERTRIPSY - FOR: LITHOLAPAXY;  Surgeon: Kwasi Sapp M.D.;  Location: [facility];  Service: Urology   • BLOCK EPIDURAL STEROID INJECTION N/A 3/27/2018    Procedure: BLOCK EPIDURAL STEROID INJECTION - BACLOFEN PUMP TRIAL;  Surgeon: Andrew Gomez M.D.;  Location: [facility];  Service: Pain Management   • GASTROSCOPY  1/14/2017    Procedure: GASTROSCOPY WITH FECAL MATTER TRANSPLANT;  Surgeon: Obinna Douglas M.D.;  Location: [facility];  Service:    • OPEN REDUCTION Left 1988    left thumb       Family history:   Family History   Problem Relation Age of Onset   • Thyroid Mother    • Hypertension Mother    • Hypertension Father    • Psychiatric Illness Father        Social history:   Social History     Socioeconomic History   • Marital status:      Spouse name: Not on file   • Number of children: Not on file   • Years of education: Not on file   • Highest education level: Not on file   Occupational History   • Not on file   Tobacco Use   • Smoking status: Former Smoker     Packs/day: 1.00     Years: 20.00     Pack years: 20.00     Start date: 8/1/1997     Quit date: 8/1/2013      Years since quittin.3   • Smokeless tobacco: Never Used   Vaping Use   • Vaping Use: Former   Substance and Sexual Activity   • Alcohol use: Yes     Alcohol/week: 0.0 oz   • Drug use: Not Currently     Types: Inhaled     Comment: stopped 2019   • Sexual activity: Not on file     Comment: once daily for 23 yrs   Other Topics Concern   • Not on file   Social History Narrative   • Not on file     Social Determinants of Health     Financial Resource Strain:    • Difficulty of Paying Living Expenses: Not on file   Food Insecurity:    • Worried About Running Out of Food in the Last Year: Not on file   • Ran Out of Food in the Last Year: Not on file   Transportation Needs:    • Lack of Transportation (Medical): Not on file   • Lack of Transportation (Non-Medical): Not on file   Physical Activity:    • Days of Exercise per Week: Not on file   • Minutes of Exercise per Session: Not on file   Stress:    • Feeling of Stress : Not on file   Social Connections:    • Frequency of Communication with Friends and Family: Not on file   • Frequency of Social Gatherings with Friends and Family: Not on file   • Attends Christianity Services: Not on file   • Active Member of Clubs or Organizations: Not on file   • Attends Club or Organization Meetings: Not on file   • Marital Status: Not on file   Intimate Partner Violence:    • Fear of Current or Ex-Partner: Not on file   • Emotionally Abused: Not on file   • Physically Abused: Not on file   • Sexually Abused: Not on file   Housing Stability:    • Unable to Pay for Housing in the Last Year: Not on file   • Number of Places Lived in the Last Year: Not on file   • Unstable Housing in the Last Year: Not on file       Current medications:   Current Outpatient Medications   Medication   • buPROPion (WELLBUTRIN) 100 MG Tab   • MAVENCLAD, 6 TABS, 10 MG Tablet Therapy Pack   • lactulose 10 GM/15ML Solution   • ondansetron (ZOFRAN) 4 MG/2ML Solution injection   • ondansetron (ZOFRAN) 4 MG  Tab tablet   • Sodium Chloride (NS) Solution   • vancomycin (VANCOCIN) 125 MG capsule   • Cladribine, 10 Tabs, (MAVENCLAD, 10 TABS,) 10 MG Tablet Therapy Pack   • lactobacillus rhamnosus (CULTURELLE) Cap capsule   • Pain Pump (PATIENT SUPPLIED) XX CHAO   • multivitamin (THERAGRAN) Tab   • famotidine (PEPCID) 20 MG Tab   • sertraline (ZOLOFT) 25 MG tablet   • baclofen (LIORESAL) 10 MG Tab   • methenamine hip (HIPPREX) 1 GM Tab   • lidocaine (LIDODERM) 5 % Patch   • oxybutynin SR (DITROPAN-XL) 10 MG CR tablet   • Ascorbic Acid (VITAMIN C) 1000 MG Tab   • Acetaminophen (TYLENOL) 325 MG Cap   • ondansetron (ZOFRAN ODT) 4 MG TABLET DISPERSIBLE   • vitamin D (CHOLECALCIFEROL) 1000 UNIT Tab   • buPROPion SR (WELLBUTRIN-SR) 100 MG TABLET SR 12 HR     No current facility-administered medications for this visit.       Medication Allergy:  Allergies   Allergen Reactions   • Asa [Aspirin] Unspecified     Bleeding in stomach as a baby   • Gabapentin Anxiety     anger and severe mood swing   • Hydrocodone Nausea   • Methocarbamol      emesis   • Morphine Nausea   • Pregabalin Anxiety     Anger and severe mood swing       Review of systems:   Constitutional: denies fever, night sweats, weight loss.   Eyes: denies acute vision change, eye pain or secretion.   Ears, Nose, Mouth, Throat: denies nasal secretion, nasal bleeding, difficulty swallowing, hearing loss, tinnitus, vertigo, ear pain, acute dental problems, oral ulcers or lesions.   Endocrine: denies recent weight changes, heat or cold intolerance, polyuria, polydypsia, polyphagia,abnormal hair growth.  Cardiovascular: denies new onset of chest pain, palpitations, syncope, or dyspnea of exertion.  Pulmonary: denies shortness of breath, new onset of cough, hemoptysis, wheezing, chest pain or flu-like symptoms.   GI: denies nausea, vomiting, diarrhea, GI bleeding, change in appetite, abdominal pain, and change in bowel habits.  : denies dysuria, urinary incontinence,  "hematuria.  Heme/oncology: denies history of easy bruising or bleeding. No history of cancer, DVTor PE.  Allergy/immunology: denies hives/urticaria, or itching.   Dermatologic: denies new rash, or new skin lesions.  Musculoskeletal:denies joint swelling or pain, muscle pain, neck and back pain. Neurologic: denies headaches, acute visual changes, facial droopiness, muscle weakness (focal or generalized), paresthesias, anesthesia, ataxia, change in speech or language, memory loss, abnormal movements, seizures, loss of consciousness, or episodes of confusion.   Psychiatric: denies symptoms of depression, anxiety, hallucinations, mood swings or changes, suicidal or homicidal thoughts.     Physical examination:   Vitals:    12/14/21 1411   BP: 120/78   BP Location: Right arm   Patient Position: Sitting   BP Cuff Size: Adult   Pulse: (!) 123   Temp: 37.7 °C (99.8 °F)   TempSrc: Temporal   SpO2: 98%   Weight: 56.7 kg (125 lb)   Height: 1.803 m (5' 11\")     General: Patient in no acute distress, pleasant and cooperative.  HEENT: Normocephalic, no signs of acute trauma.   Neck: supple, no meningeal signs or carotid bruits. There is normal range of motion. No tenderness on exam.   Chest: clear to auscultation. No cough.   CV: RRR, no murmurs.   Skin: no signs of acute rashes or trauma.   Musculoskeletal: joints exhibit full range of motion, without any pain to palpation. There are no signs of joint or muscle swelling. There is no tenderness to deep palpation of muscles.   Psychiatric: No hallucinatory behavior. Denies symptoms of depression or suicidal ideation. Mood and affect appear normal on exam.     NEUROLOGICAL EXAM:   Mental status, orientation: Awake, alert and fully oriented.   Speech and language: speech is dysphasic and slurred. The patient is able to name, repeat and comprehend.   Memory: There is intact recollection of recent and remote events.   Cranial nerve exam: Pupils are 3-4 mm bilaterally and equally " reactive to light and accommodation. Visual fields are intact by confrontation. Fundoscopic exam was unremarkable. There is no nystagmus on primary or secondary gaze. Intact full EOM in all directions of gaze. Face appears symmetric. Sensation in the face is intact to light touch. Uvula is midline. Palate elevates symmetrically. Tongue is midline and without any signs of tongue biting or fasciculations. Sternocleidomastoid muscles exhibit is normal strength bilaterally. Shoulder shrug is intact bilaterally.   Motor exam: Strength is 1/5 in both lower extremities and 4- out of 5 in his left arm 4 out of 5 in his right arm. Tone is spastic. No abnormal movements were seen on exam.   Sensory exam reveals abnormal and decreased sense of light touch, proprioception, vibration and pinprick in all extremities.   Deep tendon reflexes:  3+ throughout. Plantar responses are flexor. There is no clonus.   Coordination: Unable to test  Gait: The patient is wheelchair-bound with a spastic paraparesis and also decreased arm strength.      ANCILLARY DATA REVIEWED:     Lab Data Review:  No results found for this or any previous visit (from the past 24 hour(s)).    Records reviewed: Reviewed recent laboratory testing which did not show suppressed lymphocyte count.      Imaging: I reviewed patient's most recent imaging studies of the brain and cervical spine from October 29, 2021      Reading Physician Reading Date Result Priority   Renu Villatoro M.D.  [phone] 10/29/2021 Routine     Narrative & Impression     10/29/2021 12:48 PM     HISTORY/REASON FOR EXAM: Demyelinating disease; History of MS, eval for active flare        TECHNIQUE/EXAM DESCRIPTION:     T1 sagittal, T2 axial, flair coronal, T1 coronal, and diffusion-weighted axial images were obtained of the brain pre-contrast followed by T1 coronal and axial images post intravenous administration of 10 mL ProHance.     COMPARISON:  6/8/2016     FINDINGS:     Diffusion-weighted images are normal. No acute infarction is identified. Again noted are multiple foci of abnormal T2 signal involving the brainstem and cerebellum, stable from previous study. Confluent T2 signal abnormality in the left occipital   periventricular white matter. No stable. Foci of signal abnormality involving the subcortical white matter in the right parietal, left frontal, left medial parietal region, right superior frontal region, right medial high parietal region, left precentral   and postcentral region, left prefrontal region, right superior frontal region are stable. No definite new lesions are identified on the FLAIR imaging. Volume loss in the posterior body of the corpus callosum is again noted, stable from the previous   study.  Postcontrast images do not demonstrate any abnormal intracranial enhancement.  The Sella is within normal limits.  The craniocervical junction is within normal limits.  Visualized intracranial arterial flow voids are within normal limits.  Bone marrow signal in the calvarium is within normal limits.  Included portions of the paranasal sinuses are within normal limits.  Included portions of the mastoid air cells are within normal limits.  Included portions of the orbits are within normal limits     IMPRESSION:        Multiple white matter lesions compatible with the diagnosis of a demyelinating process such as multiple sclerosis, stable from previous study. No new lesions are identified. There is no abnormal intracranial enhancement.           Narrative & Impression     10/29/2021 12:48 PM     HISTORY/REASON FOR EXAM:  Demyelinating disease; Known MS, eval for active flare        TECHNIQUE/EXAM DESCRIPTION: MRI of the cervical spine without and with contrast.     The study was performed on a [employer] 1.5 Kayce MRI scanner. T1 sagittal, T2 fast spin-echo sagittal, T1 postcontrast fat-suppressed sagittal, and gradient-echo axial images were  obtained of the cervical spine. 10 mL ProHance contrast were administered   intravenously.     COMPARISON:  6/8/2016     FINDINGS:     The cervical vertebral bodies have a normal height and alignment. The disk spaces are well-maintained and have a normal appearance.  Volume loss noted diffusely throughout the cervical spinal cord, which is a new finding since the previous examination. On the T2-weighted images, heterogeneous cord signal is noted diffusely with decreased conspicuous area of the previously seen   discrete lesions noted at the cervicomedullary junction, at the C2-3 level and at the C5 level.  There is no significant degenerative disease in the cervical spine.     Findings specific to each level are described below:     C2-3: Normal  C3-4:  Normal  C4-5:  Normal  C5-6: Normal  C6-7: Normal  C7-T1: Normal     Postcontrast images through the cervical spine do not demonstrate any abnormal enhancement in the cervical spine.     IMPRESSION:        1.  Diffuse volume loss noted in the cervical spinal cord, new from the previous examination.     2.  Previously seen discrete lesions in the cervical cord are less conspicuous. There is heterogeneous cord signal throughout. No new lesions are seen.     3.  No abnormal enhancement is identified in the cervical spinal cord.      ASSESSMENT AND PLAN:    1. MS (multiple sclerosis) ([facility])  Secondarily progressive multiple sclerosis receiving his second year and second course of Mavenclad.  I reviewed recent laboratory testing and it is okay to go ahead with the Mavenclad after he has been proven to be cleared of C. difficile I recommend waiting at least a week to make sure he does not have return of the diarrhea.  I discussed with patient and his mother the importance of physical therapy to keep his arm strength.  Patient will continue with Dr. Gomez for treatment of his pain and spasticity with the baclofen and ketamine pump.  Patient states that he does like to eat  and was having reflux but that now it has improved with positioning.  He is gaining some weight.  And ordered laboratory testing in 1 month and return to clinic in 3 months.        FOLLOW-UP:   6 months      My total time spent caring for the patient on the day of the encounter was 43 minutes.   This does not include time spent on separately billable procedures/tests.    EDUCATION AND COUNSELING:  -Discussed regular exercise program and prevention of cardiovascular disease, including stroke.   -Discussed healthy lifestyle, including: healthy diet (rich in fruits, vegetables, nuts and healthy oils); proper hydration, and adequate sleep hygiene (allowing 7-8 hrs of overnight sleep).        Melissa Bloch, MD  Clinical  of Neurology [facility].   Diplomate in Neurology.   Office: [phone]  Fax: [phone]

## 2021-12-15 NOTE — ASSESSMENT & PLAN NOTE
Epifanio has progressive multiple sclerosis and is currently taking a second course of Mavenclad.  He has been living at [facility] for the last few years.  He has been doing okay with the facility there with the help of his mother.  He was hospitalized in October for bladder stone and bleeding and then he was hospitalized in November for fecal impaction and he got C. difficile.  Today he just finished his course of vancomycin.  Patient states that he is having worsening movement in his arms.  Sometimes he does get deconditioned after an illness.  He is working with a physical therapist at [place].   Potassium 6.3 hemolyzed

## 2021-12-25 NOTE — ASSESSMENT & PLAN NOTE
Sertraline and bupropion held due to postop ileus and high NG tube output  Diazepam started for probable baclofen withdrawal, discontinue lorazepam

## 2021-12-25 NOTE — CONSULTS
"Date of Service:12/25/21    Consult Requested By: Marzena Smith M.D.    Reason for Consultation: C,diff constipation    History of Present Illness:   Jerzy Juan III is a 41 y.o.male who was sent by nursing home 12/25/2021 with fever and diarrhea.  PMH of MS, chronic pain with intrathecal pain pump, neurogenic bladder s/p suprapubic catheter, anxiety/depression, GERD.   Most recent admission 11/18-11/22.  At that time he had fecal impaction that required surgical evacuation as well as a recurrent C. difficile infection.  He was started on tapered vancomycin and discharged, diarrhea eventually resolved.  For the past 2 days diarrhea started up again, found to be febrile today at 102.7 so he was sent to the ED.  Denies headache, neck pain, cough, shortness of breath.  His mother is in the room and says they do skin checks on him daily, no recent skin breakdown.     He has had recurrent C. difficile infections, history of fecal transplant 3 years ago.  After fecal transplant he had significant improvement until 10/2021 when he had a UTI requiring antibiotics, afterwards he began having recurrent C. difficile again.     In the ED febrile at 100.6, and sinus tachycardia up to the 140s.  Improved with IV fluids.  Labs showed WBC of 18.9, lactic acid 2.7, procalcitonin 3.34.  CT abdomen shows proctitis and pancolitis as well as large amount of stool in the rectum and sigmoid colon. [unfilled] admitted 12/25/2021.     He has been seen by surgery who started him on enemas and Golytely     Review Of Systems:  Unable to obtain patient sedated    PMH:   Past Medical History:   Diagnosis Date   • Anxiety    • Back pain    • Blood transfusion    • Bowel habit changes     constipation   • Breath shortness     \"r/t to medication Ocrevus and Baclofen\" \"side effect   • Dental disorder     poor dentation   • Depression     PTSD/clinical depression   • Eyes sensitive to light     Needs to wear sunglasses   • Fall     " "4/6/2016   • Headache(784.0)    • Heart burn     reflux   • Heat intolerance     will vomit   • Hypertension    • Indigestion    • MS (multiple sclerosis) (HCC) DX 8/20/2013    multiple lesions brain/spine   • Multiple sclerosis (HCC) 2013   • Pain 06/20/2018    \"everywhere\"   • Presence of intrathecal baclofen pump     right side   • Urinary bladder disorder     UTI/catheter/bladder stone   • Urinary incontinence     catheter in place         PSH:  Past Surgical History:   Procedure Laterality Date   • WOUND EXPLORATION GENERAL N/A 11/19/2021    Procedure: MANUAL DISIMPACTION;  Surgeon: Chris Conroy M.D.;  Location: [facility];  Service: General   • PB CYSTOURETHROSCOPY N/A 10/28/2021    Procedure: CYSTOSCOPY;  Surgeon: Kwasi Sapp M.D.;  Location: [facility];  Service: Urology   • LASERTRIPSY N/A 10/28/2021    Procedure: LITHOTRIPSY, USING LASER - FOR CYSTOLITHALOPAXY;  Surgeon: Kwasi Sapp M.D.;  Location: [facility];  Service: Urology   • PUMP INSERT/REMOVE  6/26/2018    Procedure: PUMP INSERT/REMOVE - BACLOFEN;  Surgeon: Andrew Gomez M.D.;  Location: [facility];  Service: Pain Management   • CYSTOSCOPY  5/7/2018    Procedure: CYSTOSCOPY;  Surgeon: Kwasi Sapp M.D.;  Location: [facility];  Service: Urology   • LASERTRIPSY  5/7/2018    Procedure: LASERTRIPSY - FOR: LITHOLAPAXY;  Surgeon: Kwasi Sapp M.D.;  Location: [facility];  Service: Urology   • BLOCK EPIDURAL STEROID INJECTION N/A 3/27/2018    Procedure: BLOCK EPIDURAL STEROID INJECTION - BACLOFEN PUMP TRIAL;  Surgeon: Andrew Gomez M.D.;  Location: [facility];  Service: Pain Management   • GASTROSCOPY  1/14/2017    Procedure: GASTROSCOPY WITH FECAL MATTER TRANSPLANT;  Surgeon: Obinna Douglas M.D.;  Location: [facility];  Service:    • OPEN REDUCTION Left 1988    left thumb       FAMILY HX:  Family History   Problem Relation Age of " Onset   • Thyroid Mother    • Hypertension Mother    • Hypertension Father    • Psychiatric Illness Father        SOCIAL HX:  Social History     Socioeconomic History   • Marital status:      Spouse name: Not on file   • Number of children: Not on file   • Years of education: Not on file   • Highest education level: Not on file   Occupational History   • Not on file   Tobacco Use   • Smoking status: Former Smoker     Packs/day: 1.00     Years: 20.00     Pack years: 20.00     Start date: 1997     Quit date: 2013     Years since quittin.4   • Smokeless tobacco: Never Used   Vaping Use   • Vaping Use: Former   Substance and Sexual Activity   • Alcohol use: Not Currently     Alcohol/week: 0.0 oz   • Drug use: Not Currently     Types: Inhaled     Comment: stopped 2019   • Sexual activity: Not on file     Comment: once daily for 23 yrs   Other Topics Concern   • Not on file   Social History Narrative   • Not on file     Social Determinants of Health     Financial Resource Strain:    • Difficulty of Paying Living Expenses: Not on file   Food Insecurity:    • Worried About Running Out of Food in the Last Year: Not on file   • Ran Out of Food in the Last Year: Not on file   Transportation Needs:    • Lack of Transportation (Medical): Not on file   • Lack of Transportation (Non-Medical): Not on file   Physical Activity:    • Days of Exercise per Week: Not on file   • Minutes of Exercise per Session: Not on file   Stress:    • Feeling of Stress : Not on file   Social Connections:    • Frequency of Communication with Friends and Family: Not on file   • Frequency of Social Gatherings with Friends and Family: Not on file   • Attends Christianity Services: Not on file   • Active Member of Clubs or Organizations: Not on file   • Attends Club or Organization Meetings: Not on file   • Marital Status: Not on file   Intimate Partner Violence:    • Fear of Current or Ex-Partner: Not on file   • Emotionally Abused:  Not on file   • Physically Abused: Not on file   • Sexually Abused: Not on file   Housing Stability:    • Unable to Pay for Housing in the Last Year: Not on file   • Number of Places Lived in the Last Year: Not on file   • Unstable Housing in the Last Year: Not on file     Social History     Tobacco Use   Smoking Status Former Smoker   • Packs/day: 1.00   • Years: 20.00   • Pack years: 20.00   • Start date: 1997   • Quit date: 2013   • Years since quittin.4   Smokeless Tobacco Never Used     Social History     Substance and Sexual Activity   Alcohol Use Not Currently   • Alcohol/week: 0.0 oz       Allergies/Intolerances:  Allergies   Allergen Reactions   • Asa [Aspirin] Unspecified     Bleeding in stomach as a baby   • Gabapentin Anxiety     Anger and severe mood swings   • Pregabalin Anxiety     Anger and severe mood swings   • Hydrocodone Nausea   • Methocarbamol Nausea   • Morphine Nausea       History reviewed with the patient    Other Current Medications:    Current Facility-Administered Medications:   •  Special Contact Isolation, , , CONTINUOUS **AND** [COMPLETED] C Diff by PCR rflx Toxin, , , Once **AND** Pharmacy Consult Request, 1 Each, Other, PHARMACY TO DOSE, Carlos Leslie D.O.  •  enoxaparin (LOVENOX) inj 40 mg, 40 mg, Subcutaneous, DAILY, Guero Mishra M.D.  •  acetaminophen (Tylenol) tablet 650 mg, 650 mg, Oral, Q6HRS PRN, Guero Mishra M.D., 650 mg at 21 0734  •  labetalol (NORMODYNE/TRANDATE) injection 10 mg, 10 mg, Intravenous, Q4HRS PRN, Guero Mishra M.D.  •  baclofen (LIORESAL) tablet 10 mg, 10 mg, Oral, 4X/DAY, Guero Mishra M.D., 10 mg at 21 0932  •  buPROPion SR (WELLBUTRIN-SR) tablet 100 mg, 100 mg, Oral, TID, Guero Mishra M.D., 100 mg at 21 0931  •  famotidine (PEPCID) tablet 20 mg, 20 mg, Oral, BID, Guero Mishra M.D., 20 mg at 21 0932  •  ondansetron (ZOFRAN ODT) dispertab 4 mg, 4 mg, Oral, Q4HRS PRN, Guero Mishra M.D.  •  ondansetron  "(ZOFRAN) syringe/vial injection 4 mg, 4 mg, Intravenous, Q6HRS PRN, Guero Mishra M.D., 4 mg at 21  •  sertraline (Zoloft) tablet 25 mg, 25 mg, Oral, DAILY, Guero Mishra M.D., 25 mg at 21  •  vitamin D3 (cholecalciferol) tablet 1,000 Units, 1,000 Units, Oral, DAILY, Guero Mishra M.D., 1,000 Units at 21  •  vancomycin 50 mg/mL oral soln 250 mg, 250 mg, Oral, Q6HRS, Marzena Smith M.D.  •  lactated ringers infusion, , Intravenous, Continuous, Marzena Smith M.D.  •  metroNIDAZOLE (FLAGYL) IVPB 500 mg, 500 mg, Intravenous, Q8HRS, Veronica Cramer M.D.  •  polyethylene glycol-electrolytes (GOLYTELY) solution 4 L, 4 L, Oral, Once, Radha Jacobo M.D.  •  Metoprolol Tartrate (LOPRESSOR) injection 5 mg, 5 mg, Intravenous, Q5 MIN PRN, Marzena Smith M.D., 5 mg at 21 1154  [unfilled]    Most Recent Vital Signs:  /90   Pulse (!) 137   Temp (!) 38.2 °C (100.8 °F) (Oral)   Resp (!) 22   Ht 1.803 m (5' 11\")   Wt 60.7 kg (133 lb 13.1 oz)   SpO2 93%   BMI 18.66 kg/m²   Temp  Av.3 °C (99.1 °F)  Min: 36.3 °C (97.3 °F)  Max: 39.2 °C (102.5 °F)    Physical Exam:  General: Nontoxic, no acute distress  HEENT: sclera anicteric, PERRL, EOMI, MMM, no oral lesions  Neck: supple, no lymphadenopathy  Chest: CTAB, no r/r/w, normal work of breathing.  Cardiac: Regular, no murmurs no gallops heard  Abdomen: + bowel sounds faint, soft, non-tender, non-distended, no HSM  Extremities: No edema. No joint swelling.  Skin: no rashes or erythema, tattoos on arms  Pertinent Lab Results:  Recent Labs     21  0350   WBC 18.9*      Recent Labs     21  0350   HEMOGLOBIN 16.9   HEMATOCRIT 50.0   MCV 88.7   MCH 30.0   PLATELETCT 317         Recent Labs     21  035   SODIUM 134*   POTASSIUM 3.6   CHLORIDE 100   CO2 18*   CREATININE 0.85        Recent Labs     21  035   ALBUMIN 3.3      Recent Labs     21  0350   ASTSGOT 15   ALTSGPT 15 " "  TBILIRUBIN 0.8   ALKPHOSPHAT 95   GLOBULIN 3.0       [unfilled]      Pertinent Micro:  Results     Procedure Component Value Units Date/Time    URINALYSIS [[id number]]  (Abnormal) Collected: 12/25/21 0820    Order Status: Completed Specimen: Urine, [facility] Cath Updated: 12/25/21 0909     Color Yellow     Character Clear     Specific Gravity 1.010     Ph 5.5     Glucose Negative mg/dL      Ketones Negative mg/dL      Protein Negative mg/dL      Bilirubin Negative     Nitrite Negative     Leukocyte Esterase Negative     Occult Blood Moderate     Micro Urine Req Microscopic    Narrative:      Collected By:  Indication for culture:->Emergency Room Patient  Indication for culture:->New onset fever with no other  identified cause    URINE CULTURE(NEW) [[id number]] Collected: 12/25/21 0820    Order Status: Completed Specimen: Urine, [facility] Cath Updated: 12/25/21 0829    Narrative:      Collected By:  Indication for culture:->Emergency Room Patient  Indication for culture:->New onset fever with no other  identified cause    C Diff by PCR rflx Toxin [[id number]] Collected: 12/25/21 0505    Order Status: Completed Specimen: Stool Updated: 12/25/21 0531    Narrative:      Special Contact Isolation  Collected By:  Does this patient have risk factors for C-diff?->Yes  C-Diff Risk Factors->long length of stay in healthcare  settings    COV-2, FLU A/B, AND RSV BY PCR (2-4 HOURS CEPHEID): Collect NP swab in VTM [[id number]] Collected: 12/25/21 0410    Order Status: Completed Specimen: Respirate from Nasopharyngeal Updated: 12/25/21 0459     Influenza virus A RNA Negative     Influenza virus B, PCR Negative     RSV, PCR Negative     SARS-CoV-2 by PCR NotDetected     Comment: PATIENTS: Important information regarding your results and instructions can  be found at [email or web address]   \"After your  Covid-19 Test\"    [facility] providers: PLEASE REFER TO DE-ESCALATION AND RETESTING PROTOCOL  on " "[email or web address]    **The [employer] GeneXpert Xpress SARS-CoV-2 RT-PCR Test has been made  available for use under the Emergency Use Authorization (EUA) only.          SARS-CoV-2 Source NP Swab    Narrative:      Special Contact Isolation  Collected By:  Have you been in close contact with a person who is suspected  or known to be positive for COVID-19 within the last 30 days  (e.g. last seen that person < 30 days ago)->Unknown    BLOOD CULTURE [[id number]] Collected: 12/25/21 0425    Order Status: Completed Specimen: Blood from Peripheral Updated: 12/25/21 0433    Narrative:      Collected By:  Per Hospital Policy: Only change Specimen Src: to \"Line\" if  specified by physician order.    BLOOD CULTURE [[id number]] Collected: 12/25/21 0350    Order Status: Completed Specimen: Blood from Peripheral Updated: 12/25/21 0358    Narrative:      Collected By:  Per Hospital Policy: Only change Specimen Src: to \"Line\" if  specified by physician order.    CULTURE STOOL [[id number]]     Order Status: Sent Specimen: Stool         Blood Culture   Date Value Ref Range Status   03/12/2018 No growth after 5 days of incubation.  Final        Studies:       Results for orders placed in visit on 06/20/18    DX-CHEST-2 VIEWS    Impression  No evidence of acute cardiopulmonary process.                                            IMPRESSION:     C.Diff colitis       Vancomycin        Flagyl        Could consider changing to fidaxomicin    Constipation       Bowel prep started       Having loose stools   MS  Sepsis        Antibiotics and supportive care per primary team        PLAN:      started on Vancomycin continue for now  This is a repeated event unfortunately for him, a FMT might help but given the Covid pandemic this has become more difficult to obtain, we will try to see what we can arrange on Monday given the FMT facility from what Im told is closed.  He did require a fecal disimpaction last time by surgery perhaps the bowel prep will " achieve results given he is having liquid stools at this time.  On antibiotics and would continue supportive care and watch carefully given his underlying MS.  His abdomin is soft faint BS are present at this time.  To further augment his therapy consider changing to Fidamoxmcin if ID agrees and available.  We will pursue FMT on Monday   Please call if any issues [phone]    Discussed with IM. Will continue to follow    Jung Rodriguez M.D.

## 2021-12-25 NOTE — PROGRESS NOTES
Soap suds enema complete at bedside with assistance of Jhonathan MEDINA. Pt tolerated well. Loose stool witnessed as result of enema administration.

## 2021-12-25 NOTE — PROGRESS NOTES
Telemetry Shift Summary     Rhythm: ST  Rate: 123-150  Measurements: 0.16/0.08/0.34  Ectopy (reported by Monitor Tech): R PVC, R PAC     Normal Values  Rhythm: Sinus  HR:   Measurements: 0.12-0.20/0.06-0.10/0.30-0.52

## 2021-12-25 NOTE — ASSESSMENT & PLAN NOTE
Debilitating MS, lives in a nursing home- [place]  Patient has been declining despite second round of [place], hold off due to active infection   History of neurogenic bladder secondary to this with suprapubic catheter  History of chronic pain with pain pump in place  Continue baclofen and vitamin D  Hold off on cladribine due to acute infection.  Restart once afebrile infection under control  Palliative care consult

## 2021-12-25 NOTE — ASSESSMENT & PLAN NOTE
This is Sepsis Present on admission  SIRS criteria identified on my evaluation include: Fever, with temperature greater than 101 deg F, Tachycardia, with heart rate greater than 90 BPM and Leukocytosis, with WBC greater than 12,000  Source is GI  Sepsis protocol initiated  Fluid resuscitation ordered per protocol  IV antibiotics as appropriate for source of sepsis  While organ dysfunction may be noted elsewhere in this problem list or in the chart, degree of organ dysfunction does not meet CMS criteria for severe sepsis    Patient continues to be tachycardic and tachypneic this morning  Possible baclofen withdrawal and pain contributing to this  Lactic acid 1.2  Continue iv meropenem and Vanco per ostomy

## 2021-12-25 NOTE — ED NOTES
Pt had large incontinent loose stool. Pt was cleaned. Urine was sent from the replaced SP catheter.

## 2021-12-25 NOTE — H&P
Hospital Medicine History & Physical Note    Date of Service  12/25/2021    Primary Care Physician  Janet Reddy M.D.    Code Status  DNAR/DNI    Chief Complaint  Chief Complaint   Patient presents with   • Fever     Pt BELINDA HOLCOMB from [facility]. Per nurse report from [place], pt with fever of 102.7 F, diarrhea since yesterday, dark urine in suprapubic catheter, mild confusion.    • Diarrhea       History of Presenting Illness  Jerzy Juan III is a 41 y.o. male who was sent by nursing home 12/25/2021 with fever and diarrhea.  PMH of MS, chronic pain with intrathecal pain pump, neurogenic bladder s/p suprapubic catheter, anxiety/depression, GERD.   Most recent admission 11/18-11/22.  At that time he had fecal impaction that required surgical evacuation as well as a recurrent C. difficile infection.  He was started on tapered vancomycin and discharged, diarrhea eventually resolved.  For the past 2 days diarrhea started up again, found to be febrile today at 102.7 so he was sent to the ED.  Denies headache, neck pain, cough, shortness of breath.  His mother is in the room and says they do skin checks on him daily, no recent skin breakdown.    He has had recurrent C. difficile infections, history of fecal transplant 3 years ago.  After fecal transplant he had significant improvement until 10/2021 when he had a UTI requiring antibiotics, afterwards he began having recurrent C. difficile again.    In the ED febrile at 100.6, and sinus tachycardia up to the 140s.  Improved with IV fluids.  Labs showed WBC of 18.9, lactic acid 2.7, procalcitonin 3.34.  CT abdomen shows proctitis and pancolitis as well as large amount of stool in the rectum and sigmoid colon.    I discussed the plan of care with patient, family and bedside RN.    Review of Systems  Review of Systems   Constitutional: Positive for fever. Negative for chills.   HENT: Negative for sore throat.    Respiratory: Negative for cough and shortness of  breath.    Cardiovascular: Negative for chest pain.   Gastrointestinal: Positive for diarrhea. Negative for abdominal pain, nausea and vomiting.   Genitourinary: Negative for dysuria and urgency.   Neurological: Negative for dizziness and headaches.   All other systems reviewed and are negative.      Past Medical History   has a past medical history of Anxiety, Back pain, Blood transfusion, Bowel habit changes, Breath shortness, Dental disorder, Depression, Eyes sensitive to light, Fall, Headache(784.0), Heart burn, Heat intolerance, Hypertension, Indigestion, MS (multiple sclerosis) ([facility]) (DX 8/20/2013), Multiple sclerosis ([facility]) (2013), Pain (06/20/2018), Presence of intrathecal baclofen pump, Urinary bladder disorder, and Urinary incontinence.    Surgical History   has a past surgical history that includes open reduction (Left, 1988); gastroscopy (1/14/2017); block epidural steroid injection (N/A, 3/27/2018); cystoscopy (5/7/2018); lasertripsy (5/7/2018); pump insert/remove (6/26/2018); pr cystourethroscopy (N/A, 10/28/2021); lasertripsy (N/A, 10/28/2021); and wound exploration general (N/A, 11/19/2021).     Family History  family history includes Hypertension in his father and mother; Psychiatric Illness in his father; Thyroid in his mother.   Family history reviewed with patient. There is no family history that is pertinent to the chief complaint.     Social History   reports that he quit smoking about 8 years ago. He started smoking about 24 years ago. He has a 20.00 pack-year smoking history. He has never used smokeless tobacco. He reports previous alcohol use. He reports previous drug use. Drug: Inhaled.    Allergies  Allergies   Allergen Reactions   • Asa [Aspirin] Unspecified     Bleeding in stomach as a baby   • Gabapentin Anxiety     anger and severe mood swing   • Hydrocodone Nausea   • Methocarbamol      emesis   • Morphine Nausea   • Pregabalin Anxiety     Anger and severe mood swing        Medications  Prior to Admission Medications   Prescriptions Last Dose Informant Patient Reported? Taking?   Acetaminophen (TYLENOL) 325 MG Cap  MAR from Other Facility Yes No   Sig: Take 650 mg by mouth every 6 hours as needed. Indications: Fever   Ascorbic Acid (VITAMIN C) 1000 MG Tab  MAR from Other Facility Yes No   Sig: Take 1,000 mg by mouth every day.   Cladribine, 10 Tabs, (MAVENCLAD, 10 TABS,) 10 MG Tablet Therapy Pack   Yes No   Sig: As directed   MAVENCLAD, 6 TABS, 10 MG Tablet Therapy Pack   Yes No   Pain Pump (PATIENT SUPPLIED) XX CHAO  Other Facility Yes No   Sig: Inject 1 Each as directed continuous. Patient's Pain Pump (placed and maintained as an outpatient)    Medications/concentrations:   BACLOFEN (2,250.0mcg/ml)  KETAMINE (121.2mcg/ml)    Last changed: 9/28/2021   Refill pump before date: 11/30/2021    Continuous infusion rates (Drug/Rate):  BACLOFEN 1,019.0mcg (42.5 mcg/hr)  KETAMINE 54.89 mcg (2.29 mcg/hr)     myPTM setup:  Primary Drug:  BACLOFEN 49.9mcg  KETAMINE 2.69 mcg    Maximum daily dose:  BACLOFEN 1,310.1 mcg (+ 28.6%)  KETAMINE 70.57 mcg       Patient activation lockout interval: 1 hr  Maximum activations per day: 6/day    MD office: Dr. Gomez's office   Phone number: [phone]   Sodium Chloride (NS) Solution   Yes No   baclofen (LIORESAL) 10 MG Tab  MAR from Other Facility Yes No   Sig: Take 10 mg by mouth 4 times a day.   buPROPion (WELLBUTRIN) 100 MG Tab   Yes No   buPROPion SR (WELLBUTRIN-SR) 100 MG TABLET SR 12 HR  MAR from Other Facility Yes No   Sig: Take 100 mg by mouth in the morning, at noon, and at bedtime.   famotidine (PEPCID) 20 MG Tab  MAR from Other Facility Yes No   Sig: Take 20 mg by mouth 2 times a day.   lactobacillus rhamnosus (CULTURELLE) Cap capsule   No No   Sig: Take 1 Capsule by mouth every morning with breakfast.   lactulose 10 GM/15ML Solution   Yes No   lidocaine (LIDODERM) 5 % Patch  MAR from Other Facility Yes No   Sig: Place 1 Patch on the skin  every 24 hours. Per MAR apply's on right shoulder   methenamine hip (HIPPREX) 1 GM Tab  MAR from Other Facility Yes No   Sig: Take 1 g by mouth 2 times a day.   multivitamin (THERAGRAN) Tab  MAR from Other Facility Yes No   Sig: Take 1 Tablet by mouth every day.   ondansetron (ZOFRAN ODT) 4 MG TABLET DISPERSIBLE  MAR from Other Facility Yes No   Sig: Take 4 mg by mouth every four hours as needed for Nausea.   ondansetron (ZOFRAN) 4 MG Tab tablet   Yes No   ondansetron (ZOFRAN) 4 MG/2ML Solution injection   Yes No   oxybutynin SR (DITROPAN-XL) 10 MG CR tablet  MAR from Other Facility Yes No   Sig: Take 10 mg by mouth in the morning, at noon, and at bedtime.   sertraline (ZOLOFT) 25 MG tablet  MAR from Other Facility Yes No   Sig: Take 25 mg by mouth every day.   vancomycin (VANCOCIN) 125 MG capsule   Yes No   vitamin D (CHOLECALCIFEROL) 1000 UNIT Tab  MAR from Other Facility Yes No   Sig: Take 1,000 Units by mouth every day.      Facility-Administered Medications: None       Physical Exam  Temp:  [38.1 °C (100.6 °F)] 38.1 °C (100.6 °F)  Pulse:  [144-151] 144  Resp:  [17] 17  BP: (102)/(68) 102/68  SpO2:  [92 %-98 %] 95 %  Blood Pressure: 102/68   Temperature: (!) 38.1 °C (100.6 °F)   Pulse: (!) 144   Respiration: 17   Pulse Oximetry: 95 %       Physical Exam  Constitutional:       Appearance: He is ill-appearing.   HENT:      Head: Normocephalic and atraumatic.      Mouth/Throat:      Mouth: Mucous membranes are moist.      Pharynx: Oropharynx is clear. No oropharyngeal exudate or posterior oropharyngeal erythema.   Eyes:      General: No scleral icterus.  Cardiovascular:      Rate and Rhythm: Normal rate and regular rhythm.      Pulses: Normal pulses.      Heart sounds: Normal heart sounds. No murmur heard.      Pulmonary:      Effort: No respiratory distress.      Breath sounds: Normal breath sounds. No wheezing.   Abdominal:      General: There is no distension.      Palpations: Abdomen is soft.      Comments:  Pain pump RLQ.   Suprapubic catheter    Musculoskeletal:         General: No swelling or tenderness. Normal range of motion.      Cervical back: Normal range of motion.   Skin:     General: Skin is warm and dry.   Neurological:      General: No focal deficit present.      Mental Status: He is alert and oriented to person, place, and time. Mental status is at baseline.   Psychiatric:         Mood and Affect: Mood normal.         Laboratory:  Recent Labs     12/25/21  0350   WBC 18.9*   RBC 5.64   HEMOGLOBIN 16.9   HEMATOCRIT 50.0   MCV 88.7   MCH 30.0   MCHC 33.8   RDW 50.7*   PLATELETCT 317   MPV 8.9*     Recent Labs     12/25/21  0350   SODIUM 134*   POTASSIUM 3.6   CHLORIDE 100   CO2 18*   GLUCOSE 157*   BUN 38*   CREATININE 0.85   CALCIUM 8.9     Recent Labs     12/25/21  0350   ALTSGPT 15   ASTSGOT 15   ALKPHOSPHAT 95   TBILIRUBIN 0.8   GLUCOSE 157*         No results for input(s): NTPROBNP in the last 72 hours.      No results for input(s): TROPONINT in the last 72 hours.    Imaging:  CT-ABDOMEN-PELVIS WITH   Final Result         1. Diffuse rectal and colonic wall thickening throughout, in keeping with proctitis and pancolitis.   2. Large amount of stool in the rectum and sigmoid colon.      DX-CHEST-PORTABLE (1 VIEW)   Final Result         1. No acute cardiopulmonary abnormalities are identified.          X-Ray:  I have personally reviewed the images and compared with prior images.    Assessment/Plan:  I anticipate this patient will require at least two midnights for appropriate medical management, necessitating inpatient admission.    * Sepsis (HCC)- (present on admission)  Assessment & Plan  This is Sepsis Present on admission  SIRS criteria identified on my evaluation include: Fever, with temperature greater than 101 deg F, Tachycardia, with heart rate greater than 90 BPM and Leukocytosis, with WBC greater than 12,000  Source is GI  Sepsis protocol initiated  Fluid resuscitation ordered per protocol  IV  antibiotics as appropriate for source of sepsis  While organ dysfunction may be noted elsewhere in this problem list or in the chart, degree of organ dysfunction does not meet CMS criteria for severe sepsis    Acute colitis- (present on admission)  Assessment & Plan  Comes in complaining of diarrhea for the past 2 days, SIRS 4/4 on presentation  History of recurrent C. difficile infections  CT abdomen shows wall thickening consistent with proctitis and pancolitis  Most likely has recurrent C. difficile infection  We will continue oral vancomycin.  He will need an ID consult in the morning  Consider GI consult for another fecal transplant as it worked the first time    MS (multiple sclerosis) ([facility])- (present on admission)  Assessment & Plan  Lives in a nursing home  History of neurogenic bladder secondary to this with suprapubic catheter  History of chronic pain with pain pump in place  Continue baclofen and vitamin D  Hold off on cladribine due to acute infection.  Restart once afebrile infection under control    Neurogenic dysfunction of the urinary bladder- (present on admission)  Assessment & Plan  Secondary to MS.  Has suprapubic catheter  He had urine drawn in the ED from the catheter he came in with.  Asked for catheter to be exchanged in the ED and urine drawn again after new catheter placed    Diarrhea of presumed infectious origin- (present on admission)  Assessment & Plan  Likely secondary to C. difficile, history of recurrent infections  Restarted on oral vancomycin  Large amounts of stool seen on CT in the rectum and sigmoid colon.  May need disimpaction    Anxiety and depression- (present on admission)  Assessment & Plan  Continue sertraline and bupropion      VTE prophylaxis: enoxaparin ppx

## 2021-12-25 NOTE — ED NOTES
Repeat green top for lactic acid drawn & sent to lab.  Second liter of LR bolus infusing.    Hospitalist MD Mishra arrived at bedside.

## 2021-12-25 NOTE — PROGRESS NOTES
Pt up to room 307 with ER tech and mother at bedside. Pt arrived to room and slide board used with 3 person assist. Pt cleaned and new brief placed. RN identified redness at rectal area, blanchable. Mother and pt oriented to room and unit. Verbalized understanding. Vitals taken, VSS. Initial assessment complete. Pain pump identified on RLQ, mother able to identify baclofen and ketamine in pain pump. Pt placed on cardiac monitor, non-monitored rate tachycardic. Safety measures in place, call light within reach.

## 2021-12-25 NOTE — ED NOTES
MD Leslie at bedside for initial evaluation. Patient's mother arrived at bedside.   Quality 130: Documentation Of Current Medications In The Medical Record: Current Medications Documented Detail Level: Detailed Quality 110: Preventive Care And Screening: Influenza Immunization: Influenza Immunization not Administered because Patient Refused.

## 2021-12-25 NOTE — PROGRESS NOTES
4 Eyes Skin Assessment Completed by AURORA Veliz and AURORA Johnson.    Head WDL  Ears WDL  Nose WDL  Mouth WDL  Neck WDL  Breast/Chest WDL  Shoulder Blades WDL  Spine WDL  (R) Arm/Elbow/Hand WDL  (L) Arm/Elbow/Hand WDL  Abdomen WDL, pain pump RLQ  Groin WDL  Scrotum/Coccyx/Buttocks Redness and Blanching  (R) Leg WDL  (L) Leg WDL  (R) Heel/Foot/Toe WDL  (L) Heel/Foot/Toe WDL    Devices In Places Tele Box    Interventions In Place Pillows, Q2 Turns, Barrier Cream, Heels Loaded W/Pillows and Pressure Redistribution Mattress    Possible Skin Injury No    Pictures Uploaded Into Epic N/A  Wound Consult Placed N/A  RN Wound Prevention Protocol Ordered No

## 2021-12-25 NOTE — ED NOTES
Stool sample and urine sample collected and delivered to lab.    Patient's perineum cleaned up by 2 RNs, new linens placed, new brief & chux placed. Suprapubic catheter cleansed with soap and water.    VS assessed. Patient continues to be tachy in 150s. MD Leslie notified.     Patient given PO water per request.

## 2021-12-25 NOTE — CARE PLAN
The patient is Watcher - Medium risk of patient condition declining or worsening    Shift Goals  Clinical Goals: q2 turns, enema administration, jt admin  Patient Goals: rest, comfort    Progress made toward(s) clinical / shift goals:  Enema administered to patient as ordered. NG tube inserted and jt administered on MAR. Pt tolerating jt well. Pt turned and repositioned every 2 hours. Mother, Kandi, updated on POC via phone.    Patient is not progressing towards the following goals: N/A    Problem: Knowledge Deficit - Standard  Goal: Patient and family/care givers will demonstrate understanding of plan of care, disease process/condition, diagnostic tests and medications  Outcome: Progressing     Problem: Skin Integrity  Goal: Skin integrity is maintained or improved  Outcome: Progressing     Problem: Fall Risk  Goal: Patient will remain free from falls  Outcome: Progressing     Problem: Pain - Standard  Goal: Alleviation of pain or a reduction in pain to the patient’s comfort goal  Outcome: Progressing   Pt states his pain is at baseline, pain pump in place with ketamine and baclofen.

## 2021-12-25 NOTE — ASSESSMENT & PLAN NOTE
Secondary to C. difficile, history of recurrent infections  Status post total colectomy  Continue vancomycin per ostomy

## 2021-12-25 NOTE — ED TRIAGE NOTES
Chief Complaint   Patient presents with   • Fever     Pt BIB [facility] from [facility]. Per nurse report from [place], pt with fever of 102.7 F, diarrhea since yesterday, dark urine in suprapubic catheter, mild confusion.    • Diarrhea     /68   Pulse (!) 149   Temp (!) 38.1 °C (100.6 °F) (Temporal)   Wt 57 kg (125 lb 10.6 oz)   SpO2 92% RA    Has this patient been vaccinated for COVID YES

## 2021-12-25 NOTE — ED NOTES
MD Leslie at bedside to replace suprapubic catheter with new kit. UA sample to be collected from new catheter.

## 2021-12-25 NOTE — CONSULTS
Brief ID note:  Known to ID from last admit in Nov 2021 for recurrent Cdiff colitis after FMT  Prior fecal impaction  Admitted with recurrence diarrhea, fever  CT with proctitis and pancolitis  Failed another round oral vancomycin  Recommend surgical eval for colectomy as has failed medical management if due to Cdiff-no result this admission as yet  Oral vanco +IV flagyl pending surgery  DW Dr Smith

## 2021-12-25 NOTE — ASSESSMENT & PLAN NOTE
Comes in complaining of diarrhea for the past 2 days, SIRS 4/4 on presentation  History of recurrent C. difficile infections  CT abdomen shows wall thickening consistent with proctitis and pancolitis  C. difficile postive, ID following, continue oral vancomycin  GI consulted, unable to do fecal transplant  Pt continued to decline, general surgery consulted and total colectomy completed 12/26 by Dr. Yeboah  Ostomy in place with thin output  Ileus on imaging, NG tube placed without high volume of output.  Patient to be strict n.p.o.

## 2021-12-25 NOTE — ED NOTES
Second set of blood cultures collected & sent to lab.     IV LR bolus infusing.    Safety precautions are in place including gurney locked and in lowest position, bilateral rails up, call light within reach. Patient & patient's mother encouraged to use call light to notify staff for any needs.

## 2021-12-25 NOTE — ED PROVIDER NOTES
"ED Provider Note    CHIEF COMPLAINT  Chief Complaint   Patient presents with   • Fever     Pt BELINDA HOLCOMB from [facility]. Per nurse report from [place], pt with fever of 102.7 F, diarrhea since yesterday, dark urine in suprapubic catheter, mild confusion.    • Diarrhea        HPI    Primary care provider: Janet Reddy M.D.   History obtained from: Patient and mother  History limited by: None     Jerzy Juan III is a 41 y.o. male who presents to the ED by EMS from nursing facility for sudden onset of fever to 102.7 and diarrhea that started yesterday along with confusion and dark urine in the suprapubic catheter.  Mother reports that patient has had C. difficile and just finished a course of vancomycin on December 17.  There has been other residents at the nursing facility with diarrhea.  Patient with history of MS.  He denies cough or shortness of breath/difficulty breathing.  He has been vaccinated for COVID-19.  He has had some nausea and vomiting and was given Zofran.  He currently denies any pain.  No rash noted.  Mother reports that he was hospitalized last month due to \"blockage in his bowels\" and was taken to the OR.  She reports that he was also previously admitted for urinary stone that required removal.    REVIEW OF SYSTEMS  Please see HPI for pertinent positives/negatives.  All other systems reviewed and are negative.     PAST MEDICAL HISTORY  Past Medical History:   Diagnosis Date   • Pain 06/20/2018    \"everywhere\"   • Multiple sclerosis (HCC) 2013   • Anxiety    • Back pain    • Blood transfusion    • Bowel habit changes     constipation   • Breath shortness     \"r/t to medication Ocrevus and Baclofen\" \"side effect   • Dental disorder     poor dentation   • Depression     PTSD/clinical depression   • Eyes sensitive to light     Needs to wear sunglasses   • Fall     4/6/2016   • Headache(784.0)    • Heart burn     reflux   • Heat intolerance     will vomit   • Hypertension    • Indigestion  "   • MS (multiple sclerosis) ([facility]) DX 2013    multiple lesions brain/spine   • Presence of intrathecal baclofen pump     right side   • Urinary bladder disorder     UTI/catheter/bladder stone   • Urinary incontinence     catheter in place        SURGICAL HISTORY  Past Surgical History:   Procedure Laterality Date   • WOUND EXPLORATION GENERAL N/A 2021    Procedure: MANUAL DISIMPACTION;  Surgeon: Chris Conroy M.D.;  Location: [facility];  Service: General   • PB CYSTOURETHROSCOPY N/A 10/28/2021    Procedure: CYSTOSCOPY;  Surgeon: Kwasi Sapp M.D.;  Location: [facility];  Service: Urology   • LASERTRIPSY N/A 10/28/2021    Procedure: LITHOTRIPSY, USING LASER - FOR CYSTOLITHALOPAXY;  Surgeon: Kwasi Sapp M.D.;  Location: [facility];  Service: Urology   • PUMP INSERT/REMOVE  2018    Procedure: PUMP INSERT/REMOVE - BACLOFEN;  Surgeon: Andrew Gomez M.D.;  Location: [facility];  Service: Pain Management   • CYSTOSCOPY  2018    Procedure: CYSTOSCOPY;  Surgeon: Kwasi Sapp M.D.;  Location: [facility];  Service: Urology   • LASERTRIPSY  2018    Procedure: LASERTRIPSY - FOR: LITHOLAPAXY;  Surgeon: Kwasi Sapp M.D.;  Location: [facility];  Service: Urology   • BLOCK EPIDURAL STEROID INJECTION N/A 3/27/2018    Procedure: BLOCK EPIDURAL STEROID INJECTION - BACLOFEN PUMP TRIAL;  Surgeon: Andrew Gomez M.D.;  Location: [facility];  Service: Pain Management   • GASTROSCOPY  2017    Procedure: GASTROSCOPY WITH FECAL MATTER TRANSPLANT;  Surgeon: Obinna Douglas M.D.;  Location: [facility];  Service:    • OPEN REDUCTION Left 1988    left thumb        SOCIAL HISTORY  Social History     Tobacco Use   • Smoking status: Former Smoker     Packs/day: 1.00     Years: 20.00     Pack years: 20.00     Start date: 1997     Quit date: 2013     Years since quittin.4   • Smokeless  tobacco: Never Used   Vaping Use   • Vaping Use: Former   Substance and Sexual Activity   • Alcohol use: Not Currently     Alcohol/week: 0.0 oz   • Drug use: Not Currently     Types: Inhaled     Comment: stopped Feb 2019   • Sexual activity: Not on file     Comment: once daily for 23 yrs        FAMILY HISTORY  Family History   Problem Relation Age of Onset   • Thyroid Mother    • Hypertension Mother    • Hypertension Father    • Psychiatric Illness Father         CURRENT MEDICATIONS  Home Medications    **Home medications have not yet been reviewed for this encounter**          ALLERGIES  Allergies   Allergen Reactions   • Asa [Aspirin] Unspecified     Bleeding in stomach as a baby   • Gabapentin Anxiety     Anger and severe mood swings   • Pregabalin Anxiety     Anger and severe mood swings   • Hydrocodone Nausea   • Methocarbamol Nausea   • Morphine Nausea        PHYSICAL EXAM  VITAL SIGNS: BP (!) 164/92   Pulse (!) 152   Temp (!) 38.1 °C (100.6 °F) (Temporal)   Resp 20   Wt 57 kg (125 lb 10.6 oz)   SpO2 95%   BMI 17.53 kg/m²  @BASSAM[[id number]::@     Pulse ox interpretation: 89% I interpret this pulse ox as abnormal    Cardiac monitor interpretation: Sinus tachycardia with heart rate in the 140s as interpreted by me.  The patient presented with possible sepsis and cardiac monitor was ordered to monitor for dysrhythmia.    Constitutional: Well developed, well nourished, alert in no apparent distress, nontoxic appearance    HENT: No external signs of trauma, normocephalic, mask on due to COVID-19 pandemic  Eyes: PERRL, conjunctiva without erythema, no discharge, no icterus    Neck: Soft and supple, trachea midline, no stridor, no tenderness, no LAD, no JVD, good ROM    Cardiovascular: Regular rate and tachycardic, no murmurs/rubs/gallops, strong distal pulses and good perfusion    Thorax & Lungs: No respiratory distress, CTAB   Abdomen: Soft, suprapubic catheter in place, nontender, nondistended, no guarding, no  rebound, normal BS    Back: No CVAT    Extremities: No cyanosis, no edema, no gross deformity, no tenderness, intact distal pulses with brisk cap refill    Skin: Warm, dry, no pallor/cyanosis, no rash noted    Lymphatic: No lymphadenopathy noted    Neuro: A/O times 3, chronic weakness due to MS  Psychiatric: Cooperative       DIAGNOSTIC STUDIES / PROCEDURES    I replaced patient's suprapubic catheter using Epperson catheter kit without complication.    LABS  All labs reviewed by me.     Results for orders placed or performed during the hospital encounter of 12/25/21   LACTIC ACID   Result Value Ref Range    Lactic Acid 3.6 (H) 0.5 - 2.0 mmol/L   CBC WITH DIFFERENTIAL   Result Value Ref Range    WBC 18.9 (H) 4.8 - 10.8 K/uL    RBC 5.64 4.70 - 6.10 M/uL    Hemoglobin 16.9 14.0 - 18.0 g/dL    Hematocrit 50.0 42.0 - 52.0 %    MCV 88.7 81.4 - 97.8 fL    MCH 30.0 27.0 - 33.0 pg    MCHC 33.8 33.7 - 35.3 g/dL    RDW 50.7 (H) 35.9 - 50.0 fL    Platelet Count 317 164 - 446 K/uL    MPV 8.9 (L) 9.0 - 12.9 fL    Neutrophils-Polys 57.00 44.00 - 72.00 %    Lymphocytes 11.00 (L) 22.00 - 41.00 %    Monocytes 19.00 (H) 0.00 - 13.40 %    Eosinophils 0.00 0.00 - 6.90 %    Basophils 0.00 0.00 - 1.80 %    Nucleated RBC 0.00 /100 WBC    Neutrophils (Absolute) 13.23 (H) 1.82 - 7.42 K/uL    Lymphs (Absolute) 2.08 1.00 - 4.80 K/uL    Monos (Absolute) 3.59 (H) 0.00 - 0.85 K/uL    Eos (Absolute) 0.00 0.00 - 0.51 K/uL    Baso (Absolute) 0.00 0.00 - 0.12 K/uL    NRBC (Absolute) 0.00 K/uL   COMP METABOLIC PANEL   Result Value Ref Range    Sodium 134 (L) 135 - 145 mmol/L    Potassium 3.6 3.6 - 5.5 mmol/L    Chloride 100 96 - 112 mmol/L    Co2 18 (L) 20 - 33 mmol/L    Anion Gap 16.0 7.0 - 16.0    Glucose 157 (H) 65 - 99 mg/dL    Bun 38 (H) 8 - 22 mg/dL    Creatinine 0.85 0.50 - 1.40 mg/dL    Calcium 8.9 8.4 - 10.2 mg/dL    AST(SGOT) 15 12 - 45 U/L    ALT(SGPT) 15 2 - 50 U/L    Alkaline Phosphatase 95 30 - 99 U/L    Total Bilirubin 0.8 0.1 - 1.5 mg/dL     Albumin 3.3 3.2 - 4.9 g/dL    Total Protein 6.3 6.0 - 8.2 g/dL    Globulin 3.0 1.9 - 3.5 g/dL    A-G Ratio 1.1 g/dL   COV-2, FLU A/B, AND RSV BY PCR (2-4 HOURS CEPHEID): Collect NP swab in VTM    Specimen: Nasopharyngeal; Respirate   Result Value Ref Range    Influenza virus A RNA Negative Negative    Influenza virus B, PCR Negative Negative    RSV, PCR Negative Negative    SARS-CoV-2 by PCR NotDetected     SARS-CoV-2 Source NP Swab    LACTIC ACID   Result Value Ref Range    Lactic Acid 2.7 (H) 0.5 - 2.0 mmol/L   PROCALCITONIN   Result Value Ref Range    Procalcitonin 3.39 (H) <0.25 ng/mL   ESTIMATED GFR   Result Value Ref Range    GFR If African American >60 >60 mL/min/1.73 m 2    GFR If Non African American >60 >60 mL/min/1.73 m 2   DIFFERENTIAL MANUAL   Result Value Ref Range    Bands-Stabs 13.00 (H) 0.00 - 10.00 %    Manual Diff Status PERFORMED    PLATELET ESTIMATE   Result Value Ref Range    Plt Estimation Normal    MORPHOLOGY   Result Value Ref Range    RBC Morphology Normal         RADIOLOGY  The radiologist's interpretation of all radiological studies have been reviewed by me.     CT-ABDOMEN-PELVIS WITH   Final Result         1. Diffuse rectal and colonic wall thickening throughout, in keeping with proctitis and pancolitis.   2. Large amount of stool in the rectum and sigmoid colon.      DX-CHEST-PORTABLE (1 VIEW)   Final Result         1. No acute cardiopulmonary abnormalities are identified.             COURSE & MEDICAL DECISION MAKING  Nursing notes, VS, PMSFHx reviewed in chart.     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      Differential diagnoses considered include but are not limited to: Appendicitis, bowel perforation/obstruction, ileus, cholecystitis/ascending cholangitis, gallstone/biliary colic,  diverticulitis, pancreatitis, PUD, gastritis, KS/renal colic, UTI/pyelo, gastroenteritis, colitis, electrolyte abnormality, dehydration      0600: D/W hospitalist who will admit patient      History and physical exam as above.  A septic work-up was initiated given his history, presenting symptoms and vital signs.  Labs were significant for leukocytosis along with elevated lactic acid and procalcitonin.  Chest x-ray without apparent signs of pneumonia and CT abdomen/pelvis with findings as above.  Stool was sent for C. difficile and culture.  I replaced patient suprapubic catheter and urine was sent for testing.  I discussed the findings with the patient and mother and they are agreeable to admission for possible sepsis.  Antibiotic was not initiated initially due to his history of recurrent C. difficile and no clear infectious etiology at this time except for colitis and diarrhea.  I discussed with the hospitalist who graciously agreed to admit patient for further care.      HYDRATION: Based on the patient's presentation of Sepsis and Tachycardia the patient was given IV fluids. IV Hydration was used because oral hydration was not as rapid as required. Upon recheck following hydration, the patient was unchanged.      FINAL IMPRESSION  1. Fever, unspecified fever cause Acute   2. Diarrhea, unspecified type Acute          DISPOSITION  Patient will be admitted by hospitalist in guarded condition      Electronically signed by: Carlos Leslie D.O., 12/25/2021 3:07 AM      Portions of this record were made with voice recognition software.  Despite my review, spelling/grammar/context errors may still remain.  Interpretation of this chart should be taken in this context.

## 2021-12-25 NOTE — PROGRESS NOTES
Called mother, Kandi, regarding NG tube placement for patient. Discussed reason for NG tube suggestion by Dr. Jacobo and medication to be administered through NG (jt). Mother agreeable to current POC.

## 2021-12-25 NOTE — PROGRESS NOTES
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  In ER noted to be febrile, tachycardic but hemodynamically stable.  Saturating well on room air.  Lab work significant for lactic acidosis, leukocytosis with WBCs of 18.9.  CT abdomen pelvis showed diffuse rectal and colonic wall thickening throughout concerning for proctitis and pancolitis.  Chest x-ray negative for acute cardiopulmonary process.  Started on oral vancomycin and IV Flagyl.  GI and general surgery consulted.  Recommended aggressive bowel regimen.  ID consulted as well.  Fidaxomicin requested but unfortunately not available in this facility or [facility].  Per GI it will be challenging to perform FMT considering FMT facility is closed for the holidays.

## 2021-12-25 NOTE — PROGRESS NOTES
PT seen and examined  Has MS and constipation/diarrhea  Admitted in 11/21 with severe constipation and colitis  Disimpacted by Dr Conroy  Now back with same  Would treat with enemas and Golytely from above  Abd distended but not peritoneal  Will follow

## 2021-12-26 PROBLEM — Z71.89 ADVANCE CARE PLANNING: Status: ACTIVE | Noted: 2018-11-27

## 2021-12-26 NOTE — PROGRESS NOTES
Hospital Medicine Daily Progress Note    Date of Service  12/26/2021    Chief Complaint  Jerzy Juan III is a 41 y.o. male admitted 12/25/2021 with fever, abdominal pain.    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  In ER noted to be febrile, tachycardic but hemodynamically stable.  Saturating well on room air.  Lab work significant for lactic acidosis, leukocytosis with WBCs of 18.9.  CT abdomen pelvis showed diffuse rectal and colonic wall thickening throughout concerning for proctitis and pancolitis.  Chest x-ray negative for acute cardiopulmonary process.  Started on oral vancomycin and IV Flagyl.  GI and general surgery consulted.  Recommended aggressive bowel regimen.  ID consulted as well.  Fidaxomicin requested but unfortunately not available in this facility or [facility].  Per GI it will be challenging to perform FMT considering FMT facility is closed for the holidays.  Interval Problem Update  12/26: Has been febrile overnight however afebrile this morning.  Still tachycardic.  Abdominal pain excruciating.  Patient have extensive stool output after GoLYTELY and enema given.  After discussion between the mother and patient, decision was made for total colectomy.  Dr. Yeboah to perform surgery today afternoon.  Patient made n.p.o.    I have personally seen and examined the patient at bedside. I discussed the plan of care with patient, family and Dr Zayra Harmon.    Consultants/Specialty  general surgery, GI and infectious disease    Code Status  DNAR/DNI    Disposition  Patient is not medically cleared for discharge.   Anticipate discharge to TBD.  I have placed the appropriate orders for post-discharge  needs.    Review of Systems  Review of Systems   Unable to perform ROS: Medical condition   Constitutional: Positive for chills and fever.   HENT: Negative for hearing loss and tinnitus.    Eyes: Negative for blurred vision.   Respiratory: Negative for cough.    Cardiovascular: Negative for chest pain and palpitations.   Gastrointestinal: Positive for abdominal pain and diarrhea.   Genitourinary: Negative for dysuria and urgency.   Musculoskeletal: Negative for myalgias.   Skin: Negative for rash.   Neurological: Negative for dizziness and headaches.   Psychiatric/Behavioral: Negative for depression and suicidal ideas.        Physical Exam  Temp:  [36.5 °C (97.7 °F)-38.3 °C (101 °F)] 36.5 °C (97.7 °F)  Pulse:  [120-137] 122  Resp:  [20] 20  BP: (129-147)/() 137/99  SpO2:  [92 %-94 %] 93 %    Physical Exam  Constitutional:       General: He is in acute distress.      Appearance: He is ill-appearing.   HENT:      Head: Normocephalic and atraumatic.   Eyes:      Extraocular Movements: Extraocular movements intact.   Cardiovascular:      Rate and Rhythm: Regular rhythm. Tachycardia present.      Heart sounds: No friction rub.   Pulmonary:      Effort: Pulmonary effort is normal. No respiratory distress.   Abdominal:      General: There is distension.      Tenderness: There is abdominal tenderness. There is guarding.   Musculoskeletal:         General: No tenderness.   Neurological:      Mental Status: He is alert.      Comments: Difficult to assess the light of acute distress from severe illness.   Psychiatric:      Comments: Difficult to assess.         Fluids    Intake/Output Summary (Last 24 hours) at 12/26/2021 1107  Last data filed at 12/26/2021 0832  Gross per 24 hour   Intake 1232 ml   Output 500 ml   Net 732 ml       Laboratory  Recent Labs     12/25/21  0350 12/25/21  2336   WBC 18.9* 20.6*   RBC 5.64 5.27   HEMOGLOBIN 16.9 15.9   HEMATOCRIT 50.0 46.3   MCV 88.7 87.9   MCH 30.0 30.2   MCHC 33.8 34.3   RDW  50.7* 49.5   PLATELETCT 317 252   MPV 8.9* 9.3     Recent Labs     12/25/21  0350 12/25/21  2336   SODIUM 134* 127*   POTASSIUM 3.6 3.5*   CHLORIDE 100 93*   CO2 18* 20   GLUCOSE 157* 106*   BUN 38* 25*   CREATININE 0.85 0.66   CALCIUM 8.9 8.3*                   Imaging  DX-ABDOMEN FOR TUBE PLACEMENT   Final Result      Nasogastric tube tip at the gastric fundus.      CT-ABDOMEN-PELVIS WITH   Final Result         1. Diffuse rectal and colonic wall thickening throughout, in keeping with proctitis and pancolitis.   2. Large amount of stool in the rectum and sigmoid colon.      DX-CHEST-PORTABLE (1 VIEW)   Final Result         1. No acute cardiopulmonary abnormalities are identified.           Assessment/Plan  * Sepsis (HCC)- (present on admission)  Assessment & Plan  This is Sepsis Present on admission  SIRS criteria identified on my evaluation include: Fever, with temperature greater than 101 deg F, Tachycardia, with heart rate greater than 90 BPM and Leukocytosis, with WBC greater than 12,000  Source is GI  Sepsis protocol initiated  Fluid resuscitation ordered per protocol  IV antibiotics as appropriate for source of sepsis  While organ dysfunction may be noted elsewhere in this problem list or in the chart, degree of organ dysfunction does not meet CMS criteria for severe sepsis    12/26: Worsening.  Increasing bandemia.  Worsening leukocytosis.  Currently on oral vancomycin and IV Flagyl.    Diarrhea of presumed infectious origin- (present on admission)  Assessment & Plan  Likely secondary to C. difficile, history of recurrent infections  Restarted on oral vancomycin  Large amounts of stool seen on CT in the rectum and sigmoid colon.  May need disimpaction  12/25: Discussed the case with general surgery Dr. Jacobo who recommended aggressive bowel regimen.  12/26: FMS ordered to manage significant stool output.    Neurogenic dysfunction of the urinary bladder- (present on admission)  Assessment & Plan  Secondary to  MS.  Has suprapubic catheter  He had urine drawn in the ED from the catheter he came in with.  Asked for catheter to be exchanged in the ED and urine drawn again after new 12/25: Suprapubic catheter has been replaced in ED.    Advance care planning  Assessment & Plan  Patient declining clinically.  Abdominal pain has been excruciating.  Patient and family members to discuss plan of care and decided on total colectomy recommended by surgery.     Acute colitis- (present on admission)  Assessment & Plan  Comes in complaining of diarrhea for the past 2 days, SIRS 4/4 on presentation  History of recurrent C. difficile infections  CT abdomen shows wall thickening consistent with proctitis and pancolitis  Most likely has recurrent C. difficile infection  We will continue oral vancomycin.  He will need an ID consult in the morning  Consider GI consult for another fecal transplant as it worked the first time  12/25:  Severe. On oral vancomycin and IV Flagyl.  Discussed the case with GI Dr. Azevedo who recommended to continue treatment with antibiotics for now.  Discussed the case with general surgery Dr. Aj, who recommended aggressive bowel regimen.  12/26: Clinically worsening.  Mother consented for total colectomy will be done today afternoon by Dr. Yeboah.    Anxiety and depression- (present on admission)  Assessment & Plan  Continue sertraline and bupropion    MS (multiple sclerosis) (HCC)- (present on admission)  Assessment & Plan  Lives in a nursing home  History of neurogenic bladder secondary to this with suprapubic catheter  History of chronic pain with pain pump in place  Continue baclofen and vitamin D  Hold off on cladribine due to acute infection.  Restart once afebrile infection under control  12/25: Apparently MS has been debilitating for patient and has been declining despite being on second round of [place]. Hold off for now due to active cdiff colitis.        VTE prophylaxis: enoxaparin ppx    I have  performed a physical exam and reviewed and updated ROS and Plan today (12/26/2021). In review of yesterday's note (12/25/2021), there are no changes except as documented above.

## 2021-12-26 NOTE — PROGRESS NOTES
Bedside report received from AURORA Landry. Assumed pt care. Pt is AOx4. Complaining of pain/discomfort related to NG tube. Requesting to have NG tube removed. Denies any other distress at this time. Discussed POC including possible NGT removal, fall risk/precautions, IV abx. Pt verbalized understanding. Hourly rounding in place. Fall precautions in place and call light within reach.

## 2021-12-26 NOTE — PROGRESS NOTES
Pt's mother Kandi brought in patient's pain pump on-demand device for self-administering bolus medication. Per physician, okay for pt to use his home pain pump device.

## 2021-12-26 NOTE — PROGRESS NOTES
Telemetry Shift Summary    Rhythm ST  HR Range 119-138  Ectopy r-oPVC, rCoup  Measurements 0.16/0.10/0.28        Normal Values  Rhythm SR  HR Range    Measurements 0.12-0.20 / 0.06-0.10  / 0.30-0.52

## 2021-12-26 NOTE — CONSULTS
DATE OF SERVICE:  12/25/2021     SURGICAL CONSULTATION     PHYSICIAN REQUESTING CONSULTATION:  Marzena Smith MD     REASON FOR CONSULTATION:  The patient is a 41-year-old gentleman who was   readmitted to the hospital on the 25th with fever and diarrhea.  He lives in a   nursing home.  He has multiple sclerosis and intrathecal pain pump,   neurogenic bladder.  He was recently admitted in November for C. diff and was   also found to have a fecal impaction, which was decompressed by Dr. Conroy.    He stated the diarrhea started 2 days ago, was found to be febrile to 102.  He   has had recurrent C. diff infections, the last with a history of fecal   transplant 3 years ago apparently.  He subsequently had a UTI requiring   antibiotics and developed a recurrent C. diff infection.  I have been asked to   see him in regards to this as his CT shows proctitis, pancolitis and a large   amount of stool.     PAST MEDICAL HISTORY:  ILLNESSES:  Multiple sclerosis, hypertension, and recurrent C. diff   infections.     PAST SURGICAL HISTORY:  Intrathecal pump.     MEDICATIONS:  Currently are Tylenol, vitamin C, _____, pain pump, baclofen,   Wellbutrin, Pepcid, Culturelle, Lidoderm, methenamine, ondansetron, Zoloft,   and oral vancomycin.     ALLERGIES:  ASPIRIN, GABAPENTIN, HYDROCODONE, ROBAXIN, MORPHINE, AND   PREGABALIN.     PHYSICAL EXAMINATION:  VITAL SIGNS:  He is somewhat responsive.  HEENT:  He is anicteric.  NECK:  Supple.  LUNGS:  He is breathing normally.  ABDOMEN:  Mildly distended.  He has a pain pump in the right lower quadrant,   suprapubic catheter in place.  He has no peritoneal signs.  EXTREMITIES:  With marked weakness.     LABORATORY DATA:  White count was 18,000, hemoglobin is 16.9, and platelet   count 317,000.  Electrolytes showed dehydration with his BUN of 38 and a   lactate of 2.7.     DIAGNOSTIC DATA:  CT scan shows pancolitis and significant amount of stool   burden in the colon.     IMPRESSION:  A  41-year-old male with history of recurrent Clostridium   difficile infections and now with colitis, which appears to be somewhat worse   than it was on the CT scan 6 weeks ago.     PLAN:  Given the large stool burden, I suspect that his diarrhea is actually   more overflow incontinence than it is actual C. diff, although C. diff has not   been sent.  I would recommend trying to clean the patient out with enemas   from below as well as placing an NG tube or trying to do oral GoLYTELY and the   patient is to be followed as far as output as well as serial exams.        ______________________________  MD LIZETTE ACKERMAN/MAMADOU/LAVONNE      DD:  12/25/2021 19:27  DT:  12/25/2021 19:52    Job#:  [id number]

## 2021-12-26 NOTE — PROGRESS NOTES
Order received to insert rectal tube. This RN spoke with charge RN regarding rectal tube insertion. Wound care consult placed for rectal insertion and monitoring. Per wound care, RN will not be available for insertion until Monday 12/27/21.

## 2021-12-26 NOTE — PROGRESS NOTES
0534 Haydee from Microbiology called to confirm that patient is C diff positive.     5832 Dr. Mishra updated with result. Patient is already on PO vanco and IV flagyl. Continue with current plan of care.

## 2021-12-26 NOTE — ASSESSMENT & PLAN NOTE
12/30/21 Spent 22 minutes discussing advanced care planning with patient and family this morning.  Patient's family inquiring about comfort care measures patient has been declining with his MS for some time now.  Patient oriented and competent to make his own medical decisions, at this time he wishes to continue with treatment.   Visit from  yesterday  Son to arrive from [place] today  Ongoing discussion of goals of care with family and patient  Consult to palliative care

## 2021-12-26 NOTE — PROGRESS NOTES
Tele strip printed at 1352     Rhythm: ST   HR: 113  Measurements: 0.14/0.08/0.30        Telemetry Shift Summary     Rhythm: ST  HR Range: 110's-120's  Ectopy: Frequent PVC, Rare Couplets, Occasional Bigeminy/Trigeminy     Telemetry monitoring strips placed in pt's chart.

## 2021-12-26 NOTE — PROGRESS NOTES
Report given to Frantz SIMON. Patient handed off in stable condition. Safety measures in place. Call light within reach. Care relinquished.

## 2021-12-26 NOTE — CARE PLAN
The patient is Stable - Low risk of patient condition declining or worsening    Shift Goals  Clinical Goals: Golytely and antibiotic adminisrtaion;   Patient Goals: rest; comfort    Progress made toward(s) clinical / shift goals:  Administer golytely through NG tube. Administer antibiotics as prescribed. Cluster care to allow patient to rest and sleep.    Problem: Knowledge Deficit - Standard  Goal: Patient and family/care givers will demonstrate understanding of plan of care, disease process/condition, diagnostic tests and medications  Outcome: Progressing     Problem: Fall Risk  Goal: Patient will remain free from falls  Outcome: Progressing        Patient is not progressing towards the following goals:N/A

## 2021-12-26 NOTE — CONSULTS
"INFECTIOUS DISEASES INPATIENT CONSULT NOTE     Date of Service: 12/26/2021    Consult Requested By: Marzena Smith M.D.    Reason for Consultation: Refractory C diff colitis    History of Present Illness:   Jerzy Juan III is a 41 y.o. male with severe MS and recurrent Cdiff colitis admitted 12/25/2021 with fever and diarrhea. Similar presentation last month (11/18-11/22) for Cdiff colitis-completed course of oral vancomycin 12/14/2021.  known MS, chronic pain with intrathecal pain pump, neurogenic bladder with suprapubic catheter, anxiety/depression, GERD.     Symptoms started 2 daysprior to admit with temp 102.7 so he was sent to the ED.  Denies headache, neck pain, cough, shortness of breath.  His mother has medical POA.   Known recurrent C. difficile infections, history of fecal transplant 3 years ago.  After fecal transplant he had significant improvement until 10/2021 when he had a UTI requiring antibiotics, afterwards he began having recurrent C. difficile again.     In the ED tachycardic up to the 140s. +leukocytosis WBC of 18.9, lactic acidosis 2.7, procalcitonin 3.34. Abnormal UA  CT abdomen shows proctitis and pancolitis as well as large amount of stool in the rectum and sigmoid colon.   Infectious Diseases consulted for antibiotic recommendation and management  Started on oral vanco  Cdiff tox now positive  Infectious Diseases consulted for antibiotic recommendation and management    Review Of Systems:  Chronic pain  Diarrhea  MS changes  All other systems are reviewed and are negative     PMH:   Past Medical History:   Diagnosis Date   • Anxiety    • Back pain    • Blood transfusion    • Bowel habit changes     constipation   • Breath shortness     \"r/t to medication Ocrevus and Baclofen\" \"side effect   • Dental disorder     poor dentation   • Depression     PTSD/clinical depression   • Eyes sensitive to light     Needs to wear sunglasses   • Fall     4/6/2016   • Headache(784.0)    • Heart " "burn     reflux   • Heat intolerance     will vomit   • Hypertension    • Indigestion    • MS (multiple sclerosis) (HCC) DX 8/20/2013    multiple lesions brain/spine   • Multiple sclerosis (HCC) 2013   • Pain 06/20/2018    \"everywhere\"   • Presence of intrathecal baclofen pump     right side   • Urinary bladder disorder     UTI/catheter/bladder stone   • Urinary incontinence     catheter in place         PSH:  Past Surgical History:   Procedure Laterality Date   • WOUND EXPLORATION GENERAL N/A 11/19/2021    Procedure: MANUAL DISIMPACTION;  Surgeon: Chris Conroy M.D.;  Location: [facility];  Service: General   • PB CYSTOURETHROSCOPY N/A 10/28/2021    Procedure: CYSTOSCOPY;  Surgeon: Kwasi Sapp M.D.;  Location: [facility];  Service: Urology   • LASERTRIPSY N/A 10/28/2021    Procedure: LITHOTRIPSY, USING LASER - FOR CYSTOLITHALOPAXY;  Surgeon: Kwasi Sapp M.D.;  Location: [facility];  Service: Urology   • PUMP INSERT/REMOVE  6/26/2018    Procedure: PUMP INSERT/REMOVE - BACLOFEN;  Surgeon: Andrew Gomez M.D.;  Location: [facility];  Service: Pain Management   • CYSTOSCOPY  5/7/2018    Procedure: CYSTOSCOPY;  Surgeon: Kwasi Sapp M.D.;  Location: [facility];  Service: Urology   • LASERTRIPSY  5/7/2018    Procedure: LASERTRIPSY - FOR: LITHOLAPAXY;  Surgeon: Kwasi Sapp M.D.;  Location: [facility];  Service: Urology   • BLOCK EPIDURAL STEROID INJECTION N/A 3/27/2018    Procedure: BLOCK EPIDURAL STEROID INJECTION - BACLOFEN PUMP TRIAL;  Surgeon: Andrew Gomez M.D.;  Location: [facility];  Service: Pain Management   • GASTROSCOPY  1/14/2017    Procedure: GASTROSCOPY WITH FECAL MATTER TRANSPLANT;  Surgeon: Obinna Douglas M.D.;  Location: [facility];  Service:    • OPEN REDUCTION Left 1988    left thumb       FAMILY HX:  Family History   Problem Relation Age of Onset   • Thyroid Mother    • " Hypertension Mother    • Hypertension Father    • Psychiatric Illness Father        SOCIAL HX:  Social History     Socioeconomic History   • Marital status:      Spouse name: Not on file   • Number of children: Not on file   • Years of education: Not on file   • Highest education level: Not on file   Occupational History   • Not on file   Tobacco Use   • Smoking status: Former Smoker     Packs/day: 1.00     Years: 20.00     Pack years: 20.00     Start date: 1997     Quit date: 2013     Years since quittin.4   • Smokeless tobacco: Never Used   Vaping Use   • Vaping Use: Former   Substance and Sexual Activity   • Alcohol use: Not Currently     Alcohol/week: 0.0 oz   • Drug use: Not Currently     Types: Inhaled     Comment: stopped 2019   • Sexual activity: Not on file     Comment: once daily for 23 yrs   Other Topics Concern   • Not on file   Social History Narrative   • Not on file     Social Determinants of Health     Financial Resource Strain:    • Difficulty of Paying Living Expenses: Not on file   Food Insecurity:    • Worried About Running Out of Food in the Last Year: Not on file   • Ran Out of Food in the Last Year: Not on file   Transportation Needs:    • Lack of Transportation (Medical): Not on file   • Lack of Transportation (Non-Medical): Not on file   Physical Activity:    • Days of Exercise per Week: Not on file   • Minutes of Exercise per Session: Not on file   Stress:    • Feeling of Stress : Not on file   Social Connections:    • Frequency of Communication with Friends and Family: Not on file   • Frequency of Social Gatherings with Friends and Family: Not on file   • Attends Taoism Services: Not on file   • Active Member of Clubs or Organizations: Not on file   • Attends Club or Organization Meetings: Not on file   • Marital Status: Not on file   Intimate Partner Violence:    • Fear of Current or Ex-Partner: Not on file   • Emotionally Abused: Not on file   • Physically  Abused: Not on file   • Sexually Abused: Not on file   Housing Stability:    • Unable to Pay for Housing in the Last Year: Not on file   • Number of Places Lived in the Last Year: Not on file   • Unstable Housing in the Last Year: Not on file     Social History     Tobacco Use   Smoking Status Former Smoker   • Packs/day: 1.00   • Years: 20.00   • Pack years: 20.00   • Start date: 1997   • Quit date: 2013   • Years since quittin.4   Smokeless Tobacco Never Used     Social History     Substance and Sexual Activity   Alcohol Use Not Currently   • Alcohol/week: 0.0 oz       Allergies/Intolerances:  Allergies   Allergen Reactions   • Asa [Aspirin] Unspecified     Bleeding in stomach as a baby   • Gabapentin Anxiety     Anger and severe mood swings   • Pregabalin Anxiety     Anger and severe mood swings   • Hydrocodone Nausea   • Methocarbamol Nausea   • Morphine Nausea         Other Current Medications:    Current Facility-Administered Medications:   •  NS infusion, , Intravenous, Continuous, Marzena Smith M.D., Last Rate: 125 mL/hr at 21 0936, New Bag at 21 0936  •  Special Contact Isolation, , , CONTINUOUS **AND** [COMPLETED] C Diff by PCR rflx Toxin, , , Once **AND** Pharmacy Consult Request, 1 Each, Other, PHARMACY TO DOSE, Carlos Leslie D.O.  •  enoxaparin (LOVENOX) inj 40 mg, 40 mg, Subcutaneous, DAILY, Guero Mishra M.D., 40 mg at 21 0523  •  acetaminophen (Tylenol) tablet 650 mg, 650 mg, Oral, Q6HRS PRN, Guero Mishra M.D., 650 mg at 21 1656  •  labetalol (NORMODYNE/TRANDATE) injection 10 mg, 10 mg, Intravenous, Q4HRS PRN, Guero Mishra M.D.  •  baclofen (LIORESAL) tablet 10 mg, 10 mg, Oral, 4X/DAY, Guero Mishra M.D., 10 mg at 21 1217  •  buPROPion SR (WELLBUTRIN-SR) tablet 100 mg, 100 mg, Oral, TID, Guero Mishra M.D., 100 mg at 21 0936  •  ondansetron (ZOFRAN ODT) dispertab 4 mg, 4 mg, Oral, Q4HRS PRN, Guero Mishra M.D., 4 mg at 21 0655  •  " ondansetron (ZOFRAN) syringe/vial injection 4 mg, 4 mg, Intravenous, Q6HRS PRN, Guero Mishra M.D., 4 mg at 21 0427  •  sertraline (Zoloft) tablet 25 mg, 25 mg, Oral, DAILY, Guero Mishra M.D., 25 mg at 21 0526  •  vitamin D3 (cholecalciferol) tablet 1,000 Units, 1,000 Units, Oral, DAILY, Guero Mishra M.D., 1,000 Units at 21 0526  •  vancomycin 50 mg/mL oral soln 250 mg, 250 mg, Oral, Q6HRS, Marzena Smith M.D., 250 mg at 21 1217  •  metroNIDAZOLE (FLAGYL) IVPB 500 mg, 500 mg, Intravenous, Q8HRS, Veronica Cramer M.D., Stopped at 21 0623  •  Metoprolol Tartrate (LOPRESSOR) injection 5 mg, 5 mg, Intravenous, Q5 MIN PRN, Marzena Smith M.D., 5 mg at 21      Most Recent Vital Signs:  /99   Pulse (!) 122   Temp 36.5 °C (97.7 °F) (Oral)   Resp 20   Ht 1.803 m (5' 11\")   Wt 60.7 kg (133 lb 13.1 oz)   SpO2 93%   BMI 18.66 kg/m²   Temp  Av.3 °C (99.1 °F)  Min: 36.3 °C (97.3 °F)  Max: 39.2 °C (102.5 °F)    Physical Exam:  General: Ill-appearing, well nourished no acute distress  HEENT: NCAT, PERRLA, sclera anicteric, PERRL, EOMI,  no oral lesions Dentition fair  Neck: supple, no lymphadenopathy  Chest: CTAB, unlabored.  Cardiac: RRR,  tachy  Abdomen: + bowel sounds, soft, SP cath  Extremities: No cyanosis, clubbing. no edema, sarcopenia  Skin: no rashes ecchymosis  Neuro: Alert and oriented times 3, Speech dysarthric JOSÉ gromacing  Psych: Mood depressed Affect normal    Pertinent Lab Results:  Recent Labs     21  0350 21  233   WBC 18.9* 20.6*      Recent Labs     21  0350 21   HEMOGLOBIN 16.9 15.9   HEMATOCRIT 50.0 46.3   MCV 88.7 87.9   MCH 30.0 30.2   PLATELETCT 317 252         Recent Labs     21  0350 21  23321  1142   SODIUM 134* 127* 128*   POTASSIUM 3.6 3.5* 3.2*   CHLORIDE 100 93* 91*   CO2 18* 20 23   CREATININE 0.85 0.66 0.68        Recent Labs     21  0350 21 " "12/26/21  1142   ALBUMIN 3.3 2.7* 2.4*        Pertinent Micro:  Results     Procedure Component Value Units Date/Time    URINE CULTURE(NEW) [[id number]]  (Abnormal) Collected: 12/25/21 0820    Order Status: Completed Specimen: Urine, [place] Cath Updated: 12/26/21 1124     Significant Indicator POS     Source UR     Site URINE, [place] CATH     Culture Result -      Non-lactose fermenting Gram negative germán  ,000      Narrative:      Collected By:  Indication for culture:->Emergency Room Patient  Indication for culture:->New onset fever with no other  identified cause  Collected By:    BLOOD CULTURE [[id number]] Collected: 12/25/21 0350    Order Status: Completed Specimen: Blood from Peripheral Updated: 12/26/21 0734     Significant Indicator NEG     Source BLD     Site PERIPHERAL     Culture Result No Growth  Note: Blood cultures are incubated for 5 days and  are monitored continuously.Positive blood cultures  are called to the RN and reported as soon as  they are identified.  Blood culture testing and Gram stain, if indicated, are  performed at [address].  Positive blood cultures are  sent to [facility], [address] [place] [address], for organism identification and  susceptibility testing.      Narrative:      Collected By:  Per Hospital Policy: Only change Specimen Src: to \"Line\" if  specified by physician order.  Collected By:    BLOOD CULTURE [[id number]] Collected: 12/25/21 0425    Order Status: Completed Specimen: Blood from Peripheral Updated: 12/26/21 0734     Significant Indicator NEG     Source BLD     Site PERIPHERAL     Culture Result No Growth  Note: Blood cultures are incubated for 5 days and  are monitored continuously.Positive blood cultures  are called to the RN and reported as soon as  they are identified.  Blood culture testing and Gram stain, if indicated, are  performed at [facility]" "Laboratory, [address]  [place], [place].  Positive blood cultures are  sent to [facility], [address] [place] [address], for organism identification and  susceptibility testing.      Narrative:      Collected By:  Per Hospital Policy: Only change Specimen Src: to \"Line\" if  specified by physician order.  Left AC    C Diff Toxin [[id number]]  (Abnormal) Collected: 12/25/21 0505    Order Status: Completed Updated: 12/26/21 0534     C.Diff Toxin A&B Positive     Comment: TOXIN POSITIVE  Toxin detected by EIA; C. difficile detected by PCR.  If clinically correlated, treatment indicated per guidelines.  Test of cure is not recommended.         Narrative:      Special Contact Isolation  Collected By:  Does this patient have risk factors for C-diff?->Yes  C-Diff Risk Factors->long length of stay in healthcare  settings    C Diff by PCR rflx Toxin [[id number]] Collected: 12/25/21 0505    Order Status: Completed Specimen: Stool Updated: 12/26/21 0532     C Diff by PCR See Toxin     027-NAP1-BI Presumptive Negative     Comment: Presumptive 027/NAP1/BI target DNA sequences are NOT DETECTED.       Narrative:      Special Contact Isolation  Collected By:  Does this patient have risk factors for C-diff?->Yes  C-Diff Risk Factors->long length of stay in healthcare  settings    URINALYSIS [[id number]]  (Abnormal) Collected: 12/25/21 0820    Order Status: Completed Specimen: Urine, [place] Cath Updated: 12/25/21 0909     Color Yellow     Character Clear     Specific Gravity 1.010     Ph 5.5     Glucose Negative mg/dL      Ketones Negative mg/dL      Protein Negative mg/dL      Bilirubin Negative     Nitrite Negative     Leukocyte Esterase Negative     Occult Blood Moderate     Micro Urine Req Microscopic    Narrative:      Collected By:  Indication for culture:->Emergency Room Patient  Indication for culture:->New onset fever with no other  identified cause    COV-2, FLU A/B, AND RSV BY PCR (2-4 HOURS CEPHEID): " "Collect NP swab in [facility] [[id number]] Collected: 12/25/21 0410    Order Status: Completed Specimen: Respirate from Nasopharyngeal Updated: 12/25/21 0459     Influenza virus A RNA Negative     Influenza virus B, PCR Negative     RSV, PCR Negative     SARS-CoV-2 by PCR NotDetected     Comment: PATIENTS: Important information regarding your results and instructions can  be found at [email or web address]   \"After your  Covid-19 Test\"    [facility] providers: PLEASE REFER TO DE-ESCALATION AND RETESTING PROTOCOL  on [email or web address]    **The [employer] GeneXpert Xpress SARS-CoV-2 RT-PCR Test has been made  available for use under the Emergency Use Authorization (EUA) only.          SARS-CoV-2 Source NP Swab    Narrative:      Special Contact Isolation  Collected By:  Have you been in close contact with a person who is suspected  or known to be positive for COVID-19 within the last 30 days  (e.g. last seen that person < 30 days ago)->Unknown    CULTURE STOOL [[id number]]     Order Status: Sent Specimen: Stool         Blood Culture   Date Value Ref Range Status   03/12/2018 No growth after 5 days of incubation.  Final        Studies:    IMPRESSION:   Recurrent C. difficile infection  Severe fecal impaction  Leukocytosis  - S/p surgical disimpaction on 11/19 an treatment Cdiff  Now presenting with same  Recent diagnosis of UTI in the setting of bladder stones-colonization vs new infection  MS, complicated by neurogenic bladder and suprapubic catheter       PLAN:   -Change catheter if not already done  Status post multiple courses of antibiotics and fecal transplant  Just finished oral vanco taper 12/14/2021  - S/p surgical disimpaction on 11/19   S/p Golytely and enemas  -Mom states dw GI and they have no other treatment options recommended  -Continue p.o. vancomycin 125 mg every 6 hours and IV Flagyl pending surgical eval  Offered option attempting lifelong suppression of Cdiff with oral vancomycin  Only " other option at this time is surgical-  -Surgery following-possible need for colectomy  -UA of unclear significance given above and chronic indwelling cath-give dose fosfomycin

## 2021-12-27 PROBLEM — R78.81 POSITIVE BLOOD CULTURES: Status: ACTIVE | Noted: 2021-01-01

## 2021-12-27 NOTE — ANESTHESIA PREPROCEDURE EVALUATION
" Case: [id number] Date/Time: 12/26/21 1618    Procedures:       LAPAROTOMY, EXPLORATORY      COLECTOMY      CREATION, ILEOSTOMY    Location:  OR 02 / SURGERY [facility]    Surgeons: John Yeboah M.D.          Relevant Problems   PULMONARY   (positive) H/O Clostridium difficile infection   (positive) History of recurrent UTIs      NEURO   (positive) H/O Clostridium difficile infection   (positive) History of recurrent UTIs   (positive) History of self-care deficit      CARDIAC   (positive) Essential hypertension     /87   Pulse (!) 110   Temp 37.2 °C (98.9 °F) (Axillary)   Resp 18   Ht 1.803 m (5' 11\")   Wt 60.7 kg (133 lb 13.1 oz)   SpO2 95%   BMI 18.66 kg/m²     Physical Exam    Airway   Mallampati: II  TM distance: >3 FB  Neck ROM: full       Cardiovascular - normal exam  Rhythm: regular  Rate: normal  (-) murmur     Dental - normal exam           Pulmonary - normal exam  Breath sounds clear to auscultation     Abdominal    Neurological - normal exam                 Anesthesia Plan    ASA 3- EMERGENT   ASA physical status emergent criteria: acutely contaminated wound or identified infection source and acute peritonitis    Plan - general       Airway plan will be ETT          Induction: intravenous    Postoperative Plan: Postoperative administration of opioids is intended.    Pertinent diagnostic labs and testing reviewed    Informed Consent:    Anesthetic plan and risks discussed with patient.    Use of blood products discussed with: patient whom consented to blood products.         "

## 2021-12-27 NOTE — CARE PLAN
The patient is Watcher - Medium risk of patient condition declining or worsening    Shift Goals  Clinical Goals: IV abx, safety/prevent falls  Patient Goals: rest, go home    Progress made toward(s) clinical / shift goals:  IV antibiotics administered per MAR. Pt educated on fall risk/precautions including bed alarms and use of call light. Pt remains free from falls at this time.     Patient is not progressing towards the following goals: n/a

## 2021-12-27 NOTE — CARE PLAN
Problem: Nutritional:  Goal: Achieve adequate nutritional intake  Description: Patient will tolerate diet past NPO/clears and eat 50% meals  Outcome: Not Met

## 2021-12-27 NOTE — PROGRESS NOTES
Made contact with [facility], Dr Reyes office ([phone] (ext [phone]))   Obtained pain pump information  Med rec completed

## 2021-12-27 NOTE — PROGRESS NOTES
Corrina from Lab called with critical result of positive blood culture at 0704. Critical lab result read back to Corrina.   Dr. Smith notified via Voalte of critical lab result at 0719.  Critical lab result read back by Dr. Smith.

## 2021-12-27 NOTE — PROGRESS NOTES
Patient's ostomy leaking around wafer small/mod serosanguineous drainage. Wafer changed and new island dressing placed.

## 2021-12-27 NOTE — ANESTHESIA POSTPROCEDURE EVALUATION
Patient: Jerzy Juan III    Procedure Summary     Date: 12/26/21 Room / Location:  OR  / SURGERY [facility]    Anesthesia Start: 1629 Anesthesia Stop: 1829    Procedures:       LAPAROTOMY, EXPLORATORY (Abdomen)      COLECTOMY (Abdomen)      CREATION, ILEOSTOMY (Abdomen) Diagnosis: (C DIFF COLITIS W PERITONITIS)    Surgeons: John Yeboah M.D. Responsible Provider: Arya Rizzo M.D.    Anesthesia Type: general ASA Status: 3 - Emergent          Final Anesthesia Type: general  Last vitals  BP   Blood Pressure: 130/91    Temp   36.5 °C (97.7 °F)    Pulse   96   Resp   15    SpO2   97 %      Anesthesia Post Evaluation    Patient location during evaluation: PACU  Patient participation: complete - patient participated  Level of consciousness: awake and alert  Pain score: 0    Airway patency: patent  Anesthetic complications: no  Cardiovascular status: hemodynamically stable  Respiratory status: acceptable  Hydration status: euvolemic    PONV: none          No complications documented.     Nurse Pain Score: 0 (NPRS)

## 2021-12-27 NOTE — CONSULTS
"Surgical History and Physical    Date: 12/26/2021    Requesting Physician: Dr. Jacobo    Consulting Physician: John Yeboah M.D.    Reason for consultation: Recurrent C. difficile colitis    CC: Abdominal pain    HPI: This is a 41 y.o. male who is presenting with recurrent C. difficile colitis.  Patient is poor historian and the majority of his history and physical was obtained through chart review and discussion with his hospitalist as well as his gastroenterologist.  Patient has recurrent C. difficile with a history of fecal transplant 3 years ago.  Patient has multiple hospitalizations related to C. difficile infections as well as constipation requiring surgical disimpaction.  On this admission he was referred for abdominal pain with fever.  He currently has leukocytosis greater than 20 and his heart rate was in the 140s but is now improved to the 110s.  CT imaging reveals proctitis and pancolitis as well as a large amount of stool in the rectum and colon.    Past Medical History:   Diagnosis Date   • Anxiety    • Back pain    • Blood transfusion    • Bowel habit changes     constipation   • Breath shortness     \"r/t to medication Ocrevus and Baclofen\" \"side effect   • Dental disorder     poor dentation   • Depression     PTSD/clinical depression   • Eyes sensitive to light     Needs to wear sunglasses   • Fall     4/6/2016   • Headache(784.0)    • Heart burn     reflux   • Heat intolerance     will vomit   • Hypertension    • Indigestion    • MS (multiple sclerosis) ([facility]) DX 8/20/2013    multiple lesions brain/spine   • Multiple sclerosis (HCC) 2013   • Pain 06/20/2018    \"everywhere\"   • Presence of intrathecal baclofen pump     right side   • Urinary bladder disorder     UTI/catheter/bladder stone   • Urinary incontinence     catheter in place       Past Surgical History:   Procedure Laterality Date   • WOUND EXPLORATION GENERAL N/A 11/19/2021    Procedure: MANUAL DISIMPACTION;  Surgeon: Chris Conroy, " M.D.;  Location: [facility];  Service: General   • PB CYSTOURETHROSCOPY N/A 10/28/2021    Procedure: CYSTOSCOPY;  Surgeon: Kwasi Sapp M.D.;  Location: [facility];  Service: Urology   • LASERTRIPSY N/A 10/28/2021    Procedure: LITHOTRIPSY, USING LASER - FOR CYSTOLITHALOPAXY;  Surgeon: Kwasi Sapp M.D.;  Location: [facility];  Service: Urology   • PUMP INSERT/REMOVE  6/26/2018    Procedure: PUMP INSERT/REMOVE - BACLOFEN;  Surgeon: Andrew Gomez M.D.;  Location: [facility];  Service: Pain Management   • CYSTOSCOPY  5/7/2018    Procedure: CYSTOSCOPY;  Surgeon: Kwasi Sapp M.D.;  Location: [facility];  Service: Urology   • LASERTRIPSY  5/7/2018    Procedure: LASERTRIPSY - FOR: LITHOLAPAXY;  Surgeon: Kwasi Sapp M.D.;  Location: [facility];  Service: Urology   • BLOCK EPIDURAL STEROID INJECTION N/A 3/27/2018    Procedure: BLOCK EPIDURAL STEROID INJECTION - BACLOFEN PUMP TRIAL;  Surgeon: Andrew Gomez M.D.;  Location: [facility];  Service: Pain Management   • GASTROSCOPY  1/14/2017    Procedure: GASTROSCOPY WITH FECAL MATTER TRANSPLANT;  Surgeon: Obinna Douglas M.D.;  Location: [facility];  Service:    • OPEN REDUCTION Left 1988    left thumb       Current Facility-Administered Medications   Medication Dose Route Frequency Provider Last Rate Last Admin   • NS infusion   Intravenous Continuous Marzena Smith M.D. 125 mL/hr at 12/26/21 0936 New Bag at 12/26/21 0936   • [MAR Hold] fosfomycin (MONUROL) oral packet 3 g  3 g Oral Once Veronica Cramer M.D.       • [MAR Hold] Pharmacy Consult Request  1 Each Other PHARMACY TO DOSE Carlos Leslie D.O.       • [MAR Hold] enoxaparin (LOVENOX) inj 40 mg  40 mg Subcutaneous DAILY Guero Mishra M.D.   40 mg at 12/26/21 9989   • [MAR Hold] acetaminophen (Tylenol) tablet 650 mg  650 mg Oral Q6HRS PRN Guero Mishra M.D.   650 mg at 12/25/21 1656   • [MAR  Hold] labetalol (NORMODYNE/TRANDATE) injection 10 mg  10 mg Intravenous Q4HRS PRN Guero Mishra M.D.       • [MAR Hold] baclofen (LIORESAL) tablet 10 mg  10 mg Oral 4X/DAY Guero Mishra M.D.   10 mg at 21 0936   • [MAR Hold] buPROPion SR (WELLBUTRIN-SR) tablet 100 mg  100 mg Oral TID Guero Mishra M.D.   100 mg at 21 0936   • [MAR Hold] ondansetron (ZOFRAN ODT) dispertab 4 mg  4 mg Oral Q4HRS PRN Guero Mishra M.D.   4 mg at 21 0626   • [MAR Hold] ondansetron (ZOFRAN) syringe/vial injection 4 mg  4 mg Intravenous Q6HRS PRN Guero Mishra M.D.   4 mg at 21 1227   • [MAR Hold] sertraline (Zoloft) tablet 25 mg  25 mg Oral DAILY Guero Mishra M.D.   25 mg at 21 0526   • [MAR Hold] vitamin D3 (cholecalciferol) tablet 1,000 Units  1,000 Units Oral DAILY Guero Mishra M.D.   1,000 Units at 21 0526   • [MAR Hold] vancomycin 50 mg/mL oral soln 250 mg  250 mg Oral Q6HRS Marzena Smith M.D.   250 mg at 21 0527   • [MAR Hold] metroNIDAZOLE (FLAGYL) IVPB 500 mg  500 mg Intravenous Q8HRS Veronica Cramer M.D. 100 mL/hr at 21 1449 500 mg at 21 1449   • [MAR Hold] Metoprolol Tartrate (LOPRESSOR) injection 5 mg  5 mg Intravenous Q5 MIN PRN Marzena Smith M.D.   5 mg at 21 2018       Social History     Socioeconomic History   • Marital status:      Spouse name: Not on file   • Number of children: Not on file   • Years of education: Not on file   • Highest education level: Not on file   Occupational History   • Not on file   Tobacco Use   • Smoking status: Former Smoker     Packs/day: 1.00     Years: 20.00     Pack years: 20.00     Start date: 1997     Quit date: 2013     Years since quittin.4   • Smokeless tobacco: Never Used   Vaping Use   • Vaping Use: Former   Substance and Sexual Activity   • Alcohol use: Not Currently     Alcohol/week: 0.0 oz   • Drug use: Not Currently     Types: Inhaled     Comment: stopped 2019   •  Sexual activity: Not on file     Comment: once daily for 23 yrs   Other Topics Concern   • Not on file   Social History Narrative   • Not on file     Social Determinants of Health     Financial Resource Strain:    • Difficulty of Paying Living Expenses: Not on file   Food Insecurity:    • Worried About Running Out of Food in the Last Year: Not on file   • Ran Out of Food in the Last Year: Not on file   Transportation Needs:    • Lack of Transportation (Medical): Not on file   • Lack of Transportation (Non-Medical): Not on file   Physical Activity:    • Days of Exercise per Week: Not on file   • Minutes of Exercise per Session: Not on file   Stress:    • Feeling of Stress : Not on file   Social Connections:    • Frequency of Communication with Friends and Family: Not on file   • Frequency of Social Gatherings with Friends and Family: Not on file   • Attends [employer] Services: Not on file   • Active Member of Clubs or Organizations: Not on file   • Attends Club or Organization Meetings: Not on file   • Marital Status: Not on file   Intimate Partner Violence:    • Fear of Current or Ex-Partner: Not on file   • Emotionally Abused: Not on file   • Physically Abused: Not on file   • Sexually Abused: Not on file   Housing Stability:    • Unable to Pay for Housing in the Last Year: Not on file   • Number of Places Lived in the Last Year: Not on file   • Unstable Housing in the Last Year: Not on file       Family History   Problem Relation Age of Onset   • Thyroid Mother    • Hypertension Mother    • Hypertension Father    • Psychiatric Illness Father        Allergies:  Asa [aspirin], Gabapentin, Pregabalin, Hydrocodone, Methocarbamol, and Morphine    Review of Systems:  Constitutional: Negative for fever, chills or weight loss  HENT: Negative for nosebleeds   Eyes: Negative for changes in vision or photophobia  Respiratory: Negative for cough, shortness of breath or wheezing  Cardiovascular: Negative for chest pain or  "palpitations  Gastrointestinal: Negative for nausea, vomiting, positive for constipation as well as diarrhea, negative for blood in stool and melena.   Genitourinary: Negative for dysuria or urinary incontinence   Musculoskeletal: Negative for back pain and joint pain.   Skin: Negative for itching and rash.  Neurological: Positive for headaches  Endo/Heme/Allergies: Does not bruise/bleed easily.   Psychiatric/Behavioral: Negative for substance abuse. The patient is not nervous/anxious and does not have insomnia.    Physical Exam:  /87   Pulse (!) 110   Temp 37.2 °C (98.9 °F) (Axillary)   Resp 18   Ht 1.803 m (5' 11\")   Wt 60.7 kg (133 lb 13.1 oz)   SpO2 95%     Constitutional: Not Fully conversant and in moderate distress.   Head: Normocephalic and atraumatic.   Neck: Reduced range of motion  Cardiovascular: Tachycardia no hypotension  Pulmonary/Chest: Normal respiratory effort. No stridor. No respiratory distress.   Abdominal: Diffusely tender with minimal distention no rebound or guarding  Musculoskeletal: Limited range of motion   neurological: Abnormal movement of extremities  Skin: Skin is warm and dry. No rash noted. he is not diaphoretic. No erythema. No pallor.   Psychiatric: normal mood and affect.  Behavior is at baseline.       Labs:  Recent Labs     12/25/21  0350 12/25/21 2336 12/26/21  1142   WBC 18.9* 20.6* 20.5*   RBC 5.64 5.27 5.47   HEMOGLOBIN 16.9 15.9 16.3   HEMATOCRIT 50.0 46.3 48.7   MCV 88.7 87.9 89.0   MCH 30.0 30.2 29.8   MCHC 33.8 34.3 33.5*   RDW 50.7* 49.5 49.4   PLATELETCT 317 252 192   MPV 8.9* 9.3 9.0     Recent Labs     12/25/21  0350 12/25/21 2336 12/26/21  1142   SODIUM 134* 127* 128*   POTASSIUM 3.6 3.5* 3.2*   CHLORIDE 100 93* 91*   CO2 18* 20 23   GLUCOSE 157* 106* 91   BUN 38* 25* 21   CREATININE 0.85 0.66 0.68   CALCIUM 8.9 8.3* 7.8*         Recent Labs     12/25/21  0350 12/25/21 2336 12/26/21  1142   ASTSGOT 15 24 30   ALTSGPT 15 17 16   TBILIRUBIN 0.8 0.7 0.6 "   ALKPHOSPHAT 95 136* 161*   GLOBULIN 3.0 3.0 3.1         Radiology:  DX-ABDOMEN FOR TUBE PLACEMENT   Final Result      Nasogastric tube tip at the gastric fundus.      CT-ABDOMEN-PELVIS WITH   Final Result         1. Diffuse rectal and colonic wall thickening throughout, in keeping with proctitis and pancolitis.   2. Large amount of stool in the rectum and sigmoid colon.      DX-CHEST-PORTABLE (1 VIEW)   Final Result         1. No acute cardiopulmonary abnormalities are identified.          Assessment: This is a 41 y.o.     Active Hospital Problems    Diagnosis    • Acute colitis [K52.9]      Priority: High   • Sepsis (HCC) [A41.9]      Priority: High   • MS (multiple sclerosis) ([facility]) [G35]      Priority: High     IMO load March 2020     • Anxiety and depression [F41.9, F32.A]      Priority: Low   • Diarrhea of presumed infectious origin [R19.7]    • Advance care planning [Z71.89]    • Neurogenic dysfunction of the urinary bladder [N31.9]        Plan:   I had a discussion with the patient though it was limited and then I repeated my discussion with the patient's mother.  Patient has recurrent C. difficile colitis complicated by constipation requiring surgical disimpaction.  Patient currently has leukocytosis greater than 20 and fever with tachycardia though his tachycardia is improving.  I discussed with the mother that the patient is currently stable though there is some risk if we wait for him to become hemodynamically unstable.  We have a plan for pursuing a fecal transplant though he has had a fecal transplant in the past and there is some difficulty during current pandemic protocols and obtaining fecal transplants and we discussed waiting for that versus proceeding with surgery.  We discussed diverting loop ileostomy with colonic of all lavage and the limited data associated with that procedure versus taking out the colon however this patient does have complex needs due to his multiple sclerosis that are  compounded by his constipation.  We discussed the possibility of the need for continued vancomycin enemas.  We had a prolonged discussion regarding the patient's motor difficulties and potential complications of an ileostomy.  Given the above presentation, the patient will be taken to the operating room for open colon resection. The surgical plan was discussed the the patient and available family. Potential complications were discussed in detail and include but are not limited to infection, bleeding, damage to adjacent tissues and organs, anesthetic complications . Additional possible complications specifically related to the planned procedure included in the discussion were related to leak, bowel, bladder, or vascular trocar injury, abscess, fistula, hernia or need for permanent or temporary ostomy.    Questions were elicited and answered to the patient's and available family's satisfaction. The patient understands the rationale for surgery and agrees to proceed.  Operative consent was obtained.  According to the [employer] NSQIP surgical risk report the patient is above average in terms of risk for any or serious complications including respiratory, cardiac, death, return to OR for discharge to a nursing or rehabilitation facility.        John Yeboah MD PhD  [facility]  Colon and Rectal Surgeon  [phone]

## 2021-12-27 NOTE — PROGRESS NOTES
"12/27  S:  41 y.o.male s/p Open TAC with End Ileostomy  POD# 1.  Patient doing fair overnight, minimal ability to communicate but having some ongoing pain most of today.  No nausea or emesis, ileostomy in place without output so far.  Unable to ambulate or sit in a chair at baseline.      O:  /84   Pulse (!) 103   Temp 36.4 °C (97.5 °F) (Oral)   Resp 14   Ht 1.803 m (5' 11\")   Wt 60.7 kg (133 lb 13.1 oz)   SpO2 96%   I/O last 3 completed shifts:  In: 3432 [I.V.:3432]  Out: 1460 [Urine:200]  Recent Labs     12/25/21  2336 12/26/21  1142 12/27/21  0358   SODIUM 127* 128* 136   POTASSIUM 3.5* 3.2* 3.0*   CHLORIDE 93* 91* 99   CO2 20 23 24   GLUCOSE 106* 91 100*   BUN 25* 21 15   CREATININE 0.66 0.68 0.53   CALCIUM 8.3* 7.8* 7.3*     Recent Labs     12/25/21  2336 12/26/21  1142 12/27/21  0358   WBC 20.6* 20.5* 15.3*   RBC 5.27 5.47 4.41*   HEMOGLOBIN 15.9 16.3 13.2*   HEMATOCRIT 46.3 48.7 38.7*   MCV 87.9 89.0 87.8   MCH 30.2 29.8 29.9   MCHC 34.3 33.5* 34.1   RDW 49.5 49.4 49.6   PLATELETCT 252 192 196   MPV 9.3 9.0 9.5       Alert and Oriented x3, No Acute Distress  Normal Respiratory Effort  Abdomen soft, appropriately tender  Incisions/Bandages clean/dry/intact  Extremities warm and well perfused  Ostomy pink and healthy, output serous fluid only, minimal    A/P:  Pain control with PO meds, add IV prn as well for breakthrough pain  Diet NPO  Fluids continue until tolerated PO  Ambulate tid and ad chadwick  Abx per ID, on Merepenum and PO Vancomycin    "

## 2021-12-27 NOTE — PROGRESS NOTES
Infectious Disease Progress Note    Author: Veronica Cramer M.D. Date & Time of service: 2021  1:43 PM    Chief Complaint:  Refractory C diff colitis   ESBL  Peritonitis    Interval History:  41 y.o. male with severe MS and recurrent Cdiff colitis admitted 2021 with fever and diarrhea.    AF WBC 15 s/p colectomy-dw surgery, inflamed and purulent peritoneal fluid seen intra-op. Pt refused oral fosfomycin    Labs Reviewed, Medications Reviewed and Wound Reviewed.    Review of Systems:  Review of Systems   Unable to perform ROS: Critical illness   Constitutional: Negative for fever.   Musculoskeletal: Positive for myalgias.   Neurological: Positive for focal weakness and weakness.   All other systems reviewed and are negative.      Hemodynamics:  Temp (24hrs), Av.6 °C (97.9 °F), Min:36.4 °C (97.5 °F), Max:36.8 °C (98.2 °F)  Temperature: 36.4 °C (97.5 °F)  Pulse  Av.7  Min: 76  Max: 152   Blood Pressure: 124/84       Physical Exam:  Physical Exam  Vitals and nursing note reviewed.   Constitutional:       Appearance: He is ill-appearing. He is not diaphoretic.   HENT:      Nose: No rhinorrhea.      Mouth/Throat:      Pharynx: No oropharyngeal exudate.   Eyes:      Extraocular Movements: Extraocular movements intact.      Pupils: Pupils are equal, round, and reactive to light.   Cardiovascular:      Rate and Rhythm: Tachycardia present.   Pulmonary:      Effort: Pulmonary effort is normal. No respiratory distress.   Abdominal:      General: There is no distension.      Comments: Surgical site dressed ostomy-red, no necrosis   Musculoskeletal:      Comments: sarcopenia   Skin:     Coloration: Skin is not jaundiced.      Findings: Bruising present.   Neurological:      Mental Status: He is alert.      Motor: Weakness present.      Coordination: Coordination abnormal.      Deep Tendon Reflexes: Reflexes abnormal.         Meds:    Current Facility-Administered Medications:   •  meropenem  •   morphine  •  NS  •  potassium chloride SA  •  Special Contact Isolation **AND** [COMPLETED] C Diff by PCR rflx Toxin **AND** Pharmacy  •  enoxaparin  •  acetaminophen  •  labetalol  •  baclofen  •  buPROPion SR  •  ondansetron  •  ondansetron  •  sertraline  •  vitamin D3  •  vancomycin 50 mg/mL    Labs:  Recent Labs     12/25/21  0350 12/25/21  0350 12/25/21 2336 12/26/21  1142 12/27/21  0358   WBC 18.9*   < > 20.6* 20.5* 15.3*   RBC 5.64   < > 5.27 5.47 4.41*   HEMOGLOBIN 16.9   < > 15.9 16.3 13.2*   HEMATOCRIT 50.0   < > 46.3 48.7 38.7*   MCV 88.7   < > 87.9 89.0 87.8   MCH 30.0   < > 30.2 29.8 29.9   RDW 50.7*   < > 49.5 49.4 49.6   PLATELETCT 317   < > 252 192 196   MPV 8.9*   < > 9.3 9.0 9.5   NEUTSPOLYS 57.00   < > 72.00 84.00* 91.00*   LYMPHOCYTES 11.00*   < > 2.00* 2.00* 1.60*   MONOCYTES 19.00*   < > 7.00 6.00 6.10   EOSINOPHILS 0.00   < > 0.00 0.00 0.00   BASOPHILS 0.00   < > 0.00 0.00 0.30   RBCMORPHOLO Normal  --  Normal Normal  --     < > = values in this interval not displayed.     Recent Labs     12/25/21 2336 12/26/21  1142 12/27/21  0358   SODIUM 127* 128* 136   POTASSIUM 3.5* 3.2* 3.0*   CHLORIDE 93* 91* 99   CO2 20 23 24   GLUCOSE 106* 91 100*   BUN 25* 21 15     Recent Labs     12/25/21 2336 12/26/21  1142 12/27/21  0358   ALBUMIN 2.7* 2.4* 2.0*   TBILIRUBIN 0.7 0.6 0.3   ALKPHOSPHAT 136* 161* 114*   TOTPROTEIN 5.7* 5.5* 4.6*   ALTSGPT 17 16 15   ASTSGOT 24 30 23   CREATININE 0.66 0.68 0.53       Imaging:  CT-ABDOMEN-PELVIS WITH    Result Date: 12/25/2021 12/25/2021 5:35 AM HISTORY/REASON FOR EXAM:  Diarrhea. TECHNIQUE/EXAM DESCRIPTION:   CT scan of the abdomen and pelvis with contrast. Contrast-enhanced helical scanning was obtained from the diaphragmatic domes through the pubic symphysis following the bolus administration of nonionic contrast without complication. 90 mL of Omnipaque 350 nonionic contrast was administered without complication. Low dose optimization technique was utilized  for this CT exam including automated exposure control and adjustment of the mA and/or kV according to patient size. COMPARISON: 5/18/2018 FINDINGS: Lower Chest: Patchy right basilar opacities. Liver: Normal. Spleen: Unremarkable. Pancreas: Unremarkable. Gallbladder: No calcified stones. Biliary: There is no biliary dilatation. Adrenal glands: Normal. Kidneys: No hydronephrosis. Bilateral kidneys are enhancing symmetrically.. Bowel: Large amount of stool in the rectum and sigmoid colon.. Diffuse colonic wall thickening throughout. Lymph nodes: No adenopathy. Vasculature: The abdominal aorta is normal in caliber. Peritoneum: Unremarkable without ascites. Musculoskeletal: No aggressive bone lesions are seen. Pelvis: Decompressed urinary bladder by suprapubic catheter. Large amount stool in the rectum with rectal wall thickening.     1. Diffuse rectal and colonic wall thickening throughout, in keeping with proctitis and pancolitis. 2. Large amount of stool in the rectum and sigmoid colon.    DX-CHEST-PORTABLE (1 VIEW)    Result Date: 12/25/2021 12/25/2021 3:17 AM HISTORY/REASON FOR EXAM:  Sepsis; sepsis TECHNIQUE/EXAM DESCRIPTION AND NUMBER OF VIEWS: Single portable view of the chest. COMPARISON: 11/18/2021 FINDINGS: No pulmonary infiltrates or consolidations are noted. No pleural effusion. No pneumothorax. Normal cardiopericardial silhouette.     1. No acute cardiopulmonary abnormalities are identified.    DX-ABDOMEN FOR TUBE PLACEMENT    Result Date: 12/25/2021 12/25/2021 2:31 PM HISTORY/REASON FOR EXAM:  Tube placement. TECHNIQUE/EXAM DESCRIPTION AND NUMBER OF VIEWS:  1 view(s) of the abdomen. COMPARISON:  10/29/2021 FINDINGS: Enteric tube has been placed. The tip projects over the fundus. Increased bowel gas.     Nasogastric tube tip at the gastric fundus.      Micro:  Results     Procedure Component Value Units Date/Time    Fungal Culture [[id number]] Collected: 12/26/21 8072    Order Status: Completed Specimen:  "Body Fluid Updated: 12/27/21 1150    Anaerobic Culture [[id number]] Collected: 12/26/21 1805    Order Status: Completed Specimen: Body Fluid Updated: 12/27/21 1150    CULTURE WOUND W/ GRAM STAIN [[id number]] Collected: 12/26/21 1805    Order Status: Completed Specimen: Body Fluid Updated: 12/27/21 1150    BLOOD CULTURE [[id number]]  (Abnormal) Collected: 12/25/21 0350    Order Status: Completed Specimen: Blood from Peripheral Updated: 12/27/21 1133     Significant Indicator POS     Source BLD     Site PERIPHERAL     Culture Result Growth detected by Bactec instrument.  12/27/2021  08:12  Gram Stain: Gram positive cocci: Possible Staphylococcus sp.  Negative for Staphylococcus aureus and MRSA by PCR. Correlate  ongoing need for antibiotics with clinical condition.  Further report to follow.      Narrative:      CALL  [facility]  MED tel. [phone],  CALLED  MED tel. [phone] 12/27/2021, 08:13, RB PERF. RESULTS CALLED TO:  [id number] rn  Collected By:  Per Hospital Policy: Only change Specimen Src: to \"Line\" if  specified by physician order.  Collected By:    URINE CULTURE(NEW) [[id number]]  (Abnormal)  (Susceptibility) Collected: 12/25/21 0820    Order Status: Completed Specimen: Urine, [place] Cath Updated: 12/27/21 0804     Significant Indicator POS     Source UR     Site URINE, [place] CATH     Culture Result -      Escherichia coli ESBL  ,000  Extended Spectrum Beta-lactamase (ESBL) isolated.  ESBL's may be clinically resistant to therapy with  Penicillins,Cephalosporins or Aztreonam despite  apparent in vitro susceptibility to some of these agents.  The patient requires contact isolation.  Please contact pharmacy or an Infectious Disease Specialist  if you have any questions about appropriate therapy.      Narrative:      Collected By:  Indication for culture:->Emergency Room Patient  Indication for culture:->New onset fever with no other  identified cause  Collected By:    Susceptibility     Escherichia coli esbl (1)     " "Antibiotic Interpretation Microscan   Method Status    Ampicillin Resistant >16 mcg/mL GUANACO Final    Ceftriaxone Extended Spectrum Beta Lactamase >32 mcg/mL GUANACO Final    Cefazolin Resistant >16 mcg/mL GUANACO Final    Ciprofloxacin Resistant 2 mcg/mL GUANACO Final    Cefepime Resistant >16 mcg/mL GUANACO Final    Cefuroxime Resistant >16 mcg/mL GUANACO Final    Ampicillin/sulbactam Resistant >16/8 mcg/mL GUANACO Final    Tobramycin Sensitive <=2 mcg/mL GUANACO Final    Nitrofurantoin Sensitive <=32 mcg/mL GUANACO Final    Gentamicin Sensitive <=2 mcg/mL GUANACO Final    Levofloxacin Intermediate 1 mcg/mL GUANACO Final    Minocycline Sensitive <=4 mcg/mL GUANACO Final    Pip/Tazobactam Sensitive <=8 mcg/mL GUANACO Final    Trimeth/Sulfa Sensitive <=0.5/9.5 mcg/mL GUANACO Final    Tigecycline Sensitive <=2 mcg/mL GUANACO Final                   BLOOD CULTURE [[id number]] Collected: 12/25/21 0425    Order Status: Completed Specimen: Blood from Peripheral Updated: 12/26/21 0734     Significant Indicator NEG     Source BLD     Site PERIPHERAL     Culture Result No Growth  Note: Blood cultures are incubated for 5 days and  are monitored continuously.Positive blood cultures  are called to the RN and reported as soon as  they are identified.  Blood culture testing and Gram stain, if indicated, are  performed at [facility], [address] [place] [address].  Positive blood cultures are  sent to [facility], [address] [place] [address], for organism identification and  susceptibility testing.      Narrative:      Collected By:  Per Hospital Policy: Only change Specimen Src: to \"Line\" if  specified by physician order.  Left AC    C Diff Toxin [[id number]]  (Abnormal) Collected: 12/25/21 0505    Order Status: Completed Updated: 12/26/21 0534     C.Diff Toxin A&B Positive     Comment: TOXIN POSITIVE  Toxin detected by EIA; C. difficile detected by PCR.  If clinically correlated, treatment indicated per guidelines.  Test of cure is " "not recommended.         Narrative:      Special Contact Isolation  Collected By:  Does this patient have risk factors for C-diff?->Yes  C-Diff Risk Factors->long length of stay in healthcare  settings    C Diff by PCR rflx Toxin [[id number]] Collected: 12/25/21 0505    Order Status: Completed Specimen: Stool Updated: 12/26/21 0532     C Diff by PCR See Toxin     027-NAP1-BI Presumptive Negative     Comment: Presumptive 027/NAP1/BI target DNA sequences are NOT DETECTED.       Narrative:      Special Contact Isolation  Collected By:  Does this patient have risk factors for C-diff?->Yes  C-Diff Risk Factors->long length of stay in healthcare  settings    AFB Culture [[id number]] Collected: 12/26/21 0000    Order Status: Canceled     URINALYSIS [[id number]]  (Abnormal) Collected: 12/25/21 0820    Order Status: Completed Specimen: Urine, [place] Cath Updated: 12/25/21 0909     Color Yellow     Character Clear     Specific Gravity 1.010     Ph 5.5     Glucose Negative mg/dL      Ketones Negative mg/dL      Protein Negative mg/dL      Bilirubin Negative     Nitrite Negative     Leukocyte Esterase Negative     Occult Blood Moderate     Micro Urine Req Microscopic    Narrative:      Collected By:  Indication for culture:->Emergency Room Patient  Indication for culture:->New onset fever with no other  identified cause    COV-2, FLU A/B, AND RSV BY PCR (2-4 HOURS CEPHEID): Collect NP swab in VTM [[id number]] Collected: 12/25/21 0410    Order Status: Completed Specimen: Respirate from Nasopharyngeal Updated: 12/25/21 0459     Influenza virus A RNA Negative     Influenza virus B, PCR Negative     RSV, PCR Negative     SARS-CoV-2 by PCR NotDetected     Comment: PATIENTS: Important information regarding your results and instructions can  be found at [email or web address]   \"After your  Covid-19 Test\"    [facility] providers: PLEASE REFER TO DE-ESCALATION AND RETESTING PROTOCOL  on [email or web address]    **The " [employer] GeneXpert Xpress SARS-CoV-2 RT-PCR Test has been made  available for use under the Emergency Use Authorization (EUA) only.          SARS-CoV-2 Source NP Swab    Narrative:      Special Contact Isolation  Collected By:  Have you been in close contact with a person who is suspected  or known to be positive for COVID-19 within the last 30 days  (e.g. last seen that person < 30 days ago)->Unknown    CULTURE STOOL [[id number]]     Order Status: Sent Specimen: Stool           Assessment:  Active Hospital Problems    Diagnosis    • *Sepsis ([facility]) [A41.9]    • Diarrhea of presumed infectious origin [R19.7]    • Advance care planning [Z71.89]    • Neurogenic dysfunction of the urinary bladder [N31.9]    • Acute colitis [K52.9]    • Anxiety and depression [F41.9, F32.A]    • MS (multiple sclerosis) ([facility]) [G35]      Recurrent C. difficile infection  Severe fecal impaction  Leukocytosis  - S/p surgical disimpaction on 11/19 an treatment Cdiff  Presenting with same  Recent diagnosis of UTI in the setting of bladder stones-colonization vs new infection  MS, complicated by neurogenic bladder and suprapubic catheter. Urine culture ESBL Ecoli   Pseudobacteremia-CNS in one blood culture  Secondary peritonitis     PLAN:   -Change catheter if not already done  Status post multiple courses of antibiotics and fecal transplant  Just finished oral vanco taper 12/14/2021  - S/p surgical disimpaction on 11/19. S/p Golytely and enemas this admission  -s/p colectomy 12/26 colon markedly inflamed colon and purulence seen suggestive of perforation  Start meropenem given known ESBL in urine  -FU peritoneal fluid cultures  -Continue p.o. vancomycin 125 mg every 6 hours   -DC IV Flagyl   -UA of unclear significance given above and chronic indwelling cath-  -DC vanco as +blood culture identified as CNS    DW Dr Yeboah and Dr [facility]

## 2021-12-27 NOTE — PROGRESS NOTES
Bedside report received from AURORA Landry. Assumed pt care. Pt is AOx4, though anxious/restless this morning, reporting generalized pain 10/10 in legs/buttocks. Denies any other distress at this time. Discussed POC including frequent turns, IVF/IV abx, weaning supplemental oxygen, stoma care, suprapubic catheter care. Pt verbalized understanding. Hourly rounding in place. Fall precautions in place and call light within reach.

## 2021-12-27 NOTE — OP REPORT
DATE OF OPERATION: 12/26/2021    PREOPERATIVE DIAGNOSIS:    Sepsis  C. difficile colitis  Multiple sclerosis  Neurogenic bladder  Chronic constipation      POSTOPERATIVE DIAGNOSIS:    Sepsis due to fulminant colitis  C. difficile colitis  Multiple sclerosis  Neurogenic bladder  Chronic constipation with fecal impaction  Peritonitis with peritoneal abscess    PROCEDURE PERFORMED:  1.  Exploratory laparotomy with abdominal colectomy  2.  Creation of an ileostomy  3.  Washout of peritoneal abscess  4.  Manual disimpaction of impacted feces      SURGEON: John Yeboah M.D.    ASSISTANT: None    ANESTHESIOLOGIST: Arya Rizzo M.D.    ANESTHESIA:  General endotracheal anesthesia.    ASA CLASSIFICATION: III.    INDICATION:  The patient is a 41 y.o. male with history of recurrent C. difficile colitis admitted for abdominal pain with fever. He is taken to the operating room for exploratory laparotomy and abdominal colectomy.    FINDINGS: Peritoneal cavity was grossly purulent with a markedly distended colon with a cecum greater than 12 cm.  Distention continued all the way to the rectum and the rectum was also markedly dilated.  Total abdominal colectomy with an ileostomy in the patient's left abdomen due to his intrathecal pump on the right side.  Cultures sent for peritoneal fluid sensitivities.    WOUND CLASSIFICATION: Class IV, Dirty, Infected.    SPECIMEN: Terminal ileum colon and proximal rectum.    ESTIMATED BLOOD LOSS:  50 mL.    PROCEDURE:    After informed witnessed consent was obtained, the patient was taken to the operating room and underwent general endotracheal anesthesia without incident.  Preoperative antibiotics were administered.  Bilateral lower sequential compression devices were placed.  The abdomen was prepped and draped in the usual standard sterile fashion.  A timeout was performed verifying the correct patient, procedure, site, positioning in availability of equipment prior to starting  surgery.  A midline incision was performed and carried down to the peritoneal cavity where immediately I encountered a large amount of purulence.  I was able to place a wound protector around my wound and then started with dividing the terminal ileum just proximal to the ileocecal valve.  I then incised lateral attachments of the cecum and mobilized the cecum to the hepatic flexure.  I then used the LigaSure device to divide all of the mesentery of the right side.  I then incised the gastrocolic ligament and similarly incised the transverse mesocolon.  I took down the splenic flexure and then divided the descending colon mesentery.  I then identified a point in the proximal rectum and divided it with a linear cutting stapler.  I then used Lembert sutures to oversew the staple line.  Identified the left ureter and left gonadal vessels and protected them at all times.  I divided the CHARLES with a LigaSure device.  I connected the division lines in the descending and sigmoid colon and then passed the specimen off the table.  I cultured the fluid and sent it for sensitivities.  Hemostasis was observed.  A trephine of skin in the left abdomen was cut and the anterior rectus sheath was incised.  I then split the rectus muscles atraumatically and similarly incised the posterior rectus sheath and grasped the terminal ileum and brought it through without kinking or twisting.  I then changed gloves and gowns to a separate closing tray and closed the abdominal cavity with #1 running PDS sutures.  Skin was closed with skin staples.  A dressing was placed.  I then matured the ileostomy in the manner of Sujatha with interrupted 3-0 Vicryl sutures.  An ileostomy appliance was placed.  All sponge and instrument counts were correct x2.  Patient was extubated and taken to the postanesthesia care unit in stable condition.

## 2021-12-27 NOTE — ANESTHESIA TIME REPORT
Anesthesia Start and Stop Event Times     Date Time Event    12/26/2021 1604 Ready for Procedure     1629 Anesthesia Start     1829 Anesthesia Stop        Responsible Staff  12/26/21    Name Role Begin End    Arya Rizzo M.D. Anesth 1629 1829        Preop Diagnosis (Free Text):  Pre-op Diagnosis     RECURRENT C DIFFICILE        Preop Diagnosis (Codes):    Premium Reason  B. 1st Call    Comments:

## 2021-12-27 NOTE — PROGRESS NOTES
2002 PACU called for report for the patient. Plan of care discussed. PACU unable to bring patient back to room.     2006 This RN and charge RN picked up patient and transferred back to 307. Christine, patient's mother in room and updated with plan of care.     2145 Dr. Desouza updated that patient did not have diet order and patient had oral medications. Dr. Desouza also updated that patient's respirations were at 10. Vital signs otherwise WDL. Dr. Desouza updated diet to sips with meds. Continue to monitor vitals and respirations.

## 2021-12-27 NOTE — OR NURSING
1826: Pt arrived from OR s/p  1.  Exploratory laparotomy with abdominal colectomy  2.  Creation of an ileostomy  3.  Washout of peritoneal abscess  4.  Manual disimpaction of impacted feces   . Verified pt identity. Report received. Respirations 16. Surgical dressing to Abdominal incision midline and ostomy pink  CDI. pedal pulses intact, CMS intact. Pt is  sedated on arrival.  1836: patient resting with oral airway in place  Lung sounds cta  1846  Patient  resting with oral airway in place  1856 negro  1906 oral air way removed.  1916 placed patient on oxymask @ 3 Liters  1926 same   1936  Same clear by Anesthesia and meets criteria for transfer to room  1945 called report  1956 Floor to transport patient   2000 floor Rn and tech at bedside to transfer patient.

## 2021-12-27 NOTE — ASSESSMENT & PLAN NOTE
Coag negative staph 1/2 likely skin contamination  IV vancomycin and Flagyl discontinued by ID  ID following

## 2021-12-27 NOTE — OR NURSING
Pt emergently added-on pre-op completed in recovery, H&P verified, consent for surgery signed, questions answered. Pt to surgery.

## 2021-12-27 NOTE — DOCUMENTATION QUERY
[facility]                                                                       Query Response Note      PATIENT:               JAYNA MOODY  ACCT #:                  [id number]  MRN:                     [id number]  :                      1980  ADMIT DATE:       2021 3:02 AM  DISCH DATE:          RESPONDING  PROVIDER #:        [id number]           QUERY TEXT:    BMI <19 (17.53 & 18.66) is documented in the Medical Record.  Can a diagnosis be provided to support this finding?    NOTE:  If an appropriate response is not listed below, please respond with a new note.    The patient's Clinical Indicators include:  - Findings:  BMI 17.53 per ED provider note.  BMI 18.66 per consult note and anesthesia assessment     - Treatments:  regular diet (NPO for surgery), I&O, colectomy    - Risk factors:  MS, sepsis, diarrhea, chronic pain, fecal impaction     Thank You,  Swathi Clay RN  Clinical Documentation   [email or web address]  Connect via Vo[employer] Messenger  Options provided:   -- Underweight   -- Cachexia   -- Findings of no clinical significance   -- Unable to determine      Query created by: Swathi Clay on 2021 8:27 AM    RESPONSE TEXT:    Underweight          Electronically signed by:  MIGNON MCGRAW MD 2021 3:51 PM

## 2021-12-27 NOTE — PROGRESS NOTES
Per pt's mother, pain pump rep contacted and there are no problems with the pump. Pain pump rep stated that surgeon never contacted the company regarding problems with the pump during surgery and as far as they can tell the pump should be functioning as intended.

## 2021-12-27 NOTE — DIETARY
"Nutrition services: Day 2 of admit.  Jerzy Juan III is a 41 y.o. male with admitting DX of sepsis.    Consult received for BMI<19.    Assessment:  Height: 180.3 cm (5' 11\")  Weight: 60.7 kg (133 lb 13.1 oz) - bed scale  Body mass index is 18.66 kg/m²., BMI classification: normal  Diet/Intake: NPO    Evaluation:   1. Pt admitted with fever and diarrhea.   2. Per weight hx, pt previously at 150 lbs on 8/13/20 and appears to have been stable at that time. Current weight is 133 lbs, which is 17 lb weight loss in > 1 year, not significant.   3. Pt has surgery 12/26: ex lap with abdominal colectomy and creation of ileostomy, washout of peritoneal abscess, and manual disimpaction.  4. Skin: colostomy noted, no edema noted.  5. Labs: K 3.0, glucose 100, Alk phos 114  6. Meds: Kdur, zoloft, vanco, D3, zofran prn, NS infusion  7. Last BM: 12/26    Malnutrition Risk: Not met at this time.    Recommendations/Plan:  1.  GI soft diet when appropriate. Will monitor for tolerance of oral diet when appropriate.  2. Encourage intake of meals.  3. Document intake of all meals as % taken in ADL's to provide interdisciplinary communication across all shifts.   4. Monitor weight.  5. Nutrition rep will continue to see patient for ongoing meal and snack preferences.     RD following.          "

## 2021-12-27 NOTE — ANESTHESIA PROCEDURE NOTES
Airway    Date/Time: 12/26/2021 4:55 PM  Performed by: Arya Rizzo M.D.  Authorized by: Arya Rizzo M.D.     Location:  OR  Urgency:  Elective  Difficult Airway: No    Indications for Airway Management:  Anesthesia      Spontaneous Ventilation: absent    Sedation Level:  Deep  Preoxygenated: Yes    Patient Position:  Sniffing  Mask Difficulty Assessment:  0 - not attempted  Final Airway Type:  Endotracheal airway  Final Endotracheal Airway:  ETT  Cuffed: Yes    Technique Used for Successful ETT Placement:  Direct laryngoscopy    Insertion Site:  Oral  Blade Type:  Jessica  Laryngoscope Blade/Videolaryngoscope Blade Size:  3  ETT Size (mm):  7.5  Measured from:  Teeth  ETT to Teeth (cm):  24  Placement Verified by: auscultation and capnometry    Cormack-Lehane Classification:  Grade I - full view of glottis  Number of Attempts at Approach:  1

## 2021-12-27 NOTE — CARE PLAN
The patient is Watcher - Medium risk of patient condition declining or worsening    Shift Goals  Clinical Goals: maintain hemodynamic stability, control pain, frequent repositioning  Patient Goals: pain control    Progress made toward(s) clinical / shift goals:  Pt VS closely monitored, pt remains stable at this time. Collaborated with physician for pain medication to control breakthrough pain, pt medicated per MAR. Pt frequently repositioned for comfort.     Patient is not progressing towards the following goals: n/a

## 2021-12-28 NOTE — ASSESSMENT & PLAN NOTE
Status post colectomy and end ileostomy with manual disimpaction  Contact precautions  Continue vancomycin via ostomy as patient is strict n.p.o. with ileus and high output from NG tube

## 2021-12-28 NOTE — PROGRESS NOTES
ISOLATION PRECAUTIONS EDUCATION    Educated PATIENT, FAMILY, S.O: patient and family member on isolation for C DIFFICILE.    Educated on reason for isolation, how the infection may be transmitted, and how to help prevent transmission to others. Educated precautions involves staff and visitors wearing PPE, following Standard Precautions and performing meticulous hand hygiene in order to prevent transmission of infection.     Special Contact Precautions: Educated that Special Contact Precautions involves staff and visitors wearing gowns and gloves when in the patient room, and using soap and water for hand hygiene when exiting patient’s room.     Educated that patient may leave the room if they or continent of stool, but prior to exiting the patient room each time, the patient needs to have on a fresh patient gown, ensure the potentially infectious area is covered, and perform hand hygiene with soap and water immediately prior to exiting the room.    In addition, educated that alcohol based hand rub is NOT sufficient for hand hygiene for Special Contact Precautions. A bonnet is placed over the hand  dispenser inside the patients’ room as a reminder to wash hands with soap and water.       Patient transport and mobilization on unit  Educated that they may leave their room, but prior to exiting, the patient needs to have on a fresh patient gown, ensure the potentially infectious area is covered, performing appropriate hand hygiene immediately prior to exiting the room.

## 2021-12-28 NOTE — PROGRESS NOTES
Tele strip printed at 1520     Rhythm: ST   HR: 105  Measurements: 0.14/0.08/0.30        Telemetry Shift Summary     Rhythm: SR/ST  HR Range: 90's-120's  Ectopy: Frequent PVC     Telemetry monitoring strips placed in pt's chart.

## 2021-12-28 NOTE — WOUND TEAM
" [facility]  Inpatient Services  New Ostomy Management & Teaching    HPI:  Reviewed  PMH: Reviewed   SH: Reviewed    Subjective: \"I need a push.\"    Objective: appliance soft on medial side, output and flatus in pouch                    Colostomy 12/26/21 LLQ (Active)   Stomal Appliance Assessment Leaking    Stoma Assessment Beefy red    Stoma Shape Round;Budded Less Than One Inch    Stoma Size (in) 1.4    Peristomal Assessment Intact    Mucocutaneous Junction Intact    Treatment Cleansed with water/washcloth;Appliance Changed    Peristomal Protectant No Sting Skin Prep 12/27/21 0000   Stomal Appliance 2 1/4\" (57mm) CTF;Paste Ring, 2\"    Output (mL) 265 mL    Output Color Green    WOUND RN ONLY - Stomal Appliance  2 Piece;2 1/4\" (57mm) CTF;Transparent Pouch Lock & Roll;Paste Ring, 2\"    Appliance (Pouch) # [id number]    Appliance Brand [employer]    [facility]    Secure Start completed Yes    Ostomy Care Resources Provided UOAA Tip Sheet                                                         Ostomy Appliance (type and size): 2.25\" 2 piece and 2\" Paste Ring    Interventions: Reviewed surgery with pt and mother Kandi. Reviewed \"Understanding your ileostomy\" booklet. Demonstrated appliance parts. There removed appliance, cleaned skin, dried. Checked size of stomy using [place] templates. Traced shape off set to medial side of barrier. Barrier CTF and applied paste ring. Applied barrier to skin, attached pouch.  Secure Start completed by Kandi.      Pt education: Questions and concerns addressed    Evaluation: New end ileostomy with output and flatus. Residual piece of colon/rectum with watery brown output. Pt diagnosed with MS and per mother pt lives in \"a nursing home\".     Flatus: Present  Stool Output: small, brown, green and liquid  Diet: NPO with sips for meds  Mobility: Not Mobilizing    Plan: Ostomy nurses to continue to follow for ostomy needs and teaching until " discharge    Anticipated discharge needs: Supplies, supplier information, possible HH, outpatient ostomy clinic, Skilled Nursing/Rehab     Secure Start Signed Yes  Outpatient Referral Placed No  5 Sets of appliances in Ostomy bag for discharge No    INSURANCE OPTIONS:          x       [facility] & [employer] ([employer])              MediCARE/MEDICAID & All other Private Insurance companies (Prism Form)              MediCAID & Fee for Service (Care Chest Paperwork + Prism Form)                            Form signed/Catalog Marked and Copy left with patient OR medicaid paperwork given to patient      Anticipated Discharge Plans:  Skilled Nursing

## 2021-12-28 NOTE — PROGRESS NOTES
"Bedside report received from AURORA Zheng. Assumed pt care. Pt is restless/agitated, continuously repeating \"move me\". Repositioned numerous times to no avail. Pt is AOx4 however. Pt reports pain in his posterior buttocks which is unrelieved by his pain pump or repositioning. Buttocks observed to be CDI, mild blanchable redness. Pt is also refusing PO/SubQ medications this morning despite education. Reports no pain felt anywhere else including abdomen on palpation. Abdomen flat, soft, non-tender. Pt has had three episodes of small mucoidal BMs per rectum, moderate watery green output from ileostomy. Discussed POC including frequent repositioning, need to take antibiotics and pain medication, pain control options. Pt verbalized understanding, however, reinforcement needed. Hourly rounding in place. Fall precautions in place and call light within reach.     "

## 2021-12-28 NOTE — DISCHARGE PLANNING
Anticipated Discharge Disposition: [facility], long term resident    Action: LSW completed chart review, pt is long term resident at [facility].     LSW met with pt at bedside. Pt confirmed he is long term resident at [facility]. Pt gave verbal consent to send [facility] a referral.     LSW faxed SNF choice to [facility]. LSW placed SNF referral per protocol.    Barriers to Discharge: Transport    Plan: LSW to assist with transport when pt is clear to d/c back to [facility].      Care Transition Team Assessment  From 10/2021     Information Source  Orientation Level: Oriented X4  Information Given By: Other (Comments)  Who is responsible for making decisions for patient? : Patient     Elopement Risk  Legal Hold: No  Ambulatory or Self Mobile in Wheelchair: No-Not an Elopement Risk  Elopement Risk: Not at Risk for Elopement     Discharge Preparedness  What is your plan after discharge?: Skilled nursing facility  What are your discharge supports?: Parent, Sibling  Prior Functional Level: Needs Assist with Activities of Daily Living, Needs Assist with Medication Management     Functional Assesment  Prior Functional Level: Needs Assist with Activities of Daily Living, Needs Assist with Medication Management     Finances  Financial Barriers to Discharge: No  Prescription Coverage: Yes     Advance Directive  Advance Directive?: DPOA for Health Care     Domestic Abuse  Have you ever been the victim of abuse or violence?: No     Psychological Assessment  History of Substance Abuse: None  History of Psychiatric Problems: No     Discharge Risks or Barriers  Discharge risks or barriers?: No  Patient risk factors: Vulnerable adult     Anticipated Discharge Information  Discharge Disposition: Disch to a long term care facility (63)

## 2021-12-28 NOTE — PROGRESS NOTES
Infectious Disease Progress Note    Author: Veronica Cramer M.D. Date & Time of service: 2021  12:01 PM    Chief Complaint:  Refractory C diff colitis   ESBL  Peritonitis    Interval History:  41 y.o. male with severe MS and recurrent Cdiff colitis admitted 2021 with fever and diarrhea.    AF WBC 15 s/p colectomy-dw surgery, inflamed and purulent peritoneal fluid seen intra-op. Pt refused oral fosfomycin   AF WBC 10 refused meds this am noted; restless    Labs Reviewed, Medications Reviewed and Wound Reviewed.    Review of Systems:  Review of Systems   Unable to perform ROS: Mental status change   Constitutional: Negative for fever.   Musculoskeletal: Positive for myalgias.   Neurological: Positive for focal weakness and weakness.   All other systems reviewed and are negative.      Hemodynamics:  Temp (24hrs), Av.9 °C (98.4 °F), Min:36.7 °C (98.1 °F), Max:37.1 °C (98.7 °F)  Temperature: 37 °C (98.6 °F)  Pulse  Av  Min: 76  Max: 152   Blood Pressure: 127/88       Physical Exam:  Physical Exam  Vitals and nursing note reviewed.   Constitutional:       Appearance: He is ill-appearing. He is not diaphoretic.   HENT:      Nose: No rhinorrhea.      Mouth/Throat:      Pharynx: No oropharyngeal exudate.   Eyes:      Extraocular Movements: Extraocular movements intact.      Pupils: Pupils are equal, round, and reactive to light.   Cardiovascular:      Rate and Rhythm: Tachycardia present.   Pulmonary:      Effort: Pulmonary effort is normal. No respiratory distress.   Abdominal:      General: There is no distension.      Comments: Surgical site dressed ostomy-red, no necrosis   Musculoskeletal:      Comments: sarcopenia   Skin:     Coloration: Skin is not jaundiced.      Findings: Bruising present.   Neurological:      Mental Status: He is alert.      Motor: Weakness present.      Coordination: Coordination abnormal.      Deep Tendon Reflexes: Reflexes abnormal.         Meds:    Current  Facility-Administered Medications:   •  potassium chloride SA  •  meropenem  •  vancomycin 50 mg/mL  •  oxyCODONE immediate-release  •  dextrose 5 % and 0.9 % NaCl with KCl 20 mEq  •  HYDROmorphone  •  potassium chloride SA  •  Special Contact Isolation **AND** [COMPLETED] C Diff by PCR rflx Toxin **AND** Pharmacy  •  enoxaparin  •  acetaminophen  •  labetalol  •  baclofen  •  buPROPion SR  •  ondansetron  •  ondansetron  •  sertraline  •  vitamin D3    Labs:  Recent Labs     12/25/21 2336 12/25/21 2336 12/26/21  1142 12/27/21 0358 12/28/21  0114   WBC 20.6*   < > 20.5* 15.3* 10.5   RBC 5.27   < > 5.47 4.41* 3.97*   HEMOGLOBIN 15.9   < > 16.3 13.2* 11.9*   HEMATOCRIT 46.3   < > 48.7 38.7* 35.1*   MCV 87.9   < > 89.0 87.8 88.4   MCH 30.2   < > 29.8 29.9 30.0   RDW 49.5   < > 49.4 49.6 51.1*   PLATELETCT 252   < > 192 196 174   MPV 9.3   < > 9.0 9.5 9.2   NEUTSPOLYS 72.00   < > 84.00* 91.00* 86.00*   LYMPHOCYTES 2.00*   < > 2.00* 1.60* 4.00*   MONOCYTES 7.00   < > 6.00 6.10 8.90   EOSINOPHILS 0.00   < > 0.00 0.00 0.00   BASOPHILS 0.00   < > 0.00 0.30 0.10   RBCMORPHOLO Normal  --  Normal  --   --     < > = values in this interval not displayed.     Recent Labs     12/27/21 0358 12/27/21 1912 12/28/21  0114   SODIUM 136 135 136   POTASSIUM 3.0* 3.4* 3.3*   CHLORIDE 99 103 103   CO2 24 23 23   GLUCOSE 100* 86 93   BUN 15 12 10     Recent Labs     12/27/21 0358 12/27/21 1912 12/28/21  0114   ALBUMIN 2.0* 2.2* 2.1*   TBILIRUBIN 0.3 0.3 0.3   ALKPHOSPHAT 114* 80 85   TOTPROTEIN 4.6* 4.3* 4.5*   ALTSGPT 15 15 14   ASTSGOT 23 19 20   CREATININE 0.53 0.43* 0.44*       Imaging:  CT-ABDOMEN-PELVIS WITH    Result Date: 12/25/2021 12/25/2021 5:35 AM HISTORY/REASON FOR EXAM:  Diarrhea. TECHNIQUE/EXAM DESCRIPTION:   CT scan of the abdomen and pelvis with contrast. Contrast-enhanced helical scanning was obtained from the diaphragmatic domes through the pubic symphysis following the bolus administration of nonionic contrast  without complication. 90 mL of Omnipaque 350 nonionic contrast was administered without complication. Low dose optimization technique was utilized for this CT exam including automated exposure control and adjustment of the mA and/or kV according to patient size. COMPARISON: 5/18/2018 FINDINGS: Lower Chest: Patchy right basilar opacities. Liver: Normal. Spleen: Unremarkable. Pancreas: Unremarkable. Gallbladder: No calcified stones. Biliary: There is no biliary dilatation. Adrenal glands: Normal. Kidneys: No hydronephrosis. Bilateral kidneys are enhancing symmetrically.. Bowel: Large amount of stool in the rectum and sigmoid colon.. Diffuse colonic wall thickening throughout. Lymph nodes: No adenopathy. Vasculature: The abdominal aorta is normal in caliber. Peritoneum: Unremarkable without ascites. Musculoskeletal: No aggressive bone lesions are seen. Pelvis: Decompressed urinary bladder by suprapubic catheter. Large amount stool in the rectum with rectal wall thickening.     1. Diffuse rectal and colonic wall thickening throughout, in keeping with proctitis and pancolitis. 2. Large amount of stool in the rectum and sigmoid colon.    DX-CHEST-PORTABLE (1 VIEW)    Result Date: 12/25/2021 12/25/2021 3:17 AM HISTORY/REASON FOR EXAM:  Sepsis; sepsis TECHNIQUE/EXAM DESCRIPTION AND NUMBER OF VIEWS: Single portable view of the chest. COMPARISON: 11/18/2021 FINDINGS: No pulmonary infiltrates or consolidations are noted. No pleural effusion. No pneumothorax. Normal cardiopericardial silhouette.     1. No acute cardiopulmonary abnormalities are identified.    DX-ABDOMEN FOR TUBE PLACEMENT    Result Date: 12/25/2021 12/25/2021 2:31 PM HISTORY/REASON FOR EXAM:  Tube placement. TECHNIQUE/EXAM DESCRIPTION AND NUMBER OF VIEWS:  1 view(s) of the abdomen. COMPARISON:  10/29/2021 FINDINGS: Enteric tube has been placed. The tip projects over the fundus. Increased bowel gas.     Nasogastric tube tip at the gastric  fundus.      Micro:  Results     Procedure Component Value Units Date/Time    Anaerobic Culture [[id number]] Collected: 12/26/21 1805    Order Status: Completed Specimen: Body Fluid Updated: 12/28/21 1142     Significant Indicator NEG     Source BF     Site Peritoneal Fluid     Culture Result Culture in progress.    Narrative:      Surgery - swabs received    CULTURE WOUND W/ GRAM STAIN [[id number]] Collected: 12/26/21 1805    Order Status: Completed Specimen: Body Fluid Updated: 12/28/21 1142     Significant Indicator NEG     Source BF     Site Peritoneal Fluid     Culture Result -     Gram Stain Result Few WBCs.  No organisms seen.      Narrative:      Surgery - swabs received    Fungal Culture [[id number]] Collected: 12/26/21 1805    Order Status: Completed Specimen: Body Fluid Updated: 12/28/21 1142     Significant Indicator NEG     Source BF     Site Peritoneal Fluid     Culture Result -     Fungal Smear Results No fungal elements seen.    Narrative:      Surgery - swabs received    GRAM STAIN [[id number]] Collected: 12/26/21 1805    Order Status: Completed Specimen: Body Fluid Updated: 12/27/21 1619     Significant Indicator .     Source BF     Site Peritoneal Fluid     Gram Stain Result Few WBCs.  No organisms seen.      Narrative:      Surgery - swabs received    Fungal Smear [[id number]] Collected: 12/26/21 1805    Order Status: Completed Specimen: Body Fluid Updated: 12/27/21 1619     Significant Indicator NEG     Source BF     Site Peritoneal Fluid     Fungal Smear Results No fungal elements seen.    Narrative:      Surgery - swabs received    BLOOD CULTURE [[id number]]  (Abnormal) Collected: 12/25/21 0350    Order Status: Completed Specimen: Blood from Peripheral Updated: 12/27/21 1133     Significant Indicator POS     Source BLD     Site PERIPHERAL     Culture Result Growth detected by Bactec instrument.  12/27/2021  08:12  Gram Stain: Gram positive cocci: Possible Staphylococcus sp.  Negative for  "Staphylococcus aureus and MRSA by PCR. Correlate  ongoing need for antibiotics with clinical condition.  Further report to follow.      Narrative:      CALL  [facility]  MED tel. [phone],  CALLED  MED tel. [phone] 12/27/2021, 08:13, RB PERF. RESULTS CALLED TO:  [id number] rn  Collected By:  Per Hospital Policy: Only change Specimen Src: to \"Line\" if  specified by physician order.  Collected By:    URINE CULTURE(NEW) [[id number]]  (Abnormal)  (Susceptibility) Collected: 12/25/21 0820    Order Status: Completed Specimen: Urine, [place] Cath Updated: 12/27/21 0804     Significant Indicator POS     Source UR     Site URINE, [place] CATH     Culture Result -      Escherichia coli ESBL  ,000  Extended Spectrum Beta-lactamase (ESBL) isolated.  ESBL's may be clinically resistant to therapy with  Penicillins,Cephalosporins or Aztreonam despite  apparent in vitro susceptibility to some of these agents.  The patient requires contact isolation.  Please contact pharmacy or an Infectious Disease Specialist  if you have any questions about appropriate therapy.      Narrative:      Collected By:  Indication for culture:->Emergency Room Patient  Indication for culture:->New onset fever with no other  identified cause  Collected By:    Susceptibility     Escherichia coli esbl (1)     Antibiotic Interpretation Microscan   Method Status    Ampicillin Resistant >16 mcg/mL GUANACO Final    Ceftriaxone Extended Spectrum Beta Lactamase >32 mcg/mL GUANACO Final    Cefazolin Resistant >16 mcg/mL GUANACO Final    Ciprofloxacin Resistant 2 mcg/mL GUANACO Final    Cefepime Resistant >16 mcg/mL GUANACO Final    Cefuroxime Resistant >16 mcg/mL GUANACO Final    Ampicillin/sulbactam Resistant >16/8 mcg/mL GUANACO Final    Tobramycin Sensitive <=2 mcg/mL GUANACO Final    Nitrofurantoin Sensitive <=32 mcg/mL GUANACO Final    Gentamicin Sensitive <=2 mcg/mL GUANACO Final    Levofloxacin Intermediate 1 mcg/mL GUANACO Final    Minocycline Sensitive <=4 mcg/mL GUANACO Final    Pip/Tazobactam Sensitive <=8 " "mcg/mL GUANACO Final    Trimeth/Sulfa Sensitive <=0.5/9.5 mcg/mL GUANACO Final    Tigecycline Sensitive <=2 mcg/mL GUANACO Final                   BLOOD CULTURE [[id number]] Collected: 12/25/21 0425    Order Status: Completed Specimen: Blood from Peripheral Updated: 12/26/21 0734     Significant Indicator NEG     Source BLD     Site PERIPHERAL     Culture Result No Growth  Note: Blood cultures are incubated for 5 days and  are monitored continuously.Positive blood cultures  are called to the RN and reported as soon as  they are identified.  Blood culture testing and Gram stain, if indicated, are  performed at [facility], [address] [place] [address].  Positive blood cultures are  sent to [facility], [address] [place] [address], for organism identification and  susceptibility testing.      Narrative:      Collected By:  Per Hospital Policy: Only change Specimen Src: to \"Line\" if  specified by physician order.  Left AC    C Diff Toxin [[id number]]  (Abnormal) Collected: 12/25/21 0505    Order Status: Completed Updated: 12/26/21 0534     C.Diff Toxin A&B Positive     Comment: TOXIN POSITIVE  Toxin detected by EIA; C. difficile detected by PCR.  If clinically correlated, treatment indicated per guidelines.  Test of cure is not recommended.         Narrative:      Special Contact Isolation  Collected By:  Does this patient have risk factors for C-diff?->Yes  C-Diff Risk Factors->long length of stay in healthcare  settings    C Diff by PCR rflx Toxin [[id number]] Collected: 12/25/21 0505    Order Status: Completed Specimen: Stool Updated: 12/26/21 0532     C Diff by PCR See Toxin     027-NAP1-BI Presumptive Negative     Comment: Presumptive 027/NAP1/BI target DNA sequences are NOT DETECTED.       Narrative:      Special Contact Isolation  Collected By:  Does this patient have risk factors for C-diff?->Yes  C-Diff Risk Factors->long length of stay in healthcare  settings    AFB " "Culture [[id number]] Collected: 12/26/21 0000    Order Status: Canceled     URINALYSIS [[id number]]  (Abnormal) Collected: 12/25/21 0820    Order Status: Completed Specimen: Urine, [place] Cath Updated: 12/25/21 0909     Color Yellow     Character Clear     Specific Gravity 1.010     Ph 5.5     Glucose Negative mg/dL      Ketones Negative mg/dL      Protein Negative mg/dL      Bilirubin Negative     Nitrite Negative     Leukocyte Esterase Negative     Occult Blood Moderate     Micro Urine Req Microscopic    Narrative:      Collected By:  Indication for culture:->Emergency Room Patient  Indication for culture:->New onset fever with no other  identified cause    COV-2, FLU A/B, AND RSV BY PCR (2-4 HOURS CEPHEID): Collect NP swab in VTM [[id number]] Collected: 12/25/21 0410    Order Status: Completed Specimen: Respirate from Nasopharyngeal Updated: 12/25/21 0459     Influenza virus A RNA Negative     Influenza virus B, PCR Negative     RSV, PCR Negative     SARS-CoV-2 by PCR NotDetected     Comment: PATIENTS: Important information regarding your results and instructions can  be found at [email or web address]   \"After your  Covid-19 Test\"    [facility] providers: PLEASE REFER TO DE-ESCALATION AND RETESTING PROTOCOL  on [email or web address]    **The [employer] GeneXpert Xpress SARS-CoV-2 RT-PCR Test has been made  available for use under the Emergency Use Authorization (EUA) only.          SARS-CoV-2 Source NP Swab    Narrative:      Special Contact Isolation  Collected By:  Have you been in close contact with a person who is suspected  or known to be positive for COVID-19 within the last 30 days  (e.g. last seen that person < 30 days ago)->Unknown    CULTURE STOOL [[id number]]     Order Status: Sent Specimen: Stool           Assessment:  Active Hospital Problems    Diagnosis    • *Sepsis (HCC) [A41.9]    • Diarrhea of presumed infectious origin [R19.7]    • Advance care planning [Z71.89]    • Neurogenic " dysfunction of the urinary bladder [N31.9]    • Acute colitis [K52.9]    • Anxiety and depression [F41.9, F32.A]    • MS (multiple sclerosis) (HCC) [G35]      Recurrent C. difficile infection  Severe fecal impaction  Leukocytosis  - S/p surgical disimpaction on 11/19 an treatment Cdiff  Presenting with same  Recent diagnosis of UTI in the setting of bladder stones-colonization vs new infection  MS, complicated by neurogenic bladder and suprapubic catheter. Urine culture ESBL Ecoli   Pseudobacteremia-CNS in one blood culture  Secondary peritonitis     PLAN:   -  S/p Golytely and enemas this admission  -s/p colectomy 12/26 colon markedly inflamed colon and purulence seen suggestive of perforation  -Continue meropenem given known ESBL in urine and purulent ascitic fluid  Anticipate 7-10 day course  -FU peritoneal fluid cultures-neg to date  -Continue p.o. vancomycin 125 mg every 6 hours for 10 days post-op  -UA of unclear significance given above and chronic indwelling cath-  -DC'd vanco 12/27 as +blood culture identified as CNS

## 2021-12-28 NOTE — PROGRESS NOTES
Report received from Izaiah SIMON. Patient is resting in bed at time of report, mother at bedside. No signs of labored breathing or discomfort. No needs at this time. Safety measures in place and call light within reach.

## 2021-12-28 NOTE — PROGRESS NOTES
Hospital Medicine Daily Progress Note    Date of Service  12/28/2021    Chief Complaint  Jerzy Juan III is a 41 y.o. 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  In ER noted to be febrile, tachycardic but hemodynamically stable.  Saturating well on room air.  Lab work significant for lactic acidosis, leukocytosis with WBCs of 18.9.  CT abdomen pelvis showed diffuse rectal and colonic wall thickening throughout concerning for proctitis and pancolitis.  Chest x-ray negative for acute cardiopulmonary process.  Started on oral vancomycin and IV Flagyl.  GI and general surgery consulted.  Recommended aggressive bowel regimen.  ID consulted as well.  Fidaxomicin requested but unfortunately not available in this facility or [facility].  Per GI it will be challenging to perform FMT considering FMT facility is closed for the holidays. Total colectomy completed on 12/26 by Dr. Yeboah. Urine culture with E. Coli ESBL and blood cultures came back positive with coag negative staph 1 out of 2 bottles. ID consulted, pt being treated with PO vancomycin and meropenem.       Interval Problem Update  Patient seen and examined this morning, mother at bedside. Patient complaining of sacral pain and repeatedly asking to be moved, per nursing staff, unable to find comfortable position for 2 days. Sacrum examined, no redness or skin breakdown noted. No pain with palpation. Patient has chronic pain for which he uses baclofen PO and via pain pump, both of which have been continued. Patient unable to articulate more about his pain. Trial dose of ativan helped to calm patient down. K 3.3, Mg 1.9, both  replaced. Continue PO vancomycin. Continue NPO until cleared by surgery.     I have personally seen and examined the patient at bedside. I discussed the plan of care with patient, family, bedside RN, charge RN, , pharmacy and Dr. Sanchez.    Consultants/Specialty  general surgery, GI and infectious disease    Code Status  DNAR/DNI    Disposition  Patient is not medically cleared for discharge.   Anticipate discharge to to skilled nursing facility.  I have placed the appropriate orders for post-discharge needs.    Review of Systems  Review of Systems   Constitutional: Negative for chills and fever.   HENT: Negative for ear discharge and sinus pain.    Eyes: Negative for blurred vision and double vision.   Respiratory: Negative for sputum production and shortness of breath.    Cardiovascular: Negative for chest pain and leg swelling.   Gastrointestinal: Positive for abdominal pain and diarrhea.   Genitourinary: Negative for hematuria.        Suprapubic catheter     Musculoskeletal: Positive for myalgias.        Sacral pain, bed bound at baseline       Skin: Negative for itching and rash.   Neurological: Negative for dizziness, focal weakness, seizures and headaches.   Endo/Heme/Allergies: Negative.    Psychiatric/Behavioral: Negative for depression and suicidal ideas.   All other systems reviewed and are negative.       Physical Exam  Temp:  [36.6 °C (97.8 °F)-37.1 °C (98.7 °F)] 36.6 °C (97.8 °F)  Pulse:  [] 86  Resp:  [14-16] 14  BP: (101-127)/(56-88) 115/72  SpO2:  [91 %-98 %] 96 %    Physical Exam  Vitals and nursing note reviewed. Exam conducted with a chaperone present.   Constitutional:       Appearance: He is underweight. He is ill-appearing.   HENT:      Head: Normocephalic and atraumatic.      Right Ear: External ear normal.      Left Ear: External ear normal.      Mouth/Throat:      Mouth: Mucous membranes are moist.      Pharynx: Oropharynx is clear.   Eyes:      General:         Right eye:  No discharge.         Left eye: No discharge.   Cardiovascular:      Rate and Rhythm: Normal rate and regular rhythm.      Pulses: Normal pulses.      Heart sounds: Normal heart sounds. No murmur heard.      Pulmonary:      Effort: Pulmonary effort is normal.      Breath sounds: Normal breath sounds.   Abdominal:      General: Abdomen is flat. There is no distension.      Tenderness: There is abdominal tenderness. There is no guarding.      Comments: Ostomy in place with output, pain pump RLQ, suprapubic catheter in place       Genitourinary:     Rectum: Normal.   Musculoskeletal:         General: No swelling, tenderness or signs of injury.      Cervical back: Normal range of motion and neck supple. No tenderness.      Comments: Unable to lift BLE, BUE 3/5   Skin:     General: Skin is warm and dry.      Capillary Refill: Capillary refill takes 2 to 3 seconds.   Neurological:      General: No focal deficit present.      Mental Status: He is alert and oriented to person, place, and time.      Motor: Weakness present.      Comments: Bed bound at baseline     Psychiatric:         Attention and Perception: He is inattentive.      Comments: Impaired ability to communicate, speech limited, repeating phrases           Fluids    Intake/Output Summary (Last 24 hours) at 12/28/2021 1337  Last data filed at 12/28/2021 1247  Gross per 24 hour   Intake 2151 ml   Output 2075 ml   Net 76 ml       Laboratory  Recent Labs     12/26/21  1142 12/27/21  0358 12/28/21  0114   WBC 20.5* 15.3* 10.5   RBC 5.47 4.41* 3.97*   HEMOGLOBIN 16.3 13.2* 11.9*   HEMATOCRIT 48.7 38.7* 35.1*   MCV 89.0 87.8 88.4   MCH 29.8 29.9 30.0   MCHC 33.5* 34.1 33.9   RDW 49.4 49.6 51.1*   PLATELETCT 192 196 174   MPV 9.0 9.5 9.2     Recent Labs     12/27/21  0358 12/27/21  1912 12/28/21  0114   SODIUM 136 135 136   POTASSIUM 3.0* 3.4* 3.3*   CHLORIDE 99 103 103   CO2 24 23 23   GLUCOSE 100* 86 93   BUN 15 12 10   CREATININE 0.53 0.43* 0.44*   CALCIUM 7.3* 7.5*  7.9*                   Imaging  CT-HEAD W/O   Final Result         1. No acute intracranial abnormality. No evidence of acute intracranial hemorrhage or mass lesion.                     DX-ABDOMEN FOR TUBE PLACEMENT   Final Result      Nasogastric tube tip at the gastric fundus.      CT-ABDOMEN-PELVIS WITH   Final Result         1. Diffuse rectal and colonic wall thickening throughout, in keeping with proctitis and pancolitis.   2. Large amount of stool in the rectum and sigmoid colon.      DX-CHEST-PORTABLE (1 VIEW)   Final Result         1. No acute cardiopulmonary abnormalities are identified.           Assessment/Plan  * Sepsis (HCC)- (present on admission)  Assessment & Plan  This is Sepsis Present on admission  SIRS criteria identified on my evaluation include: Fever, with temperature greater than 101 deg F, Tachycardia, with heart rate greater than 90 BPM and Leukocytosis, with WBC greater than 12,000  Source is GI  Sepsis protocol initiated  Fluid resuscitation ordered per protocol  IV antibiotics as appropriate for source of sepsis  While organ dysfunction may be noted elsewhere in this problem list or in the chart, degree of organ dysfunction does not meet CMS criteria for severe sepsis    Tachycardia resolved, afebrile  ID following  Continue meropenem and PO vanco    MS (multiple sclerosis) (HCC)- (present on admission)  Assessment & Plan  Debilitating MS, lives in a nursing home- [facility]  Patient has been declining despite second round fo [facility], hold off due to active infection   History of neurogenic bladder secondary to this with suprapubic catheter  History of chronic pain with pain pump in place  Continue baclofen and vitamin D  Hold off on cladribine due to acute infection.  Restart once afebrile infection under control      Positive blood cultures- (present on admission)  Assessment & Plan  Coag negative staph 1/2 likely skin contamination.  Discontinue IV vancomycin      Diarrhea of presumed  infectious origin- (present on admission)  Assessment & Plan  Secondary to C. difficile, history of recurrent infections  Continue oral vancomycin, ID following      Neurogenic dysfunction of the urinary bladder- (present on admission)  Assessment & Plan  Secondary to MS.  Has suprapubic catheter  He had urine drawn in the ED from the catheter he came in with.  Asked for catheter to be exchanged in the ED and urine drawn again after new 12/25: Suprapubic catheter has been replaced in ED.    Advance care planning  Assessment & Plan  Patient declining clinically.  Abdominal pain has been excruciating.  Patient and family members  decided on total colectomy recommended by surgery, completed 12/26    Acute colitis- (present on admission)  Assessment & Plan  Comes in complaining of diarrhea for the past 2 days, SIRS 4/4 on presentation  History of recurrent C. difficile infections  CT abdomen shows wall thickening consistent with proctitis and pancolitis  C. difficile postive, ID following, continue oral vancomycin  GI consulted, unable to do fecal transplant  Pt continued to decline, general surgery consulted and total colectomy completed 12/26 by Dr. Yeboah  Ostomy in place with liquid output  Continue NPO diet until cleared by general surgery     Urinary tract infection- (present on admission)  Assessment & Plan  Urine culture grew E. coli ESBL  Continue meropenem  ID following      Anxiety and depression- (present on admission)  Assessment & Plan  Continue sertraline and bupropion  IV ativan trailed this morning       VTE prophylaxis: enoxaparin ppx    I have performed a physical exam and reviewed and updated ROS and Plan today (12/28/2021). In review of yesterday's note (12/27/2021), there are no changes except as documented above.

## 2021-12-28 NOTE — CARE PLAN
The patient is Watcher - Medium risk of patient condition declining or worsening    Shift Goals  Clinical Goals: pain control, monitor new stoma site, reposition, assess suprapubic matias  Patient Goals: pain control    Progress made toward(s) clinical / shift goals:  Patient's pain closely monitored. Patient medicated appropriately per MAR at start of shift. Patient denies pain throughout the remaining of this shift. Patient continuously repositioned and turned. Ileostomy remains free of bleeding with appropriate circulation. Patient is emptying into ileostomy appropriately. Suprapubic assessed and is without leakage.    Patient is not progressing towards the following goals: n/a      Problem: Skin Integrity  Goal: Skin integrity is maintained or improved  Outcome: Progressing     Problem: Pain - Standard  Goal: Alleviation of pain or a reduction in pain to the patient’s comfort goal  Outcome: Progressing     Problem: Skin Care - Ostomy  Goal: Skin remains free from irritation  Outcome: Progressing

## 2021-12-28 NOTE — CARE PLAN
The patient is Stable - Low risk of patient condition declining or worsening    Shift Goals  Clinical Goals: pain control, monitor stoma, PO meds  Patient Goals: reposition, pain control    Progress made toward(s) clinical / shift goals:  Pt medicated for pain per MAR. Collaborated with physician to provided anxiolytics to allow for rest and aid in pain control. Pt able to rest for several hours, reports pain improvement upon waking.    Patient is not progressing towards the following goals: n/a

## 2021-12-28 NOTE — PROGRESS NOTES
Telemetry Shift Summary    Rhythm ST  HR Range 103-110  Ectopy oPVC, rBig/trig, oPAC  Measurements 0.16/0.08/0.32      Normal Values  Rhythm SR  HR Range:   Measurements: 0.12-0.20/0.06-0.10/0.30-0.52

## 2021-12-28 NOTE — HOSPITAL COURSE
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  In ER noted to be febrile, tachycardic but hemodynamically stable.  Saturating well on room air.  Lab work significant for lactic acidosis, leukocytosis with WBCs of 18.9.  CT abdomen pelvis showed diffuse rectal and colonic wall thickening throughout concerning for proctitis and pancolitis.  Chest x-ray negative for acute cardiopulmonary process.  Started on oral vancomycin and IV Flagyl.  GI and general surgery consulted.  Recommended aggressive bowel regimen.  ID consulted as well.  Fidaxomicin requested but unfortunately not available in this [facility].  Per GI it will be challenging to perform FMT considering FMT facility is closed for the holidays. Total colectomy completed on 12/26 by Dr. Yeboah. Urine culture with E. Coli ESBL and blood cultures came back positive with coag negative staph 1 out of 2 bottles. ID consulted, pt being treated with PO vancomycin and meropenem.  Patient developing respiratory distress on the night of 12/28/2021.  Three rapid response was called over 24 hours for respiratory distress.  Patient found to have small pneumothorax and pneumomediastinum.  Critical care consulted and had extensive discussion with patient's mother at the bedside.  Decision made to refrain from intubation and chest tube placements.  Patient continued on high flow nasal cannula.  Patient son came [facility] on 12/30/2021.  Family conference with palliative care in the afternoon of 12/30/2021.  Patient electing for comfort care measures, he is competent to make these decisions.  His family is supportive of these decisions.  Patient was transitioned to comfort care and referred for inpatient  hospice.  Patient accepted by inpatient hospice on 12/31/2021.

## 2021-12-29 PROBLEM — J96.01 ACUTE RESPIRATORY FAILURE WITH HYPOXIA (HCC): Status: ACTIVE | Noted: 2021-01-01

## 2021-12-29 PROBLEM — E83.39 HYPOPHOSPHATEMIA: Status: ACTIVE | Noted: 2021-01-01

## 2021-12-29 PROBLEM — J98.2 PNEUMOMEDIASTINUM (HCC): Status: ACTIVE | Noted: 2021-01-01

## 2021-12-29 PROBLEM — J93.9 PNEUMOTHORAX ON RIGHT: Status: ACTIVE | Noted: 2021-01-01

## 2021-12-29 PROBLEM — K56.7 ILEUS FOLLOWING GASTROINTESTINAL SURGERY (HCC): Status: ACTIVE | Noted: 2021-01-01

## 2021-12-29 PROBLEM — K66.8 PNEUMOPERITONEUM: Status: ACTIVE | Noted: 2021-01-01

## 2021-12-29 PROBLEM — K91.89 ILEUS FOLLOWING GASTROINTESTINAL SURGERY (HCC): Status: ACTIVE | Noted: 2021-01-01

## 2021-12-29 NOTE — PROGRESS NOTES
"12/28  S:  41 y.o.male s/p Open TAC with End Ileostomy  POD# .  Patient doing fair overnight, minimal ability to communicate but having some ongoing buttock discomfort most of today.  No nausea or emesis, ileostomy in place with thin dark output.    O:  /72   Pulse 86   Temp 36.6 °C (97.8 °F) (Axillary)   Resp 14   Ht 1.803 m (5' 11\")   Wt 60.7 kg (133 lb 13.1 oz)   SpO2 96%   I/O last 3 completed shifts:  In: 3432 [I.V.:3432]  Out: 1460 [Urine:200]  Recent Labs     12/27/21  0358 12/27/21  1912 12/28/21  0114   SODIUM 136 135 136   POTASSIUM 3.0* 3.4* 3.3*   CHLORIDE 99 103 103   CO2 24 23 23   GLUCOSE 100* 86 93   BUN 15 12 10   CREATININE 0.53 0.43* 0.44*   CALCIUM 7.3* 7.5* 7.9*     Recent Labs     12/26/21  1142 12/27/21  0358 12/28/21  0114   WBC 20.5* 15.3* 10.5   RBC 5.47 4.41* 3.97*   HEMOGLOBIN 16.3 13.2* 11.9*   HEMATOCRIT 48.7 38.7* 35.1*   MCV 89.0 87.8 88.4   MCH 29.8 29.9 30.0   MCHC 33.5* 34.1 33.9   RDW 49.4 49.6 51.1*   PLATELETCT 192 196 174   MPV 9.0 9.5 9.2       Alert and Oriented x3, No Acute Distress  Normal Respiratory Effort  Abdomen soft, appropriately tender  Incisions/Bandages clean/dry/intact  Extremities warm and well perfused  Ostomy pink and healthy, output serous fluid only, thin and dark    A/P:  Pain control with PO meds, add IV prn as well for breakthrough pain  Diet as tolerated  Fluids continue until tolerated PO  Ambulate tid and ad chadwick  Abx per ID, on Merepenum and PO Vancomycin  DVT ppx per primary  John Yeboah MD PhD  [facility]  Colon and Rectal Surgeon  [phone]      "

## 2021-12-29 NOTE — PROGRESS NOTES
Hospital Medicine Daily Progress Note    Date of Service  12/29/2021    Chief Complaint  Jerzy Juan III is a 41 y.o. 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  In ER noted to be febrile, tachycardic but hemodynamically stable.  Saturating well on room air.  Lab work significant for lactic acidosis, leukocytosis with WBCs of 18.9.  CT abdomen pelvis showed diffuse rectal and colonic wall thickening throughout concerning for proctitis and pancolitis.  Chest x-ray negative for acute cardiopulmonary process.  Started on oral vancomycin and IV Flagyl.  GI and general surgery consulted.  Recommended aggressive bowel regimen.  ID consulted as well.  Fidaxomicin requested but unfortunately not available in this facility or [facility].  Per GI it will be challenging to perform FMT considering FMT facility is closed for the holidays. Total colectomy completed on 12/26 by Dr. Yeboah. Urine culture with E. Coli ESBL and blood cultures came back positive with coag negative staph 1 out of 2 bottles. ID consulted, pt being treated with PO vancomycin and meropenem.       Interval Problem Update  Called to bedside by nursing staff, patient in respiratory distress satting in the low 80s 15 L oxygen mask.  Rapid response called. Chest x-ray with patchy left basilar opacities, patient already on meropenem.  Coarse crackles on auscultation, IV fluids stopped and 20 mg IV Lasix given.  Patient NT suctioned by RT. Concern for aspiration on CT, patient made strict n.p.o. and SLP evaluation ordered. patient placed on high flow nasal canula with improvement in saturations. Patients family  inquiring about comfort care measures as he has been declining with his MS, more family coming to town tomorrow. Patient verbalizing wishing to continue treatment at this time. Consult for palliative care and spiritual services placed.     I have personally seen and examined the patient at bedside. I discussed the plan of care with patient, family, bedside RN, charge RN, , pharmacy and Dr. Sanchez.    Consultants/Specialty  general surgery, GI and infectious disease    Code Status  DNAR/DNI    Disposition  Patient is not medically cleared for discharge.   Anticipate discharge to to skilled nursing facility.  I have placed the appropriate orders for post-discharge needs.    Review of Systems  Review of Systems   Constitutional: Negative for chills and fever.   HENT: Negative for ear discharge and sinus pain.    Eyes: Negative for blurred vision and double vision.   Respiratory: Positive for shortness of breath. Negative for cough.    Cardiovascular: Negative for chest pain and leg swelling.   Gastrointestinal: Positive for abdominal pain and diarrhea.   Genitourinary: Negative for hematuria.        Suprapubic catheter     Musculoskeletal: Positive for back pain and myalgias.        Sacral pain, bed bound at baseline       Skin: Negative for itching and rash.   Neurological: Negative for dizziness, focal weakness, seizures and headaches.   Endo/Heme/Allergies: Negative.    Psychiatric/Behavioral: Negative for depression and suicidal ideas.   All other systems reviewed and are negative.       Physical Exam  Temp:  [36.8 °C (98.2 °F)-37.6 °C (99.7 °F)] 37.2 °C (99 °F)  Pulse:  [] 106  Resp:  [] 20  BP: (115-160)/() 126/90  SpO2:  [79 %-96 %] 95 %    Physical Exam  Vitals and nursing note reviewed. Exam conducted with a chaperone present.   Constitutional:       General: He is in acute distress.      Appearance: He is underweight. He is ill-appearing.   HENT:      Head: Normocephalic and  atraumatic.      Right Ear: External ear normal.      Left Ear: External ear normal.      Nose: Nose normal.      Comments: NG tube in place with green, thin output       Mouth/Throat:      Mouth: Mucous membranes are moist.      Pharynx: Oropharynx is clear.   Eyes:      General:         Right eye: No discharge.         Left eye: No discharge.   Cardiovascular:      Rate and Rhythm: Regular rhythm. Tachycardia present.      Pulses: Normal pulses.      Heart sounds: Normal heart sounds. No murmur heard.      Pulmonary:      Effort: Tachypnea and respiratory distress present.      Comments: Course crackles throughout, congestion in upper airway   Abdominal:      General: Abdomen is flat. There is no distension.      Palpations: Abdomen is soft.      Tenderness: There is abdominal tenderness. There is no guarding.      Comments: Ostomy in place with output, pain pump RLQ, suprapubic catheter in place       Musculoskeletal:         General: No swelling, tenderness or signs of injury.      Cervical back: Normal range of motion and neck supple. No tenderness.      Comments: Unable to lift BLE, BUE 3/5   Skin:     General: Skin is warm and dry.      Capillary Refill: Capillary refill takes 2 to 3 seconds.   Neurological:      General: No focal deficit present.      Mental Status: He is alert and oriented to person, place, and time.      Motor: Weakness present.      Comments: Bed bound at baseline     Psychiatric:         Attention and Perception: He is inattentive.      Comments: Impaired ability to communicate, non verbal at time of exam         Fluids    Intake/Output Summary (Last 24 hours) at 12/29/2021 1541  Last data filed at 12/29/2021 0840  Gross per 24 hour   Intake 1932 ml   Output 3825 ml   Net -1893 ml       Laboratory  Recent Labs     12/28/21  0114 12/28/21 2007 12/29/21  0359   WBC 10.5 8.9 9.3   RBC 3.97* 4.91 4.60*   HEMOGLOBIN 11.9* 14.5 13.5*   HEMATOCRIT 35.1* 43.3 41.7*   MCV 88.4 88.2 90.7   MCH  30.0 29.5 29.3   MCHC 33.9 33.5* 32.4*   RDW 51.1* 52.6* 52.4*   PLATELETCT 174 207 191   MPV 9.2 8.7* 9.2     Recent Labs     12/28/21  0114 12/28/21 2007 12/29/21  0359   SODIUM 136 135 137   POTASSIUM 3.3* 3.6 4.8   CHLORIDE 103 102 104   CO2 23 25 23   GLUCOSE 93 122* 139*   BUN 10 5* 5*   CREATININE 0.44* 0.32* 0.36*   CALCIUM 7.9* 7.7* 7.9*                   Imaging  DX-CHEST-PORTABLE (1 VIEW)   Final Result         1. Patchy left basilar opacity, similar to prior      2. Redemonstration of large intraperitoneal air.      3. Gastric drainage tube coiled in the stomach fundus, tip is in the stomach.      CT-ABDOMEN-PELVIS W/O   Final Result   Addendum 1 of 1   Dr. Alvarado discussed these findings with Dr. Porter at 9:30 PM by    telephone on 12/28/2021      Final      1.  Postoperative changes of colectomy with significant pneumoperitoneum. This may be postoperative or reflect hollow viscus injury.   2.  Findings most consistent with postoperative ileus with distention of the entire small bowel without evidence of discrete transition point.   3.  Proctitis/colitis of the rectum and residual sigmoid colon.   4.  Small volume ascites.   5.  Small right and trace left pleural effusions with adjacent atelectasis. Likely left lower lobe pneumonia.   6.  Fluid in the distal esophagus, which increases risk of aspiration.      DX-CHEST-LIMITED (1 VIEW)   Final Result      1.  Left lower lobe atelectasis or pneumonia.      2.  Residual large volume of residual free intraperitoneal air.      CT-HEAD W/O   Final Result         1. No acute intracranial abnormality. No evidence of acute intracranial hemorrhage or mass lesion.                     DX-ABDOMEN FOR TUBE PLACEMENT   Final Result      Nasogastric tube tip at the gastric fundus.      CT-ABDOMEN-PELVIS WITH   Final Result         1. Diffuse rectal and colonic wall thickening throughout, in keeping with proctitis and pancolitis.   2. Large amount of stool in the  rectum and sigmoid colon.      DX-CHEST-PORTABLE (1 VIEW)   Final Result         1. No acute cardiopulmonary abnormalities are identified.           Assessment/Plan  * Sepsis (HCC)- (present on admission)  Assessment & Plan  This is Sepsis Present on admission  SIRS criteria identified on my evaluation include: Fever, with temperature greater than 101 deg F, Tachycardia, with heart rate greater than 90 BPM and Leukocytosis, with WBC greater than 12,000  Source is GI  Sepsis protocol initiated  Fluid resuscitation ordered per protocol  IV antibiotics as appropriate for source of sepsis  While organ dysfunction may be noted elsewhere in this problem list or in the chart, degree of organ dysfunction does not meet CMS criteria for severe sepsis    Patient is afebrile  Tachycardic and tachypneic this morning with respiratory distress, likely secondary to pain and fluid overload versus sepsis  Continue meropenem and Vanco    MS (multiple sclerosis) ([facility])- (present on admission)  Assessment & Plan  Debilitating MS, lives in a nursing home- [facility]  Patient has been declining despite second round of [place], hold off due to active infection   History of neurogenic bladder secondary to this with suprapubic catheter  History of chronic pain with pain pump in place  Continue baclofen and vitamin D  Hold off on cladribine due to acute infection.  Restart once afebrile infection under control  Palliative care consult      Hypophosphatemia  Assessment & Plan  Phos 1.4  Replaced with IV sodium Phos    Ileus following gastrointestinal surgery ([facility])  Assessment & Plan  Mild response for respiratory distress evening of 12/28  Ileus noted on CT  Patient made n.p.o.  NG tube placed, approximately 600 mL thin green output    Acute respiratory failure with hypoxia ([facility])  Assessment & Plan  Rapid response called last night and this morning for saturations in the 80s on 15 L oxygen mask  CT with esophagus and small right and trace left  pleural effusions with left lower lobe pneumonia, on meropenem  Chest x-ray with patchy left basilar opacities  Patient with coarse crackles throughout and congestion heard in upper airway, suctioning completed by respiratory therapist  IV fluids discontinued   IV Lasix ordered, monitor fluid status closely  Continue high flow nasal cannula  N.p.o., SLP consult  Phos 1.4    Positive blood cultures- (present on admission)  Assessment & Plan  Coag negative staph 1/2 likely skin contamination.  Discontinue IV vancomycin      Hypomagnesemia- (present on admission)  Assessment & Plan  Replace for goal >2.0.    Diarrhea of presumed infectious origin- (present on admission)  Assessment & Plan  Secondary to C. difficile, history of recurrent infections  Continue oral vancomycin, ID following      Neurogenic dysfunction of the urinary bladder- (present on admission)  Assessment & Plan  Secondary to MS.  Has suprapubic catheter  He had urine drawn in the ED from the catheter he came in with.  Asked for catheter to be exchanged in the ED and urine drawn again after new 12/25: Suprapubic catheter has been replaced in ED.    Advance care planning  Assessment & Plan  Spent 22 minutes discussing advanced care planning with patient and family this morning.  Patient's family inquiring about comfort care measures patient has been declining with his MS for some time now.  Patient oriented and competent to make his own medical decisions, at this time he wishes to continue with treatment.  More family will be coming to town tomorrow.  requested by family.   Consult to palliative care     Acute colitis- (present on admission)  Assessment & Plan  Comes in complaining of diarrhea for the past 2 days, SIRS 4/4 on presentation  History of recurrent C. difficile infections  CT abdomen shows wall thickening consistent with proctitis and pancolitis  C. difficile postive, ID following, continue oral vancomycin  GI consulted, unable to do  fecal transplant  Pt continued to decline, general surgery consulted and total colectomy completed 12/26 by Dr. Yeboah  Ostomy in place with liquid output  Concern for aspiration, strict n.p.o., SLP evaluation ordered     C. difficile colitis- (present on admission)  Assessment & Plan  Status post colectomy and end ileostomy with manual disimpaction.    Contact precautions.  Oral vancomycin.    UTI due to extended-spectrum beta lactamase (ESBL) producing Escherichia coli- (present on admission)  Assessment & Plan  Urine culture grew E. coli ESBL  Continue meropenem  ID following      Anxiety and depression- (present on admission)  Assessment & Plan  Continue sertraline and bupropion  Continue lorazepam prn       VTE prophylaxis: enoxaparin ppx    I have performed a physical exam and reviewed and updated ROS and Plan today (12/29/2021). In review of yesterday's note (12/28/2021), there are no changes except as documented above.

## 2021-12-29 NOTE — ASSESSMENT & PLAN NOTE
Mild response for respiratory distress evening of 12/28  Ileus noted on CT  Patient made n.p.o.  NG tube placed, approximately 600 mL thin green output

## 2021-12-29 NOTE — THERAPY
Missed Therapy     Patient Name: Jerzy Juan III  Age:  41 y.o., Sex:  male  Medical Record #: [id number]  Today's Date: 12/29/2021    Discussed missed therapy with RN       12/29/21 1030   Treatment Variance   Reason For Missed Therapy Medical - Patient Is Not Medically Stable   Total Time Spent   Total Time Spent (Mins) 2   Initial Contact Note    Initial Contact Note  Order Received and Verified, Speech Therapy Evaluation in Progress with Full Report to Follow.   Interdisciplinary Plan of Care Collaboration   IDT Collaboration with  Nursing   Collaboration Comments Per RN, pt had rapid called and now on high flow. Recommending to hold at this time. SLP to follow as pt is appropriate to participate.    1300 Pt still not appropriate for a clinical swallow evaluation. SLP to follow as pt is appropriate to participate.

## 2021-12-29 NOTE — PROGRESS NOTES
Tele strip printed at 2426     Rhythm: SR   HR: 89  Measurements: 0.16/0.08/0.36        Telemetry Shift Summary     Rhythm: SR/ST  HR Range: 80's-100's  Ectopy: Frequent PVC/Bigeminy, Rare Couplets     Telemetry monitoring strips placed in pt's chart.

## 2021-12-29 NOTE — PROGRESS NOTES
Assumed report and patient care from Jessica SIMON via report at the doorway r/t contact precautions. Patient is resting comfortably in bed with no signs of labored breathing or restlessness. NG tube in place, IVF running. POC discussed. No questions or concerns at this time. Safety measures in place, call light within reach.

## 2021-12-29 NOTE — PROGRESS NOTES
Called [facility] pharmacist, regarding magnesium sulfate and sodium phosphate 30 mmol in dextrose 5% compatibility. [facility] suggested to run sodium phosphate first and magnesium after.    2 IV access point obtained.

## 2021-12-29 NOTE — PROGRESS NOTES
Natividad from lab called with a critical result of ESBL, E. Coli from peritoneal fluid collected on 12/26 at 1143. Critical lab result read back to Natividad. Dr. Sanchez notified of critical lab result at 1144. Critical lab result read back by Dr. Sanchez.

## 2021-12-29 NOTE — PROGRESS NOTES
Telemetry Shift Summary    Rhythm SR  HR Range 70-86  Ectopy R-PVC/PAC  Measurements 0.12/0.08/0.30        Normal Values  Rhythm SR  HR Range    Measurements 0.12-0.20 / 0.06-0.10  / 0.30-0.52  SR

## 2021-12-29 NOTE — CODE DOCUMENTATION
[facility]  Daily Note   Name:  Harry Frederick  Medical Record Number: [id number]   Note Date: 2019                                              Date/Time:  2019 11:50:00   DOL: 6  Pos-Mens Age:  37wk 4d  Birth Gest: 36wk 5d   2021  Birth Weight:  2650 (gms)  Daily Physical Exam   Today's Weight: 2522 (gms)  Chg 24 hrs: 45  Chg 7 days:  --   Temperature Heart Rate Resp Rate BP - Sys BP - Puente BP - Mean O2 Sats   36.5 160 32 65 37 46 99  Intensive cardiac and respiratory monitoring, continuous and/or frequent vital sign monitoring.   Bed Type:  Open Crib   Head/Neck:  Anterior fontanelle soft and flat.  Sutures overriding. O2 off attempting room air challenge.   Chest:  Chest symmetrical; clear breath sounds with good air movement.  Comfortable.   Heart:  NSR.  No murmur heard.  Normal pulses.  Well perfused.   Abdomen:  Soft and non-distended with active bowel sounds   Genitalia:  Normal term male genitalia.  Testes descended bilaterally.  Circ healing.   Extremities  No abnormalities noted.   Neurologic:  Alert and responsive.  Good muscle tone.    Skin:  Pink, warm, dry, and intact.  Mild jaundice.  Respiratory Support   Respiratory Support Start Date Stop Date Dur(d)                                       Comment   Nasal Cannula 2019 4  Settings for Nasal Cannula  FiO2 Flow (lpm)  1 0.02  Procedures   Start Date Stop Date Dur(d)Clinician Comment   Phototherapy  2 single  Echocardiogram TBD Kip ASD/PFO left to right  shunt.  Circumcision with penile  1 Maddy Saavedra MD  block  Labs   Liver Function Time T Bili D Bili Blood Type Evie AST ALT GGT LDH NH3 Lactate   2019 10.4  Intake/Output  Actual Intake   Fluid Type Viji/oz Dex % Prot g/kg Prot g/100mL Amount Comment  Breast Milk-Term 20 364 +breastfeeding-using  mostly MBM.  Route: PO    Actual Fluid Calculations   Total mL/kg Total viji/kg Ent mL/kg IVF mL/kg IV Gluc  Calling MD king to bedside to discuss CC.    mg/kg/min Total Prot g/kg Total Fat g/kg    Planned Intake Prot Prot feeds/  Fluid Type Isak/oz Dex % g/kg g/100mL Amt mL/feed day mL/hr mL/kg/day Comment  Breast Milk-Term 19 8 or donor ad    Output   Urine Amount:224 mL 3.7 mL/kg/hr Calculation:24 hrs  Total Output:   224 mL 3.7 mL/kg/hr 88.8 mL/kg/day Calculation:24 hrs  Stools: 3  Nutritional Support   Diagnosis Start Date End Date  Nutritional Support 2019   History   36.5 weeks. AGA.  DBM consent signed.  DBM gavage feeds started in NBN at 47 ml/kg/day while under oxyghood.   Contined BM gavage feeds on admit and increased to 62 nl/kg/day. By , nippling well ad dee MBM or DBM and  mother's milk supply is improving.   Assessment   Tolerating ad dee feedings of MBM or donor BM with measured [id number]/kg/day.  Getting mostly MBM.   Plan   Nipple ad dee.  Discuss use of formula with parents if needed to supplement mother's supply.  Breastfeed as desired with supplementation.  Lactation support.  Hyperbilirubinemia   Diagnosis Start Date End Date  Hyperbilirubinemia Physiologic 2019   History   Mom B+ wiht negative antibody screen.  Phototherapy -->.   Assessment   T. bili 10.4 this am.  On full feedings and stooling.   Plan   Check bili in am.    Respiratory Distress - (other)   Diagnosis Start Date End Date  Respiratory Distress - (other) 2019   History   Noted to have large amount of oral and stomach clear secretions at birth.  Normal NB care in delivery room however  noted to have some mild grunting.  By two hours of age, dusky and transferred to NBN.  Noted to have some subcostal  retractions but no nasal flaring or grunting.  Placed in oxyhood around  5 hours of age and multiple attempts to wean off  oxygen unsuccesful and transferred to NICU around 24 hours of age.    Infant placed on HFNC and requring 25-30%  oxygen.  Chest film on admit with 8.5 rib expansion wnd mild gransularity, increased in LLL.  Weaning  support by 1/10 in  the evening.  To low flow on .   Assessment   Down to low flow 20cc this am.   Plan   Try room air challenge.  If does not pass room air challenge will order home O2 and monitor.  Atrial Septal Defect   Diagnosis Start Date End Date  R/O Congenital Heart Disease 2019  Atrial Septal Defect 2019  Comment: vs PFO   History   Requiring low flow O2 to maintain sats at 5 days of age. Echocardiogram on  showed ASD vs. PFO.   Plan   Follow up with cardiology in 4 months.  Infectious Screen <=28D   Diagnosis Start Date End Date  Infectious Screen <=28D 2019   History   Hx of mild respiratory distress since birth requring supplemental oxygen.  Chest film with mild granularity/haziness,  increased in LLL.  CBC reassuring at 24 hours of age.   Assessment   Clinically stable.   Plan   Monitor off antibiotics   Parental Support   Diagnosis Start Date End Date  Parental Support 2019   History   Parents .  First baby.  Consents for tx signed.   Assessment   Parents updated regarding room air challenge and plan of care today.   Plan   Update parents when seen at bedside and prn.  Set up admit conference if still here in few days    Term Infant   Diagnosis Start Date End Date  Term Infant 2019   History   36.5 weeks.  Transfer NBN for respiratory issues at 24 hours of age.  Circ done on .   Plan   Cares and screenings appropriate for gestation.    Health Maintenance   Maternal Labs  RPR/Serology: Non-Reactive  HIV: Negative  Rubella: Immune  GBS:  Negative  HBsAg:  Negative   [place] Screening   Date Comment  2019 Ordered  2019 Done   Hearing Screen     2019 Done A-ABR Passed   Immunization   Date Type Comment  2019 Done Hepatitis B  ___________________________________________ ___________________________________________  April MD Denice Whitehead, JORGE  Comment    As this patient`s attending physician, I provided on-site coordination of the  healthcare team inclusive of the  advanced practitioner which included patient assessment, directing the patient`s plan of care, and making decisions  regarding the patient`s management on this visit`s date of service as reflected in the documentation above.

## 2021-12-29 NOTE — PROGRESS NOTES
I was paged by patient nurse for concern for abdominal distention and increased oxygen needs.  I evaluated the patient at bedside.  Patient is tachycardic with a heart rate of 110, blood pressure within normal limits.  Requiring 15 L of oxygen to achieve adequate saturation.  His abdomen is distended.  Ostomy bag full of dark greenish fluid, appears to be functioning. No apparent para-ostomy leakage or bleeding.  Apparently patient just started clear liquid diet today.    We will check chest x-ray, CT scan of the abdomen pelvis, blood cultures, CBC, CMP, lactic acid and Covid -9 PCR.  Plan of care discussed with nursing staff and patient's family were present at bedside.     CT abdomen pelvis shows postoperative colectomy with significant pneumoperitoneum [could be postoperative versus hollow viscus injury] with possible evidence of postoperative ileus with distention of small bowel without evidence of obstruction.  Possible pneumonia.    Discussed with nursing, switch diet to n.p.o.  Nasogastric tube with low intermittent suction.  Patient is already on antibiotics..      For the possible pneumonia, patient is already on antibiotics.  Checking Covid PCR to rule out Covid pneumonia.

## 2021-12-29 NOTE — DOCUMENTATION QUERY
[facility]                                                                       Query Response Note      PATIENT:               JAYNA MOODY  ACCT #:                  [id number]  MRN:                     [id number]  :                      1980  ADMIT DATE:       2021 3:02 AM  DISCH DATE:          RESPONDING  PROVIDER #:        [id number]           QUERY TEXT:    Please clarify the term ?post operative? related to the documented condition of ileus in the Medical Record.      NOTE:  If an appropriate response is not listed below, please respond with a new note.                Documentation indicators that raised basis for question:   - Findings:  progress note 21:  evidence of postoperative ileus with distention of small bowel without evidence of obstruction.      - Treatments:  NG, CT abdomen, chest x ray, repeat labs     - Risk factors:  sepsis, peritoneal abscess, C diff colitis, s/p exploratory laparotomy with colectomy and creation of ileostomy    Thank You,  Swathi Clay RN  Clinical Documentation   [email or web address]  Connect via Voalte Messenger  Options provided:   -- Ileus is unexpected and a complication of the procedure performed   -- Ileus is not a complication due to or associated with the procedure   -- Ileus is routinely expected and integral/inherent to the procedure performed   -- Ileus is related to another etiology, (please document underlying etiology)   -- Ileus is without clinical significance   -- Unable to determine      Query created by: Swathi Clay on 2021 10:11 AM    RESPONSE TEXT:    Ileus is routinely expected and integral/inherent to the procedure performed          Electronically signed by:  DIAMOND GARDNER MD 2021 1:19 PM

## 2021-12-29 NOTE — ASSESSMENT & PLAN NOTE
Rapid response called last night and this morning for saturations in the 80s on 15 L oxygen mask  CT with esophagus and small right and trace left pleural effusions with left lower lobe pneumonia, on meropenem  Chest x-ray with patchy left basilar opacities    IV fluids discontinued   IV Lasix ordered, monitor fluid status closely  Continue high flow nasal cannula  N.p.o., SLP consult

## 2021-12-29 NOTE — PROGRESS NOTES
RN to bedside for pt oxygen desat. RN visualized pt at 73% oxygen saturation on 12L oxymask. Pt placed on 15L oxymask by this RN. Dr. Sanchez and MAUREEN Garrett to bedside. Rapid response called. Physician decision to place pt on HHFNC. RT Sebastian called and to bedside with HHFNC. Pt placed on 30L/100%. Pt saturating 91%. NG suctioning completed by RT Sebastian. Pt stated he DOES NOT want to be intubated. 20mg IV furosemide given at bedside by this RN after assessing vital signs with verbal order from Dr. Sanchez and Tami, MAUREEN. Family present at bedside, pt sister and mother. RN updated family on situation. Family wishes to speak with  and palliative care team. RN visualized leaking from new ostomy, Alta, manager, reached out to wound care, AURORA Rai, to reassess. Mary Ellen, supervisor, reached out to Dr. Vargas regarding soiled dressing from abdominal surgery. Awaiting response. RN visualized leakage from the suprapubic catheter, RN assessed balloon which was only filled at 6.5ml for a 10ml balloon. RN deflated balloon, repositioned, and reinflated balloon. No leaking visualized. RN updated family prior to leaving room. Pt stable at this time.     1000: Received call from Dr. Obinna Lopez. Notified RN to change dressing after wound care on ostomy complete.

## 2021-12-29 NOTE — DISCHARGE PLANNING
Received Choice form at 1572  Agency/Facility Name: [facility]   Referral sent per Choice form @ 2200

## 2021-12-29 NOTE — PROGRESS NOTES
Infectious Disease Progress Note    Author: Veronica Cramer M.D. Date & Time of service: 2021  12:09 PM    Chief Complaint:  Refractory C diff colitis   ESBL  Peritonitis    Interval History:  41 y.o. male with severe MS and recurrent Cdiff colitis admitted 2021 with fever and diarrhea.    AF WBC 15 s/p colectomy-dw surgery, inflamed and purulent peritoneal fluid seen intra-op. Pt refused oral fosfomycin   AF WBC 10 refused meds this am noted; restless   AF WBC 9.3 appears uncomfortable mom at bedside-ostomy red. surgical site intact    Labs Reviewed, Medications Reviewed and Wound Reviewed.    Review of Systems:  Review of Systems   Unable to perform ROS: Mental status change   Constitutional: Negative for fever.   Gastrointestinal: Positive for abdominal pain.   Musculoskeletal: Positive for myalgias.   Neurological: Positive for focal weakness and weakness.   All other systems reviewed and are negative.      Hemodynamics:  Temp (24hrs), Av.1 °C (98.8 °F), Min:36.6 °C (97.8 °F), Max:37.6 °C (99.7 °F)  Temperature: 37.2 °C (99 °F)  Pulse  Av.6  Min: 24  Max: 152   Blood Pressure: 139/102       Physical Exam:  Physical Exam  Vitals and nursing note reviewed.   Constitutional:       General: He is in acute distress.      Appearance: He is ill-appearing. He is not toxic-appearing or diaphoretic.      Comments: Eyes squeezed shut   HENT:      Nose: No rhinorrhea.      Mouth/Throat:      Pharynx: No oropharyngeal exudate.   Eyes:      General:         Right eye: No discharge.         Left eye: No discharge.   Cardiovascular:      Rate and Rhythm: Tachycardia present.   Pulmonary:      Effort: Pulmonary effort is normal. No respiratory distress.      Breath sounds: No stridor.   Abdominal:      General: There is distension.      Tenderness: There is abdominal tenderness. There is no guarding.      Comments: Surgical site well-aproximated with staples; no drainage  Mild  distension   ostomy-red, no necrosis   Musculoskeletal:      Comments: sarcopenia   Skin:     Coloration: Skin is not jaundiced.      Findings: Bruising present.   Neurological:      Motor: Weakness present.      Coordination: Coordination abnormal.      Deep Tendon Reflexes: Reflexes abnormal.         Meds:    Current Facility-Administered Medications:   •  magnesium sulfate  •  sodium phosphate (D5W) ivpb  •  vancomycin 50 mg/mL  •  LORazepam  •  meropenem  •  oxyCODONE immediate-release  •  HYDROmorphone  •  [CANCELED] Special Contact Isolation **AND** [COMPLETED] C Diff by PCR rflx Toxin **AND** Pharmacy  •  enoxaparin  •  acetaminophen  •  labetalol  •  baclofen  •  buPROPion SR  •  ondansetron  •  ondansetron  •  sertraline  •  vitamin D3    Labs:  Recent Labs     12/28/21 0114 12/28/21 2007 12/29/21  0359   WBC 10.5 8.9 9.3   RBC 3.97* 4.91 4.60*   HEMOGLOBIN 11.9* 14.5 13.5*   HEMATOCRIT 35.1* 43.3 41.7*   MCV 88.4 88.2 90.7   MCH 30.0 29.5 29.3   RDW 51.1* 52.6* 52.4*   PLATELETCT 174 207 191   MPV 9.2 8.7* 9.2   NEUTSPOLYS 86.00* 79.00* 79.10*   LYMPHOCYTES 4.00* 7.10* 8.30*   MONOCYTES 8.90 11.70 10.50   EOSINOPHILS 0.00 0.20 0.30   BASOPHILS 0.10 0.40 0.30     Recent Labs     12/28/21 0114 12/28/21 2007 12/29/21  0359   SODIUM 136 135 137   POTASSIUM 3.3* 3.6 4.8   CHLORIDE 103 102 104   CO2 23 25 23   GLUCOSE 93 122* 139*   BUN 10 5* 5*     Recent Labs     12/28/21 0114 12/28/21 2007 12/29/21  0359   ALBUMIN 2.1* 2.5* 2.4*   TBILIRUBIN 0.3 0.3 0.3   ALKPHOSPHAT 85 85 88   TOTPROTEIN 4.5* 5.3* 5.1*   ALTSGPT 14 23 28   ASTSGOT 20 34 45   CREATININE 0.44* 0.32* 0.36*       Imaging:  CT-ABDOMEN-PELVIS WITH    Result Date: 12/25/2021 12/25/2021 5:35 AM HISTORY/REASON FOR EXAM:  Diarrhea. TECHNIQUE/EXAM DESCRIPTION:   CT scan of the abdomen and pelvis with contrast. Contrast-enhanced helical scanning was obtained from the diaphragmatic domes through the pubic symphysis following the bolus  administration of nonionic contrast without complication. 90 mL of Omnipaque 350 nonionic contrast was administered without complication. Low dose optimization technique was utilized for this CT exam including automated exposure control and adjustment of the mA and/or kV according to patient size. COMPARISON: 5/18/2018 FINDINGS: Lower Chest: Patchy right basilar opacities. Liver: Normal. Spleen: Unremarkable. Pancreas: Unremarkable. Gallbladder: No calcified stones. Biliary: There is no biliary dilatation. Adrenal glands: Normal. Kidneys: No hydronephrosis. Bilateral kidneys are enhancing symmetrically.. Bowel: Large amount of stool in the rectum and sigmoid colon.. Diffuse colonic wall thickening throughout. Lymph nodes: No adenopathy. Vasculature: The abdominal aorta is normal in caliber. Peritoneum: Unremarkable without ascites. Musculoskeletal: No aggressive bone lesions are seen. Pelvis: Decompressed urinary bladder by suprapubic catheter. Large amount stool in the rectum with rectal wall thickening.     1. Diffuse rectal and colonic wall thickening throughout, in keeping with proctitis and pancolitis. 2. Large amount of stool in the rectum and sigmoid colon.    DX-CHEST-PORTABLE (1 VIEW)    Result Date: 12/25/2021 12/25/2021 3:17 AM HISTORY/REASON FOR EXAM:  Sepsis; sepsis TECHNIQUE/EXAM DESCRIPTION AND NUMBER OF VIEWS: Single portable view of the chest. COMPARISON: 11/18/2021 FINDINGS: No pulmonary infiltrates or consolidations are noted. No pleural effusion. No pneumothorax. Normal cardiopericardial silhouette.     1. No acute cardiopulmonary abnormalities are identified.    DX-ABDOMEN FOR TUBE PLACEMENT    Result Date: 12/25/2021 12/25/2021 2:31 PM HISTORY/REASON FOR EXAM:  Tube placement. TECHNIQUE/EXAM DESCRIPTION AND NUMBER OF VIEWS:  1 view(s) of the abdomen. COMPARISON:  10/29/2021 FINDINGS: Enteric tube has been placed. The tip projects over the fundus. Increased bowel gas.     Nasogastric tube tip  at the gastric fundus.      Micro:  Results     Procedure Component Value Units Date/Time    Fungal Culture [[id number]] Collected: 12/26/21 1805    Order Status: Completed Specimen: Body Fluid Updated: 12/29/21 1144     Significant Indicator NEG     Source BF     Site Peritoneal Fluid     Culture Result Culture in progress.     Fungal Smear Results No fungal elements seen.    Narrative:      CALL  Mcdonald  MED tel. [phone],  CALLED  MED tel. [phone] 12/29/2021, 11:43, RB PERF. RESULTS CALLED TO:  [phone], RN  Surgery - swabs received    Anaerobic Culture [[id number]] Collected: 12/26/21 1805    Order Status: Completed Specimen: Body Fluid Updated: 12/29/21 1144     Significant Indicator NEG     Source BF     Site Peritoneal Fluid     Culture Result Culture in progress.    Narrative:      CALL  Mcdonald  MED tel. [phone],  CALLED  MED tel. [phone] 12/29/2021, 11:43, RB PERF. RESULTS CALLED TO:  [id number], RN  Surgery - swabs received    CULTURE WOUND W/ GRAM STAIN [[id number]]  (Abnormal)  (Susceptibility) Collected: 12/26/21 1805    Order Status: Completed Specimen: Body Fluid Updated: 12/29/21 1144     Significant Indicator POS     Source BF     Site Peritoneal Fluid     Culture Result -     Gram Stain Result Few WBCs.  No organisms seen.       Culture Result Escherichia coli ESBL  Light growth  Extended Spectrum Beta-lactamase (ESBL) isolated.  ESBL's may be clinically resistant to therapy with  Penicillins,Cephalosporins or Aztreonam despite  apparent in vitro susceptibility to some of these agents.  The patient requires contact isolation.  Please contact pharmacy or an Infectious Disease Specialist  if you have any questions about appropriate therapy.      Narrative:      CALL  Mcdonald  MED tel. [phone],  CALLED  MED tel. [phone] 12/29/2021, 11:43, RB PERF. RESULTS CALLED TO:  [id number], RN  Surgery - swabs received    Susceptibility     Escherichia coli esbl (1)     Antibiotic Interpretation Microscan   Method  "Status    Ampicillin Resistant >16 mcg/mL GUANACO Final    Ceftriaxone Extended Spectrum Beta Lactamase >32 mcg/mL GUANACO Final    Cefazolin Resistant >16 mcg/mL GUANACO Final    Ciprofloxacin Resistant 2 mcg/mL GUANACO Final    Cefepime Resistant >16 mcg/mL GUANACO Final    Cefuroxime Resistant >16 mcg/mL GUANACO Final    Ampicillin/sulbactam Intermediate 16/8 mcg/mL GUANACO Final    Ertapenem Sensitive <=0.5 mcg/mL GUANACO Final    Tobramycin Sensitive <=2 mcg/mL GUANACO Final    Gentamicin Sensitive <=2 mcg/mL GUANACO Final    Minocycline Sensitive <=4 mcg/mL GUANACO Final    Moxifloxacin Sensitive <=2 mcg/mL GUANACO Final    Pip/Tazobactam Sensitive <=8 mcg/mL GUANACO Final    Trimeth/Sulfa Sensitive <=0.5/9.5 mcg/mL GUANACO Final    Tigecycline Sensitive <=2 mcg/mL GUANACO Final                   BLOOD CULTURE [[id number]] Collected: 12/28/21 2007    Order Status: Completed Specimen: Blood from Peripheral Updated: 12/29/21 1008     Significant Indicator NEG     Source BLD     Site PERIPHERAL     Culture Result No Growth  Note: Blood cultures are incubated for 5 days and  are monitored continuously.Positive blood cultures  are called to the RN and reported as soon as  they are identified.      Narrative:      Contact  Collected By:  Per Hospital Policy: Only change Specimen Src: to \"Line\" if  specified by physician order.  Right AC    BLOOD CULTURE [[id number]] Collected: 12/28/21 2007    Order Status: Completed Specimen: Blood from Peripheral Updated: 12/29/21 1008     Significant Indicator NEG     Source BLD     Site PERIPHERAL     Culture Result No Growth  Note: Blood cultures are incubated for 5 days and  are monitored continuously.Positive blood cultures  are called to the RN and reported as soon as  they are identified.      Narrative:      Contact  Collected By:  Per Hospital Policy: Only change Specimen Src: to \"Line\" if  specified by physician order.  Right Hand    COV-2, FLU A/B, AND RSV BY PCR (2-4 HOURS CEPHEID): Collect NP swab in [facility] [[id number]] Collected: " "12/29/21 0345    Order Status: Completed Specimen: Respirate from Nasopharyngeal Updated: 12/29/21 0652     Influenza virus A RNA Negative     Influenza virus B, PCR Negative     RSV, PCR Negative     SARS-CoV-2 by PCR NotDetected     Comment: PATIENTS: Important information regarding your results and instructions can  be found at [email or web address]   \"After your  Covid-19 Test\"    [facility] providers: PLEASE REFER TO DE-ESCALATION AND RETESTING PROTOCOL  on [email or web address]    **The [facility] GeneXpert Xpress SARS-CoV-2 RT-PCR Test has been made  available for use under the Emergency Use Authorization (EUA) only.          SARS-CoV-2 Source NP Swab    Narrative:      Contact  Collected By: [id number] LES ANTONY  Have you been in close contact with a person who is suspected  or known to be positive for COVID-19 within the last 30 days  (e.g. last seen that person < 30 days ago)->Unknown    Blood Culture,Hold [[id number]] Collected: 12/28/21 2007    Order Status: Canceled     BLOOD CULTURE [[id number]]  (Abnormal) Collected: 12/25/21 0350    Order Status: Completed Specimen: Blood from Peripheral Updated: 12/28/21 1355     Significant Indicator POS     Source BLD     Site PERIPHERAL     Culture Result Growth detected by Bactec instrument. 12/27/2021  08:12  Negative for Staphylococcus aureus and MRSA by PCR. Correlate  ongoing need for antibiotics with clinical condition.        Micrococcus species  Possible Contaminant  Isolated from one set only, please correlate with clinical  condition. Contact the Microbiology department within 48 hr  if identification and susceptibility are needed.      Narrative:      CALL  Mcdonald  MED tel. [phone],  CALLED  MED tel. [phone] 12/27/2021, 11:36, RB PERF. RESULTS CALLED  TO:Pharm Lana [phone]  Collected By:  Per Hospital Policy: Only change Specimen Src: to \"Line\" if  specified by physician order.  Collected By:    GRAM STAIN [[id number]] Collected: " 12/26/21 1805    Order Status: Completed Specimen: Body Fluid Updated: 12/27/21 1619     Significant Indicator .     Source BF     Site Peritoneal Fluid     Gram Stain Result Few WBCs.  No organisms seen.      Narrative:      Surgery - swabs received    Fungal Smear [[id number]] Collected: 12/26/21 1805    Order Status: Completed Specimen: Body Fluid Updated: 12/27/21 1619     Significant Indicator NEG     Source BF     Site Peritoneal Fluid     Fungal Smear Results No fungal elements seen.    Narrative:      Surgery - swabs received    URINE CULTURE(NEW) [[id number]]  (Abnormal)  (Susceptibility) Collected: 12/25/21 0820    Order Status: Completed Specimen: Urine, [place] Cath Updated: 12/27/21 0804     Significant Indicator POS     Source UR     Site URINE, [place] CATH     Culture Result -      Escherichia coli ESBL  ,000  Extended Spectrum Beta-lactamase (ESBL) isolated.  ESBL's may be clinically resistant to therapy with  Penicillins,Cephalosporins or Aztreonam despite  apparent in vitro susceptibility to some of these agents.  The patient requires contact isolation.  Please contact pharmacy or an Infectious Disease Specialist  if you have any questions about appropriate therapy.      Narrative:      Collected By:  Indication for culture:->Emergency Room Patient  Indication for culture:->New onset fever with no other  identified cause  Collected By:    Susceptibility     Escherichia coli esbl (1)     Antibiotic Interpretation Microscan   Method Status    Ampicillin Resistant >16 mcg/mL GUANACO Final    Ceftriaxone Extended Spectrum Beta Lactamase >32 mcg/mL GUANACO Final    Cefazolin Resistant >16 mcg/mL GUANACO Final    Ciprofloxacin Resistant 2 mcg/mL GUANACO Final    Cefepime Resistant >16 mcg/mL GUANACO Final    Cefuroxime Resistant >16 mcg/mL GUANACO Final    Ampicillin/sulbactam Resistant >16/8 mcg/mL GUANACO Final    Tobramycin Sensitive <=2 mcg/mL GUANACO Final    Nitrofurantoin Sensitive <=32 mcg/mL GUANACO Final    Gentamicin Sensitive  "<=2 mcg/mL GUANACO Final    Levofloxacin Intermediate 1 mcg/mL GUANACO Final    Minocycline Sensitive <=4 mcg/mL GUANACO Final    Pip/Tazobactam Sensitive <=8 mcg/mL GUANACO Final    Trimeth/Sulfa Sensitive <=0.5/9.5 mcg/mL GUANACO Final    Tigecycline Sensitive <=2 mcg/mL GUANACO Final                   BLOOD CULTURE [[id number]] Collected: 12/25/21 0425    Order Status: Completed Specimen: Blood from Peripheral Updated: 12/26/21 0734     Significant Indicator NEG     Source BLD     Site PERIPHERAL     Culture Result No Growth  Note: Blood cultures are incubated for 5 days and  are monitored continuously.Positive blood cultures  are called to the RN and reported as soon as  they are identified.  Blood culture testing and Gram stain, if indicated, are  performed at [facility], [address] [place] [address].  Positive blood cultures are  sent to [facility], [address] [place] [address], for organism identification and  susceptibility testing.      Narrative:      Collected By:  Per Hospital Policy: Only change Specimen Src: to \"Line\" if  specified by physician order.  Left AC    C Diff Toxin [[id number]]  (Abnormal) Collected: 12/25/21 0505    Order Status: Completed Updated: 12/26/21 0534     C.Diff Toxin A&B Positive     Comment: TOXIN POSITIVE  Toxin detected by EIA; C. difficile detected by PCR.  If clinically correlated, treatment indicated per guidelines.  Test of cure is not recommended.         Narrative:      Special Contact Isolation  Collected By:  Does this patient have risk factors for C-diff?->Yes  C-Diff Risk Factors->long length of stay in healthcare  settings    C Diff by PCR rflx Toxin [[id number]] Collected: 12/25/21 0505    Order Status: Completed Specimen: Stool Updated: 12/26/21 0532     C Diff by PCR See Toxin     027-NAP1-BI Presumptive Negative     Comment: Presumptive 027/NAP1/BI target DNA sequences are NOT DETECTED.       Narrative:      Special Contact " "Isolation  Collected By:  Does this patient have risk factors for C-diff?->Yes  C-Diff Risk Factors->long length of stay in healthcare  settings    AFB Culture [[id number]] Collected: 12/26/21 0000    Order Status: Canceled     URINALYSIS [[id number]]  (Abnormal) Collected: 12/25/21 0820    Order Status: Completed Specimen: Urine, [place] Cath Updated: 12/25/21 0909     Color Yellow     Character Clear     Specific Gravity 1.010     Ph 5.5     Glucose Negative mg/dL      Ketones Negative mg/dL      Protein Negative mg/dL      Bilirubin Negative     Nitrite Negative     Leukocyte Esterase Negative     Occult Blood Moderate     Micro Urine Req Microscopic    Narrative:      Collected By:  Indication for culture:->Emergency Room Patient  Indication for culture:->New onset fever with no other  identified cause    COV-2, FLU A/B, AND RSV BY PCR (2-4 HOURS CEPHEID): Collect NP swab in [facility] [[id number]] Collected: 12/25/21 0410    Order Status: Completed Specimen: Respirate from Nasopharyngeal Updated: 12/25/21 0459     Influenza virus A RNA Negative     Influenza virus B, PCR Negative     RSV, PCR Negative     SARS-CoV-2 by PCR NotDetected     Comment: PATIENTS: Important information regarding your results and instructions can  be found at [email or web address]   \"After your  Covid-19 Test\"    [facility] providers: PLEASE REFER TO DE-ESCALATION AND RETESTING PROTOCOL  on [email or web address]    **The [facility] GeneXpert Xpress SARS-CoV-2 RT-PCR Test has been made  available for use under the Emergency Use Authorization (EUA) only.          SARS-CoV-2 Source NP Swab    Narrative:      Special Contact Isolation  Collected By:  Have you been in close contact with a person who is suspected  or known to be positive for COVID-19 within the last 30 days  (e.g. last seen that person < 30 days ago)->Unknown    CULTURE STOOL [[id number]] Collected: 12/25/21 0000    Order Status: Canceled Specimen: Other from Stool     "       Assessment:  Active Hospital Problems    Diagnosis    • *Sepsis (HCC) [A41.9]    • Diarrhea of presumed infectious origin [R19.7]    • Advance care planning [Z71.89]    • Neurogenic dysfunction of the urinary bladder [N31.9]    • Acute colitis [K52.9]    • Anxiety and depression [F41.9, F32.A]    • MS (multiple sclerosis) (HCC) [G35]      Recurrent C. difficile infection  Severe fecal impaction  Leukocytosis  - S/p surgical disimpaction on 11/19 an treatment Cdiff  Presenting with same  Recent diagnosis of UTI in the setting of bladder stones-colonization vs new infection  MS, complicated by neurogenic bladder and suprapubic catheter. Urine culture ESBL Ecoli   Pseudobacteremia-CNS in one blood culture  Secondary peritonitis     PLAN:     -s/p colectomy 12/26 colon markedly inflamed colon and purulence seen suggestive of perforation. Path confirms ischemic with abscessess  -Continue meropenem given known ESBL in ascitic fluid  Anticipate 10 day course  -Continue p.o. vancomycin 125 mg every 6 hours for 10 days post-op  -DC'd vanco 12/27 as +blood culture identified as CNS  Plan for imaging for ditension    TRACY Sanchez

## 2021-12-30 PROBLEM — Z66 DNR (DO NOT RESUSCITATE): Status: ACTIVE | Noted: 2021-01-01

## 2021-12-30 NOTE — ASSESSMENT & PLAN NOTE
Per CT abdomen 12/29, appears improved on chest x-ray 12/30  Status post total colectomy  General surgical surgery following no further surgical interventions planned  Continue supportive treatment

## 2021-12-30 NOTE — PROGRESS NOTES
Assumed report and patient care from Francia SIMON. Mother, Kandi, at bedside. Patient is resting comfortably in bed with no signs of labored breathing or restlessness. HHFNC in place with NRB over top. Suprapubic matias in place, ostomy in place with drainage bag. POC discussed. No questions or concerns at this time. Safety measures in place, call light within reach.

## 2021-12-30 NOTE — CODE DOCUMENTATION
MD Sanchez at bedside, speaking with family regarding potentially initiating comfort care measures.

## 2021-12-30 NOTE — PROGRESS NOTES
Infectious Disease Progress Note    Author: Veronica Cramer M.D. Date & Time of service: 2021  11:55 AM    Chief Complaint:  Refractory C diff colitis   ESBL  Peritonitis    Interval History:  41 y.o. male with severe MS and recurrent Cdiff colitis admitted 2021 with fever and diarrhea.    AF WBC 15 s/p colectomy-dw surgery, inflamed and purulent peritoneal fluid seen intra-op. Pt refused oral fosfomycin   AF WBC 10 refused meds this am noted; restless   AF WBC 9.3 appears uncomfortable mom at bedside-ostomy red. surgical site intact   AF WBC 10.5 post-op ileus on imaging NGT placed-patient states hungry and wants to eat    Labs Reviewed, Medications Reviewed and Wound Reviewed.    Review of Systems:  Review of Systems   Constitutional: Negative for fever.   Gastrointestinal: Positive for abdominal pain.        Decreased   Musculoskeletal: Positive for myalgias.   Neurological: Positive for focal weakness and weakness.   All other systems reviewed and are negative.      Hemodynamics:  Temp (24hrs), Av.8 °C (98.2 °F), Min:36.2 °C (97.2 °F), Max:37.3 °C (99.1 °F)  Temperature: 37.3 °C (99.1 °F)  Pulse  Av  Min: 24  Max: 152   Blood Pressure: 144/100       Physical Exam:  Physical Exam  Vitals and nursing note reviewed.   Constitutional:       General: He is in acute distress.      Appearance: He is ill-appearing. He is not toxic-appearing or diaphoretic.   HENT:      Nose: No rhinorrhea.      Comments: NGT     Mouth/Throat:      Pharynx: No oropharyngeal exudate.   Eyes:      General: No scleral icterus.        Right eye: No discharge.         Left eye: No discharge.   Cardiovascular:      Rate and Rhythm: Tachycardia present.   Pulmonary:      Effort: Pulmonary effort is normal. No respiratory distress.      Breath sounds: No stridor.   Abdominal:      General: There is distension.      Tenderness: There is abdominal tenderness. There is no guarding.      Comments:  Surgical site well-aproximated with staples; no drainage  Mild distension   ostomy-red, no necrosis   Musculoskeletal:      Comments: sarcopenia   Skin:     Coloration: Skin is not jaundiced.      Findings: Bruising present.   Neurological:      Motor: Weakness present.      Coordination: Coordination abnormal.      Deep Tendon Reflexes: Reflexes abnormal.         Meds:    Current Facility-Administered Medications:   •  diazePAM  •  furosemide  •  vancomycin antegrade enema (ileostomy)  •  meropenem  •  oxyCODONE immediate-release  •  HYDROmorphone  •  [CANCELED] Special Contact Isolation **AND** [COMPLETED] C Diff by PCR rflx Toxin **AND** Pharmacy  •  enoxaparin  •  acetaminophen  •  labetalol  •  baclofen  •  buPROPion SR  •  ondansetron  •  ondansetron  •  sertraline  •  vitamin D3    Labs:  Recent Labs     12/28/21 2007 12/29/21 0359 12/30/21 0136   WBC 8.9 9.3 10.5   RBC 4.91 4.60* 4.63*   HEMOGLOBIN 14.5 13.5* 13.8*   HEMATOCRIT 43.3 41.7* 42.4   MCV 88.2 90.7 91.6   MCH 29.5 29.3 29.8   RDW 52.6* 52.4* 53.2*   PLATELETCT 207 191 217   MPV 8.7* 9.2 9.0   NEUTSPOLYS 79.00* 79.10* 79.60*   LYMPHOCYTES 7.10* 8.30* 9.10*   MONOCYTES 11.70 10.50 8.40   EOSINOPHILS 0.20 0.30 1.00   BASOPHILS 0.40 0.30 0.50     Recent Labs     12/28/21 2007 12/29/21 0359 12/30/21  0136   SODIUM 135 137 138   POTASSIUM 3.6 4.8 3.8   CHLORIDE 102 104 101   CO2 25 23 25   GLUCOSE 122* 139* 99   BUN 5* 5* 7*     Recent Labs     12/28/21 2007 12/29/21 0359 12/30/21  0136   ALBUMIN 2.5* 2.4* 2.6*   TBILIRUBIN 0.3 0.3 0.4   ALKPHOSPHAT 85 88 79   TOTPROTEIN 5.3* 5.1* 5.4*   ALTSGPT 23 28 33   ASTSGOT 34 45 37   CREATININE 0.32* 0.36* 0.30*       Imaging:  CT-ABDOMEN-PELVIS WITH    Result Date: 12/25/2021 12/25/2021 5:35 AM HISTORY/REASON FOR EXAM:  Diarrhea. TECHNIQUE/EXAM DESCRIPTION:   CT scan of the abdomen and pelvis with contrast. Contrast-enhanced helical scanning was obtained from the diaphragmatic domes through the pubic  symphysis following the bolus administration of nonionic contrast without complication. 90 mL of Omnipaque 350 nonionic contrast was administered without complication. Low dose optimization technique was utilized for this CT exam including automated exposure control and adjustment of the mA and/or kV according to patient size. COMPARISON: 5/18/2018 FINDINGS: Lower Chest: Patchy right basilar opacities. Liver: Normal. Spleen: Unremarkable. Pancreas: Unremarkable. Gallbladder: No calcified stones. Biliary: There is no biliary dilatation. Adrenal glands: Normal. Kidneys: No hydronephrosis. Bilateral kidneys are enhancing symmetrically.. Bowel: Large amount of stool in the rectum and sigmoid colon.. Diffuse colonic wall thickening throughout. Lymph nodes: No adenopathy. Vasculature: The abdominal aorta is normal in caliber. Peritoneum: Unremarkable without ascites. Musculoskeletal: No aggressive bone lesions are seen. Pelvis: Decompressed urinary bladder by suprapubic catheter. Large amount stool in the rectum with rectal wall thickening.     1. Diffuse rectal and colonic wall thickening throughout, in keeping with proctitis and pancolitis. 2. Large amount of stool in the rectum and sigmoid colon.    DX-CHEST-PORTABLE (1 VIEW)    Result Date: 12/25/2021 12/25/2021 3:17 AM HISTORY/REASON FOR EXAM:  Sepsis; sepsis TECHNIQUE/EXAM DESCRIPTION AND NUMBER OF VIEWS: Single portable view of the chest. COMPARISON: 11/18/2021 FINDINGS: No pulmonary infiltrates or consolidations are noted. No pleural effusion. No pneumothorax. Normal cardiopericardial silhouette.     1. No acute cardiopulmonary abnormalities are identified.    DX-ABDOMEN FOR TUBE PLACEMENT    Result Date: 12/25/2021 12/25/2021 2:31 PM HISTORY/REASON FOR EXAM:  Tube placement. TECHNIQUE/EXAM DESCRIPTION AND NUMBER OF VIEWS:  1 view(s) of the abdomen. COMPARISON:  10/29/2021 FINDINGS: Enteric tube has been placed. The tip projects over the fundus. Increased bowel  "gas.     Nasogastric tube tip at the gastric fundus.      Micro:  Results     Procedure Component Value Units Date/Time    BLOOD CULTURE [[id number]] Collected: 12/25/21 0425    Order Status: Completed Specimen: Blood from Peripheral Updated: 12/30/21 0617     Significant Indicator NEG     Source BLD     Site PERIPHERAL     Culture Result No growth after 5 days of incubation.  Blood culture testing and Gram stain, if indicated, are  performed at [facility], [address] [place] [address].  Positive blood cultures are  sent to [facility], [address] [place] [address], for organism identification and  susceptibility testing.      Narrative:      Collected By:  Per Hospital Policy: Only change Specimen Src: to \"Line\" if  specified by physician order.  Left AC    Fungal Culture [[id number]] Collected: 12/26/21 1805    Order Status: Completed Specimen: Body Fluid Updated: 12/29/21 1144     Significant Indicator NEG     Source BF     Site Peritoneal Fluid     Culture Result Culture in progress.     Fungal Smear Results No fungal elements seen.    Narrative:      CALL  Mcdonald  MED tel. [phone],  CALLED  MED tel. [phone] 12/29/2021, 11:43, RB PERF. RESULTS CALLED TO:  Amina, RN  Surgery - swabs received    Anaerobic Culture [[id number]] Collected: 12/26/21 1805    Order Status: Completed Specimen: Body Fluid Updated: 12/29/21 1144     Significant Indicator NEG     Source BF     Site Peritoneal Fluid     Culture Result Culture in progress.    Narrative:      CALL  Mcdonald  MED tel. [phone],  CALLED  MED tel. [phone] 12/29/2021, 11:43, RB PERF. RESULTS CALLED TO:  [id number], RN  Surgery - swabs received    CULTURE WOUND W/ GRAM STAIN [[id number]]  (Abnormal)  (Susceptibility) Collected: 12/26/21 1805    Order Status: Completed Specimen: Body Fluid Updated: 12/29/21 1144     Significant Indicator POS     Source BF     Site Peritoneal Fluid     Culture Result -     Gram Stain " Result Few WBCs.  No organisms seen.       Culture Result Escherichia coli ESBL  Light growth  Extended Spectrum Beta-lactamase (ESBL) isolated.  ESBL's may be clinically resistant to therapy with  Penicillins,Cephalosporins or Aztreonam despite  apparent in vitro susceptibility to some of these agents.  The patient requires contact isolation.  Please contact pharmacy or an Infectious Disease Specialist  if you have any questions about appropriate therapy.      Narrative:      CALL  Mcdonald  MED tel. [phone],  CALLED  MED tel. [phone] 12/29/2021, 11:43, RB PERF. RESULTS CALLED TO:  [id number], RN  Surgery - swabs received    Susceptibility     Escherichia coli esbl (1)     Antibiotic Interpretation Microscan   Method Status    Ampicillin Resistant >16 mcg/mL GUANACO Final    Ceftriaxone Extended Spectrum Beta Lactamase >32 mcg/mL GUANACO Final    Cefazolin Resistant >16 mcg/mL GUANACO Final    Ciprofloxacin Resistant 2 mcg/mL GUANACO Final    Cefepime Resistant >16 mcg/mL GUANACO Final    Cefuroxime Resistant >16 mcg/mL GUANACO Final    Ampicillin/sulbactam Intermediate 16/8 mcg/mL GUANACO Final    Ertapenem Sensitive <=0.5 mcg/mL GUANACO Final    Tobramycin Sensitive <=2 mcg/mL GUANACO Final    Gentamicin Sensitive <=2 mcg/mL GUANACO Final    Minocycline Sensitive <=4 mcg/mL GUANACO Final    Moxifloxacin Sensitive <=2 mcg/mL GUANACO Final    Pip/Tazobactam Sensitive <=8 mcg/mL GUANACO Final    Trimeth/Sulfa Sensitive <=0.5/9.5 mcg/mL GUANACO Final    Tigecycline Sensitive <=2 mcg/mL GUANACO Final                   BLOOD CULTURE [[id number]] Collected: 12/28/21 2007    Order Status: Completed Specimen: Blood from Peripheral Updated: 12/29/21 1008     Significant Indicator NEG     Source BLD     Site PERIPHERAL     Culture Result No Growth  Note: Blood cultures are incubated for 5 days and  are monitored continuously.Positive blood cultures  are called to the RN and reported as soon as  they are identified.      Narrative:      Contact  Collected By:  Per Hospital Policy: Only  "change Specimen Src: to \"Line\" if  specified by physician order.  Right AC    BLOOD CULTURE [[id number]] Collected: 12/28/21 2007    Order Status: Completed Specimen: Blood from Peripheral Updated: 12/29/21 1008     Significant Indicator NEG     Source BLD     Site PERIPHERAL     Culture Result No Growth  Note: Blood cultures are incubated for 5 days and  are monitored continuously.Positive blood cultures  are called to the RN and reported as soon as  they are identified.      Narrative:      Contact  Collected By:  Per Hospital Policy: Only change Specimen Src: to \"Line\" if  specified by physician order.  Right Hand    COV-2, FLU A/B, AND RSV BY PCR (2-4 HOURS CEPHEID): Collect NP swab in [place] [[id number]] Collected: 12/29/21 0345    Order Status: Completed Specimen: Respirate from Nasopharyngeal Updated: 12/29/21 0652     Influenza virus A RNA Negative     Influenza virus B, PCR Negative     RSV, PCR Negative     SARS-CoV-2 by PCR NotDetected     Comment: PATIENTS: Important information regarding your results and instructions can  be found at [email or web address]   \"After your  Covid-19 Test\"    [facility] providers: PLEASE REFER TO DE-ESCALATION AND RETESTING PROTOCOL  on [email or web address]    **The [employer] GeneXpert Xpress SARS-CoV-2 RT-PCR Test has been made  available for use under the Emergency Use Authorization (EUA) only.          SARS-CoV-2 Source NP Swab    Narrative:      Contact  Collected By: [id number] LES ANTONY  Have you been in close contact with a person who is suspected  or known to be positive for COVID-19 within the last 30 days  (e.g. last seen that person < 30 days ago)->Unknown    Blood Culture,Hold [[id number]] Collected: 12/28/21 2007    Order Status: Canceled     BLOOD CULTURE [[id number]]  (Abnormal) Collected: 12/25/21 0350    Order Status: Completed Specimen: Blood from Peripheral Updated: 12/28/21 1355     Significant Indicator POS     Source BLD     Site " "PERIPHERAL     Culture Result Growth detected by Bactec instrument. 12/27/2021  08:12  Negative for Staphylococcus aureus and MRSA by PCR. Correlate  ongoing need for antibiotics with clinical condition.        Micrococcus species  Possible Contaminant  Isolated from one set only, please correlate with clinical  condition. Contact the Microbiology department within 48 hr  if identification and susceptibility are needed.      Narrative:      CALL  Mcdonald  MED tel. [phone],  CALLED  MED tel. [phone] 12/27/2021, 11:36, RB PERF. RESULTS CALLED  TO:Rebecca Schwartz [phone]  Collected By:  Per Hospital Policy: Only change Specimen Src: to \"Line\" if  specified by physician order.  Collected By:    GRAM STAIN [[id number]] Collected: 12/26/21 1805    Order Status: Completed Specimen: Body Fluid Updated: 12/27/21 1619     Significant Indicator .     Source BF     Site Peritoneal Fluid     Gram Stain Result Few WBCs.  No organisms seen.      Narrative:      Surgery - swabs received    Fungal Smear [[id number]] Collected: 12/26/21 1805    Order Status: Completed Specimen: Body Fluid Updated: 12/27/21 1619     Significant Indicator NEG     Source BF     Site Peritoneal Fluid     Fungal Smear Results No fungal elements seen.    Narrative:      Surgery - swabs received    URINE CULTURE(NEW) [[id number]]  (Abnormal)  (Susceptibility) Collected: 12/25/21 0820    Order Status: Completed Specimen: Urine, [place] Cath Updated: 12/27/21 0804     Significant Indicator POS     Source UR     Site URINE, [place] CATH     Culture Result -      Escherichia coli ESBL  ,000  Extended Spectrum Beta-lactamase (ESBL) isolated.  ESBL's may be clinically resistant to therapy with  Penicillins,Cephalosporins or Aztreonam despite  apparent in vitro susceptibility to some of these agents.  The patient requires contact isolation.  Please contact pharmacy or an Infectious Disease Specialist  if you have any questions about appropriate therapy.      " Narrative:      Collected By:  Indication for culture:->Emergency Room Patient  Indication for culture:->New onset fever with no other  identified cause  Collected By:    Susceptibility     Escherichia coli esbl (1)     Antibiotic Interpretation Microscan   Method Status    Ampicillin Resistant >16 mcg/mL GUANACO Final    Ceftriaxone Extended Spectrum Beta Lactamase >32 mcg/mL GUANACO Final    Cefazolin Resistant >16 mcg/mL GUANACO Final    Ciprofloxacin Resistant 2 mcg/mL GUANACO Final    Cefepime Resistant >16 mcg/mL GUANACO Final    Cefuroxime Resistant >16 mcg/mL GUANACO Final    Ampicillin/sulbactam Resistant >16/8 mcg/mL GUANACO Final    Tobramycin Sensitive <=2 mcg/mL GUANACO Final    Nitrofurantoin Sensitive <=32 mcg/mL GUANACO Final    Gentamicin Sensitive <=2 mcg/mL GUANACO Final    Levofloxacin Intermediate 1 mcg/mL GUANACO Final    Minocycline Sensitive <=4 mcg/mL GUANACO Final    Pip/Tazobactam Sensitive <=8 mcg/mL UGANACO Final    Trimeth/Sulfa Sensitive <=0.5/9.5 mcg/mL GUANACO Final    Tigecycline Sensitive <=2 mcg/mL GUANACO Final                   C Diff Toxin [[id number]]  (Abnormal) Collected: 12/25/21 0505    Order Status: Completed Updated: 12/26/21 0534     C.Diff Toxin A&B Positive     Comment: TOXIN POSITIVE  Toxin detected by EIA; C. difficile detected by PCR.  If clinically correlated, treatment indicated per guidelines.  Test of cure is not recommended.         Narrative:      Special Contact Isolation  Collected By:  Does this patient have risk factors for C-diff?->Yes  C-Diff Risk Factors->long length of stay in healthcare  settings    C Diff by PCR rflx Toxin [[id number]] Collected: 12/25/21 0505    Order Status: Completed Specimen: Stool Updated: 12/26/21 0532     C Diff by PCR See Toxin     027-NAP1-BI Presumptive Negative     Comment: Presumptive 027/NAP1/BI target DNA sequences are NOT DETECTED.       Narrative:      Special Contact Isolation  Collected By:  Does this patient have risk factors for C-diff?->Yes  C-Diff Risk Factors->long  "length of stay in healthcare  settings    AFB Culture [[id number]] Collected: 12/26/21 0000    Order Status: Canceled     URINALYSIS [[id number]]  (Abnormal) Collected: 12/25/21 0820    Order Status: Completed Specimen: Urine, [place] Cath Updated: 12/25/21 0909     Color Yellow     Character Clear     Specific Gravity 1.010     Ph 5.5     Glucose Negative mg/dL      Ketones Negative mg/dL      Protein Negative mg/dL      Bilirubin Negative     Nitrite Negative     Leukocyte Esterase Negative     Occult Blood Moderate     Micro Urine Req Microscopic    Narrative:      Collected By:  Indication for culture:->Emergency Room Patient  Indication for culture:->New onset fever with no other  identified cause    COV-2, FLU A/B, AND RSV BY PCR (2-4 HOURS CEPSauce LabsID): Collect NP swab in VTM [[id number]] Collected: 12/25/21 0410    Order Status: Completed Specimen: Respirate from Nasopharyngeal Updated: 12/25/21 0459     Influenza virus A RNA Negative     Influenza virus B, PCR Negative     RSV, PCR Negative     SARS-CoV-2 by PCR NotDetected     Comment: PATIENTS: Important information regarding your results and instructions can  be found at [email or web address]   \"After your  Covid-19 Test\"    [facility] providers: PLEASE REFER TO DE-ESCALATION AND RETESTING PROTOCOL  on [email or web address]    **The [employer] GeneXpert Xpress SARS-CoV-2 RT-PCR Test has been made  available for use under the Emergency Use Authorization (EUA) only.          SARS-CoV-2 Source NP Swab    Narrative:      Special Contact Isolation  Collected By:  Have you been in close contact with a person who is suspected  or known to be positive for COVID-19 within the last 30 days  (e.g. last seen that person < 30 days ago)->Unknown    CULTURE STOOL [[id number]] Collected: 12/25/21 0000    Order Status: Canceled Specimen: Other from Stool           Assessment:  Active Hospital Problems    Diagnosis    • *Sepsis (HCC) [A41.9]    • Diarrhea of presumed " infectious origin [R19.7]    • Advance care planning [Z71.89]    • Neurogenic dysfunction of the urinary bladder [N31.9]    • Acute colitis [K52.9]    • Anxiety and depression [F41.9, F32.A]    • MS (multiple sclerosis) (HCC) [G35]      Recurrent C. difficile infection  Severe fecal impaction  Leukocytosis  - S/p surgical disimpaction on 11/19 an treatment Cdiff  Presenting with same  Recent diagnosis of UTI in the setting of bladder stones-colonization vs new infection  MS, complicated by neurogenic bladder and suprapubic catheter. Urine culture ESBL Ecoli   Pseudobacteremia-micrococcus in one blood culture  Secondary peritonitis     PLAN:     -s/p colectomy 12/26 colon markedly inflamed colon and purulence seen suggestive of perforation. Path confirms ischemic with abscessess  -Continue meropenem given known ESBL in ascitic fluid  Anticipate 10 day course  Stop date 1/7/2022  -Continue p.o./per ostomy vancomycin for 10-14 days post-op  -DC'd vanco 12/27 as +blood culture identified as CNS and now micrococcus       DW Dr Sanchez

## 2021-12-30 NOTE — DISCHARGE PLANNING
@1030  Agency/Facility Name: [facility]  Spoke To: Niko  Outcome: Has a bed today if patient is ready.    @1000  Agency/Facility Name: [facility]  Outcome: Left message, awaiting call back.

## 2021-12-30 NOTE — PROGRESS NOTES
Monitor tech notified RN of patient sustaining 140s. No PRNs on MAR. RN reached out to Dr. Sanchez to notify of HR and inquire about pharmacological intervention. No new orders at this time.

## 2021-12-30 NOTE — WOUND TEAM
" [facility]  Inpatient Services  New Ostomy Management & Teaching    HPI:  Reviewed  PMH: Reviewed   SH: Reviewed    Subjective: \"Turn.\"    Objective: appliance leaking, midline drsg saurated                   Colostomy 12/26/21 LLQ (Active)   Stomal Appliance Assessment Leaking    Stoma Assessment Beefy red    Stoma Shape Round;Budded Less Than One Inch    Stoma Size (in) 1.5    Peristomal Assessment Intact    Mucocutaneous Junction Intact    Treatment Cleansed with water/washcloth;Appliance Changed    Peristomal Protectant Paste Ring    Stomal Appliance 2 1/4\" (57mm) CTF;Paste Ring, 2\"    Output (mL) 450 mL    Output Color Green    WOUND RN ONLY - Stomal Appliance  2 Piece;2 1/4\" (57mm) CTF;High Output Pouch;Down Drain Bag;Paste Ring, 2\"    Appliance (Pouch) # [id number]    Appliance Brand [employer]    Appliance Supplier [email or web address]    Secure Start completed Yes    Ostomy Care Resources Provided UOAA Tip Sheet           Ostomy Appliance (type and size): 2.25\" 2 piece and 2\" Paste Ring    Interventions: Removed appliance, cleaned skin, dried. Checked size of stomy using [place] templates. Traced shape to barrier. Barrier CTF and applied paste ring. Applied barrier to skin, attached pouch and attached spout to DD.       Pt education: Family and Nsg questions and concerns addressed     Evaluation: New end ileostomy with output and flatus. Appliance had leaked r/t amount of watery output. Applied high output to DD for better control. Removed midline drsg r/t saturation from output. Nsg to apply dry drsg after cleaning incision well.    Flatus: Present  Stool Output: large, green and liquid  Diet: NPO  Mobility: Not Mobilizing    Plan: Ostomy nurses to continue to follow for ostomy needs and teaching until discharge    Anticipated discharge needs: Supplies, supplier information, possible HH, outpatient ostomy clinic, Skilled Nursing/Rehab     Secure Start Signed Yes  Outpatient Referral Placed No  5 Sets of " appliances in Ostomy bag for discharge No    INSURANCE OPTIONS:          x       [facility] ([place])              MediCARE/MEDICAID & All other Private Insurance companies (Prism Form)              MediCAID & Fee for Service (Care Chest Paperwork + Prism Form)                            Form signed/Catalog Marked and Copy left with patient OR medicaid paperwork given to patient      Anticipated Discharge Plans:  Skilled Nursing

## 2021-12-30 NOTE — THERAPY
Missed Therapy     Patient Name: Jerzy Juan III  Age:  41 y.o., Sex:  male  Medical Record #: [id number]  Today's Date: 12/30/2021    Discussed missed therapy with RN       12/30/21 0812   Treatment Variance   Reason For Missed Therapy Medical - Patient Is Not Medically Stable   Total Time Spent   Total Time Spent (Mins) 2   Initial Contact Note    Initial Contact Note  Order Received and Verified, Speech Therapy Evaluation in Progress with Full Report to Follow.   Interdisciplinary Plan of Care Collaboration   IDT Collaboration with  Nursing   Collaboration Comments Per RN, pt is not medically appropriate for a clinical swallow evaluation at this time and may transition to comfort care today. SLP to follow for a clinical swallow evaluation as needed.

## 2021-12-30 NOTE — PROGRESS NOTES
Report received. Assessment complete. Pt of HHFNC 60L 100% and NRB 15L with o2 sat in the low 90s. Pt grimacing and restless. Pt repositioned with no relief. Pt mother at bedside. Pt instructed to notify RN of any needs. Hourly rounding in place. All lines verified. Call light within reach.

## 2021-12-30 NOTE — PROGRESS NOTES
Hospital Medicine Daily Progress Note    Date of Service  12/30/2021    Chief Complaint  Jerzy Juan III is a 41 y.o. 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  In ER noted to be febrile, tachycardic but hemodynamically stable.  Saturating well on room air.  Lab work significant for lactic acidosis, leukocytosis with WBCs of 18.9.  CT abdomen pelvis showed diffuse rectal and colonic wall thickening throughout concerning for proctitis and pancolitis.  Chest x-ray negative for acute cardiopulmonary process.  Started on oral vancomycin and IV Flagyl.  GI and general surgery consulted.  Recommended aggressive bowel regimen.  ID consulted as well.  Fidaxomicin requested but unfortunately not available in this facility or [facility].  Per GI it will be challenging to perform FMT considering FMT facility is closed for the holidays. Total colectomy completed on 12/26 by Dr. Yeboah. Urine culture with E. Coli ESBL and blood cultures came back positive with coag negative staph 1 out of 2 bottles. ID consulted, pt being treated with PO vancomycin and meropenem.  Patient developing respiratory distress on the night of 12/28/2021.  Three rapid response was called over 24.  Respiratory distress.  Patient found to have small pneumothorax and pneumomediastinum.  Critical care consulted and had extensive discussion with patient's mother at the bedside.  Decision made to refrain from intubation and chest tube placements.  Patient continued on high flow nasal cannula with some improvement in saturations.     Interval Problem Update  Patient tachycardic and tachypneic this morning.  He  remains on high flow nasal cannula 60 L 100%. Patient has not been n.p.o. due to his ileus and high output from NG tube.  He has missed several doses of p.o. baclofen.  Concern for baclofen withdrawal today causing agitation.  IV Valium used with some improvement in symptoms.  Crackles improved, will continue gentle diuresis with IV Lasix.     I have personally seen and examined the patient at bedside. I discussed the plan of care with patient, family, bedside RN, charge RN, , pharmacy and Dr. Sanchez.    Consultants/Specialty  general surgery, GI and infectious disease    Code Status  DNAR/DNI    Disposition  Patient is not medically cleared for discharge.   Anticipate discharge to to skilled nursing facility.  I have placed the appropriate orders for post-discharge needs.    Review of Systems  Review of Systems   Constitutional: Negative for chills and fever.   HENT: Negative for ear discharge and sinus pain.    Eyes: Negative for blurred vision and double vision.   Respiratory: Positive for shortness of breath. Negative for cough.    Cardiovascular: Negative for chest pain and leg swelling.   Gastrointestinal: Positive for abdominal pain and diarrhea.   Genitourinary: Negative for hematuria.        Suprapubic catheter     Musculoskeletal: Positive for back pain and myalgias.        Sacral pain, bed bound at baseline       Skin: Negative for itching and rash.   Neurological: Positive for weakness. Negative for dizziness, seizures and headaches.   Endo/Heme/Allergies: Negative.    Psychiatric/Behavioral: Negative for depression and suicidal ideas.   All other systems reviewed and are negative.       Physical Exam  Temp:  [36.2 °C (97.2 °F)-37.3 °C (99.1 °F)] 37.3 °C (99.1 °F)  Pulse:  [112-140] 135  Resp:  [18-30] 22  BP: (128-166)/() 144/100  SpO2:  [87 %-98 %] 97 %    Physical Exam  Vitals and nursing note reviewed. Exam conducted with a chaperone present.   Constitutional:       General: He is not  in acute distress.     Appearance: He is underweight. He is ill-appearing.   HENT:      Head: Normocephalic and atraumatic.      Right Ear: External ear normal.      Left Ear: External ear normal.      Nose: Nose normal.      Comments: NG tube in place with green, thin output       Mouth/Throat:      Mouth: Mucous membranes are moist.      Pharynx: Oropharynx is clear.   Eyes:      General:         Right eye: No discharge.         Left eye: No discharge.   Cardiovascular:      Rate and Rhythm: Regular rhythm. Tachycardia present.      Pulses: Normal pulses.      Heart sounds: Normal heart sounds. No murmur heard.      Pulmonary:      Effort: Tachypnea present.      Breath sounds: Rhonchi present.      Comments: Increased respiratory effort     Abdominal:      General: Abdomen is flat. There is no distension.      Palpations: Abdomen is soft.      Tenderness: There is abdominal tenderness. There is no guarding.      Comments: Ostomy in place with output, pain pump RLQ, suprapubic catheter in place       Musculoskeletal:         General: No swelling, tenderness or signs of injury.      Cervical back: Normal range of motion and neck supple. No tenderness.      Comments: Unable to lift BLE, BUE 3/5   Skin:     General: Skin is warm and dry.      Capillary Refill: Capillary refill takes 2 to 3 seconds.   Neurological:      General: No focal deficit present.      Mental Status: He is alert and oriented to person, place, and time.      Motor: Weakness present.      Comments: Bed bound at baseline     Psychiatric:         Attention and Perception: He is inattentive.         Behavior: Behavior is withdrawn.      Comments: Impaired ability to communicate         Fluids    Intake/Output Summary (Last 24 hours) at 12/30/2021 1514  Last data filed at 12/30/2021 1252  Gross per 24 hour   Intake --   Output 4750 ml   Net -4750 ml       Laboratory  Recent Labs     12/28/21 2007 12/29/21  0359 12/30/21  0136   WBC 8.9 9.3 10.5    RBC 4.91 4.60* 4.63*   HEMOGLOBIN 14.5 13.5* 13.8*   HEMATOCRIT 43.3 41.7* 42.4   MCV 88.2 90.7 91.6   MCH 29.5 29.3 29.8   MCHC 33.5* 32.4* 32.5*   RDW 52.6* 52.4* 53.2*   PLATELETCT 207 191 217   MPV 8.7* 9.2 9.0     Recent Labs     12/28/21 2007 12/29/21  0359 12/30/21  0136   SODIUM 135 137 138   POTASSIUM 3.6 4.8 3.8   CHLORIDE 102 104 101   CO2 25 23 25   GLUCOSE 122* 139* 99   BUN 5* 5* 7*   CREATININE 0.32* 0.36* 0.30*   CALCIUM 7.7* 7.9* 8.0*                   Imaging  DX-CHEST-PORTABLE (1 VIEW)   Final Result      Interval right middle lobe collapse      Diminished pneumoperitoneum      DX-CHEST-PORTABLE (1 VIEW)   Final Result      1.  Trace right apical pneumothorax may be present.   2.  Pneumomediastinum is now noted.   3.  Bibasilar opacities may represent atelectasis which is compared to prior.   4.  Large amount of pneumoperitoneum is again noted.      Findings discussed with Dr. Sanchez      DX-CHEST-PORTABLE (1 VIEW)   Final Result         1. Patchy left basilar opacity, similar to prior      2. Redemonstration of large intraperitoneal air.      3. Gastric drainage tube coiled in the stomach fundus, tip is in the stomach.      CT-ABDOMEN-PELVIS W/O   Final Result   Addendum 1 of 1   Dr. Alvarado discussed these findings with Dr. Porter at 9:30 PM by    telephone on 12/28/2021      Final      1.  Postoperative changes of colectomy with significant pneumoperitoneum. This may be postoperative or reflect hollow viscus injury.   2.  Findings most consistent with postoperative ileus with distention of the entire small bowel without evidence of discrete transition point.   3.  Proctitis/colitis of the rectum and residual sigmoid colon.   4.  Small volume ascites.   5.  Small right and trace left pleural effusions with adjacent atelectasis. Likely left lower lobe pneumonia.   6.  Fluid in the distal esophagus, which increases risk of aspiration.      DX-CHEST-LIMITED (1 VIEW)   Final Result      1.  Left  lower lobe atelectasis or pneumonia.      2.  Residual large volume of residual free intraperitoneal air.      CT-HEAD W/O   Final Result         1. No acute intracranial abnormality. No evidence of acute intracranial hemorrhage or mass lesion.                     DX-ABDOMEN FOR TUBE PLACEMENT   Final Result      Nasogastric tube tip at the gastric fundus.      CT-ABDOMEN-PELVIS WITH   Final Result         1. Diffuse rectal and colonic wall thickening throughout, in keeping with proctitis and pancolitis.   2. Large amount of stool in the rectum and sigmoid colon.      DX-CHEST-PORTABLE (1 VIEW)   Final Result         1. No acute cardiopulmonary abnormalities are identified.           Assessment/Plan  * Sepsis (HCC)- (present on admission)  Assessment & Plan  This is Sepsis Present on admission  SIRS criteria identified on my evaluation include: Fever, with temperature greater than 101 deg F, Tachycardia, with heart rate greater than 90 BPM and Leukocytosis, with WBC greater than 12,000  Source is GI  Sepsis protocol initiated  Fluid resuscitation ordered per protocol  IV antibiotics as appropriate for source of sepsis  While organ dysfunction may be noted elsewhere in this problem list or in the chart, degree of organ dysfunction does not meet CMS criteria for severe sepsis    Patient continues to be tachycardic and tachypneic this morning  Possible baclofen withdrawal and pain contributing to this  Lactic acid 1.2  Continue iv meropenem and Vanco per ostomy    MS (multiple sclerosis) (HCC)- (present on admission)  Assessment & Plan  Debilitating MS, lives in a nursing home- [facility]  Patient has been declining despite second round of [place], hold off due to active infection   History of neurogenic bladder secondary to this with suprapubic catheter  History of chronic pain with pain pump in place  Continue baclofen and vitamin D  Hold off on cladribine due to acute infection.  Restart once afebrile infection under  control  Palliative care consult      Pneumothorax on right- (present on admission)  Assessment & Plan  Small apical pneumo noted on CXR 12/29   Relative contraindication to intubation, continue high flow nasal cannula per patient and family wishes  Resolved on x-ray 12/30      Pneumomediastinum (HCC)- (present on admission)  Assessment & Plan  As per chest x-ray  Unstable for CT scan for further evaluation  Etiology unclear  Continue to monitor    Pneumoperitoneum  Assessment & Plan  Per CT abdomen 12/29, appears improved on chest x-ray 12/30  Status post total colectomy  General surgical surgery following no further surgical interventions planned  Continue supportive treatment    Hypophosphatemia  Assessment & Plan  Phos 1.4  Replaced with IV sodium Phos    Ileus following gastrointestinal surgery (HCC)  Assessment & Plan  Mild response for respiratory distress evening of 12/28  Ileus noted on CT  Patient made n.p.o.  NG tube placed, approximately 600 mL thin green output    Acute respiratory failure with hypoxia (HCC)  Assessment & Plan  Rapid response called last night and this morning for saturations in the 80s on 15 L oxygen mask  CT with esophagus and small right and trace left pleural effusions with left lower lobe pneumonia, on meropenem  Chest x-ray with patchy left basilar opacities    IV fluids discontinued   IV Lasix ordered, monitor fluid status closely  Continue high flow nasal cannula  N.p.o., SLP consult      Positive blood cultures- (present on admission)  Assessment & Plan  Coag negative staph 1/2 likely skin contamination  IV vancomycin and Flagyl discontinued by ID  ID following      Hypomagnesemia- (present on admission)  Assessment & Plan  Mag 1.9 today, replaced  Continue to monitor and replace for goal >2.0    Normocytic anemia- (present on admission)  Assessment & Plan  Mild, stable  Hemoglobin 13.8  Continue to monitor    Diarrhea of presumed infectious origin- (present on  admission)  Assessment & Plan  Secondary to C. difficile, history of recurrent infections  Status post total colectomy  Continue vancomycin per ostomy      Neurogenic dysfunction of the urinary bladder- (present on admission)  Assessment & Plan  Secondary to MS  Suprapubic catheter in place      Advance care planning  Assessment & Plan  12/30/21 Spent 22 minutes discussing advanced care planning with patient and family this morning.  Patient's family inquiring about comfort care measures patient has been declining with his MS for some time now.  Patient oriented and competent to make his own medical decisions, at this time he wishes to continue with treatment.   Visit from  yesterday  Son to arrive from [place] today  Ongoing discussion of goals of care with family and patient  Consult to palliative care     Acute colitis- (present on admission)  Assessment & Plan  Comes in complaining of diarrhea for the past 2 days, SIRS 4/4 on presentation  History of recurrent C. difficile infections  CT abdomen shows wall thickening consistent with proctitis and pancolitis  C. difficile postive, ID following, continue oral vancomycin  GI consulted, unable to do fecal transplant  Pt continued to decline, general surgery consulted and total colectomy completed 12/26 by Dr. Yeboah  Ostomy in place with thin output  Ileus on imaging, NG tube placed without high volume of output.  Patient to be strict n.p.o.    Recurrent colitis due to Clostridium difficile- (present on admission)  Assessment & Plan  Status post colectomy and end ileostomy with manual disimpaction  Contact precautions  Continue vancomycin via ostomy as patient is strict n.p.o. with ileus and high output from NG tube    Hypokalemia- (present on admission)  Assessment & Plan  Potassium 3.8 this morning, replaced  Continue to monitor and replace for goal >4.0  Magnesium 1.9    UTI due to extended-spectrum beta lactamase (ESBL) producing Escherichia coli-  (present on admission)  Assessment & Plan  Urine culture grew E. coli ESBL  Continue meropenem  ID following      Anxiety and depression- (present on admission)  Assessment & Plan  Sertraline and bupropion held due to postop ileus and high NG tube output  Diazepam started for probable baclofen withdrawal, discontinue lorazepam       VTE prophylaxis: enoxaparin ppx    I have performed a physical exam and reviewed and updated ROS and Plan today (12/30/2021). In review of yesterday's note (12/29/2021), there are no changes except as documented above.    Patient is critically ill.   The patient continues to have: Acute hypoxemic respiratory failure, fulminant C. Difficile enterocolitis, post colectomy, and multiple sclerosis  The vital organ system that is affected is the: Respiratory and cardiovascular  If untreated there is a high chance of deterioration into: Respiratory failure, cardiovascular collapse, cardiac arrest, and eventually death.   The critical care that I am providing today is: High flow nasal cannula oxygen and thorough data review with frequent close monitoring of the patient's clinical status and vital signs at bedside.  Time coordinating care with patient, respiratory therapist, and palliative care  The critical that has been undertaken is medically complex.   There has been no overlap in critical care time.   Critical Care Time not including procedures: 32

## 2021-12-30 NOTE — PROGRESS NOTES
Report given to Francia SIMON via report at the bedside. Family oriented to new RN and pt mother aware she can stay the night tonight. Patient handed off in stable condition. HHFNC in place. NRB in place. Safety measures in place. Call light within reach. Care relinquished.

## 2021-12-30 NOTE — ASSESSMENT & PLAN NOTE
I met with the patient's mother at bedside while he slept.  Mr. Juan has a progressive, end stage neuromuscular disease which makes him a very poor candidate for mechanical ventilation.  I expressed my concern that when patient's have progressive neuromuscular weakness and are bed bound and in the end stages of their disease the respiratory reserve is generally very poor.  Once the patient is put on mechanical ventilation and we begin to passively ventilate him with each passing day his chances of successful extubation become successively worse and his risk of permanent ventilator dependence increases.  If long term ventilator dependence with tracheostomy is not within his wishes (which it is not) I would not recommend mechanical ventilation for any duration of time longer than for procedural purposes.  Furthermore the presence of pneumothorax and pneumomediastinum put the patient at high risk of tension pneumothorax with the introduction of positive airway pressure.  If the patient was amenable to chest tube this would be a reasonable solution, however the patient and his mother have stated that they would not consent to a chest tube and in the event of tension pneumothorax would plan to allow him to pass at that time.  Given the likelihood of that event in this situation and his poor prognosis on mechanical ventilation I recommend against intubation.  I explained these things to Mr. Juan's mother and she decided that he should return to his previous designation of DNR/DNI.  I have discussed my recommendations with the patient's hospitalist and surgeon.    At this time I recommend continuing support with high flow nasal cannula.  If he becomes distressed or is unable to maintain his saturations I recommend comfort care which I have discussed with his mother and she is in agreement.

## 2021-12-30 NOTE — CARE PLAN
Problem: Nutritional:  Goal: Achieve adequate nutritional intake  Description: Patient will tolerate diet past NPO/clears and eat 50% meals  Outcome: Not Progressing    Pt is currently on NPO/clear liquids x 4 days, currently NPO. Noted code status changed back to DNR/I and possible transition to comfort care today as pt remains on 60 L high flow. Advance diet as medically feasible, RD following.

## 2021-12-30 NOTE — ASSESSMENT & PLAN NOTE
Small apical pneumo noted on CXR 12/29   Relative contraindication to intubation, continue high flow nasal cannula per patient and family wishes  Resolved on x-ray 12/30

## 2021-12-30 NOTE — ASSESSMENT & PLAN NOTE
As per chest x-ray  Unstable for CT scan for further evaluation  Etiology unclear  Continue to monitor

## 2021-12-30 NOTE — PROGRESS NOTES
Telemetry Shift Summary    Rhythm ST  HR Range 100s-120s  Ectopy frequent PVC, bigeminy, rare trigeminy  Measurements 0.16/0.08/0.34        Normal Values  Rhythm SR  HR Range    Measurements 0.12-0.20 / 0.06-0.10  / 0.30-0.52

## 2021-12-30 NOTE — PROGRESS NOTES
Telemetry Shift Summary     Rhythm SB/ST  HR Range   Ectopy fBig, fPVC, rTrig,  Measurements 0.16/0.08/0.30

## 2021-12-30 NOTE — PROGRESS NOTES
"Palliative Care   Attempted Palliative Care consult.  Introduced myself to mother Kandi and father Zen at bedside.  Patient appears to be sleeping, unresponsive at this time.  Kandi advised patient's sister Marylin and son \"just arrived at the front door.\"  I offered to provide family some time alone with patient before continuing discussion.  Kandi was grateful for this and said, \"But we want you to come back.  These are things we need to talk about.\"  Tentative plan made to return in about one hour to continue care conference.    MAUREEN Casas, [facility]-BC  Palliative Care Nurse Practitioner  [phone]      "

## 2021-12-30 NOTE — CONSULTS
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  Lab work significant for lactic acidosis, leukocytosis.  CT abdomen pelvis showed diffuse rectal and colonic wall thickening throughout concerning for proctitis and pancolitis.  Started on oral vancomycin and IV Flagyl.  GI, Surgery General, and Infectious Disease consulted.  Patient underwent total colectomy 12/26 (Dr. Yeboah).   Urine culture with E. Coli ESBL and blood cultures came back positive with coag negative staph 1 out of 2 bottles. ID consulted, pt being treated with PO vancomycin and meropenem.      Rapid Response called 12/29 due to persistent hypoxia in the low 80% despite being on oxygen via high flow nasal cannula (HFNC).  Per Dr. Sanchez's 12/29 note, \"Due to patient's right pneumothorax and concern for development of tension pneumothorax, as well as patient's poor baseline status with multiple comorbidities with multiple sclerosis being bedbound, she advised against intubation with the concern that the patient would could not be extubated if he survived. She discussed this with the patient's mother.\"    Palliative Care (PC) consulting for Advance Care Planning, goals of care discussion.  It was difficult for patient to speak.  Majority of information in note obtained from chart review and information received from family.    Past medical/surgical history:   Past Medical History:   Diagnosis Date   • Anxiety    • Back pain    • Blood transfusion    • Bowel habit changes     constipation   • Breath shortness     \"r/t to medication Ocrevus and " "Baclofen\" \"side effect   • Dental disorder     poor dentation   • Depression     PTSD/clinical depression   • Eyes sensitive to light     Needs to wear sunglasses   • Fall     4/6/2016   • Headache(784.0)    • Heart burn     reflux   • Heat intolerance     will vomit   • Hypertension    • Indigestion    • MS (multiple sclerosis) ([facility]) DX 8/20/2013    multiple lesions brain/spine   • Multiple sclerosis ([facility]) 2013   • Pain 06/20/2018    \"everywhere\"   • Presence of intrathecal baclofen pump     right side   • Urinary bladder disorder     UTI/catheter/bladder stone   • Urinary incontinence     catheter in place     Additional consults:   Gastroenterology  Infectious Disease  Internal Medicine  Surgery Colorectal  Surgery General    Assessment:  Neuro: Patient alert but minimally verbally responsive.  Nods/shakes head appropriately to yes/no questions.  Dyspnea: Yes- 97% on HFNC 60L @ 100% FiO2  Last BM: 12/30/21-    Pain: Yes-    Depression: Unable to determine-    Dementia: Unable to Determine;       Living situation & psychosocial: Patient is single, disabled, and lives long-term at [facility] in [place].  His parents Kandi Leyva and Zen Juan  live in [place].  He has a son, Chris ([place], [place]), sister Marylin Boss ([place]), brother Rey, and 4 half siblings.  He has been diagnosed with MS since 2013.  He has lived full-time at [facility] since approximately 2018.    Spiritual:  Is Mandaeism or spirituality important for coping with this illness? Unable to determine- Non-Sikh  Has a  or spiritual provider visit been requested? Unable to determine    Palliative Performance Scale: 30%    Advance Directive: Advance Directive-    DPOA: Yes- mother Kandi Leyva ([phone])  POLST: Yes-      To locate and/or review the AD/POLST, please hover the cursor over the patient's code status, then scroll down under Documents to access ACP document links.    Code Status: DNR- DNI    Discussion:  Met " "with mother Kandi, Kandi's brother Supa, sister Marylin, and son Chris at bedside. Patient minimally responsive with one-or-two-word answers, nods/shakes head appropriately.  Introduced myself and explained the role of Palliative Care (PC). From a functional standpoint patient is wheelchair bound at baseline and is dependent on [facility] staff for all ADL's including personal hygiene.  He is disabled secondary to his multiple sclerosis and has not worked for several years.    When queried about their understanding of Zen's current health situation, Kandi said, \"I was pretty sure he wasn't going to make it last night.  He's told me before he's afraid of dying.  I can see him maybe wanting to foy through it a little.\"  Gely REDDY joined discussion and provided medical review explaining why intubation is not being offered as an intervention, advising it is highly likely patient would likely never be weaned off ventilator.  Discussed most patients experience very poor quality of life on life support.  I explained option of continuing current treatment on high flow nasal cannula, versus option of transitioning patient to comfort care and focusing on promoting his comfort, dignity, and quality of life at end-of-life.  I explained with comfort care patient would receive frequent IV morphine and/or anxiolytics to help with dyspnea, air hunger, anxiety.    MAUREEN Johnson and myself spoke with Epifanio at bedside.  I explained he is so sick that he cannot be intubated due to physician concern he would never come off ventilator.  I explained he is currently on maximum oxygen support on high flow nasal cannula.  I shared he could continue high flow oxygen. Or if he is too tired, he has option of transitioning to comfort care.  I advised with comfort care he would be given food, and IV morphine to help with pain, air hunger.  He said, \"Can't take morphine.\"  We assured him he would be given non-morphine opioid for " "pain, air hunger.  He said, \"Let me die.\"  Son Chris came over at this point and Epifanio said again, \"Let me die.\"  Chris asked, \"You want to be comfortable, dad?\"  Epifanio nodded head \"yes\" then said, \"Food.\"  MAUREEN Johnson said, \"You know that if you are on comfort care you will pass away?\"  Epifanio nodded his head \"Yes.\"  She asked, \"Is that what you want?\"  Epifanio again nodded his head \"Yes.\"  Supportive presence provided.    While MAUREEN Johnson, myself and son Chris were talking with patient at bedside, Dr. Sanchez entered room and was speaking with other family members.  He provided prognosis that if patient is transitioned to CC tonight, he may not make it through the night.  Kandi became tearful and said, \"It would be just like him to last longer.\"  Kandi had questions about imminent death.  I educated her patient may experience more irregular breathing with episodes of dyspnea.  Reassured her Epifanio can receive frequent medications on comfort care to manage his pain and symptoms.  Reassured her nursing staff would work diligently to keep patient as comfortable as possible.  Family would like to take some time as a family with patient.  They will let team know when they are ready to transition to comfort care.         Advance Directive signed 4/11/2016 on file listing mother Kandi Leyva ([phone]) as DPOA-HC; with sister Marylin Boss ([phone]) as First Alternate.  Statement of Desires section is blank, with patient not selecting any boxes (1-5).  Kandi reports updated AD available at [employer].  She estimates it was completed in 2018.       Discussed code status.  Family wishes patient to remain DNR/DNI at this time, as this is what patient had indicated on his POLST signed 6/11/2019.     Acknowledged these are really hard things to think about and talk about.  Advised me and my team are here to support Zen and their family during this difficult time.  Provided Kandi with Palliative Care contact " information, and encouraged her/family to call with any questions/needs, or if they just need to talk.  All questions answered.     Provided therapeutic communication including open-ended questions, therapeutic silence, reflective listening, normalization of feelings, empathic support, and therapeutic touch throughout encounter.      Outcome:  Patient verbalized wish to transition to comfort care.  Family will take some time, then let primary team know when they want comfort care orders placed.    Plan: Transition to comfort care when family ready.    Recommendations: I recommend a hospice consult.    Updated: MAUREEN Lo via [employer]    Thank you for allowing Palliative Care to participate in Jerzy' care. Please call our team with questions and/or additional needs.    As appropriate, Palliative Care encourages medical team to engage in further conversation, education for patient/family at bedside regarding GOC/POC.    Total visit time was 75 minutes discussing and coordinating advance care planning.     MAUREEN Casas, AGACNP-BC  Palliative Care Nurse Practitioner  [phone]

## 2021-12-30 NOTE — CODE DOCUMENTATION
Patient will now be FULL CODE.    Addendum 1725: MD Clifford at bedside discussing plan of care options with family member. No change to code status at this time.

## 2021-12-30 NOTE — PROGRESS NOTES
Rapid Response Note    Called to bedside per rapid response due to persistent hypoxia in the low 80% despite being on high flow oxygen.    When I arrived at bedside, patient was in acute respiratory distress and tachypneic with SPO2 in the low 80s while on 40 LPM high flow oxygen via nasal cannula.  He was suctioned again per respiratory therapy and also placed on 15 LPM oxygen mask on top of this high flow oxygen with improvement of his hypoxia into the low 90s but then dipping again back to the high 80s.  Patient had just received additional dose of furosemide 20 mg IV approximately 45 minutes prior.  He continued to show signs of severe respiratory distress with pending fulminant respiratory failure.    Patient continued to have coarse breath sounds consistent with aspiration pneumonitis.  He was tachypneic with respiratory rate in the high twenties to thirties.  He was persistently tachycardic with heart rate into the 120s  There was concern that his tachycardia may be worsening his respiratory status per the respiratory therapist.  Abdomen was soft, nontender, and nondistended with hypoactive bowel sounds.  No significant peripheral edema.  Patient was able to respond verbally to questions with some difficulty.    Chest x-ray was obtained.  I personally reviewed the chest x-ray.  Per my read, the pulmonary infiltrates would not significantly change from chest x-ray taken this morning.  Stable pneumoperitoneum.    Per radiology, a small right pneumothorax as well as small pneumomediastinum was noted.     EKG was performed at bedside.  Per my read, EKG showed sinus tachycardia with heart rate of 122, QTc 441, no significant ST elevation or depression.  Significant artifact present.    Lactic acid was 1.2 with troponin of 12.    Assessment and plan:  #Acute hypoxemic respiratory failure  #Aspiration pneumonitis  #Pneumoperitoneum  #Pneumomediastinum  #Right pneumothorax  #Ileus  #Fulminant C. difficile  enterocolitis status post total colectomy  #Multiple sclerosis    At this point, my concern was that the patient needed to be intubated.  I again asked the patient whether he wanted to continue full medical treatment.  However, I was unable to understand fully his responses due to his slurred speech.    During the rapid response, Dr. Yeboah came in to round on the patient.  Although the mother was leaning towards comfort care measures, following discussion with Dr. Yeboah, the mother was in agreement with intubation and continued full supportive care for short period time.    I consulted Dr. Clifford of critical care, who came bedside to assess the patient.  Due to patient's right pneumothorax and concern for development of tension pneumothorax, as well as patient's poor baseline status with multiple comorbidities with multiple sclerosis being bedbound, she advised against intubation with the concern that the patient would could not be extubated if he survived. She discussed this with the patient's mother.    I again clarified patient's plan of care with the patient's mother, who agreed with continuing the DNR/DNI status continue full medical treatment with high flow oxygen and gentle diuresis along with antibiotic treatment.    Due to patient's NG tube and continued high output and inability to take oral vancomycin, I also discussed the case with the clinical pharmacist to have the vancomycin placed through the ileostomy.  Per Dr. Yeboah, he agreed with administration of vancomycin through the ileostomy.    Per discussion with multiple specialists and also the mother and wishes of the patient, plan will be to continue full medical treatment at least until his son arrives as scheduled tomorrow.   I expressed to the mother my concerns that the patient may not make it through the night due to his worsening respiratory status and hypoxemia.  Patient will continue DNR/DNI status and will be allowed to pass peacefully if  he continues to decompensate.   The patient was able to communicate that he did not want comfort care measures at this time.      Patient is critically ill.   The patient continues to have: Acute hypoxemic respiratory failure in setting of right pneumothorax, pneumomediastinum, pneumoperitoneum, fulminant C. difficile enterocolitis status post hemicolectomy, and multiple sclerosis  The vital organ system that is affected is the: Respiratory and cardiovascular  If untreated there is a high chance of deterioration into: Fulminant respiratory failure, cardiovascular collapse, cardiac arrest, and eventually death.   The critical care that I am providing today is: Extensive data review with frequent and close monitoring of patient's clinical status and vital signs at bedside.  Time spent coordinating care with the rapid response nursing team as well as respiratory therapist, bedside RN, and APRN.  Time spent coordinating care with the general surgeon and developing plan of care in consultation with the critical care specialist and clinical pharmacist as outlined per above.  The critical that has been undertaken is medically complex.   There has been no overlap in critical care time.   Critical Care Time not including procedures: 62 minutes

## 2021-12-30 NOTE — CONSULTS
Internal Medicine Consultation    Date of consult: 12/29/2021    Referring Physician  Brandon Sanchez M.D.    Reason for Consultation  Respiratory decompensation    History of Presenting Illness  41 y.o. male who presented 12/25/2021 with end stage MS, chronic pain on baclofen, neurogenic bladder, recurrent C diff colitis now s/p total colectomy with post-operative ileus resulting in aspiration pneumonia, high flow oxygen dependence and respiratory failure. He was found today to have trace pneumothorax and pneumomediastinum and has been saturating in the high 80's on max high flow. Per his mother at bedside he has been bedridden for years at nursing facility and has lost the ability to use his hands.  They were considering comfort care but decided on the total colectomy to see if he would be able to recover.  He has previously made the decision to be DNR/DNI but this was reversed today due to his respiratory failure and the possibility of intubation.  His mother is clear that he would not want to remain on a ventilator and he would not want further invasive interventions like chest tube placement.     Code Status  DNAR/DNI    Review of Systems  Review of Systems   Unable to perform ROS: Acuity of condition       Past Medical History   has a past medical history of Anxiety, Back pain, Blood transfusion, Bowel habit changes, Breath shortness, Dental disorder, Depression, Eyes sensitive to light, Fall, Headache(784.0), Heart burn, Heat intolerance, Hypertension, Indigestion, MS (multiple sclerosis) (HCC) (DX 8/20/2013), Multiple sclerosis (HCC) (2013), Pain (06/20/2018), Presence of intrathecal baclofen pump, Urinary bladder disorder, and Urinary incontinence. He also has no past medical history of Heart murmur or Meningitis.    Surgical History   has a past surgical history that includes open reduction (Left, 1988); gastroscopy (1/14/2017); block epidural steroid injection (N/A, 3/27/2018); cystoscopy (5/7/2018);  lasertripsy (5/7/2018); pump insert/remove (6/26/2018); pr cystourethroscopy (N/A, 10/28/2021); lasertripsy (N/A, 10/28/2021); wound exploration general (N/A, 11/19/2021); pr exploratory of abdomen (12/26/2021); colectomy (12/26/2021); and ileostomy (12/26/2021).    Family History  family history includes Hypertension in his father and mother; Psychiatric Illness in his father; Thyroid in his mother.    Social History   reports that he quit smoking about 8 years ago. He started smoking about 24 years ago. He has a 20.00 pack-year smoking history. He has never used smokeless tobacco. He reports previous alcohol use. He reports previous drug use. Drug: Inhaled.    Medications  Home Medications     Reviewed by Britany Nava (Pharmacy Tech) on 12/27/21 at 1013  Med List Status: Complete   Medication Last Dose Status   baclofen (LIORESAL) 10 MG Tab 12/24/2021 Active   buPROPion (WELLBUTRIN) 100 MG Tab 12/24/2021 Active   eucalyptus/peppermint oil (PONARIS NASAL) Solution 12/24/2021 Active   famotidine (PEPCID) 20 MG Tab 12/24/2021 Active   lactobacillus rhamnosus (CULTURELLE) Cap capsule 12/24/2021 Active   lidocaine (LIDODERM) 5 % Patch 12/24/2021 Active   loperamide (IMODIUM A-D) 2 MG tablet 12/24/2021 Active   magnesium hydroxide (MILK OF MAGNESIA) 400 MG/5ML Suspension 12/1/2021 Active   MAVENCLAD, 6 TABS, 10 MG Tablet Therapy Pack 12/24/2021 Active   methenamine hip (HIPPREX) 1 GM Tab 12/24/2021 Active   multivitamin (THERAGRAN) Tab 12/24/2021 Active   oxybutynin SR (DITROPAN-XL) 10 MG CR tablet 12/24/2021 Active   Pain Pump (PATIENT SUPPLIED) XX CHAO CONTINUOUS Active   Vancomycin HCl (VANCOMYCIN 50 MG/ML) 50 mg/mL Solution 12/14/2021 Active              Current Facility-Administered Medications   Medication Dose Route Frequency Provider Last Rate Last Admin   • furosemide (LASIX) injection 20 mg  20 mg Intravenous BID DIURETIC Tami Johnson, CARINP.R.N.   20 mg at 12/29/21 1606   • vancomycin (VANCOCIN)  antegrade ENEMA (for ileostomy) 500 mg  500 mg Other Q8HRS Brandon Sanchez M.D.       • LORazepam (ATIVAN) injection 0.5 mg  0.5 mg Intravenous Q6HRS PRN MARY ANN BhagatRKoleN.   0.5 mg at 12/29/21 1324   • meropenem (Merrem) 500 mg in  mL IV-MBP  500 mg Intravenous Q6HRS Veronica Cramer M.D.   Stopped at 12/29/21 1907   • oxyCODONE immediate-release (ROXICODONE) tablet 5-10 mg  5-10 mg Oral Q4HRS PRN Marzena Smith M.D.   10 mg at 12/28/21 1459   • HYDROmorphone (Dilaudid) injection 0.5 mg  0.5 mg Intravenous Q3HRS PRN Evangelist Vargas M.D.   0.5 mg at 12/29/21 1701   • Pharmacy Consult Request  1 Each Other PHARMACY TO DOSE Carlos Leslie D.O.       • enoxaparin (LOVENOX) inj 40 mg  40 mg Subcutaneous DAILY Guero Mishra M.D.   40 mg at 12/29/21 0605   • acetaminophen (Tylenol) tablet 650 mg  650 mg Oral Q6HRS PRN Guero Mishra M.D.   650 mg at 12/29/21 0414   • labetalol (NORMODYNE/TRANDATE) injection 10 mg  10 mg Intravenous Q4HRS PRN Guero Mishra M.D.       • baclofen (LIORESAL) tablet 10 mg  10 mg Oral 4X/DAY Guero Mishra M.D.   10 mg at 12/28/21 2142   • buPROPion SR (WELLBUTRIN-SR) tablet 100 mg  100 mg Oral TID Guero Mishra M.D.   100 mg at 12/28/21 2142   • ondansetron (ZOFRAN ODT) dispertab 4 mg  4 mg Oral Q4HRS PRN Guero Mishra M.D.   4 mg at 12/26/21 0626   • ondansetron (ZOFRAN) syringe/vial injection 4 mg  4 mg Intravenous Q6HRS PRN Guero Mishra M.D.   4 mg at 12/28/21 1739   • sertraline (Zoloft) tablet 25 mg  25 mg Oral DAILY Guero Mishra M.D.   25 mg at 12/29/21 0605   • vitamin D3 (cholecalciferol) tablet 1,000 Units  1,000 Units Oral DAILY Guero Mishra M.D.   1,000 Units at 12/29/21 0605       Allergies  Allergies   Allergen Reactions   • Asa [Aspirin] Unspecified     Bleeding in stomach as a baby   • Gabapentin Anxiety     Anger and severe mood swings   • Pregabalin Anxiety     Anger and severe mood swings   • Hydrocodone Nausea   • Methocarbamol Nausea   •  Morphine Nausea       Vital Signs last 24 hours  Temp:  [36.6 °C (97.8 °F)-37.6 °C (99.7 °F)] 36.6 °C (97.8 °F)  Pulse:  [106-132] 115  Resp:  [16-30] 20  BP: (115-163)/() 153/105  SpO2:  [85 %-97 %] 97 %    Physical Exam  Physical Exam  Constitutional:       Appearance: He is not ill-appearing.   HENT:      Head: Normocephalic and atraumatic.   Cardiovascular:      Rate and Rhythm: Regular rhythm. Tachycardia present.   Pulmonary:      Effort: No respiratory distress.      Breath sounds: No stridor. No wheezing.   Musculoskeletal:         General: No swelling or deformity.   Skin:     General: Skin is warm.   Neurological:      Comments: Patient slept through my visit          Fluids    Intake/Output Summary (Last 24 hours) at 12/29/2021 2046  Last data filed at 12/29/2021 0840  Gross per 24 hour   Intake --   Output 1975 ml   Net -1975 ml       Laboratory  Recent Results (from the past 48 hour(s))   CBC WITH DIFFERENTIAL    Collection Time: 12/28/21  1:14 AM   Result Value Ref Range    WBC 10.5 4.8 - 10.8 K/uL    RBC 3.97 (L) 4.70 - 6.10 M/uL    Hemoglobin 11.9 (L) 14.0 - 18.0 g/dL    Hematocrit 35.1 (L) 42.0 - 52.0 %    MCV 88.4 81.4 - 97.8 fL    MCH 30.0 27.0 - 33.0 pg    MCHC 33.9 33.7 - 35.3 g/dL    RDW 51.1 (H) 35.9 - 50.0 fL    Platelet Count 174 164 - 446 K/uL    MPV 9.2 9.0 - 12.9 fL    Neutrophils-Polys 86.00 (H) 44.00 - 72.00 %    Lymphocytes 4.00 (L) 22.00 - 41.00 %    Monocytes 8.90 0.00 - 13.40 %    Eosinophils 0.00 0.00 - 6.90 %    Basophils 0.10 0.00 - 1.80 %    Immature Granulocytes 1.00 (H) 0.00 - 0.90 %    Nucleated RBC 0.00 /100 WBC    Neutrophils (Absolute) 9.03 (H) 1.82 - 7.42 K/uL    Lymphs (Absolute) 0.42 (L) 1.00 - 4.80 K/uL    Monos (Absolute) 0.93 (H) 0.00 - 0.85 K/uL    Eos (Absolute) 0.00 0.00 - 0.51 K/uL    Baso (Absolute) 0.01 0.00 - 0.12 K/uL    Immature Granulocytes (abs) 0.11 0.00 - 0.11 K/uL    NRBC (Absolute) 0.00 K/uL   Comp Metabolic Panel    Collection Time: 12/28/21   1:14 AM   Result Value Ref Range    Sodium 136 135 - 145 mmol/L    Potassium 3.3 (L) 3.6 - 5.5 mmol/L    Chloride 103 96 - 112 mmol/L    Co2 23 20 - 33 mmol/L    Anion Gap 10.0 7.0 - 16.0    Glucose 93 65 - 99 mg/dL    Bun 10 8 - 22 mg/dL    Creatinine 0.44 (L) 0.50 - 1.40 mg/dL    Calcium 7.9 (L) 8.4 - 10.2 mg/dL    AST(SGOT) 20 12 - 45 U/L    ALT(SGPT) 14 2 - 50 U/L    Alkaline Phosphatase 85 30 - 99 U/L    Total Bilirubin 0.3 0.1 - 1.5 mg/dL    Albumin 2.1 (L) 3.2 - 4.9 g/dL    Total Protein 4.5 (L) 6.0 - 8.2 g/dL    Globulin 2.4 1.9 - 3.5 g/dL    A-G Ratio 0.9 g/dL   ESTIMATED GFR    Collection Time: 12/28/21  1:14 AM   Result Value Ref Range    GFR If African American >60 >60 mL/min/1.73 m 2    GFR If Non African American >60 >60 mL/min/1.73 m 2   MAGNESIUM    Collection Time: 12/28/21  1:14 AM   Result Value Ref Range    Magnesium 1.9 1.5 - 2.5 mg/dL   CBC WITH DIFFERENTIAL    Collection Time: 12/28/21  8:07 PM   Result Value Ref Range    WBC 8.9 4.8 - 10.8 K/uL    RBC 4.91 4.70 - 6.10 M/uL    Hemoglobin 14.5 14.0 - 18.0 g/dL    Hematocrit 43.3 42.0 - 52.0 %    MCV 88.2 81.4 - 97.8 fL    MCH 29.5 27.0 - 33.0 pg    MCHC 33.5 (L) 33.7 - 35.3 g/dL    RDW 52.6 (H) 35.9 - 50.0 fL    Platelet Count 207 164 - 446 K/uL    MPV 8.7 (L) 9.0 - 12.9 fL    Neutrophils-Polys 79.00 (H) 44.00 - 72.00 %    Lymphocytes 7.10 (L) 22.00 - 41.00 %    Monocytes 11.70 0.00 - 13.40 %    Eosinophils 0.20 0.00 - 6.90 %    Basophils 0.40 0.00 - 1.80 %    Immature Granulocytes 1.60 (H) 0.00 - 0.90 %    Nucleated RBC 0.00 /100 WBC    Neutrophils (Absolute) 7.03 1.82 - 7.42 K/uL    Lymphs (Absolute) 0.63 (L) 1.00 - 4.80 K/uL    Monos (Absolute) 1.04 (H) 0.00 - 0.85 K/uL    Eos (Absolute) 0.02 0.00 - 0.51 K/uL    Baso (Absolute) 0.04 0.00 - 0.12 K/uL    Immature Granulocytes (abs) 0.14 (H) 0.00 - 0.11 K/uL    NRBC (Absolute) 0.00 K/uL   Comp Metabolic Panel    Collection Time: 12/28/21  8:07 PM   Result Value Ref Range    Sodium 135 135 - 145  mmol/L    Potassium 3.6 3.6 - 5.5 mmol/L    Chloride 102 96 - 112 mmol/L    Co2 25 20 - 33 mmol/L    Anion Gap 8.0 7.0 - 16.0    Glucose 122 (H) 65 - 99 mg/dL    Bun 5 (L) 8 - 22 mg/dL    Creatinine 0.32 (L) 0.50 - 1.40 mg/dL    Calcium 7.7 (L) 8.4 - 10.2 mg/dL    AST(SGOT) 34 12 - 45 U/L    ALT(SGPT) 23 2 - 50 U/L    Alkaline Phosphatase 85 30 - 99 U/L    Total Bilirubin 0.3 0.1 - 1.5 mg/dL    Albumin 2.5 (L) 3.2 - 4.9 g/dL    Total Protein 5.3 (L) 6.0 - 8.2 g/dL    Globulin 2.8 1.9 - 3.5 g/dL    A-G Ratio 0.9 g/dL   BLOOD CULTURE    Collection Time: 12/28/21  8:07 PM    Specimen: Peripheral; Blood   Result Value Ref Range    Significant Indicator NEG     Source BLD     Site PERIPHERAL     Culture Result       No Growth  Note: Blood cultures are incubated for 5 days and  are monitored continuously.Positive blood cultures  are called to the RN and reported as soon as  they are identified.     BLOOD CULTURE    Collection Time: 12/28/21  8:07 PM    Specimen: Peripheral; Blood   Result Value Ref Range    Significant Indicator NEG     Source BLD     Site PERIPHERAL     Culture Result       No Growth  Note: Blood cultures are incubated for 5 days and  are monitored continuously.Positive blood cultures  are called to the RN and reported as soon as  they are identified.     LACTIC ACID    Collection Time: 12/28/21  8:07 PM   Result Value Ref Range    Lactic Acid 1.0 0.5 - 2.0 mmol/L   ESTIMATED GFR    Collection Time: 12/28/21  8:07 PM   Result Value Ref Range    GFR If African American >60 >60 mL/min/1.73 m 2    GFR If Non African American >60 >60 mL/min/1.73 m 2   COV-2, FLU A/B, AND RSV BY PCR (2-4 HOURS CEPHEID): Collect NP swab in VTM    Collection Time: 12/29/21  3:45 AM    Specimen: Nasopharyngeal; Respirate   Result Value Ref Range    Influenza virus A RNA Negative Negative    Influenza virus B, PCR Negative Negative    RSV, PCR Negative Negative    SARS-CoV-2 by PCR NotDetected     SARS-CoV-2 Source NP Swab     proBrain Natriuretic Peptide, NT    Collection Time: 12/29/21  3:45 AM   Result Value Ref Range    NT-proBNP 356 (H) 0 - 125 pg/mL   CBC WITH DIFFERENTIAL    Collection Time: 12/29/21  3:59 AM   Result Value Ref Range    WBC 9.3 4.8 - 10.8 K/uL    RBC 4.60 (L) 4.70 - 6.10 M/uL    Hemoglobin 13.5 (L) 14.0 - 18.0 g/dL    Hematocrit 41.7 (L) 42.0 - 52.0 %    MCV 90.7 81.4 - 97.8 fL    MCH 29.3 27.0 - 33.0 pg    MCHC 32.4 (L) 33.7 - 35.3 g/dL    RDW 52.4 (H) 35.9 - 50.0 fL    Platelet Count 191 164 - 446 K/uL    MPV 9.2 9.0 - 12.9 fL    Neutrophils-Polys 79.10 (H) 44.00 - 72.00 %    Lymphocytes 8.30 (L) 22.00 - 41.00 %    Monocytes 10.50 0.00 - 13.40 %    Eosinophils 0.30 0.00 - 6.90 %    Basophils 0.30 0.00 - 1.80 %    Immature Granulocytes 1.50 (H) 0.00 - 0.90 %    Nucleated RBC 0.00 /100 WBC    Neutrophils (Absolute) 7.33 1.82 - 7.42 K/uL    Lymphs (Absolute) 0.77 (L) 1.00 - 4.80 K/uL    Monos (Absolute) 0.97 (H) 0.00 - 0.85 K/uL    Eos (Absolute) 0.03 0.00 - 0.51 K/uL    Baso (Absolute) 0.03 0.00 - 0.12 K/uL    Immature Granulocytes (abs) 0.14 (H) 0.00 - 0.11 K/uL    NRBC (Absolute) 0.00 K/uL   PHOSPHORUS    Collection Time: 12/29/21  3:59 AM   Result Value Ref Range    Phosphorus 1.4 (L) 2.5 - 4.5 mg/dL   MAGNESIUM    Collection Time: 12/29/21  3:59 AM   Result Value Ref Range    Magnesium 1.8 1.5 - 2.5 mg/dL   Comp Metabolic Panel    Collection Time: 12/29/21  3:59 AM   Result Value Ref Range    Sodium 137 135 - 145 mmol/L    Potassium 4.8 3.6 - 5.5 mmol/L    Chloride 104 96 - 112 mmol/L    Co2 23 20 - 33 mmol/L    Anion Gap 10.0 7.0 - 16.0    Glucose 139 (H) 65 - 99 mg/dL    Bun 5 (L) 8 - 22 mg/dL    Creatinine 0.36 (L) 0.50 - 1.40 mg/dL    Calcium 7.9 (L) 8.4 - 10.2 mg/dL    AST(SGOT) 45 12 - 45 U/L    ALT(SGPT) 28 2 - 50 U/L    Alkaline Phosphatase 88 30 - 99 U/L    Total Bilirubin 0.3 0.1 - 1.5 mg/dL    Albumin 2.4 (L) 3.2 - 4.9 g/dL    Total Protein 5.1 (L) 6.0 - 8.2 g/dL    Globulin 2.7 1.9 - 3.5 g/dL     A-G Ratio 0.9 g/dL   ESTIMATED GFR    Collection Time: 21  3:59 AM   Result Value Ref Range    GFR If African American >60 >60 mL/min/1.73 m 2    GFR If Non African American >60 >60 mL/min/1.73 m 2   EKG    Collection Time: 21  8:42 AM   Result Value Ref Range    Report       [facility]    Test Date:  2021  Pt Name:    JAYNA MOODY                 Department: MED  MRN:        [id number]                      Room:       3307  Gender:     Male                         Technician: [id number]  :        1980                   Requested By:DIAMOND SANCHEZ  Order #:    [id number]                    Reading MD: Elicia Lake MD    Measurements  Intervals                                Axis  Rate:       122                          P:          44  HI:         164                          QRS:        16  QRSD:       85                           T:          17  QT:         309  QTc:        441    Interpretive Statements  Sinus tachycardia  Ventricular premature complex  Borderline low voltage, extremity leads  Minimal ST elevation, inferior leads  Compared to ECG 2021 11:38:20  Ventricular premature complex(es) now present  ST (T wave) deviation now present  Supraventricular tachycardia no longer present  Electronically Signed On 2021 17:28:28  PST by Elicia Lake MD     LACTIC ACID    Collection Time: 21  6:28 PM   Result Value Ref Range    Lactic Acid 1.2 0.5 - 2.0 mmol/L   TROPONIN    Collection Time: 21  6:28 PM   Result Value Ref Range    Troponin T 12 6 - 19 ng/L       Imaging  DX-CHEST-PORTABLE (1 VIEW)   Final Result      1.  Trace right apical pneumothorax may be present.   2.  Pneumomediastinum is now noted.   3.  Bibasilar opacities may represent atelectasis which is compared to prior.   4.  Large amount of pneumoperitoneum is again noted.      Findings discussed with Dr. Sanchez      DX-CHEST-PORTABLE (1 VIEW)   Final Result         1. Patchy left basilar opacity,  similar to prior      2. Redemonstration of large intraperitoneal air.      3. Gastric drainage tube coiled in the stomach fundus, tip is in the stomach.      CT-ABDOMEN-PELVIS W/O   Final Result   Addendum 1 of 1   Dr. Alvarado discussed these findings with Dr. Porter at 9:30 PM by    telephone on 12/28/2021      Final      1.  Postoperative changes of colectomy with significant pneumoperitoneum. This may be postoperative or reflect hollow viscus injury.   2.  Findings most consistent with postoperative ileus with distention of the entire small bowel without evidence of discrete transition point.   3.  Proctitis/colitis of the rectum and residual sigmoid colon.   4.  Small volume ascites.   5.  Small right and trace left pleural effusions with adjacent atelectasis. Likely left lower lobe pneumonia.   6.  Fluid in the distal esophagus, which increases risk of aspiration.      DX-CHEST-LIMITED (1 VIEW)   Final Result      1.  Left lower lobe atelectasis or pneumonia.      2.  Residual large volume of residual free intraperitoneal air.      CT-HEAD W/O   Final Result         1. No acute intracranial abnormality. No evidence of acute intracranial hemorrhage or mass lesion.                     DX-ABDOMEN FOR TUBE PLACEMENT   Final Result      Nasogastric tube tip at the gastric fundus.      CT-ABDOMEN-PELVIS WITH   Final Result         1. Diffuse rectal and colonic wall thickening throughout, in keeping with proctitis and pancolitis.   2. Large amount of stool in the rectum and sigmoid colon.      DX-CHEST-PORTABLE (1 VIEW)   Final Result         1. No acute cardiopulmonary abnormalities are identified.          Assessment/Plan  Acute respiratory failure with hypoxia (HCC)  Assessment & Plan  I met with the patient's mother at bedside while he slept.  Mr. Juan has a progressive, end stage neuromuscular disease which makes him a very poor candidate for mechanical ventilation.  I expressed my concern that when patient's  have progressive neuromuscular weakness and are bed bound and in the end stages of their disease the respiratory reserve is generally very poor.  Once the patient is put on mechanical ventilation and we begin to passively ventilate him with each passing day his chances of successful extubation become successively worse and his risk of permanent ventilator dependence increases.  If long term ventilator dependence with tracheostomy is not within his wishes (which it is not) I would not recommend mechanical ventilation for any duration of time longer than for procedural purposes.  Furthermore the presence of pneumothorax and pneumomediastinum put the patient at high risk of tension pneumothorax with the introduction of positive airway pressure.  If the patient was amenable to chest tube this would be a reasonable solution, however the patient and his mother have stated that they would not consent to a chest tube and in the event of tension pneumothorax would plan to allow him to pass at that time.  Given the likelihood of that event in this situation and his poor prognosis on mechanical ventilation I recommend against intubation.  I explained these things to Mr. Juan's mother and she decided that he should return to his previous designation of DNR/DNI.  I have discussed my recommendations with the patient's hospitalist and surgeon.    At this time I recommend continuing support with high flow nasal cannula.  If he becomes distressed or is unable to maintain his saturations I recommend comfort care which I have discussed with his mother and she is in agreement.        Soila Clifford MD RD  Pulmonary and Critical Care    Available on [employer]

## 2021-12-30 NOTE — CODE DOCUMENTATION
Oral suction provided for increased saliva production. New labs ordered. MAUREEN and MD at bedside.

## 2021-12-30 NOTE — CARE PLAN
The patient is Unstable - High likelihood or risk of patient condition declining or worsening    Shift Goals  Clinical Goals: oxygenation,Pain control  Patient Goals: Pain control   Family Goals: comfort care?  and palliative care    Progress made toward(s) clinical / shift goals: Pt grimacing and moaning in pain. Pt currently on HHFNC with )2 dipping into the mid 80s. Pt having difficulty clearing secretions. Suction at bedside. Family at bedside.    Patient is not progressing towards the following goals:      Problem: Pain - Standard  Goal: Alleviation of pain or a reduction in pain to the patient’s comfort goal  Outcome: Not Progressing     Problem: Skin Care - Ostomy  Goal: Skin remains free from irritation  Outcome: Progressing     Problem: Knowledge Deficit - Ostomy  Goal: Patient will demonstrate ability to manage and maintain ostomy  Outcome: Progressing

## 2021-12-30 NOTE — ASSESSMENT & PLAN NOTE
Potassium 3.8 this morning, replaced  Continue to monitor and replace for goal >4.0  Magnesium 1.9

## 2021-12-31 PROBLEM — Z51.5 HOSPICE CARE PATIENT: Status: ACTIVE | Noted: 2021-01-01

## 2021-12-31 NOTE — PROGRESS NOTES
Telemetry Shift Summary     Rhythm: ST  Rate: 121-136  Measurements: 0.14/0.08/0.32  Ectopy (reported by Monitor Tech): F-O PVC, R-O big, R trig, R coup     Normal Values  Rhythm: Sinus  HR:   Measurements: 0.12-0.20/0.06-0.10/0.30-0.52

## 2021-12-31 NOTE — WOUND TEAM
Wound RN to [place] for ostomy education. Per chart review pt is now comfort care and has 8 family members at bedside at this time. Pt not appropriate for ostomy education. Wound team to sign off. Please reconsult in event pts status changes.

## 2021-12-31 NOTE — HOSPICE
"Hospice H&P  Author: Denver Echavarria M.D.     Date & Time note created:    12/31/2021   11:08 AM       Date of Consult:   Requesting Physician:   Consulting Physician: Denver Echavarria M.D.   Reason for Consult:    History of Present Illness:    Jerzy Juan III is 41 y.o. male with multiple sclerosis chronic pain with pain pump, neurogenic bladder with suprapubic cath who presented to the hospital with fever and diarrhea and admitted with sepsis. He was treated aggressively for C diff and UTI. He then developed a pneumothorax. He had uncontrolled pain and SOB. Patient and family decided to transition to comfort care. He continues to have uncontrolled pain and SOB despite high and frequent doses of ativan and dilaudid.         Review of Systems:     Deferred    Physical Exam:  deferred    Past Medical History:   Past Medical History:   Diagnosis Date   • Anxiety    • Back pain    • Blood transfusion    • Bowel habit changes     constipation   • Breath shortness     \"r/t to medication Ocrevus and Baclofen\" \"side effect   • Dental disorder     poor dentation   • Depression     PTSD/clinical depression   • Eyes sensitive to light     Needs to wear sunglasses   • Fall     4/6/2016   • Headache(784.0)    • Heart burn     reflux   • Heat intolerance     will vomit   • Hypertension    • Indigestion    • MS (multiple sclerosis) (HCC) DX 8/20/2013    multiple lesions brain/spine   • Multiple sclerosis (HCC) 2013   • Pain 06/20/2018    \"everywhere\"   • Presence of intrathecal baclofen pump     right side   • Urinary bladder disorder     UTI/catheter/bladder stone   • Urinary incontinence     catheter in place       Past Surgical History:  Past Surgical History:   Procedure Laterality Date   • PB EXPLORATORY OF ABDOMEN  12/26/2021    Procedure: LAPAROTOMY, EXPLORATORY;  Surgeon: John Yeboah M.D.;  Location: SURGERY [facility];  Service: Gastroenterology   • COLECTOMY  12/26/2021    Procedure: COLECTOMY;  Surgeon: " Patient: Momo Benites    Procedure Summary     Date Anesthesia Start Anesthesia Stop Room / Location    03/16/17 1646 [id number] 300 [facility] MAIN OR 03 / 300 [facility] MAIN OR       Procedure Diagnosis Surgeon Responsible Provider    EXTREMITY LOWER INCISION & DRAINAGE (N/A ) John Yeboah M.D.;  Location: [facility];  Service: Gastroenterology   • ILEOSTOMY  12/26/2021    Procedure: CREATION, ILEOSTOMY;  Surgeon: John Yeboah M.D.;  Location: [facility];  Service: Gastroenterology   • WOUND EXPLORATION GENERAL N/A 11/19/2021    Procedure: MANUAL DISIMPACTION;  Surgeon: Chris Conroy M.D.;  Location: [facility];  Service: General   • PB CYSTOURETHROSCOPY N/A 10/28/2021    Procedure: CYSTOSCOPY;  Surgeon: Kwasi Sapp M.D.;  Location: [facility];  Service: Urology   • LASERTRIPSY N/A 10/28/2021    Procedure: LITHOTRIPSY, USING LASER - FOR CYSTOLITHALOPAXY;  Surgeon: Kwasi Sapp M.D.;  Location: [facility];  Service: Urology   • PUMP INSERT/REMOVE  6/26/2018    Procedure: PUMP INSERT/REMOVE - BACLOFEN;  Surgeon: Andrew Gomez M.D.;  Location: [facility];  Service: Pain Management   • CYSTOSCOPY  5/7/2018    Procedure: CYSTOSCOPY;  Surgeon: Kwasi Sapp M.D.;  Location: [facility];  Service: Urology   • LASERTRIPSY  5/7/2018    Procedure: LASERTRIPSY - FOR: LITHOLAPAXY;  Surgeon: Kwasi Sapp M.D.;  Location: [facility];  Service: Urology   • BLOCK EPIDURAL STEROID INJECTION N/A 3/27/2018    Procedure: BLOCK EPIDURAL STEROID INJECTION - BACLOFEN PUMP TRIAL;  Surgeon: Andrew Gomez M.D.;  Location: [facility];  Service: Pain Management   • GASTROSCOPY  1/14/2017    Procedure: GASTROSCOPY WITH FECAL MATTER TRANSPLANT;  Surgeon: Obinna Douglas M.D.;  Location: [facility];  Service:    • OPEN REDUCTION Left 1988    left thumb       Current Outpatient Medications:  Home Medications    **Home medications have not yet been reviewed for this encounter**         Medication Allergy/Sensitivities:  Allergies   Allergen Reactions   • Asa [Aspirin] Unspecified     Bleeding in stomach as a baby   • Gabapentin Anxiety     Anger and severe mood swings    • Pregabalin Anxiety     Anger and severe mood swings   • Hydrocodone Nausea   • Methocarbamol Nausea   • Morphine Nausea       Family History:  Family History   Problem Relation Age of Onset   • Thyroid Mother    • Hypertension Mother    • Hypertension Father    • Psychiatric Illness Father        Social History:  Social History     Tobacco Use   • Smoking status: Former Smoker     Packs/day: 1.00     Years: 20.00     Pack years: 20.00     Start date: 1997     Quit date: 2013     Years since quittin.4   • Smokeless tobacco: Never Used   Vaping Use   • Vaping Use: Former   Substance Use Topics   • Alcohol use: Not Currently     Alcohol/week: 0.0 oz   • Drug use: Not Currently     Types: Inhaled     Comment: stopped 2019         Vitals:  Weight/BMI: Body mass index is 18.66 kg/m².  Wt 60.7 kg (133 lb 13.1 oz)   BMI 18.66 kg/m²   Vitals:    21 1000   Weight: 60.7 kg (133 lb 13.1 oz)     Oxygen Therapy:         Lab Data Review:  No results found for this or any previous visit (from the past 24 hour(s)).    Imaging/Procedures Review:    No orders to display          Problem List:  1. Sepsis  2. Acute respiratory failure with hypoxia  3. Recurrent C difficile infection  4. UTI due to ESBL E coli  5. Pneumothorax  6. Multiple sclerosis  Hospice general inpatient DNR/DNI      Discussion: This patient requires a high level of nursing care for pain control and symptom management. The patient is being admitted to hospice general inpatient to manage symptoms of pain, dyspnea and agitation. I will place him on versed and fentanyl infusions and titrate for symptom control.    The patient will be followed by [facility] team on a daily basis to assess for symptom control as well as appropriateness for GIP level of care. All findings have been reviewed with hospice medical director.      Denver Echavarria M.D.  Hospice and Palliative Medicine  21

## 2021-12-31 NOTE — DISCHARGE SUMMARY
Discharge Summary    CHIEF COMPLAINT ON ADMISSION  Chief Complaint   Patient presents with   • Fever     Pt BIB [facility] from [facility]. Per nurse report from [place], pt with fever of 102.7 F, diarrhea since yesterday, dark urine in suprapubic catheter, mild confusion.    • Diarrhea       Reason for Admission  EMS     Admission Date  12/25/2021    CODE STATUS  Comfort Care/DNR    HPI & HOSPITAL COURSE  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  In ER noted to be febrile, tachycardic but hemodynamically stable.  Saturating well on room air.  Lab work significant for lactic acidosis, leukocytosis with WBCs of 18.9.  CT abdomen pelvis showed diffuse rectal and colonic wall thickening throughout concerning for proctitis and pancolitis.  Chest x-ray negative for acute cardiopulmonary process.  Started on oral vancomycin and IV Flagyl.  GI and general surgery consulted.  Recommended aggressive bowel regimen.  ID consulted as well.  Fidaxomicin requested but unfortunately not available in this facility or [facility].  Per GI it will be challenging to perform FMT considering FMT facility is closed for the holidays. Total colectomy completed on 12/26 by Dr. Yeboah. Urine culture with E. Coli ESBL and blood cultures came back positive with coag negative staph 1 out of 2 bottles. ID consulted, pt being treated with PO vancomycin and meropenem.  Patient developing respiratory distress on the night of 12/28/2021.  Three rapid response was called over 24 hours for respiratory distress.  Patient found to have small pneumothorax and pneumomediastinum.  Critical care consulted and had extensive discussion with patient's mother at the bedside.  Decision made to refrain from  intubation and chest tube placements.  Patient continued on high flow nasal cannula.  Patient son came to [facility] on 12/30/2021.  Family conference with palliative care in the afternoon of 12/30/2021.  Patient electing for comfort care measures, he is competent to make these decisions.  His family is supportive of these decisions.  Patient was transitioned to comfort care and referred for inpatient hospice.  Patient accepted by inpatient hospice on 12/31/2021.       Therefore, he is discharged in guarded and stable condition to hospice.  Patient discharged to inpatient hospice.     The patient met 2-midnight criteria for an inpatient stay at the time of discharge.         Discharge Date  12/31/2021    FOLLOW UP ITEMS POST DISCHARGE  None    DISCHARGE DIAGNOSES  Principal Problem:    Sepsis (HCC) POA: Yes  Active Problems:    MS (multiple sclerosis) (HCC) POA: Yes      Overview: IMO load March 2020    Anxiety and depression POA: Yes    UTI due to extended-spectrum beta lactamase (ESBL) producing Escherichia coli POA: Yes    Hypokalemia POA: Yes    Recurrent colitis due to Clostridium difficile POA: Yes    Acute colitis POA: Yes    Advance care planning POA: Unknown    Neurogenic dysfunction of the urinary bladder POA: Yes    Diarrhea of presumed infectious origin POA: Yes    Normocytic anemia POA: Yes    Hypomagnesemia POA: Yes    Positive blood cultures POA: Yes    Acute respiratory failure with hypoxia (HCC) POA: Unknown    Ileus following gastrointestinal surgery (HCC) POA: No    Hypophosphatemia POA: Unknown    Pneumoperitoneum POA: No    Pneumomediastinum (HCC) POA: Yes    Pneumothorax on right POA: Yes    DNR (do not resuscitate) POA: Yes  Resolved Problems:    * No resolved hospital problems. *      FOLLOW UP  Future Appointments   Date Time Provider Department Center   6/7/2022  2:40 PM Melissa P Bloch, M.D. [facility] None     No follow-up provider specified.    MEDICATIONS ON DISCHARGE     Medication List      ASK  your doctor about these medications      Instructions   baclofen 10 MG Tabs  Commonly known as: LIORESAL   Take 10 mg by mouth 4 times a day.  Dose: 10 mg     buPROPion 100 MG Tabs  Commonly known as: WELLBUTRIN  Ask about: Which instructions should I use?   Take 100 mg by mouth 3 times a day.  Dose: 100 mg     eucalyptus/peppermint oil Soln   Administer 2 Drops into affected nostril(S) at bedtime. IN BOTH NOSTRILS.  Dose: 2 Drop     famotidine 20 MG Tabs  Commonly known as: PEPCID   Take 20 mg by mouth 2 times a day.  Dose: 20 mg     lactobacillus rhamnosus Caps capsule   Take 1 Capsule by mouth every morning with breakfast.  Dose: 1 Capsule     Lidoderm 5 % Ptch  Generic drug: lidocaine   Place 1 Patch on the skin every 24 hours. Apply to right or left shoulder. Remove at 07:59 and replace at 08:00.  Dose: 1 Patch     loperamide 2 MG tablet  Commonly known as: IMODIUM A-D   Take 2 mg by mouth every four hours as needed for Diarrhea.  Dose: 2 mg     magnesium hydroxide 400 MG/5ML Susp  Commonly known as: MILK OF MAGNESIA   Take 30 mL by mouth 1 time a day as needed (Constipation).  Dose: 30 mL     Mavenclad (6 Tabs) 10 MG Tbpk  Generic drug: Cladribine (6 Tabs)  Ask about: Which instructions should I use?   Take 1-2 Tablets by mouth every day. Take 2 tablets (20 mg) by mouth on 12/23/2021, then 1 tablet (10 mg) each day from 12/24/2021 - 12/27/2021.  Dose: 1-2 Tablet     methenamine hip 1 GM Tabs  Commonly known as: HIPPREX   Take 1 g by mouth 2 times a day.  Dose: 1 g     multivitamin Tabs   Take 1 Tablet by mouth every morning.  Dose: 1 Tablet     oxybutynin SR 10 MG CR tablet  Commonly known as: DITROPAN-XL   Take 10 mg by mouth 3 times a day.  Dose: 10 mg     Pain Pump Gayla  Commonly known as: patient supplied   Inject 1 Each as directed continuous. Patient's Pain Pump (placed and maintained as an outpatient)    Medications/concentrations:   BACLOFEN (2,250.0mcg/ml)  KETAMINE (220.8mcg/ml)    Last changed:  11/29/2021  Refill pump before date: 01/31/2021    Continuous infusion rates (Drug/Rate):  BACLOFEN 1,019.0mcg (42.5 mcg/hr)  KETAMINE 99.99 mcg (4.17 mcg/hr)     myPTM setup:  Primary Drug:  BACLOFEN 49.9mcg  KETAMINE 4.90 mcg    Maximum daily dose:  BACLOFEN 1,310.1 mcg (+ 28.6%)  KETAMINE 128.57 mcg       Patient activation lockout interval: 1 hr  Maximum activations per day: 6/day    MD office: Dr. Gomez's office   Phone number: [phone]  Dose: 1 Each     vancomycin 50 mg/mL 50 mg/mL Soln  Ask about: Which instructions should I use?   Take 125 mg by mouth every 6 hours. 2.5 mL = 125 mg. 20 day course from 11/24/2021 - 12/14/2021. FINISHED.  Dose: 125 mg            Allergies  Allergies   Allergen Reactions   • Asa [Aspirin] Unspecified     Bleeding in stomach as a baby   • Gabapentin Anxiety     Anger and severe mood swings   • Pregabalin Anxiety     Anger and severe mood swings   • Hydrocodone Nausea   • Methocarbamol Nausea   • Morphine Nausea       DIET  No orders of the defined types were placed in this encounter.      ACTIVITY  As tolerated.  Weight bearing as tolerated    CONSULTATIONS  GI  Infectious disease  General surgery  Palliative care    PROCEDURES  12/26/21- Exploratory laparotomy with abdominal colectomy, creation of an ileostomy, washout of peritoneal abscess, manual disimpaction of impacted feces      LABORATORY  Lab Results   Component Value Date    SODIUM 138 12/30/2021    POTASSIUM 3.8 12/30/2021    CHLORIDE 101 12/30/2021    CO2 25 12/30/2021    GLUCOSE 99 12/30/2021    BUN 7 (L) 12/30/2021    CREATININE 0.30 (L) 12/30/2021        Lab Results   Component Value Date    WBC 10.5 12/30/2021    HEMOGLOBIN 13.8 (L) 12/30/2021    HEMATOCRIT 42.4 12/30/2021    PLATELETCT 217 12/30/2021        Total time of the discharge process exceeds 35 minutes.

## 2021-12-31 NOTE — ACP (ADVANCE CARE PLANNING)
Advance Care Planning Note    Discussion date:  12/30/2021  Discussion participants:  patient, mother, sister and son    The patient wishes to discuss Advanced Care Planning today and the following is a brief summary of our discussion.    Patient has capacity to make their own medical decisions.  Health Care Agent/Surrogate Decision Maker documented in chart.    Documents of Advance Directives:  POLST and Durable Power of  for Healthcare    Communication of relevant diagnoses: Acute hypoxemic respiratory failure, fulminant C. Difficile enterocolitis, post colectomy, and multiple sclerosis,     Communication of prognosis: poor    Communication of treatment goals/options: continue current plan, inpatient Palliative Care consult, withdrawal of care and comfort care    Treatment decisions:  Comfort care measures per patient wishes    Code status:  comfort measures only    The patient would like:   Life-sustaining antibiotics: no antibiotics   Artificially administered fluids: no IV fluids   Artificially administered nutrition: no feeding tube   Other limitations of medical interventions: no ICU, no radiology, no hyperalimentation, no electrolyte correction, no lab draws, no antiarrhythmic medications and no dialysis     Time statement:  I discussed advance care planning with the patient and patient's family for at least 25 minutes, including diagnosis, prognosis, plan of care, risks and benefits of any therapies that could be offered, as well as alternatives including palliation and hospice, as appropriate, exclusive of evaluation and management or other separately billable procedures.    Had a discussion with family and palliative care at bedside. Discussed treatment options and comfort care measures with patient and family. Patient expressing clear wishes to move towards comfort care measures only. His family is in support of this.    RUI Bhagat.

## 2021-12-31 NOTE — CARE PLAN
The patient is Stable - Low risk of patient condition declining or worsening    Progress made toward(s) clinical / shift goals:  Pt comfortable with medications on MAR. Family at bedside with spiritual  visiting. Social work associated with hospice and hospice RN at bedside this morning to educate family. Family verbalized understanding. Pt currently looking comfortable in bed, versed and fentanyl drip in place at non-titratable rate.    Patient is not progressing towards the following goals: N/A

## 2021-12-31 NOTE — DISCHARGE SUMMARY
Death Summary    Cause of Death  Sepsis due to C difficult infection and UTI due to multiple sclerosis    Comorbid Conditions at the Time of Death  Principal Problem:    Hospice care patient POA: Yes  Resolved Problems:    * No resolved hospital problems. *      History of Presenting Illness and Hospital Course  This is a 41 y.o. male admitted 12/31/2021 with multiple sclerosis, chronic pain with pain pump, neurogenic bladder with suprapubic cath who presented to the hospital with fever and diarrhea and admitted with sepsis. He was treated aggressively for C diff and UTI. He had uncontrolled pain and SOB. Patient and family decided to transition to comfort care. He was kept comfortable and passed away as expected.    Death Date: 12/31/21   Death Time: 1543         Pronounced By (RN1): AURORA Veliz  Pronounced By (RN2): AURORA Johnson

## 2021-12-31 NOTE — PROGRESS NOTES
Report given to Carlita SIMON via report at the bedside. Patient handed off in stable condition with HHFNC in place at 40L/50%. Family at bedside and aware of plan of care. In event patient passes overnight, [facility] Safety measures in place. Call light within reach. Care relinquished.

## 2021-12-31 NOTE — PROGRESS NOTES
"Received pt from Carlita RN, pt family is at bedside (8 individuals). \"OK\" for family to remain at bedside per doctor Sanchez overnight. Pt is comfort care, see MAR medication administration for sedation and comfort. Per Dr Sanchez, family allowed to decide on weaning of oxygen delivery overnight to withdraw care. Assuming pt care, all safety measures in place.   "

## 2021-12-31 NOTE — DIETARY
Nutrition Services:  Consult received for BMI<19. Patient now transitioned to comfort care. Re-consult RD as needed.     RD available prn.

## 2021-12-31 NOTE — PROGRESS NOTES
Assumed report and patient care from Chidi SIMON. Patient is resting comfortably in bed with 2 family members at bedside as discussed, mother and son who have been approved by NAM and floor staff including charge RN. Family updated on POC and medications. Safety measures in place, call light within reach.

## 2021-12-31 NOTE — PROGRESS NOTES
Palliative Care   Provided supportive visit to patient/family.  Mom Kandi, Dad Zen Flores, uncle Supa, son Chris, and friend Dandre at bedside.  Patient obtunded, on continuous Fentanyl gtt and Versed gtt, appears comfortable, although mildly diaphoretic.  Kandi shared patient was accepted onto inpatient hospice ([facility]) earlier today.      Re-educated family about irregular breathing, periods of apnea.  Normalized presence of tracheal secretions and advised scopolamine patch will help diminish secretions, but will not take away altogether.  Normalized secretions, apnea as normal part of dying process.  I provided therapeutic presence and advised family that they and Epifanio have been in my thoughts both last night and today.  Expressed my belief that Epifanio can sense their presence, which surely provides him comfort.  Replenished (2) pitchers of ice water and (1) pitcher of ice.  Placed on bedside table.  Family appreciative of visit.  Family has my business card with Palliative Care contact information.  Encouraged family to reach out with any needs, or if they just need to talk.    MAUREEN Casas, [facility]  Palliative Care Nurse Practitioner  [phone]

## 2022-01-01 NOTE — PROGRESS NOTES
Versed and fentanyl IV administration cosigned by AURORA Johnson. AURORA Johnson verified waste of 107mg of versed and 160mcg of fentanyl. Wasted in the omnicell and cosigned by AURORA Johnson in this progress note.

## 2022-01-27 LAB
FUNGUS SPEC CULT: NORMAL
FUNGUS SPEC FUNGUS STN: NORMAL
SIGNIFICANT IND 70042: NORMAL
SITE SITE: NORMAL
SOURCE SOURCE: NORMAL

## 2022-05-02 NOTE — ED NOTES
"Chief Complaint   Patient presents with   • Urinary Catheter Problem     Pt BIB [facility] from [facility] for blood in superpubic catheter - [facility] stated that staff at [facility] changed catheter and noticed blood draining out; Pt mom stated that Pt had hardly any urine in FC collection bag, so [facility] staff changed catheter, then noticed \"a lot\" draining; Pt +nauseous but mom stated that this is normal; Pt has Hx of MS - very light sensitive; Pt mom stated that \"many bladder stones\" - has scheduled appt this Thursday here c MD Good; Pt mom stated that MD Reddy would like Pt to see cardiolog.   ...cardiologist prior to procedure this Thursday because they are going to put Pt under for procedure; Pt is bedbound;      " Goal Outcome Evaluation:  Plan of Care Reviewed With: patient  Patient Agreement with Plan of Care: agrees        Outcome Evaluation: New admit.    Problem: Adult Behavioral Health Plan of Care  Goal: Plan of Care Review  Outcome: Ongoing, Progressing  Flowsheets (Taken 5/2/2022 [id number])  Plan of Care Reviewed With: patient  Patient Agreement with Plan of Care: agrees  Outcome Evaluation: New admit.  Goal: Patient-Specific Goal (Individualization)  Outcome: Ongoing, Progressing  Goal: Adheres to Safety Considerations for Self and Others  Outcome: Ongoing, Progressing  Goal: Absence of New-Onset Illness or Injury  Outcome: Ongoing, Progressing  Goal: Optimized Coping Skills in Response to Life Stressors  Outcome: Ongoing, Progressing  Goal: Develops/Participates in Therapeutic [place] to Support Successful Transition  Outcome: Ongoing, Progressing

## 2022-08-22 NOTE — PROGRESS NOTES
"Subjective:       Patient ID: Charlie Francois is a 63 y.o. female.    Chief Complaint: Labs Only (Requesting to get thyroid check. Feeling fatigue, light headness for 2-3 months. )    Had covid several months ago. Still having fatigue and occasional SOB. She smoked for 30 years but has been quit for years. Wants a chest xray. Also c/o hair loss. Wants lab evaluation.     Review of Systems   Constitutional: Positive for malaise/fatigue. Negative for chills and fever.   HENT: Negative.    Respiratory: Negative.    Cardiovascular: Negative.    Musculoskeletal: Negative.    Neurological: Negative.           Reviewed family, medical, surgical, and social history.    Objective:      /60 (BP Location: Left arm, Patient Position: Sitting, BP Method: Small (Manual))   Pulse 63   Temp 98.3 °F (36.8 °C) (Oral)   Resp 16   Ht 5' 6" (1.676 m)   Wt 79.8 kg (176 lb)   SpO2 99%   BMI 28.41 kg/m²   Physical Exam  Vitals and nursing note reviewed.   Constitutional:       General: She is not in acute distress.     Appearance: Normal appearance. She is normal weight. She is not ill-appearing, toxic-appearing or diaphoretic.   HENT:      Head: Normocephalic.      Right Ear: Tympanic membrane, ear canal and external ear normal.      Left Ear: Tympanic membrane, ear canal and external ear normal.      Nose: No congestion or rhinorrhea.      Mouth/Throat:      Mouth: Mucous membranes are moist.      Pharynx: No oropharyngeal exudate or posterior oropharyngeal erythema.   Neck:      Vascular: No carotid bruit.   Cardiovascular:      Rate and Rhythm: Normal rate and regular rhythm.      Heart sounds: Normal heart sounds.   Pulmonary:      Effort: Pulmonary effort is normal.      Breath sounds: Normal breath sounds.   Musculoskeletal:      Cervical back: Normal range of motion and neck supple. No rigidity or tenderness.   Lymphadenopathy:      Cervical: No cervical adenopathy.   Skin:     General: Skin is warm and dry.      " Two RN skin check with Kristofer SIMON.  Blanchable red b/l heels. Tattoos to b/l arms and hands.  Pt has surgical scar to sacrum.  No skin break down noted. Pt on air loss mattress.   Capillary Refill: Capillary refill takes less than 2 seconds.   Neurological:      Mental Status: She is alert and oriented to person, place, and time.   Psychiatric:         Mood and Affect: Mood normal.         Behavior: Behavior normal.         Thought Content: Thought content normal.         Judgment: Judgment normal.            No visits with results within 1 Day(s) from this visit.   Latest known visit with results is:   Office Visit on 04/29/2022   Component Date Value Ref Range Status    SARS Coronavirus 2 Antigen 04/29/2022 Negative  Negative Final     Acceptable 04/29/2022 Yes   Final      Assessment:       1. Cough    2. Fatigue, unspecified type    3. Encounter for screening    4. SOB (shortness of breath)    5. Former smoker    6. Unspecified disturbances of skin sensation     7. Vitamin D deficiency, unspecified         Plan:       Cough  -     X-Ray Chest PA And Lateral; Future; Expected date: 08/22/2022    Fatigue, unspecified type  -     CBC Auto Differential; Future; Expected date: 08/22/2022  -     Comprehensive Metabolic Panel; Future; Expected date: 08/22/2022  -     TSH; Future; Expected date: 08/22/2022  -     T4, Free; Future; Expected date: 08/22/2022  -     T3; Future; Expected date: 08/22/2022  -     T4; Future; Expected date: 08/22/2022    Encounter for screening  -     Vitamin B12; Future; Expected date: 08/22/2022    SOB (shortness of breath)  -     Vitamin B12; Future; Expected date: 08/22/2022    Former smoker    Unspecified disturbances of skin sensation   -     Vitamin B12; Future; Expected date: 08/22/2022    Vitamin D deficiency, unspecified   -     Vitamin D; Future; Expected date: 08/22/2022    I will call with results  RTC PRN          Risks, benefits, and side effects were discussed with the patient. All questions were answered to the fullest satisfaction of the patient, and pt verbalized understanding and agreement to treatment plan. Pt was to call with any  new or worsening symptoms, or present to the ER.

## 2022-11-30 NOTE — PROGRESS NOTES
Left VM  To Esther Lara @ [facility] Coordination Team. [facility] Patient Advocate was calling to introduce ourself's to the case now  that the patient is discharge from the hospital.  Left contact information [phone] . [facility] requested a return call if there was anything that needed to be addressed regarding the patient case.    ambulatory

## 2023-12-04 NOTE — DISCHARGE INSTRUCTIONS
Epperson Catheter Care, Adult  A Epperson catheter is a soft, flexible tube that is placed into the bladder to drain urine. A Epperson catheter may be inserted if:  · You leak urine or are not able to control when you urinate (urinary incontinence).  · You are not able to urinate when you need to (urinary retention).  · You had prostate surgery or surgery on the genitals.  · You have certain medical conditions, such as multiple sclerosis, dementia, or a spinal cord injury.  If you are going home with a Epperson catheter in place, follow the instructions below.  TAKING CARE OF THE CATHETER  1. Wash your hands with soap and water.  2. Using mild soap and warm water on a clean washcloth:  ¨ Clean the area on your body closest to the catheter insertion site using a circular motion, moving away from the catheter. Never wipe toward the catheter because this could sweep bacteria up into the urethra and cause infection.  ¨ Remove all traces of soap. Pat the area dry with a clean towel. For males, reposition the foreskin.  3. Attach the catheter to your leg so there is no tension on the catheter. Use adhesive tape or a leg strap. If you are using adhesive tape, remove any sticky residue left behind by the previous tape you used.  4. Keep the drainage bag below the level of the bladder, but keep it off the floor.  5. Check throughout the day to be sure the catheter is working and urine is draining freely. Make sure the tubing does not become kinked.  6. Do not pull on the catheter or try to remove it. Pulling could damage internal tissues.  TAKING CARE OF THE DRAINAGE BAGS  You will be given two drainage bags to take home. One is a large overnight drainage bag, and the other is a smaller leg bag that fits underneath clothing. You may wear the overnight bag at any time, but you should never wear the smaller leg bag at night. Follow the instructions below for how to empty, change, and clean your drainage bags.  Emptying the Drainage  Bag  You must empty your drainage bag when it is  -½ full or at least 2-3 times a day.  1. Wash your hands with soap and water.  2. Keep the drainage bag below your hips, below the level of your bladder. This stops urine from going back into the tubing and into your bladder.  3. Hold the dirty bag over the toilet or a clean container.  4. Open the pour spout at the bottom of the bag and empty the urine into the toilet or container. Do not let the pour spout touch the toilet, container, or any other surface. Doing so can place bacteria on the bag, which can cause an infection.  5. Clean the pour spout with a gauze pad or cotton ball that has rubbing alcohol on it.  6. Close the pour spout.  7. Attach the bag to your leg with adhesive tape or a leg strap.  8. Wash your hands well.  Changing the Drainage Bag  Change your drainage bag once a month or sooner if it starts to smell bad or look dirty. Below are steps to follow when changing the drainage bag.  1. Wash your hands with soap and water.  2. Pinch off the rubber catheter so that urine does not spill out.  3. Disconnect the catheter tube from the drainage tube at the connection valve. Do not let the tubes touch any surface.  4. Clean the end of the catheter tube with an alcohol wipe. Use a different alcohol wipe to clean the end of the drainage tube.  5. Connect the catheter tube to the drainage tube of the clean drainage bag.  6. Attach the new bag to the leg with adhesive tape or a leg strap. Avoid attaching the new bag too tightly.  7. Wash your hands well.  Cleaning the Drainage Bag  1. Wash your hands with soap and water.  2. Wash the bag in warm, soapy water.  3. Rinse the bag thoroughly with warm water.  4. Fill the bag with a solution of white vinegar and water (1 cup vinegar to 1 qt warm water [.2 L vinegar to 1 L warm water]). Close the bag and soak it for 30 minutes in the solution.  5. Rinse the bag with warm water.  6. Hang the bag to dry with the  pour spout open and hanging downward.  7. Store the clean bag (once it is dry) in a clean plastic bag.  8. Wash your hands well.  PREVENTING INFECTION  · Wash your hands before and after handling your catheter.  · Take showers daily and wash the area where the catheter enters your body. Do not take baths. Replace wet leg straps with dry ones, if this applies.  · Do not use powders, sprays, or lotions on the genital area. Only use creams, lotions, or ointments as directed by your caregiver.  · For females, wipe from front to back after each bowel movement.  · Drink enough fluids to keep your urine clear or pale yellow unless you have a fluid restriction.  · Do not let the drainage bag or tubing touch or lie on the floor.  · Wear cotton underwear to absorb moisture and to keep your .  SEEK MEDICAL CARE IF:   · Your urine is cloudy or smells unusually bad.  · Your catheter becomes clogged.  · You are not draining urine into the bag or your bladder feels full.  · Your catheter starts to leak.  SEEK IMMEDIATE MEDICAL CARE IF:   · You have pain, swelling, redness, or pus where the catheter enters the body.  · You have pain in the abdomen, legs, lower back, or bladder.  · You have a fever.  · You see blood fill the catheter, or your urine is pink or red.  · You have nausea, vomiting, or chills.  · Your catheter gets pulled out.  MAKE SURE YOU:   · Understand these instructions.  · Will watch your condition.  · Will get help right away if you are not doing well or get worse.     This information is not intended to replace advice given to you by your health care provider. Make sure you discuss any questions you have with your health care provider.     Document Released: 12/18/2006 Document Revised: 05/04/2015 Document Reviewed: 12/09/2013  [employer] Patient Education ©2016 [employer]     25

## 2024-04-18 NOTE — H&P
"History and Physical    Date: 8/16/2019    PCP: NAOMIE Emmanuel      CC: MS and SPT    HPI: This is a 39 y.o. male who is presenting with above    Past Medical History:   Diagnosis Date   • Anxiety    • Back pain    • Blood transfusion    • Bowel habit changes     constipation   • Breath shortness     \"r/t to medication Ocrevus and Baclofen\" \"side effect   • Dental disorder     full denture   • Depression     PTSD/clinical depression   • Fall     4/6/2016   • Headache(784.0)    • Heart burn    • Heat intolerance     will vomit   • Hypertension    • Indigestion    • MS (multiple sclerosis) ([facility]) DX 8/20/2013    multiple lesions brain/spine   • Pain 06/20/2018    \"everywhere\"   • Presence of intrathecal baclofen pump     right side   • Urinary bladder disorder     UTI/catheter/bladder stone   • Urinary incontinence     catheter in place       Past Surgical History:   Procedure Laterality Date   • PUMP INSERT/REMOVE  6/26/2018    Procedure: PUMP INSERT/REMOVE - BACLOFEN;  Surgeon: Andrew Gomez M.D.;  Location: [facility];  Service: Pain Management   • CYSTOSCOPY  5/7/2018    Procedure: CYSTOSCOPY;  Surgeon: Kwasi Sapp M.D.;  Location: [facility];  Service: Urology   • LASERTRIPSY  5/7/2018    Procedure: LASERTRIPSY - FOR: LITHOLAPAXY;  Surgeon: Kwasi Sapp M.D.;  Location: [facility];  Service: Urology   • BLOCK EPIDURAL STEROID INJECTION N/A 3/27/2018    Procedure: BLOCK EPIDURAL STEROID INJECTION - BACLOFEN PUMP TRIAL;  Surgeon: Andrew Gomez M.D.;  Location: [facility];  Service: Pain Management   • GASTROSCOPY  1/14/2017    Procedure: GASTROSCOPY WITH FECAL MATTER TRANSPLANT;  Surgeon: Obinna Douglas M.D.;  Location: [facility];  Service:    • OPEN REDUCTION Left 1988    left thumb       Current Facility-Administered Medications   Medication Dose Route Frequency Provider Last Rate Last Dose   • NS infusion   Intravenous " CT CAP- R Hilar mass with lymphangitic spread of tumor, Right hilar and perihilar heterogeneously enhancing mass measures 5.5 x 6.3 x 5.2 cm,  Innumerable hypoenhancing lesions throughout the liver measuring up to 3.1 cm   Likely new diagnosis metastatic cancer.  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   Outpatient medical oncology appointment scheduled 5/1   Continuous DEJAH Burnett            Social History     Socioeconomic History   • Marital status:      Spouse name: Not on file   • Number of children: Not on file   • Years of education: Not on file   • Highest education level: Not on file   Occupational History   • Not on file   Social Needs   • Financial resource strain: Not on file   • Food insecurity:     Worry: Not on file     Inability: Not on file   • Transportation needs:     Medical: Not on file     Non-medical: Not on file   Tobacco Use   • Smoking status: Former Smoker     Packs/day: 1.00     Years: 20.00     Pack years: 20.00     Start date: 1997     Last attempt to quit: 2013     Years since quittin.0   • Smokeless tobacco: Never Used   Substance and Sexual Activity   • Alcohol use: Yes     Alcohol/week: 0.0 oz     Frequency: Never   • Drug use: Not Currently     Types: Inhaled     Comment: stopped 2019   • Sexual activity: Not on file     Comment: once daily for 23 yrs   Lifestyle   • Physical activity:     Days per week: Not on file     Minutes per session: Not on file   • Stress: Not on file   Relationships   • Social connections:     Talks on phone: Not on file     Gets together: Not on file     Attends Sabianism service: Not on file     Active member of club or organization: Not on file     Attends meetings of clubs or organizations: Not on file     Relationship status: Not on file   • Intimate partner violence:     Fear of current or ex partner: Not on file     Emotionally abused: Not on file     Physically abused: Not on file     Forced sexual activity: Not on file   Other Topics Concern   • Not on file   Social History Narrative   • Not on file       Family History   Problem Relation Age of Onset   • Thyroid Mother    • Hypertension Mother    • Hypertension Father    • Psychiatric Illness Father        Allergies:  Asa [aspirin]; Gabapentin; and Pregabalin    Review of Systems:  Negative except for penile pain,  dyskinesia, weakness    Physical Exam   Constitutional: He is oriented to person, place, and time. He appears well-developed and well-nourished.   HENT:   Head: Normocephalic and atraumatic.   Eyes: Conjunctivae are normal.   Neck: Neck supple.   Cardiovascular: Regular rhythm.   Pulmonary/Chest: Effort normal.   Abdominal: Soft.   Neurological: He is alert and oriented to person, place, and time.   Skin: Skin is warm and dry.   Psychiatric: His behavior is normal. Thought content normal.       Radiology:  IR-DRAINAGE-CATH EXCHANGE    (Results Pending)             Assessment and Plan:This is a 39 y.o. With MS and SPT here for upsize and conversion to softer tube.

## 2024-05-28 NOTE — ED PROVIDER NOTES
Here for follow-up    Being followed by nephrology for chronic renal failure    She has an appointment virtually with the nephrologist advised her to discuss about her serum protein electrophoresis results    For diabetes I will increase the Trulicity to 1.5 mg    Continue rest of the medicine as it is    Advised her to come back in August for her annual physical   ED Provider Note    CHIEF COMPLAINT  Chief Complaint   Patient presents with   • N/V     x 2 started last night    • Other     decrease urine output       HPI  Jerzy Juan III is a 37 y.o. male who presents with nausea, change in urine with diminished output and dark cloudy urine.  Patient self catheterizes, has history of urinary tract infection.  Patient states he takes daily antibiotics however this is changed to Bactrim whenever he gets a UTI.  Patient is vomiting twice today.  He has had nausea with vomiting over the past 2 days.  Patient suffers from multiple sclerosis, has loss of sensation and strength to his legs.  No acute neurologic problems at this time.  He has history of constipation, currently denying diarrhea.  No abdominal pain.    REVIEW OF SYSTEMS  Constitutional: Malaise  Respiratory: No shortness of breath  Cardiac: No chest pain or syncope  Gastrointestinal: Vomiting  Musculoskeletal: No acute back pain  Neurologic: No headache.  Multiple sclerosis, multiple neurologic deficits, no acute complaint.  Genitourinary: Diminished urine output, cloudiness and darkness to his urine          PAST MEDICAL HISTORY  Past Medical History:   Diagnosis Date   • Back pain    • Blood transfusion    • Depression     PTSD/clinical depression   • Fall     4/6/2016   • Headache(784.0)    • Heart murmur    • MS (multiple sclerosis) ([facility]) DX 8/20/2013   • Urinary incontinence     condom catheter       FAMILY HISTORY  Family History   Problem Relation Age of Onset   • Thyroid Mother        SOCIAL HISTORY  Social History     Social History   • Marital status:      Spouse name: N/A   • Number of children: N/A   • Years of education: N/A     Social History Main Topics   • Smoking status: Former Smoker     Start date: 8/1/1997     Quit date: 8/1/2013   • Smokeless tobacco: Former User   • Alcohol use 0.0 oz/week      Comment: 1 beer/day   • Drug use: Yes     Types: Inhaled      Comment: marijuana   •  Sexual activity: Not on file      Comment: once daily for 23 yrs     Other Topics Concern   • Not on file     Social History Narrative   • No narrative on file       SURGICAL HISTORY  Past Surgical History:   Procedure Laterality Date   • GASTROSCOPY  1/14/2017    Procedure: GASTROSCOPY WITH FECAL MATTER TRANSPLANT;  Surgeon: Obinna Douglas M.D.;  Location: [facility];  Service:    • OPEN REDUCTION         CURRENT MEDICATIONS  Home Medications     Reviewed by Kelley Ferrell R.N. (Registered Nurse) on 01/29/18 at 1048  Med List Status: Complete   Medication Last Dose Status   baclofen (LIORESAL) 20 MG tablet 1/28/2018 Active   buPROPion SR (WELLBUTRIN-SR) 150 MG TABLET SR 12 HR sustained-release tablet 1/28/2018 Active   Cholecalciferol (VITAMIN D3) 5000 UNITS Tab 1/28/2018 Active   Cranberry 125 MG Tab 1/28/2018 Active   docusate sodium (COLACE) 250 MG capsule 1/28/2018 Active   escitalopram (LEXAPRO) 20 MG tablet 1/28/2018 Active   losartan (COZAAR) 50 MG Tab 1/28/2018 Active   Magnesium 300 MG Cap 1/28/2018 Active   methenamine hip (HIPPREX) 1 GM Tab 1/28/2018 Active   Multiple Vitamins-Minerals (MULTIVITAMIN & MINERAL PO) 1/28/2018 Active   NS SOLN 250 mL with ocrelizumab 300 MG/10ML SOLN 300 mg 1/24/2018 Active   omeprazole (PRILOSEC) 20 MG delayed-release capsule 1/28/2018 Active   ondansetron (ZOFRAN) 8 MG Tab 1/28/2018 Active   oxybutynin SR (DITROPAN-XL) 10 MG CR tablet 1/28/2018 Active   polyethylene glycol/lytes (MIRALAX) Pack Unknown Active   Probiotic Product (PROBIOTIC DAILY PO) 1/28/2018 Active   psyllium (METAMUCIL) 58.12 % Pack 1/28/2018 Active                ALLERGIES  Allergies   Allergen Reactions   • Other Drug      CANNOT TAKE ANY STEROIDS WITH RECENT TYSABRI INFUSION   • Asa [Aspirin] Unspecified     Bleeding in stomach as a baby   • Gabapentin Anxiety     anger and severe mood swing   • Lyrica Anxiety     anger and severe mood swing       PHYSICAL EXAM  VITAL SIGNS: /84    Pulse 88   Temp 37.1 °C (98.7 °F)   Resp 20   Wt 72.6 kg (160 lb)   SpO2 93%   BMI 22.32 kg/m²   Constitutional: Nontoxic appearance  ENT: Nares clear, mucous membranes mostly dry.  Eyes:  Conjunctiva normal, No discharge.    Lymphatic: No adenopathy.   Cardiovascular: Normal heart rate, Normal rhythm.   Pulmonary: No wheezing, no rales  Gastrointestinal: Soft, nondistended, nontender  Skin: Warm, Dry, No jaundice  Musculoskeletal:  No CVA tenderness.   Neurologic: Alert, bilateral leg numbness and complete weakness.  Left eyelid weakness.  Slurred speech  Psychiatric: Patient is calm and cooperative.    RADIOLOGY/PROCEDURES/Labs  Results for orders placed or performed during the hospital encounter of 01/29/18   CBC WITH DIFFERENTIAL   Result Value Ref Range    WBC 10.6 4.8 - 10.8 K/uL    RBC 5.28 4.70 - 6.10 M/uL    Hemoglobin 16.0 14.0 - 18.0 g/dL    Hematocrit 47.2 42.0 - 52.0 %    MCV 89.4 81.4 - 97.8 fL    MCH 30.3 27.0 - 33.0 pg    MCHC 33.9 33.7 - 35.3 g/dL    RDW 38.9 35.9 - 50.0 fL    Platelet Count 326 164 - 446 K/uL    MPV 8.7 (L) 9.0 - 12.9 fL    Neutrophils-Polys 72.00 44.00 - 72.00 %    Lymphocytes 18.20 (L) 22.00 - 41.00 %    Monocytes 8.40 0.00 - 13.40 %    Eosinophils 0.40 0.00 - 6.90 %    Basophils 0.70 0.00 - 1.80 %    Immature Granulocytes 0.30 0.00 - 0.90 %    Nucleated RBC 0.00 /100 WBC    Neutrophils (Absolute) 7.61 (H) 1.82 - 7.42 K/uL    Lymphs (Absolute) 1.92 1.00 - 4.80 K/uL    Monos (Absolute) 0.89 (H) 0.00 - 0.85 K/uL    Eos (Absolute) 0.04 0.00 - 0.51 K/uL    Baso (Absolute) 0.07 0.00 - 0.12 K/uL    Immature Granulocytes (abs) 0.03 0.00 - 0.11 K/uL    NRBC (Absolute) 0.00 K/uL   COMP METABOLIC PANEL   Result Value Ref Range    Sodium 138 135 - 145 mmol/L    Potassium 3.6 3.6 - 5.5 mmol/L    Chloride 103 96 - 112 mmol/L    Co2 25 20 - 33 mmol/L    Anion Gap 10.0 0.0 - 11.9    Glucose 99 65 - 99 mg/dL    Bun 12 8 - 22 mg/dL    Creatinine 0.78 0.50 - 1.40 mg/dL    Calcium 10.2 8.5 -  10.5 mg/dL    AST(SGOT) 13 12 - 45 U/L    ALT(SGPT) 10 2 - 50 U/L    Alkaline Phosphatase 78 30 - 99 U/L    Total Bilirubin 0.9 0.1 - 1.5 mg/dL    Albumin 4.2 3.2 - 4.9 g/dL    Total Protein 7.0 6.0 - 8.2 g/dL    Globulin 2.8 1.9 - 3.5 g/dL    A-G Ratio 1.5 g/dL   URINALYSIS,CULTURE IF INDICATED   Result Value Ref Range    Color Yellow     Character Turbid (A)     Specific Gravity 1.021 <1.035    Ph >=9.0 (A) 5.0 - 8.0    Glucose Negative Negative mg/dL    Ketones 80 (A) Negative mg/dL    Protein Negative Negative mg/dL    Bilirubin Negative Negative    Urobilinogen, Urine 1.0 Negative    Nitrite Negative Negative    Leukocyte Esterase Moderate (A) Negative    Occult Blood Negative Negative    Micro Urine Req Microscopic     Culture Indicated Yes UA Culture   ESTIMATED GFR   Result Value Ref Range    GFR If African American >60 >60 mL/min/1.73 m 2    GFR If Non African American >60 >60 mL/min/1.73 m 2   URINE MICROSCOPIC (W/UA)   Result Value Ref Range    WBC 5-10 (A) /hpf    RBC 0-2 (A) /hpf    Bacteria Rare (A) None /hpf    Epithelial Cells Rare /hpf         COURSE & MEDICAL DECISION MAKING  Pertinent Labs & Imaging studies reviewed. (See chart for details)  Patient with history of recurrent urinary tract infections, now with malaise and vomiting and change in urine color.  Urinalysis shows elevation of white blood cells, and after discussion with the patient and his mother he has been placed on Bactrim the usual medication for this according to them.  I've advised to review nausea medication with them regarding the possibility of prolonged QT syndrome in the setting of the patient taking Lexapro.  Patient felt better after IV Zofran in the ER, also after IV fluids.  He is provided IV hydration secondary to evidence of dehydration.  Patient feels improved.  He will return if worse.  Differential diagnosis includes medication side effect, viral syndrome, food poisoning.    FINAL IMPRESSION  1. Non-intractable  vomiting with nausea, unspecified vomiting type    2. Urinary tract infection without hematuria, site unspecified              Electronically signed by: Obinna Monroe, 1/29/2018 3:18 PM

## 2025-01-17 NOTE — ED NOTES
Med rec partial per MAR sent with patient from [facility]. Unable to verify pain pump contents at this time; SAIGE Gomez's office closed; contacted [facility] to request copy of telemetry report from most recent pain pump refill.  Allergies reviewed per MAR.  Patient is on a 5 day course of Mavenclad, started on 12/23/2021.  Patient was on a 20 day course of oral vancomycin from 11/24/2021 - 12/14/2021.    Addendum: per [facility], only pain pump information on file is what is entered on patient's MAR, which consists of only the medications in the pump (baclofen and ketamine) and maximum boluses per day (6). Unable to verify infusion rates or bolus dosing at this time.   22-Oct-2024 22:28 no chest pain/no palpitations

## (undated) DEVICE — GLOVE BIOGEL PI ULTRATOUCH SZ 7.5 SURGICAL PF LF -(50/BX 4BX/CA)

## (undated) DEVICE — LASER FIBER HOLM 910 MICRON 100 WATT (5EA/BX)

## (undated) DEVICE — SUCTION INSTRUMENT YANKAUER BULBOUS TIP W/O VENT (50EA/CA)

## (undated) DEVICE — PACK MAJOR BASIN - (2EA/CA)

## (undated) DEVICE — SENSOR SPO2 NEO LNCS ADHESIVE (20/BX) SEE USER NOTES

## (undated) DEVICE — PAD LAP STERILE 18 X 18 - (5/PK 40PK/CA)

## (undated) DEVICE — DRAPE C-ARM LARGE 41IN X 74 IN - (10/BX 2BX/CA)

## (undated) DEVICE — KIT ROOM DECONTAMINATION

## (undated) DEVICE — BOVIE NEEDLE TIP INSULATD NON-SAFETY 2CM (50/PK)

## (undated) DEVICE — PROTECTOR ULNA NERVE - (36PR/CA)

## (undated) DEVICE — NEPTUNE 4 PORT MANIFOLD - (20/PK)

## (undated) DEVICE — CANISTER SUCTION RIGID RED 1500CC (40EA/CA)

## (undated) DEVICE — HEAD HOLDER JUNIOR/ADULT

## (undated) DEVICE — MASK ANESTHESIA ADULT  - (100/CA)

## (undated) DEVICE — SET IRRIGATION CYSTOSCOPY Y-TYPE L81 IN (20EA/CA)

## (undated) DEVICE — LACTATED RINGERS INJ 1000 ML - (14EA/CA 60CA/PF)

## (undated) DEVICE — WATER IRRIGATION STERILE 1000ML (12EA/CA)

## (undated) DEVICE — PROTECTOR WOUND (DEXTERITY) - (5/BX)

## (undated) DEVICE — SUTURE 2-0 ETHILON FS - (36/BX) 18 INCH

## (undated) DEVICE — GLOVE BIOGEL SZ 8 SURGICAL PF LTX - (50PR/BX 4BX/CA)

## (undated) DEVICE — TUBE CONNECTING SUCTION - CLEAR PLASTIC STERILE 72 IN (50EA/CA)

## (undated) DEVICE — SWAB ANAEROBIC SPEC.COLLECTOR - (25/PK 4PK/CA 100EA/CA)

## (undated) DEVICE — TUBE CONNECT SUCTION CLEAR 120 X 1/4" (50EA/CA)"

## (undated) DEVICE — DRAPE LARGE 3 QUARTER - (20/CA)

## (undated) DEVICE — BLADE SURGICAL #15 - (50/BX 3BX/CA)

## (undated) DEVICE — TOWELS CLOTH SURGICAL - (4/PK 20PK/CA)

## (undated) DEVICE — CANNULA W/ SUPPLY TUBING O2 - (50/CA)

## (undated) DEVICE — GLOVE BIOGEL SZ 7.5 SURGICAL PF LTX - (50PR/BX 4BX/CA)

## (undated) DEVICE — SET LEADWIRE 5 LEAD BEDSIDE DISPOSABLE ECG (1SET OF 5/EA)

## (undated) DEVICE — SET EXTENSION WITH 2 PORTS (48EA/CA) ***PART #2C8610 IS A SUBSTITUTE*****

## (undated) DEVICE — TOWEL STOP TIMEOUT SAFETY FLAG (40EA/CA)

## (undated) DEVICE — GOWN WARMING STANDARD FLEX - (30/CA)

## (undated) DEVICE — CHLORAPREP 26 ML APPLICATOR - ORANGE TINT(25/CA)

## (undated) DEVICE — SPONGE GAUZESTER 4 X 4 4PLY - (128PK/CA)

## (undated) DEVICE — SUTURE GENERAL

## (undated) DEVICE — ELECTRODE 850 FOAM ADHESIVE - HYDROGEL RADIOTRNSPRNT (50/PK)

## (undated) DEVICE — GOWN SURGEONS X-LARGE - DISP. (30/CA)

## (undated) DEVICE — PACK MINOR BASIN - (2EA/CA)

## (undated) DEVICE — NEEDLE WHITACRE SPINAL NON-SAFETY 25GA X 3.5 (10EA/BX)

## (undated) DEVICE — GLOVE BIOGEL PI INDICATOR SZ 7.0 SURGICAL PF LF - (50/BX 4BX/CA)

## (undated) DEVICE — GOWN SURGICAL X-LARGE ULTRA - FILM-REINFORCED (20/CA)

## (undated) DEVICE — ELECTRODE DUAL RETURN W/ CORD - (50/PK)

## (undated) DEVICE — CATHETER PASSER

## (undated) DEVICE — JELLY SURGILUBE STERILE TUBE 4.25 OZ (1/EA)

## (undated) DEVICE — WATER IRRIG. STER. 1500 ML - (9/CA)

## (undated) DEVICE — SODIUM CHL IRRIGATION 0.9% 1000ML (12EA/CA)

## (undated) DEVICE — BAG SPONGE COUNT 10.25 X 32 - BLUE (250/CA)

## (undated) DEVICE — SET IRRIGATION CYSTOSCOPY TUBE L80 IN (20EA/CA)

## (undated) DEVICE — SYRINGE SAFETY 3 ML 18 GA X 1 1/2 BLUNT LL (100/BX 8BX/CA)

## (undated) DEVICE — SYRINGE SAFETY 10 ML 18 GA X 1 1/2 BLUNT LL (100/BX 4BX/CA)

## (undated) DEVICE — KIT ANESTHESIA W/CIRCUIT & 3/LT BAG W/FILTER (20EA/CA)

## (undated) DEVICE — MEDICINE CUP STERILE 2 OZ - (100/CA)

## (undated) DEVICE — BAG DRAINAGE URINARY CLOSED 2000ML (20EA/CA)

## (undated) DEVICE — JELLY, KY 2 0Z STERILE

## (undated) DEVICE — GLOVE BIOGEL INDICATOR SZ 7.5 SURGICAL PF LTX - (50PR/BX 4BX/CA)

## (undated) DEVICE — CRADLE EXTREMITY ARM ELEVATION CARDINAL (5EA/CA)

## (undated) DEVICE — WATER IRRIG. STER 3000 ML - (4/CA)

## (undated) DEVICE — COVER FOOT UNIVERSAL DISP. - (25EA/CA)

## (undated) DEVICE — GLOVE, LITE (PAIR)

## (undated) DEVICE — Device

## (undated) DEVICE — TUBING CLEARLINK DUO-VENT - C-FLO (48EA/CA)

## (undated) DEVICE — BAG, SPONGE COUNT 50600

## (undated) DEVICE — DRAPE LAPAROTOMY T SHEET - (12EA/CA)

## (undated) DEVICE — HUMID-VENT HEAT AND MOISTURE EXCHANGE- (50/BX)

## (undated) DEVICE — SUTURE 3-0 VICRYL PLUS SH - 8X 18 INCH (12/BX)

## (undated) DEVICE — BINDER ABDOMINAL 24X30X12 IN - FITS 24-30IN WAIST 12IN HIGH

## (undated) DEVICE — DRAPE STRLE REG TOWEL 18X24 - (10/BX 4BX/CA)"

## (undated) DEVICE — CONNECTOR HOSE NEPTUNE FOR CYSTO ROOM

## (undated) DEVICE — GLOVE BIOGEL SZ 7 SURGICAL PF LTX - (50PR/BX 4BX/CA)

## (undated) DEVICE — STAPLER SKIN DISP - (6/BX 10BX/CA) VISISTAT

## (undated) DEVICE — TUBE E-T HI-LO CUFF 8.0MM (10EA/PK)

## (undated) DEVICE — DRESSING TRANSPARENT FILM TEGADERM 4 X 4.75" (50EA/BX)"

## (undated) DEVICE — SYRINGE 10 ML CONTROL LL (25EA/BX 4BX/CA)

## (undated) DEVICE — BANDAID SHEER STRIP 3/4 IN (100EA/BX 12BX/CA)

## (undated) DEVICE — DRESSING BORDERED GAUZE 4X10 - (15EA/BX 10BX/CA 150EA/CA)

## (undated) DEVICE — SUTURE 1 PDS-2 PLUS CTX - (24/BX)

## (undated) DEVICE — SYRINGE 20 ML LL (50EA/BX 4BX/CA)

## (undated) DEVICE — DETERGENT RENUZYME PLUS 10 OZ PACKET (50/BX)

## (undated) DEVICE — SLEEVE VASO CALF MED - (10PR/CA)

## (undated) DEVICE — SUTURE 0 ETHIBOND MO6 C/R - (12/BX) 8-18 INCH ETHICON

## (undated) DEVICE — SHEET TRANSVERSE LAP - (12EA/CA)

## (undated) DEVICE — CANISTER SUCTION 3000ML MECHANICAL FILTER AUTO SHUTOFF MEDI-VAC NONSTERILE LF DISP  (40EA/CA)

## (undated) DEVICE — DRAPE IOBAN II INCISE 23X17 - (10EA/BX 4BX/CA)

## (undated) DEVICE — ANTENNA

## (undated) DEVICE — PACK CYSTOSCOPY - (14/CA)

## (undated) DEVICE — DRESSING XEROFORM 1X8 - (50/BX 4BX/CA)

## (undated) DEVICE — SYRINGE 20 ML LS (48/BX 4BX/CA)

## (undated) DEVICE — CATHETER FOLEY 22FR 5CC

## (undated) DEVICE — GLOVE BIOGEL INDICATOR SZ 8 SURGICAL PF LTX - (50/BX 4BX/CA)